# Patient Record
Sex: FEMALE | Race: WHITE | Employment: UNEMPLOYED | URBAN - METROPOLITAN AREA
[De-identification: names, ages, dates, MRNs, and addresses within clinical notes are randomized per-mention and may not be internally consistent; named-entity substitution may affect disease eponyms.]

---

## 2017-02-24 ENCOUNTER — HOSPITAL ENCOUNTER (OUTPATIENT)
Age: 52
Setting detail: SPECIMEN
Discharge: HOME | End: 2017-02-24
Payer: MEDICAID

## 2017-02-24 ENCOUNTER — HOSPITAL ENCOUNTER (OUTPATIENT)
Age: 52
Discharge: HOME | End: 2017-02-24
Payer: COMMERCIAL

## 2017-02-24 ENCOUNTER — OFFICE VISIT (OUTPATIENT)
Dept: FAMILY MEDICINE CLINIC | Facility: CLINIC | Age: 52
End: 2017-02-24

## 2017-02-24 VITALS
WEIGHT: 158.2 LBS | HEIGHT: 68 IN | BODY MASS INDEX: 23.98 KG/M2 | DIASTOLIC BLOOD PRESSURE: 81 MMHG | SYSTOLIC BLOOD PRESSURE: 138 MMHG | HEART RATE: 67 BPM | TEMPERATURE: 98 F

## 2017-02-24 DIAGNOSIS — M77.52 BONE SPUR OF LEFT FOOT: ICD-10-CM

## 2017-02-24 DIAGNOSIS — R56.9 SEIZURES (HCC): ICD-10-CM

## 2017-02-24 DIAGNOSIS — F32.A DEPRESSION, UNSPECIFIED DEPRESSION TYPE: ICD-10-CM

## 2017-02-24 DIAGNOSIS — G89.29 CHRONIC PAIN OF BOTH KNEES: ICD-10-CM

## 2017-02-24 DIAGNOSIS — M34.9 SCLERODERMA (HCC): ICD-10-CM

## 2017-02-24 DIAGNOSIS — M25.561 CHRONIC PAIN OF BOTH KNEES: ICD-10-CM

## 2017-02-24 DIAGNOSIS — R56.9 SEIZURE (HCC): ICD-10-CM

## 2017-02-24 DIAGNOSIS — Z13.9 SCREENING: ICD-10-CM

## 2017-02-24 DIAGNOSIS — Z76.89 ENCOUNTER TO ESTABLISH CARE: ICD-10-CM

## 2017-02-24 DIAGNOSIS — M25.562 CHRONIC PAIN OF BOTH KNEES: ICD-10-CM

## 2017-02-24 DIAGNOSIS — I10 ESSENTIAL HYPERTENSION: Primary | ICD-10-CM

## 2017-02-24 DIAGNOSIS — K21.9 GASTROESOPHAGEAL REFLUX DISEASE WITHOUT ESOPHAGITIS: ICD-10-CM

## 2017-02-24 LAB
ABSOLUTE EOS #: 0.2 K/UL (ref 0–0.4)
ABSOLUTE LYMPH #: 1.4 K/UL (ref 1–4.8)
ABSOLUTE MONO #: 0.4 K/UL (ref 0.1–1.2)
ALBUMIN SERPL-MCNC: 4.4 G/DL (ref 3.5–5.2)
ALBUMIN/GLOBULIN RATIO: 1.6 (ref 1–2.5)
ALP BLD-CCNC: 134 U/L (ref 35–104)
ALT SERPL-CCNC: 13 U/L (ref 5–33)
ANION GAP SERPL CALCULATED.3IONS-SCNC: 16 MMOL/L (ref 9–17)
AST SERPL-CCNC: 15 U/L
BASOPHILS # BLD: 1 % (ref 0–2)
BASOPHILS ABSOLUTE: 0.1 K/UL (ref 0–0.2)
BILIRUB SERPL-MCNC: 0.33 MG/DL (ref 0.3–1.2)
BUN BLDV-MCNC: 14 MG/DL (ref 6–20)
BUN/CREAT BLD: ABNORMAL (ref 9–20)
CALCIUM SERPL-MCNC: 9.4 MG/DL (ref 8.6–10.4)
CHLORIDE BLD-SCNC: 106 MMOL/L (ref 98–107)
CO2: 25 MMOL/L (ref 20–31)
CREAT SERPL-MCNC: 1.05 MG/DL (ref 0.5–0.9)
DIFFERENTIAL TYPE: ABNORMAL
EOSINOPHILS RELATIVE PERCENT: 3 % (ref 1–4)
GFR AFRICAN AMERICAN: >60 ML/MIN
GFR NON-AFRICAN AMERICAN: 55 ML/MIN
GFR SERPL CREATININE-BSD FRML MDRD: ABNORMAL ML/MIN/{1.73_M2}
GFR SERPL CREATININE-BSD FRML MDRD: ABNORMAL ML/MIN/{1.73_M2}
GLUCOSE BLD-MCNC: 59 MG/DL (ref 70–99)
HCT VFR BLD CALC: 33.9 % (ref 36–46)
HEMOGLOBIN: 11.9 G/DL (ref 12–16)
LYMPHOCYTES # BLD: 25 % (ref 24–44)
MCH RBC QN AUTO: 30.6 PG (ref 26–34)
MCHC RBC AUTO-ENTMCNC: 35 G/DL (ref 31–37)
MCV RBC AUTO: 87.3 FL (ref 80–100)
MONOCYTES # BLD: 8 % (ref 2–11)
PDW BLD-RTO: 13 % (ref 12.5–15.4)
PLATELET # BLD: 185 K/UL (ref 140–450)
PLATELET ESTIMATE: ABNORMAL
PMV BLD AUTO: 9 FL (ref 6–12)
POTASSIUM SERPL-SCNC: 4.6 MMOL/L (ref 3.7–5.3)
RBC # BLD: 3.88 M/UL (ref 4–5.2)
RBC # BLD: ABNORMAL 10*6/UL
SEG NEUTROPHILS: 63 % (ref 36–66)
SEGMENTED NEUTROPHILS ABSOLUTE COUNT: 3.3 K/UL (ref 1.8–7.7)
SODIUM BLD-SCNC: 147 MMOL/L (ref 135–144)
TOTAL PROTEIN: 7.2 G/DL (ref 6.4–8.3)
WBC # BLD: 5.4 K/UL (ref 3.5–11)
WBC # BLD: ABNORMAL 10*3/UL

## 2017-02-24 PROCEDURE — 99213 OFFICE O/P EST LOW 20 MIN: CPT | Performed by: FAMILY MEDICINE

## 2017-02-24 PROCEDURE — 99202 OFFICE O/P NEW SF 15 MIN: CPT | Performed by: HOSPITALIST

## 2017-02-24 RX ORDER — OMEPRAZOLE 20 MG/1
CAPSULE, DELAYED RELEASE ORAL
Refills: 4 | COMMUNITY
Start: 2017-02-09 | End: 2017-07-14 | Stop reason: SDUPTHER

## 2017-02-24 RX ORDER — CETIRIZINE HYDROCHLORIDE 10 MG/1
TABLET ORAL
Refills: 4 | COMMUNITY
Start: 2017-02-10 | End: 2018-03-09 | Stop reason: SDUPTHER

## 2017-02-24 RX ORDER — ERGOCALCIFEROL 1.25 MG/1
50000 CAPSULE ORAL WEEKLY
Status: ON HOLD | COMMUNITY
Start: 2017-02-22 | End: 2018-01-31 | Stop reason: HOSPADM

## 2017-02-24 RX ORDER — CYCLOBENZAPRINE HCL 5 MG
TABLET ORAL
Refills: 0 | COMMUNITY
Start: 2017-01-12 | End: 2017-03-17 | Stop reason: SDUPTHER

## 2017-02-24 RX ORDER — NALTREXONE HYDROCHLORIDE 50 MG/1
TABLET, FILM COATED ORAL
Refills: 2 | COMMUNITY
Start: 2017-01-10 | End: 2017-03-17 | Stop reason: SDUPTHER

## 2017-02-24 RX ORDER — POTASSIUM CHLORIDE 1.5 G/1.77G
20 POWDER, FOR SOLUTION ORAL 2 TIMES DAILY
COMMUNITY
End: 2017-03-08 | Stop reason: SDUPTHER

## 2017-02-24 RX ORDER — PRAVASTATIN SODIUM 40 MG
TABLET ORAL
Refills: 4 | COMMUNITY
Start: 2017-02-10 | End: 2017-12-01 | Stop reason: SDUPTHER

## 2017-02-24 RX ORDER — LOSARTAN POTASSIUM 100 MG/1
TABLET ORAL
Refills: 3 | COMMUNITY
Start: 2017-02-09 | End: 2017-04-17 | Stop reason: SDUPTHER

## 2017-02-24 RX ORDER — FAMOTIDINE 40 MG/1
TABLET, FILM COATED ORAL
Refills: 4 | COMMUNITY
Start: 2017-02-09 | End: 2018-04-11

## 2017-02-24 RX ORDER — GABAPENTIN 300 MG/1
CAPSULE ORAL
Refills: 2 | Status: ON HOLD | COMMUNITY
Start: 2017-01-10 | End: 2018-01-31 | Stop reason: HOSPADM

## 2017-02-24 RX ORDER — QUETIAPINE FUMARATE 200 MG/1
TABLET, FILM COATED ORAL
Refills: 1 | COMMUNITY
Start: 2017-02-21 | End: 2017-04-17 | Stop reason: SDUPTHER

## 2017-02-24 RX ORDER — LANOLIN ALCOHOL/MO/W.PET/CERES
1000 CREAM (GRAM) TOPICAL DAILY
Status: ON HOLD | COMMUNITY
End: 2019-06-11 | Stop reason: HOSPADM

## 2017-02-24 RX ORDER — PREDNISONE 50 MG/1
50 TABLET ORAL DAILY PRN
COMMUNITY
End: 2017-12-01 | Stop reason: SDUPTHER

## 2017-02-24 RX ORDER — LIDOCAINE 5 G/100G
CREAM RECTAL; TOPICAL PRN
COMMUNITY
End: 2017-06-21 | Stop reason: CLARIF

## 2017-02-24 RX ORDER — FLUCONAZOLE 150 MG/1
TABLET ORAL
Refills: 2 | COMMUNITY
Start: 2016-12-24 | End: 2017-09-29 | Stop reason: ALTCHOICE

## 2017-02-24 RX ORDER — LAMOTRIGINE 25 MG/1
TABLET ORAL
COMMUNITY
Start: 2017-02-14 | End: 2017-03-17 | Stop reason: SDUPTHER

## 2017-02-24 RX ORDER — M-VIT,TX,IRON,MINS/CALC/FOLIC 27MG-0.4MG
1 TABLET ORAL DAILY
COMMUNITY

## 2017-02-24 RX ORDER — ASPIRIN 81 MG/1
81 TABLET ORAL DAILY
Status: ON HOLD | COMMUNITY
End: 2017-05-03 | Stop reason: HOSPADM

## 2017-02-24 RX ORDER — FELODIPINE 10 MG/1
TABLET, EXTENDED RELEASE ORAL
COMMUNITY
Start: 2017-02-12 | End: 2017-03-17 | Stop reason: SDUPTHER

## 2017-02-24 RX ORDER — IBUPROFEN 800 MG/1
TABLET ORAL
Refills: 0 | Status: ON HOLD | COMMUNITY
Start: 2017-01-29 | End: 2017-04-08 | Stop reason: HOSPADM

## 2017-02-24 RX ORDER — CHLORHEXIDINE GLUCONATE 0.12 MG/ML
RINSE ORAL
Refills: 0 | COMMUNITY
Start: 2016-12-20 | End: 2017-09-29 | Stop reason: SDUPTHER

## 2017-02-24 RX ORDER — POTASSIUM BICARBONATE 25 MEQ/1
25 TABLET, EFFERVESCENT ORAL DAILY
Status: ON HOLD | COMMUNITY
End: 2017-09-15 | Stop reason: CLARIF

## 2017-02-24 RX ORDER — ATENOLOL 100 MG/1
TABLET ORAL
Refills: 3 | COMMUNITY
Start: 2017-02-17 | End: 2017-09-11 | Stop reason: SDUPTHER

## 2017-02-24 RX ORDER — TRAMADOL HYDROCHLORIDE 50 MG/1
TABLET ORAL
Refills: 1 | COMMUNITY
Start: 2016-12-21 | End: 2017-06-12 | Stop reason: ALTCHOICE

## 2017-02-24 RX ORDER — VENLAFAXINE 75 MG/1
TABLET ORAL
Refills: 0 | COMMUNITY
Start: 2016-12-07 | End: 2017-03-17 | Stop reason: SDUPTHER

## 2017-02-24 RX ORDER — PROPRANOLOL HYDROCHLORIDE 10 MG/1
TABLET ORAL
Refills: 1 | COMMUNITY
Start: 2017-02-21 | End: 2017-12-01 | Stop reason: SDUPTHER

## 2017-02-24 RX ORDER — EPINEPHRINE 0.3 MG/.3ML
INJECTION INTRAMUSCULAR
Refills: 2 | COMMUNITY
Start: 2016-12-07 | End: 2017-09-29 | Stop reason: SDUPTHER

## 2017-02-24 ASSESSMENT — ENCOUNTER SYMPTOMS
ABDOMINAL DISTENTION: 0
APNEA: 0
COUGH: 0
ANAL BLEEDING: 0
CHOKING: 0
SHORTNESS OF BREATH: 0

## 2017-03-08 ENCOUNTER — OFFICE VISIT (OUTPATIENT)
Dept: FAMILY MEDICINE CLINIC | Facility: CLINIC | Age: 52
End: 2017-03-08

## 2017-03-08 VITALS
HEART RATE: 58 BPM | HEIGHT: 67 IN | DIASTOLIC BLOOD PRESSURE: 72 MMHG | BODY MASS INDEX: 25.9 KG/M2 | SYSTOLIC BLOOD PRESSURE: 136 MMHG | WEIGHT: 165 LBS

## 2017-03-08 DIAGNOSIS — M25.561 CHRONIC PAIN OF BOTH KNEES: ICD-10-CM

## 2017-03-08 DIAGNOSIS — M34.9 SCLERODERMA (HCC): ICD-10-CM

## 2017-03-08 DIAGNOSIS — R76.0 ANTINUCLEAR ANTIBODY (ANA) TITER GREATER THAN 1:80: ICD-10-CM

## 2017-03-08 DIAGNOSIS — F32.9 SINGLE CURRENT EPISODE OF MAJOR DEPRESSIVE DISORDER, UNSPECIFIED DEPRESSION EPISODE SEVERITY: ICD-10-CM

## 2017-03-08 DIAGNOSIS — F41.9 ANXIETY: ICD-10-CM

## 2017-03-08 DIAGNOSIS — N17.9 ACUTE KIDNEY INJURY (HCC): ICD-10-CM

## 2017-03-08 DIAGNOSIS — I10 ESSENTIAL HYPERTENSION: ICD-10-CM

## 2017-03-08 DIAGNOSIS — Z96.652 H/O TOTAL KNEE REPLACEMENT, LEFT: ICD-10-CM

## 2017-03-08 DIAGNOSIS — M25.562 CHRONIC PAIN OF BOTH KNEES: ICD-10-CM

## 2017-03-08 DIAGNOSIS — Z00.00 PHYSICAL EXAM: Primary | ICD-10-CM

## 2017-03-08 DIAGNOSIS — G89.29 CHRONIC PAIN OF BOTH KNEES: ICD-10-CM

## 2017-03-08 DIAGNOSIS — F31.9 BIPOLAR AFFECTIVE DISORDER, REMISSION STATUS UNSPECIFIED (HCC): ICD-10-CM

## 2017-03-08 LAB
BILIRUBIN, POC: NORMAL
BLOOD URINE, POC: NORMAL
CLARITY, POC: CLEAR
COLOR, POC: YELLOW
GLUCOSE URINE, POC: NORMAL
KETONES, POC: NORMAL
LEUKOCYTE EST, POC: NORMAL
NITRITE, POC: NORMAL
PH, POC: NORMAL
PROTEIN, POC: NORMAL
SPECIFIC GRAVITY, POC: 1.01
UROBILINOGEN, POC: NORMAL

## 2017-03-08 PROCEDURE — 81003 URINALYSIS AUTO W/O SCOPE: CPT | Performed by: NURSE PRACTITIONER

## 2017-03-08 PROCEDURE — 99214 OFFICE O/P EST MOD 30 MIN: CPT | Performed by: NURSE PRACTITIONER

## 2017-03-08 RX ORDER — HYDROXYCHLOROQUINE SULFATE 200 MG/1
200 TABLET, FILM COATED ORAL DAILY
Refills: 1 | COMMUNITY
Start: 2017-02-15 | End: 2017-12-07 | Stop reason: SDUPTHER

## 2017-03-08 ASSESSMENT — PATIENT HEALTH QUESTIONNAIRE - PHQ9
SUM OF ALL RESPONSES TO PHQ QUESTIONS 1-9: 0
1. LITTLE INTEREST OR PLEASURE IN DOING THINGS: 0
2. FEELING DOWN, DEPRESSED OR HOPELESS: 0
SUM OF ALL RESPONSES TO PHQ9 QUESTIONS 1 & 2: 0

## 2017-03-08 ASSESSMENT — ENCOUNTER SYMPTOMS
RHINORRHEA: 0
SINUS PRESSURE: 0
EYE DISCHARGE: 0
COUGH: 0
ABDOMINAL PAIN: 0
CONSTIPATION: 0
CHEST TIGHTNESS: 0
BLOOD IN STOOL: 0
SHORTNESS OF BREATH: 0
DIARRHEA: 0

## 2017-03-08 ASSESSMENT — VISUAL ACUITY
OS_CC: 20/50
OD_CC: 20/20

## 2017-03-10 ENCOUNTER — TELEPHONE (OUTPATIENT)
Dept: ORTHOPEDIC SURGERY | Facility: CLINIC | Age: 52
End: 2017-03-10

## 2017-03-17 RX ORDER — FELODIPINE 10 MG/1
10 TABLET, EXTENDED RELEASE ORAL DAILY
Qty: 30 TABLET | Refills: 3 | Status: SHIPPED | OUTPATIENT
Start: 2017-03-17 | End: 2017-07-28 | Stop reason: SDUPTHER

## 2017-03-17 RX ORDER — LAMOTRIGINE 25 MG/1
25 TABLET ORAL 2 TIMES DAILY
Qty: 60 TABLET | Refills: 3 | Status: ON HOLD | OUTPATIENT
Start: 2017-03-17 | End: 2017-09-15 | Stop reason: HOSPADM

## 2017-03-17 RX ORDER — TRAMADOL HYDROCHLORIDE 50 MG/1
TABLET ORAL
Qty: 60 TABLET | Refills: 1 | OUTPATIENT
Start: 2017-03-17

## 2017-03-17 RX ORDER — CYCLOBENZAPRINE HCL 5 MG
TABLET ORAL
Qty: 45 TABLET | Refills: 1 | Status: SHIPPED | OUTPATIENT
Start: 2017-03-17 | End: 2017-05-24 | Stop reason: SDUPTHER

## 2017-03-17 RX ORDER — NALTREXONE HYDROCHLORIDE 50 MG/1
TABLET, FILM COATED ORAL
Qty: 30 TABLET | Refills: 3 | Status: SHIPPED | OUTPATIENT
Start: 2017-03-17 | End: 2017-12-08 | Stop reason: SDUPTHER

## 2017-03-17 RX ORDER — VENLAFAXINE 75 MG/1
TABLET ORAL
Qty: 90 TABLET | Refills: 3 | Status: SHIPPED | OUTPATIENT
Start: 2017-03-17 | End: 2020-08-05 | Stop reason: ALTCHOICE

## 2017-03-28 DIAGNOSIS — M25.561 RIGHT KNEE PAIN, UNSPECIFIED CHRONICITY: Primary | ICD-10-CM

## 2017-03-30 ENCOUNTER — TELEPHONE (OUTPATIENT)
Dept: FAMILY MEDICINE CLINIC | Age: 52
End: 2017-03-30

## 2017-03-30 ENCOUNTER — OFFICE VISIT (OUTPATIENT)
Dept: ORTHOPEDIC SURGERY | Age: 52
End: 2017-03-30
Payer: MEDICAID

## 2017-03-30 VITALS — BODY MASS INDEX: 25.61 KG/M2 | HEIGHT: 67 IN | WEIGHT: 163.14 LBS

## 2017-03-30 DIAGNOSIS — S83.206D TEAR OF MENISCUS OF RIGHT KNEE, UNSPECIFIED MENISCUS, UNSPECIFIED TEAR TYPE, UNSPECIFIED WHETHER OLD OR CURRENT TEAR, SUBSEQUENT ENCOUNTER: Primary | ICD-10-CM

## 2017-03-30 DIAGNOSIS — S83.206A TEAR OF MENISCUS OF RIGHT KNEE, UNSPECIFIED MENISCUS, UNSPECIFIED TEAR TYPE, UNSPECIFIED WHETHER OLD OR CURRENT TEAR, INITIAL ENCOUNTER: Primary | ICD-10-CM

## 2017-03-30 DIAGNOSIS — M34.9 SCLERODERMA (HCC): ICD-10-CM

## 2017-03-30 PROCEDURE — 99243 OFF/OP CNSLTJ NEW/EST LOW 30: CPT | Performed by: ORTHOPAEDIC SURGERY

## 2017-03-30 ASSESSMENT — ENCOUNTER SYMPTOMS
NAUSEA: 0
COUGH: 0
CONSTIPATION: 0
DIARRHEA: 0

## 2017-03-31 ENCOUNTER — TELEPHONE (OUTPATIENT)
Dept: FAMILY MEDICINE CLINIC | Age: 52
End: 2017-03-31

## 2017-03-31 DIAGNOSIS — J45.909 UNCOMPLICATED ASTHMA, UNSPECIFIED ASTHMA SEVERITY: Primary | ICD-10-CM

## 2017-03-31 RX ORDER — ALBUTEROL SULFATE 90 UG/1
2 AEROSOL, METERED RESPIRATORY (INHALATION) EVERY 6 HOURS PRN
Qty: 1 INHALER | Refills: 3 | Status: SHIPPED | OUTPATIENT
Start: 2017-03-31 | End: 2017-12-01 | Stop reason: SDUPTHER

## 2017-04-06 ENCOUNTER — APPOINTMENT (OUTPATIENT)
Dept: CT IMAGING | Age: 52
DRG: 463 | End: 2017-04-06
Payer: MEDICAID

## 2017-04-06 ENCOUNTER — APPOINTMENT (OUTPATIENT)
Dept: MRI IMAGING | Age: 52
DRG: 463 | End: 2017-04-06
Payer: MEDICAID

## 2017-04-06 ENCOUNTER — HOSPITAL ENCOUNTER (INPATIENT)
Age: 52
LOS: 2 days | Discharge: HOME | DRG: 463 | End: 2017-04-08
Attending: EMERGENCY MEDICINE | Admitting: INTERNAL MEDICINE
Payer: MEDICAID

## 2017-04-06 DIAGNOSIS — I63.9 CEREBROVASCULAR ACCIDENT (CVA), UNSPECIFIED MECHANISM (HCC): Primary | ICD-10-CM

## 2017-04-06 LAB
-: ABNORMAL
ABO/RH: NORMAL
ABSOLUTE EOS #: 0.2 K/UL (ref 0–0.4)
ABSOLUTE LYMPH #: 1.7 K/UL (ref 1–4.8)
ABSOLUTE MONO #: 0.5 K/UL (ref 0.1–1.3)
ALBUMIN SERPL-MCNC: 4.6 G/DL (ref 3.5–5.2)
ALBUMIN/GLOBULIN RATIO: ABNORMAL (ref 1–2.5)
ALP BLD-CCNC: 160 U/L (ref 35–104)
ALT SERPL-CCNC: 16 U/L (ref 5–33)
AMORPHOUS: ABNORMAL
ANION GAP SERPL CALCULATED.3IONS-SCNC: 15 MMOL/L (ref 9–17)
ANTIBODY SCREEN: NEGATIVE
ARM BAND NUMBER: NORMAL
AST SERPL-CCNC: 22 U/L
BACTERIA: ABNORMAL
BASOPHILS # BLD: 2 % (ref 0–2)
BASOPHILS ABSOLUTE: 0.1 K/UL (ref 0–0.2)
BILIRUB SERPL-MCNC: 0.33 MG/DL (ref 0.3–1.2)
BILIRUBIN URINE: NEGATIVE
BLOOD BANK COMMENT: NORMAL
BNP INTERPRETATION: NORMAL
BUN BLDV-MCNC: 17 MG/DL (ref 6–20)
BUN/CREAT BLD: ABNORMAL (ref 9–20)
CALCIUM SERPL-MCNC: 9.3 MG/DL (ref 8.6–10.4)
CASTS UA: ABNORMAL /LPF (ref 0–2)
CHLORIDE BLD-SCNC: 99 MMOL/L (ref 98–107)
CO2: 23 MMOL/L (ref 20–31)
COLOR: YELLOW
COMMENT UA: ABNORMAL
CREAT SERPL-MCNC: 0.98 MG/DL (ref 0.5–0.9)
CRYSTALS, UA: ABNORMAL /HPF
DIFFERENTIAL TYPE: ABNORMAL
EOSINOPHILS RELATIVE PERCENT: 4 % (ref 0–4)
EPITHELIAL CELLS UA: ABNORMAL /HPF (ref 0–5)
EXPIRATION DATE: NORMAL
GFR AFRICAN AMERICAN: >60 ML/MIN
GFR NON-AFRICAN AMERICAN: 60 ML/MIN
GFR SERPL CREATININE-BSD FRML MDRD: ABNORMAL ML/MIN/{1.73_M2}
GFR SERPL CREATININE-BSD FRML MDRD: ABNORMAL ML/MIN/{1.73_M2}
GLUCOSE BLD-MCNC: 75 MG/DL (ref 70–99)
GLUCOSE URINE: NEGATIVE
HCT VFR BLD CALC: 37 % (ref 36–46)
HEMOGLOBIN: 12.7 G/DL (ref 12–16)
HOMOCYSTEINE: 10.8 UMOL/L
INR BLD: 1
KETONES, URINE: NEGATIVE
LEUKOCYTE ESTERASE, URINE: ABNORMAL
LYMPHOCYTES # BLD: 27 % (ref 24–44)
MAGNESIUM: 2.3 MG/DL (ref 1.6–2.6)
MCH RBC QN AUTO: 31.1 PG (ref 26–34)
MCHC RBC AUTO-ENTMCNC: 34.3 G/DL (ref 31–37)
MCV RBC AUTO: 90.6 FL (ref 80–100)
MONOCYTES # BLD: 8 % (ref 1–7)
MUCUS: ABNORMAL
NITRITE, URINE: NEGATIVE
OTHER OBSERVATIONS UA: ABNORMAL
PDW BLD-RTO: 13 % (ref 11.5–14.9)
PH UA: 6 (ref 5–8)
PLATELET # BLD: 185 K/UL (ref 150–450)
PLATELET ESTIMATE: ABNORMAL
PMV BLD AUTO: 8.7 FL (ref 6–12)
POTASSIUM SERPL-SCNC: 4.9 MMOL/L (ref 3.7–5.3)
PRO-BNP: 69 PG/ML
PROTEIN UA: NEGATIVE
PROTHROMBIN TIME: 10.2 SEC (ref 9.7–12)
RBC # BLD: 4.08 M/UL (ref 4–5.2)
RBC # BLD: ABNORMAL 10*6/UL
RBC UA: ABNORMAL /HPF
RENAL EPITHELIAL, UA: ABNORMAL /HPF
SEG NEUTROPHILS: 59 % (ref 36–66)
SEGMENTED NEUTROPHILS ABSOLUTE COUNT: 3.8 K/UL (ref 1.3–9.1)
SODIUM BLD-SCNC: 137 MMOL/L (ref 135–144)
SPECIFIC GRAVITY UA: 1 (ref 1–1.03)
TOTAL PROTEIN: 7.9 G/DL (ref 6.4–8.3)
TRICHOMONAS: ABNORMAL
TROPONIN INTERP: NORMAL
TROPONIN T: <0.03 NG/ML
TURBIDITY: ABNORMAL
URINE HGB: NEGATIVE
UROBILINOGEN, URINE: NORMAL
WBC # BLD: 6.3 K/UL (ref 3.5–11)
WBC # BLD: ABNORMAL 10*3/UL
WBC UA: ABNORMAL /HPF
YEAST: ABNORMAL

## 2017-04-06 PROCEDURE — 70450 CT HEAD/BRAIN W/O DYE: CPT

## 2017-04-06 PROCEDURE — 87186 SC STD MICRODIL/AGAR DIL: CPT

## 2017-04-06 PROCEDURE — 99285 EMERGENCY DEPT VISIT HI MDM: CPT

## 2017-04-06 PROCEDURE — 87088 URINE BACTERIA CULTURE: CPT

## 2017-04-06 PROCEDURE — 71275 CT ANGIOGRAPHY CHEST: CPT

## 2017-04-06 PROCEDURE — 6370000000 HC RX 637 (ALT 250 FOR IP): Performed by: HOSPITALIST

## 2017-04-06 PROCEDURE — 83090 ASSAY OF HOMOCYSTEINE: CPT

## 2017-04-06 PROCEDURE — 70551 MRI BRAIN STEM W/O DYE: CPT

## 2017-04-06 PROCEDURE — 2580000003 HC RX 258: Performed by: HOSPITALIST

## 2017-04-06 PROCEDURE — 6360000002 HC RX W HCPCS: Performed by: STUDENT IN AN ORGANIZED HEALTH CARE EDUCATION/TRAINING PROGRAM

## 2017-04-06 PROCEDURE — 2580000003 HC RX 258: Performed by: EMERGENCY MEDICINE

## 2017-04-06 PROCEDURE — 83880 ASSAY OF NATRIURETIC PEPTIDE: CPT

## 2017-04-06 PROCEDURE — 85025 COMPLETE CBC W/AUTO DIFF WBC: CPT

## 2017-04-06 PROCEDURE — 84484 ASSAY OF TROPONIN QUANT: CPT

## 2017-04-06 PROCEDURE — 74174 CTA ABD&PLVS W/CONTRAST: CPT

## 2017-04-06 PROCEDURE — 6360000002 HC RX W HCPCS: Performed by: HOSPITALIST

## 2017-04-06 PROCEDURE — 87086 URINE CULTURE/COLONY COUNT: CPT

## 2017-04-06 PROCEDURE — 86901 BLOOD TYPING SEROLOGIC RH(D): CPT

## 2017-04-06 PROCEDURE — 70498 CT ANGIOGRAPHY NECK: CPT

## 2017-04-06 PROCEDURE — 86850 RBC ANTIBODY SCREEN: CPT

## 2017-04-06 PROCEDURE — 80053 COMPREHEN METABOLIC PANEL: CPT

## 2017-04-06 PROCEDURE — 85306 CLOT INHIBIT PROT S FREE: CPT

## 2017-04-06 PROCEDURE — 85220 BLOOC CLOT FACTOR V TEST: CPT

## 2017-04-06 PROCEDURE — 36415 COLL VENOUS BLD VENIPUNCTURE: CPT

## 2017-04-06 PROCEDURE — 85610 PROTHROMBIN TIME: CPT

## 2017-04-06 PROCEDURE — 6360000004 HC RX CONTRAST MEDICATION: Performed by: EMERGENCY MEDICINE

## 2017-04-06 PROCEDURE — 86900 BLOOD TYPING SEROLOGIC ABO: CPT

## 2017-04-06 PROCEDURE — 85303 CLOT INHIBIT PROT C ACTIVITY: CPT

## 2017-04-06 PROCEDURE — 70496 CT ANGIOGRAPHY HEAD: CPT

## 2017-04-06 PROCEDURE — 2060000000 HC ICU INTERMEDIATE R&B

## 2017-04-06 PROCEDURE — 93005 ELECTROCARDIOGRAM TRACING: CPT

## 2017-04-06 PROCEDURE — 94664 DEMO&/EVAL PT USE INHALER: CPT

## 2017-04-06 PROCEDURE — 83735 ASSAY OF MAGNESIUM: CPT

## 2017-04-06 PROCEDURE — 86038 ANTINUCLEAR ANTIBODIES: CPT

## 2017-04-06 PROCEDURE — 6370000000 HC RX 637 (ALT 250 FOR IP): Performed by: EMERGENCY MEDICINE

## 2017-04-06 PROCEDURE — 81001 URINALYSIS AUTO W/SCOPE: CPT

## 2017-04-06 PROCEDURE — 99999 PR OFFICE/OUTPT VISIT,PROCEDURE ONLY: CPT | Performed by: PSYCHIATRY & NEUROLOGY

## 2017-04-06 RX ORDER — LAMOTRIGINE 25 MG/1
25 TABLET ORAL 2 TIMES DAILY
Status: DISCONTINUED | OUTPATIENT
Start: 2017-04-06 | End: 2017-04-08 | Stop reason: HOSPADM

## 2017-04-06 RX ORDER — LOSARTAN POTASSIUM 100 MG/1
100 TABLET ORAL DAILY
Status: DISCONTINUED | OUTPATIENT
Start: 2017-04-06 | End: 2017-04-08 | Stop reason: HOSPADM

## 2017-04-06 RX ORDER — 0.9 % SODIUM CHLORIDE 0.9 %
100 INTRAVENOUS SOLUTION INTRAVENOUS ONCE
Status: COMPLETED | OUTPATIENT
Start: 2017-04-06 | End: 2017-04-06

## 2017-04-06 RX ORDER — SODIUM CHLORIDE 0.9 % (FLUSH) 0.9 %
10 SYRINGE (ML) INJECTION EVERY 12 HOURS SCHEDULED
Status: DISCONTINUED | OUTPATIENT
Start: 2017-04-06 | End: 2017-04-08 | Stop reason: HOSPADM

## 2017-04-06 RX ORDER — GABAPENTIN 300 MG/1
300 CAPSULE ORAL NIGHTLY
Status: DISCONTINUED | OUTPATIENT
Start: 2017-04-06 | End: 2017-04-08 | Stop reason: HOSPADM

## 2017-04-06 RX ORDER — SODIUM CHLORIDE 0.9 % (FLUSH) 0.9 %
10 SYRINGE (ML) INJECTION PRN
Status: DISCONTINUED | OUTPATIENT
Start: 2017-04-06 | End: 2017-04-08 | Stop reason: HOSPADM

## 2017-04-06 RX ORDER — BISACODYL 10 MG
10 SUPPOSITORY, RECTAL RECTAL DAILY PRN
Status: DISCONTINUED | OUTPATIENT
Start: 2017-04-06 | End: 2017-04-08 | Stop reason: HOSPADM

## 2017-04-06 RX ORDER — ONDANSETRON 2 MG/ML
4 INJECTION INTRAMUSCULAR; INTRAVENOUS EVERY 6 HOURS PRN
Status: DISCONTINUED | OUTPATIENT
Start: 2017-04-06 | End: 2017-04-08 | Stop reason: HOSPADM

## 2017-04-06 RX ORDER — VENLAFAXINE 75 MG/1
75 TABLET ORAL 2 TIMES DAILY WITH MEALS
Status: DISCONTINUED | OUTPATIENT
Start: 2017-04-06 | End: 2017-04-08 | Stop reason: HOSPADM

## 2017-04-06 RX ORDER — FAMOTIDINE 20 MG/1
40 TABLET, FILM COATED ORAL DAILY
Status: DISCONTINUED | OUTPATIENT
Start: 2017-04-06 | End: 2017-04-08 | Stop reason: HOSPADM

## 2017-04-06 RX ORDER — ASPIRIN 81 MG/1
324 TABLET, CHEWABLE ORAL ONCE
Status: COMPLETED | OUTPATIENT
Start: 2017-04-06 | End: 2017-04-06

## 2017-04-06 RX ORDER — PRAVASTATIN SODIUM 40 MG
40 TABLET ORAL NIGHTLY
Status: DISCONTINUED | OUTPATIENT
Start: 2017-04-06 | End: 2017-04-08 | Stop reason: HOSPADM

## 2017-04-06 RX ORDER — ALBUTEROL SULFATE 90 UG/1
2 AEROSOL, METERED RESPIRATORY (INHALATION) EVERY 6 HOURS PRN
Status: DISCONTINUED | OUTPATIENT
Start: 2017-04-06 | End: 2017-04-08 | Stop reason: HOSPADM

## 2017-04-06 RX ORDER — SODIUM CHLORIDE 9 MG/ML
INJECTION, SOLUTION INTRAVENOUS CONTINUOUS
Status: DISCONTINUED | OUTPATIENT
Start: 2017-04-06 | End: 2017-04-08 | Stop reason: HOSPADM

## 2017-04-06 RX ORDER — DIAZEPAM 5 MG/ML
2.5 INJECTION, SOLUTION INTRAMUSCULAR; INTRAVENOUS ONCE
Status: COMPLETED | OUTPATIENT
Start: 2017-04-06 | End: 2017-04-06

## 2017-04-06 RX ORDER — ASPIRIN 325 MG
325 TABLET ORAL DAILY
Status: DISCONTINUED | OUTPATIENT
Start: 2017-04-07 | End: 2017-04-08 | Stop reason: HOSPADM

## 2017-04-06 RX ORDER — QUETIAPINE FUMARATE 100 MG/1
100 TABLET, FILM COATED ORAL 2 TIMES DAILY
Status: DISCONTINUED | OUTPATIENT
Start: 2017-04-06 | End: 2017-04-08 | Stop reason: HOSPADM

## 2017-04-06 RX ORDER — ATENOLOL 50 MG/1
100 TABLET ORAL DAILY
Status: DISCONTINUED | OUTPATIENT
Start: 2017-04-06 | End: 2017-04-08 | Stop reason: HOSPADM

## 2017-04-06 RX ORDER — ACETAMINOPHEN 325 MG/1
650 TABLET ORAL EVERY 4 HOURS PRN
Status: DISCONTINUED | OUTPATIENT
Start: 2017-04-06 | End: 2017-04-08 | Stop reason: HOSPADM

## 2017-04-06 RX ORDER — HYDROXYCHLOROQUINE SULFATE 200 MG/1
200 TABLET, FILM COATED ORAL DAILY
Status: DISCONTINUED | OUTPATIENT
Start: 2017-04-06 | End: 2017-04-08 | Stop reason: HOSPADM

## 2017-04-06 RX ORDER — PROPRANOLOL HYDROCHLORIDE 10 MG/1
10 TABLET ORAL 2 TIMES DAILY
Status: DISCONTINUED | OUTPATIENT
Start: 2017-04-06 | End: 2017-04-08 | Stop reason: HOSPADM

## 2017-04-06 RX ADMIN — ENOXAPARIN SODIUM 40 MG: 40 INJECTION SUBCUTANEOUS at 21:41

## 2017-04-06 RX ADMIN — GABAPENTIN 300 MG: 300 CAPSULE ORAL at 21:36

## 2017-04-06 RX ADMIN — IOVERSOL 100 ML: 741 INJECTION INTRA-ARTERIAL; INTRAVENOUS at 15:13

## 2017-04-06 RX ADMIN — PROPRANOLOL HYDROCHLORIDE 10 MG: 10 TABLET ORAL at 21:31

## 2017-04-06 RX ADMIN — Medication 10 ML: at 15:13

## 2017-04-06 RX ADMIN — VENLAFAXINE 75 MG: 75 TABLET ORAL at 21:38

## 2017-04-06 RX ADMIN — SODIUM CHLORIDE 100 ML: 9 INJECTION, SOLUTION INTRAVENOUS at 15:13

## 2017-04-06 RX ADMIN — QUETIAPINE FUMARATE 100 MG: 100 TABLET, FILM COATED ORAL at 21:31

## 2017-04-06 RX ADMIN — Medication 10 ML: at 15:50

## 2017-04-06 RX ADMIN — IOVERSOL 100 ML: 741 INJECTION INTRA-ARTERIAL; INTRAVENOUS at 15:49

## 2017-04-06 RX ADMIN — ASPIRIN 324 MG: 81 TABLET, CHEWABLE ORAL at 16:36

## 2017-04-06 RX ADMIN — LAMOTRIGINE 25 MG: 25 TABLET ORAL at 21:31

## 2017-04-06 RX ADMIN — SODIUM CHLORIDE 100 ML: 9 INJECTION, SOLUTION INTRAVENOUS at 15:49

## 2017-04-06 RX ADMIN — DIAZEPAM 2.5 MG: 5 INJECTION, SOLUTION INTRAMUSCULAR; INTRAVENOUS at 19:18

## 2017-04-06 RX ADMIN — PRAVASTATIN SODIUM 40 MG: 40 TABLET ORAL at 21:31

## 2017-04-06 RX ADMIN — SODIUM CHLORIDE: 9 INJECTION, SOLUTION INTRAVENOUS at 18:46

## 2017-04-06 ASSESSMENT — PAIN DESCRIPTION - PAIN TYPE: TYPE: ACUTE PAIN

## 2017-04-06 ASSESSMENT — PAIN DESCRIPTION - ORIENTATION: ORIENTATION: RIGHT

## 2017-04-06 ASSESSMENT — PAIN DESCRIPTION - LOCATION: LOCATION: ANKLE

## 2017-04-06 ASSESSMENT — PAIN SCALES - GENERAL: PAINLEVEL_OUTOF10: 4

## 2017-04-07 LAB
ANTI-NUCLEAR ANTIBODY (ANA): POSITIVE
CHOLESTEROL/HDL RATIO: 1.7
CHOLESTEROL: 167 MG/DL
ESTIMATED AVERAGE GLUCOSE: 91 MG/DL
FACTOR V ACTIVITY: 106 % (ref 50–150)
HBA1C MFR BLD: 4.8 % (ref 4–6)
HCT VFR BLD CALC: 33.3 % (ref 36–46)
HDLC SERPL-MCNC: 100 MG/DL
HEMOGLOBIN: 11.4 G/DL (ref 12–16)
LDL CHOLESTEROL: 55 MG/DL (ref 0–130)
LV EF: 55 %
LVEF MODALITY: NORMAL
MCH RBC QN AUTO: 30.9 PG (ref 26–34)
MCHC RBC AUTO-ENTMCNC: 34.2 G/DL (ref 31–37)
MCV RBC AUTO: 90.2 FL (ref 80–100)
PDW BLD-RTO: 13.3 % (ref 11.5–14.9)
PLATELET # BLD: 157 K/UL (ref 150–450)
PMV BLD AUTO: 8.4 FL (ref 6–12)
PROTEIN C ACTIVITY: 102 %
PROTEIN S ACTIVITY: 69 % (ref 59–130)
RBC # BLD: 3.69 M/UL (ref 4–5.2)
TRIGL SERPL-MCNC: 59 MG/DL
VLDLC SERPL CALC-MCNC: NORMAL MG/DL (ref 1–30)
WBC # BLD: 4.8 K/UL (ref 3.5–11)

## 2017-04-07 PROCEDURE — 6370000000 HC RX 637 (ALT 250 FOR IP): Performed by: HOSPITALIST

## 2017-04-07 PROCEDURE — 83036 HEMOGLOBIN GLYCOSYLATED A1C: CPT

## 2017-04-07 PROCEDURE — 80061 LIPID PANEL: CPT

## 2017-04-07 PROCEDURE — 99232 SBSQ HOSP IP/OBS MODERATE 35: CPT | Performed by: INTERNAL MEDICINE

## 2017-04-07 PROCEDURE — 93306 TTE W/DOPPLER COMPLETE: CPT

## 2017-04-07 PROCEDURE — 6360000002 HC RX W HCPCS: Performed by: HOSPITALIST

## 2017-04-07 PROCEDURE — 2060000000 HC ICU INTERMEDIATE R&B

## 2017-04-07 PROCEDURE — 36415 COLL VENOUS BLD VENIPUNCTURE: CPT

## 2017-04-07 PROCEDURE — 85027 COMPLETE CBC AUTOMATED: CPT

## 2017-04-07 RX ADMIN — LAMOTRIGINE 25 MG: 25 TABLET ORAL at 19:53

## 2017-04-07 RX ADMIN — PRAVASTATIN SODIUM 40 MG: 40 TABLET ORAL at 19:53

## 2017-04-07 RX ADMIN — QUETIAPINE FUMARATE 100 MG: 100 TABLET, FILM COATED ORAL at 19:53

## 2017-04-07 RX ADMIN — LAMOTRIGINE 25 MG: 25 TABLET ORAL at 09:07

## 2017-04-07 RX ADMIN — FAMOTIDINE 40 MG: 20 TABLET, FILM COATED ORAL at 09:04

## 2017-04-07 RX ADMIN — GABAPENTIN 300 MG: 300 CAPSULE ORAL at 19:53

## 2017-04-07 RX ADMIN — VENLAFAXINE 75 MG: 75 TABLET ORAL at 09:07

## 2017-04-07 RX ADMIN — VENLAFAXINE 75 MG: 75 TABLET ORAL at 17:15

## 2017-04-07 RX ADMIN — QUETIAPINE FUMARATE 100 MG: 100 TABLET, FILM COATED ORAL at 09:07

## 2017-04-07 RX ADMIN — HYDROXYCHLOROQUINE SULFATE 200 MG: 200 TABLET, FILM COATED ORAL at 09:07

## 2017-04-07 RX ADMIN — ASPIRIN 325 MG ORAL TABLET 325 MG: 325 PILL ORAL at 09:04

## 2017-04-07 RX ADMIN — ENOXAPARIN SODIUM 40 MG: 40 INJECTION SUBCUTANEOUS at 09:08

## 2017-04-08 VITALS
TEMPERATURE: 97.5 F | RESPIRATION RATE: 15 BRPM | HEIGHT: 68 IN | SYSTOLIC BLOOD PRESSURE: 125 MMHG | DIASTOLIC BLOOD PRESSURE: 74 MMHG | BODY MASS INDEX: 24.93 KG/M2 | OXYGEN SATURATION: 100 % | HEART RATE: 58 BPM | WEIGHT: 164.46 LBS

## 2017-04-08 LAB
CULTURE: ABNORMAL
CULTURE: ABNORMAL
Lab: ABNORMAL
ORGANISM: ABNORMAL
SPECIMEN DESCRIPTION: ABNORMAL
SPECIMEN DESCRIPTION: ABNORMAL
STATUS: ABNORMAL

## 2017-04-08 PROCEDURE — 99239 HOSP IP/OBS DSCHRG MGMT >30: CPT | Performed by: INTERNAL MEDICINE

## 2017-04-08 PROCEDURE — 6370000000 HC RX 637 (ALT 250 FOR IP): Performed by: HOSPITALIST

## 2017-04-08 PROCEDURE — 2580000003 HC RX 258: Performed by: HOSPITALIST

## 2017-04-08 PROCEDURE — 6360000002 HC RX W HCPCS: Performed by: INTERNAL MEDICINE

## 2017-04-08 PROCEDURE — 6360000002 HC RX W HCPCS: Performed by: HOSPITALIST

## 2017-04-08 RX ORDER — CIPROFLOXACIN 2 MG/ML
400 INJECTION, SOLUTION INTRAVENOUS EVERY 12 HOURS
Status: DISCONTINUED | OUTPATIENT
Start: 2017-04-08 | End: 2017-04-08 | Stop reason: HOSPADM

## 2017-04-08 RX ORDER — CIPROFLOXACIN 500 MG/1
500 TABLET, FILM COATED ORAL 2 TIMES DAILY
Qty: 10 TABLET | Refills: 0 | Status: SHIPPED | OUTPATIENT
Start: 2017-04-08 | End: 2017-04-13

## 2017-04-08 RX ADMIN — PROPRANOLOL HYDROCHLORIDE 10 MG: 10 TABLET ORAL at 11:31

## 2017-04-08 RX ADMIN — FAMOTIDINE 40 MG: 20 TABLET, FILM COATED ORAL at 09:57

## 2017-04-08 RX ADMIN — Medication 10 ML: at 09:59

## 2017-04-08 RX ADMIN — LOSARTAN POTASSIUM 100 MG: 100 TABLET, FILM COATED ORAL at 09:57

## 2017-04-08 RX ADMIN — ASPIRIN 325 MG ORAL TABLET 325 MG: 325 PILL ORAL at 09:57

## 2017-04-08 RX ADMIN — LAMOTRIGINE 25 MG: 25 TABLET ORAL at 09:58

## 2017-04-08 RX ADMIN — ATENOLOL 100 MG: 50 TABLET ORAL at 09:57

## 2017-04-08 RX ADMIN — HYDROXYCHLOROQUINE SULFATE 200 MG: 200 TABLET, FILM COATED ORAL at 09:58

## 2017-04-08 RX ADMIN — ENOXAPARIN SODIUM 40 MG: 40 INJECTION SUBCUTANEOUS at 09:57

## 2017-04-08 RX ADMIN — CIPROFLOXACIN 400 MG: 2 INJECTION, SOLUTION INTRAVENOUS at 12:00

## 2017-04-08 RX ADMIN — VENLAFAXINE 75 MG: 75 TABLET ORAL at 09:58

## 2017-04-08 ASSESSMENT — PAIN SCALES - GENERAL: PAINLEVEL_OUTOF10: 0

## 2017-04-10 LAB
EKG ATRIAL RATE: 51 BPM
EKG P AXIS: 57 DEGREES
EKG P-R INTERVAL: 230 MS
EKG Q-T INTERVAL: 452 MS
EKG QRS DURATION: 92 MS
EKG QTC CALCULATION (BAZETT): 416 MS
EKG R AXIS: 1 DEGREES
EKG T AXIS: 0 DEGREES
EKG VENTRICULAR RATE: 51 BPM

## 2017-04-11 ENCOUNTER — CARE COORDINATION (OUTPATIENT)
Dept: FAMILY MEDICINE CLINIC | Age: 52
End: 2017-04-11

## 2017-04-11 ENCOUNTER — HOSPITAL ENCOUNTER (OUTPATIENT)
Dept: MRI IMAGING | Age: 52
Discharge: HOME | End: 2017-04-11
Payer: MEDICAID

## 2017-04-11 DIAGNOSIS — S83.206A TEAR OF MENISCUS OF RIGHT KNEE, UNSPECIFIED MENISCUS, UNSPECIFIED TEAR TYPE, UNSPECIFIED WHETHER OLD OR CURRENT TEAR, INITIAL ENCOUNTER: ICD-10-CM

## 2017-04-11 PROCEDURE — 73721 MRI JNT OF LWR EXTRE W/O DYE: CPT

## 2017-04-13 ENCOUNTER — OFFICE VISIT (OUTPATIENT)
Dept: ORTHOPEDIC SURGERY | Age: 52
End: 2017-04-13
Payer: MEDICAID

## 2017-04-13 VITALS — BODY MASS INDEX: 24.93 KG/M2 | HEIGHT: 68 IN | WEIGHT: 164.46 LBS

## 2017-04-13 DIAGNOSIS — M25.561 RIGHT KNEE PAIN, UNSPECIFIED CHRONICITY: Primary | ICD-10-CM

## 2017-04-13 DIAGNOSIS — M22.41 CHONDROMALACIA PATELLA, RIGHT: ICD-10-CM

## 2017-04-13 PROCEDURE — 99213 OFFICE O/P EST LOW 20 MIN: CPT | Performed by: ORTHOPAEDIC SURGERY

## 2017-04-13 ASSESSMENT — ENCOUNTER SYMPTOMS
CONSTIPATION: 0
DIARRHEA: 0
NAUSEA: 0
COUGH: 0

## 2017-04-17 RX ORDER — LOSARTAN POTASSIUM 100 MG/1
TABLET ORAL
Qty: 30 TABLET | Refills: 3 | Status: SHIPPED | OUTPATIENT
Start: 2017-04-17 | End: 2017-07-28

## 2017-04-17 RX ORDER — QUETIAPINE FUMARATE 200 MG/1
TABLET, FILM COATED ORAL
Qty: 60 TABLET | Refills: 1 | Status: SHIPPED | OUTPATIENT
Start: 2017-04-17 | End: 2017-12-01 | Stop reason: SDUPTHER

## 2017-04-21 ENCOUNTER — OFFICE VISIT (OUTPATIENT)
Dept: ORTHOPEDIC SURGERY | Age: 52
End: 2017-04-21
Payer: MEDICAID

## 2017-04-21 VITALS — HEIGHT: 68 IN | BODY MASS INDEX: 24.93 KG/M2 | WEIGHT: 164.46 LBS

## 2017-04-21 DIAGNOSIS — Z01.818 PRE-OP TESTING: ICD-10-CM

## 2017-04-21 DIAGNOSIS — M17.11 ARTHRITIS OF RIGHT KNEE: ICD-10-CM

## 2017-04-21 DIAGNOSIS — M25.561 RIGHT KNEE PAIN, UNSPECIFIED CHRONICITY: Primary | ICD-10-CM

## 2017-04-21 DIAGNOSIS — S83.206D TEAR OF MENISCUS OF RIGHT KNEE, UNSPECIFIED MENISCUS, UNSPECIFIED TEAR TYPE, UNSPECIFIED WHETHER OLD OR CURRENT TEAR, SUBSEQUENT ENCOUNTER: ICD-10-CM

## 2017-04-21 PROCEDURE — 99213 OFFICE O/P EST LOW 20 MIN: CPT | Performed by: ORTHOPAEDIC SURGERY

## 2017-04-21 ASSESSMENT — ENCOUNTER SYMPTOMS
DIARRHEA: 0
COUGH: 0
NAUSEA: 0
CONSTIPATION: 0

## 2017-04-26 ENCOUNTER — HOSPITAL ENCOUNTER (OUTPATIENT)
Dept: GENERAL RADIOLOGY | Age: 52
Discharge: HOME | End: 2017-04-26
Payer: MEDICAID

## 2017-04-26 ENCOUNTER — OFFICE VISIT (OUTPATIENT)
Dept: FAMILY MEDICINE CLINIC | Age: 52
End: 2017-04-26
Payer: MEDICAID

## 2017-04-26 ENCOUNTER — HOSPITAL ENCOUNTER (OUTPATIENT)
Dept: PREADMISSION TESTING | Age: 52
Discharge: HOME | End: 2017-04-26
Payer: MEDICAID

## 2017-04-26 ENCOUNTER — TELEPHONE (OUTPATIENT)
Dept: ORTHOPEDIC SURGERY | Age: 52
End: 2017-04-26

## 2017-04-26 VITALS
DIASTOLIC BLOOD PRESSURE: 78 MMHG | HEIGHT: 68 IN | RESPIRATION RATE: 18 BRPM | BODY MASS INDEX: 25.34 KG/M2 | OXYGEN SATURATION: 98 % | WEIGHT: 167.2 LBS | TEMPERATURE: 97.3 F | HEART RATE: 98 BPM | SYSTOLIC BLOOD PRESSURE: 120 MMHG

## 2017-04-26 VITALS
TEMPERATURE: 98.6 F | RESPIRATION RATE: 18 BRPM | HEART RATE: 56 BPM | HEIGHT: 68 IN | DIASTOLIC BLOOD PRESSURE: 64 MMHG | OXYGEN SATURATION: 100 % | SYSTOLIC BLOOD PRESSURE: 114 MMHG | WEIGHT: 164 LBS | BODY MASS INDEX: 24.86 KG/M2

## 2017-04-26 DIAGNOSIS — E11.42 TYPE 2 DIABETES MELLITUS WITH DIABETIC POLYNEUROPATHY, WITHOUT LONG-TERM CURRENT USE OF INSULIN (HCC): Primary | ICD-10-CM

## 2017-04-26 DIAGNOSIS — Z13.9 SCREENING: Primary | ICD-10-CM

## 2017-04-26 DIAGNOSIS — Z01.818 PRE-OP TESTING: ICD-10-CM

## 2017-04-26 DIAGNOSIS — Z13.9 SCREENING: ICD-10-CM

## 2017-04-26 DIAGNOSIS — Z09 HOSPITAL DISCHARGE FOLLOW-UP: ICD-10-CM

## 2017-04-26 LAB
-: ABNORMAL
AMORPHOUS: ABNORMAL
BACTERIA: ABNORMAL
BILIRUBIN URINE: NEGATIVE
CASTS UA: ABNORMAL /LPF (ref 0–8)
COLOR: YELLOW
COMMENT UA: ABNORMAL
CRYSTALS, UA: ABNORMAL /HPF
EPITHELIAL CELLS UA: ABNORMAL /HPF (ref 0–5)
GLUCOSE URINE: NEGATIVE
KETONES, URINE: NEGATIVE
LEUKOCYTE ESTERASE, URINE: ABNORMAL
MUCUS: ABNORMAL
NITRITE, URINE: NEGATIVE
OTHER OBSERVATIONS UA: ABNORMAL
PH UA: 5.5 (ref 5–8)
PROTEIN UA: NEGATIVE
RBC UA: ABNORMAL /HPF (ref 0–4)
RENAL EPITHELIAL, UA: ABNORMAL /HPF
SPECIFIC GRAVITY UA: 1.01 (ref 1–1.03)
TRICHOMONAS: ABNORMAL
TURBIDITY: CLEAR
URINE HGB: NEGATIVE
UROBILINOGEN, URINE: NORMAL
WBC UA: ABNORMAL /HPF (ref 0–5)
YEAST: ABNORMAL

## 2017-04-26 PROCEDURE — 71020 XR CHEST STANDARD TWO VW: CPT

## 2017-04-26 PROCEDURE — 81001 URINALYSIS AUTO W/SCOPE: CPT

## 2017-04-26 PROCEDURE — 99214 OFFICE O/P EST MOD 30 MIN: CPT | Performed by: NURSE PRACTITIONER

## 2017-04-26 RX ORDER — SODIUM CHLORIDE, SODIUM LACTATE, POTASSIUM CHLORIDE, CALCIUM CHLORIDE 600; 310; 30; 20 MG/100ML; MG/100ML; MG/100ML; MG/100ML
1000 INJECTION, SOLUTION INTRAVENOUS CONTINUOUS
Status: CANCELLED | OUTPATIENT
Start: 2017-04-26

## 2017-04-26 RX ORDER — CIPROFLOXACIN 500 MG/1
TABLET, FILM COATED ORAL
Qty: 6 TABLET | Refills: 0 | Status: SHIPPED | OUTPATIENT
Start: 2017-04-26 | End: 2017-06-14 | Stop reason: ALTCHOICE

## 2017-04-26 ASSESSMENT — ENCOUNTER SYMPTOMS
EYE DISCHARGE: 0
CONSTIPATION: 0
DIARRHEA: 0
HEMOPTYSIS: 0
EYE PAIN: 0
NAUSEA: 0
VOMITING: 0
COUGH: 0
BACK PAIN: 1

## 2017-04-27 ENCOUNTER — HOSPITAL ENCOUNTER (OUTPATIENT)
Age: 52
Setting detail: SPECIMEN
Discharge: HOME | End: 2017-04-27
Payer: MEDICAID

## 2017-04-27 DIAGNOSIS — N18.30 CKD (CHRONIC KIDNEY DISEASE) STAGE 3, GFR 30-59 ML/MIN (HCC): ICD-10-CM

## 2017-04-27 LAB
-: ABNORMAL
ABSOLUTE EOS #: 0.3 K/UL (ref 0–0.4)
ABSOLUTE LYMPH #: 1.4 K/UL (ref 1–4.8)
ABSOLUTE MONO #: 0.4 K/UL (ref 0.1–1.2)
AMORPHOUS: ABNORMAL
ANION GAP SERPL CALCULATED.3IONS-SCNC: 13 MMOL/L (ref 9–17)
BACTERIA: ABNORMAL
BASOPHILS # BLD: 2 %
BASOPHILS ABSOLUTE: 0.1 K/UL (ref 0–0.2)
BILIRUBIN URINE: NEGATIVE
BUN BLDV-MCNC: 17 MG/DL (ref 6–20)
BUN/CREAT BLD: ABNORMAL (ref 9–20)
CALCIUM SERPL-MCNC: 9.5 MG/DL (ref 8.6–10.4)
CASTS UA: ABNORMAL /LPF (ref 0–8)
CHLORIDE BLD-SCNC: 99 MMOL/L (ref 98–107)
CO2: 24 MMOL/L (ref 20–31)
COLOR: YELLOW
COMMENT UA: ABNORMAL
CREAT SERPL-MCNC: 1 MG/DL (ref 0.5–0.9)
CREATININE URINE: 91.2 MG/DL (ref 28–217)
CRYSTALS, UA: ABNORMAL /HPF
DIFFERENTIAL TYPE: ABNORMAL
EOSINOPHILS RELATIVE PERCENT: 5 %
EPITHELIAL CELLS UA: ABNORMAL /HPF (ref 0–5)
GFR AFRICAN AMERICAN: >60 ML/MIN
GFR NON-AFRICAN AMERICAN: 58 ML/MIN
GFR SERPL CREATININE-BSD FRML MDRD: ABNORMAL ML/MIN/{1.73_M2}
GFR SERPL CREATININE-BSD FRML MDRD: ABNORMAL ML/MIN/{1.73_M2}
GLUCOSE BLD-MCNC: 86 MG/DL (ref 70–99)
GLUCOSE URINE: NEGATIVE
HCT VFR BLD CALC: 33.9 % (ref 36–46)
HEMOGLOBIN: 11.8 G/DL (ref 12–16)
KETONES, URINE: NEGATIVE
LEUKOCYTE ESTERASE, URINE: ABNORMAL
LYMPHOCYTES # BLD: 27 %
MCH RBC QN AUTO: 30.7 PG (ref 26–34)
MCHC RBC AUTO-ENTMCNC: 34.6 G/DL (ref 31–37)
MCV RBC AUTO: 88.7 FL (ref 80–100)
MONOCYTES # BLD: 8 %
MUCUS: ABNORMAL
NITRITE, URINE: POSITIVE
OTHER OBSERVATIONS UA: ABNORMAL
PDW BLD-RTO: 13.5 % (ref 12.5–15.4)
PH UA: 5.5 (ref 5–8)
PLATELET # BLD: 206 K/UL (ref 140–450)
PLATELET ESTIMATE: ABNORMAL
PMV BLD AUTO: 9.4 FL (ref 6–12)
POTASSIUM SERPL-SCNC: 5.2 MMOL/L (ref 3.7–5.3)
PROTEIN UA: NEGATIVE
RBC # BLD: 3.82 M/UL (ref 4–5.2)
RBC # BLD: ABNORMAL 10*6/UL
RBC UA: ABNORMAL /HPF (ref 0–4)
RENAL EPITHELIAL, UA: ABNORMAL /HPF
SEG NEUTROPHILS: 58 %
SEGMENTED NEUTROPHILS ABSOLUTE COUNT: 2.9 K/UL (ref 1.8–7.7)
SODIUM BLD-SCNC: 136 MMOL/L (ref 135–144)
SPECIFIC GRAVITY UA: 1.02 (ref 1–1.03)
TOTAL PROTEIN, URINE: 13 MG/DL
TRICHOMONAS: ABNORMAL
TURBIDITY: CLEAR
URINE HGB: NEGATIVE
UROBILINOGEN, URINE: NORMAL
VITAMIN D 25-HYDROXY: 37.6 NG/ML (ref 30–100)
WBC # BLD: 5 K/UL (ref 3.5–11)
WBC # BLD: ABNORMAL 10*3/UL
WBC UA: ABNORMAL /HPF (ref 0–5)
YEAST: ABNORMAL

## 2017-05-02 ENCOUNTER — ANESTHESIA (OUTPATIENT)
Dept: OPERATING ROOM | Age: 52
DRG: 302 | End: 2017-05-02
Payer: MEDICAID

## 2017-05-02 ENCOUNTER — APPOINTMENT (OUTPATIENT)
Dept: GENERAL RADIOLOGY | Age: 52
DRG: 302 | End: 2017-05-02
Attending: ORTHOPAEDIC SURGERY
Payer: MEDICAID

## 2017-05-02 ENCOUNTER — HOSPITAL ENCOUNTER (INPATIENT)
Age: 52
LOS: 1 days | Discharge: HOME HEALTH | DRG: 302 | End: 2017-05-03
Attending: ORTHOPAEDIC SURGERY | Admitting: ORTHOPAEDIC SURGERY
Payer: MEDICAID

## 2017-05-02 ENCOUNTER — ANESTHESIA EVENT (OUTPATIENT)
Dept: OPERATING ROOM | Age: 52
DRG: 302 | End: 2017-05-02
Payer: MEDICAID

## 2017-05-02 VITALS — DIASTOLIC BLOOD PRESSURE: 62 MMHG | OXYGEN SATURATION: 97 % | SYSTOLIC BLOOD PRESSURE: 112 MMHG | TEMPERATURE: 96.9 F

## 2017-05-02 DIAGNOSIS — Z98.890 S/P KNEE SURGERY: Primary | ICD-10-CM

## 2017-05-02 PROBLEM — E11.9 TYPE 2 DIABETES MELLITUS (HCC): Status: ACTIVE | Noted: 2017-05-02

## 2017-05-02 PROBLEM — M17.11 DEGENERATIVE ARTHRITIS OF RIGHT KNEE: Status: ACTIVE | Noted: 2017-05-02

## 2017-05-02 PROBLEM — E11.9 CONTROLLED TYPE 2 DIABETES MELLITUS WITHOUT COMPLICATION (HCC): Status: ACTIVE | Noted: 2017-05-02

## 2017-05-02 LAB
BILIRUBIN URINE: NEGATIVE
COLOR: YELLOW
COMMENT UA: ABNORMAL
GLUCOSE BLD-MCNC: 73 MG/DL (ref 65–105)
GLUCOSE BLD-MCNC: 76 MG/DL (ref 65–105)
GLUCOSE BLD-MCNC: 79 MG/DL (ref 74–106)
GLUCOSE BLD-MCNC: 92 MG/DL (ref 65–105)
GLUCOSE URINE: ABNORMAL
KETONES, URINE: NEGATIVE
LEUKOCYTE ESTERASE, URINE: NEGATIVE
NITRITE, URINE: NEGATIVE
PH UA: 6 (ref 5–8)
POC POTASSIUM: 4 MMOL/L (ref 3.5–5.1)
PROTEIN UA: NEGATIVE
SPECIFIC GRAVITY UA: 1 (ref 1–1.03)
TURBIDITY: CLEAR
URINE HGB: NEGATIVE
UROBILINOGEN, URINE: NORMAL

## 2017-05-02 PROCEDURE — 6360000002 HC RX W HCPCS

## 2017-05-02 PROCEDURE — 82947 ASSAY GLUCOSE BLOOD QUANT: CPT

## 2017-05-02 PROCEDURE — 6360000002 HC RX W HCPCS: Performed by: ORTHOPAEDIC SURGERY

## 2017-05-02 PROCEDURE — 3600000004 HC SURGERY LEVEL 4 BASE: Performed by: ORTHOPAEDIC SURGERY

## 2017-05-02 PROCEDURE — 6360000002 HC RX W HCPCS: Performed by: STUDENT IN AN ORGANIZED HEALTH CARE EDUCATION/TRAINING PROGRAM

## 2017-05-02 PROCEDURE — 1200000000 HC SEMI PRIVATE

## 2017-05-02 PROCEDURE — G8988 SELF CARE GOAL STATUS: HCPCS

## 2017-05-02 PROCEDURE — 6360000002 HC RX W HCPCS: Performed by: SPECIALIST

## 2017-05-02 PROCEDURE — 2720000010 HC SURG SUPPLY STERILE: Performed by: ORTHOPAEDIC SURGERY

## 2017-05-02 PROCEDURE — 6360000002 HC RX W HCPCS: Performed by: ANESTHESIOLOGY

## 2017-05-02 PROCEDURE — L8699 PROSTHETIC IMPLANT NOS: HCPCS | Performed by: ORTHOPAEDIC SURGERY

## 2017-05-02 PROCEDURE — G8987 SELF CARE CURRENT STATUS: HCPCS

## 2017-05-02 PROCEDURE — 97162 PT EVAL MOD COMPLEX 30 MIN: CPT

## 2017-05-02 PROCEDURE — 3700000001 HC ADD 15 MINUTES (ANESTHESIA): Performed by: ORTHOPAEDIC SURGERY

## 2017-05-02 PROCEDURE — 97165 OT EVAL LOW COMPLEX 30 MIN: CPT

## 2017-05-02 PROCEDURE — 3E0T3CZ INTRODUCE REGIONAL ANESTH IN PERIPH NRV, PLEXI, PERC: ICD-10-PCS | Performed by: ANESTHESIOLOGY

## 2017-05-02 PROCEDURE — 7100000000 HC PACU RECOVERY - FIRST 15 MIN: Performed by: ORTHOPAEDIC SURGERY

## 2017-05-02 PROCEDURE — 7100000001 HC PACU RECOVERY - ADDTL 15 MIN: Performed by: ORTHOPAEDIC SURGERY

## 2017-05-02 PROCEDURE — 2580000003 HC RX 258: Performed by: ORTHOPAEDIC SURGERY

## 2017-05-02 PROCEDURE — 81003 URINALYSIS AUTO W/O SCOPE: CPT

## 2017-05-02 PROCEDURE — 97535 SELF CARE MNGMENT TRAINING: CPT

## 2017-05-02 PROCEDURE — 2580000003 HC RX 258: Performed by: ANESTHESIOLOGY

## 2017-05-02 PROCEDURE — 99253 IP/OBS CNSLTJ NEW/EST LOW 45: CPT | Performed by: FAMILY MEDICINE

## 2017-05-02 PROCEDURE — 6370000000 HC RX 637 (ALT 250 FOR IP): Performed by: ORTHOPAEDIC SURGERY

## 2017-05-02 PROCEDURE — C1713 ANCHOR/SCREW BN/BN,TIS/BN: HCPCS | Performed by: ORTHOPAEDIC SURGERY

## 2017-05-02 PROCEDURE — 64448 NJX AA&/STRD FEM NRV NFS IMG: CPT | Performed by: ANESTHESIOLOGY

## 2017-05-02 PROCEDURE — 3700000000 HC ANESTHESIA ATTENDED CARE: Performed by: ORTHOPAEDIC SURGERY

## 2017-05-02 PROCEDURE — 0SRC0J9 REPLACEMENT OF RIGHT KNEE JOINT WITH SYNTHETIC SUBSTITUTE, CEMENTED, OPEN APPROACH: ICD-10-PCS | Performed by: ORTHOPAEDIC SURGERY

## 2017-05-02 PROCEDURE — 3600000014 HC SURGERY LEVEL 4 ADDTL 15MIN: Performed by: ORTHOPAEDIC SURGERY

## 2017-05-02 PROCEDURE — 73562 X-RAY EXAM OF KNEE 3: CPT

## 2017-05-02 PROCEDURE — 84132 ASSAY OF SERUM POTASSIUM: CPT

## 2017-05-02 PROCEDURE — G8979 MOBILITY GOAL STATUS: HCPCS

## 2017-05-02 PROCEDURE — G8978 MOBILITY CURRENT STATUS: HCPCS

## 2017-05-02 PROCEDURE — 27438 REVISE KNEECAP WITH IMPLANT: CPT | Performed by: ORTHOPAEDIC SURGERY

## 2017-05-02 PROCEDURE — 2500000003 HC RX 250 WO HCPCS: Performed by: SPECIALIST

## 2017-05-02 PROCEDURE — 97530 THERAPEUTIC ACTIVITIES: CPT

## 2017-05-02 DEVICE — IMPLANTABLE DEVICE: Type: IMPLANTABLE DEVICE | Status: FUNCTIONAL

## 2017-05-02 DEVICE — DISCONTINUED USE 416978 CEMENT PALACOS R SING DOSE 40GR: Type: IMPLANTABLE DEVICE | Status: FUNCTIONAL

## 2017-05-02 DEVICE — SCREW BNE L33MM CONSTRN CNDYL KNEE HEX HD STEM FOR LEG NXGN: Type: IMPLANTABLE DEVICE | Status: FUNCTIONAL

## 2017-05-02 DEVICE — NEXGEN ALL-POLY PATELLA 29MM: Type: IMPLANTABLE DEVICE | Status: FUNCTIONAL

## 2017-05-02 RX ORDER — M-VIT,TX,IRON,MINS/CALC/FOLIC 27MG-0.4MG
1 TABLET ORAL DAILY
Status: DISCONTINUED | OUTPATIENT
Start: 2017-05-02 | End: 2017-05-03 | Stop reason: HOSPADM

## 2017-05-02 RX ORDER — PRAVASTATIN SODIUM 20 MG
40 TABLET ORAL NIGHTLY
Status: DISCONTINUED | OUTPATIENT
Start: 2017-05-02 | End: 2017-05-03 | Stop reason: HOSPADM

## 2017-05-02 RX ORDER — MAGNESIUM HYDROXIDE 1200 MG/15ML
LIQUID ORAL CONTINUOUS PRN
Status: DISCONTINUED | OUTPATIENT
Start: 2017-05-02 | End: 2017-05-02 | Stop reason: HOSPADM

## 2017-05-02 RX ORDER — PREDNISONE 20 MG/1
50 TABLET ORAL DAILY PRN
Status: DISCONTINUED | OUTPATIENT
Start: 2017-05-02 | End: 2017-05-03 | Stop reason: HOSPADM

## 2017-05-02 RX ORDER — LAMOTRIGINE 25 MG/1
25 TABLET ORAL 2 TIMES DAILY
Status: DISCONTINUED | OUTPATIENT
Start: 2017-05-02 | End: 2017-05-03 | Stop reason: HOSPADM

## 2017-05-02 RX ORDER — TRAMADOL HYDROCHLORIDE 50 MG/1
50 TABLET ORAL EVERY 6 HOURS PRN
Status: DISCONTINUED | OUTPATIENT
Start: 2017-05-02 | End: 2017-05-03 | Stop reason: HOSPADM

## 2017-05-02 RX ORDER — VENLAFAXINE 75 MG/1
75 TABLET ORAL 2 TIMES DAILY
Status: DISCONTINUED | OUTPATIENT
Start: 2017-05-02 | End: 2017-05-03 | Stop reason: HOSPADM

## 2017-05-02 RX ORDER — MORPHINE SULFATE 2 MG/ML
2 INJECTION, SOLUTION INTRAMUSCULAR; INTRAVENOUS
Status: DISCONTINUED | OUTPATIENT
Start: 2017-05-02 | End: 2017-05-03 | Stop reason: HOSPADM

## 2017-05-02 RX ORDER — ONDANSETRON 2 MG/ML
4 INJECTION INTRAMUSCULAR; INTRAVENOUS EVERY 6 HOURS PRN
Status: DISCONTINUED | OUTPATIENT
Start: 2017-05-02 | End: 2017-05-03 | Stop reason: HOSPADM

## 2017-05-02 RX ORDER — MIDAZOLAM HYDROCHLORIDE 1 MG/ML
2 INJECTION INTRAMUSCULAR; INTRAVENOUS ONCE
Status: COMPLETED | OUTPATIENT
Start: 2017-05-02 | End: 2017-05-02

## 2017-05-02 RX ORDER — FENTANYL CITRATE 50 UG/ML
INJECTION, SOLUTION INTRAMUSCULAR; INTRAVENOUS PRN
Status: DISCONTINUED | OUTPATIENT
Start: 2017-05-02 | End: 2017-05-02 | Stop reason: SDUPTHER

## 2017-05-02 RX ORDER — SODIUM CHLORIDE, SODIUM LACTATE, POTASSIUM CHLORIDE, CALCIUM CHLORIDE 600; 310; 30; 20 MG/100ML; MG/100ML; MG/100ML; MG/100ML
1000 INJECTION, SOLUTION INTRAVENOUS CONTINUOUS
Status: DISCONTINUED | OUTPATIENT
Start: 2017-05-02 | End: 2017-05-02

## 2017-05-02 RX ORDER — GLYCOPYRROLATE 0.2 MG/ML
INJECTION INTRAMUSCULAR; INTRAVENOUS PRN
Status: DISCONTINUED | OUTPATIENT
Start: 2017-05-02 | End: 2017-05-02 | Stop reason: SDUPTHER

## 2017-05-02 RX ORDER — PROPOFOL 10 MG/ML
INJECTION, EMULSION INTRAVENOUS PRN
Status: DISCONTINUED | OUTPATIENT
Start: 2017-05-02 | End: 2017-05-02 | Stop reason: SDUPTHER

## 2017-05-02 RX ORDER — CYCLOBENZAPRINE HCL 10 MG
5 TABLET ORAL 3 TIMES DAILY PRN
Status: DISCONTINUED | OUTPATIENT
Start: 2017-05-02 | End: 2017-05-03 | Stop reason: HOSPADM

## 2017-05-02 RX ORDER — ATENOLOL 50 MG/1
100 TABLET ORAL DAILY
Status: DISCONTINUED | OUTPATIENT
Start: 2017-05-02 | End: 2017-05-03 | Stop reason: HOSPADM

## 2017-05-02 RX ORDER — ASPIRIN 81 MG/1
81 TABLET ORAL 2 TIMES DAILY
Status: DISCONTINUED | OUTPATIENT
Start: 2017-05-02 | End: 2017-05-03 | Stop reason: HOSPADM

## 2017-05-02 RX ORDER — LOSARTAN POTASSIUM 50 MG/1
100 TABLET ORAL DAILY
Status: DISCONTINUED | OUTPATIENT
Start: 2017-05-02 | End: 2017-05-03 | Stop reason: HOSPADM

## 2017-05-02 RX ORDER — TRANEXAMIC ACID 100 MG/ML
INJECTION, SOLUTION INTRAVENOUS PRN
Status: DISCONTINUED | OUTPATIENT
Start: 2017-05-02 | End: 2017-05-02 | Stop reason: SDUPTHER

## 2017-05-02 RX ORDER — MORPHINE SULFATE 4 MG/ML
4 INJECTION, SOLUTION INTRAMUSCULAR; INTRAVENOUS
Status: DISCONTINUED | OUTPATIENT
Start: 2017-05-02 | End: 2017-05-03 | Stop reason: HOSPADM

## 2017-05-02 RX ORDER — NICOTINE POLACRILEX 4 MG
15 LOZENGE BUCCAL PRN
Status: DISCONTINUED | OUTPATIENT
Start: 2017-05-02 | End: 2017-05-03 | Stop reason: HOSPADM

## 2017-05-02 RX ORDER — SODIUM CHLORIDE 450 MG/100ML
INJECTION, SOLUTION INTRAVENOUS CONTINUOUS
Status: DISCONTINUED | OUTPATIENT
Start: 2017-05-02 | End: 2017-05-03 | Stop reason: HOSPADM

## 2017-05-02 RX ORDER — ACETAMINOPHEN 325 MG/1
650 TABLET ORAL EVERY 4 HOURS PRN
Status: DISCONTINUED | OUTPATIENT
Start: 2017-05-02 | End: 2017-05-03 | Stop reason: HOSPADM

## 2017-05-02 RX ORDER — SODIUM CHLORIDE 0.9 % (FLUSH) 0.9 %
10 SYRINGE (ML) INJECTION EVERY 12 HOURS SCHEDULED
Status: DISCONTINUED | OUTPATIENT
Start: 2017-05-02 | End: 2017-05-03 | Stop reason: HOSPADM

## 2017-05-02 RX ORDER — FENTANYL CITRATE 50 UG/ML
50 INJECTION, SOLUTION INTRAMUSCULAR; INTRAVENOUS ONCE
Status: COMPLETED | OUTPATIENT
Start: 2017-05-02 | End: 2017-05-02

## 2017-05-02 RX ORDER — AMLODIPINE BESYLATE 10 MG/1
10 TABLET ORAL DAILY
Status: DISCONTINUED | OUTPATIENT
Start: 2017-05-02 | End: 2017-05-02

## 2017-05-02 RX ORDER — NALTREXONE HYDROCHLORIDE 50 MG/1
50 TABLET, FILM COATED ORAL DAILY
Status: DISCONTINUED | OUTPATIENT
Start: 2017-05-02 | End: 2017-05-02

## 2017-05-02 RX ORDER — CHOLECALCIFEROL (VITAMIN D3) 125 MCG
1000 CAPSULE ORAL DAILY
Status: DISCONTINUED | OUTPATIENT
Start: 2017-05-02 | End: 2017-05-03 | Stop reason: HOSPADM

## 2017-05-02 RX ORDER — QUETIAPINE FUMARATE 200 MG/1
200 TABLET, FILM COATED ORAL NIGHTLY
Status: DISCONTINUED | OUTPATIENT
Start: 2017-05-02 | End: 2017-05-03 | Stop reason: HOSPADM

## 2017-05-02 RX ORDER — FAMOTIDINE 20 MG/1
20 TABLET, FILM COATED ORAL 2 TIMES DAILY
Status: DISCONTINUED | OUTPATIENT
Start: 2017-05-02 | End: 2017-05-03 | Stop reason: HOSPADM

## 2017-05-02 RX ORDER — DEXTROSE MONOHYDRATE 25 G/50ML
12.5 INJECTION, SOLUTION INTRAVENOUS PRN
Status: DISCONTINUED | OUTPATIENT
Start: 2017-05-02 | End: 2017-05-03 | Stop reason: HOSPADM

## 2017-05-02 RX ORDER — ONDANSETRON 2 MG/ML
INJECTION INTRAMUSCULAR; INTRAVENOUS PRN
Status: DISCONTINUED | OUTPATIENT
Start: 2017-05-02 | End: 2017-05-02 | Stop reason: SDUPTHER

## 2017-05-02 RX ORDER — SODIUM CHLORIDE 0.9 % (FLUSH) 0.9 %
10 SYRINGE (ML) INJECTION PRN
Status: DISCONTINUED | OUTPATIENT
Start: 2017-05-02 | End: 2017-05-03 | Stop reason: HOSPADM

## 2017-05-02 RX ORDER — POT CHLORIDE/POT BICARB/CIT AC 25 MEQ
25 TABLET, EFFERVESCENT ORAL DAILY
Status: DISCONTINUED | OUTPATIENT
Start: 2017-05-02 | End: 2017-05-03 | Stop reason: HOSPADM

## 2017-05-02 RX ORDER — LIDOCAINE HYDROCHLORIDE 10 MG/ML
INJECTION, SOLUTION EPIDURAL; INFILTRATION; INTRACAUDAL; PERINEURAL PRN
Status: DISCONTINUED | OUTPATIENT
Start: 2017-05-02 | End: 2017-05-02 | Stop reason: SDUPTHER

## 2017-05-02 RX ORDER — OXYCODONE HYDROCHLORIDE AND ACETAMINOPHEN 5; 325 MG/1; MG/1
2 TABLET ORAL EVERY 4 HOURS PRN
Status: DISCONTINUED | OUTPATIENT
Start: 2017-05-02 | End: 2017-05-03 | Stop reason: HOSPADM

## 2017-05-02 RX ORDER — CETIRIZINE HYDROCHLORIDE 10 MG/1
10 TABLET ORAL DAILY
Status: DISCONTINUED | OUTPATIENT
Start: 2017-05-02 | End: 2017-05-03 | Stop reason: HOSPADM

## 2017-05-02 RX ORDER — ALBUTEROL SULFATE 90 UG/1
2 AEROSOL, METERED RESPIRATORY (INHALATION) EVERY 6 HOURS PRN
Status: DISCONTINUED | OUTPATIENT
Start: 2017-05-02 | End: 2017-05-03 | Stop reason: HOSPADM

## 2017-05-02 RX ORDER — DOCUSATE SODIUM 100 MG/1
100 CAPSULE, LIQUID FILLED ORAL 2 TIMES DAILY
Status: DISCONTINUED | OUTPATIENT
Start: 2017-05-02 | End: 2017-05-03 | Stop reason: HOSPADM

## 2017-05-02 RX ORDER — DEXTROSE MONOHYDRATE 50 MG/ML
100 INJECTION, SOLUTION INTRAVENOUS PRN
Status: DISCONTINUED | OUTPATIENT
Start: 2017-05-02 | End: 2017-05-03 | Stop reason: HOSPADM

## 2017-05-02 RX ORDER — CIPROFLOXACIN 500 MG/1
500 TABLET, FILM COATED ORAL 3 TIMES DAILY
Status: DISCONTINUED | OUTPATIENT
Start: 2017-05-02 | End: 2017-05-02

## 2017-05-02 RX ORDER — KETOROLAC TROMETHAMINE 30 MG/ML
30 INJECTION, SOLUTION INTRAMUSCULAR; INTRAVENOUS ONCE
Status: COMPLETED | OUTPATIENT
Start: 2017-05-02 | End: 2017-05-02

## 2017-05-02 RX ORDER — OXYCODONE HYDROCHLORIDE AND ACETAMINOPHEN 5; 325 MG/1; MG/1
1 TABLET ORAL EVERY 4 HOURS PRN
Status: DISCONTINUED | OUTPATIENT
Start: 2017-05-02 | End: 2017-05-03 | Stop reason: HOSPADM

## 2017-05-02 RX ORDER — PROPRANOLOL HYDROCHLORIDE 10 MG/1
10 TABLET ORAL 2 TIMES DAILY PRN
Status: DISCONTINUED | OUTPATIENT
Start: 2017-05-02 | End: 2017-05-03 | Stop reason: HOSPADM

## 2017-05-02 RX ADMIN — Medication 2 G: at 17:57

## 2017-05-02 RX ADMIN — SODIUM CHLORIDE, POTASSIUM CHLORIDE, SODIUM LACTATE AND CALCIUM CHLORIDE 1000 ML: 600; 310; 30; 20 INJECTION, SOLUTION INTRAVENOUS at 08:38

## 2017-05-02 RX ADMIN — HYDROMORPHONE HYDROCHLORIDE 0.5 MG: 1 INJECTION, SOLUTION INTRAMUSCULAR; INTRAVENOUS; SUBCUTANEOUS at 13:20

## 2017-05-02 RX ADMIN — QUETIAPINE FUMARATE 200 MG: 200 TABLET, FILM COATED ORAL at 21:48

## 2017-05-02 RX ADMIN — CETIRIZINE HYDROCHLORIDE 10 MG: 10 TABLET ORAL at 17:56

## 2017-05-02 RX ADMIN — Medication 0.5 MG: at 13:15

## 2017-05-02 RX ADMIN — MIDAZOLAM HYDROCHLORIDE 2 MG: 1 INJECTION, SOLUTION INTRAMUSCULAR; INTRAVENOUS at 09:07

## 2017-05-02 RX ADMIN — OXYCODONE HYDROCHLORIDE AND ACETAMINOPHEN 2 TABLET: 5; 325 TABLET ORAL at 15:00

## 2017-05-02 RX ADMIN — HYDROMORPHONE HYDROCHLORIDE 0.5 MG: 1 INJECTION, SOLUTION INTRAMUSCULAR; INTRAVENOUS; SUBCUTANEOUS at 11:45

## 2017-05-02 RX ADMIN — DOCUSATE SODIUM 100 MG: 100 CAPSULE, LIQUID FILLED ORAL at 21:48

## 2017-05-02 RX ADMIN — LAMOTRIGINE 25 MG: 25 TABLET ORAL at 21:48

## 2017-05-02 RX ADMIN — TRANEXAMIC ACID 1000 MG: 100 INJECTION, SOLUTION INTRAVENOUS at 11:20

## 2017-05-02 RX ADMIN — FENTANYL CITRATE 25 MCG: 50 INJECTION INTRAMUSCULAR; INTRAVENOUS at 10:15

## 2017-05-02 RX ADMIN — ONDANSETRON 4 MG: 2 INJECTION, SOLUTION INTRAMUSCULAR; INTRAVENOUS at 11:13

## 2017-05-02 RX ADMIN — PRAVASTATIN SODIUM 40 MG: 20 TABLET ORAL at 21:48

## 2017-05-02 RX ADMIN — TRANEXAMIC ACID 1000 MG: 100 INJECTION, SOLUTION INTRAVENOUS at 10:14

## 2017-05-02 RX ADMIN — Medication 1000 MCG: at 17:57

## 2017-05-02 RX ADMIN — OXYCODONE HYDROCHLORIDE AND ACETAMINOPHEN 2 TABLET: 5; 325 TABLET ORAL at 18:25

## 2017-05-02 RX ADMIN — FAMOTIDINE 20 MG: 20 TABLET, FILM COATED ORAL at 21:48

## 2017-05-02 RX ADMIN — HYDROMORPHONE HYDROCHLORIDE 0.5 MG: 1 INJECTION, SOLUTION INTRAMUSCULAR; INTRAVENOUS; SUBCUTANEOUS at 11:40

## 2017-05-02 RX ADMIN — HYDROMORPHONE HYDROCHLORIDE 0.25 MG: 1 INJECTION, SOLUTION INTRAMUSCULAR; INTRAVENOUS; SUBCUTANEOUS at 11:55

## 2017-05-02 RX ADMIN — MULTIPLE VITAMINS W/ MINERALS TAB 1 TABLET: TAB at 17:57

## 2017-05-02 RX ADMIN — CYCLOBENZAPRINE HYDROCHLORIDE 5 MG: 10 TABLET, FILM COATED ORAL at 18:25

## 2017-05-02 RX ADMIN — OXYCODONE HYDROCHLORIDE AND ACETAMINOPHEN 2 TABLET: 5; 325 TABLET ORAL at 22:31

## 2017-05-02 RX ADMIN — PROPOFOL 300 MG: 10 INJECTION, EMULSION INTRAVENOUS at 09:45

## 2017-05-02 RX ADMIN — METFORMIN HYDROCHLORIDE 500 MG: 500 TABLET ORAL at 17:57

## 2017-05-02 RX ADMIN — FENTANYL CITRATE 25 MCG: 50 INJECTION INTRAMUSCULAR; INTRAVENOUS at 10:00

## 2017-05-02 RX ADMIN — LIDOCAINE HYDROCHLORIDE 50 MG: 10 INJECTION, SOLUTION EPIDURAL; INFILTRATION; INTRACAUDAL; PERINEURAL at 09:45

## 2017-05-02 RX ADMIN — ASPIRIN 81 MG: 81 TABLET, COATED ORAL at 21:48

## 2017-05-02 RX ADMIN — FENTANYL CITRATE 50 MCG: 50 INJECTION INTRAMUSCULAR; INTRAVENOUS at 09:07

## 2017-05-02 RX ADMIN — GLYCOPYRROLATE 0.2 MG: 0.2 INJECTION INTRAMUSCULAR; INTRAVENOUS at 09:55

## 2017-05-02 RX ADMIN — HYDROMORPHONE HYDROCHLORIDE 0.25 MG: 1 INJECTION, SOLUTION INTRAMUSCULAR; INTRAVENOUS; SUBCUTANEOUS at 11:50

## 2017-05-02 RX ADMIN — HYDROMORPHONE HYDROCHLORIDE 0.5 MG: 1 INJECTION, SOLUTION INTRAMUSCULAR; INTRAVENOUS; SUBCUTANEOUS at 13:15

## 2017-05-02 RX ADMIN — MORPHINE SULFATE 4 MG: 4 INJECTION, SOLUTION INTRAMUSCULAR; INTRAVENOUS at 16:21

## 2017-05-02 RX ADMIN — Medication 550 ML: at 13:25

## 2017-05-02 RX ADMIN — SODIUM CHLORIDE, POTASSIUM CHLORIDE, SODIUM LACTATE AND CALCIUM CHLORIDE: 600; 310; 30; 20 INJECTION, SOLUTION INTRAVENOUS at 10:45

## 2017-05-02 RX ADMIN — KETOROLAC TROMETHAMINE 30 MG: 30 INJECTION, SOLUTION INTRAMUSCULAR at 18:49

## 2017-05-02 RX ADMIN — HYDROMORPHONE HYDROCHLORIDE 0.25 MG: 1 INJECTION, SOLUTION INTRAMUSCULAR; INTRAVENOUS; SUBCUTANEOUS at 12:05

## 2017-05-02 RX ADMIN — VENLAFAXINE 75 MG: 75 TABLET ORAL at 21:48

## 2017-05-02 RX ADMIN — FENTANYL CITRATE 25 MCG: 50 INJECTION INTRAMUSCULAR; INTRAVENOUS at 10:45

## 2017-05-02 RX ADMIN — FAMOTIDINE 20 MG: 20 TABLET, FILM COATED ORAL at 17:57

## 2017-05-02 RX ADMIN — FENTANYL CITRATE 25 MCG: 50 INJECTION INTRAMUSCULAR; INTRAVENOUS at 10:30

## 2017-05-02 RX ADMIN — Medication 2 G: at 09:58

## 2017-05-02 RX ADMIN — HYDROMORPHONE HYDROCHLORIDE 0.25 MG: 1 INJECTION, SOLUTION INTRAMUSCULAR; INTRAVENOUS; SUBCUTANEOUS at 12:00

## 2017-05-02 RX ADMIN — SODIUM CHLORIDE: 4.5 INJECTION, SOLUTION INTRAVENOUS at 12:27

## 2017-05-02 ASSESSMENT — PAIN DESCRIPTION - DESCRIPTORS
DESCRIPTORS: ACHING;DISCOMFORT;SORE
DESCRIPTORS: ACHING;SORE

## 2017-05-02 ASSESSMENT — PAIN SCALES - GENERAL
PAINLEVEL_OUTOF10: 10
PAINLEVEL_OUTOF10: 4
PAINLEVEL_OUTOF10: 6
PAINLEVEL_OUTOF10: 2
PAINLEVEL_OUTOF10: 10
PAINLEVEL_OUTOF10: 10
PAINLEVEL_OUTOF10: 0
PAINLEVEL_OUTOF10: 10
PAINLEVEL_OUTOF10: 6
PAINLEVEL_OUTOF10: 10
PAINLEVEL_OUTOF10: 4
PAINLEVEL_OUTOF10: 10
PAINLEVEL_OUTOF10: 9
PAINLEVEL_OUTOF10: 6
PAINLEVEL_OUTOF10: 10
PAINLEVEL_OUTOF10: 9
PAINLEVEL_OUTOF10: 10
PAINLEVEL_OUTOF10: 7
PAINLEVEL_OUTOF10: 10
PAINLEVEL_OUTOF10: 6
PAINLEVEL_OUTOF10: 10

## 2017-05-02 ASSESSMENT — PAIN DESCRIPTION - ONSET: ONSET: ON-GOING

## 2017-05-02 ASSESSMENT — PAIN DESCRIPTION - LOCATION
LOCATION: KNEE

## 2017-05-02 ASSESSMENT — ENCOUNTER SYMPTOMS
CONSTIPATION: 0
DIARRHEA: 0
VOICE CHANGE: 0
SHORTNESS OF BREATH: 0
SORE THROAT: 0
ABDOMINAL PAIN: 0
NAUSEA: 0
VOMITING: 0
SINUS PRESSURE: 0
WHEEZING: 0
COUGH: 0
BLOOD IN STOOL: 0

## 2017-05-02 ASSESSMENT — PAIN DESCRIPTION - ORIENTATION
ORIENTATION: RIGHT

## 2017-05-02 ASSESSMENT — PAIN DESCRIPTION - PAIN TYPE
TYPE: SURGICAL PAIN

## 2017-05-02 ASSESSMENT — PAIN - FUNCTIONAL ASSESSMENT: PAIN_FUNCTIONAL_ASSESSMENT: 0-10

## 2017-05-02 ASSESSMENT — PAIN DESCRIPTION - FREQUENCY: FREQUENCY: CONTINUOUS

## 2017-05-02 ASSESSMENT — PAIN DESCRIPTION - PROGRESSION: CLINICAL_PROGRESSION: NOT CHANGED

## 2017-05-03 VITALS
HEART RATE: 56 BPM | BODY MASS INDEX: 25.46 KG/M2 | TEMPERATURE: 98.2 F | DIASTOLIC BLOOD PRESSURE: 75 MMHG | RESPIRATION RATE: 16 BRPM | WEIGHT: 167.99 LBS | SYSTOLIC BLOOD PRESSURE: 125 MMHG | OXYGEN SATURATION: 100 % | HEIGHT: 68 IN

## 2017-05-03 LAB
ABSOLUTE EOS #: 0.1 K/UL (ref 0–0.4)
ABSOLUTE LYMPH #: 1.3 K/UL (ref 1–4.8)
ABSOLUTE MONO #: 0.8 K/UL (ref 0.1–1.2)
ANION GAP SERPL CALCULATED.3IONS-SCNC: 15 MMOL/L (ref 9–17)
BASOPHILS # BLD: 1 %
BASOPHILS ABSOLUTE: 0 K/UL (ref 0–0.2)
BUN BLDV-MCNC: 9 MG/DL (ref 6–20)
BUN/CREAT BLD: ABNORMAL (ref 9–20)
CALCIUM SERPL-MCNC: 8.5 MG/DL (ref 8.6–10.4)
CHLORIDE BLD-SCNC: 103 MMOL/L (ref 98–107)
CO2: 21 MMOL/L (ref 20–31)
CREAT SERPL-MCNC: 0.83 MG/DL (ref 0.5–0.9)
DIFFERENTIAL TYPE: ABNORMAL
EOSINOPHILS RELATIVE PERCENT: 2 %
GFR AFRICAN AMERICAN: >60 ML/MIN
GFR NON-AFRICAN AMERICAN: >60 ML/MIN
GFR SERPL CREATININE-BSD FRML MDRD: ABNORMAL ML/MIN/{1.73_M2}
GFR SERPL CREATININE-BSD FRML MDRD: ABNORMAL ML/MIN/{1.73_M2}
GLUCOSE BLD-MCNC: 109 MG/DL (ref 65–105)
GLUCOSE BLD-MCNC: 120 MG/DL (ref 70–99)
GLUCOSE BLD-MCNC: 164 MG/DL (ref 65–105)
HCT VFR BLD CALC: 30.5 % (ref 36–46)
HEMOGLOBIN: 10.6 G/DL (ref 12–16)
LYMPHOCYTES # BLD: 24 %
MCH RBC QN AUTO: 30.7 PG (ref 26–34)
MCHC RBC AUTO-ENTMCNC: 34.6 G/DL (ref 31–37)
MCV RBC AUTO: 88.8 FL (ref 80–100)
MONOCYTES # BLD: 14 %
PDW BLD-RTO: 13.5 % (ref 12.5–15.4)
PLATELET # BLD: 163 K/UL (ref 140–450)
PLATELET ESTIMATE: ABNORMAL
PMV BLD AUTO: 8.6 FL (ref 6–12)
POTASSIUM SERPL-SCNC: 3.8 MMOL/L (ref 3.7–5.3)
RBC # BLD: 3.43 M/UL (ref 4–5.2)
RBC # BLD: ABNORMAL 10*6/UL
SEG NEUTROPHILS: 59 %
SEGMENTED NEUTROPHILS ABSOLUTE COUNT: 3.2 K/UL (ref 1.8–7.7)
SODIUM BLD-SCNC: 139 MMOL/L (ref 135–144)
WBC # BLD: 5.5 K/UL (ref 3.5–11)
WBC # BLD: ABNORMAL 10*3/UL

## 2017-05-03 PROCEDURE — 6360000002 HC RX W HCPCS: Performed by: ORTHOPAEDIC SURGERY

## 2017-05-03 PROCEDURE — 97535 SELF CARE MNGMENT TRAINING: CPT

## 2017-05-03 PROCEDURE — 97110 THERAPEUTIC EXERCISES: CPT

## 2017-05-03 PROCEDURE — 97116 GAIT TRAINING THERAPY: CPT

## 2017-05-03 PROCEDURE — 99232 SBSQ HOSP IP/OBS MODERATE 35: CPT | Performed by: FAMILY MEDICINE

## 2017-05-03 PROCEDURE — 82947 ASSAY GLUCOSE BLOOD QUANT: CPT

## 2017-05-03 PROCEDURE — 80048 BASIC METABOLIC PNL TOTAL CA: CPT

## 2017-05-03 PROCEDURE — 2580000003 HC RX 258: Performed by: ORTHOPAEDIC SURGERY

## 2017-05-03 PROCEDURE — 36415 COLL VENOUS BLD VENIPUNCTURE: CPT

## 2017-05-03 PROCEDURE — 85025 COMPLETE CBC W/AUTO DIFF WBC: CPT

## 2017-05-03 PROCEDURE — 6370000000 HC RX 637 (ALT 250 FOR IP): Performed by: ORTHOPAEDIC SURGERY

## 2017-05-03 RX ORDER — ASPIRIN 81 MG/1
81 TABLET ORAL 2 TIMES DAILY
Qty: 84 TABLET | Refills: 0 | Status: SHIPPED | OUTPATIENT
Start: 2017-05-03 | End: 2018-05-14

## 2017-05-03 RX ORDER — OXYCODONE HYDROCHLORIDE AND ACETAMINOPHEN 5; 325 MG/1; MG/1
1 TABLET ORAL EVERY 6 HOURS PRN
Qty: 60 TABLET | Refills: 0 | Status: SHIPPED | OUTPATIENT
Start: 2017-05-03 | End: 2017-05-10

## 2017-05-03 RX ORDER — DOCUSATE SODIUM 100 MG/1
100 CAPSULE, LIQUID FILLED ORAL 2 TIMES DAILY PRN
Qty: 60 CAPSULE | Refills: 1 | Status: ON HOLD | OUTPATIENT
Start: 2017-05-03 | End: 2018-11-10 | Stop reason: HOSPADM

## 2017-05-03 RX ADMIN — MORPHINE SULFATE 4 MG: 4 INJECTION, SOLUTION INTRAMUSCULAR; INTRAVENOUS at 00:05

## 2017-05-03 RX ADMIN — OXYCODONE HYDROCHLORIDE AND ACETAMINOPHEN 2 TABLET: 5; 325 TABLET ORAL at 10:21

## 2017-05-03 RX ADMIN — ATENOLOL 100 MG: 50 TABLET ORAL at 10:21

## 2017-05-03 RX ADMIN — Medication 2 G: at 01:52

## 2017-05-03 RX ADMIN — CETIRIZINE HYDROCHLORIDE 10 MG: 10 TABLET ORAL at 10:21

## 2017-05-03 RX ADMIN — Medication 1000 MCG: at 10:20

## 2017-05-03 RX ADMIN — LAMOTRIGINE 25 MG: 25 TABLET ORAL at 10:21

## 2017-05-03 RX ADMIN — METFORMIN HYDROCHLORIDE 500 MG: 500 TABLET ORAL at 10:20

## 2017-05-03 RX ADMIN — FAMOTIDINE 20 MG: 20 TABLET, FILM COATED ORAL at 10:20

## 2017-05-03 RX ADMIN — MORPHINE SULFATE 4 MG: 4 INJECTION, SOLUTION INTRAMUSCULAR; INTRAVENOUS at 02:38

## 2017-05-03 RX ADMIN — OXYCODONE HYDROCHLORIDE AND ACETAMINOPHEN 2 TABLET: 5; 325 TABLET ORAL at 05:47

## 2017-05-03 RX ADMIN — OXYCODONE HYDROCHLORIDE AND ACETAMINOPHEN 2 TABLET: 5; 325 TABLET ORAL at 14:47

## 2017-05-03 RX ADMIN — MULTIPLE VITAMINS W/ MINERALS TAB 1 TABLET: TAB at 10:20

## 2017-05-03 RX ADMIN — DOCUSATE SODIUM 100 MG: 100 CAPSULE, LIQUID FILLED ORAL at 10:20

## 2017-05-03 RX ADMIN — Medication 10 ML: at 00:05

## 2017-05-03 RX ADMIN — ASPIRIN 81 MG: 81 TABLET, COATED ORAL at 10:21

## 2017-05-03 RX ADMIN — OXYCODONE HYDROCHLORIDE AND ACETAMINOPHEN 2 TABLET: 5; 325 TABLET ORAL at 01:52

## 2017-05-03 RX ADMIN — LOSARTAN POTASSIUM 100 MG: 50 TABLET, FILM COATED ORAL at 10:21

## 2017-05-03 RX ADMIN — VENLAFAXINE 75 MG: 75 TABLET ORAL at 10:20

## 2017-05-03 RX ADMIN — Medication 10 ML: at 01:53

## 2017-05-03 ASSESSMENT — PAIN SCALES - GENERAL
PAINLEVEL_OUTOF10: 7
PAINLEVEL_OUTOF10: 7
PAINLEVEL_OUTOF10: 8
PAINLEVEL_OUTOF10: 7
PAINLEVEL_OUTOF10: 7
PAINLEVEL_OUTOF10: 9
PAINLEVEL_OUTOF10: 7
PAINLEVEL_OUTOF10: 7
PAINLEVEL_OUTOF10: 9
PAINLEVEL_OUTOF10: 10
PAINLEVEL_OUTOF10: 8
PAINLEVEL_OUTOF10: 7
PAINLEVEL_OUTOF10: 7

## 2017-05-03 ASSESSMENT — ENCOUNTER SYMPTOMS
VOICE CHANGE: 0
COUGH: 0
CONSTIPATION: 0
VOMITING: 0
SINUS PRESSURE: 0
SHORTNESS OF BREATH: 0
ABDOMINAL PAIN: 0
WHEEZING: 0
BLOOD IN STOOL: 0
SORE THROAT: 0
DIARRHEA: 0
NAUSEA: 0

## 2017-05-03 ASSESSMENT — PAIN DESCRIPTION - ORIENTATION
ORIENTATION: RIGHT
ORIENTATION: RIGHT

## 2017-05-03 ASSESSMENT — PAIN DESCRIPTION - PROGRESSION: CLINICAL_PROGRESSION: NOT CHANGED

## 2017-05-03 ASSESSMENT — PAIN DESCRIPTION - ONSET: ONSET: ON-GOING

## 2017-05-03 ASSESSMENT — PAIN DESCRIPTION - PAIN TYPE
TYPE: SURGICAL PAIN

## 2017-05-03 ASSESSMENT — PAIN DESCRIPTION - LOCATION
LOCATION: KNEE
LOCATION: KNEE

## 2017-05-03 ASSESSMENT — PAIN DESCRIPTION - FREQUENCY: FREQUENCY: CONTINUOUS

## 2017-05-03 ASSESSMENT — PAIN DESCRIPTION - DESCRIPTORS: DESCRIPTORS: ACHING;DISCOMFORT;SORE

## 2017-05-04 ENCOUNTER — OFFICE VISIT (OUTPATIENT)
Dept: NEUROLOGY | Age: 52
End: 2017-05-04
Payer: MEDICAID

## 2017-05-04 ENCOUNTER — TELEPHONE (OUTPATIENT)
Dept: ORTHOPEDIC SURGERY | Age: 52
End: 2017-05-04

## 2017-05-04 VITALS
WEIGHT: 168.8 LBS | HEIGHT: 68 IN | BODY MASS INDEX: 25.58 KG/M2 | SYSTOLIC BLOOD PRESSURE: 115 MMHG | HEART RATE: 74 BPM | DIASTOLIC BLOOD PRESSURE: 70 MMHG

## 2017-05-04 DIAGNOSIS — Z98.890 S/P KNEE SURGERY: Primary | ICD-10-CM

## 2017-05-04 DIAGNOSIS — G40.909 SEIZURE DISORDER (HCC): Primary | ICD-10-CM

## 2017-05-04 PROCEDURE — 99204 OFFICE O/P NEW MOD 45 MIN: CPT | Performed by: PSYCHIATRY & NEUROLOGY

## 2017-05-04 RX ORDER — OXYCODONE HYDROCHLORIDE AND ACETAMINOPHEN 5; 325 MG/1; MG/1
1 TABLET ORAL EVERY 6 HOURS PRN
Qty: 70 TABLET | Refills: 0 | Status: SHIPPED | OUTPATIENT
Start: 2017-05-04 | End: 2017-05-11

## 2017-05-05 ENCOUNTER — HOSPITAL ENCOUNTER (OUTPATIENT)
Dept: PHYSICAL THERAPY | Age: 52
Setting detail: THERAPIES SERIES
Discharge: HOME | End: 2017-05-05
Payer: MEDICAID

## 2017-05-05 ENCOUNTER — TELEPHONE (OUTPATIENT)
Dept: CASE MANAGEMENT | Age: 52
End: 2017-05-05

## 2017-05-05 PROCEDURE — 97161 PT EVAL LOW COMPLEX 20 MIN: CPT

## 2017-05-05 PROCEDURE — 97016 VASOPNEUMATIC DEVICE THERAPY: CPT

## 2017-05-05 PROCEDURE — 97110 THERAPEUTIC EXERCISES: CPT

## 2017-05-08 ENCOUNTER — HOSPITAL ENCOUNTER (OUTPATIENT)
Dept: PHYSICAL THERAPY | Age: 52
Setting detail: THERAPIES SERIES
Discharge: HOME | End: 2017-05-08
Payer: MEDICAID

## 2017-05-08 PROCEDURE — 97016 VASOPNEUMATIC DEVICE THERAPY: CPT

## 2017-05-08 PROCEDURE — 97110 THERAPEUTIC EXERCISES: CPT

## 2017-05-09 ENCOUNTER — HOSPITAL ENCOUNTER (OUTPATIENT)
Dept: PHYSICAL THERAPY | Age: 52
Setting detail: THERAPIES SERIES
Discharge: HOME | End: 2017-05-09
Payer: MEDICAID

## 2017-05-09 PROCEDURE — 97110 THERAPEUTIC EXERCISES: CPT

## 2017-05-09 PROCEDURE — 97016 VASOPNEUMATIC DEVICE THERAPY: CPT

## 2017-05-11 DIAGNOSIS — Z98.890 S/P KNEE SURGERY: Primary | ICD-10-CM

## 2017-05-12 ENCOUNTER — HOSPITAL ENCOUNTER (OUTPATIENT)
Dept: PHYSICAL THERAPY | Age: 52
Setting detail: THERAPIES SERIES
Discharge: HOME | End: 2017-05-12
Payer: MEDICAID

## 2017-05-12 PROCEDURE — 97110 THERAPEUTIC EXERCISES: CPT

## 2017-05-12 PROCEDURE — 97016 VASOPNEUMATIC DEVICE THERAPY: CPT

## 2017-05-15 ENCOUNTER — HOSPITAL ENCOUNTER (OUTPATIENT)
Dept: PHYSICAL THERAPY | Age: 52
Setting detail: THERAPIES SERIES
Discharge: HOME | End: 2017-05-15
Payer: MEDICAID

## 2017-05-15 ENCOUNTER — OFFICE VISIT (OUTPATIENT)
Dept: ORTHOPEDIC SURGERY | Age: 52
End: 2017-05-15

## 2017-05-15 VITALS — BODY MASS INDEX: 25.39 KG/M2 | WEIGHT: 167.55 LBS | HEIGHT: 68 IN

## 2017-05-15 DIAGNOSIS — Z96.651 HISTORY OF ARTHROPLASTY OF RIGHT KNEE: ICD-10-CM

## 2017-05-15 DIAGNOSIS — M22.41 CHONDROMALACIA PATELLAE OF RIGHT KNEE: Primary | ICD-10-CM

## 2017-05-15 PROCEDURE — 97016 VASOPNEUMATIC DEVICE THERAPY: CPT

## 2017-05-15 PROCEDURE — 99024 POSTOP FOLLOW-UP VISIT: CPT | Performed by: ORTHOPAEDIC SURGERY

## 2017-05-15 PROCEDURE — 97110 THERAPEUTIC EXERCISES: CPT

## 2017-05-17 ENCOUNTER — HOSPITAL ENCOUNTER (OUTPATIENT)
Dept: PHYSICAL THERAPY | Age: 52
Setting detail: THERAPIES SERIES
Discharge: HOME | End: 2017-05-17
Payer: MEDICAID

## 2017-05-17 PROCEDURE — 97016 VASOPNEUMATIC DEVICE THERAPY: CPT

## 2017-05-17 PROCEDURE — 97110 THERAPEUTIC EXERCISES: CPT

## 2017-05-17 ASSESSMENT — ENCOUNTER SYMPTOMS
CONSTIPATION: 0
DIARRHEA: 0
COUGH: 0
NAUSEA: 0

## 2017-05-18 ENCOUNTER — HOSPITAL ENCOUNTER (OUTPATIENT)
Dept: PHYSICAL THERAPY | Age: 52
Setting detail: THERAPIES SERIES
Discharge: HOME | End: 2017-05-18
Payer: MEDICAID

## 2017-05-18 PROCEDURE — 97110 THERAPEUTIC EXERCISES: CPT

## 2017-05-18 PROCEDURE — 97016 VASOPNEUMATIC DEVICE THERAPY: CPT

## 2017-05-22 ENCOUNTER — HOSPITAL ENCOUNTER (OUTPATIENT)
Dept: PHYSICAL THERAPY | Age: 52
Setting detail: THERAPIES SERIES
Discharge: HOME | End: 2017-05-22
Payer: MEDICAID

## 2017-05-22 PROCEDURE — 97110 THERAPEUTIC EXERCISES: CPT

## 2017-05-23 ENCOUNTER — OFFICE VISIT (OUTPATIENT)
Dept: FAMILY MEDICINE CLINIC | Age: 52
End: 2017-05-23
Payer: MEDICAID

## 2017-05-23 ENCOUNTER — HOSPITAL ENCOUNTER (OUTPATIENT)
Age: 52
Setting detail: SPECIMEN
Discharge: HOME | End: 2017-05-23
Payer: MEDICAID

## 2017-05-23 VITALS
OXYGEN SATURATION: 100 % | TEMPERATURE: 97.9 F | HEIGHT: 68 IN | SYSTOLIC BLOOD PRESSURE: 125 MMHG | WEIGHT: 167.55 LBS | BODY MASS INDEX: 25.39 KG/M2 | DIASTOLIC BLOOD PRESSURE: 72 MMHG | HEART RATE: 62 BPM | RESPIRATION RATE: 18 BRPM

## 2017-05-23 DIAGNOSIS — Z86.69 H/O SLEEP APNEA: Primary | ICD-10-CM

## 2017-05-23 DIAGNOSIS — R82.90 MALODOROUS URINE: ICD-10-CM

## 2017-05-23 DIAGNOSIS — Z86.2 H/O IRON DEFICIENCY ANEMIA: ICD-10-CM

## 2017-05-23 LAB
BILIRUBIN, POC: ABNORMAL
BLOOD URINE, POC: ABNORMAL
CLARITY, POC: CLEAR
COLOR, POC: ABNORMAL
GLUCOSE URINE, POC: ABNORMAL
KETONES, POC: ABNORMAL
LEUKOCYTE EST, POC: ABNORMAL
NITRITE, POC: POSITIVE
PH, POC: 5.5
PROTEIN, POC: ABNORMAL
SPECIFIC GRAVITY, POC: 1.01
UROBILINOGEN, POC: 0.2

## 2017-05-23 PROCEDURE — 81003 URINALYSIS AUTO W/O SCOPE: CPT | Performed by: NURSE PRACTITIONER

## 2017-05-23 PROCEDURE — 99214 OFFICE O/P EST MOD 30 MIN: CPT | Performed by: NURSE PRACTITIONER

## 2017-05-23 RX ORDER — NITROFURANTOIN 25; 75 MG/1; MG/1
100 CAPSULE ORAL 2 TIMES DAILY
Qty: 14 CAPSULE | Refills: 0 | Status: SHIPPED | OUTPATIENT
Start: 2017-05-23 | End: 2017-06-02

## 2017-05-23 ASSESSMENT — ENCOUNTER SYMPTOMS
ABDOMINAL PAIN: 0
SINUS PRESSURE: 0
DIARRHEA: 0
CONSTIPATION: 0
EYE DISCHARGE: 0
BLOOD IN STOOL: 0
SHORTNESS OF BREATH: 0
CHEST TIGHTNESS: 0
COUGH: 0
RHINORRHEA: 0

## 2017-05-24 ENCOUNTER — HOSPITAL ENCOUNTER (OUTPATIENT)
Dept: MRI IMAGING | Facility: CLINIC | Age: 52
Discharge: HOME | End: 2017-05-24
Payer: MEDICAID

## 2017-05-24 ENCOUNTER — HOSPITAL ENCOUNTER (OUTPATIENT)
Age: 52
Discharge: HOME | End: 2017-05-24
Payer: MEDICAID

## 2017-05-24 ENCOUNTER — HOSPITAL ENCOUNTER (OUTPATIENT)
Dept: PHYSICAL THERAPY | Age: 52
Setting detail: THERAPIES SERIES
Discharge: HOME | End: 2017-05-24
Payer: MEDICAID

## 2017-05-24 DIAGNOSIS — G40.909 SEIZURE DISORDER (HCC): ICD-10-CM

## 2017-05-24 DIAGNOSIS — Z86.2 H/O IRON DEFICIENCY ANEMIA: ICD-10-CM

## 2017-05-24 LAB
ADRENOCORTICOTROPIC HORMONE: 8 PG/ML (ref 6–58)
ESTIMATED AVERAGE GLUCOSE: 103 MG/DL
FERRITIN: 65 UG/L (ref 13–150)
HBA1C MFR BLD: 5.2 % (ref 4–6)
HCT VFR BLD CALC: 32.6 % (ref 36–46)
HEMOGLOBIN: 11.1 G/DL (ref 12–16)
IRON SATURATION: 23 % (ref 20–55)
IRON: 74 UG/DL (ref 37–145)
MCH RBC QN AUTO: 30.7 PG (ref 26–34)
MCHC RBC AUTO-ENTMCNC: 34.1 G/DL (ref 31–37)
MCV RBC AUTO: 90.1 FL (ref 80–100)
PDW BLD-RTO: 14.5 % (ref 12.5–15.4)
PLATELET # BLD: 378 K/UL (ref 140–450)
PMV BLD AUTO: 7.7 FL (ref 6–12)
RBC # BLD: 3.62 M/UL (ref 4–5.2)
TOTAL IRON BINDING CAPACITY: 325 UG/DL (ref 250–450)
UNSATURATED IRON BINDING CAPACITY: 251 UG/DL (ref 112–347)
VITAMIN D 25-HYDROXY: 34.9 NG/ML (ref 30–100)
WBC # BLD: 5.3 K/UL (ref 3.5–11)

## 2017-05-24 PROCEDURE — 72141 MRI NECK SPINE W/O DYE: CPT

## 2017-05-24 PROCEDURE — 84244 ASSAY OF RENIN: CPT

## 2017-05-24 PROCEDURE — 82088 ASSAY OF ALDOSTERONE: CPT

## 2017-05-24 PROCEDURE — 83036 HEMOGLOBIN GLYCOSYLATED A1C: CPT

## 2017-05-24 PROCEDURE — 82024 ASSAY OF ACTH: CPT

## 2017-05-24 PROCEDURE — 82627 DEHYDROEPIANDROSTERONE: CPT

## 2017-05-24 PROCEDURE — 83550 IRON BINDING TEST: CPT

## 2017-05-24 PROCEDURE — 82728 ASSAY OF FERRITIN: CPT

## 2017-05-24 PROCEDURE — 82306 VITAMIN D 25 HYDROXY: CPT

## 2017-05-24 PROCEDURE — 83540 ASSAY OF IRON: CPT

## 2017-05-24 PROCEDURE — 85027 COMPLETE CBC AUTOMATED: CPT

## 2017-05-24 PROCEDURE — 36415 COLL VENOUS BLD VENIPUNCTURE: CPT

## 2017-05-24 RX ORDER — CYCLOBENZAPRINE HCL 5 MG
TABLET ORAL
Qty: 45 TABLET | Refills: 0 | Status: SHIPPED | OUTPATIENT
Start: 2017-05-24 | End: 2017-12-07 | Stop reason: SDUPTHER

## 2017-05-25 LAB
ALDOSTERONE COMMENT: NORMAL
ALDOSTERONE: 6 NG/DL
CULTURE: ABNORMAL
CULTURE: ABNORMAL
DHEAS (DHEA SULFATE): 33.4 UG/DL (ref 26–200)
Lab: ABNORMAL
ORGANISM: ABNORMAL
RENIN ACTIVITY: 0.8 NG/ML/HR
RENIN COMMENT: NORMAL
SPECIMEN DESCRIPTION: ABNORMAL
STATUS: ABNORMAL

## 2017-05-26 ENCOUNTER — HOSPITAL ENCOUNTER (OUTPATIENT)
Age: 52
Discharge: HOME | End: 2017-05-26
Payer: MEDICAID

## 2017-05-26 ENCOUNTER — HOSPITAL ENCOUNTER (OUTPATIENT)
Dept: PHYSICAL THERAPY | Age: 52
Setting detail: THERAPIES SERIES
Discharge: HOME | End: 2017-05-26
Payer: MEDICAID

## 2017-05-26 PROCEDURE — 97110 THERAPEUTIC EXERCISES: CPT

## 2017-05-26 PROCEDURE — 82533 TOTAL CORTISOL: CPT

## 2017-05-30 ENCOUNTER — HOSPITAL ENCOUNTER (OUTPATIENT)
Dept: PHYSICAL THERAPY | Age: 52
Setting detail: THERAPIES SERIES
Discharge: HOME | End: 2017-05-30
Payer: MEDICAID

## 2017-05-30 LAB — CORTISOL SALIVARY: 0.07 UG/DL

## 2017-05-30 PROCEDURE — 97110 THERAPEUTIC EXERCISES: CPT

## 2017-05-31 ENCOUNTER — TELEPHONE (OUTPATIENT)
Dept: NEUROLOGY | Age: 52
End: 2017-05-31

## 2017-05-31 DIAGNOSIS — M54.12 CERVICAL RADICULOPATHY: Primary | ICD-10-CM

## 2017-06-01 ENCOUNTER — HOSPITAL ENCOUNTER (OUTPATIENT)
Dept: PHYSICAL THERAPY | Age: 52
Setting detail: THERAPIES SERIES
Discharge: HOME | End: 2017-06-01
Payer: MEDICAID

## 2017-06-01 DIAGNOSIS — Z86.69 H/O SLEEP APNEA: Primary | ICD-10-CM

## 2017-06-01 PROCEDURE — 97110 THERAPEUTIC EXERCISES: CPT

## 2017-06-02 ENCOUNTER — HOSPITAL ENCOUNTER (OUTPATIENT)
Dept: PHYSICAL THERAPY | Age: 52
Setting detail: THERAPIES SERIES
Discharge: HOME | End: 2017-06-02
Payer: MEDICAID

## 2017-06-02 PROCEDURE — 97110 THERAPEUTIC EXERCISES: CPT

## 2017-06-05 DIAGNOSIS — Z86.69 H/O SLEEP APNEA: Primary | ICD-10-CM

## 2017-06-06 ENCOUNTER — HOSPITAL ENCOUNTER (OUTPATIENT)
Dept: PHYSICAL THERAPY | Age: 52
Setting detail: THERAPIES SERIES
Discharge: HOME | End: 2017-06-06
Payer: MEDICAID

## 2017-06-06 PROCEDURE — 97110 THERAPEUTIC EXERCISES: CPT

## 2017-06-08 ENCOUNTER — APPOINTMENT (OUTPATIENT)
Dept: PHYSICAL THERAPY | Age: 52
End: 2017-06-08
Payer: MEDICAID

## 2017-06-12 ENCOUNTER — HOSPITAL ENCOUNTER (OUTPATIENT)
Dept: PHYSICAL THERAPY | Age: 52
Setting detail: THERAPIES SERIES
Discharge: HOME | End: 2017-06-12
Payer: MEDICAID

## 2017-06-12 ENCOUNTER — OFFICE VISIT (OUTPATIENT)
Dept: ORTHOPEDIC SURGERY | Age: 52
End: 2017-06-12

## 2017-06-12 VITALS — BODY MASS INDEX: 25.39 KG/M2 | WEIGHT: 167.55 LBS | HEIGHT: 68 IN

## 2017-06-12 DIAGNOSIS — Z96.651 HISTORY OF ARTHROPLASTY OF RIGHT KNEE: ICD-10-CM

## 2017-06-12 DIAGNOSIS — M22.41 CHONDROMALACIA PATELLAE OF RIGHT KNEE: Primary | ICD-10-CM

## 2017-06-12 PROCEDURE — 99024 POSTOP FOLLOW-UP VISIT: CPT | Performed by: ORTHOPAEDIC SURGERY

## 2017-06-12 PROCEDURE — 97110 THERAPEUTIC EXERCISES: CPT

## 2017-06-12 RX ORDER — OXYCODONE HYDROCHLORIDE AND ACETAMINOPHEN 5; 325 MG/1; MG/1
1 TABLET ORAL 2 TIMES DAILY PRN
Qty: 40 TABLET | Refills: 0 | Status: SHIPPED | OUTPATIENT
Start: 2017-06-12 | End: 2017-12-08

## 2017-06-12 ASSESSMENT — ENCOUNTER SYMPTOMS
ABDOMINAL PAIN: 0
RECTAL PAIN: 0
ANAL BLEEDING: 0
BLOOD IN STOOL: 0
DIARRHEA: 0
WHEEZING: 0
COLOR CHANGE: 0
CONSTIPATION: 0
NAUSEA: 0
COUGH: 0
VOMITING: 0
BACK PAIN: 0

## 2017-06-14 ENCOUNTER — OFFICE VISIT (OUTPATIENT)
Dept: NEUROLOGY | Age: 52
End: 2017-06-14
Payer: MEDICAID

## 2017-06-14 ENCOUNTER — HOSPITAL ENCOUNTER (OUTPATIENT)
Dept: SLEEP CENTER | Age: 52
Discharge: HOME | End: 2017-06-14
Payer: MEDICAID

## 2017-06-14 VITALS
HEART RATE: 62 BPM | WEIGHT: 167 LBS | BODY MASS INDEX: 25.31 KG/M2 | HEIGHT: 68 IN | RESPIRATION RATE: 18 BRPM | SYSTOLIC BLOOD PRESSURE: 122 MMHG | DIASTOLIC BLOOD PRESSURE: 72 MMHG

## 2017-06-14 VITALS
HEART RATE: 57 BPM | SYSTOLIC BLOOD PRESSURE: 96 MMHG | BODY MASS INDEX: 24.86 KG/M2 | DIASTOLIC BLOOD PRESSURE: 71 MMHG | WEIGHT: 164 LBS

## 2017-06-14 DIAGNOSIS — M54.12 CERVICAL RADICULAR PAIN: Primary | ICD-10-CM

## 2017-06-14 DIAGNOSIS — G40.909 SEIZURE DISORDER (HCC): ICD-10-CM

## 2017-06-14 DIAGNOSIS — G47.33 OSA (OBSTRUCTIVE SLEEP APNEA): Primary | ICD-10-CM

## 2017-06-14 PROCEDURE — 99214 OFFICE O/P EST MOD 30 MIN: CPT | Performed by: PSYCHIATRY & NEUROLOGY

## 2017-06-14 PROCEDURE — 95810 POLYSOM 6/> YRS 4/> PARAM: CPT

## 2017-06-15 DIAGNOSIS — G93.32 CHRONIC FATIGUE SYNDROME: ICD-10-CM

## 2017-06-15 ASSESSMENT — SLEEP AND FATIGUE QUESTIONNAIRES
HOW LIKELY ARE YOU TO NOD OFF OR FALL ASLEEP WHILE LYING DOWN TO REST IN THE AFTERNOON WHEN CIRCUMSTANCES PERMIT: 1
NECK CIRCUMFERENCE (INCHES): 14.17
HOW LIKELY ARE YOU TO NOD OFF OR FALL ASLEEP WHILE SITTING INACTIVE IN A PUBLIC PLACE: 0
HOW LIKELY ARE YOU TO NOD OFF OR FALL ASLEEP WHILE SITTING AND READING: 0
HOW LIKELY ARE YOU TO NOD OFF OR FALL ASLEEP WHEN YOU ARE A PASSENGER IN A CAR FOR AN HOUR WITHOUT A BREAK: 1
ESS TOTAL SCORE: 3
HOW LIKELY ARE YOU TO NOD OFF OR FALL ASLEEP IN A CAR, WHILE STOPPED FOR A FEW MINUTES IN TRAFFIC: 0
HOW LIKELY ARE YOU TO NOD OFF OR FALL ASLEEP WHILE WATCHING TV: 1
HOW LIKELY ARE YOU TO NOD OFF OR FALL ASLEEP WHILE SITTING QUIETLY AFTER LUNCH WITHOUT ALCOHOL: 0
HOW LIKELY ARE YOU TO NOD OFF OR FALL ASLEEP WHILE SITTING AND TALKING TO SOMEONE: 0

## 2017-06-16 ENCOUNTER — HOSPITAL ENCOUNTER (OUTPATIENT)
Dept: INFUSION THERAPY | Age: 52
Discharge: HOME | End: 2017-06-16
Payer: MEDICAID

## 2017-06-16 ENCOUNTER — HOSPITAL ENCOUNTER (OUTPATIENT)
Dept: PHYSICAL THERAPY | Age: 52
Setting detail: THERAPIES SERIES
Discharge: HOME | End: 2017-06-16
Payer: MEDICAID

## 2017-06-16 VITALS
SYSTOLIC BLOOD PRESSURE: 126 MMHG | HEART RATE: 52 BPM | TEMPERATURE: 97.7 F | DIASTOLIC BLOOD PRESSURE: 75 MMHG | RESPIRATION RATE: 18 BRPM

## 2017-06-16 DIAGNOSIS — G93.32 CHRONIC FATIGUE SYNDROME: ICD-10-CM

## 2017-06-16 LAB
CORTISOL COLLECTION INFO: 30
CORTISOL COLLECTION INFO: 60
CORTISOL: 20.9 UG/DL
CORTISOL: 23.5 UG/DL

## 2017-06-16 PROCEDURE — 97110 THERAPEUTIC EXERCISES: CPT

## 2017-06-16 PROCEDURE — 36415 COLL VENOUS BLD VENIPUNCTURE: CPT

## 2017-06-16 PROCEDURE — 6360000002 HC RX W HCPCS: Performed by: INTERNAL MEDICINE

## 2017-06-16 PROCEDURE — 2580000003 HC RX 258: Performed by: INTERNAL MEDICINE

## 2017-06-16 PROCEDURE — 96374 THER/PROPH/DIAG INJ IV PUSH: CPT

## 2017-06-16 PROCEDURE — 82533 TOTAL CORTISOL: CPT

## 2017-06-16 RX ADMIN — COSYNTROPIN 1 MCG: 0.25 INJECTION, POWDER, LYOPHILIZED, FOR SOLUTION INTRAMUSCULAR; INTRAVENOUS at 11:28

## 2017-06-20 ENCOUNTER — HOSPITAL ENCOUNTER (OUTPATIENT)
Dept: NEUROLOGY | Age: 52
Discharge: HOME | End: 2017-06-20
Payer: MEDICAID

## 2017-06-20 ENCOUNTER — HOSPITAL ENCOUNTER (OUTPATIENT)
Dept: PHYSICAL THERAPY | Age: 52
Setting detail: THERAPIES SERIES
Discharge: HOME | End: 2017-06-20
Payer: MEDICAID

## 2017-06-20 PROCEDURE — 97110 THERAPEUTIC EXERCISES: CPT

## 2017-06-20 PROCEDURE — 95816 EEG AWAKE AND DROWSY: CPT

## 2017-06-21 ENCOUNTER — HOSPITAL ENCOUNTER (OUTPATIENT)
Dept: PAIN MANAGEMENT | Age: 52
Discharge: HOME | End: 2017-06-21
Payer: MEDICAID

## 2017-06-21 VITALS
HEIGHT: 68 IN | HEART RATE: 58 BPM | WEIGHT: 162 LBS | TEMPERATURE: 98 F | BODY MASS INDEX: 24.55 KG/M2 | RESPIRATION RATE: 15 BRPM

## 2017-06-21 DIAGNOSIS — M79.18 MYOFASCIAL PAIN SYNDROME, CERVICAL: ICD-10-CM

## 2017-06-21 DIAGNOSIS — G62.9 NEUROPATHY: ICD-10-CM

## 2017-06-21 DIAGNOSIS — M75.50 SUBSCAPULAR BURSITIS: ICD-10-CM

## 2017-06-21 DIAGNOSIS — E11.9 CONTROLLED TYPE 2 DIABETES MELLITUS WITHOUT COMPLICATION, WITHOUT LONG-TERM CURRENT USE OF INSULIN (HCC): ICD-10-CM

## 2017-06-21 DIAGNOSIS — M47.812 SPONDYLOSIS OF CERVICAL REGION WITHOUT MYELOPATHY OR RADICULOPATHY: Primary | ICD-10-CM

## 2017-06-21 PROCEDURE — 99204 OFFICE O/P NEW MOD 45 MIN: CPT

## 2017-06-21 PROCEDURE — 99244 OFF/OP CNSLTJ NEW/EST MOD 40: CPT | Performed by: PAIN MEDICINE

## 2017-06-21 PROCEDURE — 80307 DRUG TEST PRSMV CHEM ANLYZR: CPT

## 2017-06-21 RX ORDER — LIDOCAINE 50 MG/G
OINTMENT TOPICAL PRN
COMMUNITY
End: 2017-12-01 | Stop reason: SDUPTHER

## 2017-06-21 ASSESSMENT — ENCOUNTER SYMPTOMS
VOMITING: 0
RESPIRATORY NEGATIVE: 1
DOUBLE VISION: 0
BLURRED VISION: 0
NAUSEA: 0
BACK PAIN: 1
CONSTIPATION: 0
GASTROINTESTINAL NEGATIVE: 1
HEARTBURN: 0
PHOTOPHOBIA: 1
SHORTNESS OF BREATH: 0
COUGH: 0

## 2017-06-21 ASSESSMENT — PAIN SCALES - GENERAL: PAINLEVEL_OUTOF10: 4

## 2017-06-21 ASSESSMENT — PAIN DESCRIPTION - DIRECTION: RADIATING_TOWARDS: RIGHT SHOULDER

## 2017-06-21 ASSESSMENT — PAIN DESCRIPTION - FREQUENCY: FREQUENCY: CONTINUOUS

## 2017-06-21 ASSESSMENT — PAIN DESCRIPTION - ORIENTATION: ORIENTATION: RIGHT

## 2017-06-21 ASSESSMENT — PAIN DESCRIPTION - LOCATION: LOCATION: NECK;SHOULDER

## 2017-06-21 ASSESSMENT — PAIN DESCRIPTION - PAIN TYPE: TYPE: CHRONIC PAIN

## 2017-06-21 ASSESSMENT — PAIN DESCRIPTION - ONSET: ONSET: ON-GOING

## 2017-06-21 ASSESSMENT — PAIN DESCRIPTION - PROGRESSION: CLINICAL_PROGRESSION: GRADUALLY WORSENING

## 2017-06-23 ENCOUNTER — HOSPITAL ENCOUNTER (OUTPATIENT)
Dept: PHYSICAL THERAPY | Age: 52
Setting detail: THERAPIES SERIES
Discharge: HOME | End: 2017-06-23
Payer: MEDICAID

## 2017-06-23 PROCEDURE — 97110 THERAPEUTIC EXERCISES: CPT

## 2017-06-24 LAB
6-ACETYLMORPHINE, UR: NOT DETECTED
7-AMINOCLONAZEPAM, URINE: NOT DETECTED
ALPHA-OH-ALPRAZ, URINE: NOT DETECTED
ALPRAZOLAM, URINE: NOT DETECTED
AMPHETAMINES, URINE: NOT DETECTED
BARBITURATES, URINE: NOT DETECTED
BENZOYLECGONINE, UR: NOT DETECTED
BUPRENORPHINE URINE: NOT DETECTED
CARISOPRODOL, UR: NOT DETECTED
CLONAZEPAM, URINE: NOT DETECTED
CODEINE, URINE: NOT DETECTED
CREATININE URINE: 145.1 MG/DL (ref 20–400)
DIAZEPAM, URINE: NOT DETECTED
DRUGS EXPECTED, UR: NORMAL
EER HI RES INTERP UR: NORMAL
ETHYL GLUCURONIDE UR: PRESENT
FENTANYL URINE: NOT DETECTED
HYDROCODONE, URINE: NOT DETECTED
HYDROMORPHONE, URINE: NOT DETECTED
LORAZEPAM, URINE: NOT DETECTED
MARIJUANA METAB, UR: NOT DETECTED
MDA, UR: NOT DETECTED
MDEA, EVE, UR: NOT DETECTED
MDMA URINE: NOT DETECTED
MEPERIDINE METAB, UR: NOT DETECTED
METHADONE, URINE: NOT DETECTED
METHAMPHETAMINE, URINE: NOT DETECTED
METHYLPHENIDATE: NOT DETECTED
MIDAZOLAM, URINE: NOT DETECTED
MORPHINE URINE: NOT DETECTED
NORBUPRENORPHINE, URINE: NOT DETECTED
NORDIAZEPAM, URINE: NOT DETECTED
NORFENTANYL, URINE: NOT DETECTED
NORHYDROCODONE, URINE: NOT DETECTED
NOROXYCODONE, URINE: NOT DETECTED
NOROXYMORPHONE, URINE: NOT DETECTED
OXAZEPAM, URINE: NOT DETECTED
OXYCODONE URINE: NOT DETECTED
OXYMORPHONE, URINE: NOT DETECTED
PAIN MANAGEMENT DRUG PANEL INTERP, URINE: NORMAL
PAIN MGT DRUG PANEL, HI RES, UR: NORMAL
PCP,URINE: NOT DETECTED
PHENTERMINE, UR: NOT DETECTED
PROPOXYPHENE, URINE: NOT DETECTED
TAPENTADOL, URINE: NOT DETECTED
TAPENTADOL-O-SULFATE, URINE: NOT DETECTED
TEMAZEPAM, URINE: NOT DETECTED
TRAMADOL, URINE: NOT DETECTED
ZOLPIDEM, URINE: NOT DETECTED

## 2017-06-25 ENCOUNTER — APPOINTMENT (OUTPATIENT)
Dept: GENERAL RADIOLOGY | Age: 52
End: 2017-06-25
Payer: MEDICAID

## 2017-06-25 ENCOUNTER — HOSPITAL ENCOUNTER (EMERGENCY)
Age: 52
Discharge: HOME | End: 2017-06-25
Attending: EMERGENCY MEDICINE
Payer: MEDICAID

## 2017-06-25 VITALS
HEART RATE: 56 BPM | HEIGHT: 68 IN | TEMPERATURE: 97.3 F | BODY MASS INDEX: 24.25 KG/M2 | OXYGEN SATURATION: 100 % | SYSTOLIC BLOOD PRESSURE: 117 MMHG | DIASTOLIC BLOOD PRESSURE: 79 MMHG | RESPIRATION RATE: 16 BRPM | WEIGHT: 160 LBS

## 2017-06-25 DIAGNOSIS — S30.810A ABRASION OF RIGHT BUTTOCK, INITIAL ENCOUNTER: ICD-10-CM

## 2017-06-25 DIAGNOSIS — S80.01XA CONTUSION OF RIGHT KNEE, INITIAL ENCOUNTER: Primary | ICD-10-CM
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
## 2017-06-25 PROCEDURE — 99284 EMERGENCY DEPT VISIT MOD MDM: CPT

## 2017-06-25 PROCEDURE — 73562 X-RAY EXAM OF KNEE 3: CPT

## 2017-06-25 RX ORDER — IBUPROFEN 800 MG/1
800 TABLET ORAL EVERY 8 HOURS PRN
Qty: 30 TABLET | Refills: 0 | Status: ON HOLD | OUTPATIENT
Start: 2017-06-25 | End: 2018-01-31 | Stop reason: HOSPADM

## 2017-06-25 RX ORDER — BACITRACIN ZINC AND POLYMYXIN B SULFATE 500; 1000 [USP'U]/G; [USP'U]/G
OINTMENT TOPICAL
Qty: 28.35 G | Refills: 0 | Status: SHIPPED | OUTPATIENT
Start: 2017-06-25 | End: 2017-07-02

## 2017-06-25 ASSESSMENT — ENCOUNTER SYMPTOMS
GASTROINTESTINAL NEGATIVE: 1
RESPIRATORY NEGATIVE: 1
EYES NEGATIVE: 1
ALLERGIC/IMMUNOLOGIC NEGATIVE: 1

## 2017-06-25 ASSESSMENT — PAIN DESCRIPTION - PAIN TYPE: TYPE: ACUTE PAIN

## 2017-06-26 ENCOUNTER — HOSPITAL ENCOUNTER (OUTPATIENT)
Dept: PHYSICAL THERAPY | Age: 52
Setting detail: THERAPIES SERIES
End: 2017-06-26
Payer: MEDICAID

## 2017-06-26 ENCOUNTER — HOSPITAL ENCOUNTER (OUTPATIENT)
Dept: PHYSICAL THERAPY | Age: 52
Setting detail: THERAPIES SERIES
Discharge: HOME | End: 2017-06-26
Payer: MEDICAID

## 2017-06-28 ENCOUNTER — OFFICE VISIT (OUTPATIENT)
Dept: FAMILY MEDICINE CLINIC | Age: 52
End: 2017-06-28
Payer: MEDICAID

## 2017-06-28 VITALS
SYSTOLIC BLOOD PRESSURE: 130 MMHG | WEIGHT: 157 LBS | DIASTOLIC BLOOD PRESSURE: 93 MMHG | HEART RATE: 89 BPM | OXYGEN SATURATION: 100 % | TEMPERATURE: 97.7 F | RESPIRATION RATE: 18 BRPM | BODY MASS INDEX: 23.79 KG/M2 | HEIGHT: 68 IN

## 2017-06-28 DIAGNOSIS — T07.XXXA MULTIPLE ABRASIONS: ICD-10-CM

## 2017-06-28 DIAGNOSIS — M25.551 ACUTE HIP PAIN, RIGHT: Primary | ICD-10-CM

## 2017-06-28 PROCEDURE — 99214 OFFICE O/P EST MOD 30 MIN: CPT | Performed by: NURSE PRACTITIONER

## 2017-06-28 ASSESSMENT — ENCOUNTER SYMPTOMS
CHEST TIGHTNESS: 0
CONSTIPATION: 0
DIARRHEA: 0
ABDOMINAL PAIN: 0
SHORTNESS OF BREATH: 0
RHINORRHEA: 0
EYE DISCHARGE: 0
BLOOD IN STOOL: 0
SINUS PRESSURE: 0
COUGH: 0

## 2017-06-30 ENCOUNTER — HOSPITAL ENCOUNTER (OUTPATIENT)
Dept: GENERAL RADIOLOGY | Facility: CLINIC | Age: 52
Discharge: HOME | End: 2017-06-30
Payer: MEDICAID

## 2017-06-30 ENCOUNTER — HOSPITAL ENCOUNTER (OUTPATIENT)
Dept: PHYSICAL THERAPY | Age: 52
Setting detail: THERAPIES SERIES
Discharge: HOME | End: 2017-06-30
Payer: MEDICAID

## 2017-06-30 ENCOUNTER — HOSPITAL ENCOUNTER (OUTPATIENT)
Facility: CLINIC | Age: 52
Discharge: HOME | End: 2017-06-30
Payer: MEDICAID

## 2017-06-30 DIAGNOSIS — M25.551 ACUTE HIP PAIN, RIGHT: ICD-10-CM

## 2017-06-30 PROCEDURE — 73502 X-RAY EXAM HIP UNI 2-3 VIEWS: CPT

## 2017-06-30 PROCEDURE — 97161 PT EVAL LOW COMPLEX 20 MIN: CPT

## 2017-06-30 PROCEDURE — 97140 MANUAL THERAPY 1/> REGIONS: CPT

## 2017-06-30 PROCEDURE — 97110 THERAPEUTIC EXERCISES: CPT

## 2017-07-05 ENCOUNTER — OFFICE VISIT (OUTPATIENT)
Dept: ORTHOPEDIC SURGERY | Age: 52
End: 2017-07-05

## 2017-07-05 ENCOUNTER — HOSPITAL ENCOUNTER (OUTPATIENT)
Dept: PHYSICAL THERAPY | Age: 52
Setting detail: THERAPIES SERIES
Discharge: HOME | End: 2017-07-05
Payer: MEDICAID

## 2017-07-05 VITALS — HEIGHT: 68 IN | BODY MASS INDEX: 23.79 KG/M2 | WEIGHT: 156.97 LBS

## 2017-07-05 DIAGNOSIS — M22.41 CHONDROMALACIA PATELLA, RIGHT: ICD-10-CM

## 2017-07-05 DIAGNOSIS — Z96.651 HISTORY OF ARTHROPLASTY OF RIGHT KNEE: Primary | ICD-10-CM

## 2017-07-05 DIAGNOSIS — M17.11 ARTHRITIS OF RIGHT KNEE: ICD-10-CM

## 2017-07-05 PROCEDURE — 99024 POSTOP FOLLOW-UP VISIT: CPT | Performed by: ORTHOPAEDIC SURGERY

## 2017-07-05 PROCEDURE — 97110 THERAPEUTIC EXERCISES: CPT

## 2017-07-05 PROCEDURE — 97140 MANUAL THERAPY 1/> REGIONS: CPT

## 2017-07-05 ASSESSMENT — ENCOUNTER SYMPTOMS
CHOKING: 0
VOMITING: 0
WHEEZING: 0
DIARRHEA: 0
ABDOMINAL PAIN: 0
VOICE CHANGE: 0
COUGH: 0
CONSTIPATION: 0
TROUBLE SWALLOWING: 0
SHORTNESS OF BREATH: 0
NAUSEA: 0

## 2017-07-07 ENCOUNTER — HOSPITAL ENCOUNTER (OUTPATIENT)
Dept: PHYSICAL THERAPY | Age: 52
Setting detail: THERAPIES SERIES
Discharge: HOME | End: 2017-07-07
Payer: MEDICAID

## 2017-07-07 PROCEDURE — 97140 MANUAL THERAPY 1/> REGIONS: CPT

## 2017-07-07 PROCEDURE — 97110 THERAPEUTIC EXERCISES: CPT

## 2017-07-11 ENCOUNTER — HOSPITAL ENCOUNTER (OUTPATIENT)
Dept: PHYSICAL THERAPY | Age: 52
Setting detail: THERAPIES SERIES
Discharge: HOME | End: 2017-07-11
Payer: MEDICAID

## 2017-07-11 PROCEDURE — 97140 MANUAL THERAPY 1/> REGIONS: CPT

## 2017-07-11 PROCEDURE — 97110 THERAPEUTIC EXERCISES: CPT

## 2017-07-14 ENCOUNTER — HOSPITAL ENCOUNTER (OUTPATIENT)
Dept: PHYSICAL THERAPY | Age: 52
Setting detail: THERAPIES SERIES
Discharge: HOME | End: 2017-07-14
Payer: MEDICAID

## 2017-07-14 PROCEDURE — 97110 THERAPEUTIC EXERCISES: CPT

## 2017-07-14 PROCEDURE — 97140 MANUAL THERAPY 1/> REGIONS: CPT

## 2017-07-14 RX ORDER — OMEPRAZOLE 20 MG/1
CAPSULE, DELAYED RELEASE ORAL
Qty: 30 CAPSULE | Refills: 4 | Status: ON HOLD | OUTPATIENT
Start: 2017-07-14 | End: 2018-01-17 | Stop reason: HOSPADM

## 2017-07-17 ENCOUNTER — HOSPITAL ENCOUNTER (OUTPATIENT)
Dept: PHYSICAL THERAPY | Age: 52
Setting detail: THERAPIES SERIES
Discharge: HOME | End: 2017-07-17
Payer: MEDICAID

## 2017-07-17 PROCEDURE — 97140 MANUAL THERAPY 1/> REGIONS: CPT

## 2017-07-17 PROCEDURE — 97110 THERAPEUTIC EXERCISES: CPT

## 2017-07-26 ENCOUNTER — HOSPITAL ENCOUNTER (OUTPATIENT)
Dept: PHYSICAL THERAPY | Age: 52
Setting detail: THERAPIES SERIES
Discharge: HOME | End: 2017-07-26
Payer: MEDICAID

## 2017-07-26 PROCEDURE — 97110 THERAPEUTIC EXERCISES: CPT

## 2017-07-26 PROCEDURE — 97140 MANUAL THERAPY 1/> REGIONS: CPT

## 2017-07-28 ENCOUNTER — OFFICE VISIT (OUTPATIENT)
Dept: FAMILY MEDICINE CLINIC | Age: 52
End: 2017-07-28
Payer: MEDICAID

## 2017-07-28 ENCOUNTER — HOSPITAL ENCOUNTER (OUTPATIENT)
Dept: PHYSICAL THERAPY | Age: 52
Setting detail: THERAPIES SERIES
Discharge: HOME | End: 2017-07-28
Payer: MEDICAID

## 2017-07-28 VITALS
DIASTOLIC BLOOD PRESSURE: 56 MMHG | OXYGEN SATURATION: 100 % | HEIGHT: 68 IN | SYSTOLIC BLOOD PRESSURE: 85 MMHG | HEART RATE: 58 BPM | BODY MASS INDEX: 25.1 KG/M2 | TEMPERATURE: 96.8 F | WEIGHT: 165.6 LBS | RESPIRATION RATE: 18 BRPM

## 2017-07-28 DIAGNOSIS — I95.2 HYPOTENSION DUE TO DRUGS: Primary | ICD-10-CM

## 2017-07-28 DIAGNOSIS — Z76.0 MEDICATION REFILL: ICD-10-CM

## 2017-07-28 PROCEDURE — 99213 OFFICE O/P EST LOW 20 MIN: CPT | Performed by: NURSE PRACTITIONER

## 2017-07-28 PROCEDURE — 97110 THERAPEUTIC EXERCISES: CPT

## 2017-07-28 PROCEDURE — 97140 MANUAL THERAPY 1/> REGIONS: CPT

## 2017-07-28 RX ORDER — POTASSIUM CHLORIDE 20 MEQ/1
20 TABLET, EXTENDED RELEASE ORAL DAILY
Qty: 60 TABLET | Refills: 5 | Status: ON HOLD | OUTPATIENT
Start: 2017-07-28 | End: 2017-09-15 | Stop reason: CLARIF

## 2017-07-28 RX ORDER — FELODIPINE 10 MG/1
10 TABLET, EXTENDED RELEASE ORAL DAILY
Qty: 30 TABLET | Refills: 3 | Status: ON HOLD | OUTPATIENT
Start: 2017-07-28 | End: 2018-11-11 | Stop reason: HOSPADM

## 2017-07-28 RX ORDER — LOSARTAN POTASSIUM 100 MG/1
TABLET ORAL
Qty: 30 TABLET | Refills: 3 | Status: CANCELLED | OUTPATIENT
Start: 2017-07-28

## 2017-07-28 RX ORDER — POTASSIUM CHLORIDE 20 MEQ/1
20 TABLET, EXTENDED RELEASE ORAL
COMMUNITY
Start: 2016-06-16 | End: 2017-07-28 | Stop reason: SDUPTHER

## 2017-07-28 ASSESSMENT — ENCOUNTER SYMPTOMS
BLOOD IN STOOL: 0
RHINORRHEA: 0
SHORTNESS OF BREATH: 0
ABDOMINAL PAIN: 0
COUGH: 0
SINUS PRESSURE: 0
CHEST TIGHTNESS: 0
DIARRHEA: 0
CONSTIPATION: 0
EYE DISCHARGE: 0

## 2017-07-31 ENCOUNTER — OFFICE VISIT (OUTPATIENT)
Dept: ORTHOPEDIC SURGERY | Age: 52
End: 2017-07-31

## 2017-07-31 ENCOUNTER — HOSPITAL ENCOUNTER (OUTPATIENT)
Age: 52
Discharge: HOME | End: 2017-07-31
Payer: MEDICAID

## 2017-07-31 VITALS — BODY MASS INDEX: 24.76 KG/M2 | WEIGHT: 163.36 LBS | HEIGHT: 68 IN

## 2017-07-31 DIAGNOSIS — Z96.651 HISTORY OF ARTHROPLASTY OF RIGHT KNEE: Primary | ICD-10-CM

## 2017-07-31 DIAGNOSIS — M22.41 CHONDROMALACIA PATELLA, RIGHT: ICD-10-CM

## 2017-07-31 LAB
ALBUMIN SERPL-MCNC: 3.9 G/DL (ref 3.5–5.2)
ALBUMIN/GLOBULIN RATIO: 1.4 (ref 1–2.5)
ALP BLD-CCNC: 117 U/L (ref 35–104)
ALT SERPL-CCNC: 14 U/L (ref 5–33)
ANION GAP SERPL CALCULATED.3IONS-SCNC: 11 MMOL/L (ref 9–17)
AST SERPL-CCNC: 28 U/L
BILIRUB SERPL-MCNC: 0.2 MG/DL (ref 0.3–1.2)
BUN BLDV-MCNC: 10 MG/DL (ref 6–20)
BUN/CREAT BLD: ABNORMAL (ref 9–20)
CALCIUM SERPL-MCNC: 8.8 MG/DL (ref 8.6–10.4)
CHLORIDE BLD-SCNC: 105 MMOL/L (ref 98–107)
CO2: 25 MMOL/L (ref 20–31)
CREAT SERPL-MCNC: 0.81 MG/DL (ref 0.5–0.9)
GFR AFRICAN AMERICAN: >60 ML/MIN
GFR NON-AFRICAN AMERICAN: >60 ML/MIN
GFR SERPL CREATININE-BSD FRML MDRD: ABNORMAL ML/MIN/{1.73_M2}
GFR SERPL CREATININE-BSD FRML MDRD: ABNORMAL ML/MIN/{1.73_M2}
GLUCOSE BLD-MCNC: 73 MG/DL (ref 70–99)
HCT VFR BLD CALC: 33.6 % (ref 36–46)
HEMOGLOBIN: 11.4 G/DL (ref 12–16)
MCH RBC QN AUTO: 29.7 PG (ref 26–34)
MCHC RBC AUTO-ENTMCNC: 33.8 G/DL (ref 31–37)
MCV RBC AUTO: 87.9 FL (ref 80–100)
PDW BLD-RTO: 14.6 % (ref 12.5–15.4)
PLATELET # BLD: 190 K/UL (ref 140–450)
PMV BLD AUTO: 9.2 FL (ref 6–12)
POTASSIUM SERPL-SCNC: 4.9 MMOL/L (ref 3.7–5.3)
RBC # BLD: 3.82 M/UL (ref 4–5.2)
SODIUM BLD-SCNC: 141 MMOL/L (ref 135–144)
TOTAL PROTEIN: 6.7 G/DL (ref 6.4–8.3)
WBC # BLD: 6.6 K/UL (ref 3.5–11)

## 2017-07-31 PROCEDURE — 36415 COLL VENOUS BLD VENIPUNCTURE: CPT

## 2017-07-31 PROCEDURE — 80053 COMPREHEN METABOLIC PANEL: CPT

## 2017-07-31 PROCEDURE — 85027 COMPLETE CBC AUTOMATED: CPT

## 2017-07-31 PROCEDURE — 99024 POSTOP FOLLOW-UP VISIT: CPT | Performed by: ORTHOPAEDIC SURGERY

## 2017-07-31 ASSESSMENT — ENCOUNTER SYMPTOMS
SHORTNESS OF BREATH: 0
CONSTIPATION: 0
ABDOMINAL PAIN: 0
NAUSEA: 0
COUGH: 0
WHEEZING: 0
TROUBLE SWALLOWING: 0
DIARRHEA: 0
VOICE CHANGE: 0
CHOKING: 0
VOMITING: 0

## 2017-08-01 ENCOUNTER — HOSPITAL ENCOUNTER (OUTPATIENT)
Dept: PHYSICAL THERAPY | Age: 52
Setting detail: THERAPIES SERIES
Discharge: HOME | End: 2017-08-01
Payer: MEDICAID

## 2017-08-01 PROCEDURE — 97110 THERAPEUTIC EXERCISES: CPT

## 2017-08-01 PROCEDURE — 97140 MANUAL THERAPY 1/> REGIONS: CPT

## 2017-08-03 ENCOUNTER — HOSPITAL ENCOUNTER (OUTPATIENT)
Dept: PHYSICAL THERAPY | Age: 52
Setting detail: THERAPIES SERIES
Discharge: HOME | End: 2017-08-03
Payer: MEDICAID

## 2017-08-03 PROCEDURE — 97140 MANUAL THERAPY 1/> REGIONS: CPT

## 2017-08-03 PROCEDURE — 97110 THERAPEUTIC EXERCISES: CPT

## 2017-08-04 ENCOUNTER — NURSE ONLY (OUTPATIENT)
Dept: FAMILY MEDICINE CLINIC | Age: 52
End: 2017-08-04
Payer: MEDICAID

## 2017-08-04 VITALS — DIASTOLIC BLOOD PRESSURE: 68 MMHG | SYSTOLIC BLOOD PRESSURE: 118 MMHG

## 2017-08-04 DIAGNOSIS — I10 ESSENTIAL HYPERTENSION: Primary | ICD-10-CM

## 2017-08-04 PROCEDURE — 99212 OFFICE O/P EST SF 10 MIN: CPT | Performed by: NURSE PRACTITIONER

## 2017-08-09 ENCOUNTER — HOSPITAL ENCOUNTER (OUTPATIENT)
Dept: PHYSICAL THERAPY | Age: 52
Setting detail: THERAPIES SERIES
Discharge: HOME | End: 2017-08-09
Payer: MEDICAID

## 2017-08-09 PROCEDURE — 97110 THERAPEUTIC EXERCISES: CPT

## 2017-08-11 ENCOUNTER — HOSPITAL ENCOUNTER (OUTPATIENT)
Dept: PHYSICAL THERAPY | Age: 52
Setting detail: THERAPIES SERIES
Discharge: HOME | End: 2017-08-11
Payer: MEDICAID

## 2017-08-11 PROCEDURE — 97110 THERAPEUTIC EXERCISES: CPT

## 2017-08-16 ENCOUNTER — HOSPITAL ENCOUNTER (OUTPATIENT)
Dept: PHYSICAL THERAPY | Age: 52
Setting detail: THERAPIES SERIES
Discharge: HOME | End: 2017-08-16
Payer: MEDICAID

## 2017-08-16 PROCEDURE — 97110 THERAPEUTIC EXERCISES: CPT

## 2017-08-21 ENCOUNTER — OFFICE VISIT (OUTPATIENT)
Dept: FAMILY MEDICINE CLINIC | Age: 52
End: 2017-08-21
Payer: MEDICAID

## 2017-08-21 VITALS
WEIGHT: 167 LBS | HEIGHT: 68 IN | HEART RATE: 64 BPM | BODY MASS INDEX: 25.31 KG/M2 | OXYGEN SATURATION: 98 % | RESPIRATION RATE: 18 BRPM | SYSTOLIC BLOOD PRESSURE: 129 MMHG | TEMPERATURE: 97.1 F | DIASTOLIC BLOOD PRESSURE: 83 MMHG

## 2017-08-21 DIAGNOSIS — L08.9 TOE INFECTION: Primary | ICD-10-CM

## 2017-08-21 PROCEDURE — 99213 OFFICE O/P EST LOW 20 MIN: CPT | Performed by: NURSE PRACTITIONER

## 2017-08-21 RX ORDER — CEPHALEXIN 500 MG/1
500 CAPSULE ORAL 3 TIMES DAILY
Qty: 30 CAPSULE | Refills: 0 | Status: SHIPPED | OUTPATIENT
Start: 2017-08-21 | End: 2017-09-18 | Stop reason: ALTCHOICE

## 2017-08-28 DIAGNOSIS — M47.812 SPONDYLOSIS OF CERVICAL REGION WITHOUT MYELOPATHY OR RADICULOPATHY: ICD-10-CM

## 2017-08-28 DIAGNOSIS — R56.9 SEIZURES (HCC): ICD-10-CM

## 2017-08-31 ENCOUNTER — TELEPHONE (OUTPATIENT)
Dept: UROLOGY | Age: 52
End: 2017-08-31

## 2017-09-11 ENCOUNTER — OFFICE VISIT (OUTPATIENT)
Dept: FAMILY MEDICINE CLINIC | Age: 52
End: 2017-09-11
Payer: MEDICAID

## 2017-09-11 VITALS
RESPIRATION RATE: 18 BRPM | TEMPERATURE: 96.8 F | SYSTOLIC BLOOD PRESSURE: 147 MMHG | HEIGHT: 68 IN | HEART RATE: 72 BPM | BODY MASS INDEX: 24.4 KG/M2 | OXYGEN SATURATION: 98 % | WEIGHT: 161 LBS | DIASTOLIC BLOOD PRESSURE: 97 MMHG

## 2017-09-11 DIAGNOSIS — I10 ESSENTIAL HYPERTENSION: Primary | ICD-10-CM

## 2017-09-11 DIAGNOSIS — M79.645 FINGER PAIN, LEFT: ICD-10-CM

## 2017-09-11 DIAGNOSIS — Z76.0 MEDICATION REFILL: ICD-10-CM

## 2017-09-11 DIAGNOSIS — Z23 ENCOUNTER FOR IMMUNIZATION: ICD-10-CM

## 2017-09-11 PROCEDURE — 90471 IMMUNIZATION ADMIN: CPT | Performed by: NURSE PRACTITIONER

## 2017-09-11 PROCEDURE — 99214 OFFICE O/P EST MOD 30 MIN: CPT | Performed by: NURSE PRACTITIONER

## 2017-09-11 PROCEDURE — 90688 IIV4 VACCINE SPLT 0.5 ML IM: CPT | Performed by: NURSE PRACTITIONER

## 2017-09-11 RX ORDER — LOSARTAN POTASSIUM 50 MG/1
50 TABLET ORAL DAILY
Qty: 30 TABLET | Refills: 3 | Status: SHIPPED | OUTPATIENT
Start: 2017-09-11 | End: 2018-01-03 | Stop reason: SDUPTHER

## 2017-09-11 RX ORDER — ATENOLOL 100 MG/1
TABLET ORAL
Qty: 30 TABLET | Refills: 3 | Status: SHIPPED | OUTPATIENT
Start: 2017-09-11 | End: 2018-01-03 | Stop reason: SDUPTHER

## 2017-09-11 ASSESSMENT — ENCOUNTER SYMPTOMS
RHINORRHEA: 1
COUGH: 0

## 2017-09-12 ENCOUNTER — HOSPITAL ENCOUNTER (OUTPATIENT)
Dept: GENERAL RADIOLOGY | Facility: CLINIC | Age: 52
Discharge: HOME | End: 2017-09-12
Payer: MEDICAID

## 2017-09-12 ENCOUNTER — HOSPITAL ENCOUNTER (OUTPATIENT)
Facility: CLINIC | Age: 52
Discharge: HOME | End: 2017-09-12
Payer: MEDICAID

## 2017-09-12 DIAGNOSIS — M79.645 FINGER PAIN, LEFT: ICD-10-CM

## 2017-09-12 PROCEDURE — 73140 X-RAY EXAM OF FINGER(S): CPT

## 2017-09-14 ENCOUNTER — HOSPITAL ENCOUNTER (OUTPATIENT)
Age: 52
Setting detail: OBSERVATION
Discharge: HOME | End: 2017-09-15
Attending: EMERGENCY MEDICINE | Admitting: EMERGENCY MEDICINE
Payer: MEDICAID

## 2017-09-14 DIAGNOSIS — R27.0 ATAXIA: ICD-10-CM

## 2017-09-14 DIAGNOSIS — T30.0 BURN: Primary | ICD-10-CM

## 2017-09-14 LAB
GLUCOSE BLD-MCNC: 167 MG/DL (ref 65–105)
GLUCOSE BLD-MCNC: 85 MG/DL (ref 65–105)
LAMOTRIGINE LEVEL: 6 UG/ML (ref 3–15)

## 2017-09-14 PROCEDURE — G0384 LEV 5 HOSP TYPE B ED VISIT: HCPCS

## 2017-09-14 PROCEDURE — 99223 1ST HOSP IP/OBS HIGH 75: CPT | Performed by: PSYCHIATRY & NEUROLOGY

## 2017-09-14 PROCEDURE — 80175 DRUG SCREEN QUAN LAMOTRIGINE: CPT

## 2017-09-14 PROCEDURE — G0378 HOSPITAL OBSERVATION PER HR: HCPCS

## 2017-09-14 PROCEDURE — 16020 DRESS/DEBRID P-THICK BURN S: CPT

## 2017-09-14 PROCEDURE — 6370000000 HC RX 637 (ALT 250 FOR IP): Performed by: PHYSICIAN ASSISTANT

## 2017-09-14 PROCEDURE — 90471 IMMUNIZATION ADMIN: CPT | Performed by: EMERGENCY MEDICINE

## 2017-09-14 PROCEDURE — 82947 ASSAY GLUCOSE BLOOD QUANT: CPT

## 2017-09-14 PROCEDURE — 90715 TDAP VACCINE 7 YRS/> IM: CPT | Performed by: EMERGENCY MEDICINE

## 2017-09-14 PROCEDURE — 6360000002 HC RX W HCPCS: Performed by: EMERGENCY MEDICINE

## 2017-09-14 RX ORDER — ONDANSETRON 4 MG/1
4 TABLET, ORALLY DISINTEGRATING ORAL EVERY 8 HOURS PRN
Status: DISCONTINUED | OUTPATIENT
Start: 2017-09-14 | End: 2017-09-14 | Stop reason: CLARIF

## 2017-09-14 RX ORDER — HYDROCODONE BITARTRATE AND ACETAMINOPHEN 5; 325 MG/1; MG/1
1 TABLET ORAL EVERY 4 HOURS PRN
Status: DISCONTINUED | OUTPATIENT
Start: 2017-09-14 | End: 2017-09-15 | Stop reason: HOSPADM

## 2017-09-14 RX ORDER — PRAVASTATIN SODIUM 20 MG
40 TABLET ORAL NIGHTLY
Status: DISCONTINUED | OUTPATIENT
Start: 2017-09-14 | End: 2017-09-15 | Stop reason: HOSPADM

## 2017-09-14 RX ORDER — VENLAFAXINE 75 MG/1
75 TABLET ORAL 2 TIMES DAILY WITH MEALS
Status: DISCONTINUED | OUTPATIENT
Start: 2017-09-14 | End: 2017-09-15 | Stop reason: HOSPADM

## 2017-09-14 RX ORDER — ONDANSETRON 4 MG/1
4 TABLET, FILM COATED ORAL EVERY 8 HOURS PRN
Status: DISCONTINUED | OUTPATIENT
Start: 2017-09-14 | End: 2017-09-15 | Stop reason: HOSPADM

## 2017-09-14 RX ORDER — CHOLECALCIFEROL (VITAMIN D3) 125 MCG
1000 CAPSULE ORAL DAILY
Status: DISCONTINUED | OUTPATIENT
Start: 2017-09-14 | End: 2017-09-15 | Stop reason: HOSPADM

## 2017-09-14 RX ORDER — LOSARTAN POTASSIUM 50 MG/1
50 TABLET ORAL DAILY
Status: DISCONTINUED | OUTPATIENT
Start: 2017-09-14 | End: 2017-09-15 | Stop reason: HOSPADM

## 2017-09-14 RX ORDER — AMLODIPINE BESYLATE 10 MG/1
10 TABLET ORAL DAILY
Status: DISCONTINUED | OUTPATIENT
Start: 2017-09-14 | End: 2017-09-15

## 2017-09-14 RX ORDER — FELODIPINE 5 MG/1
10 TABLET, EXTENDED RELEASE ORAL DAILY
Status: DISCONTINUED | OUTPATIENT
Start: 2017-09-14 | End: 2017-09-14 | Stop reason: CLARIF

## 2017-09-14 RX ORDER — LAMOTRIGINE 25 MG/1
25 TABLET ORAL 2 TIMES DAILY
Status: DISCONTINUED | OUTPATIENT
Start: 2017-09-14 | End: 2017-09-15 | Stop reason: HOSPADM

## 2017-09-14 RX ORDER — ATENOLOL 50 MG/1
100 TABLET ORAL DAILY
Status: DISCONTINUED | OUTPATIENT
Start: 2017-09-14 | End: 2017-09-15 | Stop reason: HOSPADM

## 2017-09-14 RX ORDER — POTASSIUM BICARBONATE 25 MEQ/1
25 TABLET, EFFERVESCENT ORAL DAILY
Status: DISCONTINUED | OUTPATIENT
Start: 2017-09-14 | End: 2017-09-15 | Stop reason: HOSPADM

## 2017-09-14 RX ORDER — HYDROCODONE BITARTRATE AND ACETAMINOPHEN 5; 325 MG/1; MG/1
1 TABLET ORAL ONCE
Status: COMPLETED | OUTPATIENT
Start: 2017-09-14 | End: 2017-09-14

## 2017-09-14 RX ORDER — ACETAMINOPHEN 325 MG/1
650 TABLET ORAL EVERY 4 HOURS PRN
Status: DISCONTINUED | OUTPATIENT
Start: 2017-09-14 | End: 2017-09-15 | Stop reason: HOSPADM

## 2017-09-14 RX ORDER — DOCUSATE SODIUM 100 MG/1
100 CAPSULE, LIQUID FILLED ORAL 2 TIMES DAILY PRN
Status: DISCONTINUED | OUTPATIENT
Start: 2017-09-14 | End: 2017-09-15 | Stop reason: HOSPADM

## 2017-09-14 RX ORDER — FAMOTIDINE 20 MG/1
40 TABLET, FILM COATED ORAL DAILY
Status: DISCONTINUED | OUTPATIENT
Start: 2017-09-14 | End: 2017-09-15 | Stop reason: HOSPADM

## 2017-09-14 RX ORDER — CETIRIZINE HYDROCHLORIDE 10 MG/1
10 TABLET ORAL DAILY
Status: DISCONTINUED | OUTPATIENT
Start: 2017-09-14 | End: 2017-09-15 | Stop reason: HOSPADM

## 2017-09-14 RX ORDER — HYDROXYCHLOROQUINE SULFATE 200 MG/1
200 TABLET, FILM COATED ORAL DAILY
Status: DISCONTINUED | OUTPATIENT
Start: 2017-09-14 | End: 2017-09-15 | Stop reason: HOSPADM

## 2017-09-14 RX ORDER — ALBUTEROL SULFATE 90 UG/1
2 AEROSOL, METERED RESPIRATORY (INHALATION) EVERY 6 HOURS PRN
Status: DISCONTINUED | OUTPATIENT
Start: 2017-09-14 | End: 2017-09-15 | Stop reason: HOSPADM

## 2017-09-14 RX ORDER — SODIUM CHLORIDE 0.9 % (FLUSH) 0.9 %
10 SYRINGE (ML) INJECTION EVERY 12 HOURS SCHEDULED
Status: DISCONTINUED | OUTPATIENT
Start: 2017-09-14 | End: 2017-09-15 | Stop reason: HOSPADM

## 2017-09-14 RX ORDER — OMEPRAZOLE 20 MG/1
20 CAPSULE, DELAYED RELEASE ORAL EVERY MORNING
Status: DISCONTINUED | OUTPATIENT
Start: 2017-09-15 | End: 2017-09-15 | Stop reason: HOSPADM

## 2017-09-14 RX ORDER — SODIUM CHLORIDE 0.9 % (FLUSH) 0.9 %
10 SYRINGE (ML) INJECTION PRN
Status: DISCONTINUED | OUTPATIENT
Start: 2017-09-14 | End: 2017-09-15 | Stop reason: HOSPADM

## 2017-09-14 RX ORDER — GABAPENTIN 300 MG/1
300 CAPSULE ORAL 2 TIMES DAILY
Status: DISCONTINUED | OUTPATIENT
Start: 2017-09-14 | End: 2017-09-15 | Stop reason: HOSPADM

## 2017-09-14 RX ORDER — NALTREXONE HYDROCHLORIDE 50 MG/1
50 TABLET, FILM COATED ORAL DAILY
Status: DISCONTINUED | OUTPATIENT
Start: 2017-09-14 | End: 2017-09-14

## 2017-09-14 RX ORDER — QUETIAPINE FUMARATE 200 MG/1
200 TABLET, FILM COATED ORAL NIGHTLY
Status: DISCONTINUED | OUTPATIENT
Start: 2017-09-14 | End: 2017-09-15 | Stop reason: HOSPADM

## 2017-09-14 RX ADMIN — HYDROCODONE BITARTRATE AND ACETAMINOPHEN 1 TABLET: 5; 325 TABLET ORAL at 19:50

## 2017-09-14 RX ADMIN — GABAPENTIN 300 MG: 300 CAPSULE ORAL at 21:24

## 2017-09-14 RX ADMIN — QUETIAPINE FUMARATE 200 MG: 200 TABLET ORAL at 21:24

## 2017-09-14 RX ADMIN — TETANUS TOXOID, REDUCED DIPHTHERIA TOXOID AND ACELLULAR PERTUSSIS VACCINE, ADSORBED 0.5 ML: 5; 2.5; 8; 8; 2.5 SUSPENSION INTRAMUSCULAR at 14:18

## 2017-09-14 RX ADMIN — PRAVASTATIN SODIUM 40 MG: 20 TABLET ORAL at 21:24

## 2017-09-14 RX ADMIN — HYDROCODONE BITARTRATE AND ACETAMINOPHEN 1 TABLET: 5; 325 TABLET ORAL at 14:48

## 2017-09-14 RX ADMIN — VENLAFAXINE 75 MG: 75 TABLET ORAL at 21:24

## 2017-09-14 ASSESSMENT — PAIN DESCRIPTION - DESCRIPTORS
DESCRIPTORS: BURNING
DESCRIPTORS: BURNING

## 2017-09-14 ASSESSMENT — PAIN SCALES - GENERAL
PAINLEVEL_OUTOF10: 5
PAINLEVEL_OUTOF10: 7
PAINLEVEL_OUTOF10: 0
PAINLEVEL_OUTOF10: 10
PAINLEVEL_OUTOF10: 9
PAINLEVEL_OUTOF10: 9
PAINLEVEL_OUTOF10: 8
PAINLEVEL_OUTOF10: 10
PAINLEVEL_OUTOF10: 6

## 2017-09-14 ASSESSMENT — ENCOUNTER SYMPTOMS
COUGH: 0
ABDOMINAL PAIN: 0
VOMITING: 0
COLOR CHANGE: 0
NAUSEA: 0
BACK PAIN: 0
SHORTNESS OF BREATH: 0
DIARRHEA: 0

## 2017-09-14 ASSESSMENT — PAIN DESCRIPTION - PROGRESSION
CLINICAL_PROGRESSION: NOT CHANGED
CLINICAL_PROGRESSION: NOT CHANGED
CLINICAL_PROGRESSION: GRADUALLY WORSENING
CLINICAL_PROGRESSION: NOT CHANGED
CLINICAL_PROGRESSION: GRADUALLY IMPROVING
CLINICAL_PROGRESSION: GRADUALLY WORSENING
CLINICAL_PROGRESSION: NOT CHANGED
CLINICAL_PROGRESSION: NOT CHANGED

## 2017-09-14 ASSESSMENT — PAIN DESCRIPTION - PAIN TYPE
TYPE: ACUTE PAIN
TYPE: ACUTE PAIN

## 2017-09-14 ASSESSMENT — PAIN DESCRIPTION - ORIENTATION: ORIENTATION: RIGHT

## 2017-09-14 ASSESSMENT — PAIN DESCRIPTION - FREQUENCY: FREQUENCY: CONTINUOUS

## 2017-09-14 ASSESSMENT — PAIN DESCRIPTION - LOCATION
LOCATION: LEG
LOCATION: LEG

## 2017-09-14 ASSESSMENT — PAIN DESCRIPTION - ONSET: ONSET: SUDDEN

## 2017-09-15 VITALS
OXYGEN SATURATION: 100 % | WEIGHT: 166.4 LBS | HEIGHT: 68 IN | BODY MASS INDEX: 25.22 KG/M2 | RESPIRATION RATE: 16 BRPM | DIASTOLIC BLOOD PRESSURE: 72 MMHG | SYSTOLIC BLOOD PRESSURE: 111 MMHG | HEART RATE: 55 BPM | TEMPERATURE: 97.7 F

## 2017-09-15 LAB
GLUCOSE BLD-MCNC: 74 MG/DL (ref 65–105)
GLUCOSE BLD-MCNC: 93 MG/DL (ref 65–105)
LAMOTRIGINE LEVEL: 3.7 UG/ML (ref 3–15)

## 2017-09-15 PROCEDURE — 2580000003 HC RX 258: Performed by: PHYSICIAN ASSISTANT

## 2017-09-15 PROCEDURE — G0378 HOSPITAL OBSERVATION PER HR: HCPCS

## 2017-09-15 PROCEDURE — 97162 PT EVAL MOD COMPLEX 30 MIN: CPT

## 2017-09-15 PROCEDURE — 6370000000 HC RX 637 (ALT 250 FOR IP): Performed by: PHYSICIAN ASSISTANT

## 2017-09-15 PROCEDURE — 36415 COLL VENOUS BLD VENIPUNCTURE: CPT

## 2017-09-15 PROCEDURE — G8979 MOBILITY GOAL STATUS: HCPCS

## 2017-09-15 PROCEDURE — 97530 THERAPEUTIC ACTIVITIES: CPT

## 2017-09-15 PROCEDURE — 99233 SBSQ HOSP IP/OBS HIGH 50: CPT | Performed by: PSYCHIATRY & NEUROLOGY

## 2017-09-15 PROCEDURE — 80175 DRUG SCREEN QUAN LAMOTRIGINE: CPT

## 2017-09-15 PROCEDURE — G8978 MOBILITY CURRENT STATUS: HCPCS

## 2017-09-15 PROCEDURE — 82947 ASSAY GLUCOSE BLOOD QUANT: CPT

## 2017-09-15 RX ORDER — POTASSIUM CHLORIDE 750 MG/1
10 TABLET, EXTENDED RELEASE ORAL 2 TIMES DAILY
Status: ON HOLD | COMMUNITY
End: 2019-06-11 | Stop reason: HOSPADM

## 2017-09-15 RX ORDER — GINSENG 100 MG
CAPSULE ORAL 3 TIMES DAILY
Status: DISCONTINUED | OUTPATIENT
Start: 2017-09-15 | End: 2017-09-15 | Stop reason: HOSPADM

## 2017-09-15 RX ORDER — LAMOTRIGINE 25 MG/1
25 TABLET ORAL 2 TIMES DAILY
Qty: 30 TABLET | Refills: 3 | Status: SHIPPED | OUTPATIENT
Start: 2017-09-15 | End: 2018-06-14 | Stop reason: DRUGHIGH

## 2017-09-15 RX ADMIN — Medication 1000 MCG: at 10:17

## 2017-09-15 RX ADMIN — GABAPENTIN 300 MG: 300 CAPSULE ORAL at 10:18

## 2017-09-15 RX ADMIN — LOSARTAN POTASSIUM 50 MG: 50 TABLET, FILM COATED ORAL at 10:17

## 2017-09-15 RX ADMIN — LAMOTRIGINE 25 MG: 25 TABLET ORAL at 10:17

## 2017-09-15 RX ADMIN — HYDROXYCHLOROQUINE SULFATE 200 MG: 200 TABLET, FILM COATED ORAL at 10:20

## 2017-09-15 RX ADMIN — METFORMIN HYDROCHLORIDE 500 MG: 500 TABLET ORAL at 10:18

## 2017-09-15 RX ADMIN — VITAMIN D, TAB 1000IU (100/BT) 1000 UNITS: 25 TAB at 10:17

## 2017-09-15 RX ADMIN — Medication 10 ML: at 10:32

## 2017-09-15 RX ADMIN — FAMOTIDINE 40 MG: 20 TABLET, FILM COATED ORAL at 10:18

## 2017-09-15 RX ADMIN — HYDROCODONE BITARTRATE AND ACETAMINOPHEN 1 TABLET: 5; 325 TABLET ORAL at 05:31

## 2017-09-15 RX ADMIN — CETIRIZINE HYDROCHLORIDE 10 MG: 10 TABLET ORAL at 10:19

## 2017-09-15 RX ADMIN — OMEPRAZOLE 20 MG: 20 CAPSULE, DELAYED RELEASE ORAL at 10:18

## 2017-09-15 RX ADMIN — VENLAFAXINE 75 MG: 75 TABLET ORAL at 10:18

## 2017-09-15 RX ADMIN — ATENOLOL 100 MG: 50 TABLET ORAL at 10:19

## 2017-09-15 RX ADMIN — HYDROCODONE BITARTRATE AND ACETAMINOPHEN 1 TABLET: 5; 325 TABLET ORAL at 10:31

## 2017-09-15 ASSESSMENT — PAIN SCALES - GENERAL
PAINLEVEL_OUTOF10: 0
PAINLEVEL_OUTOF10: 7
PAINLEVEL_OUTOF10: 0
PAINLEVEL_OUTOF10: 8
PAINLEVEL_OUTOF10: 6
PAINLEVEL_OUTOF10: 0
PAINLEVEL_OUTOF10: 0

## 2017-09-15 ASSESSMENT — PAIN DESCRIPTION - ORIENTATION: ORIENTATION: LEFT

## 2017-09-15 ASSESSMENT — PAIN DESCRIPTION - PROGRESSION
CLINICAL_PROGRESSION: NOT CHANGED
CLINICAL_PROGRESSION: GRADUALLY WORSENING
CLINICAL_PROGRESSION: NOT CHANGED
CLINICAL_PROGRESSION: GRADUALLY IMPROVING

## 2017-09-15 ASSESSMENT — PAIN DESCRIPTION - LOCATION: LOCATION: LEG

## 2017-09-18 ENCOUNTER — OFFICE VISIT (OUTPATIENT)
Dept: NEUROLOGY | Age: 52
End: 2017-09-18
Payer: MEDICAID

## 2017-09-18 ENCOUNTER — OFFICE VISIT (OUTPATIENT)
Dept: UROLOGY | Age: 52
End: 2017-09-18
Payer: MEDICAID

## 2017-09-18 VITALS
RESPIRATION RATE: 16 BRPM | BODY MASS INDEX: 25.23 KG/M2 | WEIGHT: 166.45 LBS | DIASTOLIC BLOOD PRESSURE: 96 MMHG | HEIGHT: 68 IN | HEART RATE: 67 BPM | SYSTOLIC BLOOD PRESSURE: 133 MMHG

## 2017-09-18 VITALS
DIASTOLIC BLOOD PRESSURE: 99 MMHG | WEIGHT: 164.6 LBS | SYSTOLIC BLOOD PRESSURE: 140 MMHG | HEART RATE: 72 BPM | BODY MASS INDEX: 24.95 KG/M2 | HEIGHT: 68 IN

## 2017-09-18 DIAGNOSIS — R10.2 PELVIC PRESSURE IN FEMALE: ICD-10-CM

## 2017-09-18 DIAGNOSIS — R33.9 INCOMPLETE BLADDER EMPTYING: Primary | ICD-10-CM

## 2017-09-18 DIAGNOSIS — M47.812 SPONDYLOSIS OF CERVICAL REGION WITHOUT MYELOPATHY OR RADICULOPATHY: Primary | ICD-10-CM

## 2017-09-18 DIAGNOSIS — G25.3 MYOCLONUS: ICD-10-CM

## 2017-09-18 DIAGNOSIS — N94.10 DYSPAREUNIA, FEMALE: ICD-10-CM

## 2017-09-18 PROCEDURE — 99215 OFFICE O/P EST HI 40 MIN: CPT | Performed by: PSYCHIATRY & NEUROLOGY

## 2017-09-18 PROCEDURE — 99204 OFFICE O/P NEW MOD 45 MIN: CPT | Performed by: UROLOGY

## 2017-09-18 PROCEDURE — 51798 US URINE CAPACITY MEASURE: CPT | Performed by: UROLOGY

## 2017-09-18 ASSESSMENT — ENCOUNTER SYMPTOMS
SHORTNESS OF BREATH: 0
NAUSEA: 0
EYE PAIN: 0
COUGH: 0
VOMITING: 0
EYE REDNESS: 0
WHEEZING: 0
ABDOMINAL PAIN: 0
COLOR CHANGE: 0
BACK PAIN: 1

## 2017-09-28 ENCOUNTER — HOSPITAL ENCOUNTER (OUTPATIENT)
Age: 52
Setting detail: SPECIMEN
Discharge: HOME | End: 2017-09-28
Payer: MEDICAID

## 2017-09-28 DIAGNOSIS — N18.30 CKD (CHRONIC KIDNEY DISEASE), STAGE III (HCC): ICD-10-CM

## 2017-09-28 LAB
ABSOLUTE EOS #: 0.2 K/UL (ref 0–0.4)
ABSOLUTE LYMPH #: 1.5 K/UL (ref 1–4.8)
ABSOLUTE MONO #: 0.2 K/UL (ref 0.1–1.2)
ANION GAP SERPL CALCULATED.3IONS-SCNC: 17 MMOL/L (ref 9–17)
BASOPHILS # BLD: 1 %
BASOPHILS ABSOLUTE: 0 K/UL (ref 0–0.2)
BUN BLDV-MCNC: 13 MG/DL (ref 6–20)
BUN/CREAT BLD: ABNORMAL (ref 9–20)
CALCIUM IONIZED: 1.28 MMOL/L (ref 1.13–1.33)
CALCIUM SERPL-MCNC: 9.6 MG/DL (ref 8.6–10.4)
CHLORIDE BLD-SCNC: 102 MMOL/L (ref 98–107)
CO2: 23 MMOL/L (ref 20–31)
CREAT SERPL-MCNC: 1.02 MG/DL (ref 0.5–0.9)
CREATININE URINE: 371.9 MG/DL (ref 28–217)
DIFFERENTIAL TYPE: ABNORMAL
EOSINOPHILS RELATIVE PERCENT: 4 %
GFR AFRICAN AMERICAN: >60 ML/MIN
GFR NON-AFRICAN AMERICAN: 57 ML/MIN
GFR SERPL CREATININE-BSD FRML MDRD: ABNORMAL ML/MIN/{1.73_M2}
GFR SERPL CREATININE-BSD FRML MDRD: ABNORMAL ML/MIN/{1.73_M2}
GLUCOSE BLD-MCNC: 162 MG/DL (ref 70–99)
HCT VFR BLD CALC: 35.2 % (ref 36–46)
HEMOGLOBIN: 12 G/DL (ref 12–16)
LYMPHOCYTES # BLD: 35 %
MCH RBC QN AUTO: 29.4 PG (ref 26–34)
MCHC RBC AUTO-ENTMCNC: 34.2 G/DL (ref 31–37)
MCV RBC AUTO: 86 FL (ref 80–100)
MONOCYTES # BLD: 6 %
PDW BLD-RTO: 14.5 % (ref 12.5–15.4)
PHOSPHORUS: 4 MG/DL (ref 2.6–4.5)
PLATELET # BLD: 245 K/UL (ref 140–450)
PLATELET ESTIMATE: ABNORMAL
PMV BLD AUTO: 8.6 FL (ref 6–12)
POTASSIUM SERPL-SCNC: 4.1 MMOL/L (ref 3.7–5.3)
PTH INTACT: 108.6 PG/ML (ref 15–65)
RBC # BLD: 4.09 M/UL (ref 4–5.2)
RBC # BLD: ABNORMAL 10*6/UL
SEG NEUTROPHILS: 54 %
SEGMENTED NEUTROPHILS ABSOLUTE COUNT: 2.3 K/UL (ref 1.8–7.7)
SODIUM BLD-SCNC: 142 MMOL/L (ref 135–144)
TOTAL PROTEIN, URINE: 43 MG/DL
VITAMIN D 25-HYDROXY: 41.9 NG/ML (ref 30–100)
WBC # BLD: 4.3 K/UL (ref 3.5–11)
WBC # BLD: ABNORMAL 10*3/UL

## 2017-09-29 ENCOUNTER — OFFICE VISIT (OUTPATIENT)
Dept: FAMILY MEDICINE CLINIC | Age: 52
End: 2017-09-29
Payer: MEDICAID

## 2017-09-29 VITALS
DIASTOLIC BLOOD PRESSURE: 78 MMHG | BODY MASS INDEX: 24.57 KG/M2 | TEMPERATURE: 98.2 F | SYSTOLIC BLOOD PRESSURE: 102 MMHG | WEIGHT: 161.6 LBS | HEART RATE: 70 BPM | OXYGEN SATURATION: 98 %

## 2017-09-29 DIAGNOSIS — R27.0 ATAXIA: Primary | ICD-10-CM

## 2017-09-29 DIAGNOSIS — I10 ESSENTIAL HYPERTENSION: ICD-10-CM

## 2017-09-29 DIAGNOSIS — K12.1 ORAL ULCER: ICD-10-CM

## 2017-09-29 DIAGNOSIS — Z87.892 H/O ANAPHYLACTIC SHOCK: ICD-10-CM

## 2017-09-29 PROCEDURE — 99213 OFFICE O/P EST LOW 20 MIN: CPT | Performed by: NURSE PRACTITIONER

## 2017-09-29 RX ORDER — CHLORHEXIDINE GLUCONATE 0.12 MG/ML
RINSE ORAL
Qty: 473 ML | Refills: 0 | Status: ON HOLD | OUTPATIENT
Start: 2017-09-29 | End: 2018-01-31 | Stop reason: HOSPADM

## 2017-09-29 RX ORDER — EPINEPHRINE 0.3 MG/.3ML
INJECTION INTRAMUSCULAR
Qty: 1 EACH | Refills: 2 | Status: ON HOLD | OUTPATIENT
Start: 2017-09-29 | End: 2018-12-15 | Stop reason: HOSPADM

## 2017-09-29 NOTE — MR AVS SNAPSHOT
After Visit Summary             Jordy Loge   2017 3:30 PM   Office Visit    Description:  Female : 1965   Provider:  Jane Alvarado CNP   Department:  [facility]              Your Follow-Up and Future Appointments         Below is a list of your follow-up and future appointments. This may not be a complete list as you may have made appointments directly with providers that we are not aware of or your providers may have made some for you. Please call your providers to confirm appointments. It is important to keep your appointments. Please bring your current insurance card, photo ID, co-pay, and all medication bottles to your appointment. If self-pay, payment is expected at the time of service. Your To-Do List     Future Appointments Provider Department Dept Phone    10/11/2017 11:30 AM Jacob Salguero, [address] [facility] [facility] [place] [phone]    10/30/2017 8:30 AM Jane Alvarado, [address] [place] [address] [phone]    Please arrive 15 minutes prior to appointment, bring photo ID and insurance card. 10/30/2017 10:30 AM Bekah Houser MD [facility] [place] [phone]    2018 8:30 AM Trevon Santoyo MD Nephrology [facility] [phone]    Please arrive 15 minutes prior to appointment time, bring insurance card and photo ID.     3/22/2018 1:00 PM Ashley Lewis, [address] [facility] [facility] [phone]    Please arrive 15 minutes prior to appointment time, bring insurance card and photo ID.     2018 8:00 AM DO [place] Thomas/ [address] [phone]    Please arrive 15 minutes prior to appointment, bring photo ID and insurance card.          Information from Your Visit        Department     Name Address Phone Fax    [address] [address] [phone]      You Were Seen for:         Comments Ataxia   [[id number]]         Vital Signs     Blood Pressure Pulse Temperature Weight Oxygen Saturation Body Mass Index    102/78 (Site: Left Arm, Position: Sitting, Cuff Size: Large Adult) 70 98.2 °F (36.8 °C) (Oral) 161 lb 9.6 oz (73.3 kg) 98% 24.57 kg/m2    Smoking Status                   Never Smoker              Today's Medication Changes          These changes are accurate as of: 9/29/17  4:48 PM.  If you have any questions, ask your nurse or doctor. CHANGE how you take these medications           chlorhexidine 0.12 % solution   Commonly known as:  PERIDEX   Instructions:  RINSE WITH 1/2 OZ. TWICE A DAY FOR 30 SECONDS, THEN SPIT   Quantity:  473 mL   Refills:  0   What changed:  See the new instructions. Changed by:  Abhishek Antunez CNP       EPIPEN 2-JIGAR 0.3 MG/0.3ML Soaj injection   Instructions:  INJECT 0.3 ML (0.3 MG) INTO THE MUSCLE ONCE AS NEEDED FOR UP TO 1 DOSE. for angioedema   Quantity:  1 each   Refills:  2   Generic drug:  EPINEPHrine   What changed:  See the new instructions. Changed by:  Abhishek Antunez CNP         STOP taking these medications           fluconazole 150 MG tablet   Commonly known as:  DIFLUCAN   Stopped by:  Abhishek Antunez CNP            Where to Get Your Medications      These medications were sent to [address] [place] [address] [phone] -  [phone]  [address] [place] [id number]     Phone:  [phone]     chlorhexidine 0.12 % solution    EPIPEN 2-JIGAR 0.3 MG/0.3ML Soaj injection               Your Current Medications Are              EPIPEN 2-JIGAR 0.3 MG/0.3ML SOAJ injection INJECT 0.3 ML (0.3 MG) INTO THE MUSCLE ONCE AS NEEDED FOR UP TO 1 DOSE. for angioedema    chlorhexidine (PERIDEX) 0.12 % solution RINSE WITH 1/2 OZ.  TWICE A DAY FOR 30 SECONDS, THEN SPIT    lamoTRIgine (LAMICTAL) 25 MG tablet Take 1 tablet by mouth 2 times daily potassium chloride (KLOR-CON M) 20 MEQ extended release tablet Take 20 mEq by mouth daily    atenolol (TENORMIN) 100 MG tablet TAKE 1 TABLET BY MOUTH ONE TIME A DAY    losartan (COZAAR) 50 MG tablet Take 1 tablet by mouth daily    felodipine (PLENDIL) 10 MG extended release tablet Take 1 tablet by mouth daily    omeprazole (PRILOSEC) 20 MG delayed release capsule TAKE 1 CAPSULE BY MOUTH TWO TIMES DAILY. ibuprofen (ADVIL;MOTRIN) 800 MG tablet Take 1 tablet by mouth every 8 hours as needed for Pain    lidocaine (XYLOCAINE) 5 % ointment Apply topically as needed for Pain Apply topically as needed. oxyCODONE-acetaminophen (PERCOCET) 5-325 MG per tablet Take 1 tablet by mouth 2 times daily as needed for Pain . cyclobenzaprine (FLEXERIL) 5 MG tablet TAKE 1 OR 2 TABLETS BY MOUTH AT BEDTIME AS NEEDED    docusate sodium (COLACE) 100 MG capsule Take 1 capsule by mouth 2 times daily as needed for Constipation    QUEtiapine (SEROQUEL) 200 MG tablet TAKE 1 TABLET BY MOUTH AT BEDTIME    metFORMIN (GLUCOPHAGE) 500 MG tablet Take 500 mg by mouth 2 times daily (with meals)    albuterol sulfate  (90 BASE) MCG/ACT inhaler Inhale 2 puffs into the lungs every 6 hours as needed for Wheezing    venlafaxine (EFFEXOR) 75 MG tablet Take 2 tablets (150 mg) in the morning and 1 tablet (75 mg) in the evening. naltrexone (DEPADE) 50 MG tablet TAKE 1 TABLET (50 MG) BY MOUTH ONCE DAILY.     hydroxychloroquine (PLAQUENIL) 200 MG tablet Take 200 mg by mouth daily    pravastatin (PRAVACHOL) 40 MG tablet TAKE 1 TABLET BY MOUTH AT BEDTIME    famotidine (PEPCID) 40 MG tablet TAKE 1 TABLET BY MOUTH ONE TIME A DAY    gabapentin (NEURONTIN) 300 MG capsule TAKE 1 CAPSULE (300 MG) BY 2 in AM, 1 @ NOON, 2 @ DINNER    propranolol (INDERAL) 10 MG tablet TAKE 1 OR 2 TABLETS BY MOUTH TWO TIMES A DAY AS NEEDED FOR RESTLESSNESS    cetirizine (ZYRTEC) 10 MG tablet TAKE 1 TABLET BY MOUTH ONE TIME A DAY vitamin D (ERGOCALCIFEROL) 13464 UNITS CAPS capsule 50,000 Units once a week Indications: on Sunday     predniSONE (DELTASONE) 50 MG tablet Take 50 mg by mouth daily as needed Indications: as needed For angioedema    Multiple Vitamins-Minerals (THERAPEUTIC MULTIVITAMIN-MINERALS) tablet Take 1 tablet by mouth daily    Probiotic Product (PROBIOTIC & ACIDOPHILUS EX ST PO) Take by mouth daily     vitamin B-12 (CYANOCOBALAMIN) 1000 MCG tablet Take 1,000 mcg by mouth daily    aspirin 81 MG EC tablet Take 1 tablet by mouth 2 times daily      Allergies              Morphine Nausea And Vomiting    Pt states it changes her mental status    Amlodipine Other (See Comments)    diarrhea and stress    Dilaudid [Hydromorphone Hcl] Hives, Itching    Sulfa Antibiotics Hives      We Ordered/Performed the following           DME Order for Pb Needle as OP     Comments: You must complete the order parameters below and add the medical necessity documentation for this DME in a separate note.     Folding Walker with Wheels, brakes and seat    Current patient weight: Weight: 161 lb 9.6 oz (73.3 kg)  Current patient height:    Diagnosis: ataxia  Duration: Purchase    Handicap placard          Additional Information        Basic Information     Date Of Birth Sex Race Ethnicity Preferred Language Preferred Written Language    1965 Female White Non-/Non  English English      Problem List as of 9/29/2017  Date Reviewed: 9/18/2017                Spondylosis of cervical region without myelopathy or radiculopathy    Chronic fatigue syndrome    S/P knee surgery    Controlled type 2 diabetes mellitus without complication (HCC)    Degenerative arthritis of right knee    Stroke ([facility] [address]    Transient insomnia    Sleep walking disorder    Sleep apnea    Raynaud's disease    Pulmonary nodules    Neuropathy (HCC)    Hypertension    Headache    GERD (gastroesophageal reflux disease)    Fibromyalgia    Depression Degenerative disc disease, thoracic    CAD (coronary artery disease)    Bipolar disorder (HCC)    Asthma    Anxiety    Antinuclear antibody (IQRA) titer greater than 1:80    Anemia    Acute kidney injury ([facility] [address]    Seizures ([facility] [address]      Immunizations as of 9/29/2017     Name Date    Influenza Vaccine, unspecified formulation 10/6/2016    Influenza, Suellyn Morning, 3 Years and older, IM 9/11/2017    Pneumococcal Polysaccharide ([id number]) 11/8/2007    Tdap (Boostrix, Adacel) 9/14/2017      Preventive Care        Date Due    Hepatitis C screening is recommended for all adults regardless of risk factors born between [facility] at least once (lifetime) who have never been tested. 1965    Diabetic Foot Exam 7/29/1975    Eye Exam By An Eye Doctor 7/29/1975    Urine Check For Kidney Problems 7/29/1983    Pap Smear 7/29/1986    Colonoscopy 7/29/2015    Cholesterol Screening 4/7/2018    Hemoglobin A1C (Test For Long-Term Glucose Control) 5/24/2018    Mammograms are recommended every 2 years for low/average risk patients aged 48 - 69, and every year for high risk patients per updated national guidelines. However these guidelines can be individualized by your provider. 12/13/2018    Tetanus Combination Vaccine (2 - Td) 9/14/2027            [email or web address] Signup           Our records indicate that you have an active [employer] account. You can view your After Visit Summary by going to [email or web address] and logging in with your [employer] username and password. If you don't have a [employer] username and password but a parent or guardian has access to your record, the parent or guardian should login with their own [employer] username and password and access your record to view the After Visit Summary. Additional Information  If you have questions, please contact the physician practice where you receive care. Remember, [employer] is NOT to be used for urgent needs. For medical emergencies, dial 911. For questions regarding your [employer] account call [phone]. If you have a clinical question, please call your doctor's office.

## 2017-09-29 NOTE — PROGRESS NOTES
HYSTERECTOMY      JOINT REPLACEMENT Bilateral     KNEE SURGERY  [id number]    both statse 24 knee surgeries    KNEE SURGERY Right 05/02/2017    (femoral) patella replacement    TONSILLECTOMY  1986    TOTAL KNEE ARTHROPLASTY  2011    left knee    TOTAL KNEE ARTHROPLASTY Right 5/2/2017    PATELLOFEMORAL REPLACEMENT KNEE - JAXSON, 3080 TABLE, FEMORAL POPLITEAL BLOCK, NSA= SPINAL VS GENERAL performed by Esau Landaverde, DO at [address] [place] [address]     Family History   Problem Relation Age of Onset    Adopted: Yes    Depression Mother     Asthma Mother     Depression Father     High Blood Pressure Father     Diabetes Father     Asthma Father     Depression Sister     Asthma Sister     Depression Brother     Asthma Brother     Asthma Maternal Aunt     Asthma Maternal Uncle     Asthma Paternal Aunt     Asthma Paternal Uncle     Asthma Maternal Grandmother     Cancer Maternal Grandmother     Asthma Maternal Grandfather     Asthma Paternal Grandmother     Asthma Paternal Grandfather     Asthma Maternal Cousin     Asthma Paternal Cousin      Social History   Substance Use Topics    Smoking status: Never Smoker    Smokeless tobacco: Never Used    Alcohol use Yes      Comment: occasional      Allergies   Allergen Reactions    Morphine Nausea And Vomiting     Pt states it changes her mental status    Amlodipine Other (See Comments)     diarrhea and stress    Dilaudid [Hydromorphone Hcl] Hives and Itching    Sulfa Antibiotics Hives       Subjective:      Review of Systems   Cardiovascular: Negative for chest pain, palpitations and leg swelling. Musculoskeletal: Positive for arthralgias, back pain, joint swelling, myalgias and neck pain. Neurological: Positive for weakness.      Objective:     /78 (Site: Left Arm, Position: Sitting, Cuff Size: Large Adult)   Pulse 70   Temp 98.2 °F (36.8 °C) (Oral)   Wt 161 lb 9.6 oz (73.3 kg)   SpO2 98%   BMI 24.57 kg/m²    Physical Exam   Constitutional: She is oriented to person, place, and time. She appears well-developed and well-nourished. No distress. HENT:   Head: Normocephalic and atraumatic. Right Ear: External ear normal.   Left Ear: External ear normal.   Nose: Nose normal.   Mouth/Throat: Oropharynx is clear and moist.   Eyes: Conjunctivae and EOM are normal. Pupils are equal, round, and reactive to light. Neck: Trachea normal, normal range of motion and full passive range of motion without pain. No thyroid mass present. Cardiovascular: Normal rate, regular rhythm, S1 normal, S2 normal and normal heart sounds. Exam reveals no distant heart sounds and no friction rub. Pulmonary/Chest: Effort normal and breath sounds normal. No accessory muscle usage. No respiratory distress. Abdominal: Soft. Bowel sounds are normal. She exhibits no distension, no ascites and no mass. Musculoskeletal: Normal range of motion. Pain free ROM     Lymphadenopathy:     She has no cervical adenopathy. Neurological: She is oriented to person, place, and time. Gait is normal.   Skin: Skin is warm and dry. No rash noted. She is not diaphoretic. Psychiatric: She has a normal mood and affect. Her behavior is normal. Judgment and thought content normal.       Assessment:       1. Ataxia  DME Order for Walker as OP    Handicap placard   2. Essential hypertension     3. Oral ulcer  chlorhexidine (PERIDEX) 0.12 % solution   4. H/O anaphylactic shock  EPIPEN 2-JIGAR 0.3 MG/0.3ML SOAJ injection       Plan:      1. Ataxia  Suggested she get an order for walker from er  - DME Order for Estrlela Mckeon as OP  - Handicap placard    2. Essential hypertension  Continue med    3. Oral ulcer    - chlorhexidine (PERIDEX) 0.12 % solution; RINSE WITH 1/2 OZ. TWICE A DAY FOR 30 SECONDS, THEN SPIT  Dispense: 473 mL; Refill: 0    4.  H/O anaphylactic shock    - EPIPEN 2-JIGAR 0.3 MG/0.3ML SOAJ injection; INJECT 0.3 ML (0.3 MG) INTO THE MUSCLE ONCE AS NEEDED FOR UP TO 1 DOSE. for angioedema  Dispense: 1 each; Refill: 2    RTO if symptoms worsen or fail to improve  Pt agreeable with plan      Patient given educational materials - see patient instructions. Discussed use, benefit, and side effects of prescribed medications. All patient questions answered. Pt voiced understanding. Reviewed health maintenance. Instructed to continue current medications, diet and exercise. 1.  Mia received counseling on the following healthy behaviors: nutrition, exercise and medication adherence  2. Patient given educational materials when available - see patient instructions when applicable  3. Discussed use, benefit, and side effects of prescribed medications. Barriers to medication compliance addressed. All patient questions answered. Pt voiced understanding. 4.  Reviewed prior labs and health maintenance  5. Continue current medications, diet and exercise. Completed Refills   Requested Prescriptions     Signed Prescriptions Disp Refills    EPIPEN 2-JIGAR 0.3 MG/0.3ML SOAJ injection 1 each 2     Sig: INJECT 0.3 ML (0.3 MG) INTO THE MUSCLE ONCE AS NEEDED FOR UP TO 1 DOSE. for angioedema    chlorhexidine (PERIDEX) 0.12 % solution 473 mL 0     Sig: RINSE WITH 1/2 OZ.  TWICE A DAY FOR 30 SECONDS, THEN SPIT         Electronically signed by Rylie Chicas CNP on 10/15/2017 at 5:23 PM

## 2017-10-02 ENCOUNTER — TELEPHONE (OUTPATIENT)
Dept: FAMILY MEDICINE CLINIC | Age: 52
End: 2017-10-02

## 2017-10-02 NOTE — TELEPHONE ENCOUNTER
She is not on antabuse, she is on naltrexone, this blocks her receptors, she has been using both of these for a long time.

## 2017-10-11 ENCOUNTER — PROCEDURE VISIT (OUTPATIENT)
Dept: UROLOGY | Age: 52
End: 2017-10-11
Payer: MEDICAID

## 2017-10-11 VITALS — TEMPERATURE: 98.3 F | SYSTOLIC BLOOD PRESSURE: 134 MMHG | DIASTOLIC BLOOD PRESSURE: 95 MMHG | HEART RATE: 80 BPM

## 2017-10-11 DIAGNOSIS — R10.2 PELVIC PRESSURE IN FEMALE: ICD-10-CM

## 2017-10-11 DIAGNOSIS — R39.16 STRAINING TO VOID: Primary | ICD-10-CM

## 2017-10-11 DIAGNOSIS — R33.9 INCOMPLETE BLADDER EMPTYING: ICD-10-CM

## 2017-10-11 PROCEDURE — 99999 PR OFFICE/OUTPT VISIT,PROCEDURE ONLY: CPT | Performed by: NURSE PRACTITIONER

## 2017-10-11 PROCEDURE — 51797 INTRAABDOMINAL PRESSURE TEST: CPT | Performed by: NURSE PRACTITIONER

## 2017-10-11 PROCEDURE — 51784 ANAL/URINARY MUSCLE STUDY: CPT | Performed by: NURSE PRACTITIONER

## 2017-10-11 PROCEDURE — 51728 CYSTOMETROGRAM W/VP: CPT | Performed by: NURSE PRACTITIONER

## 2017-10-11 PROCEDURE — 51741 ELECTRO-UROFLOWMETRY FIRST: CPT | Performed by: NURSE PRACTITIONER

## 2017-10-15 ASSESSMENT — ENCOUNTER SYMPTOMS: BACK PAIN: 1

## 2017-10-16 ENCOUNTER — HOSPITAL ENCOUNTER (OUTPATIENT)
Age: 52
Discharge: HOME | End: 2017-10-16
Payer: MEDICAID

## 2017-10-16 LAB
EKG ATRIAL RATE: 60 BPM
EKG P AXIS: 56 DEGREES
EKG P-R INTERVAL: 200 MS
EKG Q-T INTERVAL: 414 MS
EKG QRS DURATION: 88 MS
EKG QTC CALCULATION (BAZETT): 414 MS
EKG R AXIS: 15 DEGREES
EKG T AXIS: 20 DEGREES
EKG VENTRICULAR RATE: 60 BPM

## 2017-10-16 PROCEDURE — 93005 ELECTROCARDIOGRAM TRACING: CPT

## 2017-10-23 ENCOUNTER — TELEPHONE (OUTPATIENT)
Dept: FAMILY MEDICINE CLINIC | Age: 52
End: 2017-10-23

## 2017-10-25 ENCOUNTER — TELEPHONE (OUTPATIENT)
Dept: FAMILY MEDICINE CLINIC | Age: 52
End: 2017-10-25

## 2017-10-25 NOTE — TELEPHONE ENCOUNTER
Pt states that without heat she cannot feel her hands and feet ect. because of the condition that she has.

## 2017-10-25 NOTE — TELEPHONE ENCOUNTER
Its not that I dont want to but she needs to have a reason in her chart.   A medical need for hot water etc

## 2017-10-25 NOTE — TELEPHONE ENCOUNTER
Pt would like to know if you could write a letter stating she is handicap so her gas can be turned back on.

## 2017-10-30 ENCOUNTER — PROCEDURE VISIT (OUTPATIENT)
Dept: UROLOGY | Age: 52
End: 2017-10-30
Payer: MEDICAID

## 2017-10-30 ENCOUNTER — OFFICE VISIT (OUTPATIENT)
Dept: FAMILY MEDICINE CLINIC | Age: 52
End: 2017-10-30
Payer: MEDICAID

## 2017-10-30 ENCOUNTER — HOSPITAL ENCOUNTER (OUTPATIENT)
Age: 52
Setting detail: SPECIMEN
Discharge: HOME | End: 2017-10-30
Payer: MEDICAID

## 2017-10-30 VITALS
TEMPERATURE: 97 F | HEART RATE: 98 BPM | DIASTOLIC BLOOD PRESSURE: 78 MMHG | SYSTOLIC BLOOD PRESSURE: 130 MMHG | HEIGHT: 68 IN | RESPIRATION RATE: 20 BRPM | OXYGEN SATURATION: 99 % | BODY MASS INDEX: 25.16 KG/M2 | WEIGHT: 166 LBS

## 2017-10-30 VITALS — HEART RATE: 82 BPM | DIASTOLIC BLOOD PRESSURE: 82 MMHG | SYSTOLIC BLOOD PRESSURE: 142 MMHG | TEMPERATURE: 97.6 F

## 2017-10-30 DIAGNOSIS — N81.10 PROLAPSE OF VAGINAL WALLS: Primary | ICD-10-CM

## 2017-10-30 DIAGNOSIS — E11.9 CONTROLLED TYPE 2 DIABETES MELLITUS WITHOUT COMPLICATION, WITHOUT LONG-TERM CURRENT USE OF INSULIN (HCC): Primary | ICD-10-CM

## 2017-10-30 DIAGNOSIS — E11.9 CONTROLLED TYPE 2 DIABETES MELLITUS WITHOUT COMPLICATION, WITHOUT LONG-TERM CURRENT USE OF INSULIN (HCC): ICD-10-CM

## 2017-10-30 DIAGNOSIS — Z12.11 SCREENING FOR MALIGNANT NEOPLASM OF COLON: ICD-10-CM

## 2017-10-30 DIAGNOSIS — Z76.0 MEDICATION REFILL: ICD-10-CM

## 2017-10-30 DIAGNOSIS — N81.10 FEMALE CYSTOCELE: Primary | ICD-10-CM

## 2017-10-30 DIAGNOSIS — R27.0 ATAXIA: ICD-10-CM

## 2017-10-30 LAB
CREATININE URINE: 124.7 MG/DL (ref 28–217)
MICROALBUMIN/CREAT 24H UR: <12 MG/L
MICROALBUMIN/CREAT UR-RTO: NORMAL MCG/MG CREAT

## 2017-10-30 PROCEDURE — G8484 FLU IMMUNIZE NO ADMIN: HCPCS | Performed by: NURSE PRACTITIONER

## 2017-10-30 PROCEDURE — 3014F SCREEN MAMMO DOC REV: CPT | Performed by: NURSE PRACTITIONER

## 2017-10-30 PROCEDURE — 3044F HG A1C LEVEL LT 7.0%: CPT | Performed by: NURSE PRACTITIONER

## 2017-10-30 PROCEDURE — 1036F TOBACCO NON-USER: CPT | Performed by: NURSE PRACTITIONER

## 2017-10-30 PROCEDURE — 52000 CYSTOURETHROSCOPY: CPT | Performed by: UROLOGY

## 2017-10-30 PROCEDURE — G8598 ASA/ANTIPLAT THER USED: HCPCS | Performed by: NURSE PRACTITIONER

## 2017-10-30 PROCEDURE — 99214 OFFICE O/P EST MOD 30 MIN: CPT | Performed by: NURSE PRACTITIONER

## 2017-10-30 PROCEDURE — 3017F COLORECTAL CA SCREEN DOC REV: CPT | Performed by: NURSE PRACTITIONER

## 2017-10-30 PROCEDURE — G8417 CALC BMI ABV UP PARAM F/U: HCPCS | Performed by: NURSE PRACTITIONER

## 2017-10-30 PROCEDURE — G8427 DOCREV CUR MEDS BY ELIG CLIN: HCPCS | Performed by: NURSE PRACTITIONER

## 2017-10-30 ASSESSMENT — ENCOUNTER SYMPTOMS: BACK PAIN: 1

## 2017-10-30 NOTE — PROGRESS NOTES
[address] [place]  [phone]    Cortney Nguyễn is a 46 y.o. female who presents today for her  medical conditions/complaints as noted below. Mia Bishop is c/o of Gait Problem    HPI:     HPI   Mia still feels she has a gait problem. She is seeing neuro for this problem. She has still not gotten her walker, she does not know what the hold up is. Her neurologist wanted her to discontinue some of her polypharmacy, she is not willing to do this. She is going to go see a new neurologist at [facility]. She feels her gait is in a zig zag pattern, she does exhibit some of this while walking to the bathroom. One of her specialists is trying to get a functional capacity test ordered for her. She is not receiving disability. I do feel that mia would benefit from occassional use of walker when her gait is off balance until her neurologist can diagnose what her problem is. She sees specialists for all of her chronic medical problems other than her htn. Nursing note reviewed and discussed with patient. Patient's medications, allergies, past medical, surgical, social and family histories were reviewed and updated as appropriate. Current Outpatient Prescriptions on File Prior to Visit   Medication Sig Dispense Refill    EPIPEN 2-JIGAR 0.3 MG/0.3ML SOAJ injection INJECT 0.3 ML (0.3 MG) INTO THE MUSCLE ONCE AS NEEDED FOR UP TO 1 DOSE. for angioedema 1 each 2    chlorhexidine (PERIDEX) 0.12 % solution RINSE WITH 1/2 OZ.  TWICE A DAY FOR 30 SECONDS, THEN SPIT 473 mL 0    lamoTRIgine (LAMICTAL) 25 MG tablet Take 1 tablet by mouth 2 times daily 30 tablet 3    potassium chloride (KLOR-CON M) 20 MEQ extended release tablet Take 20 mEq by mouth daily      atenolol (TENORMIN) 100 MG tablet TAKE 1 TABLET BY MOUTH ONE TIME A DAY 30 tablet 3    losartan (COZAAR) 50 MG tablet Take 1 tablet by mouth daily 30 tablet 3    felodipine (PLENDIL) 10 MG extended release tablet Take 1 tablet by mouth daily 30 tablet 3    omeprazole (PRILOSEC) 20 MG delayed release capsule TAKE 1 CAPSULE BY MOUTH TWO TIMES DAILY. 30 capsule 4    ibuprofen (ADVIL;MOTRIN) 800 MG tablet Take 1 tablet by mouth every 8 hours as needed for Pain 30 tablet 0    lidocaine (XYLOCAINE) 5 % ointment Apply topically as needed for Pain Apply topically as needed.  oxyCODONE-acetaminophen (PERCOCET) 5-325 MG per tablet Take 1 tablet by mouth 2 times daily as needed for Pain . 40 tablet 0    cyclobenzaprine (FLEXERIL) 5 MG tablet TAKE 1 OR 2 TABLETS BY MOUTH AT BEDTIME AS NEEDED 45 tablet 0    docusate sodium (COLACE) 100 MG capsule Take 1 capsule by mouth 2 times daily as needed for Constipation 60 capsule 1    QUEtiapine (SEROQUEL) 200 MG tablet TAKE 1 TABLET BY MOUTH AT BEDTIME 60 tablet 1    metFORMIN (GLUCOPHAGE) 500 MG tablet Take 500 mg by mouth 2 times daily (with meals)      albuterol sulfate  (90 BASE) MCG/ACT inhaler Inhale 2 puffs into the lungs every 6 hours as needed for Wheezing 1 Inhaler 3    venlafaxine (EFFEXOR) 75 MG tablet Take 2 tablets (150 mg) in the morning and 1 tablet (75 mg) in the evening. 90 tablet 3    naltrexone (DEPADE) 50 MG tablet TAKE 1 TABLET (50 MG) BY MOUTH ONCE DAILY.  30 tablet 3    hydroxychloroquine (PLAQUENIL) 200 MG tablet Take 200 mg by mouth daily  1    pravastatin (PRAVACHOL) 40 MG tablet TAKE 1 TABLET BY MOUTH AT BEDTIME  4    famotidine (PEPCID) 40 MG tablet TAKE 1 TABLET BY MOUTH ONE TIME A DAY  4    gabapentin (NEURONTIN) 300 MG capsule TAKE 1 CAPSULE (300 MG) BY 2 in AM, 1 @ NOON, 2 @ DINNER  2    propranolol (INDERAL) 10 MG tablet TAKE 1 OR 2 TABLETS BY MOUTH TWO TIMES A DAY AS NEEDED FOR RESTLESSNESS  1    cetirizine (ZYRTEC) 10 MG tablet TAKE 1 TABLET BY MOUTH ONE TIME A DAY  4    vitamin D (ERGOCALCIFEROL) 97616 UNITS CAPS capsule 50,000 Units once a week Indications: on Sunday       predniSONE (DELTASONE) 50 MG tablet Take 50 mg by mouth daily as needed Indications: as needed For angioedema      Multiple Vitamins-Minerals (THERAPEUTIC MULTIVITAMIN-MINERALS) tablet Take 1 tablet by mouth daily      Probiotic Product (PROBIOTIC & ACIDOPHILUS EX ST PO) Take by mouth daily       vitamin B-12 (CYANOCOBALAMIN) 1000 MCG tablet Take 1,000 mcg by mouth daily      aspirin 81 MG EC tablet Take 1 tablet by mouth 2 times daily (Patient taking differently: Take 81 mg by mouth daily ) 84 tablet 0     No current facility-administered medications on file prior to visit.       Past Medical History:   Diagnosis Date    Acute kidney [facility] [address]     Anemia     Angioedema     Antinuclear antibody (IQRA) titer greater than 1:80     Anxiety     Asthma     Ataxia     Degenerative disc disease, thoracic     Depression     Diabetes mellitus (HCC)     DJD (degenerative joint disease)     Fibromyalgia     GERD (gastroesophageal reflux disease)     Headache     Hernia of abdominal wall     Hyperlipidemia     Hypertension     Mood disorder (HCC)     Neuropathy (HCC)     PTSD (post-traumatic stress disorder)     Pulmonary nodules     Raynaud's disease     Scleroderma (HCC)     Seizures (HCC)     Sleep apnea     Sleep walking disorder     Transient insomnia       Past Surgical History:   Procedure Laterality Date    ABDOMINOPLASTY  2013    CARPAL TUNNEL RELEASE  2016    two times   [address] [facility] Right     GASTRIC BYPASS SURGERY  2012    HYSTERECTOMY      JOINT REPLACEMENT Bilateral     KNEE SURGERY  [id number]    both statse 24 knee surgeries    KNEE SURGERY Right 05/02/2017    (femoral) patella replacement    TONSILLECTOMY  1986    TOTAL KNEE ARTHROPLASTY  2011    left knee    TOTAL KNEE ARTHROPLASTY Right 5/2/2017    PATELLOFEMORAL REPLACEMENT KNEE - JAXSON, 3080 TABLE, FEMORAL POPLITEAL BLOCK, NSA= SPINAL VS GENERAL performed by Nahed Chavarria DO at [address] [place] [address] 2004     Family History   Problem Relation Age of Onset    Adopted: Yes    Depression Mother     Asthma Mother     Depression Father     High Blood Pressure Father     Diabetes Father     Asthma Father     Depression Sister     Asthma Sister     Depression Brother     Asthma Brother     Asthma Maternal Aunt     Asthma Maternal Uncle     Asthma Paternal Aunt     Asthma Paternal Uncle     Asthma Maternal Grandmother     Cancer Maternal Grandmother     Asthma Maternal Grandfather     Asthma Paternal Grandmother     Asthma Paternal Grandfather     Asthma Maternal Cousin     Asthma Paternal Cousin      Social History   Substance Use Topics    Smoking status: Never Smoker    Smokeless tobacco: Never Used    Alcohol use Yes      Comment: occasional      Allergies   Allergen Reactions    Morphine Nausea And Vomiting     Pt states it changes her mental status    Amlodipine Other (See Comments)     diarrhea and stress    Dilaudid [Hydromorphone Hcl] Hives and Itching    Sulfa Antibiotics Hives       Subjective:      Review of Systems   Cardiovascular: Negative for chest pain, palpitations and leg swelling. Musculoskeletal: Positive for arthralgias, back pain, joint swelling, myalgias and neck pain. Neurological: Positive for weakness. Other pertinent ROS in HPI  Objective:     /78 (Site: Left Arm, Position: Sitting, Cuff Size: Medium Adult)   Pulse 98   Temp 97 °F (36.1 °C) (Oral)   Resp 20   Ht 5' 7.99\" (1.727 m)   Wt 166 lb (75.3 kg)   SpO2 99%   BMI 25.25 kg/m²    Physical Exam   Constitutional: She is oriented to person, place, and time. She appears well-developed and well-nourished. No distress. HENT:   Head: Normocephalic and atraumatic. Right Ear: External ear normal.   Left Ear: External ear normal.   Nose: Nose normal.   Mouth/Throat: Oropharynx is clear and moist.   Eyes: Conjunctivae and EOM are normal. Pupils are equal, round, and reactive to light.    Neck: Discussed use, benefit, and side effects of prescribed medications. All patient questions answered. Pt voiced understanding. Reviewed health maintenance. Instructed to continue current medications, diet and exercise. 1.  Mia received counseling on the following healthy behaviors: nutrition, exercise and medication adherence  2. Patient given educational materials when available - see patient instructions when applicable  3. Discussed use, benefit, and side effects of prescribed medications. Barriers to medication compliance addressed. All patient questions answered. Pt voiced understanding. 4.  Reviewed prior labs and health maintenance  5. Continue current medications, diet and exercise. Completed Refills   Requested Prescriptions     Signed Prescriptions Disp Refills    glucose blood VI test strips (ASCENSIA AUTODISC VI;ONE TOUCH ULTRA TEST VI) strip 100 strip 11     Sig: Use with associated glucose meter.          Electronically signed by Doris Douglass CNP on 10/30/2017 at 10:06 AM

## 2017-10-30 NOTE — PROGRESS NOTES
Cystoscopy Operative Note (10/30/17): Surgeon: Kelley Servin MD  Anesthesia: Urethral 2% Xylocaine  Indications: difficulty urinating, cystocele, stress incont  Position: Dorsal Lithotomy    Findings:   The patient was prepped and draped in the usual sterile fashion. The flexible cystoscope was advanced through the urethra and into the bladder. The bladder was thoroughly inspected and the following was noted:    Vagina: grade 3 cystocele   Residual Urine: mild  Urethra: hypermobile  Bladder: No tumors or CIS noted. No bladder diverticulum. There was none trabeculation noted. Ureters: Clear efflux from both ureters. Orifices with normal configuration and location. The cystoscope was removed. The patient tolerated the procedure well. Objective demonstration of urinary incontinence with straining and valsalva. Plan: refer to Dr. Tu Adams for cystocele  Would benefit from sling at same time -- will have pt f/u n a few months (after she sees Dr. Tu Adams) to discuss possible sling.

## 2017-10-30 NOTE — PROGRESS NOTES
Have you seen any other physician or provider since your last visit yes - Rheumatologist, urologist    Have you had any other diagnostic tests since your last visit? no    Have you changed or stopped any medications since your last visit including any over-the-counter medicines, vitamins, or herbal medicines? no     Are you taking all your prescribed medications? Yes  If NO, why? -  N/A           Patient Self-Management Goal for this visit. What is your goal for your visit today?  - 1 month follow up   Barriers to success: none   Plan for overcoming my barriers: N/A      Confidence: 10/10   Date goal set: 10/30/17   Date expected to reach goal: 3days    Medical history Review  Past Medical, Family, and Social History reviewed and does not contribute to the patient presenting condition    Health Maintenance Due   Topic Date Due    Hepatitis C screen  1965    Diabetic foot exam  07/29/1975    Diabetic retinal exam  07/29/1975    Diabetic microalbuminuria test  07/29/1983    Cervical cancer screen  07/29/1986    Colon cancer screen colonoscopy  07/29/2015

## 2017-11-01 ENCOUNTER — HOSPITAL ENCOUNTER (OUTPATIENT)
Age: 52
Discharge: HOME | End: 2017-11-01
Payer: MEDICAID

## 2017-11-01 ENCOUNTER — HOSPITAL ENCOUNTER (OUTPATIENT)
Dept: GENERAL RADIOLOGY | Age: 52
Discharge: HOME | End: 2017-11-01
Payer: MEDICAID

## 2017-11-01 DIAGNOSIS — I73.00 RAYNAUD'S DISEASE WITHOUT GANGRENE: ICD-10-CM

## 2017-11-01 DIAGNOSIS — Z12.11 SCREENING FOR MALIGNANT NEOPLASM OF COLON: ICD-10-CM

## 2017-11-01 LAB
ALBUMIN SERPL-MCNC: 4.1 G/DL (ref 3.5–5.2)
ALBUMIN/GLOBULIN RATIO: ABNORMAL (ref 1–2.5)
ALP BLD-CCNC: 116 U/L (ref 35–104)
ALT SERPL-CCNC: 10 U/L (ref 5–33)
ANION GAP SERPL CALCULATED.3IONS-SCNC: 12 MMOL/L (ref 9–17)
AST SERPL-CCNC: 22 U/L
BILIRUB SERPL-MCNC: 0.45 MG/DL (ref 0.3–1.2)
BUN BLDV-MCNC: 18 MG/DL (ref 6–20)
BUN/CREAT BLD: ABNORMAL (ref 9–20)
CALCIUM SERPL-MCNC: 9.2 MG/DL (ref 8.6–10.4)
CHLORIDE BLD-SCNC: 103 MMOL/L (ref 98–107)
CO2: 24 MMOL/L (ref 20–31)
CONTROL: PRESENT
CREAT SERPL-MCNC: 0.89 MG/DL (ref 0.5–0.9)
GFR AFRICAN AMERICAN: >60 ML/MIN
GFR NON-AFRICAN AMERICAN: >60 ML/MIN
GFR SERPL CREATININE-BSD FRML MDRD: ABNORMAL ML/MIN/{1.73_M2}
GFR SERPL CREATININE-BSD FRML MDRD: ABNORMAL ML/MIN/{1.73_M2}
GLUCOSE BLD-MCNC: 99 MG/DL (ref 70–99)
HEMOCCULT STL QL: NORMAL
POTASSIUM SERPL-SCNC: 3.6 MMOL/L (ref 3.7–5.3)
SODIUM BLD-SCNC: 139 MMOL/L (ref 135–144)
TOTAL PROTEIN: 7.1 G/DL (ref 6.4–8.3)

## 2017-11-01 PROCEDURE — 80053 COMPREHEN METABOLIC PANEL: CPT

## 2017-11-01 PROCEDURE — 82274 ASSAY TEST FOR BLOOD FECAL: CPT | Performed by: NURSE PRACTITIONER

## 2017-11-01 PROCEDURE — 72072 X-RAY EXAM THORAC SPINE 3VWS: CPT

## 2017-11-01 PROCEDURE — 72100 X-RAY EXAM L-S SPINE 2/3 VWS: CPT

## 2017-11-01 PROCEDURE — 36415 COLL VENOUS BLD VENIPUNCTURE: CPT

## 2017-11-06 ENCOUNTER — HOSPITAL ENCOUNTER (OUTPATIENT)
Age: 52
Setting detail: SPECIMEN
Discharge: HOME | End: 2017-11-06
Payer: MEDICAID

## 2017-11-06 PROCEDURE — 82570 ASSAY OF URINE CREATININE: CPT

## 2017-11-06 PROCEDURE — 82340 ASSAY OF CALCIUM IN URINE: CPT

## 2017-11-06 PROCEDURE — 81050 URINALYSIS VOLUME MEASURE: CPT

## 2017-11-07 ENCOUNTER — HOSPITAL ENCOUNTER (OUTPATIENT)
Dept: NUCLEAR MEDICINE | Age: 52
Discharge: HOME | End: 2017-11-07
Payer: MEDICAID

## 2017-11-07 DIAGNOSIS — I63.9 CEREBROVASCULAR ACCIDENT (CVA), UNSPECIFIED MECHANISM (HCC): ICD-10-CM

## 2017-11-07 LAB
CALCIUM TIMED UR: NORMAL MG/X H
CALCIUM URINE: 6.3 MG/DL
CALCIUM, URINE: 106 MG/24 H (ref 20–275)
CREATININE TIMED UR: NORMAL MG/ X H
CREATININE URINE: 78.3 MG/DL
CREATININE, 24H UR: 1317 MG/24 H (ref 740–1570)
HOURS COLLECTED: 24 H
HOURS COLLECTED: 24 H
VOLUME: 1682 ML
VOLUME: 1682 ML

## 2017-11-07 PROCEDURE — A9500 TC99M SESTAMIBI: HCPCS | Performed by: INTERNAL MEDICINE

## 2017-11-07 PROCEDURE — 78070 PARATHYROID PLANAR IMAGING: CPT

## 2017-11-07 PROCEDURE — 3430000000 HC RX DIAGNOSTIC RADIOPHARMACEUTICAL: Performed by: INTERNAL MEDICINE

## 2017-11-07 RX ADMIN — TETRAKIS(2-METHOXYISOBUTYLISOCYANIDE)COPPER(I) TETRAFLUOROBORATE 21.5 MILLICURIE: 1 INJECTION, POWDER, LYOPHILIZED, FOR SOLUTION INTRAVENOUS at 09:32

## 2017-11-17 ENCOUNTER — HOSPITAL ENCOUNTER (OUTPATIENT)
Dept: WOMENS IMAGING | Age: 52
Discharge: HOME | End: 2017-11-17
Payer: MEDICAID

## 2017-11-17 DIAGNOSIS — Z78.0 POST-MENOPAUSAL: ICD-10-CM

## 2017-11-17 PROCEDURE — 77080 DXA BONE DENSITY AXIAL: CPT

## 2017-11-29 ENCOUNTER — HOSPITAL ENCOUNTER (OUTPATIENT)
Age: 52
Setting detail: SPECIMEN
Discharge: HOME | End: 2017-11-29
Payer: MEDICAID

## 2017-11-29 ENCOUNTER — OFFICE VISIT (OUTPATIENT)
Dept: OBGYN CLINIC | Age: 52
End: 2017-11-29
Payer: MEDICAID

## 2017-11-29 VITALS
DIASTOLIC BLOOD PRESSURE: 84 MMHG | BODY MASS INDEX: 25.46 KG/M2 | RESPIRATION RATE: 16 BRPM | HEART RATE: 88 BPM | SYSTOLIC BLOOD PRESSURE: 148 MMHG | WEIGHT: 168 LBS | HEIGHT: 68 IN

## 2017-11-29 DIAGNOSIS — Z96.653 HISTORY OF TOTAL BILATERAL KNEE REPLACEMENT: ICD-10-CM

## 2017-11-29 DIAGNOSIS — Z01.419 WELL WOMAN EXAM: Primary | ICD-10-CM

## 2017-11-29 DIAGNOSIS — Z90.710 H/O HYSTERECTOMY FOR BENIGN DISEASE: ICD-10-CM

## 2017-11-29 DIAGNOSIS — Z12.39 SCREENING FOR BREAST CANCER: ICD-10-CM

## 2017-11-29 DIAGNOSIS — N89.8 VAGINAL DISCHARGE: ICD-10-CM

## 2017-11-29 DIAGNOSIS — Z13.820 SCREENING FOR OSTEOPOROSIS: ICD-10-CM

## 2017-11-29 LAB
DIRECT EXAM: ABNORMAL
Lab: ABNORMAL
SPECIMEN DESCRIPTION: ABNORMAL
STATUS: ABNORMAL

## 2017-11-29 PROCEDURE — 99214 OFFICE O/P EST MOD 30 MIN: CPT | Performed by: NURSE PRACTITIONER

## 2017-11-29 PROCEDURE — 87480 CANDIDA DNA DIR PROBE: CPT

## 2017-11-29 PROCEDURE — 87510 GARDNER VAG DNA DIR PROBE: CPT

## 2017-11-29 PROCEDURE — 87660 TRICHOMONAS VAGIN DIR PROBE: CPT

## 2017-11-29 RX ORDER — FLUCONAZOLE 150 MG/1
150 TABLET ORAL ONCE
Qty: 2 TABLET | Refills: 0 | Status: SHIPPED | OUTPATIENT
Start: 2017-11-29 | End: 2018-03-12 | Stop reason: SDUPTHER

## 2017-11-29 ASSESSMENT — PATIENT HEALTH QUESTIONNAIRE - PHQ9
2. FEELING DOWN, DEPRESSED OR HOPELESS: 0
SUM OF ALL RESPONSES TO PHQ QUESTIONS 1-9: 0
1. LITTLE INTEREST OR PLEASURE IN DOING THINGS: 0
SUM OF ALL RESPONSES TO PHQ9 QUESTIONS 1 & 2: 0

## 2017-11-29 NOTE — PROGRESS NOTES
walking disorder     Transient insomnia                                                                    Past Surgical History:   Procedure Laterality Date    ABDOMINOPLASTY  2013    CARPAL TUNNEL RELEASE  2016    two times   [address]     GASTRIC BYPASS SURGERY  2012    HYSTERECTOMY      JOINT REPLACEMENT Bilateral     KNEE SURGERY  [id number]    both statse 24 knee surgeries    KNEE SURGERY Right 05/02/2017    (femoral) patella replacement    TONSILLECTOMY  1986    TOTAL KNEE ARTHROPLASTY  2011    left knee    TOTAL KNEE ARTHROPLASTY Right 5/2/2017    PATELLOFEMORAL REPLACEMENT KNEE - JAXSON, 3080 TABLE, FEMORAL POPLITEAL BLOCK, NSA= SPINAL VS GENERAL performed by Ivette Nevarez DO at [address] [place] [address]     Family History   Problem Relation Age of Onset    Adopted: Yes    Depression Mother     Asthma Mother     Depression Father     High Blood Pressure Father     Diabetes Father     Asthma Father     Depression Sister     Asthma Sister     Depression Brother     Asthma Brother     Asthma Maternal Aunt     Asthma Maternal Uncle     Asthma Paternal [de-identified]     Asthma Paternal Uncle     Asthma Maternal Grandmother     Cancer Maternal Grandmother     Asthma Maternal Grandfather     Asthma Paternal Grandmother     Asthma Paternal Grandfather     Asthma Maternal Cousin     Asthma Paternal Cousin      Social History     Social History    Marital status:      Spouse name: N/A    Number of children: N/A    Years of education: N/A     Occupational History    Not on file.      Social History Main Topics    Smoking status: Never Smoker    Smokeless tobacco: Never Used    Alcohol use Yes      Comment: occasional    Drug use: No    Sexual activity: Not on file     Other Topics Concern    Not on file     Social History Narrative    No narrative on file       MEDICATIONS:  Current Outpatient Prescriptions   Medication Sig Dispense Refill    fluconazole (DIFLUCAN) 150 MG tablet Take 1 tablet by mouth once for 1 dose May repeat in 7 days if needed 2 tablet 0    glucose blood VI test strips (ASCENSIA AUTODISC VI;ONE TOUCH ULTRA TEST VI) strip Use with associated glucose meter. 100 strip 11    EPIPEN 2-JIGAR 0.3 MG/0.3ML SOAJ injection INJECT 0.3 ML (0.3 MG) INTO THE MUSCLE ONCE AS NEEDED FOR UP TO 1 DOSE. for angioedema 1 each 2    chlorhexidine (PERIDEX) 0.12 % solution RINSE WITH 1/2 OZ. TWICE A DAY FOR 30 SECONDS, THEN SPIT 473 mL 0    lamoTRIgine (LAMICTAL) 25 MG tablet Take 1 tablet by mouth 2 times daily 30 tablet 3    potassium chloride (KLOR-CON M) 20 MEQ extended release tablet Take 20 mEq by mouth daily      atenolol (TENORMIN) 100 MG tablet TAKE 1 TABLET BY MOUTH ONE TIME A DAY 30 tablet 3    losartan (COZAAR) 50 MG tablet Take 1 tablet by mouth daily 30 tablet 3    felodipine (PLENDIL) 10 MG extended release tablet Take 1 tablet by mouth daily 30 tablet 3    omeprazole (PRILOSEC) 20 MG delayed release capsule TAKE 1 CAPSULE BY MOUTH TWO TIMES DAILY. 30 capsule 4    ibuprofen (ADVIL;MOTRIN) 800 MG tablet Take 1 tablet by mouth every 8 hours as needed for Pain 30 tablet 0    lidocaine (XYLOCAINE) 5 % ointment Apply topically as needed for Pain Apply topically as needed.  oxyCODONE-acetaminophen (PERCOCET) 5-325 MG per tablet Take 1 tablet by mouth 2 times daily as needed for Pain .  40 tablet 0    cyclobenzaprine (FLEXERIL) 5 MG tablet TAKE 1 OR 2 TABLETS BY MOUTH AT BEDTIME AS NEEDED 45 tablet 0    docusate sodium (COLACE) 100 MG capsule Take 1 capsule by mouth 2 times daily as needed for Constipation 60 capsule 1    QUEtiapine (SEROQUEL) 200 MG tablet TAKE 1 TABLET BY MOUTH AT BEDTIME 60 tablet 1    metFORMIN (GLUCOPHAGE) 500 MG tablet Take 500 mg by mouth 2 times daily (with meals)      albuterol sulfate  (90 BASE) MCG/ACT inhaler Inhale 2 puffs into the lungs every 6 hours as needed for Wheezing 1 are not applicable. Extremities: The patients extremities were without calf tenderness, edema, or varicosities. There was full range of motion in all four extremities. Pulses in all four extremities were appreciated and are 2/4. Abdomen: The abdomen was soft and non-tender. There were good bowel sounds in all quadrants and there was no guarding, rebound or rigidity. On evaluation there was no evidence of hepatosplenomegaly and there was no costal vertebral abdiaziz tenderness bilaterally. No hernias were appreciated. Abdominal Scars: hysterectomy scar, gastric bypass scar, cholecystectomy scar    Psych: The patient had a normal Orientation to: Time, Place, Person, and Situation  There is no Mood / Affect changes    Breast:  (Chest)  normal appearance, no masses or tenderness  Self breast exams were reviewed in detail. Literature was given. Pelvic Exam:  Vulva and vagina appear normal. Bimanual exam reveals normal cuff intact. Cystocele noted- grade 2. Rectal Exam:  exam declined by patient          Musculosk:  Normal Gait and station was noted. Digits were evaluated without abnormal findings. Range of motion, stability and strength were evaluated and found to be appropriate for the patients age. ASSESSMENT:      46 y.o. Annual  1. Well woman exam     2. Screening for breast cancer  TERESA DIGITAL SCREEN W CAD BILATERAL   3. Screening for osteoporosis  CANCELED: DEXA BONE DENSITY 2 SITES   4. H/O hysterectomy for benign disease 2014     5. Vaginal discharge  VAGINITIS DNA PROBE   6.  History of total bilateral knee replacement            Chief Complaint   Patient presents with    Established New Doctor    Annual Exam          Past Medical History:   Diagnosis Date    Acute kidney injury ([facility] [address]     Anemia     Angioedema     Antinuclear antibody (IQRA) titer greater than 1:80     Anxiety     Asthma     Ataxia     Degenerative disc disease, thoracic     Depression     Diabetes mellitus ([facility] [address]     DJD (degenerative joint disease)     Fibromyalgia     GERD (gastroesophageal reflux disease)     Headache     Hernia of abdominal wall     Hyperlipidemia     Hypertension     Mood disorder (HCC)     Neuropathy (HCC)     PTSD (post-traumatic stress disorder)     Pulmonary nodules     Raynaud's disease     Scleroderma ([address]     Seizures ([facility] [address]     Sleep apnea     Sleep walking disorder     Transient insomnia          Patient Active Problem List    Diagnosis Date Noted    History of total bilateral knee replacement 11/29/2017     Priority: Medium    H/O hysterectomy for benign disease 2014 11/29/2017     [facility] in 2014 [facility] m[facility] Spondylosis of cervical region without myelopathy or radiculopathy      Suffers with neck pain. A MRI of the cervical spine revealed multilevel degenerative joint disease. Her neck pain is constant and radiates down both upper extremities. An EMG nerve conduction study in February 2016 found a bilateral carpal tunnel syndrome and possible ulnar neuropathy. A MRI of the cervical spine in August 2016 found multilevel degenerative joint disease and at C5-C6 a disc protrusion mildly indenting the thecal sac and closely abutting the ventral cord. Additionally she reports having low back pain and previously had a lumbar spine MRI in September 2014 done at the [address] [place] [address] in [place] which revealed multilevel lumbar degenerative joint disease worse at L5-S1 and L4-L5. Pain management consultation pending.         Chronic fatigue syndrome 06/15/2017    S/P knee surgery     Controlled type 2 diabetes mellitus without complication ([facility] [address] 24/18/8363    Degenerative arthritis of right knee 05/02/2017    Stroke ([facility] [address] 04/06/2017    Transient insomnia     Sleep walking disorder     Sleep apnea     Raynaud's disease     Pulmonary nodules     Neuropathy (HCC)     Hypertension     Headache     GERD

## 2017-11-30 ENCOUNTER — TELEPHONE (OUTPATIENT)
Dept: OBGYN CLINIC | Age: 52
End: 2017-11-30

## 2017-11-30 RX ORDER — METRONIDAZOLE 500 MG/1
500 TABLET ORAL 2 TIMES DAILY
Qty: 14 TABLET | Refills: 0 | Status: SHIPPED | OUTPATIENT
Start: 2017-11-30 | End: 2017-12-07

## 2017-11-30 NOTE — TELEPHONE ENCOUNTER
----- Message from Caryl Penaloza CNM sent at 11/30/2017  8:13 AM EST -----  Flagyl 500 mg # 14 BID po x 7 days

## 2017-11-30 NOTE — TELEPHONE ENCOUNTER
----- Message from Judy Hilario CNM sent at 11/30/2017  8:13 AM EST -----  Flagyl 500 mg # 14 BID po x 7 days

## 2017-11-30 NOTE — TELEPHONE ENCOUNTER
[address]. called for poss drug interraction on medication sent over E-Script.  Please call Marry Paredes

## 2017-12-01 NOTE — TELEPHONE ENCOUNTER
Last visit 10/30/017  next visit  1/30/18    Next Visit Date:  Future Appointments  Date Time Provider Sylvester Collins   12/7/2017 5:30 PM ST[facility] ULTRASOUND RM 1 [facility] US [facility] Radiolog   1/11/2018 12:00 PM DO Oliva Godfrey OB/Gyn [facility]   1/15/2018 9:20 AM Evens Gray MD [place] [facility]   1/30/2018 8:15 AM Juanjo Garcia CNP [facility] FP [facility]   2/22/2018 8:30 AM Pilar Finch MD Neph Malia None   3/22/2018 1:00 PM Anabella Laguna CNP Neuro Spec [facility]   7/30/2018 8:00 AM Sukumar Segovia DO [address] [place] [address] Maintenance   Topic Date Due    Hepatitis C screen  1965    Diabetic foot exam  07/29/1975    Diabetic retinal exam  07/29/1975    Lipid screen  04/07/2018    Diabetic hemoglobin A1C test  05/24/2018    Diabetic microalbuminuria test  10/30/2018    Colon Cancer Screen FIT/FOBT  11/01/2018    Breast cancer screen  12/13/2018    DTaP/Tdap/Td vaccine (2 - Td) 09/14/2027    Flu vaccine  Completed    Pneumococcal med risk  Completed    HIV screen  Completed       Hemoglobin A1C (%)   Date Value   05/24/2017 5.2   04/07/2017 4.8             ( goal A1C is < 7)   Microalb/Crt.  Ratio (mcg/mg creat)   Date Value   10/30/2017 CANNOT BE CALCULATED     LDL Cholesterol (mg/dL)   Date Value   04/07/2017 55       (goal LDL is <100)   AST (U/L)   Date Value   11/01/2017 22     ALT (U/L)   Date Value   11/01/2017 10     BUN (mg/dL)   Date Value   11/01/2017 18     BP Readings from Last 3 Encounters:   11/29/17 (!) 148/84   10/30/17 (!) 142/82   10/30/17 130/78          (goal 120/80)    All Future Testing planned in [facility]  Lab Frequency Next Occurrence   XR Knee Bilateral Standard Once 02/24/2017   CBC Once 96/57/6462   Basic Metabolic Panel Once 62/59/5066   Creatinine, Random Urine Once 08/26/2017   Protein, urine, random Once 08/26/2017   Baseline Diagnostic Sleep Study Once 06/01/2017   Sleep Study with PAP Titration Once 06/05/2017   Baseline Diagnostic Sleep Study Once 06/05/2017   PT eval and treat Once 06/21/2017   TERESA DIGITAL SCREEN W CAD BILATERAL Once 15/92/3402   Basic Metabolic Panel     CBC Auto Differential     Creatinine, Random Urine     Phosphorus     Protein, urine, random     PTH, INTACT WITH IONIZED CALCIUM     Vitamin D 25 Hydroxy                 Patient Active Problem List:     Seizures (HCC)     Transient insomnia     Sleep walking disorder     Sleep apnea     Raynaud's disease     Pulmonary nodules     Neuropathy (HCC)     Hypertension     Headache     GERD (gastroesophageal reflux disease)     Fibromyalgia     Depression     Degenerative disc disease, thoracic     CAD (coronary artery disease)     Bipolar disorder (HCC)     Asthma     Anxiety     Antinuclear antibody (IQRA) titer greater than 1:80     Anemia     Acute kidney injury ([facility] [address]     Stroke ([facility] [address]     Controlled type 2 diabetes mellitus without complication ([facility])     Degenerative arthritis of right knee     S/P knee surgery     Chronic fatigue syndrome     Spondylosis of cervical region without myelopathy or radiculopathy     H/O hysterectomy for benign disease 2014     History of total bilateral knee replacement

## 2017-12-04 RX ORDER — LIDOCAINE 50 MG/G
OINTMENT TOPICAL PRN
Qty: 1 TUBE | Refills: 5 | Status: SHIPPED | OUTPATIENT
Start: 2017-12-04 | End: 2018-01-26

## 2017-12-04 RX ORDER — PRAVASTATIN SODIUM 40 MG
TABLET ORAL
Qty: 30 TABLET | Refills: 5 | Status: ON HOLD | OUTPATIENT
Start: 2017-12-04 | End: 2018-01-31 | Stop reason: HOSPADM

## 2017-12-04 RX ORDER — PREDNISONE 50 MG/1
50 TABLET ORAL DAILY PRN
Qty: 30 TABLET | Refills: 0 | Status: ON HOLD | OUTPATIENT
Start: 2017-12-04 | End: 2018-01-31 | Stop reason: HOSPADM

## 2017-12-04 RX ORDER — PROPRANOLOL HYDROCHLORIDE 10 MG/1
TABLET ORAL
Qty: 60 TABLET | Refills: 5 | Status: SHIPPED | OUTPATIENT
Start: 2017-12-04 | End: 2018-01-23 | Stop reason: ALTCHOICE

## 2017-12-04 RX ORDER — ALBUTEROL SULFATE 90 UG/1
2 AEROSOL, METERED RESPIRATORY (INHALATION) EVERY 6 HOURS PRN
Qty: 1 INHALER | Refills: 3 | Status: SHIPPED | OUTPATIENT
Start: 2017-12-04 | End: 2019-08-19 | Stop reason: SDUPTHER

## 2017-12-04 RX ORDER — QUETIAPINE FUMARATE 200 MG/1
TABLET, FILM COATED ORAL
Qty: 60 TABLET | Refills: 1 | Status: SHIPPED | OUTPATIENT
Start: 2017-12-04 | End: 2018-12-14 | Stop reason: DRUGHIGH

## 2017-12-07 ENCOUNTER — HOSPITAL ENCOUNTER (OUTPATIENT)
Dept: ULTRASOUND IMAGING | Age: 52
Discharge: HOME | End: 2017-12-07
Payer: MEDICAID

## 2017-12-07 ENCOUNTER — HOSPITAL ENCOUNTER (OUTPATIENT)
Age: 52
Discharge: HOME | End: 2017-12-07
Payer: MEDICAID

## 2017-12-07 DIAGNOSIS — E21.3 HYPERPARATHYROIDISM (HCC): ICD-10-CM

## 2017-12-07 DIAGNOSIS — N18.30 CKD (CHRONIC KIDNEY DISEASE), STAGE III (HCC): ICD-10-CM

## 2017-12-07 LAB
ABSOLUTE EOS #: 0.2 K/UL (ref 0–0.44)
ABSOLUTE IMMATURE GRANULOCYTE: <0.03 K/UL (ref 0–0.3)
ABSOLUTE LYMPH #: 1.53 K/UL (ref 1.1–3.7)
ABSOLUTE MONO #: 0.51 K/UL (ref 0.1–1.2)
ANION GAP SERPL CALCULATED.3IONS-SCNC: 14 MMOL/L (ref 9–17)
BASOPHILS # BLD: 1 % (ref 0–2)
BASOPHILS ABSOLUTE: 0.08 K/UL (ref 0–0.2)
BUN BLDV-MCNC: 29 MG/DL (ref 6–20)
BUN/CREAT BLD: ABNORMAL (ref 9–20)
CALCIUM IONIZED: 1.25 MMOL/L (ref 1.13–1.33)
CALCIUM IONIZED: ABNORMAL MMOL/L (ref 1.13–1.33)
CALCIUM SERPL-MCNC: 8.9 MG/DL (ref 8.6–10.4)
CALCIUM SERPL-MCNC: 8.9 MG/DL (ref 8.6–10.4)
CHLORIDE BLD-SCNC: 99 MMOL/L (ref 98–107)
CO2: 22 MMOL/L (ref 20–31)
CREAT SERPL-MCNC: 1.26 MG/DL (ref 0.5–0.9)
CREATININE URINE: 137 MG/DL (ref 28–217)
DIFFERENTIAL TYPE: ABNORMAL
EOSINOPHILS RELATIVE PERCENT: 3 % (ref 1–4)
GFR AFRICAN AMERICAN: 54 ML/MIN
GFR NON-AFRICAN AMERICAN: 45 ML/MIN
GFR SERPL CREATININE-BSD FRML MDRD: ABNORMAL ML/MIN/{1.73_M2}
GFR SERPL CREATININE-BSD FRML MDRD: ABNORMAL ML/MIN/{1.73_M2}
GLUCOSE BLD-MCNC: 78 MG/DL (ref 70–99)
HCT VFR BLD CALC: 34.3 % (ref 36.3–47.1)
HEMOGLOBIN: 11.3 G/DL (ref 11.9–15.1)
IMMATURE GRANULOCYTES: 0 %
LYMPHOCYTES # BLD: 24 % (ref 24–43)
MCH RBC QN AUTO: 29.8 PG (ref 25.2–33.5)
MCHC RBC AUTO-ENTMCNC: 32.9 G/DL (ref 28.4–34.8)
MCV RBC AUTO: 90.5 FL (ref 82.6–102.9)
MONOCYTES # BLD: 8 % (ref 3–12)
PDW BLD-RTO: 12.2 % (ref 11.8–14.4)
PHOSPHORUS: 4.3 MG/DL (ref 2.6–4.5)
PLATELET # BLD: 272 K/UL (ref 138–453)
PLATELET ESTIMATE: ABNORMAL
PMV BLD AUTO: 10.7 FL (ref 8.1–13.5)
POTASSIUM SERPL-SCNC: 5.2 MMOL/L (ref 3.7–5.3)
POTASSIUM SERPL-SCNC: 5.5 MMOL/L (ref 3.7–5.3)
PTH INTACT: 160.1 PG/ML (ref 15–65)
PTH INTACT: 169.7 PG/ML (ref 15–65)
RBC # BLD: 3.79 M/UL (ref 3.95–5.11)
RBC # BLD: ABNORMAL 10*6/UL
SEG NEUTROPHILS: 64 % (ref 36–65)
SEGMENTED NEUTROPHILS ABSOLUTE COUNT: 3.99 K/UL (ref 1.5–8.1)
SODIUM BLD-SCNC: 135 MMOL/L (ref 135–144)
T3 FREE: 1.98 PG/ML (ref 2.02–4.43)
THYROXINE, FREE: 0.75 NG/DL (ref 0.93–1.7)
TOTAL PROTEIN, URINE: 19 MG/DL
TSH SERPL DL<=0.05 MIU/L-ACNC: 1.33 MIU/L (ref 0.3–5)
VITAMIN D 25-HYDROXY: 29.1 NG/ML (ref 30–100)
VITAMIN D 25-HYDROXY: 30 NG/ML (ref 30–100)
WBC # BLD: 6.3 K/UL (ref 3.5–11.3)
WBC # BLD: ABNORMAL 10*3/UL

## 2017-12-07 PROCEDURE — 83036 HEMOGLOBIN GLYCOSYLATED A1C: CPT

## 2017-12-07 PROCEDURE — 85025 COMPLETE CBC W/AUTO DIFF WBC: CPT

## 2017-12-07 PROCEDURE — 84132 ASSAY OF SERUM POTASSIUM: CPT

## 2017-12-07 PROCEDURE — 80048 BASIC METABOLIC PNL TOTAL CA: CPT

## 2017-12-07 PROCEDURE — 84481 FREE ASSAY (FT-3): CPT

## 2017-12-07 PROCEDURE — 82306 VITAMIN D 25 HYDROXY: CPT

## 2017-12-07 PROCEDURE — 84100 ASSAY OF PHOSPHORUS: CPT

## 2017-12-07 PROCEDURE — 36415 COLL VENOUS BLD VENIPUNCTURE: CPT

## 2017-12-07 PROCEDURE — 82310 ASSAY OF CALCIUM: CPT

## 2017-12-07 PROCEDURE — 86376 MICROSOMAL ANTIBODY EACH: CPT

## 2017-12-07 PROCEDURE — 83970 ASSAY OF PARATHORMONE: CPT

## 2017-12-07 PROCEDURE — 84439 ASSAY OF FREE THYROXINE: CPT

## 2017-12-07 PROCEDURE — 82570 ASSAY OF URINE CREATININE: CPT

## 2017-12-07 PROCEDURE — 84443 ASSAY THYROID STIM HORMONE: CPT

## 2017-12-07 PROCEDURE — 76536 US EXAM OF HEAD AND NECK: CPT

## 2017-12-07 PROCEDURE — 82330 ASSAY OF CALCIUM: CPT

## 2017-12-07 PROCEDURE — 84156 ASSAY OF PROTEIN URINE: CPT

## 2017-12-08 LAB
ESTIMATED AVERAGE GLUCOSE: 100 MG/DL
HBA1C MFR BLD: 5.1 % (ref 4–6)
THYROID PEROXIDASE (TPO) AB: <10 IU/ML (ref 0–35)

## 2017-12-08 RX ORDER — NALTREXONE HYDROCHLORIDE 50 MG/1
TABLET, FILM COATED ORAL
Qty: 30 TABLET | Refills: 2 | Status: ON HOLD | OUTPATIENT
Start: 2017-12-08 | End: 2018-01-31 | Stop reason: HOSPADM

## 2017-12-08 NOTE — TELEPHONE ENCOUNTER
Pt called again informing us that she is out plaquenil and would like this medication filled ASAP
Baseline Diagnostic Sleep Study Once 06/05/2017   PT eval and treat Once 06/21/2017   TERESA DIGITAL SCREEN W CAD BILATERAL Once 36/34/0410   Basic Metabolic Panel     CBC Auto Differential     Creatinine, Random Urine     Phosphorus     Protein, urine, random     PTH, INTACT WITH IONIZED CALCIUM     Vitamin D 25 Hydroxy                 Patient Active Problem List:     Seizures ([facility]     Transient insomnia     Sleep walking disorder     Sleep apnea     Raynaud's disease     Pulmonary nodules     Neuropathy (HCC)     Hypertension     Headache     GERD (gastroesophageal reflux disease)     Fibromyalgia     Depression     Degenerative disc disease, thoracic     CAD (coronary artery disease)     Bipolar disorder (HCC)     Asthma     Anxiety     Antinuclear antibody (IQRA) titer greater than 1:80     Anemia     Acute kidney injury ([facility]     Stroke ([facility]     Controlled type 2 diabetes mellitus without complication ([facility])     Degenerative arthritis of right knee     S/P knee surgery     Chronic fatigue syndrome     Spondylosis of cervical region without myelopathy or radiculopathy     H/O hysterectomy for benign disease 2014     History of total bilateral knee replacement

## 2017-12-08 NOTE — TELEPHONE ENCOUNTER
Health Maintenance   Topic Date Due    Hepatitis C screen  1965    Diabetic foot exam  07/29/1975    Diabetic retinal exam  07/29/1975    Lipid screen  04/07/2018    Diabetic hemoglobin A1C test  05/24/2018    Diabetic microalbuminuria test  10/30/2018    Colon Cancer Screen FIT/FOBT  11/01/2018    Breast cancer screen  12/13/2018    DTaP/Tdap/Td vaccine (2 - Td) 09/14/2027    Flu vaccine  Completed    Pneumococcal med risk  Completed    HIV screen  Completed             (applicable per patient's age: Cancer Screenings, Depression Screening, Fall Risk Screening, Immunizations)    Hemoglobin A1C (%)   Date Value   05/24/2017 5.2   04/07/2017 4.8     Microalb/Crt.  Ratio (mcg/mg creat)   Date Value   10/30/2017 CANNOT BE CALCULATED     LDL Cholesterol (mg/dL)   Date Value   04/07/2017 55     AST (U/L)   Date Value   11/01/2017 22     ALT (U/L)   Date Value   11/01/2017 10     BUN (mg/dL)   Date Value   12/07/2017 29 (H)      (goal A1C is < 7)   (goal LDL is <100) need 30-50% reduction from baseline     BP Readings from Last 3 Encounters:   11/29/17 (!) 148/84   10/30/17 (!) 142/82   10/30/17 130/78    (goal /80)      All Future Testing planned in [facility]:  Lab Frequency Next Occurrence   XR Knee Bilateral Standard Once 02/24/2017   CBC Once 34/95/3128   Basic Metabolic Panel Once 54/77/3988   Creatinine, Random Urine Once 08/26/2017   Protein, urine, random Once 08/26/2017   Baseline Diagnostic Sleep Study Once 06/01/2017   Sleep Study with PAP Titration Once 06/05/2017   Baseline Diagnostic Sleep Study Once 06/05/2017   PT eval and treat Once 06/21/2017   TERESA DIGITAL SCREEN W CAD BILATERAL Once 88/73/7063   Basic Metabolic Panel     CBC Auto Differential     Creatinine, Random Urine     Phosphorus     Protein, urine, random     PTH, INTACT WITH IONIZED CALCIUM     Vitamin D 25 Hydroxy         Next Visit Date:  Future Appointments  Date Time Provider Sylvester Collins   1/11/2018 12:00 PM

## 2017-12-11 RX ORDER — HYDROXYCHLOROQUINE SULFATE 200 MG/1
200 TABLET, FILM COATED ORAL DAILY
Qty: 30 TABLET | Refills: 5 | Status: SHIPPED | OUTPATIENT
Start: 2017-12-11 | End: 2018-06-02 | Stop reason: SDUPTHER

## 2017-12-11 RX ORDER — CYCLOBENZAPRINE HCL 5 MG
TABLET ORAL
Qty: 45 TABLET | Refills: 0 | Status: SHIPPED | OUTPATIENT
Start: 2017-12-11 | End: 2018-01-03 | Stop reason: SDUPTHER

## 2018-01-03 DIAGNOSIS — Z76.0 MEDICATION REFILL: ICD-10-CM

## 2018-01-03 DIAGNOSIS — I10 ESSENTIAL HYPERTENSION: ICD-10-CM

## 2018-01-03 NOTE — TELEPHONE ENCOUNTER
Last seen: 10/30/17  Next appt: 1/30/18    Next Visit Date:  Future Appointments  Date Time Provider Sylvester Delongi   1/12/2018 9:15 AM DO Oliva Haddad OB/[facility]   1/15/2018 9:20 AM Keith Winslow MD [place] [facility]   1/30/2018 8:15 AM Rash[facility]irston [facility]   2/22/2018 8:30 AM Casimer Schilder, MD Neph [facility] None   3/22/2018 1:00 PM Peg Scot, [facility] Neuro Spec [facility]   7/30/2018 8:00 AM Sukumar Aguilar  [facility] Maintenance   Topic Date Due    Hepatitis C screen  1965    Diabetic foot exam  07/29/1975    Diabetic retinal exam  07/29/1975    Lipid screen  04/07/2018    Diabetic microalbuminuria test  10/30/2018    Colon Cancer Screen FIT/FOBT  11/01/2018    Diabetic hemoglobin A1C test  12/07/2018    Breast cancer screen  12/13/2018    DTaP/Tdap/Td vaccine (2 - Td) 09/14/2027    Flu vaccine  Completed    Pneumococcal med risk  Completed    HIV screen  Completed       Hemoglobin A1C (%)   Date Value   12/07/2017 5.1   05/24/2017 5.2   04/07/2017 4.8             ( goal A1C is < 7)   Microalb/Crt.  Ratio (mcg/mg creat)   Date Value   10/30/2017 CANNOT BE CALCULATED     LDL Cholesterol (mg/dL)   Date Value   04/07/2017 55       (goal LDL is <100)   AST (U/L)   Date Value   11/01/2017 22     ALT (U/L)   Date Value   11/01/2017 10     BUN (mg/dL)   Date Value   12/07/2017 29 (H)     BP Readings from Last 3 Encounters:   11/29/17 (!) 148/84   10/30/17 (!) 142/82   10/30/17 130/78          (goal 120/80)    All Future Testing planned in [facility]  Lab Frequency Next Occurrence   XR Knee Bilateral Standard Once 02/24/2017   CBC Once 47/38/1436   Basic Metabolic Panel Once 42/21/9520   Creatinine, Random Urine Once 08/26/2017   Protein, urine, random Once 08/26/2017   Baseline Diagnostic Sleep Study Once 06/01/2017   Sleep Study with PAP Titration Once 06/05/2017   Baseline Diagnostic Sleep Study Once 06/05/2017   PT aaliyah and treat Once 06/21/2017   TERESA DIGITAL SCREEN W CAD BILATERAL Once 43/76/6928   Basic Metabolic Panel     CBC Auto Differential     Creatinine, Random Urine     Phosphorus     Protein, urine, random     PTH, INTACT WITH IONIZED CALCIUM     Vitamin D 25 Hydroxy                 Patient Active Problem List:     Seizures (HCC)     Transient insomnia     Sleep walking disorder     Sleep apnea     Raynaud's disease     Pulmonary nodules     Neuropathy (HCC)     Hypertension     Headache     GERD (gastroesophageal reflux disease)     Fibromyalgia     Depression     Degenerative disc disease, thoracic     CAD (coronary artery disease)     Bipolar disorder (HCC)     Asthma     Anxiety     Antinuclear antibody (IQRA) titer greater than 1:80     Anemia     Acute kidney injury ([facility] [address]     Stroke ([facility] [address]     Controlled type 2 diabetes mellitus without complication (HCC)     Degenerative arthritis of right knee     S/P knee surgery     Chronic fatigue syndrome     Spondylosis of cervical region without myelopathy or radiculopathy     H/O hysterectomy for benign disease 2014     History of total bilateral knee replacement

## 2018-01-05 DIAGNOSIS — I10 ESSENTIAL HYPERTENSION: ICD-10-CM

## 2018-01-05 RX ORDER — LOSARTAN POTASSIUM 50 MG/1
TABLET ORAL
Qty: 90 TABLET | Refills: 1 | Status: SHIPPED | OUTPATIENT
Start: 2018-01-05 | End: 2018-01-23 | Stop reason: ALTCHOICE

## 2018-01-05 RX ORDER — CYCLOBENZAPRINE HCL 5 MG
TABLET ORAL
Qty: 45 TABLET | Refills: 0 | Status: ON HOLD | OUTPATIENT
Start: 2018-01-05 | End: 2018-01-31 | Stop reason: HOSPADM

## 2018-01-05 RX ORDER — ATENOLOL 50 MG/1
TABLET ORAL
Qty: 180 TABLET | Refills: 1 | Status: SHIPPED | OUTPATIENT
Start: 2018-01-05 | End: 2018-01-23 | Stop reason: ALTCHOICE

## 2018-01-05 RX ORDER — LOSARTAN POTASSIUM 50 MG/1
TABLET ORAL
Qty: 90 TABLET | Refills: 1 | OUTPATIENT
Start: 2018-01-05

## 2018-01-12 ENCOUNTER — OFFICE VISIT (OUTPATIENT)
Dept: OBGYN CLINIC | Age: 53
End: 2018-01-12
Payer: MEDICAID

## 2018-01-12 ENCOUNTER — HOSPITAL ENCOUNTER (OUTPATIENT)
Dept: WOMENS IMAGING | Age: 53
Discharge: HOME | DRG: 469 | End: 2018-01-12
Payer: MEDICAID

## 2018-01-12 VITALS
BODY MASS INDEX: 26.52 KG/M2 | SYSTOLIC BLOOD PRESSURE: 114 MMHG | WEIGHT: 175 LBS | RESPIRATION RATE: 20 BRPM | HEIGHT: 68 IN | DIASTOLIC BLOOD PRESSURE: 72 MMHG | HEART RATE: 78 BPM

## 2018-01-12 DIAGNOSIS — N39.3 SUI (STRESS URINARY INCONTINENCE, FEMALE): ICD-10-CM

## 2018-01-12 DIAGNOSIS — N95.2 ATROPHIC VAGINITIS: ICD-10-CM

## 2018-01-12 DIAGNOSIS — Z12.39 SCREENING FOR BREAST CANCER: ICD-10-CM

## 2018-01-12 DIAGNOSIS — N81.11 CYSTOCELE, MIDLINE: Primary | ICD-10-CM

## 2018-01-12 PROCEDURE — G8427 DOCREV CUR MEDS BY ELIG CLIN: HCPCS | Performed by: OBSTETRICS & GYNECOLOGY

## 2018-01-12 PROCEDURE — 3014F SCREEN MAMMO DOC REV: CPT | Performed by: OBSTETRICS & GYNECOLOGY

## 2018-01-12 PROCEDURE — 3017F COLORECTAL CA SCREEN DOC REV: CPT | Performed by: OBSTETRICS & GYNECOLOGY

## 2018-01-12 PROCEDURE — 77067 SCR MAMMO BI INCL CAD: CPT

## 2018-01-12 PROCEDURE — 99203 OFFICE O/P NEW LOW 30 MIN: CPT | Performed by: OBSTETRICS & GYNECOLOGY

## 2018-01-12 PROCEDURE — G8484 FLU IMMUNIZE NO ADMIN: HCPCS | Performed by: OBSTETRICS & GYNECOLOGY

## 2018-01-12 PROCEDURE — 1036F TOBACCO NON-USER: CPT | Performed by: OBSTETRICS & GYNECOLOGY

## 2018-01-12 PROCEDURE — G8599 NO ASA/ANTIPLAT THER USE RNG: HCPCS | Performed by: OBSTETRICS & GYNECOLOGY

## 2018-01-12 PROCEDURE — G8417 CALC BMI ABV UP PARAM F/U: HCPCS | Performed by: OBSTETRICS & GYNECOLOGY

## 2018-01-12 NOTE — PROGRESS NOTES
Mia COULTER Huggins  1/12/2018      Mia Alicea is a 46 y.o. female [id number]      The patient was seen today. She was here to follow-up regarding her Known Cystocele and associated JOYCE. She has seen Urology Dr. Archana Gray. She is scheduled for a sling for her JOYCE and wishes her cystocel repaired at the same time. She has a lengthy medical hx with multiple co-morbidities. She will need surgical clearance and will need premarin cream pre-op BID 2-3 weeks vaginally then for 5-7 weeks post operatively at . ICD-10-CM ICD-9-CM    1. Cystocele, midline N81.11 618.01    2. JOYCE (stress urinary incontinence, female) N39.3 625.6    3. Atrophic Vaginal Mucosa N95.2 627.3        She does not have any specific chief complaint today. Her bowels are regular and she is voiding without difficulty.        Past Medical History:   Diagnosis Date    Acute kidney injury ([address]     Anemia     Angioedema     Antinuclear antibody (IQRA) titer greater than 1:80     Anxiety     Asthma     Ataxia     Degenerative disc disease, thoracic     Depression     Diabetes mellitus (HCC)     DJD (degenerative joint disease)     Fibromyalgia     GERD (gastroesophageal reflux disease)     H/O: hysterectomy     Headache     Hernia of abdominal wall     Hyperlipidemia     Hypertension     Mood disorder (HCC)     Neuropathy (HCC)     PTSD (post-traumatic stress disorder)     Pulmonary nodules     Raynaud's disease     Scleroderma ([address]     Seizures ([address]     Sleep apnea     Sleep walking disorder     Transient insomnia          Past Surgical History:   Procedure Laterality Date    ABDOMINOPLASTY  2013    CARPAL TUNNEL RELEASE  2016    two times   [address] [facility] Right     GASTRIC BYPASS SURGERY  2012    HYSTERECTOMY      JOINT REPLACEMENT Bilateral     KNEE SURGERY  [id number]    both statse [address] knee surgeries    KNEE SURGERY Right 05/02/2017    (femoral) patella replacement    TONSILLECTOMY  1986    TOTAL KNEE ARTHROPLASTY  2011    left knee    TOTAL KNEE ARTHROPLASTY Right 5/2/2017    PATELLOFEMORAL REPLACEMENT KNEE - Casi Lam, [address] TABLE, FEMORAL POPLITEAL BLOCK, NSA= SPINAL VS GENERAL performed by [facility], DO at [address] [place] [address]         Family History   Problem Relation Age of Onset    Adopted:  Yes    Depression Mother     Asthma Mother     Depression Father     High Blood Pressure Father     Diabetes Father     Asthma Father     Depression Sister     Asthma Sister     Depression Brother     Asthma Brother     Asthma Maternal Aunt     Asthma Maternal Uncle     Asthma Paternal Aunt     Asthma Paternal Uncle     Asthma Maternal Grandmother     Cancer Maternal Grandmother     Asthma Maternal Grandfather     Asthma Paternal Grandmother     Asthma Paternal Grandfather     Asthma Maternal Cousin     Asthma Paternal Cousin          Social History   Substance Use Topics    Smoking status: Never Smoker    Smokeless tobacco: Never Used    Alcohol use Yes      Comment: occasional         MEDICATIONS:  Current Outpatient Prescriptions   Medication Sig Dispense Refill    cyclobenzaprine (FLEXERIL) 5 MG tablet TAKE 1 OR 2 TABLETS BY MOUTH AT BEDTIME AS NEEDED 45 tablet 0    losartan (COZAAR) 50 MG tablet TAKE 1 TABLET BY MOUTH ONE TIME A DAY  90 tablet 1    atenolol (TENORMIN) 50 MG tablet TAKE 2 TABLETS BY MOUTH ONE TIME A DAY  180 tablet 1    hydroxychloroquine (PLAQUENIL) 200 MG tablet Take 1 tablet by mouth daily 30 tablet 5    naltrexone (DEPADE) 50 MG tablet TAKE 1 TABLET BY MOUTH ONE TIME A DAY  30 tablet 2    QUEtiapine (SEROQUEL) 200 MG tablet TAKE 1 TABLET BY MOUTH AT BEDTIME 60 tablet 1    propranolol (INDERAL) 10 MG tablet TAKE 1 OR 2 TABLETS BY MOUTH TWO TIMES A DAY AS NEEDED FOR RESTLESSNESS 60 tablet 5    pravastatin (PRAVACHOL) 40 MG tablet TAKE 1 TABLET BY MOUTH AT BEDTIME 30 tablet 5    predniSONE (DELTASONE) 50 MG tablet Take 1 tablet by MULTIVITAMIN-MINERALS) tablet Take 1 tablet by mouth daily      Probiotic Product (PROBIOTIC & ACIDOPHILUS EX ST PO) Take by mouth daily       vitamin B-12 (CYANOCOBALAMIN) 1000 MCG tablet Take 1,000 mcg by mouth daily      aspirin 81 MG EC tablet Take 1 tablet by mouth 2 times daily (Patient taking differently: Take 81 mg by mouth daily ) 84 tablet 0     No current facility-administered medications for this visit. ALLERGIES:  Allergies as of 01/12/2018 - Review Complete 01/12/2018   Allergen Reaction Noted    Morphine Nausea And Vomiting 02/24/2017    Amlodipine Other (See Comments) 02/24/2017    Dilaudid [hydromorphone hcl] Hives and Itching 02/24/2017    Sulfa antibiotics Hives 02/24/2017         Blood pressure 114/72, pulse 78, resp. rate 20, height 5' 8\" (1.727 m), weight 175 lb (79.4 kg), not currently breastfeeding. (Limited for Cystocele Evaluation)    Abdomen: Soft non-tender; good bowel sounds. No guarding, rebound or rigidity. No CVA tenderness bilaterally. Extremities: No calf tenderness, DTR 2/4, and No edema bilaterally    Pelvic: Requested   External genitalia: hair loss and fat pad loss  Urinary system: urethral meatus normal and Low with hyper-mobility  Vaginal: atrophic mucosa and cystocele present, 2-3. No other defects. Good Length no dome masses.  Appropriate Caliber  Cervix: absent  Adnexa: normal bimanual exam and non palpable  Uterus: absent    Diagnostics:  NA      Lab Results:  Results for orders placed or performed during the hospital encounter of 63/16/56   Basic Metabolic Panel   Result Value Ref Range    Glucose 78 70 - 99 mg/dL    BUN 29 (H) 6 - 20 mg/dL    CREATININE 1.26 (H) 0.50 - 0.90 mg/dL    Bun/Cre Ratio NOT REPORTED 9 - 20    Calcium 8.9 8.6 - 10.4 mg/dL    Sodium 135 135 - 144 mmol/L    Potassium 5.2 3.7 - 5.3 mmol/L    Chloride 99 98 - 107 mmol/L    CO2 22 20 - 31 mmol/L    Anion Gap 14 9 - 17 mmol/L    GFR Non-African American 45 (L) >60 mL/min    GFR  54 (L) >60 mL/min    GFR Comment          GFR Staging NOT REPORTED    CBC Auto Differential   Result Value Ref Range    WBC 6.3 3.5 - 11.3 k/uL    RBC 3.79 (L) 3.95 - 5.11 m/uL    Hemoglobin 11.3 (L) 11.9 - 15.1 g/dL    Hematocrit 34.3 (L) 36.3 - 47.1 %    MCV 90.5 82.6 - 102.9 fL    MCH 29.8 25.2 - 33.5 pg    MCHC 32.9 28.4 - 34.8 g/dL    RDW 12.2 11.8 - 14.4 %    Platelets 291 098 - 546 k/uL    MPV 10.7 8.1 - 13.5 fL    Differential Type NOT REPORTED     Seg Neutrophils 64 36 - 65 %    Lymphocytes 24 24 - 43 %    Monocytes 8 3 - 12 %    Eosinophils % 3 1 - 4 %    Basophils 1 0 - 2 %    Immature Granulocytes 0 0 %    Segs Absolute 3.99 1.50 - 8.10 k/uL    Absolute Lymph # 1.53 1.10 - 3.70 k/uL    Absolute Mono # 0.51 0.10 - 1.20 k/uL    Absolute Eos # 0.20 0.00 - 0.44 k/uL    Basophils # 0.08 0.00 - 0.20 k/uL    Absolute Immature Granulocyte <0.03 0.00 - 0.30 k/uL    WBC Morphology NOT REPORTED     RBC Morphology NOT REPORTED     Platelet Estimate NOT REPORTED    Phosphorus   Result Value Ref Range    Phosphorus 4.3 2.6 - 4.5 mg/dL   PTH, INTACT WITH IONIZED CALCIUM   Result Value Ref Range    Pth Intact 160.1 (H) 15.0 - 65.0 pg/mL    Calcium, Ion TOTAL CA PERFORMED 1.13 - 1.33 mmol/L   Vitamin D 25 Hydroxy   Result Value Ref Range    Vit D, 25-Hydroxy 30.0 30.0 - 100.0 ng/mL   Thyroid Peroxidase Antibody   Result Value Ref Range    Thyroid Peroxidase (Tpo) Ab <10.0 0.0 - 35.0 IU/mL   Calcium   Result Value Ref Range    Calcium 8.9 8.6 - 10.4 mg/dL   T3, Free   Result Value Ref Range    T3, Free 1.98 (L) 2.02 - 4.43 pg/mL   T4, Free   Result Value Ref Range    Thyroxine, Free 0.75 (L) 0.93 - 1.70 ng/dL   Hemoglobin A1C   Result Value Ref Range    Hemoglobin A1C 5.1 4.0 - 6.0 %    Estimated Avg Glucose 100 mg/dL   PTH, INTACT WITH IONIZED CALCIUM   Result Value Ref Range    Pth Intact 169.7 (H) 15.0 - 65.0 pg/mL    Calcium, Ion 1.25 1.13 - 1.33 mmol/L   Potassium   Result Value Ref Range

## 2018-01-15 ENCOUNTER — TELEPHONE (OUTPATIENT)
Dept: FAMILY MEDICINE CLINIC | Age: 53
End: 2018-01-15

## 2018-01-15 ENCOUNTER — HOSPITAL ENCOUNTER (INPATIENT)
Age: 53
LOS: 2 days | Discharge: HOME HEALTH | DRG: 469 | End: 2018-01-17
Attending: EMERGENCY MEDICINE | Admitting: INTERNAL MEDICINE
Payer: MEDICAID

## 2018-01-15 ENCOUNTER — APPOINTMENT (OUTPATIENT)
Dept: GENERAL RADIOLOGY | Age: 53
DRG: 469 | End: 2018-01-15
Payer: MEDICAID

## 2018-01-15 ENCOUNTER — OFFICE VISIT (OUTPATIENT)
Dept: UROLOGY | Age: 53
End: 2018-01-15
Payer: MEDICAID

## 2018-01-15 VITALS
BODY MASS INDEX: 27.13 KG/M2 | TEMPERATURE: 97.7 F | SYSTOLIC BLOOD PRESSURE: 74 MMHG | WEIGHT: 179 LBS | DIASTOLIC BLOOD PRESSURE: 42 MMHG | HEART RATE: 44 BPM | HEIGHT: 68 IN

## 2018-01-15 DIAGNOSIS — N17.9 ACUTE RENAL FAILURE, UNSPECIFIED ACUTE RENAL FAILURE TYPE (HCC): ICD-10-CM

## 2018-01-15 DIAGNOSIS — R00.1 BRADYCARDIA: ICD-10-CM

## 2018-01-15 DIAGNOSIS — R35.1 NOCTURIA: ICD-10-CM

## 2018-01-15 DIAGNOSIS — E86.0 DEHYDRATION: ICD-10-CM

## 2018-01-15 DIAGNOSIS — N39.3 STRESS INCONTINENCE: Primary | ICD-10-CM

## 2018-01-15 DIAGNOSIS — R39.15 URGENCY OF URINATION: ICD-10-CM

## 2018-01-15 DIAGNOSIS — N81.10 FEMALE CYSTOCELE: ICD-10-CM

## 2018-01-15 DIAGNOSIS — R35.0 FREQUENCY OF URINATION: ICD-10-CM

## 2018-01-15 DIAGNOSIS — I48.91 ATRIAL FIBRILLATION WITH SLOW VENTRICULAR RESPONSE (HCC): Primary | ICD-10-CM

## 2018-01-15 DIAGNOSIS — N94.10 DYSPAREUNIA, FEMALE: ICD-10-CM

## 2018-01-15 PROBLEM — Z86.73 H/O: STROKE: Status: ACTIVE | Noted: 2018-01-15

## 2018-01-15 PROBLEM — R57.1 HYPOVOLEMIC SHOCK (HCC): Status: ACTIVE | Noted: 2018-01-15

## 2018-01-15 LAB
-: ABNORMAL
ABSOLUTE EOS #: 0.3 K/UL (ref 0–0.4)
ABSOLUTE IMMATURE GRANULOCYTE: ABNORMAL K/UL (ref 0–0.3)
ABSOLUTE LYMPH #: 2.2 K/UL (ref 1–4.8)
ABSOLUTE MONO #: 0.7 K/UL (ref 0.1–1.3)
ALBUMIN SERPL-MCNC: 4.3 G/DL (ref 3.5–5.2)
ALBUMIN/GLOBULIN RATIO: ABNORMAL (ref 1–2.5)
ALP BLD-CCNC: 131 U/L (ref 35–104)
ALT SERPL-CCNC: 22 U/L (ref 5–33)
AMORPHOUS: ABNORMAL
ANION GAP SERPL CALCULATED.3IONS-SCNC: 17 MMOL/L (ref 9–17)
AST SERPL-CCNC: 18 U/L
BACTERIA: ABNORMAL
BASOPHILS # BLD: 1 % (ref 0–2)
BASOPHILS ABSOLUTE: 0.1 K/UL (ref 0–0.2)
BILIRUB SERPL-MCNC: 0.2 MG/DL (ref 0.3–1.2)
BILIRUBIN URINE: NEGATIVE
BUN BLDV-MCNC: 20 MG/DL (ref 6–20)
BUN/CREAT BLD: ABNORMAL (ref 9–20)
CALCIUM IONIZED: 1.31 MMOL/L (ref 1.13–1.33)
CALCIUM SERPL-MCNC: 9.4 MG/DL (ref 8.6–10.4)
CASTS UA: ABNORMAL /LPF
CHLORIDE BLD-SCNC: 101 MMOL/L (ref 98–107)
CO2: 22 MMOL/L (ref 20–31)
COLOR: YELLOW
COMMENT UA: ABNORMAL
CREAT SERPL-MCNC: 1.97 MG/DL (ref 0.5–0.9)
CRYSTALS, UA: ABNORMAL /HPF
DIFFERENTIAL TYPE: ABNORMAL
EOSINOPHILS RELATIVE PERCENT: 3 % (ref 0–4)
EPITHELIAL CELLS UA: ABNORMAL /HPF
GFR AFRICAN AMERICAN: 32 ML/MIN
GFR NON-AFRICAN AMERICAN: 27 ML/MIN
GFR SERPL CREATININE-BSD FRML MDRD: ABNORMAL ML/MIN/{1.73_M2}
GFR SERPL CREATININE-BSD FRML MDRD: ABNORMAL ML/MIN/{1.73_M2}
GLUCOSE BLD-MCNC: 116 MG/DL (ref 65–105)
GLUCOSE BLD-MCNC: 125 MG/DL (ref 70–99)
GLUCOSE BLD-MCNC: 79 MG/DL (ref 65–105)
GLUCOSE URINE: NEGATIVE
HCT VFR BLD CALC: 37.1 % (ref 36–46)
HEMOGLOBIN: 12.1 G/DL (ref 12–16)
IMMATURE GRANULOCYTES: ABNORMAL %
INR BLD: 0.9
KETONES, URINE: NEGATIVE
LACTIC ACID: 1.9 MMOL/L (ref 0.5–2.2)
LACTIC ACID: 3.9 MMOL/L (ref 0.5–2.2)
LEUKOCYTE ESTERASE, URINE: ABNORMAL
LYMPHOCYTES # BLD: 25 % (ref 24–44)
MAGNESIUM: 2.2 MG/DL (ref 1.6–2.6)
MCH RBC QN AUTO: 29.7 PG (ref 26–34)
MCHC RBC AUTO-ENTMCNC: 32.6 G/DL (ref 31–37)
MCV RBC AUTO: 91.1 FL (ref 80–100)
MONOCYTES # BLD: 9 % (ref 1–7)
MUCUS: ABNORMAL
NITRITE, URINE: POSITIVE
NRBC AUTOMATED: ABNORMAL PER 100 WBC
OTHER OBSERVATIONS UA: ABNORMAL
PDW BLD-RTO: 13.5 % (ref 11.5–14.9)
PH UA: 6.5 (ref 5–8)
PHOSPHORUS: 3.9 MG/DL (ref 2.6–4.5)
PLATELET # BLD: 245 K/UL (ref 150–450)
PLATELET ESTIMATE: ABNORMAL
PMV BLD AUTO: 9.3 FL (ref 6–12)
POTASSIUM SERPL-SCNC: 4.9 MMOL/L (ref 3.7–5.3)
PROTEIN UA: NEGATIVE
PROTHROMBIN TIME: 10 SEC (ref 9.7–12)
RBC # BLD: 4.07 M/UL (ref 4–5.2)
RBC # BLD: ABNORMAL 10*6/UL
RBC UA: ABNORMAL /HPF
RENAL EPITHELIAL, UA: ABNORMAL /HPF
SEG NEUTROPHILS: 62 % (ref 36–66)
SEGMENTED NEUTROPHILS ABSOLUTE COUNT: 5.6 K/UL (ref 1.3–9.1)
SODIUM BLD-SCNC: 140 MMOL/L (ref 135–144)
SPECIFIC GRAVITY UA: 1.01 (ref 1–1.03)
TOTAL PROTEIN: 7.4 G/DL (ref 6.4–8.3)
TRICHOMONAS: ABNORMAL
TROPONIN INTERP: NORMAL
TROPONIN INTERP: NORMAL
TROPONIN T: <0.03 NG/ML
TROPONIN T: <0.03 NG/ML
TSH SERPL DL<=0.05 MIU/L-ACNC: 3.06 MIU/L (ref 0.3–5)
TURBIDITY: ABNORMAL
URINE HGB: NEGATIVE
UROBILINOGEN, URINE: NORMAL
WBC # BLD: 8.8 K/UL (ref 3.5–11)
WBC # BLD: ABNORMAL 10*3/UL
WBC UA: ABNORMAL /HPF
YEAST: ABNORMAL

## 2018-01-15 PROCEDURE — 94762 N-INVAS EAR/PLS OXIMTRY CONT: CPT

## 2018-01-15 PROCEDURE — 87040 BLOOD CULTURE FOR BACTERIA: CPT

## 2018-01-15 PROCEDURE — 81001 URINALYSIS AUTO W/SCOPE: CPT

## 2018-01-15 PROCEDURE — 84484 ASSAY OF TROPONIN QUANT: CPT

## 2018-01-15 PROCEDURE — 99285 EMERGENCY DEPT VISIT HI MDM: CPT

## 2018-01-15 PROCEDURE — 2580000003 HC RX 258: Performed by: INTERNAL MEDICINE

## 2018-01-15 PROCEDURE — 71045 X-RAY EXAM CHEST 1 VIEW: CPT

## 2018-01-15 PROCEDURE — 87186 SC STD MICRODIL/AGAR DIL: CPT

## 2018-01-15 PROCEDURE — 6360000002 HC RX W HCPCS: Performed by: EMERGENCY MEDICINE

## 2018-01-15 PROCEDURE — 82947 ASSAY GLUCOSE BLOOD QUANT: CPT

## 2018-01-15 PROCEDURE — 83605 ASSAY OF LACTIC ACID: CPT

## 2018-01-15 PROCEDURE — 83735 ASSAY OF MAGNESIUM: CPT

## 2018-01-15 PROCEDURE — G8484 FLU IMMUNIZE NO ADMIN: HCPCS | Performed by: UROLOGY

## 2018-01-15 PROCEDURE — G8427 DOCREV CUR MEDS BY ELIG CLIN: HCPCS | Performed by: UROLOGY

## 2018-01-15 PROCEDURE — 84100 ASSAY OF PHOSPHORUS: CPT

## 2018-01-15 PROCEDURE — 2500000003 HC RX 250 WO HCPCS: Performed by: EMERGENCY MEDICINE

## 2018-01-15 PROCEDURE — 93005 ELECTROCARDIOGRAM TRACING: CPT

## 2018-01-15 PROCEDURE — 87505 NFCT AGENT DETECTION GI: CPT

## 2018-01-15 PROCEDURE — 87086 URINE CULTURE/COLONY COUNT: CPT

## 2018-01-15 PROCEDURE — G8599 NO ASA/ANTIPLAT THER USE RNG: HCPCS | Performed by: UROLOGY

## 2018-01-15 PROCEDURE — 6370000000 HC RX 637 (ALT 250 FOR IP): Performed by: FAMILY MEDICINE

## 2018-01-15 PROCEDURE — 99214 OFFICE O/P EST MOD 30 MIN: CPT | Performed by: UROLOGY

## 2018-01-15 PROCEDURE — 3014F SCREEN MAMMO DOC REV: CPT | Performed by: UROLOGY

## 2018-01-15 PROCEDURE — 96375 TX/PRO/DX INJ NEW DRUG ADDON: CPT

## 2018-01-15 PROCEDURE — 2580000003 HC RX 258: Performed by: EMERGENCY MEDICINE

## 2018-01-15 PROCEDURE — 36415 COLL VENOUS BLD VENIPUNCTURE: CPT

## 2018-01-15 PROCEDURE — 80053 COMPREHEN METABOLIC PANEL: CPT

## 2018-01-15 PROCEDURE — 99291 CRITICAL CARE FIRST HOUR: CPT | Performed by: INTERNAL MEDICINE

## 2018-01-15 PROCEDURE — 87493 C DIFF AMPLIFIED PROBE: CPT

## 2018-01-15 PROCEDURE — 84443 ASSAY THYROID STIM HORMONE: CPT

## 2018-01-15 PROCEDURE — 2000000000 HC ICU R&B

## 2018-01-15 PROCEDURE — G8417 CALC BMI ABV UP PARAM F/U: HCPCS | Performed by: UROLOGY

## 2018-01-15 PROCEDURE — 2580000003 HC RX 258: Performed by: FAMILY MEDICINE

## 2018-01-15 PROCEDURE — 3017F COLORECTAL CA SCREEN DOC REV: CPT | Performed by: UROLOGY

## 2018-01-15 PROCEDURE — 94664 DEMO&/EVAL PT USE INHALER: CPT

## 2018-01-15 PROCEDURE — 96374 THER/PROPH/DIAG INJ IV PUSH: CPT

## 2018-01-15 PROCEDURE — 87088 URINE BACTERIA CULTURE: CPT

## 2018-01-15 PROCEDURE — 1036F TOBACCO NON-USER: CPT | Performed by: UROLOGY

## 2018-01-15 PROCEDURE — 85025 COMPLETE CBC W/AUTO DIFF WBC: CPT

## 2018-01-15 PROCEDURE — 6360000002 HC RX W HCPCS: Performed by: RADIOLOGY

## 2018-01-15 PROCEDURE — 85610 PROTHROMBIN TIME: CPT

## 2018-01-15 PROCEDURE — 6370000000 HC RX 637 (ALT 250 FOR IP): Performed by: RADIOLOGY

## 2018-01-15 PROCEDURE — 2580000003 HC RX 258: Performed by: RADIOLOGY

## 2018-01-15 PROCEDURE — 82330 ASSAY OF CALCIUM: CPT

## 2018-01-15 RX ORDER — SODIUM CHLORIDE 0.9 % (FLUSH) 0.9 %
10 SYRINGE (ML) INJECTION EVERY 12 HOURS SCHEDULED
Status: DISCONTINUED | OUTPATIENT
Start: 2018-01-15 | End: 2018-01-17 | Stop reason: HOSPADM

## 2018-01-15 RX ORDER — DEXTROSE MONOHYDRATE 50 MG/ML
100 INJECTION, SOLUTION INTRAVENOUS PRN
Status: DISCONTINUED | OUTPATIENT
Start: 2018-01-15 | End: 2018-01-17 | Stop reason: HOSPADM

## 2018-01-15 RX ORDER — NICOTINE POLACRILEX 4 MG
15 LOZENGE BUCCAL PRN
Status: DISCONTINUED | OUTPATIENT
Start: 2018-01-15 | End: 2018-01-17 | Stop reason: HOSPADM

## 2018-01-15 RX ORDER — DEXTROSE MONOHYDRATE 25 G/50ML
12.5 INJECTION, SOLUTION INTRAVENOUS PRN
Status: DISCONTINUED | OUTPATIENT
Start: 2018-01-15 | End: 2018-01-17 | Stop reason: HOSPADM

## 2018-01-15 RX ORDER — POTASSIUM CHLORIDE 7.45 MG/ML
10 INJECTION INTRAVENOUS PRN
Status: DISCONTINUED | OUTPATIENT
Start: 2018-01-15 | End: 2018-01-17 | Stop reason: HOSPADM

## 2018-01-15 RX ORDER — ACETAMINOPHEN 325 MG/1
650 TABLET ORAL EVERY 4 HOURS PRN
Status: DISCONTINUED | OUTPATIENT
Start: 2018-01-15 | End: 2018-01-15 | Stop reason: SDUPTHER

## 2018-01-15 RX ORDER — ACETAMINOPHEN 325 MG/1
650 TABLET ORAL EVERY 4 HOURS PRN
Status: DISCONTINUED | OUTPATIENT
Start: 2018-01-15 | End: 2018-01-17 | Stop reason: HOSPADM

## 2018-01-15 RX ORDER — LANOLIN ALCOHOL/MO/W.PET/CERES
1000 CREAM (GRAM) TOPICAL DAILY
Status: DISCONTINUED | OUTPATIENT
Start: 2018-01-15 | End: 2018-01-17 | Stop reason: HOSPADM

## 2018-01-15 RX ORDER — SODIUM CHLORIDE 0.9 % (FLUSH) 0.9 %
10 SYRINGE (ML) INJECTION PRN
Status: DISCONTINUED | OUTPATIENT
Start: 2018-01-15 | End: 2018-01-17 | Stop reason: HOSPADM

## 2018-01-15 RX ORDER — 0.9 % SODIUM CHLORIDE 0.9 %
1000 INTRAVENOUS SOLUTION INTRAVENOUS ONCE
Status: COMPLETED | OUTPATIENT
Start: 2018-01-15 | End: 2018-01-15

## 2018-01-15 RX ORDER — HEPARIN SODIUM 5000 [USP'U]/ML
5000 INJECTION, SOLUTION INTRAVENOUS; SUBCUTANEOUS EVERY 8 HOURS SCHEDULED
Status: DISCONTINUED | OUTPATIENT
Start: 2018-01-15 | End: 2018-01-16

## 2018-01-15 RX ORDER — SODIUM CHLORIDE 9 MG/ML
INJECTION, SOLUTION INTRAVENOUS CONTINUOUS
Status: DISCONTINUED | OUTPATIENT
Start: 2018-01-15 | End: 2018-01-15

## 2018-01-15 RX ORDER — FAMOTIDINE 20 MG/1
40 TABLET, FILM COATED ORAL DAILY
Status: DISCONTINUED | OUTPATIENT
Start: 2018-01-15 | End: 2018-01-17 | Stop reason: HOSPADM

## 2018-01-15 RX ORDER — ALBUTEROL SULFATE 90 UG/1
2 AEROSOL, METERED RESPIRATORY (INHALATION) EVERY 6 HOURS PRN
Status: DISCONTINUED | OUTPATIENT
Start: 2018-01-15 | End: 2018-01-17 | Stop reason: HOSPADM

## 2018-01-15 RX ORDER — CALCIUM CHLORIDE 100 MG/ML
1 INJECTION INTRAVENOUS; INTRAVENTRICULAR ONCE
Status: COMPLETED | OUTPATIENT
Start: 2018-01-15 | End: 2018-01-15

## 2018-01-15 RX ORDER — ONDANSETRON 2 MG/ML
4 INJECTION INTRAMUSCULAR; INTRAVENOUS EVERY 6 HOURS PRN
Status: DISCONTINUED | OUTPATIENT
Start: 2018-01-15 | End: 2018-01-17 | Stop reason: HOSPADM

## 2018-01-15 RX ORDER — POTASSIUM CHLORIDE 20MEQ/15ML
40 LIQUID (ML) ORAL PRN
Status: DISCONTINUED | OUTPATIENT
Start: 2018-01-15 | End: 2018-01-17 | Stop reason: HOSPADM

## 2018-01-15 RX ORDER — SODIUM CHLORIDE 9 MG/ML
INJECTION, SOLUTION INTRAVENOUS CONTINUOUS
Status: DISCONTINUED | OUTPATIENT
Start: 2018-01-15 | End: 2018-01-17

## 2018-01-15 RX ORDER — POTASSIUM CHLORIDE 20 MEQ/1
40 TABLET, EXTENDED RELEASE ORAL PRN
Status: DISCONTINUED | OUTPATIENT
Start: 2018-01-15 | End: 2018-01-17 | Stop reason: HOSPADM

## 2018-01-15 RX ORDER — PRAVASTATIN SODIUM 40 MG
40 TABLET ORAL NIGHTLY
Status: DISCONTINUED | OUTPATIENT
Start: 2018-01-15 | End: 2018-01-17 | Stop reason: HOSPADM

## 2018-01-15 RX ORDER — CALCIUM GLUCONATE 94 MG/ML
1 INJECTION, SOLUTION INTRAVENOUS ONCE
Status: COMPLETED | OUTPATIENT
Start: 2018-01-15 | End: 2018-01-15

## 2018-01-15 RX ORDER — LAMOTRIGINE 25 MG/1
25 TABLET ORAL 2 TIMES DAILY
Status: DISCONTINUED | OUTPATIENT
Start: 2018-01-15 | End: 2018-01-17 | Stop reason: HOSPADM

## 2018-01-15 RX ORDER — VENLAFAXINE 75 MG/1
75 TABLET ORAL 2 TIMES DAILY WITH MEALS
Status: DISCONTINUED | OUTPATIENT
Start: 2018-01-15 | End: 2018-01-17 | Stop reason: HOSPADM

## 2018-01-15 RX ORDER — ASPIRIN 81 MG/1
81 TABLET ORAL DAILY
Status: DISCONTINUED | OUTPATIENT
Start: 2018-01-15 | End: 2018-01-17 | Stop reason: HOSPADM

## 2018-01-15 RX ORDER — QUETIAPINE FUMARATE 200 MG/1
200 TABLET, FILM COATED ORAL NIGHTLY
Status: DISCONTINUED | OUTPATIENT
Start: 2018-01-15 | End: 2018-01-17 | Stop reason: HOSPADM

## 2018-01-15 RX ORDER — ATROPINE SULFATE 0.1 MG/ML
0.5 INJECTION INTRAVENOUS ONCE
Status: COMPLETED | OUTPATIENT
Start: 2018-01-15 | End: 2018-01-15

## 2018-01-15 RX ORDER — HYDROXYCHLOROQUINE SULFATE 200 MG/1
200 TABLET, FILM COATED ORAL DAILY
Status: DISCONTINUED | OUTPATIENT
Start: 2018-01-15 | End: 2018-01-15

## 2018-01-15 RX ORDER — CALCIUM GLUCONATE 94 MG/ML
2 INJECTION, SOLUTION INTRAVENOUS ONCE
Status: DISCONTINUED | OUTPATIENT
Start: 2018-01-15 | End: 2018-01-15 | Stop reason: CLARIF

## 2018-01-15 RX ADMIN — QUETIAPINE FUMARATE 200 MG: 200 TABLET, FILM COATED ORAL at 23:09

## 2018-01-15 RX ADMIN — CYANOCOBALAMIN TAB 1000 MCG 1000 MCG: 1000 TAB at 15:40

## 2018-01-15 RX ADMIN — CALCIUM GLUCONATE 1 G: 94 INJECTION, SOLUTION INTRAVENOUS at 13:37

## 2018-01-15 RX ADMIN — HEPARIN SODIUM 5000 UNITS: 5000 INJECTION, SOLUTION INTRAVENOUS; SUBCUTANEOUS at 15:41

## 2018-01-15 RX ADMIN — ASPIRIN 81 MG: 81 TABLET, COATED ORAL at 15:39

## 2018-01-15 RX ADMIN — SODIUM CHLORIDE 1000 ML: 9 INJECTION, SOLUTION INTRAVENOUS at 11:46

## 2018-01-15 RX ADMIN — SODIUM BICARBONATE 50 MEQ: 84 INJECTION, SOLUTION INTRAVENOUS at 11:49

## 2018-01-15 RX ADMIN — SODIUM CHLORIDE: 9 INJECTION, SOLUTION INTRAVENOUS at 15:41

## 2018-01-15 RX ADMIN — VENLAFAXINE 75 MG: 75 TABLET ORAL at 17:42

## 2018-01-15 RX ADMIN — SODIUM CHLORIDE 1000 ML: 9 INJECTION, SOLUTION INTRAVENOUS at 16:33

## 2018-01-15 RX ADMIN — Medication 10 ML: at 23:16

## 2018-01-15 RX ADMIN — HEPARIN SODIUM 5000 UNITS: 5000 INJECTION, SOLUTION INTRAVENOUS; SUBCUTANEOUS at 23:11

## 2018-01-15 RX ADMIN — SODIUM CHLORIDE 1000 ML: 9 INJECTION, SOLUTION INTRAVENOUS at 11:05

## 2018-01-15 RX ADMIN — WATER 1 G: 1 INJECTION INTRAMUSCULAR; INTRAVENOUS; SUBCUTANEOUS at 15:40

## 2018-01-15 RX ADMIN — FAMOTIDINE 40 MG: 20 TABLET, FILM COATED ORAL at 15:40

## 2018-01-15 RX ADMIN — ATROPINE SULFATE 0.5 MG: 0.1 INJECTION, SOLUTION INTRAVENOUS at 12:14

## 2018-01-15 RX ADMIN — GLUCAGON HYDROCHLORIDE 5 MG: KIT at 13:09

## 2018-01-15 RX ADMIN — SODIUM CHLORIDE 1000 ML: 9 INJECTION, SOLUTION INTRAVENOUS at 10:45

## 2018-01-15 RX ADMIN — PRAVASTATIN SODIUM 40 MG: 40 TABLET ORAL at 23:08

## 2018-01-15 RX ADMIN — CALCIUM CHLORIDE 1 G: 100 INJECTION, SOLUTION INTRAVENOUS at 11:40

## 2018-01-15 RX ADMIN — LAMOTRIGINE 25 MG: 25 TABLET ORAL at 23:07

## 2018-01-15 ASSESSMENT — ENCOUNTER SYMPTOMS
COUGH: 0
SHORTNESS OF BREATH: 1
VOMITING: 0
SHORTNESS OF BREATH: 0
ABDOMINAL PAIN: 0
VOMITING: 0
VISUAL CHANGE: 0
COLOR CHANGE: 0
SHORTNESS OF BREATH: 0
BACK PAIN: 1
EYE PAIN: 0
COUGH: 0
ABDOMINAL PAIN: 1
NAUSEA: 0
EYE REDNESS: 0
HEMATOCHEZIA: 0
SPUTUM PRODUCTION: 0
WHEEZING: 0
DIARRHEA: 1
NAUSEA: 0
DIARRHEA: 0
ABDOMINAL PAIN: 0

## 2018-01-15 NOTE — PROGRESS NOTES
Breath Sounds: clear/dim  RR[de-identified] 15  Pulse Sat: 96% RA  Home Meds: Alb MDI Q6 prn    · Bronchodilator assessment at level:  1  · [x]    Home Level  BRONCHODILATOR ASSESSMENT SCORE  Score 0 1 2 3 4 5   Breath Sounds   []  Patient Baseline [x]  No Wheeze good aeration []  Faint, scattered wheezing, good aeration []  Expiratory Wheezing and or moderately diminished []  Insp/Exp wheeze and/or very diminished []  Insp/Exp and/ or marked distress   Respiratory Rate   []  Patient Baseline [x]  Less than 20 []  Less than 20 []  20-25 []  Greater than 25 []  Greater than 25   Peak flow % of Pred or PB [x]  NA   []  Greater than 90%  []  81-90% []  71-80% []  Less than or equal to 70%  or unable to perform []  Unable due to Respiratory Distress   Dyspnea re []  Patient Baseline [x]  No SOB []  No SOB []  SOB on exertion []  SOB min activity []  At rest/acute   e FEV% Predicted       [x]  NA []  Above 69%  []  Unable []  Above 60-69%  []  Unable []  Above 50-59%  []  Unable []  Above 35-49%  []  Unable []  Less than 35%  []  Unable

## 2018-01-15 NOTE — TELEPHONE ENCOUNTER
Lmr, Needs medical clearance for procedure: Cystocele/and in order touse premarin cream.  Please, schedule an appt. Mohamud Bosch, DO/no date for surgery at this time.

## 2018-01-15 NOTE — H&P
[address] [facility]    HISTORY AND PHYSICAL EXAMINATION            Date:   1/15/2018  Patient name:  Héctor Mccormick  Date of admission:  1/15/2018 10:12 AM  MRN:   [id number]  Account:  [de-identified]  YOB: 1965  PCP:    Virgilio Solano CNP  Room:   2001/2001-01  Code Status:    Prior    Chief Complaint:     Chief Complaint   Patient presents with    Hypotension    Neck Pain     Bilat neck \"tightness\"       History Obtained From:     patient    History of Present Illness: The patient is a 46 y.o. female with PMH mild SLE/limited systemic sclerosis, HTN, HLD, DM, bipolar d/o who presents with Hypotension and Neck Pain (Bilat neck \"tightness\")  and she is admitted to the hospital for the management of hypovolemic shock. Pt woke up this morning with lightheadedness. The past couple of days she was having bilateral calf cramping and jaw tightness. The past 3 days, she c/o bloating and watery diarrhea 8x a day which was very foul smelling. She endorses her PO intake is normal and that she is compliant with her meds. She denies any recent medication changes, abx use, contact with medical facility or sick contacts. She states she has CAD but has not had any rhythm issues in the past.  Denies fevers/chills, NV, dysuria. Has h/o raynauds and vasospasms of the arteries below the knee. In the ED she was bradycardic 39, hypotensive, LA 3.9, and in CELINE Cr 1.97 (b/l 0.89 11/2017). She was given 3L bolus, atropine, and glucagon and blood pressure meera to 122/66 HR to 50s. Per pt her HR usually runs mid 46s.     Past Medical History:     Past Medical History:   Diagnosis Date    Acute kidney injury ([facility] [address]     Anemia     Angioedema     Antinuclear antibody (IQRA) titer greater than 1:80     Anxiety     Asthma     Ataxia     Degenerative disc disease, thoracic     Depression     Diabetes mellitus (HCC)     DJD (degenerative joint disease) Collection Time: 01/15/18 10:30 AM   Result Value Ref Range    Protime 10.0 9.7 - 12.0 sec    INR 0.9    Magnesium    Collection Time: 01/15/18 10:30 AM   Result Value Ref Range    Magnesium 2.2 1.6 - 2.6 mg/dL   TSH with Reflex    Collection Time: 01/15/18 10:30 AM   Result Value Ref Range    TSH 3.06 0.30 - 5.00 mIU/L   Troponin    Collection Time: 01/15/18 10:30 AM   Result Value Ref Range    Troponin T <0.03 <0.03 ng/mL    Troponin Interp         Lactic Acid    Collection Time: 01/15/18 10:30 AM   Result Value Ref Range    Lactic Acid 3.9 (H) 0.5 - 2.2 mmol/L   Troponin    Collection Time: 01/15/18  1:29 PM   Result Value Ref Range    Troponin T <0.03 <0.03 ng/mL    Troponin Interp         Lactic Acid    Collection Time: 01/15/18  1:29 PM   Result Value Ref Range    Lactic Acid 1.9 0.5 - 2.2 mmol/L       Imaging/Diagnostics:  Xr Chest Portable    Result Date: 1/15/2018  EXAMINATION: SINGLE VIEW OF THE CHEST 1/15/2018 11:10 am COMPARISON: April 26, 2017 HISTORY: ORDERING SYSTEM PROVIDED HISTORY: weakness Ordering Physician Provided Reason for Exam: low BP FINDINGS: The lungs are without acute focal process. There is no effusion or pneumothorax. Heart size appears upper limits normal.  The osseous structures are intact without acute process. No acute process.          Assessment :      Primary Problem  Atrial fibrillation with slow ventricular response [facility])    Active Hospital Problems    Diagnosis Date Noted    Atrial fibrillation with slow ventricular response ([facility] [I48.91] 01/15/2018    Hypovolemic shock ([facility] [R57.1] 01/15/2018    H/O: stroke [Z86.73] 01/15/2018    Controlled type 2 diabetes mellitus without complication ([address] [M61.8] 05/02/2017    Hypertension [I10]     GERD (gastroesophageal reflux disease) [K21.9]     Depression [F32.9]     Fibromyalgia [M79.7]     CAD (coronary artery disease) [I25.10]     Bipolar disorder ([address] [F31.9]     Anxiety [F41.9]     Acute kidney injury ([address]

## 2018-01-15 NOTE — ED NOTES
Pt states onset of s/s's was this morning when she woke up. Pt c/o generalized weakness, lightheadedness, and bilat lower leg \"spasms\". Pt also c/o bilat neck \"tightness\" with exertion that improves with rest.  Pt states she did not take any of her home medications today. Pt was seen at her urologists office for a general checkup then the staff noticed the pt was pale, hypotensive, and bradycardic. Pt arrives A+O x 4, GCS = 15, PMS x 4 intact, and eupneic. Pt's skin is pale, cool, et dry. Pulse is bradycardic and irregular. Lung sounds clear t/o bilat. Pt's cap refill > 5 seconds. Pt is having appropriate conversation with this nurse.            Lazaro Shane RN  01/15/18 [id number]

## 2018-01-15 NOTE — ED PROVIDER NOTES
[address]  eMERGENCY dEPARTMENT eNCOUnter    Pt Name: Kenney Adams  MRN: [id number]  [place] 1965  Date of evaluation: 1/15/18  CHIEF COMPLAINT       Chief Complaint   Patient presents with    Hypotension    Neck Pain     Bilat neck \"tightness\"     HISTORY OF PRESENT ILLNESS     Fatigue   Severity:  Severe  Onset quality:  Gradual  Duration:  2 days  Timing:  Constant  Progression:  Worsening  Chronicity:  New  Relieved by:  Rest  Worsened by:  Standing  Ineffective treatments:  None tried  Associated symptoms: chest pain, dizziness and near-syncope    Associated symptoms: no abdominal pain, no aphasia, no cough, no diarrhea, no falls, no fever, no foul-smelling urine, no frequency, no hematochezia, no loss of consciousness, no melena, no myalgias, no nausea, no sensory-motor deficit, no shortness of breath, no stroke symptoms, no syncope, no urgency, no vision change and no vomiting    Associated symptoms comment:  Tightness in left upper chest and neck, decreased urine output      REVIEW OF SYSTEMS     Review of Systems   Constitutional: Positive for fatigue. Negative for fever. Respiratory: Negative for cough and shortness of breath. Cardiovascular: Positive for chest pain and near-syncope. Negative for syncope. Gastrointestinal: Negative for abdominal pain, diarrhea, hematochezia, melena, nausea and vomiting. Genitourinary: Negative for frequency and urgency. Musculoskeletal: Negative for falls and myalgias. Neurological: Positive for dizziness. Negative for loss of consciousness. All other systems reviewed and are negative.     PAST MEDICAL HISTORY     Past Medical History:   Diagnosis Date    Acute kidney injury ([facility] [address]     Anemia     Angioedema     Antinuclear antibody (IQRA) titer greater than 1:80     Anxiety     Asthma     Ataxia     Degenerative disc disease, thoracic     Depression     Diabetes mellitus (HCC)     DJD (degenerative joint disease)     Fibromyalgia D (ERGOCALCIFEROL) 29180 UNITS CAPS CAPSULE    50,000 Units once a week Indications: on Sunday      ALLERGIES     is allergic to morphine; amlodipine; dilaudid [hydromorphone hcl]; and sulfa antibiotics. FAMILY HISTORY     is adopted. SOCIAL HISTORY      reports that she has never smoked. She has never used smokeless tobacco. She reports that she drinks alcohol. She reports that she does not use drugs. PHYSICAL EXAM     INITIAL VITALS: /71   Pulse (!) 44   Temp 97.3 °F (36.3 °C) (Oral)   Resp 16   Ht 5' 8\" (1.727 m)   Wt 180 lb (81.6 kg)   SpO2 95%   BMI 27.37 kg/m²    Physical Exam   Constitutional: She is oriented to person, place, and time. She appears well-developed and well-nourished. No distress. HENT:   Head: Normocephalic and atraumatic. Nose: Nose normal.   Eyes: Conjunctivae and EOM are normal. Pupils are equal, round, and reactive to light. Right eye exhibits no discharge. Left eye exhibits no discharge. Neck: Normal range of motion. Neck supple. No tracheal deviation present. Cardiovascular: Normal heart sounds and intact distal pulses. Irregular bradycardia, radial pulses and femoral pulses 2+ BL   Pulmonary/Chest: Effort normal and breath sounds normal. No stridor. No respiratory distress. She has no wheezes. She has no rales. She exhibits no tenderness. Abdominal: Soft. Bowel sounds are normal. There is no tenderness. There is no rebound and no guarding. Musculoskeletal: Normal range of motion. She exhibits no edema or tenderness. Neurological: She is alert and oriented to person, place, and time. No cranial nerve deficit. Coordination normal.   Skin: Skin is warm and dry. No rash noted. She is not diaphoretic. No erythema. Psychiatric: She has a normal mood and affect. Her behavior is normal. Judgment normal.       MEDICAL DECISION MAKING:   [facility]  12:16 PM  Given IVF, cacl, sodium bicarb, responding to second liter. Ordered third liter.   Will give a dose of 0.5 mg atropine for her bradycardia. She is in ARF, suspect dehydration. We'll hold off on anticoagulation at this point for the atrial fibrillation because of her hypotension. 12:35 PM  Patient getting third liter of fluid. Responding well to IV fluids, blood pressure 104/71. Heart rate in the upper 40s low 50s. She had received the atropine 0.5 mg.  Mental status is normal, Marydel Coma Score 15. Holding off on a central line and art line at this time since she is responding to initial medical treatment. We'll give her a dose of glucagon as well for potential beta blocker toxicity, she is on atenolol daily. We'll also give 2 g of calcium. Continue IV fluids. 12:40 PM  She is admitted to the intensive care unit, she has 2 large-bore IVs.  He has a normal mental status, Lauro Coma Score 15. Discussed with Dr. Coral Power, accepts admission. Discussed with family practice residents. CRITICAL CARE:   Due to the immediate potential for life-threatening deterioration due to bradycardia and hypotension, I spent 45 minutes providing critical care. This time is excluding time spent performing procedures. PROCEDURES:    Procedures    DIAGNOSTIC RESULTS   EKG: All EKG's are interpreted by the Emergency Department Physician who either signs or Co-signs this chart in the absence of a cardiologist.  H referral list with slow ventricular rate, 43 bpm, QRS 86, QTC 4:15. Abnormal EKG. Compared to old EKG there is significant change. New-onset atrial fibrillation. RADIOLOGY:All plain film, CT, MRI, and formal ultrasound images (except ED bedside ultrasound) are read by the radiologist and interpretations are viewed by the emergency physician. XR CHEST PORTABLE   Final Result   No acute process. LABS: All lab results were reviewed by myself, and all abnormals are listed below.   Labs Reviewed   CBC WITH AUTO DIFFERENTIAL - Abnormal; Notable for the following:        Result Value    Monocytes 9 (*)

## 2018-01-16 PROBLEM — R57.1 HYPOVOLEMIC SHOCK (HCC): Status: RESOLVED | Noted: 2018-01-15 | Resolved: 2018-01-16

## 2018-01-16 PROBLEM — N30.00 ACUTE CYSTITIS WITHOUT HEMATURIA: Status: ACTIVE | Noted: 2018-01-16

## 2018-01-16 PROBLEM — E86.1 HYPOVOLEMIA: Status: ACTIVE | Noted: 2018-01-16

## 2018-01-16 LAB
ABSOLUTE EOS #: 0.07 K/UL (ref 0–0.4)
ABSOLUTE IMMATURE GRANULOCYTE: ABNORMAL K/UL (ref 0–0.3)
ABSOLUTE LYMPH #: 1.54 K/UL (ref 1–4.8)
ABSOLUTE MONO #: 0.25 K/UL (ref 0.1–1.3)
ANION GAP SERPL CALCULATED.3IONS-SCNC: 12 MMOL/L (ref 9–17)
ATYPICAL LYMPHOCYTE ABSOLUTE COUNT: 0.04 K/UL
ATYPICAL LYMPHOCYTES: 1 %
BASOPHILS # BLD: 0 % (ref 0–2)
BASOPHILS ABSOLUTE: 0 K/UL (ref 0–0.2)
BUN BLDV-MCNC: 12 MG/DL (ref 6–20)
BUN/CREAT BLD: ABNORMAL (ref 9–20)
C DIFFICILE TOXINS, PCR: NORMAL
CALCIUM SERPL-MCNC: 8.7 MG/DL (ref 8.6–10.4)
CAMPYLOBACTER PCR: NORMAL
CHLORIDE BLD-SCNC: 108 MMOL/L (ref 98–107)
CO2: 24 MMOL/L (ref 20–31)
CREAT SERPL-MCNC: 0.95 MG/DL (ref 0.5–0.9)
CULTURE: ABNORMAL
CULTURE: ABNORMAL
DIFFERENTIAL TYPE: ABNORMAL
EKG ATRIAL RATE: 57 BPM
EKG P AXIS: 27 DEGREES
EKG P-R INTERVAL: 250 MS
EKG Q-T INTERVAL: 436 MS
EKG QRS DURATION: 80 MS
EKG QTC CALCULATION (BAZETT): 424 MS
EKG R AXIS: 2 DEGREES
EKG T AXIS: 8 DEGREES
EKG VENTRICULAR RATE: 57 BPM
EOSINOPHILS RELATIVE PERCENT: 2 % (ref 0–4)
GFR AFRICAN AMERICAN: >60 ML/MIN
GFR NON-AFRICAN AMERICAN: >60 ML/MIN
GFR SERPL CREATININE-BSD FRML MDRD: ABNORMAL ML/MIN/{1.73_M2}
GFR SERPL CREATININE-BSD FRML MDRD: ABNORMAL ML/MIN/{1.73_M2}
GLUCOSE BLD-MCNC: 104 MG/DL (ref 65–105)
GLUCOSE BLD-MCNC: 109 MG/DL (ref 65–105)
GLUCOSE BLD-MCNC: 66 MG/DL (ref 65–105)
GLUCOSE BLD-MCNC: 69 MG/DL (ref 65–105)
GLUCOSE BLD-MCNC: 73 MG/DL (ref 70–99)
GLUCOSE BLD-MCNC: 76 MG/DL (ref 65–105)
GLUCOSE BLD-MCNC: 78 MG/DL (ref 65–105)
GLUCOSE BLD-MCNC: 79 MG/DL (ref 65–105)
HCT VFR BLD CALC: 29.1 % (ref 36–46)
HEMOGLOBIN: 9.8 G/DL (ref 12–16)
IMMATURE GRANULOCYTES: ABNORMAL %
LV EF: 55 %
LVEF MODALITY: NORMAL
LYMPHOCYTES # BLD: 44 % (ref 24–44)
Lab: ABNORMAL
MCH RBC QN AUTO: 29.9 PG (ref 26–34)
MCHC RBC AUTO-ENTMCNC: 33.6 G/DL (ref 31–37)
MCV RBC AUTO: 88.9 FL (ref 80–100)
MONOCYTES # BLD: 7 % (ref 1–7)
MORPHOLOGY: NORMAL
NRBC AUTOMATED: ABNORMAL PER 100 WBC
ORGANISM: ABNORMAL
PDW BLD-RTO: 13.2 % (ref 11.5–14.9)
PLATELET # BLD: 145 K/UL (ref 150–450)
PLATELET ESTIMATE: ABNORMAL
PMV BLD AUTO: 8.8 FL (ref 6–12)
POTASSIUM SERPL-SCNC: 4.2 MMOL/L (ref 3.7–5.3)
RBC # BLD: 3.27 M/UL (ref 4–5.2)
RBC # BLD: ABNORMAL 10*6/UL
SALMONELLA PCR: NORMAL
SEG NEUTROPHILS: 46 % (ref 36–66)
SEGMENTED NEUTROPHILS ABSOLUTE COUNT: 1.6 K/UL (ref 1.3–9.1)
SHIGATOXIN GENE PCR: NORMAL
SHIGELLA SP PCR: NORMAL
SODIUM BLD-SCNC: 144 MMOL/L (ref 135–144)
SPECIMEN DESCRIPTION: ABNORMAL
SPECIMEN DESCRIPTION: ABNORMAL
SPECIMEN DESCRIPTION: NORMAL
SPECIMEN: NORMAL
STATUS: ABNORMAL
WBC # BLD: 3.5 K/UL (ref 3.5–11)
WBC # BLD: ABNORMAL 10*3/UL

## 2018-01-16 PROCEDURE — 36415 COLL VENOUS BLD VENIPUNCTURE: CPT

## 2018-01-16 PROCEDURE — 2580000003 HC RX 258: Performed by: RADIOLOGY

## 2018-01-16 PROCEDURE — 6360000002 HC RX W HCPCS: Performed by: FAMILY MEDICINE

## 2018-01-16 PROCEDURE — 6370000000 HC RX 637 (ALT 250 FOR IP): Performed by: FAMILY MEDICINE

## 2018-01-16 PROCEDURE — 82947 ASSAY GLUCOSE BLOOD QUANT: CPT

## 2018-01-16 PROCEDURE — 2580000003 HC RX 258: Performed by: FAMILY MEDICINE

## 2018-01-16 PROCEDURE — 6360000002 HC RX W HCPCS: Performed by: RADIOLOGY

## 2018-01-16 PROCEDURE — 2060000000 HC ICU INTERMEDIATE R&B

## 2018-01-16 PROCEDURE — 93005 ELECTROCARDIOGRAM TRACING: CPT

## 2018-01-16 PROCEDURE — 99233 SBSQ HOSP IP/OBS HIGH 50: CPT | Performed by: INTERNAL MEDICINE

## 2018-01-16 PROCEDURE — 6370000000 HC RX 637 (ALT 250 FOR IP): Performed by: INTERNAL MEDICINE

## 2018-01-16 PROCEDURE — 86022 PLATELET ANTIBODIES: CPT

## 2018-01-16 PROCEDURE — 6370000000 HC RX 637 (ALT 250 FOR IP): Performed by: RADIOLOGY

## 2018-01-16 PROCEDURE — 80048 BASIC METABOLIC PNL TOTAL CA: CPT

## 2018-01-16 PROCEDURE — 85025 COMPLETE CBC W/AUTO DIFF WBC: CPT

## 2018-01-16 PROCEDURE — 93306 TTE W/DOPPLER COMPLETE: CPT

## 2018-01-16 RX ORDER — METRONIDAZOLE 500 MG/1
500 TABLET ORAL EVERY 8 HOURS SCHEDULED
Status: DISCONTINUED | OUTPATIENT
Start: 2018-01-16 | End: 2018-01-17 | Stop reason: HOSPADM

## 2018-01-16 RX ORDER — FONDAPARINUX SODIUM 2.5 MG/.5ML
2.5 INJECTION SUBCUTANEOUS DAILY
Status: DISCONTINUED | OUTPATIENT
Start: 2018-01-16 | End: 2018-01-17 | Stop reason: HOSPADM

## 2018-01-16 RX ADMIN — LAMOTRIGINE 25 MG: 25 TABLET ORAL at 08:42

## 2018-01-16 RX ADMIN — LAMOTRIGINE 25 MG: 25 TABLET ORAL at 22:05

## 2018-01-16 RX ADMIN — METRONIDAZOLE 500 MG: 500 TABLET ORAL at 22:04

## 2018-01-16 RX ADMIN — CYANOCOBALAMIN TAB 1000 MCG 1000 MCG: 1000 TAB at 08:42

## 2018-01-16 RX ADMIN — METRONIDAZOLE 500 MG: 500 TABLET ORAL at 15:51

## 2018-01-16 RX ADMIN — HEPARIN SODIUM 5000 UNITS: 5000 INJECTION, SOLUTION INTRAVENOUS; SUBCUTANEOUS at 06:46

## 2018-01-16 RX ADMIN — QUETIAPINE FUMARATE 200 MG: 200 TABLET, FILM COATED ORAL at 22:05

## 2018-01-16 RX ADMIN — ASPIRIN 81 MG: 81 TABLET, COATED ORAL at 08:42

## 2018-01-16 RX ADMIN — SODIUM CHLORIDE: 9 INJECTION, SOLUTION INTRAVENOUS at 17:43

## 2018-01-16 RX ADMIN — SODIUM CHLORIDE: 9 INJECTION, SOLUTION INTRAVENOUS at 10:42

## 2018-01-16 RX ADMIN — DEXTROSE 15 G: 15 GEL ORAL at 02:56

## 2018-01-16 RX ADMIN — Medication 10 ML: at 08:42

## 2018-01-16 RX ADMIN — FAMOTIDINE 40 MG: 20 TABLET, FILM COATED ORAL at 08:42

## 2018-01-16 RX ADMIN — PRAVASTATIN SODIUM 40 MG: 40 TABLET ORAL at 22:04

## 2018-01-16 RX ADMIN — VENLAFAXINE 75 MG: 75 TABLET ORAL at 16:56

## 2018-01-16 RX ADMIN — WATER 1 G: 1 INJECTION INTRAMUSCULAR; INTRAVENOUS; SUBCUTANEOUS at 15:51

## 2018-01-16 RX ADMIN — VENLAFAXINE 75 MG: 75 TABLET ORAL at 08:42

## 2018-01-16 RX ADMIN — ACETAMINOPHEN 650 MG: 325 TABLET, FILM COATED ORAL at 10:41

## 2018-01-16 RX ADMIN — FONDAPARINUX SODIUM 2.5 MG: 2.5 INJECTION, SOLUTION SUBCUTANEOUS at 15:52

## 2018-01-16 ASSESSMENT — PAIN SCALES - GENERAL
PAINLEVEL_OUTOF10: 7
PAINLEVEL_OUTOF10: 0
PAINLEVEL_OUTOF10: 4

## 2018-01-16 ASSESSMENT — ENCOUNTER SYMPTOMS
DIARRHEA: 1
DOUBLE VISION: 0
ABDOMINAL PAIN: 0
SORE THROAT: 0
COUGH: 0
SHORTNESS OF BREATH: 0
NAUSEA: 0
WHEEZING: 0
VOMITING: 0

## 2018-01-16 NOTE — PROGRESS NOTES
thoracic; Depression; Diabetes mellitus ([facility] [address] DJD (degenerative joint disease); Fibromyalgia; GERD (gastroesophageal reflux disease); H/O: hysterectomy; Headache; Hernia of abdominal wall; Hyperlipidemia; Hypertension; Mood disorder ([facility] [address] Neuropathy ([facility] [address] PTSD (post-traumatic stress disorder); Pulmonary nodules; Raynaud's disease; Scleroderma ([facility] [address] Seizures ([facility] [address] Sleep apnea; Sleep walking disorder; and Transient insomnia. Social History:   reports that she has never smoked. She has never used smokeless tobacco. She reports that she drinks alcohol. She reports that she does not use drugs. Family History:   Family History   Problem Relation Age of Onset    Adopted: Yes    Depression Mother     Asthma Mother     Depression Father     High Blood Pressure Father     Diabetes Father     Asthma Father     Depression Sister     Asthma Sister     Depression Brother     Asthma Brother     Asthma Maternal Aunt     Asthma Maternal Uncle     Asthma Paternal [de-identified]     Asthma Paternal Uncle     Asthma Maternal Grandmother     Cancer Maternal Grandmother     Asthma Maternal Grandfather     Asthma Paternal Grandmother     Asthma Paternal Grandfather     Asthma Maternal Cousin     Asthma Paternal Cousin        Vitals:  /70   Pulse 61   Temp 98.6 °F (37 °C) (Oral)   Resp 10   Ht 5' 8\" (1.727 m)   Wt 180 lb 5.4 oz (81.8 kg)   SpO2 99%   BMI 27.42 kg/m²   Temp (24hrs), Av.5 °F (36.4 °C), Min:95.4 °F (35.2 °C), Max:98.6 °F (37 °C)    Recent Labs      01/15/18   2236  18   0252  18   0316  18   0812   POCGLU  79  66  104  71       I/O (24Hr):     Intake/Output Summary (Last 24 hours) at 18 0841  Last data filed at 18 0615   Gross per 24 hour   Intake             3990 ml   Output             3800 ml   Net              190 ml       Labs:    Lab Results   Component Value Date/Time    SPECIAL  01/15/2018 02:34 PM     6CC TOTAL, 1CC RED, 5CC PURPLE, RT AC Performed

## 2018-01-16 NOTE — PLAN OF CARE
Problem: Falls - Risk of  Goal: Absence of falls  Outcome: Ongoing  Bed locked and in lowest position. Call light in reach. Side rails up x2. Non skid slips on. No falls this shift    Problem: Cardiac Output - Decreased:  Goal: Hemodynamic stability will improve  Hemodynamic stability will improve   Outcome: Ongoing  Patient monitored for and fluid volume imbalance. Daily weights taken, Vitals monitored Q4, strict I's and O's, and labs as needed.      Problem: Urinary Elimination:  Goal: Signs and symptoms of infection will decrease  Signs and symptoms of infection will decrease   Outcome: Ongoing  atx for UTI    Problem: Serum Glucose Level - Abnormal:  Goal: Ability to maintain appropriate glucose levels will improve to within specified parameters  Ability to maintain appropriate glucose levels will improve to within specified parameters   Outcome: Ongoing  BS checked AC and HS

## 2018-01-16 NOTE — PLAN OF CARE
Problem: Falls - Risk of  Goal: Absence of falls  Outcome: Met This Shift  Patient remained free from falls. Call light within reach. Problem: Cardiac Output - Decreased:  Goal: Hemodynamic stability will improve  Hemodynamic stability will improve   Outcome: Met This Shift  Heart rate and Blood pressure WNL. Problem: Urinary Elimination:  Goal: Signs and symptoms of infection will decrease  Signs and symptoms of infection will decrease   Outcome: Ongoing  Patient has UTI and is on Rocephin.

## 2018-01-17 VITALS
HEART RATE: 68 BPM | HEIGHT: 68 IN | OXYGEN SATURATION: 98 % | SYSTOLIC BLOOD PRESSURE: 156 MMHG | TEMPERATURE: 97.9 F | DIASTOLIC BLOOD PRESSURE: 85 MMHG | WEIGHT: 182.98 LBS | RESPIRATION RATE: 16 BRPM | BODY MASS INDEX: 27.73 KG/M2

## 2018-01-17 LAB
ABSOLUTE EOS #: 0.2 K/UL (ref 0–0.4)
ABSOLUTE IMMATURE GRANULOCYTE: ABNORMAL K/UL (ref 0–0.3)
ABSOLUTE LYMPH #: 1.1 K/UL (ref 1–4.8)
ABSOLUTE MONO #: 0.3 K/UL (ref 0.1–1.3)
ANION GAP SERPL CALCULATED.3IONS-SCNC: 13 MMOL/L (ref 9–17)
BASOPHILS # BLD: 2 % (ref 0–2)
BASOPHILS ABSOLUTE: 0.1 K/UL (ref 0–0.2)
BUN BLDV-MCNC: 8 MG/DL (ref 6–20)
BUN/CREAT BLD: NORMAL (ref 9–20)
CALCIUM SERPL-MCNC: 9 MG/DL (ref 8.6–10.4)
CHLORIDE BLD-SCNC: 103 MMOL/L (ref 98–107)
CO2: 25 MMOL/L (ref 20–31)
CREAT SERPL-MCNC: 0.85 MG/DL (ref 0.5–0.9)
CULTURE: ABNORMAL
CULTURE: ABNORMAL
DIFFERENTIAL TYPE: ABNORMAL
EKG ATRIAL RATE: 32 BPM
EKG Q-T INTERVAL: 492 MS
EKG QRS DURATION: 86 MS
EKG QTC CALCULATION (BAZETT): 415 MS
EKG R AXIS: 9 DEGREES
EKG T AXIS: 7 DEGREES
EKG VENTRICULAR RATE: 43 BPM
EOSINOPHILS RELATIVE PERCENT: 5 % (ref 0–4)
GFR AFRICAN AMERICAN: >60 ML/MIN
GFR NON-AFRICAN AMERICAN: >60 ML/MIN
GFR SERPL CREATININE-BSD FRML MDRD: NORMAL ML/MIN/{1.73_M2}
GFR SERPL CREATININE-BSD FRML MDRD: NORMAL ML/MIN/{1.73_M2}
GLUCOSE BLD-MCNC: 108 MG/DL (ref 65–105)
GLUCOSE BLD-MCNC: 66 MG/DL (ref 65–105)
GLUCOSE BLD-MCNC: 91 MG/DL (ref 70–99)
HCT VFR BLD CALC: 32 % (ref 36–46)
HEMOGLOBIN: 10.8 G/DL (ref 12–16)
HEPARIN INDUCED PLATELET ANTIBODY: 0.3 O.D. (ref 0–0.4)
IMMATURE GRANULOCYTES: ABNORMAL %
LYMPHOCYTES # BLD: 31 % (ref 24–44)
Lab: ABNORMAL
MCH RBC QN AUTO: 29.8 PG (ref 26–34)
MCHC RBC AUTO-ENTMCNC: 33.8 G/DL (ref 31–37)
MCV RBC AUTO: 88.2 FL (ref 80–100)
MONOCYTES # BLD: 9 % (ref 1–7)
NRBC AUTOMATED: ABNORMAL PER 100 WBC
ORGANISM: ABNORMAL
PDW BLD-RTO: 13.3 % (ref 11.5–14.9)
PLATELET # BLD: 166 K/UL (ref 150–450)
PLATELET ESTIMATE: ABNORMAL
PMV BLD AUTO: 8.9 FL (ref 6–12)
POTASSIUM SERPL-SCNC: 4.4 MMOL/L (ref 3.7–5.3)
RBC # BLD: 3.63 M/UL (ref 4–5.2)
RBC # BLD: ABNORMAL 10*6/UL
SEG NEUTROPHILS: 53 % (ref 36–66)
SEGMENTED NEUTROPHILS ABSOLUTE COUNT: 2 K/UL (ref 1.3–9.1)
SODIUM BLD-SCNC: 141 MMOL/L (ref 135–144)
SPECIMEN DESCRIPTION: ABNORMAL
SPECIMEN DESCRIPTION: ABNORMAL
STATUS: ABNORMAL
WBC # BLD: 3.7 K/UL (ref 3.5–11)
WBC # BLD: ABNORMAL 10*3/UL

## 2018-01-17 PROCEDURE — 80048 BASIC METABOLIC PNL TOTAL CA: CPT

## 2018-01-17 PROCEDURE — 2580000003 HC RX 258: Performed by: RADIOLOGY

## 2018-01-17 PROCEDURE — 6360000002 HC RX W HCPCS: Performed by: FAMILY MEDICINE

## 2018-01-17 PROCEDURE — 99239 HOSP IP/OBS DSCHRG MGMT >30: CPT | Performed by: INTERNAL MEDICINE

## 2018-01-17 PROCEDURE — 85025 COMPLETE CBC W/AUTO DIFF WBC: CPT

## 2018-01-17 PROCEDURE — 82947 ASSAY GLUCOSE BLOOD QUANT: CPT

## 2018-01-17 PROCEDURE — 6370000000 HC RX 637 (ALT 250 FOR IP): Performed by: RADIOLOGY

## 2018-01-17 PROCEDURE — 36415 COLL VENOUS BLD VENIPUNCTURE: CPT

## 2018-01-17 PROCEDURE — 6360000002 HC RX W HCPCS: Performed by: RADIOLOGY

## 2018-01-17 PROCEDURE — 6370000000 HC RX 637 (ALT 250 FOR IP): Performed by: FAMILY MEDICINE

## 2018-01-17 RX ORDER — CEFUROXIME AXETIL 250 MG/1
250 TABLET ORAL 2 TIMES DAILY
Qty: 10 TABLET | Refills: 0 | Status: SHIPPED | OUTPATIENT
Start: 2018-01-17 | End: 2018-01-22

## 2018-01-17 RX ADMIN — VENLAFAXINE 75 MG: 75 TABLET ORAL at 09:34

## 2018-01-17 RX ADMIN — LAMOTRIGINE 25 MG: 25 TABLET ORAL at 09:34

## 2018-01-17 RX ADMIN — WATER 1 G: 1 INJECTION INTRAMUSCULAR; INTRAVENOUS; SUBCUTANEOUS at 16:10

## 2018-01-17 RX ADMIN — FAMOTIDINE 40 MG: 20 TABLET, FILM COATED ORAL at 09:34

## 2018-01-17 RX ADMIN — METRONIDAZOLE 500 MG: 500 TABLET ORAL at 06:00

## 2018-01-17 RX ADMIN — ASPIRIN 81 MG: 81 TABLET, COATED ORAL at 09:34

## 2018-01-17 RX ADMIN — CYANOCOBALAMIN TAB 1000 MCG 1000 MCG: 1000 TAB at 09:34

## 2018-01-17 RX ADMIN — FONDAPARINUX SODIUM 2.5 MG: 2.5 INJECTION, SOLUTION SUBCUTANEOUS at 09:34

## 2018-01-17 RX ADMIN — METRONIDAZOLE 500 MG: 500 TABLET ORAL at 13:42

## 2018-01-17 ASSESSMENT — ENCOUNTER SYMPTOMS
SORE THROAT: 0
VOMITING: 0
WHEEZING: 0
ABDOMINAL PAIN: 0
NAUSEA: 0
DIARRHEA: 0
SHORTNESS OF BREATH: 0
COUGH: 0
DOUBLE VISION: 0

## 2018-01-17 NOTE — CARE COORDINATION
ONGOING DISCHARGE PLAN:    Spoke with patient regarding discharge plan and patient confirms that plan is still to discharge to home with vns  Per PCP Resident       Hypovolemic shock 2/2 viral gastroenteritis - resolved  - 2nd EKG did not show afib, 1st EKG equivocal afib  - no diarrhea today, more formed stool yesterday, able to tolerate PO  - BCx2 NGTD, cdiff and stool cx negative  - discharge today     UTI  - Small leuk est, positive nitrite  - UCx: ecoli [id number] senstive to rocephin  - Continue IV Rocephin: PO ceftin on DC     CELINE  - likely from hypovolemia  - Cr 0.85 at b/l (b/l 0.89 11/2017)     Bipolar disorder  - hold seroquel (can cause sleepiness), lamictal until pt stabilzes  - cont venlafaxine     Asthma  - albuterol PRN  - Resp eval and treat     DM2  - hold metformin  - low dose ISS  - poct glucose  - diabetic diet    Will continue to follow for additional discharge needs.     Electronically signed by Sary Casanova RN on 1/17/2018 at 12:18 PM

## 2018-01-17 NOTE — CARE COORDINATION
medications from [address] [place], [place] [address] [phone] - F [phone]   1 cefUROXime   Go     FCC57 Office Visit 9:15 AM   Carmela Valdes CNP   MINISTRY [facility]   [address] [address] [phone]    You have more future appointments. Please review your full appointment list.   Breanna Luna your After Visit Summary and more online at [email or web address]. After Visit Summary   Instructions        Need Help? After Visit Summary  (Discharge Instructions)     This summary was created for you.     Thank you for entrusting your care to us. The following information includes details about your hospital/visit stay along with steps you should take to help with your recovery once you leave the hospital. Follow-up with your Primary Care Provider when condition worsens. Seek emergency care when necessary. At your follow-up appointment, ask your physician about any tests or studies that were not available at discharge. In this packet, you will find information about the topics listed below:      Instructions about your medications including a list of your home medications   A summary of your hospital visit   Follow-up appointments once you have left the hospital   Your care plan at home        You may receive a survey regarding the care you received during your stay. Your input is valuable to us. We encourage you to complete and return your survey in the envelope provided. We hope you will choose us in the future for your healthcare needs.        Your medications have changed     START taking:    cefUROXime 250 MG tablet (CEFTIN)   STOP taking:    omeprazole 20 MG delayed release capsule (PRILOSEC)    vitamin D 1000 UNIT Tabs tablet (CHOLECALCIFEROL)   Review your updated medication list below.    Care Plan Once You Return Home   Do   Go         Activity instructions     Up as tolerated Nausea And Vomiting   Pt states it changes her mental status       Amlodipine Other (See Comments)   diarrhea and stress       Dilaudid (Hydromorphone Hcl) Hives  Itching       Sulfa Antibiotics Hives   Your Latest Vitals          Blood Pressure 156/85              BMI 27.82              Weight 182 lb 15.7 oz              Height 5' 8\"              Temperature (Oral) 97.9 °F              Pulse 68              Respiration 16              Oxygen Saturation 98%              BSA 2 m²               Daily Medication List (This medication list can be shared with any healthcare provider who is helping you manage your medications)     There are NEW medications for you. START taking them after you leave the hospital     There are NEW medications for you.  START taking them after you leave the hospital    Next Dose Due AM NOON PM NIGHT   cefUROXime 250 MG tablet   Commonly known as: CEFTIN   Take 1 tablet by mouth 2 times daily for 5 days                   You told us you were taking these medications at home,but the amount or how often you take this medication has CHANGED     You told us you were taking these medications at home,but the amount or how often you take this medication has CHANGED    Next Dose Due AM NOON PM NIGHT   aspirin 81 MG EC tablet   Take 1 tablet by mouth 2 times daily   What changed: when to take this                   These are medications you told us you were taking at home, CONTINUE taking them after you leave the hospital     These are medications you told us you were taking at home, CONTINUE taking them after you leave the hospital    Next Dose Due AM NOON PM NIGHT   albuterol sulfate  (90 Base) MCG/ACT inhaler   Inhale 2 puffs into the lungs every 6 hours as needed for Wheezing                   atenolol 50 MG tablet   Commonly known as: TENORMIN   TAKE 2 TABLETS BY MOUTH ONE TIME A DAY                   cetirizine 10 MG tablet   Commonly known as: ZYRTEC   TAKE 1 TABLET BY MOUTH ONE TIME A DAY                   chlorhexidine 0.12 % solution   Commonly known as: PERIDEX   RINSE WITH 1/2 OZ.  TWICE A DAY FOR 30 SECONDS, THEN SPIT                   cyclobenzaprine 5 MG tablet   Commonly known as: FLEXERIL   TAKE 1 OR 2 TABLETS BY MOUTH AT BEDTIME AS NEEDED                   docusate sodium 100 MG capsule   Commonly known as: COLACE   Take 1 capsule by mouth 2 times daily as needed for Constipation                   EPIPEN 2-JIGAR 0.3 MG/0.3ML Soaj injection   Generic drug: EPINEPHrine   INJECT 0.3 ML (0.3 MG) INTO THE MUSCLE ONCE AS NEEDED FOR UP TO 1 DOSE. for angioedema                   famotidine 40 MG tablet   Commonly known as: PEPCID   TAKE 1 TABLET BY MOUTH ONE TIME A DAY                   felodipine 10 MG extended release tablet   Commonly known as: PLENDIL   Take 1 tablet by mouth daily                   gabapentin 300 MG capsule   Commonly known as: NEURONTIN   TAKE 1 CAPSULE (300 MG) BY 2 in AM, 1 @ NOON, 2 @ DINNER                   glucose blood VI test strips strip   Commonly known as: ASCENSIA AUTODISC VI;ONE TOUCH ULTRA TEST VI   Use with associated glucose meter.                   hydroxychloroquine 200 MG tablet   Commonly known as: PLAQUENIL   Take 1 tablet by mouth daily                   ibuprofen 800 MG tablet   Commonly known as: ADVIL;MOTRIN   Take 1 tablet by mouth every 8 hours as needed for Pain                   lamoTRIgine 25 MG tablet   Commonly known as: LAMICTAL   Take 1 tablet by mouth 2 times daily                   lidocaine 5 % ointment   Commonly known as: XYLOCAINE   Apply topically as needed for Pain Apply topically as needed.                   losartan 50 MG tablet   Commonly known as: COZAAR   TAKE 1 TABLET BY MOUTH ONE TIME A DAY                   metFORMIN 500 MG tablet   Commonly known as: GLUCOPHAGE   Take 500 mg by mouth 2 times daily (with meals)                   naltrexone 50 MG tablet   Commonly known as: DEPADE   TAKE 1 TABLET BY MOUTH ONE TIME A DAY       ([phone]) Inpatient     The information on all pages of the After Visit Summary has been reviewed with me, the patient and/or responsible adult, by my health care provider(s). I had the opportunity to ask questions regarding this information. I understand I should dispose of my armband safely at home to protect my health information. A complete copy of the After Visit Summary has been given to me, the patient and/or responsible adult.     Patient Signature/Responsible Adult:____________________     Clinician Signature:_____________________     Date:_____________________     Time:_____________________        MyChart      Instructions     Continuity of Care Form     Patient Name: Lea Lozano   :  1965                       MRN:  [id number]     Admit date:  1/15/2018                        Discharge date:  18     Code Status Order: Full Code   Advance Directives:  No     Admitting Physician:  Haylie Lynch MD  PCP: Asim Davis CNP     Discharging Nurse: ELLIS DE LEON Unit/Room#:   Discharging Unit Phone Number: [phone]     Emergency Contact:         Past Surgical History:        Past Surgical History:   Procedure Laterality Date    ABDOMINOPLASTY   2013    CARPAL TUNNEL RELEASE   2016     two times   [address] Right      GASTRIC BYPASS SURGERY       HYSTERECTOMY        JOINT REPLACEMENT Bilateral      KNEE SURGERY   [id number]     both statse [address] knee surgeries    KNEE SURGERY Right 2017     (femoral) patella replacement    TONSILLECTOMY   1986    TOTAL KNEE ARTHROPLASTY        left knee    TOTAL KNEE ARTHROPLASTY Right 2017     PATELLOFEMORAL REPLACEMENT KNEE - Romilda Jerome, [address] TABLE, FEMORAL POPLITEAL BLOCK, NSA= SPINAL VS GENERAL [place] Roberts [place] [facility] [place]            Immunization History:        Immunization History   Administered Date(s)

## 2018-01-17 NOTE — DISCHARGE SUMMARY
cardiomyopathy    Diet: regular diet    Activity: As tolerated    Instructions to Patient:  - Take all medications as prescribed  - Follow up with PCP   - In case of any worsening condition please visit Emergency Room. Discharge Medications:      Medication List      START taking these medications    cefUROXime 250 MG tablet  Commonly known as:  CEFTIN  Take 1 tablet by mouth 2 times daily for 5 days        CHANGE how you take these medications    aspirin 81 MG EC tablet  Take 1 tablet by mouth 2 times daily  What changed:  when to take this        CONTINUE taking these medications    albuterol sulfate  (90 Base) MCG/ACT inhaler  Inhale 2 puffs into the lungs every 6 hours as needed for Wheezing     atenolol 50 MG tablet  Commonly known as:  TENORMIN  TAKE 2 TABLETS BY MOUTH ONE TIME A DAY     cetirizine 10 MG tablet  Commonly known as:  ZYRTEC     chlorhexidine 0.12 % solution  Commonly known as:  PERIDEX  RINSE WITH 1/2 OZ. TWICE A DAY FOR 30 SECONDS, THEN SPIT     cyclobenzaprine 5 MG tablet  Commonly known as:  FLEXERIL  TAKE 1 OR 2 TABLETS BY MOUTH AT BEDTIME AS NEEDED     docusate sodium 100 MG capsule  Commonly known as:  COLACE  Take 1 capsule by mouth 2 times daily as needed for Constipation     EPIPEN 2-JIGAR 0.3 MG/0.3ML Soaj injection  Generic drug:  EPINEPHrine  INJECT 0.3 ML (0.3 MG) INTO THE MUSCLE ONCE AS NEEDED FOR UP TO 1 DOSE. for angioedema     famotidine 40 MG tablet  Commonly known as:  PEPCID     felodipine 10 MG extended release tablet  Commonly known as:  PLENDIL  Take 1 tablet by mouth daily     gabapentin 300 MG capsule  Commonly known as:  NEURONTIN     glucose blood VI test strips strip  Commonly known as:  ASCENSIA AUTODISC VI;ONE TOUCH ULTRA TEST VI  Use with associated glucose meter.      hydroxychloroquine 200 MG tablet  Commonly known as:  PLAQUENIL  Take 1 tablet by mouth daily     ibuprofen 800 MG tablet  Commonly known as:  ADVIL;MOTRIN  Take 1 tablet by mouth every 8 prescriptions for required medications, discharge plan and follow up. Electronically signed by   Ember Bryant MD  1/17/2018  4:19 PM      Thank you Dr. Ivet Villa CNP for the opportunity to be involved in this patient's care. Attending Physician Statement  Patient seen and examined  I have discussed the care of the patient, including pertinent history and exam findings,  with the resident. I have reviewed the key elements of all parts of the encounter with the resident. I agree with the assessment, plan and orders as documented by the resident.     Buck Latif

## 2018-01-17 NOTE — PROGRESS NOTES
Gilberto Forester    SPECIAL  01/15/2018 02:34 [address] 63[address] [place], [address] [facility] [phone]     Lab Results   Component Value Date/Time    CULTURE NO GROWTH 2 DAYS 01/15/2018 02:34 PM    CULTURE  01/15/2018 02:34 PM     Performed at [facility], [address] [facility] [phone]       Radiology:    Xr Chest Portable    Result Date: 1/15/2018  EXAMINATION: SINGLE VIEW OF THE CHEST 1/15/2018 11:10 am COMPARISON: April 26, 2017 HISTORY: ORDERING SYSTEM PROVIDED HISTORY: weakness Ordering Physician Provided Reason for Exam: low BP FINDINGS: The lungs are without acute focal process. There is no effusion or pneumothorax. Heart size appears upper limits normal.  The osseous structures are intact without acute process. No acute process. Physical Examination:        Physical Exam   Constitutional: She is oriented to person, place, and time and well-developed, well-nourished, and in no distress. No distress. HENT:   Head: Normocephalic and atraumatic. Eyes: EOM are normal. Pupils are equal, round, and reactive to light. Neck: Normal range of motion. Neck supple. Cardiovascular: Normal rate, regular rhythm and normal heart sounds. Exam reveals no gallop and no friction rub. No murmur heard. Pulmonary/Chest: Effort normal and breath sounds normal. No respiratory distress. She has no wheezes. She exhibits no tenderness. Abdominal: Soft. Bowel sounds are normal. She exhibits no distension and no mass. There is no tenderness. There is no rebound and no guarding. Musculoskeletal: Normal range of motion. She exhibits no edema or deformity. Neurological: She is alert and oriented to person, place, and time. Skin: Skin is warm and dry.      Assessment:        Primary Problem  Hypovolemia    Active Hospital Problems    Diagnosis Date Noted    Acute cystitis without hematuria [N30.00] 01/16/2018    Hypovolemia [E86.1] 01/16/2018    Atrial fibrillation with slow ventricular response ([facility] [I48.91] 01/15/2018    H/O: stroke [Z86.73] 01/15/2018    Controlled type 2 diabetes mellitus without complication ([facility] [S68.9] 05/02/2017    Hypertension [I10]     GERD (gastroesophageal reflux disease) [K21.9]     Depression [F32.9]     Fibromyalgia [M79.7]     CAD (coronary artery disease) [I25.10]     Bipolar disorder ([facility] [F31.9]     Anxiety [F41.9]     Acute kidney injury ([facility] [N17.9]     Headache [R51]     Raynaud's disease [I73.00]     Asthma [J45.909]        Plan:        Hypovolemic shock 2/2 viral gastroenteritis - resolved  - 2nd EKG did not show afib, 1st EKG equivocal afib  - no diarrhea today, more formed stool yesterday, able to tolerate PO  - BCx2 NGTD, cdiff and stool cx negative  - discharge today     UTI  - Small leuk est, positive nitrite  - UCx: ecoli [id number] senstive to rocephin  - Continue IV Rocephin: PO ceftin on DC    CELINE  - likely from hypovolemia  - Cr 0.85 at b/l (b/l 0.89 11/2017)     Bipolar disorder  - hold seroquel (can cause sleepiness), lamictal until pt stabilzes  - cont venlafaxine     Asthma  - albuterol PRN  - Resp eval and treat     DM2  - hold metformin  - low dose ISS  - poct glucose  - diabetic diet    Jonah Portillo MD  1/17/2018  9:51 AM   Attending Physician Statement  Patient seen and examined  I have discussed the care of the patient, including pertinent history and exam findings,  with the resident. I have reviewed the key elements of all parts of the encounter with the resident. I agree with the assessment, plan and orders as documented by the resident.     Blaire Latif

## 2018-01-21 LAB
CULTURE: NORMAL
Lab: NORMAL
SPECIMEN DESCRIPTION: NORMAL
STATUS: NORMAL
STATUS: NORMAL

## 2018-01-23 ENCOUNTER — OFFICE VISIT (OUTPATIENT)
Dept: FAMILY MEDICINE CLINIC | Age: 53
End: 2018-01-23
Payer: MEDICAID

## 2018-01-23 VITALS
BODY MASS INDEX: 26.83 KG/M2 | HEIGHT: 68 IN | WEIGHT: 177 LBS | SYSTOLIC BLOOD PRESSURE: 73 MMHG | OXYGEN SATURATION: 100 % | HEART RATE: 47 BPM | RESPIRATION RATE: 20 BRPM | DIASTOLIC BLOOD PRESSURE: 47 MMHG | TEMPERATURE: 96.9 F

## 2018-01-23 DIAGNOSIS — R00.1 BRADYCARDIA: ICD-10-CM

## 2018-01-23 DIAGNOSIS — I95.9 HYPOTENSION, UNSPECIFIED HYPOTENSION TYPE: Primary | ICD-10-CM

## 2018-01-23 PROCEDURE — G8598 ASA/ANTIPLAT THER USED: HCPCS | Performed by: NURSE PRACTITIONER

## 2018-01-23 PROCEDURE — G8427 DOCREV CUR MEDS BY ELIG CLIN: HCPCS | Performed by: NURSE PRACTITIONER

## 2018-01-23 PROCEDURE — G8417 CALC BMI ABV UP PARAM F/U: HCPCS | Performed by: NURSE PRACTITIONER

## 2018-01-23 PROCEDURE — G8484 FLU IMMUNIZE NO ADMIN: HCPCS | Performed by: NURSE PRACTITIONER

## 2018-01-23 PROCEDURE — 99213 OFFICE O/P EST LOW 20 MIN: CPT | Performed by: NURSE PRACTITIONER

## 2018-01-23 PROCEDURE — 1111F DSCHRG MED/CURRENT MED MERGE: CPT | Performed by: NURSE PRACTITIONER

## 2018-01-23 PROCEDURE — 3017F COLORECTAL CA SCREEN DOC REV: CPT | Performed by: NURSE PRACTITIONER

## 2018-01-23 PROCEDURE — 3014F SCREEN MAMMO DOC REV: CPT | Performed by: NURSE PRACTITIONER

## 2018-01-23 PROCEDURE — 93000 ELECTROCARDIOGRAM COMPLETE: CPT | Performed by: NURSE PRACTITIONER

## 2018-01-23 PROCEDURE — 1036F TOBACCO NON-USER: CPT | Performed by: NURSE PRACTITIONER

## 2018-01-23 NOTE — PROGRESS NOTES
hysterectomy     Headache     Hernia of abdominal wall     Hyperlipidemia     Hypertension     Mood disorder (HCC)     Neuropathy (HCC)     PTSD (post-traumatic stress disorder)     Pulmonary nodules     Raynaud's disease     Scleroderma ([address]     Seizures (HCC)     Sleep apnea     Sleep walking disorder     Transient insomnia       Past Surgical History:   Procedure Laterality Date    ABDOMINOPLASTY  2013    CARPAL TUNNEL RELEASE  2016    two times   [address] Right     GASTRIC BYPASS SURGERY  2012    HYSTERECTOMY      JOINT REPLACEMENT Bilateral     KNEE SURGERY  [id number]    both statse 24 knee surgeries    KNEE SURGERY Right 05/02/2017    (femoral) patella replacement    TONSILLECTOMY  1986    TOTAL KNEE ARTHROPLASTY  2011    left knee    TOTAL KNEE ARTHROPLASTY Right 5/2/2017    PATELLOFEMORAL REPLACEMENT KNEE - [address], [address] TABLE, FEMORAL POPLITEAL BLOCK, NSA= SPINAL VS GENERAL performed by Sergey Larose DO at [address] [place] [address]  2004     Family History   Problem Relation Age of Onset    Adopted:  Yes    Depression Mother     Asthma Mother     Depression Father     High Blood Pressure Father     Diabetes Father     Asthma Father     Depression Sister     Asthma Sister     Depression Brother     Asthma Brother     Asthma Maternal Aunt     Asthma Maternal Uncle     Asthma Paternal Aunt     Asthma Paternal Uncle     Asthma Maternal Grandmother     Cancer Maternal Grandmother     Asthma Maternal Grandfather     Asthma Paternal Grandmother     Asthma Paternal Grandfather     Asthma Maternal Cousin     Asthma Paternal Cousin      Social History   Substance Use Topics    Smoking status: Never Smoker    Smokeless tobacco: Never Used    Alcohol use Yes      Comment: occasional      Allergies   Allergen Reactions    Morphine Nausea And Vomiting     Pt states it changes her mental status    Amlodipine Other (See Comments)

## 2018-01-24 ENCOUNTER — NURSE ONLY (OUTPATIENT)
Dept: FAMILY MEDICINE CLINIC | Age: 53
End: 2018-01-24

## 2018-01-24 VITALS — DIASTOLIC BLOOD PRESSURE: 42 MMHG | HEART RATE: 48 BPM | SYSTOLIC BLOOD PRESSURE: 78 MMHG | OXYGEN SATURATION: 99 %

## 2018-01-24 DIAGNOSIS — I95.9 HYPOTENSION, UNSPECIFIED HYPOTENSION TYPE: ICD-10-CM

## 2018-01-26 ENCOUNTER — APPOINTMENT (OUTPATIENT)
Dept: GENERAL RADIOLOGY | Age: 53
DRG: 201 | End: 2018-01-26
Payer: MEDICAID

## 2018-01-26 ENCOUNTER — HOSPITAL ENCOUNTER (INPATIENT)
Age: 53
LOS: 5 days | Discharge: HOME | DRG: 201 | End: 2018-01-31
Attending: EMERGENCY MEDICINE | Admitting: INTERNAL MEDICINE
Payer: MEDICAID

## 2018-01-26 DIAGNOSIS — R07.9 CHEST PAIN, UNSPECIFIED TYPE: Primary | ICD-10-CM

## 2018-01-26 PROBLEM — R00.1 SYMPTOMATIC BRADYCARDIA: Status: ACTIVE | Noted: 2018-01-26

## 2018-01-26 LAB
ABSOLUTE EOS #: 0.2 K/UL (ref 0–0.4)
ABSOLUTE EOS #: 0.2 K/UL (ref 0–0.4)
ABSOLUTE IMMATURE GRANULOCYTE: ABNORMAL K/UL (ref 0–0.3)
ABSOLUTE IMMATURE GRANULOCYTE: ABNORMAL K/UL (ref 0–0.3)
ABSOLUTE LYMPH #: 2.7 K/UL (ref 1–4.8)
ABSOLUTE LYMPH #: 2.9 K/UL (ref 1–4.8)
ABSOLUTE MONO #: 0.5 K/UL (ref 0.1–1.3)
ABSOLUTE MONO #: 0.6 K/UL (ref 0.1–1.3)
ANION GAP SERPL CALCULATED.3IONS-SCNC: 13 MMOL/L (ref 9–17)
ANION GAP SERPL CALCULATED.3IONS-SCNC: 20 MMOL/L (ref 9–17)
BASOPHILS # BLD: 1 % (ref 0–2)
BASOPHILS # BLD: 2 % (ref 0–2)
BASOPHILS ABSOLUTE: 0.1 K/UL (ref 0–0.2)
BASOPHILS ABSOLUTE: 0.1 K/UL (ref 0–0.2)
BNP INTERPRETATION: ABNORMAL
BUN BLDV-MCNC: 23 MG/DL (ref 6–20)
BUN BLDV-MCNC: 23 MG/DL (ref 6–20)
BUN/CREAT BLD: ABNORMAL (ref 9–20)
BUN/CREAT BLD: ABNORMAL (ref 9–20)
CALCIUM SERPL-MCNC: 7.8 MG/DL (ref 8.6–10.4)
CALCIUM SERPL-MCNC: 8.9 MG/DL (ref 8.6–10.4)
CHLORIDE BLD-SCNC: 101 MMOL/L (ref 98–107)
CHLORIDE BLD-SCNC: 105 MMOL/L (ref 98–107)
CO2: 18 MMOL/L (ref 20–31)
CO2: 18 MMOL/L (ref 20–31)
CREAT SERPL-MCNC: 1.61 MG/DL (ref 0.5–0.9)
CREAT SERPL-MCNC: 1.76 MG/DL (ref 0.5–0.9)
DIFFERENTIAL TYPE: ABNORMAL
DIFFERENTIAL TYPE: ABNORMAL
EKG ATRIAL RATE: 300 BPM
EKG Q-T INTERVAL: 452 MS
EKG QRS DURATION: 94 MS
EKG QTC CALCULATION (BAZETT): 416 MS
EKG R AXIS: 12 DEGREES
EKG T AXIS: 14 DEGREES
EKG VENTRICULAR RATE: 51 BPM
EOSINOPHILS RELATIVE PERCENT: 3 % (ref 0–4)
EOSINOPHILS RELATIVE PERCENT: 3 % (ref 0–4)
GFR AFRICAN AMERICAN: 37 ML/MIN
GFR AFRICAN AMERICAN: 41 ML/MIN
GFR NON-AFRICAN AMERICAN: 30 ML/MIN
GFR NON-AFRICAN AMERICAN: 34 ML/MIN
GFR SERPL CREATININE-BSD FRML MDRD: ABNORMAL ML/MIN/{1.73_M2}
GLUCOSE BLD-MCNC: 194 MG/DL (ref 70–99)
GLUCOSE BLD-MCNC: 229 MG/DL (ref 70–99)
HCT VFR BLD CALC: 26.4 % (ref 36–46)
HCT VFR BLD CALC: 33.4 % (ref 36–46)
HEMOGLOBIN: 11.6 G/DL (ref 12–16)
HEMOGLOBIN: 8.9 G/DL (ref 12–16)
IMMATURE GRANULOCYTES: ABNORMAL %
IMMATURE GRANULOCYTES: ABNORMAL %
INR BLD: 1
LYMPHOCYTES # BLD: 36 % (ref 24–44)
LYMPHOCYTES # BLD: 41 % (ref 24–44)
MCH RBC QN AUTO: 30.6 PG (ref 26–34)
MCH RBC QN AUTO: 31.4 PG (ref 26–34)
MCHC RBC AUTO-ENTMCNC: 33.5 G/DL (ref 31–37)
MCHC RBC AUTO-ENTMCNC: 34.9 G/DL (ref 31–37)
MCV RBC AUTO: 90.1 FL (ref 80–100)
MCV RBC AUTO: 91.2 FL (ref 80–100)
MONOCYTES # BLD: 7 % (ref 1–7)
MONOCYTES # BLD: 8 % (ref 1–7)
NRBC AUTOMATED: ABNORMAL PER 100 WBC
NRBC AUTOMATED: ABNORMAL PER 100 WBC
PARTIAL THROMBOPLASTIN TIME: 25.8 SEC (ref 23–31)
PDW BLD-RTO: 13.6 % (ref 11.5–14.9)
PDW BLD-RTO: 13.7 % (ref 11.5–14.9)
PLATELET # BLD: 186 K/UL (ref 150–450)
PLATELET # BLD: 240 K/UL (ref 150–450)
PLATELET ESTIMATE: ABNORMAL
PLATELET ESTIMATE: ABNORMAL
PMV BLD AUTO: 9.2 FL (ref 6–12)
PMV BLD AUTO: 9.4 FL (ref 6–12)
POTASSIUM SERPL-SCNC: 4.1 MMOL/L (ref 3.7–5.3)
POTASSIUM SERPL-SCNC: 4.9 MMOL/L (ref 3.7–5.3)
PRO-BNP: 813 PG/ML
PROTHROMBIN TIME: 10.2 SEC (ref 9.7–12)
RBC # BLD: 2.9 M/UL (ref 4–5.2)
RBC # BLD: 3.71 M/UL (ref 4–5.2)
RBC # BLD: ABNORMAL 10*6/UL
RBC # BLD: ABNORMAL 10*6/UL
SEG NEUTROPHILS: 46 % (ref 36–66)
SEG NEUTROPHILS: 53 % (ref 36–66)
SEGMENTED NEUTROPHILS ABSOLUTE COUNT: 3.2 K/UL (ref 1.3–9.1)
SEGMENTED NEUTROPHILS ABSOLUTE COUNT: 3.8 K/UL (ref 1.3–9.1)
SODIUM BLD-SCNC: 136 MMOL/L (ref 135–144)
SODIUM BLD-SCNC: 139 MMOL/L (ref 135–144)
TROPONIN INTERP: NORMAL
TROPONIN INTERP: NORMAL
TROPONIN T: <0.03 NG/ML
TROPONIN T: <0.03 NG/ML
WBC # BLD: 7.1 K/UL (ref 3.5–11)
WBC # BLD: 7.4 K/UL (ref 3.5–11)
WBC # BLD: ABNORMAL 10*3/UL
WBC # BLD: ABNORMAL 10*3/UL

## 2018-01-26 PROCEDURE — 36415 COLL VENOUS BLD VENIPUNCTURE: CPT

## 2018-01-26 PROCEDURE — 93005 ELECTROCARDIOGRAM TRACING: CPT

## 2018-01-26 PROCEDURE — 85025 COMPLETE CBC W/AUTO DIFF WBC: CPT

## 2018-01-26 PROCEDURE — 6370000000 HC RX 637 (ALT 250 FOR IP): Performed by: INTERNAL MEDICINE

## 2018-01-26 PROCEDURE — 83880 ASSAY OF NATRIURETIC PEPTIDE: CPT

## 2018-01-26 PROCEDURE — 80048 BASIC METABOLIC PNL TOTAL CA: CPT

## 2018-01-26 PROCEDURE — 99285 EMERGENCY DEPT VISIT HI MDM: CPT

## 2018-01-26 PROCEDURE — 2580000003 HC RX 258: Performed by: INTERNAL MEDICINE

## 2018-01-26 PROCEDURE — 85610 PROTHROMBIN TIME: CPT

## 2018-01-26 PROCEDURE — 85730 THROMBOPLASTIN TIME PARTIAL: CPT

## 2018-01-26 PROCEDURE — 71045 X-RAY EXAM CHEST 1 VIEW: CPT

## 2018-01-26 PROCEDURE — 6370000000 HC RX 637 (ALT 250 FOR IP): Performed by: EMERGENCY MEDICINE

## 2018-01-26 PROCEDURE — 2060000000 HC ICU INTERMEDIATE R&B

## 2018-01-26 PROCEDURE — 2580000003 HC RX 258: Performed by: EMERGENCY MEDICINE

## 2018-01-26 PROCEDURE — 84484 ASSAY OF TROPONIN QUANT: CPT

## 2018-01-26 RX ORDER — SODIUM CHLORIDE 0.9 % (FLUSH) 0.9 %
10 SYRINGE (ML) INJECTION EVERY 12 HOURS SCHEDULED
Status: DISCONTINUED | OUTPATIENT
Start: 2018-01-26 | End: 2018-01-31 | Stop reason: HOSPADM

## 2018-01-26 RX ORDER — HEPARIN SODIUM 5000 [USP'U]/ML
5000 INJECTION, SOLUTION INTRAVENOUS; SUBCUTANEOUS EVERY 8 HOURS SCHEDULED
Status: DISCONTINUED | OUTPATIENT
Start: 2018-01-27 | End: 2018-01-31 | Stop reason: HOSPADM

## 2018-01-26 RX ORDER — MIDODRINE HYDROCHLORIDE 5 MG/1
5 TABLET ORAL ONCE
Status: COMPLETED | OUTPATIENT
Start: 2018-01-26 | End: 2018-01-26

## 2018-01-26 RX ORDER — FENTANYL CITRATE 50 UG/ML
25 INJECTION, SOLUTION INTRAMUSCULAR; INTRAVENOUS ONCE
Status: DISCONTINUED | OUTPATIENT
Start: 2018-01-26 | End: 2018-01-31 | Stop reason: HOSPADM

## 2018-01-26 RX ORDER — 0.9 % SODIUM CHLORIDE 0.9 %
1000 INTRAVENOUS SOLUTION INTRAVENOUS ONCE
Status: COMPLETED | OUTPATIENT
Start: 2018-01-26 | End: 2018-01-26

## 2018-01-26 RX ORDER — PRAVASTATIN SODIUM 40 MG
40 TABLET ORAL ONCE
Status: COMPLETED | OUTPATIENT
Start: 2018-01-26 | End: 2018-01-26

## 2018-01-26 RX ORDER — ASPIRIN 81 MG/1
81 TABLET, CHEWABLE ORAL ONCE
Status: DISCONTINUED | OUTPATIENT
Start: 2018-01-26 | End: 2018-01-31 | Stop reason: HOSPADM

## 2018-01-26 RX ORDER — ASPIRIN 81 MG/1
324 TABLET, CHEWABLE ORAL ONCE
Status: COMPLETED | OUTPATIENT
Start: 2018-01-26 | End: 2018-01-26

## 2018-01-26 RX ORDER — ACETAMINOPHEN 325 MG/1
650 TABLET ORAL EVERY 4 HOURS PRN
Status: DISCONTINUED | OUTPATIENT
Start: 2018-01-26 | End: 2018-01-31 | Stop reason: HOSPADM

## 2018-01-26 RX ORDER — GABAPENTIN 300 MG/1
300 CAPSULE ORAL ONCE
Status: COMPLETED | OUTPATIENT
Start: 2018-01-26 | End: 2018-01-26

## 2018-01-26 RX ORDER — SODIUM CHLORIDE 0.9 % (FLUSH) 0.9 %
10 SYRINGE (ML) INJECTION PRN
Status: DISCONTINUED | OUTPATIENT
Start: 2018-01-26 | End: 2018-01-31 | Stop reason: HOSPADM

## 2018-01-26 RX ORDER — LAMOTRIGINE 25 MG/1
25 TABLET ORAL ONCE
Status: COMPLETED | OUTPATIENT
Start: 2018-01-26 | End: 2018-01-26

## 2018-01-26 RX ORDER — VENLAFAXINE 75 MG/1
75 TABLET ORAL ONCE
Status: COMPLETED | OUTPATIENT
Start: 2018-01-26 | End: 2018-01-26

## 2018-01-26 RX ORDER — SODIUM CHLORIDE 9 MG/ML
INJECTION, SOLUTION INTRAVENOUS CONTINUOUS
Status: DISCONTINUED | OUTPATIENT
Start: 2018-01-26 | End: 2018-01-30

## 2018-01-26 RX ADMIN — ASPIRIN 81 MG 324 MG: 81 TABLET ORAL at 17:12

## 2018-01-26 RX ADMIN — VENLAFAXINE 75 MG: 75 TABLET ORAL at 22:45

## 2018-01-26 RX ADMIN — PRAVASTATIN SODIUM 40 MG: 40 TABLET ORAL at 22:18

## 2018-01-26 RX ADMIN — SODIUM CHLORIDE: 9 INJECTION, SOLUTION INTRAVENOUS at 21:23

## 2018-01-26 RX ADMIN — LAMOTRIGINE 25 MG: 25 TABLET ORAL at 22:44

## 2018-01-26 RX ADMIN — SODIUM CHLORIDE 1000 ML: 9 INJECTION, SOLUTION INTRAVENOUS at 18:13

## 2018-01-26 RX ADMIN — SODIUM CHLORIDE 1000 ML: 9 INJECTION, SOLUTION INTRAVENOUS at 17:13

## 2018-01-26 RX ADMIN — GABAPENTIN 300 MG: 300 CAPSULE ORAL at 22:18

## 2018-01-26 RX ADMIN — MIDODRINE HYDROCHLORIDE 5 MG: 5 TABLET ORAL at 20:03

## 2018-01-26 ASSESSMENT — PAIN DESCRIPTION - LOCATION
LOCATION: CHEST
LOCATION: CHEST

## 2018-01-26 ASSESSMENT — PAIN DESCRIPTION - PAIN TYPE
TYPE: ACUTE PAIN
TYPE: ACUTE PAIN

## 2018-01-26 ASSESSMENT — PAIN DESCRIPTION - DESCRIPTORS
DESCRIPTORS: NUMBNESS
DESCRIPTORS: SHARP

## 2018-01-26 ASSESSMENT — PAIN SCALES - GENERAL
PAINLEVEL_OUTOF10: 6

## 2018-01-26 ASSESSMENT — PAIN DESCRIPTION - FREQUENCY: FREQUENCY: CONTINUOUS

## 2018-01-26 ASSESSMENT — PAIN DESCRIPTION - ONSET: ONSET: ON-GOING

## 2018-01-27 LAB — GLUCOSE BLD-MCNC: 128 MG/DL (ref 65–105)

## 2018-01-27 PROCEDURE — 82947 ASSAY GLUCOSE BLOOD QUANT: CPT

## 2018-01-27 PROCEDURE — 6370000000 HC RX 637 (ALT 250 FOR IP): Performed by: INTERNAL MEDICINE

## 2018-01-27 PROCEDURE — 2580000003 HC RX 258: Performed by: INTERNAL MEDICINE

## 2018-01-27 PROCEDURE — 2060000000 HC ICU INTERMEDIATE R&B

## 2018-01-27 PROCEDURE — 99223 1ST HOSP IP/OBS HIGH 75: CPT | Performed by: INTERNAL MEDICINE

## 2018-01-27 PROCEDURE — 6360000002 HC RX W HCPCS: Performed by: INTERNAL MEDICINE

## 2018-01-27 RX ORDER — HYDROXYCHLOROQUINE SULFATE 200 MG/1
200 TABLET, FILM COATED ORAL DAILY
Status: DISCONTINUED | OUTPATIENT
Start: 2018-01-27 | End: 2018-01-31 | Stop reason: HOSPADM

## 2018-01-27 RX ORDER — QUETIAPINE FUMARATE 200 MG/1
200 TABLET, FILM COATED ORAL NIGHTLY
Status: DISCONTINUED | OUTPATIENT
Start: 2018-01-27 | End: 2018-01-31 | Stop reason: HOSPADM

## 2018-01-27 RX ORDER — LAMOTRIGINE 25 MG/1
25 TABLET ORAL 2 TIMES DAILY
Status: DISCONTINUED | OUTPATIENT
Start: 2018-01-27 | End: 2018-01-31 | Stop reason: HOSPADM

## 2018-01-27 RX ORDER — VENLAFAXINE 75 MG/1
75 TABLET ORAL 2 TIMES DAILY WITH MEALS
Status: DISCONTINUED | OUTPATIENT
Start: 2018-01-27 | End: 2018-01-31 | Stop reason: HOSPADM

## 2018-01-27 RX ADMIN — HEPARIN SODIUM 5000 UNITS: 5000 INJECTION, SOLUTION INTRAVENOUS; SUBCUTANEOUS at 17:50

## 2018-01-27 RX ADMIN — HEPARIN SODIUM 5000 UNITS: 5000 INJECTION, SOLUTION INTRAVENOUS; SUBCUTANEOUS at 22:37

## 2018-01-27 RX ADMIN — SODIUM CHLORIDE: 9 INJECTION, SOLUTION INTRAVENOUS at 10:06

## 2018-01-27 RX ADMIN — HEPARIN SODIUM 5000 UNITS: 5000 INJECTION, SOLUTION INTRAVENOUS; SUBCUTANEOUS at 06:25

## 2018-01-27 RX ADMIN — HYDROXYCHLOROQUINE SULFATE 200 MG: 200 TABLET, FILM COATED ORAL at 17:50

## 2018-01-27 RX ADMIN — LAMOTRIGINE 25 MG: 25 TABLET ORAL at 22:37

## 2018-01-27 RX ADMIN — VENLAFAXINE 75 MG: 75 TABLET ORAL at 17:50

## 2018-01-27 RX ADMIN — QUETIAPINE FUMARATE 200 MG: 200 TABLET ORAL at 22:37

## 2018-01-27 RX ADMIN — ACETAMINOPHEN 650 MG: 325 TABLET, FILM COATED ORAL at 07:51

## 2018-01-27 ASSESSMENT — PAIN DESCRIPTION - ONSET: ONSET: ON-GOING

## 2018-01-27 ASSESSMENT — PAIN DESCRIPTION - LOCATION: LOCATION: CHEST

## 2018-01-27 ASSESSMENT — PAIN DESCRIPTION - DESCRIPTORS: DESCRIPTORS: PRESSURE

## 2018-01-27 ASSESSMENT — PAIN SCALES - GENERAL
PAINLEVEL_OUTOF10: 6
PAINLEVEL_OUTOF10: 0

## 2018-01-27 ASSESSMENT — PAIN DESCRIPTION - PAIN TYPE: TYPE: ACUTE PAIN

## 2018-01-27 ASSESSMENT — PAIN DESCRIPTION - FREQUENCY: FREQUENCY: CONTINUOUS

## 2018-01-27 NOTE — PROGRESS NOTES
Writer notified Jeane Ledbetter, house supervisor, that patient's mews is 5. Fluids begun at direction of Dr. Elizabet Redmond. Cardiology (Dr. Florencia Winkler also consulted)    Writer called Dr. Florencia Winkler and left message on his voicemail.

## 2018-01-27 NOTE — PROGRESS NOTES
Patient arrived from ER via gurney. Patient able to ambulate from rSoldier to bed. Vitals taken. Patient assessed. Telemetry attached. Patient oriented to room. Resting comfortably in bed.

## 2018-01-27 NOTE — CARE COORDINATION
ADMISSION NOTE       Patient admitted to room  2123. Time of admit:  2030    Admit from:  ER    Reason for admission:  Bradycardia    Where patient has been residing for the last 24 hrs:  Private residence    Has the patient been admitted to any facility in the last 4 weeks, which one:  [address] at bedside:  No    Patient is currently in pain Yes    Patient has been oriented to room, educated on how to use call light, and to call for assistance prior to getting up. Bed in lowest and locked position. 2 siderails up for safety. Call light within reach. [facility]  DVT Prophylaxis and Vaccine Status  Work List  Mandatory for all patients      Patient must be on both Chemical prophylaxis and Mechanical prophylaxis.  If chemical/mechanical prophylaxis is not ordered, the physician must document a reason for not using prophylaxis     Chemical Prophylaxis  Is patient on chemical prophylaxis: Yes  If no chemical prophylaxis Is a order in for No Chemical VTE prophylaxisNo  If no was the physician notified not applicable      Mechanical Prophylaxis  Is patient on mechanical prophylaxis, intermittent pneumatic compression device: No  If no was the physician notified Order for No Mechanical        Pneumonia Vaccine  Vaccine indicated:  Not indicated  If indicated was the vaccine given: not applicable    Influenza Vaccine (applicable from October through March):  Vaccine indicated: Up-to-date  If indicated was the vaccine given: not applicable    Patient Education  Education completed on DVT prophylaxis: yes

## 2018-01-27 NOTE — PROGRESS NOTES
Writer spoke with Dr. Mayito Swan via telephone. Updated physician on patient's status. No new orders at this time. Physician indicated he would be in to see patient in the a.m. Saturday.

## 2018-01-27 NOTE — CONSULTS
asthma, scleroderma, probable seizure disorder  Gastric bypass surgery, cholecystectomy, hysterectomy, carpal tunnel surgery, bilateral knee surgery, left total knee arthroplasty    Vitals: BP 90/70 Comment: manual  Pulse (!) 41   Temp 97.7 °F (36.5 °C) (Oral)   Resp 18   Ht 5' 8\" (1.727 m)   Wt 189 lb 9.5 oz (86 kg)   SpO2 100%   BMI 28.83 kg/m²     Patient seen and examined with the patient's nurse  At present she is hemodynamically stable and having her lunch, still has a bradycardia heart rate 50  No obvious sign of cardiopulmonary decompensation  Normal dorsalis pedis and posterior tibial pulse and in no carotid bruit  Medications:   Scheduled Meds:   fentanNYL  25 mcg Intravenous Once    sodium chloride flush  10 mL Intravenous 2 times per day    aspirin  81 mg Oral Once    heparin (porcine)  5,000 Units Subcutaneous 3 times per day     Continuous Infusions:   sodium chloride 75 mL/hr at 01/27/18 1006     CBC:   Recent Labs      01/26/18   1645  01/26/18   2117   WBC  7.1  7.4   HGB  11.6*  8.9*   PLT  240  186     BMP:    Recent Labs      01/26/18   1645  01/26/18 2117   NA  139  136   K  4.1  4.9   CL  101  105   CO2  18*  18*   BUN  23*  23*   CREATININE  1.76*  1.61*   GLUCOSE  194*  229*     Hepatic: No results for input(s): AST, ALT, ALB, BILITOT, ALKPHOS in the last 72 hours. Troponin: No results for input(s): TROPONINI in the last 72 hours. BNP: No results for input(s): BNP in the last 72 hours. Lipids: No results for input(s): CHOL, HDL in the last 72 hours. Invalid input(s): LDLCALCU  INR:   Recent Labs      01/26/18   1645   INR  1.0       Objective:   Vitals: BP 90/70 Comment: manual  Pulse (!) 41   Temp 97.7 °F (36.5 °C) (Oral)   Resp 18   Ht 5' 8\" (1.727 m)   Wt 189 lb 9.5 oz (86 kg)   SpO2 100%   BMI 28.83 kg/m²   General appearance: alert and cooperative with exam  HEENT: Head: Normocephalic, no lesions, without obvious abnormality.   Neck: no adenopathy, no carotid bruit, no JVD, supple, symmetrical, trachea midline and thyroid not enlarged, symmetric, no tenderness/mass/nodules  Lungs: diminished breath sounds bibasilar  Heart: regular rate and rhythm  Abdomen: soft, non-tender; bowel sounds normal; no masses,  no organomegaly  Extremities: extremities normal, atraumatic, no cyanosis or edema  Neurologic: Mental status: Alert, oriented, thought content appropriate    EKG: Probable junctional bradycardia. ECHO: reviewed. Ejection fraction: 55%  Stress Test: not obtained. Cardiac Angiography: not obtained. Assessment / Acute Cardiac Problems:   Patient admitted with generalized weakness, hypotension improved after 3 L normal saline  Patient admitted with the bradycardia most likely junctional bradycardia heart rate below 50  Recurrent dizziness but no syncope  Exertional fatigue during activities of daily living, recurrent chest pain radiating to the left arm associated with the fatigue for the last 10 days.   Negative cardiac markers, negative ECG for ischemic changes  History of scleroderma, connective tissue disorder, fibromyalgia  History of thyroid disorder  Anemia with a normal MCV, no history of GI bleeding    Patient Active Problem List:     Seizures (HCC)     Transient insomnia     Sleep walking disorder     Raynaud's disease     Pulmonary nodules     Neuropathy (HCC)     Hypertension     Headache     GERD (gastroesophageal reflux disease)     Fibromyalgia     Depression     Degenerative disc disease, thoracic     CAD (coronary artery disease)     Bipolar disorder (HCC)     Asthma     Anxiety     Antinuclear antibody (IQRA) titer greater than 1:80     Anemia     Acute kidney injury ([address]     Stroke ([address]     Controlled type 2 diabetes mellitus without complication (HCC)     Degenerative arthritis of right knee     S/P knee surgery     Chronic fatigue syndrome     Spondylosis of cervical region without myelopathy or radiculopathy     H/O hysterectomy with BSO at [facility] for benign disease 2014     History of total bilateral knee replacement     H/O: hysterectomy     Atrial fibrillation with slow ventricular response ([address]     H/O: stroke     Acute cystitis without hematuria     Hypovolemia     Hypotension     Symptomatic bradycardia      Plan of Treatment:   Continue IV fluids  Continuous ECG monitoring  Check thyroid function test  Most likely patient is going to need permanent pacemaker and cardiac catheterization    Electronically signed by Muna Nash MD on 1/27/2018 at 12:24 PM

## 2018-01-27 NOTE — PROGRESS NOTES
Patient had soft stool which did not meet requirements for c.diff. Per protocol c.diff isolation was cancelled.

## 2018-01-28 LAB
ABSOLUTE EOS #: 0.2 K/UL (ref 0–0.4)
ABSOLUTE IMMATURE GRANULOCYTE: ABNORMAL K/UL (ref 0–0.3)
ABSOLUTE LYMPH #: 1.5 K/UL (ref 1–4.8)
ABSOLUTE MONO #: 0.5 K/UL (ref 0.1–1.3)
BASOPHILS # BLD: 1 % (ref 0–2)
BASOPHILS ABSOLUTE: 0 K/UL (ref 0–0.2)
DIFFERENTIAL TYPE: ABNORMAL
EOSINOPHILS RELATIVE PERCENT: 4 % (ref 0–4)
HCT VFR BLD CALC: 28.2 % (ref 36–46)
HEMOGLOBIN: 9.3 G/DL (ref 12–16)
IMMATURE GRANULOCYTES: ABNORMAL %
LYMPHOCYTES # BLD: 36 % (ref 24–44)
MCH RBC QN AUTO: 29.8 PG (ref 26–34)
MCHC RBC AUTO-ENTMCNC: 33.1 G/DL (ref 31–37)
MCV RBC AUTO: 90.1 FL (ref 80–100)
MONOCYTES # BLD: 11 % (ref 1–7)
NRBC AUTOMATED: ABNORMAL PER 100 WBC
PDW BLD-RTO: 13.7 % (ref 11.5–14.9)
PLATELET # BLD: 144 K/UL (ref 150–450)
PLATELET ESTIMATE: ABNORMAL
PMV BLD AUTO: 9.2 FL (ref 6–12)
RBC # BLD: 3.13 M/UL (ref 4–5.2)
RBC # BLD: ABNORMAL 10*6/UL
SEG NEUTROPHILS: 48 % (ref 36–66)
SEGMENTED NEUTROPHILS ABSOLUTE COUNT: 2 K/UL (ref 1.3–9.1)
WBC # BLD: 4.1 K/UL (ref 3.5–11)
WBC # BLD: ABNORMAL 10*3/UL

## 2018-01-28 PROCEDURE — 94762 N-INVAS EAR/PLS OXIMTRY CONT: CPT

## 2018-01-28 PROCEDURE — 6360000002 HC RX W HCPCS: Performed by: INTERNAL MEDICINE

## 2018-01-28 PROCEDURE — 6370000000 HC RX 637 (ALT 250 FOR IP): Performed by: INTERNAL MEDICINE

## 2018-01-28 PROCEDURE — 2580000003 HC RX 258: Performed by: INTERNAL MEDICINE

## 2018-01-28 PROCEDURE — 36415 COLL VENOUS BLD VENIPUNCTURE: CPT

## 2018-01-28 PROCEDURE — 2000000000 HC ICU R&B

## 2018-01-28 PROCEDURE — 93005 ELECTROCARDIOGRAM TRACING: CPT

## 2018-01-28 PROCEDURE — 99232 SBSQ HOSP IP/OBS MODERATE 35: CPT | Performed by: INTERNAL MEDICINE

## 2018-01-28 PROCEDURE — 99222 1ST HOSP IP/OBS MODERATE 55: CPT | Performed by: INTERNAL MEDICINE

## 2018-01-28 PROCEDURE — 85025 COMPLETE CBC W/AUTO DIFF WBC: CPT

## 2018-01-28 RX ORDER — DOPAMINE HYDROCHLORIDE 160 MG/100ML
2.5 INJECTION, SOLUTION INTRAVENOUS CONTINUOUS
Status: DISCONTINUED | OUTPATIENT
Start: 2018-01-28 | End: 2018-01-29 | Stop reason: RX

## 2018-01-28 RX ORDER — 0.9 % SODIUM CHLORIDE 0.9 %
250 INTRAVENOUS SOLUTION INTRAVENOUS ONCE
Status: COMPLETED | OUTPATIENT
Start: 2018-01-28 | End: 2018-01-28

## 2018-01-28 RX ADMIN — LAMOTRIGINE 25 MG: 25 TABLET ORAL at 09:52

## 2018-01-28 RX ADMIN — SODIUM CHLORIDE 250 ML: 0.9 INJECTION, SOLUTION INTRAVENOUS at 18:15

## 2018-01-28 RX ADMIN — HEPARIN SODIUM 5000 UNITS: 5000 INJECTION, SOLUTION INTRAVENOUS; SUBCUTANEOUS at 14:57

## 2018-01-28 RX ADMIN — SODIUM CHLORIDE: 9 INJECTION, SOLUTION INTRAVENOUS at 17:33

## 2018-01-28 RX ADMIN — SODIUM CHLORIDE: 9 INJECTION, SOLUTION INTRAVENOUS at 01:57

## 2018-01-28 RX ADMIN — HYDROXYCHLOROQUINE SULFATE 200 MG: 200 TABLET, FILM COATED ORAL at 09:52

## 2018-01-28 RX ADMIN — QUETIAPINE FUMARATE 200 MG: 200 TABLET ORAL at 21:28

## 2018-01-28 RX ADMIN — RIFAXIMIN 550 MG: 550 TABLET ORAL at 14:57

## 2018-01-28 RX ADMIN — LAMOTRIGINE 25 MG: 25 TABLET ORAL at 21:28

## 2018-01-28 RX ADMIN — HEPARIN SODIUM 5000 UNITS: 5000 INJECTION, SOLUTION INTRAVENOUS; SUBCUTANEOUS at 06:51

## 2018-01-28 RX ADMIN — HEPARIN SODIUM 5000 UNITS: 5000 INJECTION, SOLUTION INTRAVENOUS; SUBCUTANEOUS at 21:29

## 2018-01-28 RX ADMIN — VENLAFAXINE 75 MG: 75 TABLET ORAL at 17:59

## 2018-01-28 RX ADMIN — VENLAFAXINE 75 MG: 75 TABLET ORAL at 09:52

## 2018-01-28 RX ADMIN — RIFAXIMIN 550 MG: 550 TABLET ORAL at 21:28

## 2018-01-28 RX ADMIN — DOPAMINE HYDROCHLORIDE 2.5 MCG/KG/MIN: 160 INJECTION, SOLUTION INTRAVENOUS at 17:55

## 2018-01-28 ASSESSMENT — PAIN SCALES - GENERAL
PAINLEVEL_OUTOF10: 0
PAINLEVEL_OUTOF10: 0

## 2018-01-28 NOTE — PROGRESS NOTES
Date:   1/28/2018  Patient name: Charles Garcia  Date of admission:  1/26/2018  4:33 PM  MRN:   [id number]  YOB: 1965  PCP: Ammy Howard CNP    Reason for Admission: Symptomatic bradycardia [R00.1]    Cardiology follow-up : Bradycardia    History of presenting illness:Mrs Mia Multani 46years old female admitted with severe generalized weakness, severe hypotension and dizziness. Over the last 10 days she has been experiencing chest discomfort like a heaviness and some time pain radiates to the left shoulder and arm. Sometimes chest pain is associated with the nausea and shortness of breath. She has experienced number of episodes of palpitation with chest pain. Chest pain usually last about 5 minutes. She gets tired during her activities of daily living and tiredness has progressed over the last few weeks. When she lost her the grossly shopping she gets exhausted. On admission her electrocardiogram showed junctional bradycardia and her systolic blood pressure was below 80. She has received 3 L IV fluids since admission and blood pressure has improved. She still has a bradycardia heart rate 45. Still has junctional bradycardia.   She has had couple of syncopal episodes over the last 2 years.     ECG January 15, 2018 probable A. fib with a bradycardia heart rate 43  ECG January 15, 2018 second ECG sinus bradycardia with a first-degree heart block heart rate 57  ECG 1/26/2018 4:36 PM junctional bradycardia heart rate 51  2-D echo January 16, 2018 ejection fraction more than 55%, no valvular abnormality, right ventricular systolic pressure 20  CTA chest and abdomen and pelvis April 2017 unremarkable, normal aorta     Labs on admission PRO-, troponin 0.03, hemoglobin 8.9, MCV 91.2, platelets 020, glucose 229, creatinine 1.61, BUN 23     Past medical history  Hypertension, hyperlipidemia, diabetes mellitus, neuropathy  Chronic anemia moderate phenomena, positive IQRA, history of 28.83 kg/m²   General appearance: alert and cooperative with exam  HEENT: Head: Normocephalic, no lesions, without obvious abnormality. Neck: no adenopathy, no carotid bruit, no JVD, supple, symmetrical, trachea midline and thyroid not enlarged, symmetric, no tenderness/mass/nodules  Lungs: diminished breath sounds bibasilar  Heart: Heart rate 47 bpm, ECG monitor looks irregular rhythm  Abdomen: Abdomen is obese, distended, soft, bowel sounds present, no localized tenderness  Extremities: Homans sign is negative, no sign of DVT  Neurologic: Mental status: Alert, oriented, thought content appropriate    EKG: Probable junctional rhythm heart rate 47, unchanged from previous tracings. ECHO: reviewed. Ejection fraction: 55%  Stress Test: not obtained. Cardiac Angiography: not obtained. Assessment / Acute Cardiac Problems:   Patient admitted with generalized weakness, hypotension improved after 3 L normal saline  Patient admitted with the bradycardia most likely junctional bradycardia heart rate below 50  Recurrent dizziness but no syncope  Exertional fatigue during activities of daily living, recurrent chest pain radiating to the left arm associated with the fatigue for the last 10 days.   Negative cardiac markers, negative ECG for ischemic changes  Negative stress test 2013  History of scleroderma, connective tissue disorder, fibromyalgia, then phenomena  History of thyroid disorder  History of gastric bypass surgery, diarrhea, 6-7 bowel movements every day  Anemia with a normal MCV, no history of GI bleeding    Patient Active Problem List:     Seizures (HCC)     Transient insomnia     Sleep walking disorder     Raynaud's disease     Pulmonary nodules     Neuropathy (HCC)     Hypertension     Headache     GERD (gastroesophageal reflux disease)     Fibromyalgia     Depression     Degenerative disc disease, thoracic     CAD (coronary artery disease)     Bipolar disorder (HCC)     Asthma     Anxiety

## 2018-01-28 NOTE — PROGRESS NOTES
Spoke to patient's son Manohar Fitting and updated on patient status. Son is requesting writer talk to Dr Calle Doing this evening since his father passed away from an internal bleed and patient is feeling bloated. Perfect serve sent.

## 2018-01-28 NOTE — H&P
[address]  [address]     HISTORY AND PHYSICAL EXAMINATION              Patient name:  Kenney Adams  Date of admission:  1/26/2018  4:33 PM  MRN:   [id number]  Account:  [de-identified]  YOB: 1965  PCP:    Ligia Sagastume CNP  Room:   2107/2107-01  Code Status:    Full Code    Chief Complaint:     Chief Complaint   Patient presents with    Chest Pain    Shortness of Breath       History Obtained From:     patient    History of Present Illness: The patient is a 46 y.o.   Non-/non  female who presents with Chest Pain and Shortness of Breath   and she is admitted to the hospital for the management of  Bradycardia        HPI   1) Location/Symptom dizziness  2) Timing/Onset: chest discomfort  3) Context/Setting: hypotension   4) Quality:pressure like   5) Duration: more than 1 week   6) Modifying Factors: palpitations  7) Severity: acute severe     ECG January 15, 2018 probable A. fib with a bradycardia heart rate 43  ECG January 15, 2018 second ECG sinus bradycardia with a first-degree heart block heart rate 57  ECG 1/26/2018 4:36 PM junctional bradycardia heart rate 51  2-D echo January 16, 2018 ejection fraction more than 55%, no valvular abnormality, right ventricular systolic pressure 20  CTA chest and abdomen and pelvis April 2017 unremarkable, normal aorta      Past Medical History:     Past Medical History:   Diagnosis Date    Acute kidney injury ([address]     Anemia     Angioedema     Antinuclear antibody (IQRA) titer greater than 1:80     Anxiety     Asthma     Ataxia     Degenerative disc disease, thoracic     Depression     Diabetes mellitus ([address]     DJD (degenerative joint disease)     Fibromyalgia     GERD (gastroesophageal reflux disease)     H/O: hysterectomy     Headache     Hernia of abdominal wall     Hyperlipidemia     Hypertension     Mood disorder (HCC)     Neuropathy (HCC)     PTSD (post-traumatic stress disorder)     Pulmonary nodules     Raynaud's disease     [facility]     Seizures (HCC)     Sleep apnea     Sleep walking disorder     Transient insomnia         Past Surgical History:     Past Surgical History:   Procedure Laterality Date    ABDOMINOPLASTY  2013    CARPAL TUNNEL RELEASE  2016    two times   [address] Right     GASTRIC BYPASS SURGERY  2012    HYSTERECTOMY      JOINT REPLACEMENT Bilateral     KNEE SURGERY  [id number]    both statse 24 knee surgeries    KNEE SURGERY Right 05/02/2017    (femoral) patella replacement    TONSILLECTOMY  1986    TOTAL KNEE ARTHROPLASTY  2011    left knee    TOTAL KNEE ARTHROPLASTY Right 5/2/2017    PATELLOFEMORAL REPLACEMENT KNEE - Romilda Queenie, [address] TABLE, FEMORAL POPLITEAL BLOCK, NSA= SPINAL VS GENERAL performed by Yanira Roberts DO at [address] [place] [address]  2004        Medications Prior to Admission:     Prior to Admission medications    Medication Sig Start Date End Date Taking?  Authorizing Provider   cyclobenzaprine (FLEXERIL) 5 MG tablet TAKE 1 OR 2 TABLETS BY MOUTH AT BEDTIME AS NEEDED 1/5/18  Yes Chanell Reese CNP   hydroxychloroquine (PLAQUENIL) 200 MG tablet Take 1 tablet by mouth daily 12/11/17  Yes Chanell Reese CNP   naltrexone (DEPADE) 50 MG tablet TAKE 1 TABLET BY MOUTH ONE TIME A DAY  12/8/17  Yes Chanell Reese CNP   QUEtiapine (SEROQUEL) 200 MG tablet TAKE 1 TABLET BY MOUTH AT BEDTIME 12/4/17  Yes Chanell Reese CNP   pravastatin (PRAVACHOL) 40 MG tablet TAKE 1 TABLET BY MOUTH AT BEDTIME 12/4/17  Yes Chanell Reese CNP   predniSONE (DELTASONE) 50 MG tablet Take 1 tablet by mouth daily as needed (prn) For angioedema 12/4/17  Yes Chanell Reese CNP   albuterol sulfate  (90 Base) MCG/ACT inhaler Inhale 2 puffs into the lungs every 6

## 2018-01-28 NOTE — CONSULTS
Value Date    RBC 3.13 (L) 01/28/2018    HGB 9.3 (L) 01/28/2018    MCV 90.1 01/28/2018    MCH 29.8 01/28/2018    MCHC 33.1 01/28/2018    RDW 13.7 01/28/2018    MPV 9.2 01/28/2018    BASOPCT 1 01/28/2018    LYMPHSABS 1.50 01/28/2018    MONOSABS 0.50 01/28/2018    NEUTROABS 2.00 01/28/2018    EOSABS 0.20 01/28/2018    BASOSABS 0.00 01/28/2018          DIAGNOSTIC TESTING:   Xr Chest (single View Frontal)    Result Date: 1/26/2018  EXAMINATION: SINGLE VIEW OF THE CHEST 1/26/2018 6:28 pm COMPARISON: January 15, 2018 and April 26, 2017 HISTORY: ORDERING SYSTEM PROVIDED HISTORY: chest pain TECHNOLOGIST PROVIDED HISTORY: Reason for exam:->chest pain Ordering Physician Provided Reason for Exam: chest pain Acuity: Acute Type of Exam: Initial FINDINGS: Cardiac silhouette enlargement is again noted. Shallow lung inflation is present. Linear opacities in the mid and lower lung fields appear more prominent, likely due to shallow inflation. There is no consolidation, pneumothorax or evidence for edema. No pleural effusion. No acute osseous abnormality is identified. Shallow inflation with findings consistent with subsegmental atelectasis in the lung bases. Xr Chest Portable    Result Date: 1/15/2018  EXAMINATION: SINGLE VIEW OF THE CHEST 1/15/2018 11:10 am COMPARISON: April 26, 2017 HISTORY: ORDERING SYSTEM PROVIDED HISTORY: weakness Ordering Physician Provided Reason for Exam: low BP FINDINGS: The lungs are without acute focal process. There is no effusion or pneumothorax. Heart size appears upper limits normal.  The osseous structures are intact without acute process. No acute process. Tiffanie Digital Screen W Cad Bilateral    Result Date: 1/25/2018  EXAMINATION: BILATERAL DIGITAL SCREENING MAMMOGRAM, 1/12/2018 TECHNIQUE: CC and MLO views of the left and right breasts were obtained. Computer aided detection was utilized in the interpretation of this exam. 3D tomosynthesis was utilized in evaluating the study. COMPARISON: 13 December 2016 ; 1 October 2015 from [address] [place] [address] Drive: Screening. Negative family history of breast cancer. Negative history of hormonal replacement therapy. No prior breast interventions. FINDINGS: The breasts are composed of scattered fibroglandular density. No skin thickening, nipple contour changes, malignant type microcalcifications, areas of architectural distortion, or significant interval changes are noted. Benign appearing axillary lymph nodes are present. No mammographic evidence of malignancy. Advise annual screening mammography. BIRADS: BIRADS - CATEGORY 1 Negative, no evidence of malignancy in either breast. OVERALL ASSESSMENT - NEGATIVE A letter of notification will be sent to the patient regarding the results. RECOMMENDATION: Routine bilateral annual screening mammography is recommended. Follow-up screening mammogram in 1 year is advised. IMPRESSION: Ms. Shabnam Sullivan is a 46 y.o. female with bloating. Abdominal discomfort. Diarrhea. Chest pain. She is undergoing further cardiology workup. We discussed the potential diagnosis for her GI issues. Trial of rifaximin for possible bacterial overgrowth due to blind loop. If this does not work we'll try cholestyramine powder. She would benefit from elective colonoscopy. Thank you for allowing me to participate in the care of Ms. Shabnam Sullivan. For any further questions please do not hesitate to contact me.        Dc Porter MD

## 2018-01-28 NOTE — PROGRESS NOTES
[address] [facility]    Date:   1/28/2018  Patient name:  Mecca Portillo  Date of admission:  1/26/2018  4:33 PM  MRN:   [id number]  YOB: 1965    CC- bradycardia     HPI-  Pt with bradycardia sopped plendil  Now heat rate around 60   TSH normal     REVIEW OF SYSTEMS:    · General----negative for fatigue, weight loss  · GI negative for nausea and vomiting, no dysphagia       EXAM-  /64   Pulse 67   Temp 97.6 °F (36.4 °C) (Oral)   Resp 20   Ht 5' 8\" (1.727 m)   Wt 189 lb 9.5 oz (86 kg)   SpO2 98%   BMI 28.83 kg/m²      · General appearance: NAD conversant  · Lungs: normal effort, clear to auscultation bilaterally,no wheeze.   · Heart: regular rate and rhythm, S1, S2 normal, no murmur  · Abdomen: soft, non-tender; no masses, no organomegaly  · Extremities: no cyanosis, no edema no clubbing no synovitis      Laboratory Testing:  CBC:   Recent Labs      01/28/18   0853   WBC  4.1   HGB  9.3*   PLT  144*     BMP:    Recent Labs      01/26/18   1645  01/26/18   2117   NA  139  136   K  4.1  4.9   CL  101  105   CO2  18*  18*   BUN  23*  23*   CREATININE  1.76*  1.61*   GLUCOSE  194*  229*         ASSESSMENT:    Patient Active Problem List   Diagnosis    Seizures (HCC)    Transient insomnia    Sleep walking disorder    Raynaud's disease    Pulmonary nodules    Neuropathy (HCC)    Hypertension    Headache    GERD (gastroesophageal reflux disease)    Fibromyalgia    Depression    Degenerative disc disease, thoracic    CAD (coronary artery disease)    Bipolar disorder (HCC)    Asthma    Anxiety    Antinuclear antibody (IQRA) titer greater than 1:80    Anemia    Acute kidney injury (HCC)    Stroke (HCC)    Controlled type 2 diabetes mellitus without complication (HCC)    Degenerative arthritis of right knee    S/P knee surgery    Chronic fatigue syndrome    Spondylosis of cervical region without myelopathy or

## 2018-01-29 LAB
EKG ATRIAL RATE: 84 BPM
EKG Q-T INTERVAL: 478 MS
EKG QRS DURATION: 96 MS
EKG QTC CALCULATION (BAZETT): 423 MS
EKG R AXIS: 15 DEGREES
EKG T AXIS: 16 DEGREES
EKG VENTRICULAR RATE: 47 BPM
GLUCOSE BLD-MCNC: 64 MG/DL (ref 65–105)
GLUCOSE BLD-MCNC: 67 MG/DL (ref 65–105)
GLUCOSE BLD-MCNC: 93 MG/DL (ref 65–105)

## 2018-01-29 PROCEDURE — 6370000000 HC RX 637 (ALT 250 FOR IP): Performed by: INTERNAL MEDICINE

## 2018-01-29 PROCEDURE — 2580000003 HC RX 258: Performed by: INTERNAL MEDICINE

## 2018-01-29 PROCEDURE — 2580000003 HC RX 258: Performed by: NURSE PRACTITIONER

## 2018-01-29 PROCEDURE — 2000000000 HC ICU R&B

## 2018-01-29 PROCEDURE — 94762 N-INVAS EAR/PLS OXIMTRY CONT: CPT

## 2018-01-29 PROCEDURE — 93005 ELECTROCARDIOGRAM TRACING: CPT

## 2018-01-29 PROCEDURE — 82947 ASSAY GLUCOSE BLOOD QUANT: CPT

## 2018-01-29 PROCEDURE — 99232 SBSQ HOSP IP/OBS MODERATE 35: CPT | Performed by: INTERNAL MEDICINE

## 2018-01-29 PROCEDURE — 6360000002 HC RX W HCPCS: Performed by: INTERNAL MEDICINE

## 2018-01-29 RX ORDER — LOSARTAN POTASSIUM 50 MG/1
50 TABLET ORAL DAILY
Status: DISCONTINUED | OUTPATIENT
Start: 2018-01-29 | End: 2018-01-30

## 2018-01-29 RX ORDER — FAMOTIDINE 20 MG/1
20 TABLET, FILM COATED ORAL DAILY
Status: DISCONTINUED | OUTPATIENT
Start: 2018-01-30 | End: 2018-01-31 | Stop reason: HOSPADM

## 2018-01-29 RX ORDER — DEXTROSE MONOHYDRATE 50 MG/ML
100 INJECTION, SOLUTION INTRAVENOUS PRN
Status: DISCONTINUED | OUTPATIENT
Start: 2018-01-29 | End: 2018-01-31 | Stop reason: HOSPADM

## 2018-01-29 RX ORDER — DEXTROSE MONOHYDRATE 25 G/50ML
12.5 INJECTION, SOLUTION INTRAVENOUS PRN
Status: DISCONTINUED | OUTPATIENT
Start: 2018-01-29 | End: 2018-01-31 | Stop reason: HOSPADM

## 2018-01-29 RX ORDER — ONDANSETRON 2 MG/ML
4 INJECTION INTRAMUSCULAR; INTRAVENOUS EVERY 6 HOURS PRN
Status: DISCONTINUED | OUTPATIENT
Start: 2018-01-29 | End: 2018-01-31 | Stop reason: HOSPADM

## 2018-01-29 RX ORDER — NICOTINE POLACRILEX 4 MG
15 LOZENGE BUCCAL PRN
Status: DISCONTINUED | OUTPATIENT
Start: 2018-01-29 | End: 2018-01-31 | Stop reason: HOSPADM

## 2018-01-29 RX ADMIN — SODIUM CHLORIDE: 9 INJECTION, SOLUTION INTRAVENOUS at 06:18

## 2018-01-29 RX ADMIN — LOSARTAN POTASSIUM 50 MG: 50 TABLET, FILM COATED ORAL at 20:39

## 2018-01-29 RX ADMIN — HEPARIN SODIUM 5000 UNITS: 5000 INJECTION, SOLUTION INTRAVENOUS; SUBCUTANEOUS at 21:53

## 2018-01-29 RX ADMIN — DOPAMINE HYDROCHLORIDE: 40 INJECTION, SOLUTION, CONCENTRATE INTRAVENOUS at 06:27

## 2018-01-29 RX ADMIN — RIFAXIMIN 550 MG: 550 TABLET ORAL at 14:09

## 2018-01-29 RX ADMIN — DEXTROSE MONOHYDRATE 12.5 G: 25 INJECTION, SOLUTION INTRAVENOUS at 22:59

## 2018-01-29 RX ADMIN — VENLAFAXINE 75 MG: 75 TABLET ORAL at 08:43

## 2018-01-29 RX ADMIN — RIFAXIMIN 550 MG: 550 TABLET ORAL at 20:39

## 2018-01-29 RX ADMIN — VENLAFAXINE 75 MG: 75 TABLET ORAL at 17:19

## 2018-01-29 RX ADMIN — QUETIAPINE FUMARATE 200 MG: 200 TABLET ORAL at 20:39

## 2018-01-29 RX ADMIN — SODIUM CHLORIDE: 9 INJECTION, SOLUTION INTRAVENOUS at 19:54

## 2018-01-29 RX ADMIN — RIFAXIMIN 550 MG: 550 TABLET ORAL at 08:43

## 2018-01-29 RX ADMIN — HYDROXYCHLOROQUINE SULFATE 200 MG: 200 TABLET, FILM COATED ORAL at 08:43

## 2018-01-29 RX ADMIN — LAMOTRIGINE 25 MG: 25 TABLET ORAL at 08:43

## 2018-01-29 RX ADMIN — HEPARIN SODIUM 5000 UNITS: 5000 INJECTION, SOLUTION INTRAVENOUS; SUBCUTANEOUS at 06:20

## 2018-01-29 RX ADMIN — HEPARIN SODIUM 5000 UNITS: 5000 INJECTION, SOLUTION INTRAVENOUS; SUBCUTANEOUS at 14:09

## 2018-01-29 RX ADMIN — LAMOTRIGINE 25 MG: 25 TABLET ORAL at 20:39

## 2018-01-29 RX ADMIN — ACETAMINOPHEN 650 MG: 325 TABLET, FILM COATED ORAL at 14:22

## 2018-01-29 ASSESSMENT — PAIN SCALES - GENERAL
PAINLEVEL_OUTOF10: 0
PAINLEVEL_OUTOF10: 4
PAINLEVEL_OUTOF10: 0
PAINLEVEL_OUTOF10: 3

## 2018-01-29 NOTE — PLAN OF CARE
Problem: Falls - Risk of  Goal: Absence of falls  Outcome: Met This Shift  Patient remained free from falls. Call light within reach. Problem: Pain:  Goal: Pain level will decrease  Pain level will decrease   Outcome: Met This Shift  No complaints of pain. Problem: Tissue Perfusion - Cardiopulmonary, Altered:  Goal: Absence of angina  Absence of angina   Outcome: Met This Shift  Patient denied any chest pain. Goal: Hemodynamic stability will improve  Hemodynamic stability will improve   Outcome: Met This Shift  Patient's heart rate maintained in 70's - 80's. Blood pressure WNL.

## 2018-01-29 NOTE — FLOWSHEET NOTE
01/29/18 5704   Provider Notification   Reason for Communication New orders   Provider Name Dr Kennedi Perez   Provider Notification Physician   Method of Communication Call   Response See orders   Notification Time [phone] [id number]   notified pt request for something for nausea

## 2018-01-30 ENCOUNTER — APPOINTMENT (OUTPATIENT)
Dept: NUCLEAR MEDICINE | Age: 53
DRG: 201 | End: 2018-01-30
Payer: MEDICAID

## 2018-01-30 LAB
ABSOLUTE EOS #: 0.2 K/UL (ref 0–0.4)
ABSOLUTE IMMATURE GRANULOCYTE: ABNORMAL K/UL (ref 0–0.3)
ABSOLUTE LYMPH #: 1.6 K/UL (ref 1–4.8)
ABSOLUTE MONO #: 0.5 K/UL (ref 0.1–1.3)
ANION GAP SERPL CALCULATED.3IONS-SCNC: 12 MMOL/L (ref 9–17)
BASOPHILS # BLD: 1 % (ref 0–2)
BASOPHILS ABSOLUTE: 0.1 K/UL (ref 0–0.2)
BUN BLDV-MCNC: 10 MG/DL (ref 6–20)
BUN/CREAT BLD: NORMAL (ref 9–20)
CALCIUM SERPL-MCNC: 8.6 MG/DL (ref 8.6–10.4)
CHLORIDE BLD-SCNC: 107 MMOL/L (ref 98–107)
CO2: 25 MMOL/L (ref 20–31)
CREAT SERPL-MCNC: 0.85 MG/DL (ref 0.5–0.9)
DIFFERENTIAL TYPE: ABNORMAL
EKG ATRIAL RATE: 79 BPM
EKG P AXIS: 29 DEGREES
EKG P-R INTERVAL: 198 MS
EKG Q-T INTERVAL: 502 MS
EKG QRS DURATION: 92 MS
EKG QTC CALCULATION (BAZETT): 575 MS
EKG R AXIS: 43 DEGREES
EKG T AXIS: 65 DEGREES
EKG VENTRICULAR RATE: 79 BPM
EOSINOPHILS RELATIVE PERCENT: 5 % (ref 0–4)
GFR AFRICAN AMERICAN: >60 ML/MIN
GFR NON-AFRICAN AMERICAN: >60 ML/MIN
GFR SERPL CREATININE-BSD FRML MDRD: NORMAL ML/MIN/{1.73_M2}
GFR SERPL CREATININE-BSD FRML MDRD: NORMAL ML/MIN/{1.73_M2}
GLUCOSE BLD-MCNC: 73 MG/DL (ref 70–99)
GLUCOSE BLD-MCNC: 76 MG/DL (ref 65–105)
GLUCOSE BLD-MCNC: 91 MG/DL (ref 65–105)
HCT VFR BLD CALC: 29.3 % (ref 36–46)
HEMOGLOBIN: 10.3 G/DL (ref 12–16)
IMMATURE GRANULOCYTES: ABNORMAL %
LYMPHOCYTES # BLD: 37 % (ref 24–44)
MCH RBC QN AUTO: 30.8 PG (ref 26–34)
MCHC RBC AUTO-ENTMCNC: 35.2 G/DL (ref 31–37)
MCV RBC AUTO: 87.4 FL (ref 80–100)
MONOCYTES # BLD: 10 % (ref 1–7)
NRBC AUTOMATED: ABNORMAL PER 100 WBC
PDW BLD-RTO: 13.6 % (ref 11.5–14.9)
PLATELET # BLD: 162 K/UL (ref 150–450)
PLATELET ESTIMATE: ABNORMAL
PMV BLD AUTO: 9.1 FL (ref 6–12)
POTASSIUM SERPL-SCNC: 3.9 MMOL/L (ref 3.7–5.3)
RBC # BLD: 3.35 M/UL (ref 4–5.2)
RBC # BLD: ABNORMAL 10*6/UL
SEG NEUTROPHILS: 47 % (ref 36–66)
SEGMENTED NEUTROPHILS ABSOLUTE COUNT: 2.1 K/UL (ref 1.3–9.1)
SODIUM BLD-SCNC: 144 MMOL/L (ref 135–144)
WBC # BLD: 4.5 K/UL (ref 3.5–11)
WBC # BLD: ABNORMAL 10*3/UL

## 2018-01-30 PROCEDURE — 82947 ASSAY GLUCOSE BLOOD QUANT: CPT

## 2018-01-30 PROCEDURE — 6370000000 HC RX 637 (ALT 250 FOR IP): Performed by: INTERNAL MEDICINE

## 2018-01-30 PROCEDURE — 2580000003 HC RX 258: Performed by: INTERNAL MEDICINE

## 2018-01-30 PROCEDURE — A9500 TC99M SESTAMIBI: HCPCS | Performed by: INTERNAL MEDICINE

## 2018-01-30 PROCEDURE — 3430000000 HC RX DIAGNOSTIC RADIOPHARMACEUTICAL: Performed by: INTERNAL MEDICINE

## 2018-01-30 PROCEDURE — 36415 COLL VENOUS BLD VENIPUNCTURE: CPT

## 2018-01-30 PROCEDURE — 78452 HT MUSCLE IMAGE SPECT MULT: CPT

## 2018-01-30 PROCEDURE — 2060000000 HC ICU INTERMEDIATE R&B

## 2018-01-30 PROCEDURE — 6360000002 HC RX W HCPCS: Performed by: INTERNAL MEDICINE

## 2018-01-30 PROCEDURE — 80048 BASIC METABOLIC PNL TOTAL CA: CPT

## 2018-01-30 PROCEDURE — 85025 COMPLETE CBC W/AUTO DIFF WBC: CPT

## 2018-01-30 PROCEDURE — 94762 N-INVAS EAR/PLS OXIMTRY CONT: CPT

## 2018-01-30 PROCEDURE — 99232 SBSQ HOSP IP/OBS MODERATE 35: CPT | Performed by: INTERNAL MEDICINE

## 2018-01-30 RX ORDER — SODIUM CHLORIDE 0.9 % (FLUSH) 0.9 %
10 SYRINGE (ML) INJECTION PRN
Status: DISCONTINUED | OUTPATIENT
Start: 2018-01-30 | End: 2018-01-31 | Stop reason: HOSPADM

## 2018-01-30 RX ORDER — NIFEDIPINE 10 MG/1
30 CAPSULE ORAL DAILY
Status: DISCONTINUED | OUTPATIENT
Start: 2018-01-30 | End: 2018-01-30

## 2018-01-30 RX ORDER — ATENOLOL 100 MG/1
100 TABLET ORAL DAILY
Status: ON HOLD | COMMUNITY
End: 2018-01-31 | Stop reason: HOSPADM

## 2018-01-30 RX ORDER — LOSARTAN POTASSIUM 100 MG/1
100 TABLET ORAL DAILY
Status: DISCONTINUED | OUTPATIENT
Start: 2018-01-30 | End: 2018-01-31 | Stop reason: HOSPADM

## 2018-01-30 RX ORDER — NIFEDIPINE 30 MG/1
30 TABLET, FILM COATED, EXTENDED RELEASE ORAL DAILY
Status: DISCONTINUED | OUTPATIENT
Start: 2018-01-30 | End: 2018-01-31 | Stop reason: HOSPADM

## 2018-01-30 RX ADMIN — QUETIAPINE FUMARATE 200 MG: 200 TABLET ORAL at 21:25

## 2018-01-30 RX ADMIN — HEPARIN SODIUM 5000 UNITS: 5000 INJECTION, SOLUTION INTRAVENOUS; SUBCUTANEOUS at 07:31

## 2018-01-30 RX ADMIN — LAMOTRIGINE 25 MG: 25 TABLET ORAL at 21:25

## 2018-01-30 RX ADMIN — ONDANSETRON 4 MG: 2 INJECTION INTRAMUSCULAR; INTRAVENOUS at 14:44

## 2018-01-30 RX ADMIN — HEPARIN SODIUM 5000 UNITS: 5000 INJECTION, SOLUTION INTRAVENOUS; SUBCUTANEOUS at 21:25

## 2018-01-30 RX ADMIN — RIFAXIMIN 550 MG: 550 TABLET ORAL at 09:22

## 2018-01-30 RX ADMIN — RIFAXIMIN 550 MG: 550 TABLET ORAL at 16:54

## 2018-01-30 RX ADMIN — SODIUM CHLORIDE: 9 INJECTION, SOLUTION INTRAVENOUS at 09:22

## 2018-01-30 RX ADMIN — ACETAMINOPHEN 650 MG: 325 TABLET, FILM COATED ORAL at 19:15

## 2018-01-30 RX ADMIN — VENLAFAXINE 75 MG: 75 TABLET ORAL at 09:22

## 2018-01-30 RX ADMIN — HYDROXYCHLOROQUINE SULFATE 200 MG: 200 TABLET, FILM COATED ORAL at 09:22

## 2018-01-30 RX ADMIN — NIFEDIPINE 30 MG: 10 CAPSULE, LIQUID FILLED ORAL at 12:04

## 2018-01-30 RX ADMIN — RIFAXIMIN 550 MG: 550 TABLET ORAL at 21:25

## 2018-01-30 RX ADMIN — TETRAKIS(2-METHOXYISOBUTYLISOCYANIDE)COPPER(I) TETRAFLUOROBORATE 31.3 MILLICURIE: 1 INJECTION, POWDER, LYOPHILIZED, FOR SOLUTION INTRAVENOUS at 12:05

## 2018-01-30 RX ADMIN — FAMOTIDINE 20 MG: 20 TABLET, FILM COATED ORAL at 09:22

## 2018-01-30 RX ADMIN — Medication 10 ML: at 12:05

## 2018-01-30 RX ADMIN — VENLAFAXINE 75 MG: 75 TABLET ORAL at 19:04

## 2018-01-30 RX ADMIN — Medication 10 ML: at 21:26

## 2018-01-30 RX ADMIN — LOSARTAN POTASSIUM 100 MG: 100 TABLET ORAL at 07:31

## 2018-01-30 RX ADMIN — HEPARIN SODIUM 5000 UNITS: 5000 INJECTION, SOLUTION INTRAVENOUS; SUBCUTANEOUS at 16:53

## 2018-01-30 RX ADMIN — LAMOTRIGINE 25 MG: 25 TABLET ORAL at 09:22

## 2018-01-30 ASSESSMENT — PAIN SCALES - GENERAL: PAINLEVEL_OUTOF10: 7

## 2018-01-30 NOTE — PROGRESS NOTES
(!) 174/106   Pulse 60   Temp 98 °F (36.7 °C)   Resp 17   Ht 5' 8\" (1.727 m)   Wt 189 lb 9.5 oz (86 kg)   SpO2 98%   BMI 28.83 kg/m²   General appearance: alert and cooperative with exam  HEENT: Head: Normocephalic, no lesions, without obvious abnormality. Neck: no adenopathy, no JVD, supple, symmetrical, trachea midline and thyroid not enlarged, symmetric, no tenderness/mass/nodules  Lungs: diminished breath sounds bibasilar  Heart: regular rate and rhythm  Abdomen: soft, non-tender; bowel sounds normal; no masses,  no organomegaly  Extremities: extremities normal, atraumatic, no cyanosis or edema  Neurologic: Mental status: Alert, oriented, thought content appropriate    EKG: normal sinus rhythm. Low voltage, nonspecific T-wave changes  ECHO: reviewed. Ejection fraction: 55%  Stress Test: ordered, but not yet obtained. Cardiac Angiography: not obtained. Assessment / Acute Cardiac Problems:   Patient admitted with generalized weakness, hypotension improved after 3 L normal saline  Patient admitted with the bradycardia most likely junctional bradycardia heart rate below 50  Bradycardia resolved after starting dopamine drip, junctional rhythm converted to sinus rhythm on 1/29/2018  Patient was taking atenolol, she told me she hasn't taken acting normal since the beginning of January 2018  Recurrent dizziness but no syncope  Exertional fatigue during activities of daily living, recurrent chest pain radiating to the left arm associated with the fatigue for the last 10 days.   Negative cardiac markers, negative ECG for ischemic changes  Negative stress test 2013  History of scleroderma, connective tissue disorder, fibromyalgia, then phenomena  History of thyroid disorder  History of gastric bypass surgery, diarrhea, 6-7 bowel movements every day  Anemia with a normal MCV, no history of GI bleeding    Patient Active Problem List:     Seizures ([facility] [address]     Transient insomnia     Sleep walking disorder

## 2018-01-30 NOTE — PROGRESS NOTES
1/10/17  Yes Historical Provider, MD   cetirizine (ZYRTEC) 10 MG tablet TAKE 1 TABLET BY MOUTH ONE TIME A DAY 2/10/17  Yes Historical Provider, MD   vitamin D (ERGOCALCIFEROL) 96262 UNITS CAPS capsule 50,000 Units once a week Indications: on Sunday 2/22/17  Yes Historical Provider, MD   Multiple Vitamins-Minerals (THERAPEUTIC MULTIVITAMIN-MINERALS) tablet Take 1 tablet by mouth daily   Yes Historical Provider, MD   Probiotic Product (PROBIOTIC & ACIDOPHILUS EX ST PO) Take by mouth daily    Yes Historical Provider, MD   vitamin B-12 (CYANOCOBALAMIN) 1000 MCG tablet Take 1,000 mcg by mouth daily   Yes Historical Provider, MD   glucose blood VI test strips (ASCENSIA AUTODISC VI;ONE TOUCH ULTRA TEST VI) strip Use with associated glucose meter. 10/30/17   Haim Wetzel CNP   EPIPEN 2-JIGAR 0.3 MG/0.3ML SOAJ injection INJECT 0.3 ML (0.3 MG) INTO THE MUSCLE ONCE AS NEEDED FOR UP TO 1 DOSE. for angioedema 9/29/17   Haim Wetzel CNP   chlorhexidine (PERIDEX) 0.12 % solution RINSE WITH 1/2 OZ. TWICE A DAY FOR 30 SECONDS, THEN SPIT 9/29/17   Haim Wetzel CNP   ibuprofen (ADVIL;MOTRIN) 800 MG tablet Take 1 tablet by mouth every 8 hours as needed for Pain 6/25/17   Migel Kendrick PA-C       ALLERGIES      Morphine; Amlodipine; Dilaudid [hydromorphone hcl]; and Sulfa antibiotics    SOCIAL HISTORY       reports that she has never smoked. She has never used smokeless tobacco. She reports that she drinks alcohol. She reports that she does not use drugs. FAMILY HISTORY      family history includes Asthma in her brother, father, maternal aunt, maternal cousin, maternal grandfather, maternal grandmother, maternal uncle, mother, paternal aunt, paternal cousin, paternal grandfather, paternal grandmother, paternal uncle, and sister; Cancer in her maternal grandmother; Depression in her brother, father, mother, and sister; Diabetes in her father; High Blood Pressure in her father.  She was adopted. REVIEW OF SYSTEMS      · Constitutional: Negative for weight loss  · Eyes: Negative for visual changes, diplopia, scleral icterus. · ENT: Negative for Headaches, hearing loss, vertigo, mouth sores, sore throat. · Cardiovascular: posfor lightheadedness/orthostatic symptoms ,chest pain, dyspnea on exertion, palpitations or loss of consciousness. · Respiratory: Negative for cough or wheezing, sputum production, hemoptysis, pleuritic pain. · Gastrointestinal: Negative for nausea/vomiting, change in bowel habits, abdominal pain, dysphagia/appetite loss, hematemesis, blood in stools. · Genitourinary:Negative for change in bladder habits, dysuria, trouble voiding, hematuria. · Musculoskeletal: Negative for gait disturbance, weakness, joint complaints. · Integumentary: Negative for rash, pruritis. · Neurological: Negative for headache, dizziness, change in muscle strength, numbness/tingling, change in gait, balance, coordination,   · Psychiatric: negative for change in mood, affect, memory, mentation, behavior. · Endocrine: negative for temperature intolerance, excessive thirst, fluid intake, or urination, tremor. · Hematologic/Lymphatic: negative for abnormal bruising or bleeding, blood clots, swollen lymph nodes. · Allergic/Immunologic: negative for nasal congestion, pruritis, hives. PHYSICAL EXAM      /63   Pulse 74   Temp 98.2 °F (36.8 °C) (Oral)   Resp 16   Ht 5' 8\" (1.727 m)   Wt 189 lb 9.5 oz (86 kg)   SpO2 100%   BMI 28.83 kg/m²      · General appearance: well nourished  · HEENT: Head: Normocephalic, no lesions, without obvious abnormality. · Lungs: clear to auscultation bilaterally  · Heart: regular rate and rhythm, S1, S2 normal, no murmur, click, rub or gallop  · Abdomen: soft, non-tender; bowel sounds normal; no masses,  no organomegaly  · Extremities: extremities normal, atraumatic, no cyanosis or edema  · Neurological: Gait normal. Reflexes normal and symmetric. Sensation grossly normal  · Skin - no rash, no lump   · Eye no icterus no redness  · Psych-normal affect   · NEURO-no limb weakness  No facial droop  · Lymphatic system-no lymphadenopathy no splenomegaly     DIAGNOSTICS      Laboratory Testing:  CBC:   Recent Labs      01/30/18   0520   WBC  4.5   HGB  10.3*   PLT  162     BMP:    Recent Labs      01/30/18   0520   NA  144   K  3.9   CL  107   CO2  25   BUN  10   CREATININE  0.85   GLUCOSE  73     S. Calcium:  Recent Labs      01/30/18   0520   CALCIUM  8.6     S. Ionized Calcium:No results for input(s): IONCA in the last 72 hours. S. Magnesium:No results for input(s): MG in the last 72 hours. S. Phosphorus:No results for input(s): PHOS in the last 72 hours. S. Glucose:  Recent Labs      01/29/18   2152  01/29/18   2320  01/30/18   0808   POCGLU  67  93  76     Glycosylated hemoglobin A1C: No results for input(s): LABA1C in the last 72 hours. INR: No results for input(s): INR in the last 72 hours. Hepatic functions: No results for input(s): ALKPHOS, ALT, AST, PROT, BILITOT, BILIDIR, LABALBU in the last 72 hours. Pancreatic functions:No results for input(s): LACTA, AMYLASE in the last 72 hours. S. Lactic Acid: No results for input(s): LACTA in the last 72 hours. Cardiac enzymes:No results for input(s): CKTOTAL, CKMB, CKMBINDEX, TROPONINI in the last 72 hours. BNP:No results for input(s): BNP in the last 72 hours. Lipid profile: No results for input(s): CHOL, TRIG, HDL, LDLCALC in the last 72 hours. Invalid input(s): LDL  Blood Gases: No results found for: PH, PCO2, PO2, HCO3, O2SAT  Thyroid functions:   Lab Results   Component Value Date    TSH 3.06 01/15/2018        Imaging/Diagonstics:      CXR: No acute cardiopulmonary findings.     ASSESSMENT     Patient Active Problem List   Diagnosis    Seizures ([facility] [address]    Transient insomnia    Sleep walking disorder    Raynaud's disease    Pulmonary nodules    Neuropathy ([facility] [address]    Hypertension    Headache    GERD (gastroesophageal reflux disease)    Fibromyalgia    Depression    Degenerative disc disease, thoracic    CAD (coronary artery disease)    Bipolar disorder (HCC)    Asthma    Anxiety    Antinuclear antibody (MARCELO) titer greater than 1:80    Anemia    [facility] ([facility] [address]    Stroke ([facility] [address]    Controlled type 2 diabetes mellitus without complication (HCC)    Degenerative arthritis of right knee    S/P knee surgery    Chronic fatigue syndrome    Spondylosis of cervical region without myelopathy or radiculopathy    H/O hysterectomy with BSO at  of  for benign disease 2014    History of total bilateral knee replacement    H/O: hysterectomy    Atrial fibrillation with slow ventricular response ([facility] [address]    H/O: stroke    Acute cystitis without hematuria    Hypovolemia    Hypotension    Bradycardia     Bradycardia   last admission was taken off atenolol and was in nsr  Hr > 60 at d/c   tsh was nl   pos marcelo    Now has reccurrence bradycardia   h/o afib with slow response   pr nl    has not been talikng bb   off plkendil      ? Ischemia related bradycardia vs lupus associted conduction disorder      PLAN         cardiac stress test     may need cath      bp elevated    will follow and adjust meds      off dopamine      no chf    MD JENN Rojo [address], [address] [place] [address].    Phone [phone]   Fax: [phone]  Answering Service: [phone]

## 2018-01-30 NOTE — PROGRESS NOTES
Spoke with Neelam faustin, pt will be brought down for resting portion of stress test today around 1330. Please keep pt Npo after midnight and no caffeine after 4pm today. The stress portion will be done in am. Any questions please call [phone].  Electronically signed by Jeffrey Moctezuma on 1/30/2018 at 11:22 AM

## 2018-01-31 VITALS
HEART RATE: 87 BPM | RESPIRATION RATE: 16 BRPM | SYSTOLIC BLOOD PRESSURE: 135 MMHG | TEMPERATURE: 98.2 F | WEIGHT: 189.6 LBS | OXYGEN SATURATION: 97 % | BODY MASS INDEX: 28.73 KG/M2 | DIASTOLIC BLOOD PRESSURE: 83 MMHG | HEIGHT: 68 IN

## 2018-01-31 LAB
GLUCOSE BLD-MCNC: 119 MG/DL (ref 65–105)
GLUCOSE BLD-MCNC: 165 MG/DL (ref 65–105)
GLUCOSE BLD-MCNC: 66 MG/DL (ref 65–105)
LV EF: 55 %
LVEF MODALITY: NORMAL

## 2018-01-31 PROCEDURE — 3430000000 HC RX DIAGNOSTIC RADIOPHARMACEUTICAL: Performed by: INTERNAL MEDICINE

## 2018-01-31 PROCEDURE — 6360000002 HC RX W HCPCS: Performed by: INTERNAL MEDICINE

## 2018-01-31 PROCEDURE — 6370000000 HC RX 637 (ALT 250 FOR IP): Performed by: INTERNAL MEDICINE

## 2018-01-31 PROCEDURE — A9500 TC99M SESTAMIBI: HCPCS | Performed by: INTERNAL MEDICINE

## 2018-01-31 PROCEDURE — 82947 ASSAY GLUCOSE BLOOD QUANT: CPT

## 2018-01-31 PROCEDURE — 99232 SBSQ HOSP IP/OBS MODERATE 35: CPT | Performed by: INTERNAL MEDICINE

## 2018-01-31 PROCEDURE — 93017 CV STRESS TEST TRACING ONLY: CPT

## 2018-01-31 PROCEDURE — 2580000003 HC RX 258: Performed by: INTERNAL MEDICINE

## 2018-01-31 PROCEDURE — 2580000003 HC RX 258: Performed by: NURSE PRACTITIONER

## 2018-01-31 RX ORDER — NITROGLYCERIN 0.4 MG/1
0.4 TABLET SUBLINGUAL EVERY 5 MIN PRN
Status: ACTIVE | OUTPATIENT
Start: 2018-01-31 | End: 2018-01-31

## 2018-01-31 RX ORDER — SODIUM CHLORIDE 0.9 % (FLUSH) 0.9 %
10 SYRINGE (ML) INJECTION PRN
Status: ACTIVE | OUTPATIENT
Start: 2018-01-31 | End: 2018-01-31

## 2018-01-31 RX ORDER — METOPROLOL TARTRATE 5 MG/5ML
2.5 INJECTION INTRAVENOUS PRN
Status: ACTIVE | OUTPATIENT
Start: 2018-01-31 | End: 2018-01-31

## 2018-01-31 RX ORDER — SODIUM CHLORIDE 9 MG/ML
50 INJECTION, SOLUTION INTRAVENOUS ONCE
Status: DISCONTINUED | OUTPATIENT
Start: 2018-01-31 | End: 2018-01-31 | Stop reason: HOSPADM

## 2018-01-31 RX ORDER — AMINOPHYLLINE DIHYDRATE 25 MG/ML
100 INJECTION, SOLUTION INTRAVENOUS
Status: DISCONTINUED | OUTPATIENT
Start: 2018-01-31 | End: 2018-01-31 | Stop reason: HOSPADM

## 2018-01-31 RX ORDER — SODIUM CHLORIDE 0.9 % (FLUSH) 0.9 %
10 SYRINGE (ML) INJECTION PRN
Status: DISCONTINUED | OUTPATIENT
Start: 2018-01-31 | End: 2018-01-31 | Stop reason: HOSPADM

## 2018-01-31 RX ADMIN — HEPARIN SODIUM 5000 UNITS: 5000 INJECTION, SOLUTION INTRAVENOUS; SUBCUTANEOUS at 05:45

## 2018-01-31 RX ADMIN — LAMOTRIGINE 25 MG: 25 TABLET ORAL at 07:56

## 2018-01-31 RX ADMIN — HYDROXYCHLOROQUINE SULFATE 200 MG: 200 TABLET, FILM COATED ORAL at 10:37

## 2018-01-31 RX ADMIN — ACETAMINOPHEN 650 MG: 325 TABLET, FILM COATED ORAL at 05:47

## 2018-01-31 RX ADMIN — VENLAFAXINE 75 MG: 75 TABLET ORAL at 17:47

## 2018-01-31 RX ADMIN — TETRAKIS(2-METHOXYISOBUTYLISOCYANIDE)COPPER(I) TETRAFLUOROBORATE 33.1 MILLICURIE: 1 INJECTION, POWDER, LYOPHILIZED, FOR SOLUTION INTRAVENOUS at 09:48

## 2018-01-31 RX ADMIN — LOSARTAN POTASSIUM 100 MG: 100 TABLET ORAL at 10:37

## 2018-01-31 RX ADMIN — RIFAXIMIN 550 MG: 550 TABLET ORAL at 10:37

## 2018-01-31 RX ADMIN — HEPARIN SODIUM 5000 UNITS: 5000 INJECTION, SOLUTION INTRAVENOUS; SUBCUTANEOUS at 15:09

## 2018-01-31 RX ADMIN — RIFAXIMIN 550 MG: 550 TABLET ORAL at 15:09

## 2018-01-31 RX ADMIN — VENLAFAXINE 75 MG: 75 TABLET ORAL at 10:37

## 2018-01-31 RX ADMIN — DEXTROSE MONOHYDRATE 12.5 G: 25 INJECTION, SOLUTION INTRAVENOUS at 07:03

## 2018-01-31 RX ADMIN — NIFEDIPINE 30 MG: 30 TABLET, FILM COATED, EXTENDED RELEASE ORAL at 10:37

## 2018-01-31 RX ADMIN — FAMOTIDINE 20 MG: 20 TABLET, FILM COATED ORAL at 10:37

## 2018-01-31 RX ADMIN — Medication 10 ML: at 10:37

## 2018-01-31 ASSESSMENT — PAIN SCALES - GENERAL: PAINLEVEL_OUTOF10: 6

## 2018-01-31 NOTE — PROGRESS NOTES
 Hypertension    Headache    GERD (gastroesophageal reflux disease)    Fibromyalgia    Depression    Degenerative disc disease, thoracic    CAD (coronary artery disease)    Bipolar disorder (HCC)    Asthma    Anxiety    Antinuclear antibody (MARCELO) titer greater than 1:80    Anemia    Acute kidney injury ([facility] [address]    Stroke ([facility] [address]    Controlled type 2 diabetes mellitus without complication (HCC)    Degenerative arthritis of right knee    S/P knee surgery    Chronic fatigue syndrome    Spondylosis of cervical region without myelopathy or radiculopathy    H/O hysterectomy with BSO at  of  for benign disease 2014    History of total bilateral knee replacement    H/O: hysterectomy    Atrial fibrillation with slow ventricular response ([facility] [address]    H/O: stroke    Acute cystitis without hematuria    Hypovolemia    Hypotension    Bradycardia    Chest pain     Bradycardia   last admission was taken off atenolol and was in nsr  Hr > 60 at d/c   tsh was nl   pos marcelo    Now has reccurrence bradycardia   h/o afib with slow response   pr nl    has not been talikng bb   off plkendil      ? Ischemia related bradycardia vs lupus associted conduction disorder      PLAN         cardiac stress test     may need cath      bp elevated    will follow and adjust meds      off dopamine      no chf      1/31   stress test today     ahr 79    bp stable    no cp         MD JENN Elliott[address], [address] [facility].    Phone [phone]   Fax: [phone]  Answering Service: [phone]

## 2018-02-01 NOTE — DISCHARGE SUMMARY
[facility]    Patient name:  Radha Noguera  YOB: 1965  Primary Care Physician: Rhea Blakely CNP    Date of admission:  1/26/2018  4:33 PM  Date of discharge: 1/31/2018    DISCHARGE DIAGNOSES       Principal Problem:    Bradycardia  Active Problems:    Hypertension    CAD (coronary artery disease)    Controlled type 2 diabetes mellitus without complication ([address]    Chest pain      HOSPITAL COURSE      Bradycardia  Off atenolol   stress neg tsh nl   pr nl    Resolved hr 70 at d/c nst   f/u holter/event monitor with cardiology  Consultants:  -    Procedures:  None    DISCHARGE MEDICATIONS        Kalie COULTER   Home Medication Instructions CNB:[id number]    Printed on:02/01/18 5542   Medication Information                      albuterol sulfate  (90 Base) MCG/ACT inhaler  Inhale 2 puffs into the lungs every 6 hours as needed for Wheezing             aspirin 81 MG EC tablet  Take 1 tablet by mouth 2 times daily             cetirizine (ZYRTEC) 10 MG tablet  TAKE 1 TABLET BY MOUTH ONE TIME A DAY             docusate sodium (COLACE) 100 MG capsule  Take 1 capsule by mouth 2 times daily as needed for Constipation             EPIPEN 2-JIGAR 0.3 MG/0.3ML SOAJ injection  INJECT 0.3 ML (0.3 MG) INTO THE MUSCLE ONCE AS NEEDED FOR UP TO 1 DOSE. for angioedema             famotidine (PEPCID) 40 MG tablet  TAKE 1 TABLET BY MOUTH ONE TIME A DAY             felodipine (PLENDIL) 10 MG extended release tablet  Take 1 tablet by mouth daily             glucose blood VI test strips (ASCENSIA AUTODISC VI;ONE TOUCH ULTRA TEST VI) strip  Use with associated glucose meter.              hydroxychloroquine (PLAQUENIL) 200 MG tablet  Take 1 tablet by mouth daily             lamoTRIgine (LAMICTAL) 25 MG tablet  Take 1 tablet by mouth 2 times daily             metFORMIN (GLUCOPHAGE) 500 MG tablet  Take 500 mg by mouth 2 times daily (with meals)             Multiple Vitamins-Minerals (THERAPEUTIC MULTIVITAMIN-MINERALS) tablet  Take 1 tablet by mouth daily             potassium chloride (KLOR-CON M) 20 MEQ extended release tablet  Take 20 mEq by mouth daily             Probiotic Product (PROBIOTIC & ACIDOPHILUS EX ST PO)  Take by mouth daily              QUEtiapine (SEROQUEL) 200 MG tablet  TAKE 1 TABLET BY MOUTH AT BEDTIME             VALSARTAN PO  Take 50 mg by mouth daily             venlafaxine (EFFEXOR) 75 MG tablet  Take 2 tablets (150 mg) in the morning and 1 tablet (75 mg) in the evening. vitamin B-12 (CYANOCOBALAMIN) 1000 MCG tablet  Take 1,000 mcg by mouth daily                 DISPOSITION AND FOLLOW-UP     Disposition: home    Condition: Stable     Diet:  Regular diet     Activity: As tolerated     Follow-up:   with Rosmery Venegas CNP,    Discharge time spent on pt and paperworki more than [address] MD JENN Lopez [address], [address] [place] [address].    Phone [phone]   Fax: [phone]  Answering Service: [phone]

## 2018-02-05 RX ORDER — CYCLOBENZAPRINE HCL 5 MG
TABLET ORAL
Qty: 100 TABLET | Refills: 1 | Status: SHIPPED | OUTPATIENT
Start: 2018-02-05 | End: 2018-06-06

## 2018-02-05 NOTE — TELEPHONE ENCOUNTER
Last filled 1/5/18 #45 with 0 RF  Last seen 1/25/18    Next Visit Date:  Future Appointments  Date Time Provider Sylvester Collins   2/22/2018 8:30 AM Lacy John MD Neph [facility]ia None   3/22/2018 1:00 PM Moises Speaker, CNP Neuro Spec [id number]   7/27/2018 7:30 AM Sukumar Langley,  [facility] Maintenance   Topic Date Due    Hepatitis C screen  1965    Diabetic foot exam  07/29/1975    Diabetic retinal exam  07/29/1975    Lipid screen  04/07/2018    Diabetic microalbuminuria test  10/30/2018    Colon Cancer Screen FIT/FOBT  11/01/2018    A1C test (Diabetic or Prediabetic)  12/07/2018    Potassium monitoring  01/30/2019    Creatinine monitoring  01/30/2019    Breast cancer screen  01/12/2020    DTaP/Tdap/Td vaccine (2 - Td) 09/14/2027    Flu vaccine  Completed    Pneumococcal med risk  Completed    HIV screen  Completed       Hemoglobin A1C (%)   Date Value   12/07/2017 5.1   05/24/2017 5.2   04/07/2017 4.8             ( goal A1C is < 7)   Microalb/Crt.  Ratio (mcg/mg creat)   Date Value   10/30/2017 CANNOT BE CALCULATED     LDL Cholesterol (mg/dL)   Date Value   04/07/2017 55       (goal LDL is <100)   AST (U/L)   Date Value   01/15/2018 18     ALT (U/L)   Date Value   01/15/2018 22     BUN (mg/dL)   Date Value   01/30/2018 10     BP Readings from Last 3 Encounters:   01/31/18 135/83   01/24/18 (!) 78/42   01/23/18 (!) 73/47          (goal 120/80)    All Future Testing planned in [facility]  Lab Frequency Next Occurrence   XR Knee Bilateral Standard Once 02/24/2017   CBC Once 14/40/7160   Basic Metabolic Panel Once 41/23/9810   Creatinine, Random Urine Once 08/26/2017   Protein, urine, random Once 08/26/2017   Baseline Diagnostic Sleep Study Once 06/01/2017   Sleep Study with PAP Titration Once 06/05/2017   Baseline Diagnostic Sleep Study Once 06/05/2017   PT eval and treat Once 46/60/7905   Basic Metabolic Panel     CBC Auto Differential     Creatinine, Random Urine

## 2018-02-08 ASSESSMENT — ENCOUNTER SYMPTOMS
SHORTNESS OF BREATH: 0
COUGH: 0

## 2018-02-13 ENCOUNTER — OFFICE VISIT (OUTPATIENT)
Dept: FAMILY MEDICINE CLINIC | Age: 53
End: 2018-02-13
Payer: MEDICAID

## 2018-02-13 VITALS
OXYGEN SATURATION: 100 % | DIASTOLIC BLOOD PRESSURE: 67 MMHG | SYSTOLIC BLOOD PRESSURE: 102 MMHG | HEIGHT: 68 IN | WEIGHT: 175 LBS | HEART RATE: 71 BPM | BODY MASS INDEX: 26.52 KG/M2

## 2018-02-13 DIAGNOSIS — Z01.818 PRE-OP EVALUATION: Primary | ICD-10-CM

## 2018-02-13 DIAGNOSIS — D64.9 ANEMIA, UNSPECIFIED TYPE: ICD-10-CM

## 2018-02-13 PROCEDURE — 99396 PREV VISIT EST AGE 40-64: CPT | Performed by: NURSE PRACTITIONER

## 2018-02-13 RX ORDER — PROPRANOLOL HYDROCHLORIDE 10 MG/1
20 TABLET ORAL 2 TIMES DAILY
COMMUNITY
End: 2018-08-15

## 2018-02-13 RX ORDER — GABAPENTIN 600 MG/1
600 TABLET ORAL 3 TIMES DAILY
COMMUNITY
End: 2018-05-10 | Stop reason: DRUGHIGH

## 2018-02-13 RX ORDER — LEVOTHYROXINE SODIUM 0.1 MG/1
100 TABLET ORAL DAILY
COMMUNITY

## 2018-02-13 RX ORDER — ERGOCALCIFEROL 1.25 MG/1
50000 CAPSULE ORAL WEEKLY
Status: ON HOLD | COMMUNITY
End: 2018-12-15 | Stop reason: HOSPADM

## 2018-02-13 RX ORDER — NALTREXONE HYDROCHLORIDE 50 MG/1
50 TABLET, FILM COATED ORAL DAILY
COMMUNITY
End: 2018-03-22 | Stop reason: ALTCHOICE

## 2018-02-13 RX ORDER — DISULFIRAM 250 MG/1
250 TABLET ORAL DAILY
COMMUNITY
End: 2018-03-22 | Stop reason: ALTCHOICE

## 2018-02-13 NOTE — PROGRESS NOTES
Visit Information    Have you changed or started any medications since your last visit including any over-the-counter medicines, vitamins, or herbal medicines? no   Have you stopped taking any of your medications? Is so, why? -  no  Are you having any side effects from any of your medications? - no    Have you seen any other physician or provider since your last visit?  no   Have you had any other diagnostic tests since your last visit? yes - 1/26/2018   Have you been seen in the emergency room and/or had an admission in a hospital since we last saw you?  yes - 1/26/2018-1/  Have you had your routine dental cleaning in the past 6 months?  no     Do you have an active MyChart account? If no, what is the barrier?   Yes    Patient Care Team:  Yasmeen Mayorga CNP as PCP - General (Certified Nurse Practitioner)  Yasmeen Mayorga CNP as Referring Physician (Certified Nurse Practitioner)    Medical History Review  Past Medical, Family, and Social History reviewed and does contribute to the patient presenting condition    Health Maintenance   Topic Date Due    Hepatitis C screen  1965    Diabetic foot exam  07/29/1975    Lipid screen  04/07/2018    Diabetic microalbuminuria test  10/30/2018    Colon Cancer Screen FIT/FOBT  11/01/2018    A1C test (Diabetic or Prediabetic)  12/07/2018    Diabetic retinal exam  01/24/2019    Potassium monitoring  01/30/2019    Creatinine monitoring  01/30/2019    Breast cancer screen  01/12/2020    DTaP/Tdap/Td vaccine (2 - Td) 09/14/2027    Flu vaccine  Completed    Pneumococcal med risk  Completed    HIV screen  Completed

## 2018-02-13 NOTE — PROGRESS NOTES
extended release tablet Take 20 mEq by mouth daily      felodipine (PLENDIL) 10 MG extended release tablet Take 1 tablet by mouth daily 30 tablet 3    aspirin 81 MG EC tablet Take 1 tablet by mouth 2 times daily (Patient taking differently: Take 81 mg by mouth daily ) 84 tablet 0    docusate sodium (COLACE) 100 MG capsule Take 1 capsule by mouth 2 times daily as needed for Constipation 60 capsule 1    metFORMIN (GLUCOPHAGE) 500 MG tablet Take 500 mg by mouth 2 times daily (with meals)      venlafaxine (EFFEXOR) 75 MG tablet Take 2 tablets (150 mg) in the morning and 1 tablet (75 mg) in the evening. 90 tablet 3    famotidine (PEPCID) 40 MG tablet TAKE 1 TABLET BY MOUTH ONE TIME A DAY  4    cetirizine (ZYRTEC) 10 MG tablet TAKE 1 TABLET BY MOUTH ONE TIME A DAY  4    Multiple Vitamins-Minerals (THERAPEUTIC MULTIVITAMIN-MINERALS) tablet Take 1 tablet by mouth daily      Probiotic Product (PROBIOTIC & ACIDOPHILUS EX ST PO) Take by mouth daily       vitamin B-12 (CYANOCOBALAMIN) 1000 MCG tablet Take 1,000 mcg by mouth daily         This patient presents to the office today for a preoperative consultation at the request of surgeon, Dr. Phoenix Asencio and Marge Barksdale, who plans on performing bladder sling on unknown  at [facility]. The current problem began unknown ago, and symptoms have been worsening with time. Conservative therapy: Yes: meds, which has been not very effective. .    Planned anesthesia: General   Known anesthesia problems: None   Bleeding risk: No recent or remote history of abnormal bleeding  Personal or FH of DVT/PE: No    Patient objection to receiving blood products: No  She is also going to get clearance from cardio- recent stress test which was negative.        Patient Active Problem List   Diagnosis    Seizures ([address]    Transient insomnia    Sleep walking disorder    Raynaud's disease    Pulmonary nodules    [facility]uropathy ([facility]    Hypertension    Headache    GERD (gastroesophageal reflux disease)  Fibromyalgia    Depression    Degenerative disc disease, thoracic    CAD (coronary artery disease)    Bipolar disorder (HCC)    Asthma    Anxiety    Antinuclear antibody (IQRA) titer greater than 1:80    Anemia    Acute kidney injury ([address]    Stroke ([address]    Controlled type 2 diabetes mellitus without complication (HCC)    Degenerative arthritis of right knee    S/P knee surgery    Chronic fatigue syndrome    Spondylosis of cervical region without myelopathy or radiculopathy    H/O hysterectomy with BSO at [facility] for benign disease 2014    History of total bilateral knee replacement    H/O: hysterectomy    Atrial fibrillation with slow ventricular response ([address]    H/O: stroke    Acute cystitis without hematuria    Hypovolemia    Hypotension    Bradycardia    Chest pain       Past Medical History:   Diagnosis Date    Acute kidney injury ([address]     Anemia     Angioedema     Antinuclear antibody (IQRA) titer greater than 1:80     Anxiety     Asthma     Ataxia     Degenerative disc disease, thoracic     Depression     Diabetes mellitus (HCC)     DJD (degenerative joint disease)     Fibromyalgia     GERD (gastroesophageal reflux disease)     H/O: hysterectomy     Headache     Hernia of abdominal wall     Hyperlipidemia     Hypertension     Mood disorder (HCC)     Neuropathy ([address]     PTSD (post-traumatic stress disorder)     Pulmonary nodules     Raynaud's disease     Scleroderma ([address]     Seizures ([address]     Sleep apnea     Sleep walking disorder     Transient insomnia      Past Surgical History:   Procedure Laterality Date    ABDOMINOPLASTY  2013    CARPAL TUNNEL RELEASE  2016    two times    CHOLECYSTECTOMY  1989    ELBOW SURGERY Right     GASTRIC BYPASS SURGERY  2012    HYSTERECTOMY      JOINT REPLACEMENT Bilateral     KNEE SURGERY  [id number]    both statse 24 knee surgeries    KNEE SURGERY Right 05/02/2017    (femoral) patella replacement    normal and normal range of motion. Neck supple. No JVD present. Carotid bruit is not present. No mass and no thyromegaly present. Cardiovascular: Normal rate, regular rhythm, normal heart sounds and intact distal pulses. Exam reveals no gallop and no friction rub. No murmur heard. Pulmonary/Chest: Effort normal and breath sounds normal. No respiratory distress. She has no wheezes. She has no rales. Abdominal: Soft. Normal aorta and bowel sounds are normal. She exhibits no distension and no mass. There is no hepatosplenomegaly. No tenderness. Musculoskeletal: She exhibits no edema and no tenderness. Neurological: She is alert and oriented to person, place, and time. She has normal strength. No cranial nerve deficit or sensory deficit. Coordination and gait normal.   Skin: Skin is warm and dry. No rash noted. No erythema. Psychiatric: She has a normal mood and affect. Her behavior is normal.     EKG Interpretation:  normal EKG, normal sinus rhythm, unchanged from previous tracings. Lab Review   Lab Results   Component Value Date     01/30/2018    K 3.9 01/30/2018     01/30/2018    CO2 25 01/30/2018    BUN 10 01/30/2018    CREATININE 0.85 01/30/2018    GLUCOSE 73 01/30/2018    CALCIUM 8.6 01/30/2018     Lab Results   Component Value Date    WBC 4.5 01/30/2018    HGB 10.3 01/30/2018    HCT 29.3 01/30/2018    MCV 87.4 01/30/2018     01/30/2018           Assessment:       46 y.o. patient with planned surgery as above. Known risk factors for perioperative complications: Congestive heart failure, Hypertension  Current medications which may produce withdrawal symptoms if withheld perioperatively: none      Plan:     1. Preoperative workup as follows: none- done in recent hospital stay  2. Change in medication regimen before surgery: None- per surgeons  3.  Prophylaxis for cardiac events with perioperative beta-blockers: Currently taking  propranolol  ACC/AHA indications for pre-operative

## 2018-02-16 ENCOUNTER — OFFICE VISIT (OUTPATIENT)
Dept: GASTROENTEROLOGY | Age: 53
End: 2018-02-16
Payer: MEDICAID

## 2018-02-16 VITALS
HEART RATE: 67 BPM | DIASTOLIC BLOOD PRESSURE: 73 MMHG | BODY MASS INDEX: 26.91 KG/M2 | SYSTOLIC BLOOD PRESSURE: 116 MMHG | WEIGHT: 177 LBS

## 2018-02-16 DIAGNOSIS — R19.7 DIARRHEA, UNSPECIFIED TYPE: ICD-10-CM

## 2018-02-16 DIAGNOSIS — R10.9 ABDOMINAL PAIN, UNSPECIFIED ABDOMINAL LOCATION: ICD-10-CM

## 2018-02-16 PROCEDURE — G8417 CALC BMI ABV UP PARAM F/U: HCPCS | Performed by: INTERNAL MEDICINE

## 2018-02-16 PROCEDURE — G8598 ASA/ANTIPLAT THER USED: HCPCS | Performed by: INTERNAL MEDICINE

## 2018-02-16 PROCEDURE — 99213 OFFICE O/P EST LOW 20 MIN: CPT | Performed by: INTERNAL MEDICINE

## 2018-02-16 PROCEDURE — 3014F SCREEN MAMMO DOC REV: CPT | Performed by: INTERNAL MEDICINE

## 2018-02-16 PROCEDURE — 1036F TOBACCO NON-USER: CPT | Performed by: INTERNAL MEDICINE

## 2018-02-16 PROCEDURE — G8484 FLU IMMUNIZE NO ADMIN: HCPCS | Performed by: INTERNAL MEDICINE

## 2018-02-16 PROCEDURE — G8427 DOCREV CUR MEDS BY ELIG CLIN: HCPCS | Performed by: INTERNAL MEDICINE

## 2018-02-16 PROCEDURE — 3017F COLORECTAL CA SCREEN DOC REV: CPT | Performed by: INTERNAL MEDICINE

## 2018-02-16 PROCEDURE — 1111F DSCHRG MED/CURRENT MED MERGE: CPT | Performed by: INTERNAL MEDICINE

## 2018-02-16 RX ORDER — OMEPRAZOLE 20 MG/1
20 CAPSULE, DELAYED RELEASE ORAL 2 TIMES DAILY
COMMUNITY
End: 2018-05-07

## 2018-02-16 RX ORDER — LOSARTAN POTASSIUM 100 MG/1
100 TABLET ORAL DAILY
COMMUNITY
End: 2018-04-10 | Stop reason: SDUPTHER

## 2018-02-16 NOTE — PROGRESS NOTES
DIGESTIVE HEALTH PROGRESS NOTE    HISTORY OF PRESENT ILLNESS: Ms. Neema Ca is a 46 y.o. female who presents for follow up on diarrhea and bloating. For the hospital recently when she was having bradycardia. We started her on rifaximin for possible bacterial overgrowth. She feels much better. She reports no further diarrhea. She reports minimal bloating. She reports no prior screening colonoscopy. She reports negative FIT test recently. Past Medical, Family, and Social History reviewed and does not contribute to the patient presenting condition. Patient's PMH/PSH,SH,PSYCH Hx, MEDs, ALLERGIES, and ROS were all reviewed and updated in the appropriate sections.     PAST MEDICAL HISTORY:  Past Medical History:   Diagnosis Date    Acute kidney injury ([address]     Anemia     Angioedema     Antinuclear antibody (IQRA) titer greater than 1:80     Anxiety     Asthma     Ataxia     Degenerative disc disease, thoracic     Depression     Diabetes mellitus (HCC)     DJD (degenerative joint disease)     Fibromyalgia     GERD (gastroesophageal reflux disease)     H/O: hysterectomy     Headache     Hernia of abdominal wall     Hyperlipidemia     Hypertension     Mood disorder (HCC)     Neuropathy (HCC)     PTSD (post-traumatic stress disorder)     Pulmonary nodules     Raynaud's disease     Scleroderma ([address]     Seizures ([address]     Sleep apnea     Sleep walking disorder     Transient insomnia        Past Surgical History:   Procedure Laterality Date    ABDOMINOPLASTY  2013    CARPAL TUNNEL RELEASE  2016    two times   [address] [facility] Right     GASTRIC BYPASS SURGERY  2012    HYSTERECTOMY      JOINT REPLACEMENT Bilateral     KNEE SURGERY  [id number]    both statse 24 knee surgeries    KNEE SURGERY Right 05/02/2017    (femoral) patella replacement    TONSILLECTOMY  1986    TOTAL KNEE ARTHROPLASTY  2011    left knee    TOTAL KNEE ARTHROPLASTY Right 5/2/2017 PATELLOFEMORAL REPLACEMENT KNEE - JAXSON, 3080 TABLE, FEMORAL POPLITEAL BLOCK, NSA= SPINAL VS GENERAL performed by Clovis Soto DO at [facility]  2004       CURRENT MEDICATIONS:    Current Outpatient Prescriptions:     losartan (COZAAR) 100 MG tablet, Take 100 mg by mouth daily, Disp: , Rfl:     omeprazole (PRILOSEC) 20 MG delayed release capsule, Take 20 mg by mouth 2 times daily, Disp: , Rfl:     vitamin D (ERGOCALCIFEROL) 52325 units CAPS capsule, Take 50,000 Units by mouth once a week, Disp: , Rfl:     gabapentin (NEURONTIN) 600 MG tablet, Take 600 mg by mouth 3 times daily. , Disp: , Rfl:     levothyroxine (SYNTHROID) 25 MCG tablet, Take 25 mcg by mouth Daily, Disp: , Rfl:     naltrexone (DEPADE) 50 MG tablet, Take 50 mg by mouth daily, Disp: , Rfl:     disulfiram (ANTABUSE) 250 MG tablet, Take 250 mg by mouth daily, Disp: , Rfl:     propranolol (INDERAL) 10 MG tablet, Take 20 mg by mouth 2 times daily, Disp: , Rfl:     cyclobenzaprine (FLEXERIL) 5 MG tablet, TAKE 1 OR 2 TABLETS BY MOUTH AT BEDTIME AS NEEDED, Disp: 100 tablet, Rfl: 1    hydroxychloroquine (PLAQUENIL) 200 MG tablet, Take 1 tablet by mouth daily, Disp: 30 tablet, Rfl: 5    QUEtiapine (SEROQUEL) 200 MG tablet, TAKE 1 TABLET BY MOUTH AT BEDTIME, Disp: 60 tablet, Rfl: 1    albuterol sulfate  (90 Base) MCG/ACT inhaler, Inhale 2 puffs into the lungs every 6 hours as needed for Wheezing, Disp: 1 Inhaler, Rfl: 3    glucose blood VI test strips (ASCENSIA AUTODISC VI;ONE TOUCH ULTRA TEST VI) strip, Use with associated glucose meter. , Disp: 100 strip, Rfl: 11    EPIPEN 2-JIGAR 0.3 MG/0.3ML SOAJ injection, INJECT 0.3 ML (0.3 MG) INTO THE MUSCLE ONCE AS NEEDED FOR UP TO 1 DOSE. for angioedema, Disp: 1 each, Rfl: 2    lamoTRIgine (LAMICTAL) 25 MG tablet, Take 1 tablet by mouth 2 times daily (Patient taking differently: Take 50 mg by mouth 2 times daily ), Disp: 30 tablet, Rfl: 3    potassium chloride (KLOR-CON M) Topics Concern    Not on file     Social History Narrative    No narrative on file       REVIEW OF SYSTEMS: A 12-point review of systems was obtained and pertinent positives and negatives were enumerated above in the history of present illness. All other reviewed systems / symptoms were negative. Review of Systems     PHYSICAL EXAMINATION: Vital signs reviewed per the nursing documentation. /73 (Site: Right Arm, Position: Sitting, Cuff Size: Medium Adult)   Pulse 67   Wt 177 lb (80.3 kg)   BMI 26.91 kg/m²   Body mass index is 26.91 kg/m². I personally reviewed the nurse's notes and documentation and I agree with her notes. General: alert, appears stated age and cooperative Psych: Normal. and Alert and oriented, appropriate affect. . Normal affect. Mentation normal  HEENT: PERRLA. Clear conjunctivae and sclerae. Moist oral mucosae, no lesions or ulcers. The neck is supple, without lymphadenopathy or jugular venous distension. No masses. Normal thyroid. Cardiovascular: S1 S2 RRR no rubs or murmurs. Pulmonary: clear BL. No accessory muscle usage. Abdominal Exam: Soft, NT ND, no hepato or spleno megaly, +BS, no ascites.         LABORATORY DATA: Reviewed  Lab Results   Component Value Date    WBC 4.5 01/30/2018    HGB 10.3 (L) 01/30/2018    HCT 29.3 (L) 01/30/2018    MCV 87.4 01/30/2018     01/30/2018     01/30/2018    K 3.9 01/30/2018     01/30/2018    CO2 25 01/30/2018    BUN 10 01/30/2018    CREATININE 0.85 01/30/2018    LABALBU 4.3 01/15/2018    BILITOT 0.20 (L) 01/15/2018    ALKPHOS 131 (H) 01/15/2018    AST 18 01/15/2018    ALT 22 01/15/2018    INR 1.0 01/26/2018         Lab Results   Component Value Date    RBC 3.35 (L) 01/30/2018    HGB 10.3 (L) 01/30/2018    MCV 87.4 01/30/2018    MCH 30.8 01/30/2018    MCHC 35.2 01/30/2018    RDW 13.6 01/30/2018    MPV 9.1 01/30/2018    BASOPCT 1 01/30/2018    LYMPHSABS 1.60 01/30/2018    MONOSABS 0.50 01/30/2018    NEUTROABS 2.10 vascular territory with abnormalities but without LV dilation 4. Small wall motion abnormality involving 1-2 segments and only 1 coronary bed. Low Risk (Less than 1% annual death or MI) 1. Normal or small myocardial perfusion defect at rest or with stress encumbering less than 5% of the myocardium. IMPRESSION: Ms. Enrique Pappas is a 46 y.o. female with chronic diarrhea. Bloating. Very likely bacterial overgrowth. Much better after a course of rifaximin. We discussed natural history. We discussed screening for colon cancer. She opted to get fecal occult blood testing. Arnie Zarate MD [facility]      Please note that this chart was generated using voice recognition Dragon dictation software. Although every effort was made to ensure the accuracy of this automated transcription, some errors in transcription may have occurred.

## 2018-02-21 ENCOUNTER — TELEPHONE (OUTPATIENT)
Dept: FAMILY MEDICINE CLINIC | Age: 53
End: 2018-02-21

## 2018-02-21 RX ORDER — MEDICAL SUPPLY, MISCELLANEOUS
1 EACH MISCELLANEOUS DAILY
Qty: 1 EACH | Refills: 0 | Status: ON HOLD | OUTPATIENT
Start: 2018-02-21 | End: 2018-11-10 | Stop reason: HOSPADM

## 2018-02-22 ENCOUNTER — HOSPITAL ENCOUNTER (OUTPATIENT)
Age: 53
Setting detail: SPECIMEN
Discharge: HOME | End: 2018-02-22
Payer: MEDICAID

## 2018-02-22 DIAGNOSIS — N18.2 CKD (CHRONIC KIDNEY DISEASE), STAGE II: ICD-10-CM

## 2018-02-22 LAB
ABSOLUTE EOS #: 0.23 K/UL (ref 0–0.44)
ABSOLUTE IMMATURE GRANULOCYTE: <0.03 K/UL (ref 0–0.3)
ABSOLUTE LYMPH #: 1.7 K/UL (ref 1.1–3.7)
ABSOLUTE MONO #: 0.45 K/UL (ref 0.1–1.2)
ANION GAP SERPL CALCULATED.3IONS-SCNC: 11 MMOL/L (ref 9–17)
BASOPHILS # BLD: 2 % (ref 0–2)
BASOPHILS ABSOLUTE: 0.09 K/UL (ref 0–0.2)
BILIRUBIN URINE: NEGATIVE
BUN BLDV-MCNC: 19 MG/DL (ref 6–20)
BUN/CREAT BLD: ABNORMAL (ref 9–20)
CALCIUM SERPL-MCNC: 8.8 MG/DL (ref 8.6–10.4)
CHLORIDE BLD-SCNC: 104 MMOL/L (ref 98–107)
CO2: 25 MMOL/L (ref 20–31)
COLOR: YELLOW
COMMENT UA: NORMAL
CREAT SERPL-MCNC: 0.99 MG/DL (ref 0.5–0.9)
CREATININE URINE: 110.4 MG/DL (ref 28–217)
DIFFERENTIAL TYPE: ABNORMAL
EOSINOPHILS RELATIVE PERCENT: 5 % (ref 1–4)
GFR AFRICAN AMERICAN: >60 ML/MIN
GFR NON-AFRICAN AMERICAN: 59 ML/MIN
GFR SERPL CREATININE-BSD FRML MDRD: ABNORMAL ML/MIN/{1.73_M2}
GFR SERPL CREATININE-BSD FRML MDRD: ABNORMAL ML/MIN/{1.73_M2}
GLUCOSE BLD-MCNC: 78 MG/DL (ref 70–99)
GLUCOSE URINE: NEGATIVE
HCT VFR BLD CALC: 34.2 % (ref 36.3–47.1)
HEMOGLOBIN: 10.9 G/DL (ref 11.9–15.1)
IMMATURE GRANULOCYTES: 1 %
KETONES, URINE: NEGATIVE
LEUKOCYTE ESTERASE, URINE: NEGATIVE
LYMPHOCYTES # BLD: 40 % (ref 24–43)
MCH RBC QN AUTO: 28.6 PG (ref 25.2–33.5)
MCHC RBC AUTO-ENTMCNC: 31.9 G/DL (ref 28.4–34.8)
MCV RBC AUTO: 89.8 FL (ref 82.6–102.9)
MONOCYTES # BLD: 11 % (ref 3–12)
NITRITE, URINE: NEGATIVE
NRBC AUTOMATED: 0 PER 100 WBC
PDW BLD-RTO: 13.1 % (ref 11.8–14.4)
PH UA: 7 (ref 5–8)
PHOSPHORUS: 4.1 MG/DL (ref 2.6–4.5)
PLATELET # BLD: 223 K/UL (ref 138–453)
PLATELET ESTIMATE: ABNORMAL
PMV BLD AUTO: 10.8 FL (ref 8.1–13.5)
POTASSIUM SERPL-SCNC: 5.1 MMOL/L (ref 3.7–5.3)
PROTEIN UA: NEGATIVE
RBC # BLD: 3.81 M/UL (ref 3.95–5.11)
RBC # BLD: ABNORMAL 10*6/UL
SEG NEUTROPHILS: 41 % (ref 36–65)
SEGMENTED NEUTROPHILS ABSOLUTE COUNT: 1.78 K/UL (ref 1.5–8.1)
SODIUM BLD-SCNC: 140 MMOL/L (ref 135–144)
SPECIFIC GRAVITY UA: 1.02 (ref 1–1.03)
TOTAL PROTEIN, URINE: 15 MG/DL
TURBIDITY: CLEAR
URINE HGB: NEGATIVE
UROBILINOGEN, URINE: NORMAL
WBC # BLD: 4.3 K/UL (ref 3.5–11.3)
WBC # BLD: ABNORMAL 10*3/UL

## 2018-03-05 ENCOUNTER — HOSPITAL ENCOUNTER (OUTPATIENT)
Age: 53
Setting detail: SPECIMEN
Discharge: HOME | End: 2018-03-05
Payer: MEDICAID

## 2018-03-05 LAB
THYROXINE, FREE: 0.9 NG/DL (ref 0.93–1.7)
TSH SERPL DL<=0.05 MIU/L-ACNC: 1.33 MIU/L (ref 0.3–5)
VITAMIN D 25-HYDROXY: 31.7 NG/ML (ref 30–100)

## 2018-03-06 LAB
ESTIMATED AVERAGE GLUCOSE: 100 MG/DL
HBA1C MFR BLD: 5.1 % (ref 4–6)

## 2018-03-09 NOTE — TELEPHONE ENCOUNTER
Last refill previous physician  Last visit 01/23/2018    Next Visit Date:  Future Appointments  Date Time Provider Sylvester Delongi   3/22/2018 1:00 PM Layla Jarrett CNP Neuro Spec [facility]   5/14/2018 8:15 AM Rashawn Hairston CNP [facility]   7/27/2018 7:30 AM Sukumar Aguilar, DO ORTHO [facility]   8/23/2018 8:30 AM Casimer Schilder, MD Neph [facility] None       Health Maintenance   Topic Date Due    Hepatitis C screen  1965    Diabetic foot exam  07/29/1975    Shingles Vaccine (1 of 2 - 2 Dose Series) 07/29/2015    Lipid screen  04/07/2018    Diabetic microalbuminuria test  10/30/2018    Colon Cancer Screen FIT/FOBT  11/01/2018    Diabetic retinal exam  01/24/2019    Potassium monitoring  02/22/2019    Creatinine monitoring  02/22/2019    A1C test (Diabetic or Prediabetic)  03/05/2019    Breast cancer screen  01/12/2020    DTaP/Tdap/Td vaccine (2 - Td) 09/14/2027    Flu vaccine  Completed    Pneumococcal med risk  Completed    HIV screen  Completed       Hemoglobin A1C (%)   Date Value   03/05/2018 5.1   12/07/2017 5.1   05/24/2017 5.2             ( goal A1C is < 7)   Microalb/Crt.  Ratio (mcg/mg creat)   Date Value   10/30/2017 CANNOT BE CALCULATED     LDL Cholesterol (mg/dL)   Date Value   04/07/2017 55       (goal LDL is <100)   AST (U/L)   Date Value   01/15/2018 18     ALT (U/L)   Date Value   01/15/2018 22     BUN (mg/dL)   Date Value   02/22/2018 19     BP Readings from Last 3 Encounters:   02/22/18 116/66   02/16/18 116/73   02/13/18 102/67          (goal 120/80)    All Future Testing planned in [facility]  Lab Frequency Next Occurrence   CBC Once 60/89/2929   Basic Metabolic Panel Once 19/47/7992   Creatinine, Random Urine Once 08/26/2017   Protein, urine, random Once 08/26/2017   Baseline Diagnostic Sleep Study Once 06/01/2017   Sleep Study with PAP Titration Once 06/05/2017   Baseline Diagnostic Sleep Study Once 06/05/2017   PT eval and treat Once 06/21/2017   Basic

## 2018-03-13 RX ORDER — CETIRIZINE HYDROCHLORIDE 10 MG/1
10 TABLET ORAL DAILY
Qty: 30 TABLET | Refills: 5 | Status: SHIPPED | OUTPATIENT
Start: 2018-03-13 | End: 2018-08-28 | Stop reason: SDUPTHER

## 2018-03-13 RX ORDER — FLUCONAZOLE 150 MG/1
TABLET ORAL
Qty: 2 TABLET | Refills: 0 | Status: SHIPPED | OUTPATIENT
Start: 2018-03-13 | End: 2018-10-11 | Stop reason: ALTCHOICE

## 2018-03-22 ENCOUNTER — OFFICE VISIT (OUTPATIENT)
Dept: NEUROLOGY | Age: 53
End: 2018-03-22
Payer: MEDICAID

## 2018-03-22 VITALS
DIASTOLIC BLOOD PRESSURE: 89 MMHG | HEIGHT: 68 IN | HEART RATE: 74 BPM | SYSTOLIC BLOOD PRESSURE: 138 MMHG | BODY MASS INDEX: 26.98 KG/M2 | WEIGHT: 178 LBS

## 2018-03-22 DIAGNOSIS — R56.9 SEIZURES (HCC): ICD-10-CM

## 2018-03-22 DIAGNOSIS — M47.812 SPONDYLOSIS OF CERVICAL REGION WITHOUT MYELOPATHY OR RADICULOPATHY: Primary | ICD-10-CM

## 2018-03-22 PROCEDURE — G8598 ASA/ANTIPLAT THER USED: HCPCS | Performed by: NURSE PRACTITIONER

## 2018-03-22 PROCEDURE — 3017F COLORECTAL CA SCREEN DOC REV: CPT | Performed by: NURSE PRACTITIONER

## 2018-03-22 PROCEDURE — 99214 OFFICE O/P EST MOD 30 MIN: CPT | Performed by: NURSE PRACTITIONER

## 2018-03-22 PROCEDURE — 3014F SCREEN MAMMO DOC REV: CPT | Performed by: NURSE PRACTITIONER

## 2018-03-22 PROCEDURE — G8417 CALC BMI ABV UP PARAM F/U: HCPCS | Performed by: NURSE PRACTITIONER

## 2018-03-22 PROCEDURE — 1036F TOBACCO NON-USER: CPT | Performed by: NURSE PRACTITIONER

## 2018-03-22 PROCEDURE — G8427 DOCREV CUR MEDS BY ELIG CLIN: HCPCS | Performed by: NURSE PRACTITIONER

## 2018-03-22 PROCEDURE — G8482 FLU IMMUNIZE ORDER/ADMIN: HCPCS | Performed by: NURSE PRACTITIONER

## 2018-03-22 NOTE — PROGRESS NOTES
[facility]            [address]          [place], [address] [facility]          Dept: [phone]          Dept Fax: [phone]        MD Jane Dickens MD Ahmed B. Leonore Carrie, MD Irene Lowers, MD Thurmond Shake, MD Marti Shine, CNP            3/22/2018    HPI:      Your patient, Aravind Francois returns for continuing neurologic care. Patient is a 59-year-old woman who was seen last by Dr. Zarina Austin, as well as Dr. Marquis Victor and all of their records were carefully reviewed. Patient's primarily suffers from neck pain and has had previous MRI of the cervical spine revealing multilevel degenerative disc disease as well as C5-C6 disc protrusion mildly indenting the thecal sac and closely abutting the ventral cord. Patient believes that her neck pain has become significantly worse on a scale of 7/10. She primarily takes gabapentin 600 mg 3 times daily, which she does not think significantly helps her pain. She had been referred to a pain management physician, however has not yet followed up with that. Patient also has low back pain and had an MRI done in 2014 which revealed multilevel lumbar degenerative joint disease, worse at L5-S1 and L4-L5. Patient also had an episode of generalized tonic-clonic activity in January 2017 while in [place], which may have been provoked by tramadol. An EEG in 2017 was normal.  An MRI of the brain in April 2017 was normal as well. Registration to that testing CT angiography of the head and neck were also completed, which were normal.  She was hospitalized at [facility] in June 2017 and was told she did not have a seizure or a seizure disorder that her episodes were most likely syncopal related to low blood pressure. She is treated with Lamictal 25 mg twice daily, but this has been taken over by 6 psychiatry for treatment of her depression.   She (PLENDIL) 10 MG extended release tablet Take 1 tablet by mouth daily 30 tablet 3    aspirin 81 MG EC tablet Take 1 tablet by mouth 2 times daily 84 tablet 0    docusate sodium (COLACE) 100 MG capsule Take 1 capsule by mouth 2 times daily as needed for Constipation (Patient taking differently: Take 100 mg by mouth daily as needed for Constipation ) 60 capsule 1    metFORMIN (GLUCOPHAGE) 500 MG tablet Take 500 mg by mouth daily (with breakfast)       venlafaxine (EFFEXOR) 75 MG tablet Take 2 tablets (150 mg) in the morning and 1 tablet (75 mg) in the evening. 90 tablet 3    famotidine (PEPCID) 40 MG tablet TAKE 1 TABLET BY MOUTH ONE TIME A DAY  4    Multiple Vitamins-Minerals (THERAPEUTIC MULTIVITAMIN-MINERALS) tablet Take 1 tablet by mouth daily      Probiotic Product (PROBIOTIC & ACIDOPHILUS EX ST PO) Take by mouth daily       vitamin B-12 (CYANOCOBALAMIN) 1000 MCG tablet Take 1,000 mcg by mouth daily       No current facility-administered medications for this visit. PHYSICAL EXAMINATION       /89 (Site: Right Arm, Position: Sitting)   Pulse 74   Ht 5' 8\" (1.727 m)   Wt 178 lb (80.7 kg)   BMI 27.06 kg/m²                                             .                                                                                                       General Appearance:  Alert, cooperative, no signs of distress, appears stated age   Head:  Normocephalic, no signs of trauma   Eyes:  Conjunctiva/corneas clear;  eyelids intact   Ears:  Normal external ear and canals   Nose: Nares normal, mucosa normal, no drainage    Throat: Lips and tongue normal; teeth normal;  gums normal   Neck: Supple, intact flexion, extension and rotation;   trachea midline;  no adenopathy;   thyroid: not enlarged;   no carotid pulse abnormality   Back:   Symmetric, no curvature, ROM adequate   Lungs:   Respirations unlabored   Heart:  Regular rate and rhythm           Extremities: Extremities normal, no cyanosis, no edema   Pulses: Symmetric over head and neck   Skin: Skin color, texture normal, no rashes, no lesions                                               NEUROLOGIC EXAMINATION    Neurologic Exam     Mental Status   Oriented to person, place, and time. Attention: normal.   Speech: speech is normal   Level of consciousness: alert  Normal comprehension. Cranial Nerves     CN II   Visual fields full to confrontation. CN III, IV, VI   Pupils are equal, round, and reactive to light. Extraocular motions are normal.     CN V   Facial sensation intact. CN VII   Facial expression full, symmetric. CN VIII   CN VIII normal.     CN IX, X   CN IX normal.     CN XI   CN XI normal.     CN XII   CN XII normal.     Motor Exam   Muscle bulk: normal  Overall muscle tone: normal  Right arm tone: normal  Left arm tone: normal  Right arm pronator drift: absent  Left arm pronator drift: absent  Right leg tone: normal  Left leg tone: normal    Strength   Strength 5/5 throughout. Sensory Exam   Light touch normal.   Right leg vibration: decreased from ankle  Left leg vibration: decreased from knee  Pinprick normal.     Gait, Coordination, and Reflexes     Gait  Gait: non-neurologic (Possible ataxic versus histrionic gait)    Coordination   Finger to nose coordination: normal    Tremor   Resting tremor: absent    Reflexes   Right brachioradialis: 2+  Left brachioradialis: 2+  Right biceps: 2+  Left biceps: 2+  Right triceps: 2+  Left triceps: 2+  Right patellar: 2+  Left patellar: 2+  Right achilles: 2+  Left achilles: 2+  Right : 0  Left : 0            ASSESSMENT/PLAN:       In summary, your patient, Neema Ca exhibits the following, with associated plan:    1. Cervical spondylosis with an increase in symptoms  1. Continue gabapentin 600 mg 3 times daily  2.  Obtain MRI of the cervical spine with and without contrast to determine if there is any progression of her cervical spondylosis, with associated stenosis causing her to have myelopathic Symptoms. 3. If MRI shows no progression of disease, patient will be referred to pain management for YOLANDA injections  4. Patient will return for reevaluation in 3 months  2. Lumbar disc disease  3. Syncope versus seizure, currently treated with Lamictal for psychiatric disease  1.  Continue Lamictal 25 mg twice a day per psychiatry            Signed: Rogelio Scales CNP

## 2018-04-02 ENCOUNTER — HOSPITAL ENCOUNTER (OUTPATIENT)
Dept: MRI IMAGING | Age: 53
Discharge: HOME | End: 2018-04-04
Payer: MEDICAID

## 2018-04-02 ENCOUNTER — APPOINTMENT (OUTPATIENT)
Dept: MRI IMAGING | Facility: CLINIC | Age: 53
End: 2018-04-02
Payer: MEDICAID

## 2018-04-02 DIAGNOSIS — M47.812 SPONDYLOSIS OF CERVICAL REGION WITHOUT MYELOPATHY OR RADICULOPATHY: ICD-10-CM

## 2018-04-02 PROCEDURE — A9579 GAD-BASE MR CONTRAST NOS,1ML: HCPCS | Performed by: NURSE PRACTITIONER

## 2018-04-02 PROCEDURE — 72156 MRI NECK SPINE W/O & W/DYE: CPT

## 2018-04-02 PROCEDURE — 6360000004 HC RX CONTRAST MEDICATION: Performed by: NURSE PRACTITIONER

## 2018-04-02 RX ADMIN — GADOTERIDOL 16 ML: 279.3 INJECTION, SOLUTION INTRAVENOUS at 09:03

## 2018-04-04 ENCOUNTER — TELEPHONE (OUTPATIENT)
Dept: NEUROLOGY | Age: 53
End: 2018-04-04

## 2018-04-04 DIAGNOSIS — M47.12 OSTEOARTHRITIS OF CERVICAL SPINE WITH MYELOPATHY: Primary | ICD-10-CM

## 2018-04-10 RX ORDER — LOSARTAN POTASSIUM 100 MG/1
100 TABLET ORAL DAILY
Qty: 30 TABLET | Refills: 5 | Status: ON HOLD | OUTPATIENT
Start: 2018-04-10 | End: 2018-11-11 | Stop reason: HOSPADM

## 2018-04-11 RX ORDER — FAMOTIDINE 40 MG/1
40 TABLET, FILM COATED ORAL EVERY EVENING
Qty: 90 TABLET | Refills: 1 | Status: SHIPPED | OUTPATIENT
Start: 2018-04-11 | End: 2018-09-27 | Stop reason: SDUPTHER

## 2018-04-12 ENCOUNTER — HOSPITAL ENCOUNTER (OUTPATIENT)
Dept: PAIN MANAGEMENT | Age: 53
Discharge: HOME | End: 2018-04-12
Payer: MEDICAID

## 2018-04-12 VITALS
HEART RATE: 75 BPM | WEIGHT: 175 LBS | SYSTOLIC BLOOD PRESSURE: 124 MMHG | DIASTOLIC BLOOD PRESSURE: 72 MMHG | HEIGHT: 68 IN | RESPIRATION RATE: 14 BRPM | BODY MASS INDEX: 26.52 KG/M2 | TEMPERATURE: 97.8 F

## 2018-04-12 PROCEDURE — 99214 OFFICE O/P EST MOD 30 MIN: CPT | Performed by: PAIN MEDICINE

## 2018-04-12 PROCEDURE — 99203 OFFICE O/P NEW LOW 30 MIN: CPT

## 2018-04-12 ASSESSMENT — ENCOUNTER SYMPTOMS
COUGH: 0
CHANGE IN BOWEL HABIT: 0
HEARTBURN: 1
BOWEL INCONTINENCE: 0
BLURRED VISION: 0

## 2018-05-04 ENCOUNTER — HOSPITAL ENCOUNTER (OUTPATIENT)
Dept: PAIN MANAGEMENT | Age: 53
Discharge: HOME | End: 2018-05-04
Payer: MEDICAID

## 2018-05-04 VITALS
HEART RATE: 68 BPM | TEMPERATURE: 97.9 F | RESPIRATION RATE: 14 BRPM | SYSTOLIC BLOOD PRESSURE: 140 MMHG | DIASTOLIC BLOOD PRESSURE: 60 MMHG | OXYGEN SATURATION: 99 %

## 2018-05-04 LAB — GLUCOSE BLD-MCNC: 83 MG/DL (ref 65–105)

## 2018-05-04 PROCEDURE — 6360000002 HC RX W HCPCS: Performed by: PAIN MEDICINE

## 2018-05-04 PROCEDURE — 64491 INJ PARAVERT F JNT C/T 2 LEV: CPT

## 2018-05-04 PROCEDURE — 64490 INJ PARAVERT F JNT C/T 1 LEV: CPT

## 2018-05-04 PROCEDURE — 64491 INJ PARAVERT F JNT C/T 2 LEV: CPT | Performed by: PAIN MEDICINE

## 2018-05-04 PROCEDURE — 2580000003 HC RX 258: Performed by: PAIN MEDICINE

## 2018-05-04 PROCEDURE — 82947 ASSAY GLUCOSE BLOOD QUANT: CPT

## 2018-05-04 PROCEDURE — 64492 INJ PARAVERT F JNT C/T 3 LEV: CPT

## 2018-05-04 PROCEDURE — 64492 INJ PARAVERT F JNT C/T 3 LEV: CPT | Performed by: PAIN MEDICINE

## 2018-05-04 PROCEDURE — 64490 INJ PARAVERT F JNT C/T 1 LEV: CPT | Performed by: PAIN MEDICINE

## 2018-05-04 RX ORDER — MIDAZOLAM HYDROCHLORIDE 1 MG/ML
INJECTION INTRAMUSCULAR; INTRAVENOUS
Status: COMPLETED | OUTPATIENT
Start: 2018-05-04 | End: 2018-05-04

## 2018-05-04 RX ORDER — MIDAZOLAM HYDROCHLORIDE 1 MG/ML
2 INJECTION INTRAMUSCULAR; INTRAVENOUS
Status: ACTIVE | OUTPATIENT
Start: 2018-05-04 | End: 2018-05-04

## 2018-05-04 RX ORDER — DEXAMETHASONE SODIUM PHOSPHATE 10 MG/ML
10 INJECTION, SOLUTION INTRAMUSCULAR; INTRAVENOUS
Status: ACTIVE | OUTPATIENT
Start: 2018-05-04 | End: 2018-05-04

## 2018-05-04 RX ORDER — SODIUM CHLORIDE, SODIUM LACTATE, POTASSIUM CHLORIDE, CALCIUM CHLORIDE 600; 310; 30; 20 MG/100ML; MG/100ML; MG/100ML; MG/100ML
INJECTION, SOLUTION INTRAVENOUS CONTINUOUS
Status: DISCONTINUED | OUTPATIENT
Start: 2018-05-04 | End: 2018-05-05 | Stop reason: HOSPADM

## 2018-05-04 RX ORDER — BUPIVACAINE HYDROCHLORIDE 2.5 MG/ML
30 INJECTION, SOLUTION EPIDURAL; INFILTRATION; INTRACAUDAL
Status: ACTIVE | OUTPATIENT
Start: 2018-05-04 | End: 2018-05-04

## 2018-05-04 RX ORDER — FENTANYL CITRATE 50 UG/ML
100 INJECTION, SOLUTION INTRAMUSCULAR; INTRAVENOUS
Status: ACTIVE | OUTPATIENT
Start: 2018-05-04 | End: 2018-05-04

## 2018-05-04 RX ADMIN — SODIUM CHLORIDE, POTASSIUM CHLORIDE, SODIUM LACTATE AND CALCIUM CHLORIDE: 600; 310; 30; 20 INJECTION, SOLUTION INTRAVENOUS at 10:12

## 2018-05-04 RX ADMIN — MIDAZOLAM HYDROCHLORIDE 4 MG: 1 INJECTION, SOLUTION INTRAMUSCULAR; INTRAVENOUS at 10:19

## 2018-05-04 ASSESSMENT — PAIN - FUNCTIONAL ASSESSMENT
PAIN_FUNCTIONAL_ASSESSMENT: 0-10

## 2018-05-04 ASSESSMENT — PAIN DESCRIPTION - DESCRIPTORS: DESCRIPTORS: CONSTANT;ACHING;PINS AND NEEDLES

## 2018-05-07 DIAGNOSIS — I10 ESSENTIAL HYPERTENSION: ICD-10-CM

## 2018-05-07 DIAGNOSIS — Z76.0 MEDICATION REFILL: ICD-10-CM

## 2018-05-07 RX ORDER — ATENOLOL 50 MG/1
TABLET ORAL
Qty: 180 TABLET | Refills: 1 | OUTPATIENT
Start: 2018-05-07

## 2018-05-07 RX ORDER — NALTREXONE HYDROCHLORIDE 50 MG/1
TABLET, FILM COATED ORAL
Qty: 90 TABLET | Refills: 1 | OUTPATIENT
Start: 2018-05-07

## 2018-05-07 RX ORDER — LOSARTAN POTASSIUM 50 MG/1
TABLET ORAL
Qty: 90 TABLET | Refills: 1 | OUTPATIENT
Start: 2018-05-07

## 2018-05-07 RX ORDER — OMEPRAZOLE 20 MG/1
CAPSULE, DELAYED RELEASE ORAL
Qty: 180 CAPSULE | Refills: 1 | Status: SHIPPED | OUTPATIENT
Start: 2018-05-07 | End: 2018-10-27 | Stop reason: SDUPTHER

## 2018-05-07 RX ORDER — CYCLOBENZAPRINE HCL 5 MG
TABLET ORAL
Qty: 45 TABLET | Refills: 1 | OUTPATIENT
Start: 2018-05-07

## 2018-05-09 ENCOUNTER — TELEPHONE (OUTPATIENT)
Dept: GASTROENTEROLOGY | Age: 53
End: 2018-05-09

## 2018-05-09 DIAGNOSIS — R19.7 DIARRHEA, UNSPECIFIED TYPE: Primary | ICD-10-CM

## 2018-05-10 ENCOUNTER — HOSPITAL ENCOUNTER (OUTPATIENT)
Dept: PAIN MANAGEMENT | Age: 53
Discharge: HOME | End: 2018-05-10
Payer: MEDICAID

## 2018-05-10 VITALS
BODY MASS INDEX: 26.52 KG/M2 | WEIGHT: 175 LBS | SYSTOLIC BLOOD PRESSURE: 136 MMHG | TEMPERATURE: 97.2 F | RESPIRATION RATE: 16 BRPM | DIASTOLIC BLOOD PRESSURE: 88 MMHG | HEART RATE: 68 BPM | HEIGHT: 68 IN

## 2018-05-10 DIAGNOSIS — M47.812 SPONDYLOSIS OF CERVICAL REGION WITHOUT MYELOPATHY OR RADICULOPATHY: Primary | ICD-10-CM

## 2018-05-10 PROCEDURE — 99213 OFFICE O/P EST LOW 20 MIN: CPT

## 2018-05-10 PROCEDURE — 99213 OFFICE O/P EST LOW 20 MIN: CPT | Performed by: PAIN MEDICINE

## 2018-05-10 RX ORDER — GABAPENTIN 800 MG/1
800 TABLET ORAL 3 TIMES DAILY
COMMUNITY
End: 2018-11-06 | Stop reason: SDUPTHER

## 2018-05-10 ASSESSMENT — ENCOUNTER SYMPTOMS: BOWEL INCONTINENCE: 0

## 2018-05-14 ENCOUNTER — OFFICE VISIT (OUTPATIENT)
Dept: FAMILY MEDICINE CLINIC | Age: 53
End: 2018-05-14
Payer: MEDICAID

## 2018-05-14 VITALS
DIASTOLIC BLOOD PRESSURE: 79 MMHG | WEIGHT: 183.6 LBS | HEIGHT: 68 IN | TEMPERATURE: 97.9 F | BODY MASS INDEX: 27.83 KG/M2 | SYSTOLIC BLOOD PRESSURE: 116 MMHG | HEART RATE: 84 BPM

## 2018-05-14 DIAGNOSIS — I48.91 ATRIAL FIBRILLATION WITH SLOW VENTRICULAR RESPONSE (HCC): ICD-10-CM

## 2018-05-14 DIAGNOSIS — Z13.220 SCREENING, LIPID: ICD-10-CM

## 2018-05-14 DIAGNOSIS — J45.20 MILD INTERMITTENT ASTHMA WITHOUT COMPLICATION: ICD-10-CM

## 2018-05-14 DIAGNOSIS — Z11.59 ENCOUNTER FOR HEPATITIS C SCREENING TEST FOR LOW RISK PATIENT: ICD-10-CM

## 2018-05-14 DIAGNOSIS — L98.9 SKIN LESION OF FACE: ICD-10-CM

## 2018-05-14 DIAGNOSIS — F31.9 BIPOLAR AFFECTIVE DISORDER, REMISSION STATUS UNSPECIFIED (HCC): Primary | ICD-10-CM

## 2018-05-14 PROCEDURE — 3017F COLORECTAL CA SCREEN DOC REV: CPT | Performed by: NURSE PRACTITIONER

## 2018-05-14 PROCEDURE — 1036F TOBACCO NON-USER: CPT | Performed by: NURSE PRACTITIONER

## 2018-05-14 PROCEDURE — G8427 DOCREV CUR MEDS BY ELIG CLIN: HCPCS | Performed by: NURSE PRACTITIONER

## 2018-05-14 PROCEDURE — G8598 ASA/ANTIPLAT THER USED: HCPCS | Performed by: NURSE PRACTITIONER

## 2018-05-14 PROCEDURE — G8417 CALC BMI ABV UP PARAM F/U: HCPCS | Performed by: NURSE PRACTITIONER

## 2018-05-14 PROCEDURE — 99214 OFFICE O/P EST MOD 30 MIN: CPT | Performed by: NURSE PRACTITIONER

## 2018-05-14 RX ORDER — PRAVASTATIN SODIUM 40 MG
40 TABLET ORAL DAILY
COMMUNITY
Start: 2018-05-07 | End: 2018-07-02

## 2018-05-14 RX ORDER — DISULFIRAM 500 MG/1
500 TABLET ORAL DAILY
Status: ON HOLD | COMMUNITY
Start: 2018-04-18 | End: 2018-12-15 | Stop reason: HOSPADM

## 2018-05-14 ASSESSMENT — ENCOUNTER SYMPTOMS
SHORTNESS OF BREATH: 0
COUGH: 0

## 2018-05-22 ENCOUNTER — HOSPITAL ENCOUNTER (OUTPATIENT)
Age: 53
Setting detail: SPECIMEN
Discharge: HOME | End: 2018-05-22
Payer: MEDICAID

## 2018-05-22 ENCOUNTER — TELEPHONE (OUTPATIENT)
Dept: GASTROENTEROLOGY | Age: 53
End: 2018-05-22

## 2018-05-22 DIAGNOSIS — Z11.59 ENCOUNTER FOR HEPATITIS C SCREENING TEST FOR LOW RISK PATIENT: ICD-10-CM

## 2018-05-22 DIAGNOSIS — Z13.220 SCREENING, LIPID: ICD-10-CM

## 2018-05-22 DIAGNOSIS — R19.7 DIARRHEA, UNSPECIFIED TYPE: Primary | ICD-10-CM

## 2018-05-22 LAB
CHOLESTEROL/HDL RATIO: 1.7
CHOLESTEROL: 184 MG/DL
HDLC SERPL-MCNC: 106 MG/DL
HEPATITIS C ANTIBODY: NONREACTIVE
LDL CHOLESTEROL: 65 MG/DL (ref 0–130)
TRIGL SERPL-MCNC: 63 MG/DL
VLDLC SERPL CALC-MCNC: NORMAL MG/DL (ref 1–30)

## 2018-05-22 RX ORDER — METRONIDAZOLE 500 MG/1
500 TABLET ORAL 3 TIMES DAILY
Qty: 30 TABLET | Refills: 0 | Status: SHIPPED | OUTPATIENT
Start: 2018-05-22 | End: 2018-06-01

## 2018-05-24 ENCOUNTER — HOSPITAL ENCOUNTER (OUTPATIENT)
Dept: PHYSICAL THERAPY | Age: 53
Setting detail: THERAPIES SERIES
Discharge: HOME | End: 2018-05-24
Payer: MEDICAID

## 2018-05-24 PROCEDURE — 97162 PT EVAL MOD COMPLEX 30 MIN: CPT

## 2018-05-24 PROCEDURE — 97140 MANUAL THERAPY 1/> REGIONS: CPT

## 2018-05-24 PROCEDURE — 97110 THERAPEUTIC EXERCISES: CPT

## 2018-05-30 ENCOUNTER — HOSPITAL ENCOUNTER (OUTPATIENT)
Dept: PHYSICAL THERAPY | Age: 53
Setting detail: THERAPIES SERIES
Discharge: HOME | End: 2018-05-30
Payer: MEDICAID

## 2018-05-30 PROCEDURE — 97140 MANUAL THERAPY 1/> REGIONS: CPT

## 2018-05-30 PROCEDURE — 97110 THERAPEUTIC EXERCISES: CPT

## 2018-05-31 ENCOUNTER — TELEPHONE (OUTPATIENT)
Dept: FAMILY MEDICINE CLINIC | Age: 53
End: 2018-05-31

## 2018-05-31 ENCOUNTER — HOSPITAL ENCOUNTER (OUTPATIENT)
Dept: PHYSICAL THERAPY | Age: 53
Setting detail: THERAPIES SERIES
Discharge: HOME | End: 2018-05-31
Payer: MEDICAID

## 2018-05-31 PROCEDURE — 97140 MANUAL THERAPY 1/> REGIONS: CPT

## 2018-05-31 PROCEDURE — 97110 THERAPEUTIC EXERCISES: CPT

## 2018-06-04 ENCOUNTER — HOSPITAL ENCOUNTER (OUTPATIENT)
Dept: PHYSICAL THERAPY | Age: 53
Setting detail: THERAPIES SERIES
Discharge: HOME | End: 2018-06-04
Payer: MEDICAID

## 2018-06-04 ENCOUNTER — CARE COORDINATION (OUTPATIENT)
Dept: CARE COORDINATION | Age: 53
End: 2018-06-04

## 2018-06-04 PROCEDURE — 97110 THERAPEUTIC EXERCISES: CPT

## 2018-06-04 PROCEDURE — 97140 MANUAL THERAPY 1/> REGIONS: CPT

## 2018-06-06 ENCOUNTER — HOSPITAL ENCOUNTER (OUTPATIENT)
Dept: PHYSICAL THERAPY | Age: 53
Setting detail: THERAPIES SERIES
Discharge: HOME | End: 2018-06-06
Payer: MEDICAID

## 2018-06-06 PROCEDURE — 97140 MANUAL THERAPY 1/> REGIONS: CPT

## 2018-06-06 PROCEDURE — 97110 THERAPEUTIC EXERCISES: CPT

## 2018-06-06 RX ORDER — HYDROXYCHLOROQUINE SULFATE 200 MG/1
TABLET, FILM COATED ORAL
Qty: 90 TABLET | Refills: 1 | Status: SHIPPED | OUTPATIENT
Start: 2018-06-06 | End: 2018-11-30 | Stop reason: SDUPTHER

## 2018-06-11 ENCOUNTER — APPOINTMENT (OUTPATIENT)
Dept: PHYSICAL THERAPY | Age: 53
End: 2018-06-11
Payer: MEDICAID

## 2018-06-11 ENCOUNTER — HOSPITAL ENCOUNTER (OUTPATIENT)
Dept: PHYSICAL THERAPY | Age: 53
Setting detail: THERAPIES SERIES
Discharge: HOME | End: 2018-06-11
Payer: MEDICAID

## 2018-06-11 PROCEDURE — 97140 MANUAL THERAPY 1/> REGIONS: CPT

## 2018-06-11 PROCEDURE — 97110 THERAPEUTIC EXERCISES: CPT

## 2018-06-13 ENCOUNTER — CARE COORDINATION (OUTPATIENT)
Dept: CARE COORDINATION | Age: 53
End: 2018-06-13

## 2018-06-13 ENCOUNTER — APPOINTMENT (OUTPATIENT)
Dept: PHYSICAL THERAPY | Age: 53
End: 2018-06-13
Payer: MEDICAID

## 2018-06-13 RX ORDER — CEPHALEXIN 500 MG/1
500 CAPSULE ORAL PRN
Status: ON HOLD | COMMUNITY
End: 2018-07-31 | Stop reason: HOSPADM

## 2018-06-13 RX ORDER — NALTREXONE HYDROCHLORIDE 50 MG/1
50 TABLET, FILM COATED ORAL DAILY
Status: ON HOLD | COMMUNITY
Start: 2018-05-14 | End: 2018-12-15 | Stop reason: HOSPADM

## 2018-06-13 RX ORDER — PREDNISONE 50 MG/1
50 TABLET ORAL PRN
COMMUNITY
End: 2018-06-15

## 2018-06-14 ENCOUNTER — HOSPITAL ENCOUNTER (OUTPATIENT)
Dept: PAIN MANAGEMENT | Age: 53
Discharge: HOME | End: 2018-06-14
Payer: MEDICAID

## 2018-06-14 ENCOUNTER — TELEPHONE (OUTPATIENT)
Dept: UROLOGY | Age: 53
End: 2018-06-14

## 2018-06-14 VITALS
BODY MASS INDEX: 26.52 KG/M2 | DIASTOLIC BLOOD PRESSURE: 96 MMHG | HEIGHT: 68 IN | WEIGHT: 175 LBS | RESPIRATION RATE: 16 BRPM | SYSTOLIC BLOOD PRESSURE: 125 MMHG | HEART RATE: 65 BPM | TEMPERATURE: 97.6 F

## 2018-06-14 PROCEDURE — 99213 OFFICE O/P EST LOW 20 MIN: CPT | Performed by: PAIN MEDICINE

## 2018-06-14 PROCEDURE — 99214 OFFICE O/P EST MOD 30 MIN: CPT

## 2018-06-14 RX ORDER — LAMOTRIGINE 200 MG/1
200 TABLET ORAL EVERY EVENING
COMMUNITY
Start: 2018-06-06

## 2018-06-14 ASSESSMENT — ENCOUNTER SYMPTOMS
RESPIRATORY NEGATIVE: 1
GASTROINTESTINAL NEGATIVE: 1

## 2018-06-15 ENCOUNTER — TELEPHONE (OUTPATIENT)
Dept: UROLOGY | Age: 53
End: 2018-06-15

## 2018-06-15 RX ORDER — PREDNISONE 50 MG/1
50 TABLET ORAL DAILY PRN
Qty: 30 TABLET | Refills: 0 | Status: SHIPPED | OUTPATIENT
Start: 2018-06-15 | End: 2018-08-02 | Stop reason: SDUPTHER

## 2018-06-18 ENCOUNTER — HOSPITAL ENCOUNTER (OUTPATIENT)
Dept: PHYSICAL THERAPY | Age: 53
Setting detail: THERAPIES SERIES
Discharge: HOME | End: 2018-06-18
Payer: MEDICAID

## 2018-06-18 PROCEDURE — 97110 THERAPEUTIC EXERCISES: CPT

## 2018-06-18 PROCEDURE — 97140 MANUAL THERAPY 1/> REGIONS: CPT

## 2018-06-20 ENCOUNTER — HOSPITAL ENCOUNTER (OUTPATIENT)
Dept: PHYSICAL THERAPY | Age: 53
Setting detail: THERAPIES SERIES
Discharge: HOME | End: 2018-06-20
Payer: MEDICAID

## 2018-06-20 PROCEDURE — 97140 MANUAL THERAPY 1/> REGIONS: CPT

## 2018-06-25 ENCOUNTER — OFFICE VISIT (OUTPATIENT)
Dept: NEUROLOGY | Age: 53
End: 2018-06-25
Payer: MEDICAID

## 2018-06-25 ENCOUNTER — HOSPITAL ENCOUNTER (OUTPATIENT)
Dept: PHYSICAL THERAPY | Age: 53
Setting detail: THERAPIES SERIES
Discharge: HOME | End: 2018-06-25
Payer: MEDICAID

## 2018-06-25 VITALS
WEIGHT: 184.4 LBS | HEIGHT: 68 IN | DIASTOLIC BLOOD PRESSURE: 72 MMHG | BODY MASS INDEX: 27.95 KG/M2 | SYSTOLIC BLOOD PRESSURE: 112 MMHG | HEART RATE: 70 BPM

## 2018-06-25 DIAGNOSIS — M47.812 SPONDYLOSIS OF CERVICAL REGION WITHOUT MYELOPATHY OR RADICULOPATHY: ICD-10-CM

## 2018-06-25 DIAGNOSIS — R56.9 SEIZURES (HCC): ICD-10-CM

## 2018-06-25 DIAGNOSIS — G62.9 NEUROPATHY: Primary | ICD-10-CM

## 2018-06-25 PROCEDURE — 3017F COLORECTAL CA SCREEN DOC REV: CPT | Performed by: NURSE PRACTITIONER

## 2018-06-25 PROCEDURE — 99214 OFFICE O/P EST MOD 30 MIN: CPT | Performed by: NURSE PRACTITIONER

## 2018-06-25 PROCEDURE — G8417 CALC BMI ABV UP PARAM F/U: HCPCS | Performed by: NURSE PRACTITIONER

## 2018-06-25 PROCEDURE — G8598 ASA/ANTIPLAT THER USED: HCPCS | Performed by: NURSE PRACTITIONER

## 2018-06-25 PROCEDURE — 97110 THERAPEUTIC EXERCISES: CPT

## 2018-06-25 PROCEDURE — G8427 DOCREV CUR MEDS BY ELIG CLIN: HCPCS | Performed by: NURSE PRACTITIONER

## 2018-06-25 PROCEDURE — 97140 MANUAL THERAPY 1/> REGIONS: CPT

## 2018-06-25 PROCEDURE — 1036F TOBACCO NON-USER: CPT | Performed by: NURSE PRACTITIONER

## 2018-06-26 ENCOUNTER — TELEPHONE (OUTPATIENT)
Dept: FAMILY MEDICINE CLINIC | Age: 53
End: 2018-06-26

## 2018-06-26 ENCOUNTER — HOSPITAL ENCOUNTER (OUTPATIENT)
Age: 53
Setting detail: SPECIMEN
Discharge: HOME | End: 2018-06-26
Payer: MEDICAID

## 2018-06-26 DIAGNOSIS — N18.2 CKD (CHRONIC KIDNEY DISEASE), STAGE II: ICD-10-CM

## 2018-06-26 LAB
ABSOLUTE EOS #: 0.2 K/UL (ref 0–0.44)
ABSOLUTE IMMATURE GRANULOCYTE: <0.03 K/UL (ref 0–0.3)
ABSOLUTE LYMPH #: 1.63 K/UL (ref 1.1–3.7)
ABSOLUTE MONO #: 0.4 K/UL (ref 0.1–1.2)
ALBUMIN SERPL-MCNC: 4.3 G/DL (ref 3.5–5.2)
ALBUMIN/GLOBULIN RATIO: 1.7 (ref 1–2.5)
ALP BLD-CCNC: 154 U/L (ref 35–104)
ALT SERPL-CCNC: 11 U/L (ref 5–33)
ANION GAP SERPL CALCULATED.3IONS-SCNC: 10 MMOL/L (ref 9–17)
ANION GAP SERPL CALCULATED.3IONS-SCNC: 10 MMOL/L (ref 9–17)
AST SERPL-CCNC: 14 U/L
BASOPHILS # BLD: 2 % (ref 0–2)
BASOPHILS ABSOLUTE: 0.07 K/UL (ref 0–0.2)
BILIRUB SERPL-MCNC: 0.27 MG/DL (ref 0.3–1.2)
BILIRUBIN URINE: NEGATIVE
BUN BLDV-MCNC: 12 MG/DL (ref 6–20)
BUN BLDV-MCNC: 12 MG/DL (ref 6–20)
BUN/CREAT BLD: ABNORMAL (ref 9–20)
BUN/CREAT BLD: ABNORMAL (ref 9–20)
CALCIUM SERPL-MCNC: 9.2 MG/DL (ref 8.6–10.4)
CALCIUM SERPL-MCNC: 9.2 MG/DL (ref 8.6–10.4)
CHLORIDE BLD-SCNC: 105 MMOL/L (ref 98–107)
CHLORIDE BLD-SCNC: 105 MMOL/L (ref 98–107)
CHOLESTEROL/HDL RATIO: 1.9
CHOLESTEROL: 206 MG/DL
CO2: 26 MMOL/L (ref 20–31)
CO2: 26 MMOL/L (ref 20–31)
COLOR: YELLOW
COMMENT UA: NORMAL
CREAT SERPL-MCNC: 0.92 MG/DL (ref 0.5–0.9)
CREAT SERPL-MCNC: 0.92 MG/DL (ref 0.5–0.9)
CREATININE URINE: 104.7 MG/DL (ref 28–217)
DIFFERENTIAL TYPE: ABNORMAL
EOSINOPHILS RELATIVE PERCENT: 6 % (ref 1–4)
ESTIMATED AVERAGE GLUCOSE: 105 MG/DL
GFR AFRICAN AMERICAN: >60 ML/MIN
GFR AFRICAN AMERICAN: >60 ML/MIN
GFR NON-AFRICAN AMERICAN: >60 ML/MIN
GFR NON-AFRICAN AMERICAN: >60 ML/MIN
GFR SERPL CREATININE-BSD FRML MDRD: ABNORMAL ML/MIN/{1.73_M2}
GLUCOSE BLD-MCNC: 78 MG/DL (ref 70–99)
GLUCOSE BLD-MCNC: 78 MG/DL (ref 70–99)
GLUCOSE URINE: NEGATIVE
HBA1C MFR BLD: 5.3 % (ref 4–6)
HCT VFR BLD CALC: 32.1 % (ref 36.3–47.1)
HDLC SERPL-MCNC: 110 MG/DL
HEMOGLOBIN: 10.2 G/DL (ref 11.9–15.1)
IMMATURE GRANULOCYTES: 0 %
KETONES, URINE: NEGATIVE
LDL CHOLESTEROL: 84 MG/DL (ref 0–130)
LEUKOCYTE ESTERASE, URINE: NEGATIVE
LYMPHOCYTES # BLD: 47 % (ref 24–43)
MCH RBC QN AUTO: 27.9 PG (ref 25.2–33.5)
MCHC RBC AUTO-ENTMCNC: 31.8 G/DL (ref 28.4–34.8)
MCV RBC AUTO: 87.7 FL (ref 82.6–102.9)
MONOCYTES # BLD: 12 % (ref 3–12)
NITRITE, URINE: NEGATIVE
NRBC AUTOMATED: 0 PER 100 WBC
PDW BLD-RTO: 14.2 % (ref 11.8–14.4)
PH UA: 6 (ref 5–8)
PHOSPHORUS: 4.6 MG/DL (ref 2.6–4.5)
PLATELET # BLD: 189 K/UL (ref 138–453)
PLATELET ESTIMATE: ABNORMAL
PMV BLD AUTO: 11.1 FL (ref 8.1–13.5)
POTASSIUM SERPL-SCNC: 4.8 MMOL/L (ref 3.7–5.3)
POTASSIUM SERPL-SCNC: 4.8 MMOL/L (ref 3.7–5.3)
PROTEIN UA: NEGATIVE
RBC # BLD: 3.66 M/UL (ref 3.95–5.11)
RBC # BLD: ABNORMAL 10*6/UL
SEG NEUTROPHILS: 33 % (ref 36–65)
SEGMENTED NEUTROPHILS ABSOLUTE COUNT: 1.15 K/UL (ref 1.5–8.1)
SODIUM BLD-SCNC: 141 MMOL/L (ref 135–144)
SODIUM BLD-SCNC: 141 MMOL/L (ref 135–144)
SPECIFIC GRAVITY UA: 1.01 (ref 1–1.03)
TOTAL PROTEIN, URINE: 11 MG/DL
TOTAL PROTEIN: 6.9 G/DL (ref 6.4–8.3)
TRIGL SERPL-MCNC: 61 MG/DL
TURBIDITY: CLEAR
URINE HGB: NEGATIVE
UROBILINOGEN, URINE: NORMAL
VITAMIN D 25-HYDROXY: 37.3 NG/ML (ref 30–100)
VLDLC SERPL CALC-MCNC: ABNORMAL MG/DL (ref 1–30)
WBC # BLD: 3.5 K/UL (ref 3.5–11.3)
WBC # BLD: ABNORMAL 10*3/UL

## 2018-06-27 ENCOUNTER — HOSPITAL ENCOUNTER (OUTPATIENT)
Dept: PHYSICAL THERAPY | Age: 53
Setting detail: THERAPIES SERIES
Discharge: HOME | End: 2018-06-27
Payer: MEDICAID

## 2018-06-27 LAB
CREATININE URINE: 97.7 MG/DL (ref 28–217)
MICROALBUMIN/CREAT 24H UR: <12 MG/L
MICROALBUMIN/CREAT UR-RTO: NORMAL MCG/MG CREAT

## 2018-06-27 PROCEDURE — 97140 MANUAL THERAPY 1/> REGIONS: CPT

## 2018-06-27 PROCEDURE — 97110 THERAPEUTIC EXERCISES: CPT

## 2018-06-29 ENCOUNTER — HOSPITAL ENCOUNTER (OUTPATIENT)
Dept: PAIN MANAGEMENT | Age: 53
Discharge: HOME | End: 2018-06-29
Payer: MEDICAID

## 2018-06-29 VITALS
HEIGHT: 68 IN | RESPIRATION RATE: 16 BRPM | TEMPERATURE: 97.8 F | OXYGEN SATURATION: 97 % | HEART RATE: 80 BPM | DIASTOLIC BLOOD PRESSURE: 80 MMHG | WEIGHT: 184 LBS | BODY MASS INDEX: 27.89 KG/M2 | SYSTOLIC BLOOD PRESSURE: 129 MMHG

## 2018-06-29 DIAGNOSIS — M54.2 NECK PAIN ON RIGHT SIDE: ICD-10-CM

## 2018-06-29 LAB — GLUCOSE BLD-MCNC: 80 MG/DL (ref 65–105)

## 2018-06-29 PROCEDURE — 64492 INJ PARAVERT F JNT C/T 3 LEV: CPT | Performed by: PAIN MEDICINE

## 2018-06-29 PROCEDURE — 64490 INJ PARAVERT F JNT C/T 1 LEV: CPT | Performed by: PAIN MEDICINE

## 2018-06-29 PROCEDURE — 82947 ASSAY GLUCOSE BLOOD QUANT: CPT

## 2018-06-29 PROCEDURE — 6360000002 HC RX W HCPCS: Performed by: PAIN MEDICINE

## 2018-06-29 PROCEDURE — 6360000004 HC RX CONTRAST MEDICATION

## 2018-06-29 PROCEDURE — 64491 INJ PARAVERT F JNT C/T 2 LEV: CPT

## 2018-06-29 PROCEDURE — 2580000003 HC RX 258: Performed by: PAIN MEDICINE

## 2018-06-29 PROCEDURE — 64491 INJ PARAVERT F JNT C/T 2 LEV: CPT | Performed by: PAIN MEDICINE

## 2018-06-29 PROCEDURE — 6360000002 HC RX W HCPCS

## 2018-06-29 PROCEDURE — 64492 INJ PARAVERT F JNT C/T 3 LEV: CPT

## 2018-06-29 PROCEDURE — 64490 INJ PARAVERT F JNT C/T 1 LEV: CPT

## 2018-06-29 RX ORDER — FENTANYL CITRATE 50 UG/ML
100 INJECTION, SOLUTION INTRAMUSCULAR; INTRAVENOUS
Status: ACTIVE | OUTPATIENT
Start: 2018-06-29 | End: 2018-06-29

## 2018-06-29 RX ORDER — DEXAMETHASONE SODIUM PHOSPHATE 10 MG/ML
10 INJECTION, SOLUTION INTRAMUSCULAR; INTRAVENOUS
Status: ACTIVE | OUTPATIENT
Start: 2018-06-29 | End: 2018-06-29

## 2018-06-29 RX ORDER — BUPIVACAINE HYDROCHLORIDE 2.5 MG/ML
30 INJECTION, SOLUTION EPIDURAL; INFILTRATION; INTRACAUDAL
Status: ACTIVE | OUTPATIENT
Start: 2018-06-29 | End: 2018-06-29

## 2018-06-29 RX ORDER — MIDAZOLAM HYDROCHLORIDE 1 MG/ML
2 INJECTION INTRAMUSCULAR; INTRAVENOUS
Status: COMPLETED | OUTPATIENT
Start: 2018-06-29 | End: 2018-06-29

## 2018-06-29 RX ORDER — SODIUM CHLORIDE, SODIUM LACTATE, POTASSIUM CHLORIDE, CALCIUM CHLORIDE 600; 310; 30; 20 MG/100ML; MG/100ML; MG/100ML; MG/100ML
INJECTION, SOLUTION INTRAVENOUS CONTINUOUS
Status: DISCONTINUED | OUTPATIENT
Start: 2018-06-29 | End: 2018-06-30 | Stop reason: HOSPADM

## 2018-06-29 RX ADMIN — SODIUM CHLORIDE, POTASSIUM CHLORIDE, SODIUM LACTATE AND CALCIUM CHLORIDE: 600; 310; 30; 20 INJECTION, SOLUTION INTRAVENOUS at 11:26

## 2018-06-29 RX ADMIN — MIDAZOLAM HYDROCHLORIDE 2 MG: 1 INJECTION, SOLUTION INTRAMUSCULAR; INTRAVENOUS at 11:39

## 2018-06-29 ASSESSMENT — PAIN DESCRIPTION - DESCRIPTORS: DESCRIPTORS: SHARP;STABBING

## 2018-06-29 ASSESSMENT — PAIN DESCRIPTION - FREQUENCY: FREQUENCY: INTERMITTENT

## 2018-06-29 ASSESSMENT — PAIN DESCRIPTION - ORIENTATION: ORIENTATION: RIGHT;LEFT;POSTERIOR

## 2018-06-29 ASSESSMENT — PAIN DESCRIPTION - PAIN TYPE: TYPE: CHRONIC PAIN

## 2018-06-29 ASSESSMENT — PAIN DESCRIPTION - LOCATION: LOCATION: SHOULDER;NECK

## 2018-06-29 ASSESSMENT — PAIN - FUNCTIONAL ASSESSMENT
PAIN_FUNCTIONAL_ASSESSMENT: 0-10
PAIN_FUNCTIONAL_ASSESSMENT: 0-10

## 2018-06-29 ASSESSMENT — PAIN SCALES - GENERAL: PAINLEVEL_OUTOF10: 4

## 2018-06-29 ASSESSMENT — PAIN DESCRIPTION - ONSET: ONSET: ON-GOING

## 2018-06-29 ASSESSMENT — PAIN DESCRIPTION - PROGRESSION: CLINICAL_PROGRESSION: NOT CHANGED

## 2018-07-02 ENCOUNTER — HOSPITAL ENCOUNTER (OUTPATIENT)
Dept: MRI IMAGING | Age: 53
Discharge: HOME | End: 2018-07-04
Payer: MEDICAID

## 2018-07-02 ENCOUNTER — HOSPITAL ENCOUNTER (OUTPATIENT)
Dept: PHYSICAL THERAPY | Age: 53
Setting detail: THERAPIES SERIES
Discharge: HOME | End: 2018-07-02
Payer: MEDICAID

## 2018-07-02 DIAGNOSIS — R56.9 SEIZURES (HCC): ICD-10-CM

## 2018-07-02 PROCEDURE — 70553 MRI BRAIN STEM W/O & W/DYE: CPT

## 2018-07-02 PROCEDURE — A9576 INJ PROHANCE MULTIPACK: HCPCS | Performed by: NURSE PRACTITIONER

## 2018-07-02 PROCEDURE — 6360000004 HC RX CONTRAST MEDICATION: Performed by: NURSE PRACTITIONER

## 2018-07-02 PROCEDURE — 97110 THERAPEUTIC EXERCISES: CPT

## 2018-07-02 RX ORDER — PRAVASTATIN SODIUM 40 MG
TABLET ORAL
Qty: 90 TABLET | Refills: 1 | Status: SHIPPED | OUTPATIENT
Start: 2018-07-02 | End: 2018-12-27 | Stop reason: SDUPTHER

## 2018-07-02 RX ORDER — PRAVASTATIN SODIUM 40 MG
TABLET ORAL
Qty: 30 TABLET | Refills: 4 | OUTPATIENT
Start: 2018-07-02

## 2018-07-02 RX ORDER — PREDNISONE 50 MG/1
TABLET ORAL
Qty: 30 TABLET | Refills: 0 | OUTPATIENT
Start: 2018-07-02

## 2018-07-02 RX ADMIN — GADOTERIDOL 16 ML: 279.3 INJECTION, SOLUTION INTRAVENOUS at 11:04

## 2018-07-02 NOTE — TELEPHONE ENCOUNTER
from Last 3 Encounters:   06/29/18 129/80   06/25/18 112/72   06/14/18 (!) 125/96          (goal 120/80)    All Future Testing planned in [facility]  Lab Frequency Next Occurrence   Basic Metabolic Panel     CBC Auto Differential     Creatinine, Random Urine     Phosphorus     Protein, urine, random     PTH, INTACT WITH IONIZED CALCIUM     Vitamin D 25 Hydroxy     Basic Metabolic Panel     CBC Auto Differential     Creatinine, Random Urine     Phosphorus     Protein, urine, random     Urinalysis Every 12 Weeks 10/26/2018, 1/18/2019, 4/12/2019, 7/5/2019, 9/27/2019, 12/20/2019, 3/13/2020, 6/5/2020, 8/28/2020, 11/20/2020, 2/12/2021, 5/7/2021, 7/30/2021               Patient Active Problem List:     Seizures ([facility]     Transient insomnia     Sleep walking disorder     Raynaud's disease     Pulmonary nodules     Neuropathy (HCC)     Hypertension     Headache     GERD (gastroesophageal reflux disease)     Fibromyalgia     Depression     Degenerative disc disease, thoracic     CAD (coronary artery disease)     Bipolar disorder (HCC)     Asthma     Anxiety     Antinuclear antibody (IQRA) titer greater than 1:80     Anemia     Acute kidney injury ([facility] [address]     Stroke ([facility]     Controlled type 2 diabetes mellitus without complication (HCC)     Degenerative arthritis of right knee     S/P knee surgery     Chronic fatigue syndrome     Spondylosis of cervical region without myelopathy or radiculopathy     H/O hysterectomy with BSO at [facility] for benign disease 2014     History of total bilateral knee replacement     H/O: hysterectomy     Atrial fibrillation with slow ventricular response (HCC)     H/O: stroke     Acute cystitis without hematuria     Hypovolemia     Hypotension     Bradycardia     Chest pain     Abdominal pain     Diarrhea

## 2018-07-02 NOTE — FLOWSHEET NOTE
Increased reps 7/2/18 x   Cervical Flexion 20x  Increased reps 7/2/18 X          Sidelying       Cervical Lateral Flexion 20x ea  Increased reps 7/2/18 x          Other:    Manual: TPR to R UT/Levator, rhomboids - not 7/2/18    Specific Instructions for next treatment: progress UE strengthening, continue with manual     Treatment Charges: Mins Units   [x]  HP 0 0   [x]  Ther Exercise 40 3   [x]  Manual Therapy 0 0   []  Ther Activities     []  Aquatics     []  Vasocompression     []  Other     Total Treatment time 40 3       Assessment: [x] Progressing toward goals: Patient with some tension in R UT, but was able to tolerate progressions as charted. Plane to complete next Tx, then d/c to HEP. [] No change. [x] Other:      STG: (to be met in 10 treatments)  1. ? Pain: Keep pain at 4 or less most times and headaches more intermittent  2. ? ROM: Cervical spine WFL and min to no pain  3. ? Strength: Rt arm test at 4+or better  4. ? Function: Neck Disability improves to 31% or better, able to read without aggravation, Improved sleep with less pain in rt trap. 5. Spasms: min to none and able to manage with a cane  6. Demonstrate Knowledge of fall prevention     LTG: (to be met in 12 treatments)         1. Independent with Home Exercise Programs      Patient goals: To stop daily pain    Pt. Education:  [x] Yes  [] No  [x] Reviewed Prior HEP/Ed  Method of Education: [x] Verbal  [x] Demo  [] Written  Comprehension of Education:  [x] Verbalizes understanding. [] Demonstrates understanding. [] Needs review. [] Demonstrates/verbalizes HEP/Ed previously given. Plan: [x] Continue per plan of care.    [] Other:   Frequency:  2 x/week for 12 visits      Time In: 5043            Time Out: 1239    Electronically signed by: Abran Gupta PTA

## 2018-07-03 DIAGNOSIS — Z76.0 MEDICATION REFILL: ICD-10-CM

## 2018-07-03 RX ORDER — PREDNISONE 50 MG/1
TABLET ORAL
Qty: 30 TABLET | Refills: 0 | OUTPATIENT
Start: 2018-07-03

## 2018-07-03 RX ORDER — ATENOLOL 50 MG/1
TABLET ORAL
Qty: 60 TABLET | Refills: 2 | OUTPATIENT
Start: 2018-07-03

## 2018-07-09 ENCOUNTER — TELEPHONE (OUTPATIENT)
Dept: NEUROLOGY | Age: 53
End: 2018-07-09

## 2018-07-09 ENCOUNTER — HOSPITAL ENCOUNTER (OUTPATIENT)
Dept: PHYSICAL THERAPY | Age: 53
Setting detail: THERAPIES SERIES
Discharge: HOME | End: 2018-07-09
Payer: MEDICAID

## 2018-07-09 ENCOUNTER — APPOINTMENT (OUTPATIENT)
Dept: PHYSICAL THERAPY | Age: 53
End: 2018-07-09
Payer: MEDICAID

## 2018-07-09 DIAGNOSIS — R27.0 ATAXIA: Primary | ICD-10-CM

## 2018-07-09 PROCEDURE — 97110 THERAPEUTIC EXERCISES: CPT

## 2018-07-09 NOTE — TELEPHONE ENCOUNTER
Mia called in . She got our message that her MRI was normal. She wanted to know now, is there anything that shows on the MRI that can explain the ataxia? If not then what?

## 2018-07-10 ENCOUNTER — CARE COORDINATION (OUTPATIENT)
Dept: CARE COORDINATION | Age: 53
End: 2018-07-10

## 2018-07-10 RX ORDER — CHLORHEXIDINE GLUCONATE 0.12 MG/ML
RINSE ORAL
Status: ON HOLD | COMMUNITY
End: 2018-11-10 | Stop reason: HOSPADM

## 2018-07-10 NOTE — CARE COORDINATION
Ambulatory Care Coordination Note  7/10/2018  CM Risk Score: 11  Jelena Mortality Risk Score:      ACC: Jeremi Ritchie RN    Summary Note: She is going to physical therapy per pain management orders. She got cervical nerve blocks to see if would be a candidate for RFA. She has a follow up appt next week. No other issues or concerns right now. Had an MRI and was negative, no answer why she has ataxia. Has cystocele repair surgery in a few weeks. Has many other physician appts coming up along with her physical therapy. CC Plan:   -Follow after surgery, review consult notes for any medication or treatment changes. Diabetes Assessment    Medic Alert ID:  No  Meal Planning:  None   How often do you test your blood sugar?:  Other (Comment: only PRN if having symptoms)   Do you have barriers with adherence to non-pharmacologic self-management interventions? (Nutrition/Exercise/Self-Monitoring):  Yes   Have you ever had to go to the ED for symptoms of low blood sugar?:  No       No patient-reported symptoms       and   General Assessment    Do you have any symptoms that are causing concern?:  No               Care Coordination Interventions    Program Enrollment:  Complex Care  Referral from Primary Care Provider:  No  Suggested Interventions and Community Resources  [facility]  Completed (Comment: Sees Dr. Divya Velasco every 3 weeks)  Physical Therapy:  Completed         Goals Addressed             Most Recent     Reduce Falls    On track (7/10/2018)             I will reduce my risk of falls by the following: Remove rugs or use non slip rugs  Install grab bars in bathroom  Use walking aids like cane or walker  Follow through on orders for PT    Barriers: none  Plan for overcoming my barriers: N/A  Confidence: 7/10  Anticipated Goal Completion Date: 3 months (9/15/18)            Prior to Admission medications    Medication Sig Start Date End Date Taking?  Authorizing Provider   pravastatin (PRAVACHOL) 40 MG tablet once a week Takes 3 times a week    Historical Provider, MD   levothyroxine (SYNTHROID) 25 MCG tablet Take 25 mcg by mouth Daily    Historical Provider, MD   propranolol (INDERAL) 10 MG tablet Take 20 mg by mouth 2 times daily Takes 30 mg at HS only prescribed by Dr. Bonnie Red Provider, MD   QUEtiapine (SEROQUEL) 200 MG tablet TAKE 1 TABLET BY MOUTH AT BEDTIME 12/4/17   COLTON Zheng CNP   albuterol sulfate  (90 Base) MCG/ACT inhaler Inhale 2 puffs into the lungs every 6 hours as needed for Wheezing 12/4/17   COLTON Zheng CNP   glucose blood VI test strips (ASCENSIA AUTODISC VI;ONE TOUCH ULTRA TEST VI) strip Use with associated glucose meter. 10/30/17   COLTON Zheng CNP   EPIPEN 2-JIGAR 0.3 MG/0.3ML SOAJ injection INJECT 0.3 ML (0.3 MG) INTO THE MUSCLE ONCE AS NEEDED FOR UP TO 1 DOSE. for angioedema 9/29/17   COLTON Zheng CNP   potassium chloride (KLOR-CON M) 20 MEQ extended release tablet Take 20 mEq by mouth 2 times daily Prescribed by endocrinologist    Historical Provider, MD   felodipine (PLENDIL) 10 MG extended release tablet Take 1 tablet by mouth daily 7/28/17   COLTON Zheng CNP   docusate sodium (COLACE) 100 MG capsule Take 1 capsule by mouth 2 times daily as needed for Constipation  Patient taking differently: Take 100 mg by mouth daily as needed for Constipation  5/3/17   Katie Hicks DO   metFORMIN (GLUCOPHAGE) 500 MG tablet Take 500 mg by mouth daily (with breakfast)     Historical Provider, MD   venlafaxine (EFFEXOR) 75 MG tablet Take 2 tablets (150 mg) in the morning and 1 tablet (75 mg) in the evening.  3/17/17   COLTON Zheng CNP   Multiple Vitamins-Minerals (THERAPEUTIC MULTIVITAMIN-MINERALS) tablet Take 1 tablet by mouth daily    Historical Provider, MD   Probiotic Product (PROBIOTIC & ACIDOPHILUS EX ST PO) Take by mouth daily     Historical Provider, MD   vitamin B-12 (CYANOCOBALAMIN) 1000 MCG tablet Take 1,000 mcg by mouth daily    Historical Provider, MD       Future Appointments  Date Time Provider Sylvester Delongi   7/12/2018 8:45 AM Shiela Wolff   7/13/2018 9:20 AM COLTON Orosco CNP [facility] PAIN [facility]   7/20/2018 10:30 AM [facility] PAT RM 1 [facility] PAT [facility]   7/27/2018 7:30 AM Sukumar Armstrong DO ORTHO SPECIA [facility]   8/14/2018 8:15 AM COLTON Vang - CNP [facility] FP [facility]   8/14/2018 10:15 AM DO Oliva Wolff OB/Gyn [facility]   8/23/2018 8:30 AM Citlali Cole MD AFL Neph [facility] None   9/26/2018 7:40 AM COLTON Kaiser CNP Neuro Spec [facility]   9/28/2018 8:15 AM Everette Saavedra MD  derm [facility]

## 2018-07-12 ENCOUNTER — OFFICE VISIT (OUTPATIENT)
Dept: OBGYN CLINIC | Age: 53
End: 2018-07-12
Payer: MEDICAID

## 2018-07-12 ENCOUNTER — PREP FOR PROCEDURE (OUTPATIENT)
Dept: OBGYN CLINIC | Age: 53
End: 2018-07-12

## 2018-07-12 VITALS
BODY MASS INDEX: 26.52 KG/M2 | SYSTOLIC BLOOD PRESSURE: 120 MMHG | WEIGHT: 175 LBS | DIASTOLIC BLOOD PRESSURE: 82 MMHG | HEIGHT: 68 IN | HEART RATE: 78 BPM

## 2018-07-12 DIAGNOSIS — I10 ESSENTIAL HYPERTENSION: ICD-10-CM

## 2018-07-12 DIAGNOSIS — E11.9 CONTROLLED TYPE 2 DIABETES MELLITUS WITHOUT COMPLICATION, WITHOUT LONG-TERM CURRENT USE OF INSULIN (HCC): ICD-10-CM

## 2018-07-12 DIAGNOSIS — Z01.818 PREOP TESTING: Primary | ICD-10-CM

## 2018-07-12 DIAGNOSIS — N95.2 ATROPHIC VAGINITIS: ICD-10-CM

## 2018-07-12 DIAGNOSIS — Z90.710 H/O HYSTERECTOMY FOR BENIGN DISEASE: ICD-10-CM

## 2018-07-12 DIAGNOSIS — Z96.653 HISTORY OF TOTAL BILATERAL KNEE REPLACEMENT: ICD-10-CM

## 2018-07-12 DIAGNOSIS — N39.3 SUI (STRESS URINARY INCONTINENCE, FEMALE): ICD-10-CM

## 2018-07-12 DIAGNOSIS — I25.119 CORONARY ARTERY DISEASE WITH ANGINA PECTORIS, UNSPECIFIED VESSEL OR LESION TYPE, UNSPECIFIED WHETHER NATIVE OR TRANSPLANTED HEART (HCC): ICD-10-CM

## 2018-07-12 DIAGNOSIS — N81.11 CYSTOCELE, MIDLINE: Primary | ICD-10-CM

## 2018-07-12 PROCEDURE — G8598 ASA/ANTIPLAT THER USED: HCPCS | Performed by: OBSTETRICS & GYNECOLOGY

## 2018-07-12 PROCEDURE — G8417 CALC BMI ABV UP PARAM F/U: HCPCS | Performed by: OBSTETRICS & GYNECOLOGY

## 2018-07-12 PROCEDURE — 2022F DILAT RTA XM EVC RTNOPTHY: CPT | Performed by: OBSTETRICS & GYNECOLOGY

## 2018-07-12 PROCEDURE — 3044F HG A1C LEVEL LT 7.0%: CPT | Performed by: OBSTETRICS & GYNECOLOGY

## 2018-07-12 PROCEDURE — G8427 DOCREV CUR MEDS BY ELIG CLIN: HCPCS | Performed by: OBSTETRICS & GYNECOLOGY

## 2018-07-12 PROCEDURE — 3017F COLORECTAL CA SCREEN DOC REV: CPT | Performed by: OBSTETRICS & GYNECOLOGY

## 2018-07-12 PROCEDURE — 1036F TOBACCO NON-USER: CPT | Performed by: OBSTETRICS & GYNECOLOGY

## 2018-07-12 PROCEDURE — 99213 OFFICE O/P EST LOW 20 MIN: CPT | Performed by: OBSTETRICS & GYNECOLOGY

## 2018-07-12 RX ORDER — SODIUM CHLORIDE 0.9 % (FLUSH) 0.9 %
10 SYRINGE (ML) INJECTION PRN
Status: CANCELLED | OUTPATIENT
Start: 2018-07-12 | End: 2019-07-12

## 2018-07-12 RX ORDER — SODIUM CHLORIDE 0.9 % (FLUSH) 0.9 %
10 SYRINGE (ML) INJECTION EVERY 12 HOURS SCHEDULED
Status: CANCELLED | OUTPATIENT
Start: 2018-07-12 | End: 2019-07-12

## 2018-07-12 RX ORDER — SODIUM CHLORIDE, SODIUM LACTATE, POTASSIUM CHLORIDE, CALCIUM CHLORIDE 600; 310; 30; 20 MG/100ML; MG/100ML; MG/100ML; MG/100ML
INJECTION, SOLUTION INTRAVENOUS CONTINUOUS
Status: CANCELLED | OUTPATIENT
Start: 2018-07-12 | End: 2019-07-12

## 2018-07-12 NOTE — PROGRESS NOTES
previously had a lumbar spine MRI in 2014 done at the [address] [place] [address],[address] in [place] which revealed multilevel lumbar degenerative joint disease worse at L5-S1 and L4-L5. Pain management consultation pending.  Sleep walking disorder      Priority: Medium    Neuropathy (HCC)      Priority: Medium    GERD (gastroesophageal reflux disease)      Priority: Medium    Antinuclear antibody (IQRA) titer greater than 1:80      Priority: Medium    Anemia      Priority: Medium    Seizures ([address] 2017     Priority: Medium     An isolated episode of witnessed generalized tonic-clonic seizure activity in 2017 while in [place] likely provoked by Tramadol. An EEG in 2017 was normal.  A MRI of the brain in 2017 was normal.    She was taking Tramadol which was discontinued and also uses Lamotrigine primarily for her bipolar disorder.       Abdominal pain 2018    Diarrhea 2018    Chest pain     Bradycardia 2018    Hypotension 2018    Acute cystitis without hematuria 2018    Hypovolemia 2018    Atrial fibrillation with slow ventricular response ([address] 01/15/2018    H/O: stroke 01/15/2018    H/O: hysterectomy     Chronic fatigue syndrome 06/15/2017    S/P knee surgery     Degenerative arthritis of right knee 2017    Transient insomnia     Pulmonary nodules     Headache     Fibromyalgia     Depression     Degenerative disc disease, thoracic     Bipolar disorder (HCC)     Asthma     Anxiety     Acute kidney injury ([address]          Obstetric History       T4      L4     SAB0   TAB0   Ectopic0   Molar0   Multiple0   Live Births4       # Outcome Date GA Lbr Flaquito/2nd Weight Sex Delivery Anes PTL Lv   4 Term      Vag-Spont  N JF   3 Term      Vag-Spont  N JF   2 Term      Vag-Spont  N JF   1 Term      Vag-Spont  N JF          Past Medical History:   Diagnosis Date    Acute kidney injury ([address]     Anemia     Angioedema     Antinuclear antibody (IQRA) titer greater than 1:80     Anxiety     Asthma     Ataxia     Atrial fibrillation (HCC)     Bradycardia     Chronic kidney disease     Degenerative disc disease, thoracic     Depression     Diabetes mellitus (HCC)     DJD (degenerative joint disease)     Fibromyalgia     GERD (gastroesophageal reflux disease)     H/O: hysterectomy     Headache     Hernia of abdominal wall     History of blood transfusion     Hyperlipidemia     Hypertension     Lupus     Mood disorder (HCC)     Neuropathy (HCC)     PTSD (post-traumatic stress disorder)     Pulmonary nodules     Raynaud's disease     Scleroderma ([address]     Seizures ([address]     Sleep apnea     Sleep walking disorder     Thyroid disease     Transient insomnia        Past Surgical History:   Procedure Laterality Date    ABDOMINOPLASTY  2013    CARPAL TUNNEL RELEASE  2016    two times   [address] Right     GASTRECTOMY      GASTRIC BYPASS SURGERY  2012    HYSTERECTOMY      JOINT REPLACEMENT Bilateral     KNEE SURGERY  [id number]    both statse 24 knee surgeries    KNEE SURGERY Right 05/02/2017    (femoral) patella replacement    NERVE BLOCK Right 05/04/2018     cervical facet #1 no steroid    NERVE BLOCK Right 06/29/2018    rt cervical diagnostic #2 decadron 10mg    TONSILLECTOMY  1986    TOTAL KNEE ARTHROPLASTY  2011    left knee    TOTAL KNEE ARTHROPLASTY Right 5/2/2017    PATELLOFEMORAL REPLACEMENT KNEE - JAXSON, 3080 TABLE, FEMORAL POPLITEAL BLOCK, NSA= SPINAL VS GENERAL performed by Sandra Box DO at [address] [place] [address]  2004       Social History     Social History    Marital status:      Spouse name: N/A    Number of children: N/A    Years of education: N/A     Occupational History    Not on file.      Social History Main Topics    Smoking status: Never Smoker    Smokeless tobacco: Never Used    Alcohol use Yes      Comment: daily drinks 1 liter of whiskey in three days    Drug use: No    Sexual activity: Yes     Partners: Male     Birth control/ protection: Surgical      Comment: hyst     Other Topics Concern    Not on file     Social History Narrative    No narrative on file       Psychosocial History: Stable    MEDICATIONS:  Current Outpatient Prescriptions   Medication Sig Dispense Refill    conjugated estrogens (PREMARIN) 0.625 MG/GM vaginal cream Place 0.5 g vaginally daily Place vaginally daily. 1 Tube 1    chlorhexidine (PERIDEX) 0.12 % solution chlorhexidine gluconate 0.12 % mouthwash   Place 15 mL twice a day by mucous membrane route.  pravastatin (PRAVACHOL) 40 MG tablet TAKE 1 TABLET BY MOUTH AT BEDTIME  90 tablet 1    predniSONE (DELTASONE) 50 MG tablet Take 1 tablet by mouth daily as needed (prn) For angioedema 30 tablet 0    lamoTRIgine (LAMICTAL) 100 MG tablet       naltrexone (DEPADE) 50 MG tablet Prescribed by Dr. Martin Soriano cephALEXin (KEFLEX) 500 MG capsule Take 500 mg by mouth as needed (before and after dental work)     Bill Flor hydroxychloroquine (PLAQUENIL) 200 MG tablet TAKE 1 TABLET BY MOUTH ONE TIME A DAY  90 tablet 1    cyclobenzaprine (FLEXERIL) 5 MG tablet TAKE 1 OR 2 TABLETS BY MOUTH AT BEDTIME AS NEEDED 100 tablet 1    disulfiram (ANTABUSE) 250 MG tablet Take 250 mg by mouth daily      aspirin 81 MG tablet Take 1 tablet by mouth 2 times daily 60 tablet 11    gabapentin (NEURONTIN) 800 MG tablet Take 800 mg by mouth 3 times daily. Myra Sue omeprazole (PRILOSEC) 20 MG delayed release capsule TAKE 1 CAPSULE BY MOUTH TWO TIMES A DAY  180 capsule 1    famotidine (PEPCID) 40 MG tablet Take 1 tablet by mouth every evening 90 tablet 1    losartan (COZAAR) 100 MG tablet Take 1 tablet by mouth daily 30 tablet 5    cetirizine (ZYRTEC) 10 MG tablet Take 1 tablet by mouth daily 30 tablet 5    fluconazole (DIFLUCAN) 150 MG tablet TAKE 1 TABLET BY MOUTH NOW AS A ONE TIME DOSE, REPEAT WITH A SECOND TABLET IN 7 DAYS if needed 2 tablet 0    Blood Pressure Monitoring (B-D ASSURE BPM/AUTO WRIST CUFF) MISC 1 Device by Does not apply route daily 1 each 0    vitamin D (ERGOCALCIFEROL) 31459 units CAPS capsule Take 50,000 Units by mouth once a week Takes 3 times a week      levothyroxine (SYNTHROID) 25 MCG tablet Take 25 mcg by mouth Daily      propranolol (INDERAL) 10 MG tablet Take 20 mg by mouth 2 times daily Takes 30 mg at HS only prescribed by Dr. Leandro Mireles QUEtiapine (SEROQUEL) 200 MG tablet TAKE 1 TABLET BY MOUTH AT BEDTIME 60 tablet 1    albuterol sulfate  (90 Base) MCG/ACT inhaler Inhale 2 puffs into the lungs every 6 hours as needed for Wheezing 1 Inhaler 3    glucose blood VI test strips (ASCENSIA AUTODISC VI;ONE TOUCH ULTRA TEST VI) strip Use with associated glucose meter. 100 strip 11    EPIPEN 2-JIGAR 0.3 MG/0.3ML SOAJ injection INJECT 0.3 ML (0.3 MG) INTO THE MUSCLE ONCE AS NEEDED FOR UP TO 1 DOSE. for angioedema 1 each 2    potassium chloride (KLOR-CON M) 20 MEQ extended release tablet Take 20 mEq by mouth 2 times daily Prescribed by endocrinologist      felodipine (PLENDIL) 10 MG extended release tablet Take 1 tablet by mouth daily 30 tablet 3    docusate sodium (COLACE) 100 MG capsule Take 1 capsule by mouth 2 times daily as needed for Constipation (Patient taking differently: Take 100 mg by mouth daily as needed for Constipation ) 60 capsule 1    metFORMIN (GLUCOPHAGE) 500 MG tablet Take 500 mg by mouth daily (with breakfast)       venlafaxine (EFFEXOR) 75 MG tablet Take 2 tablets (150 mg) in the morning and 1 tablet (75 mg) in the evening. 90 tablet 3    Multiple Vitamins-Minerals (THERAPEUTIC MULTIVITAMIN-MINERALS) tablet Take 1 tablet by mouth daily      Probiotic Product (PROBIOTIC & ACIDOPHILUS EX ST PO) Take by mouth daily       vitamin B-12 (CYANOCOBALAMIN) 1000 MCG tablet Take 1,000 mcg by mouth daily       No current facility-administered medications for this visit. intact. Female Urogen:  Declined  Female Rectal: Declined    OMM Structural Component:  The patient did not complain of a Chief complaint requiring OMM. Chief Complaint:none    Structural Exam: No Interest              Diagnostics:  NA       Lab Results:  Results for orders placed or performed during the hospital encounter of 06/29/18   POC Glucose Fingerstick   Result Value Ref Range    POC Glucose 80 65 - 105 mg/dL           Assessment:     Diagnosis Orders   1. Cystocele, midline     2. JOYCE (stress urinary incontinence, female)     3. Atrophic Vaginal Mucosa  conjugated estrogens (PREMARIN) 0.625 MG/GM vaginal cream   4. H/O hysterectomy for benign disease 2014     5. History of total bilateral knee replacement     6. Controlled type 2 diabetes mellitus without complication, without long-term current use of insulin ([facility]     7. Essential hypertension     8. Coronary artery disease with angina pectoris, unspecified vessel or lesion type, unspecified whether native or transplanted heart [facility])         Patient Active Problem List    Diagnosis Date Noted    [facility] 11/29/2017     Priority: High     Overview Note:     Hysterectomy in 2014 for [facility] menses at [facility] History of total bilateral knee replacement 11/29/2017     Priority: High    Controlled type 2 diabetes mellitus without complication ([facility] 39/21/0478     Priority: High    Stroke [facility]) 04/06/2017     Priority: High    Raynaud's disease      Priority: High    Hypertension      Priority: High    CAD (coronary artery disease)      Priority: High    Spondylosis of cervical region without myelopathy or radiculopathy      Priority: Medium     Overview Note:     Suffers with neck pain. A MRI of the cervical spine revealed multilevel degenerative joint disease. Her neck pain is constant and radiates down both upper extremities.  An EMG nerve conduction study in February 2016 found a bilateral carpal tunnel syndrome and possible ulnar neuropathy. A MRI of the cervical spine in August 2016 found multilevel degenerative joint disease and at C5-C6 a disc protrusion mildly indenting the thecal sac and closely abutting the ventral cord. Additionally she reports having low back pain and previously had a lumbar spine MRI in September 2014 done at the [address] [place] [address],[address] in [place] which revealed multilevel lumbar degenerative joint disease worse at L5-S1 and L4-L5. Pain management consultation pending.  Sleep walking disorder      Priority: Medium    Neuropathy (HCC)      Priority: Medium    GERD (gastroesophageal reflux disease)      Priority: Medium    Antinuclear antibody (IQRA) titer greater than 1:80      Priority: Medium    Anemia      Priority: Medium    Seizures ([address] 02/24/2017     Priority: Medium     Overview Note:     An isolated episode of witnessed generalized tonic-clonic seizure activity in January 2017 while in [place] likely provoked by Tramadol. An EEG in June 2017 was normal.  A MRI of the brain in April 2017 was normal.    She was taking Tramadol which was discontinued and also uses Lamotrigine primarily for her bipolar disorder.  Abdominal pain 02/16/2018    Diarrhea 02/16/2018    Chest pain     Bradycardia 01/26/2018    Hypotension 01/24/2018    Acute cystitis without hematuria 01/16/2018    Hypovolemia 01/16/2018    Atrial fibrillation with slow ventricular response ([address] 01/15/2018    H/O: stroke 01/15/2018    H/O: hysterectomy     Chronic fatigue syndrome 06/15/2017    S/P knee surgery     Degenerative arthritis of right knee 05/02/2017    Transient insomnia     Pulmonary nodules     Headache     Fibromyalgia     Depression     Degenerative disc disease, thoracic     Bipolar disorder (HCC)     Asthma     Anxiety     Acute kidney injury ([address]          Plan:  1. Cystocele Repair  2.  Co-Procedure with Urology Dr. Shelia Lagunas for

## 2018-07-13 ENCOUNTER — HOSPITAL ENCOUNTER (OUTPATIENT)
Dept: PAIN MANAGEMENT | Age: 53
Discharge: HOME | End: 2018-07-13
Payer: MEDICAID

## 2018-07-13 VITALS
HEIGHT: 68 IN | DIASTOLIC BLOOD PRESSURE: 102 MMHG | TEMPERATURE: 98 F | BODY MASS INDEX: 26.52 KG/M2 | WEIGHT: 175 LBS | HEART RATE: 75 BPM | RESPIRATION RATE: 16 BRPM | SYSTOLIC BLOOD PRESSURE: 143 MMHG

## 2018-07-13 DIAGNOSIS — M47.812 SPONDYLOSIS OF CERVICAL REGION WITHOUT MYELOPATHY OR RADICULOPATHY: Primary | ICD-10-CM

## 2018-07-13 PROCEDURE — 99213 OFFICE O/P EST LOW 20 MIN: CPT | Performed by: NURSE PRACTITIONER

## 2018-07-13 PROCEDURE — 99214 OFFICE O/P EST MOD 30 MIN: CPT

## 2018-07-13 ASSESSMENT — ENCOUNTER SYMPTOMS
CONSTIPATION: 0
SHORTNESS OF BREATH: 0
COUGH: 0

## 2018-07-13 NOTE — PROGRESS NOTES
MG tablet, TAKE 1 TABLET BY MOUTH AT BEDTIME , Disp: 90 tablet, Rfl: 1    predniSONE (DELTASONE) 50 MG tablet, Take 1 tablet by mouth daily as needed (prn) For angioedema, Disp: 30 tablet, Rfl: 0    lamoTRIgine (LAMICTAL) 100 MG tablet, , Disp: , Rfl:     naltrexone (DEPADE) 50 MG tablet, Prescribed by Dr. Panchito Naranjo, Disp: , Rfl:     hydroxychloroquine (PLAQUENIL) 200 MG tablet, TAKE 1 TABLET BY MOUTH ONE TIME A DAY , Disp: 90 tablet, Rfl: 1    cyclobenzaprine (FLEXERIL) 5 MG tablet, TAKE 1 OR 2 TABLETS BY MOUTH AT BEDTIME AS NEEDED, Disp: 100 tablet, Rfl: 1    disulfiram (ANTABUSE) 250 MG tablet, Take 250 mg by mouth daily, Disp: , Rfl:     aspirin 81 MG tablet, Take 1 tablet by mouth 2 times daily, Disp: 60 tablet, Rfl: 11    gabapentin (NEURONTIN) 800 MG tablet, Take 800 mg by mouth 3 times daily. ., Disp: , Rfl:     omeprazole (PRILOSEC) 20 MG delayed release capsule, TAKE 1 CAPSULE BY MOUTH TWO TIMES A DAY , Disp: 180 capsule, Rfl: 1    famotidine (PEPCID) 40 MG tablet, Take 1 tablet by mouth every evening, Disp: 90 tablet, Rfl: 1    losartan (COZAAR) 100 MG tablet, Take 1 tablet by mouth daily, Disp: 30 tablet, Rfl: 5    cetirizine (ZYRTEC) 10 MG tablet, Take 1 tablet by mouth daily, Disp: 30 tablet, Rfl: 5    Blood Pressure Monitoring (B-D ASSURE BPM/AUTO WRIST CUFF) MISC, 1 Device by Does not apply route daily, Disp: 1 each, Rfl: 0    vitamin D (ERGOCALCIFEROL) 78757 units CAPS capsule, Take 50,000 Units by mouth once a week Takes 3 times a week, Disp: , Rfl:     levothyroxine (SYNTHROID) 25 MCG tablet, Take 25 mcg by mouth Daily, Disp: , Rfl:     propranolol (INDERAL) 10 MG tablet, Take 20 mg by mouth 2 times daily Takes 30 mg at HS only prescribed by Dr. Panchito Naranjo, Disp: , Rfl:     QUEtiapine (SEROQUEL) 200 MG tablet, TAKE 1 TABLET BY MOUTH AT BEDTIME, Disp: 60 tablet, Rfl: 1    albuterol sulfate  (90 Base) MCG/ACT inhaler, Inhale 2 puffs into the lungs every 6 hours as needed for Wheezing, Disp: 1 Inhaler, Rfl: 3    glucose blood VI test strips (ASCENSIA AUTODISC VI;ONE TOUCH ULTRA TEST VI) strip, Use with associated glucose meter. , Disp: 100 strip, Rfl: 11    EPIPEN 2-JIGAR 0.3 MG/0.3ML SOAJ injection, INJECT 0.3 ML (0.3 MG) INTO THE MUSCLE ONCE AS NEEDED FOR UP TO 1 DOSE. for angioedema, Disp: 1 each, Rfl: 2    potassium chloride (KLOR-CON M) 20 MEQ extended release tablet, Take 20 mEq by mouth 2 times daily Prescribed by endocrinologist, Disp: , Rfl:     felodipine (PLENDIL) 10 MG extended release tablet, Take 1 tablet by mouth daily, Disp: 30 tablet, Rfl: 3    docusate sodium (COLACE) 100 MG capsule, Take 1 capsule by mouth 2 times daily as needed for Constipation (Patient taking differently: Take 100 mg by mouth daily as needed for Constipation ), Disp: 60 capsule, Rfl: 1    metFORMIN (GLUCOPHAGE) 500 MG tablet, Take 500 mg by mouth daily (with breakfast) , Disp: , Rfl:     venlafaxine (EFFEXOR) 75 MG tablet, Take 2 tablets (150 mg) in the morning and 1 tablet (75 mg) in the evening., Disp: 90 tablet, Rfl: 3    Multiple Vitamins-Minerals (THERAPEUTIC MULTIVITAMIN-MINERALS) tablet, Take 1 tablet by mouth daily, Disp: , Rfl:     Probiotic Product (PROBIOTIC & ACIDOPHILUS EX ST PO), Take by mouth daily , Disp: , Rfl:     vitamin B-12 (CYANOCOBALAMIN) 1000 MCG tablet, Take 1,000 mcg by mouth daily, Disp: , Rfl:     cephALEXin (KEFLEX) 500 MG capsule, Take 500 mg by mouth as needed (before and after dental work), Disp: , Rfl:     fluconazole (DIFLUCAN) 150 MG tablet, TAKE 1 TABLET BY MOUTH NOW AS A ONE TIME DOSE, REPEAT WITH A SECOND TABLET IN 7 DAYS if needed, Disp: 2 tablet, Rfl: 0    Family History   Problem Relation Age of Onset    Adopted:  Yes    Depression Mother     Asthma Mother     Depression Father     High Blood Pressure Father     Diabetes Father     Asthma Father     Depression Sister     Asthma Sister     Depression Brother     Asthma Brother     Asthma degenerative disc disease at C5-C6 with mild spinal canal stenosis. Severe right neural foraminal stenosis at this level. 2. Minimal spinal canal stenosis at C3-C4 and C4-C5. Assessment:  Problem List Items Addressed This Visit     Spondylosis of cervical region without myelopathy or radiculopathy - Primary            Treatment Plan:  DISCUSSION: Treatment options discussed with patient and all questions answered to patient's satisfaction. TREATMENT OPTIONS:   Cervical RFA - right side C3/4, C4/,5 C5/6   Pre-procedural instructions are reviewed and signed. Procedure is scheduled.

## 2018-07-20 ENCOUNTER — HOSPITAL ENCOUNTER (OUTPATIENT)
Dept: GENERAL RADIOLOGY | Age: 53
Discharge: HOME | End: 2018-07-22
Payer: MEDICAID

## 2018-07-20 ENCOUNTER — HOSPITAL ENCOUNTER (OUTPATIENT)
Dept: PREADMISSION TESTING | Age: 53
Discharge: HOME | End: 2018-07-24
Payer: MEDICAID

## 2018-07-20 VITALS
HEIGHT: 68 IN | WEIGHT: 175 LBS | RESPIRATION RATE: 16 BRPM | HEART RATE: 74 BPM | TEMPERATURE: 97.9 F | BODY MASS INDEX: 26.52 KG/M2 | DIASTOLIC BLOOD PRESSURE: 95 MMHG | OXYGEN SATURATION: 100 % | SYSTOLIC BLOOD PRESSURE: 157 MMHG

## 2018-07-20 DIAGNOSIS — Z01.818 PREOP TESTING: ICD-10-CM

## 2018-07-20 LAB
ABO/RH: NORMAL
ABSOLUTE BANDS #: 0.07 K/UL (ref 0–1)
ABSOLUTE EOS #: 0.1 K/UL (ref 0–0.4)
ABSOLUTE IMMATURE GRANULOCYTE: ABNORMAL K/UL (ref 0–0.3)
ABSOLUTE LYMPH #: 1.35 K/UL (ref 1–4.8)
ABSOLUTE MONO #: 0.33 K/UL (ref 0.1–1.3)
ANION GAP SERPL CALCULATED.3IONS-SCNC: 13 MMOL/L (ref 9–17)
ANTIBODY SCREEN: NEGATIVE
ARM BAND NUMBER: NORMAL
BANDS: 2 % (ref 0–10)
BASOPHILS # BLD: 1 % (ref 0–2)
BASOPHILS ABSOLUTE: 0.03 K/UL (ref 0–0.2)
BILIRUBIN URINE: NEGATIVE
BUN BLDV-MCNC: 10 MG/DL (ref 6–20)
BUN/CREAT BLD: ABNORMAL (ref 9–20)
CALCIUM SERPL-MCNC: 9.8 MG/DL (ref 8.6–10.4)
CHLORIDE BLD-SCNC: 101 MMOL/L (ref 98–107)
CO2: 28 MMOL/L (ref 20–31)
COLOR: YELLOW
COMMENT UA: NORMAL
CREAT SERPL-MCNC: 0.91 MG/DL (ref 0.5–0.9)
DIFFERENTIAL TYPE: ABNORMAL
EKG ATRIAL RATE: 68 BPM
EKG P AXIS: 47 DEGREES
EKG P-R INTERVAL: 220 MS
EKG Q-T INTERVAL: 396 MS
EKG QRS DURATION: 88 MS
EKG QTC CALCULATION (BAZETT): 421 MS
EKG R AXIS: 5 DEGREES
EKG T AXIS: 11 DEGREES
EKG VENTRICULAR RATE: 68 BPM
EOSINOPHILS RELATIVE PERCENT: 3 % (ref 0–4)
EXPIRATION DATE: NORMAL
GFR AFRICAN AMERICAN: >60 ML/MIN
GFR NON-AFRICAN AMERICAN: >60 ML/MIN
GFR SERPL CREATININE-BSD FRML MDRD: ABNORMAL ML/MIN/{1.73_M2}
GFR SERPL CREATININE-BSD FRML MDRD: ABNORMAL ML/MIN/{1.73_M2}
GLUCOSE BLD-MCNC: 94 MG/DL (ref 70–99)
GLUCOSE URINE: NEGATIVE
HCG QUANTITATIVE: 2 IU/L
HCT VFR BLD CALC: 35.7 % (ref 36–46)
HEMOGLOBIN: 12 G/DL (ref 12–16)
IMMATURE GRANULOCYTES: ABNORMAL %
INR BLD: 1
KETONES, URINE: NEGATIVE
LEUKOCYTE ESTERASE, URINE: NEGATIVE
LYMPHOCYTES # BLD: 41 % (ref 24–44)
MCH RBC QN AUTO: 28 PG (ref 26–34)
MCHC RBC AUTO-ENTMCNC: 33.5 G/DL (ref 31–37)
MCV RBC AUTO: 83.6 FL (ref 80–100)
MONOCYTES # BLD: 10 % (ref 1–7)
MORPHOLOGY: ABNORMAL
NITRITE, URINE: NEGATIVE
NRBC AUTOMATED: ABNORMAL PER 100 WBC
PARTIAL THROMBOPLASTIN TIME: 25.4 SEC (ref 23–31)
PDW BLD-RTO: 14.8 % (ref 11.5–14.9)
PH UA: 6 (ref 5–8)
PLATELET # BLD: 232 K/UL (ref 150–450)
PLATELET ESTIMATE: ABNORMAL
PMV BLD AUTO: 8.6 FL (ref 6–12)
POTASSIUM SERPL-SCNC: 3.7 MMOL/L (ref 3.7–5.3)
PROTEIN UA: NEGATIVE
PROTHROMBIN TIME: 10.1 SEC (ref 9.7–12)
RBC # BLD: 4.27 M/UL (ref 4–5.2)
RBC # BLD: ABNORMAL 10*6/UL
SEG NEUTROPHILS: 43 % (ref 36–66)
SEGMENTED NEUTROPHILS ABSOLUTE COUNT: 1.42 K/UL (ref 1.3–9.1)
SODIUM BLD-SCNC: 142 MMOL/L (ref 135–144)
SPECIFIC GRAVITY UA: 1.01 (ref 1–1.03)
TURBIDITY: CLEAR
URINE HGB: NEGATIVE
UROBILINOGEN, URINE: NORMAL
WBC # BLD: 3.3 K/UL (ref 3.5–11)
WBC # BLD: ABNORMAL 10*3/UL

## 2018-07-20 PROCEDURE — 36415 COLL VENOUS BLD VENIPUNCTURE: CPT

## 2018-07-20 PROCEDURE — 86850 RBC ANTIBODY SCREEN: CPT

## 2018-07-20 PROCEDURE — 93005 ELECTROCARDIOGRAM TRACING: CPT

## 2018-07-20 PROCEDURE — 84702 CHORIONIC GONADOTROPIN TEST: CPT

## 2018-07-20 PROCEDURE — 86900 BLOOD TYPING SEROLOGIC ABO: CPT

## 2018-07-20 PROCEDURE — 85610 PROTHROMBIN TIME: CPT

## 2018-07-20 PROCEDURE — 87086 URINE CULTURE/COLONY COUNT: CPT

## 2018-07-20 PROCEDURE — 80048 BASIC METABOLIC PNL TOTAL CA: CPT

## 2018-07-20 PROCEDURE — 85730 THROMBOPLASTIN TIME PARTIAL: CPT

## 2018-07-20 PROCEDURE — 85025 COMPLETE CBC W/AUTO DIFF WBC: CPT

## 2018-07-20 PROCEDURE — 86901 BLOOD TYPING SEROLOGIC RH(D): CPT

## 2018-07-20 PROCEDURE — 71046 X-RAY EXAM CHEST 2 VIEWS: CPT

## 2018-07-20 PROCEDURE — 81003 URINALYSIS AUTO W/O SCOPE: CPT

## 2018-07-21 ENCOUNTER — ANESTHESIA EVENT (OUTPATIENT)
Dept: OPERATING ROOM | Age: 53
End: 2018-07-21
Payer: MEDICAID

## 2018-07-21 LAB
CULTURE: NO GROWTH
Lab: NORMAL
SPECIMEN DESCRIPTION: NORMAL
STATUS: NORMAL

## 2018-07-26 ENCOUNTER — TELEPHONE (OUTPATIENT)
Dept: ORTHOPEDIC SURGERY | Age: 53
End: 2018-07-26

## 2018-07-26 DIAGNOSIS — Z96.651 HISTORY OF ARTHROPLASTY OF RIGHT KNEE: Primary | ICD-10-CM

## 2018-07-27 ENCOUNTER — OFFICE VISIT (OUTPATIENT)
Dept: ORTHOPEDIC SURGERY | Age: 53
End: 2018-07-27
Payer: MEDICAID

## 2018-07-27 VITALS
HEART RATE: 82 BPM | BODY MASS INDEX: 26.52 KG/M2 | DIASTOLIC BLOOD PRESSURE: 94 MMHG | WEIGHT: 175 LBS | SYSTOLIC BLOOD PRESSURE: 134 MMHG | HEIGHT: 68 IN

## 2018-07-27 DIAGNOSIS — Z98.890 S/P KNEE SURGERY: ICD-10-CM

## 2018-07-27 DIAGNOSIS — G89.29 CHRONIC PAIN OF LEFT KNEE: ICD-10-CM

## 2018-07-27 DIAGNOSIS — M79.641 RIGHT HAND PAIN: ICD-10-CM

## 2018-07-27 DIAGNOSIS — Z01.818 PRE-OP TESTING: ICD-10-CM

## 2018-07-27 DIAGNOSIS — M17.11 ARTHRITIS OF RIGHT KNEE: Primary | ICD-10-CM

## 2018-07-27 DIAGNOSIS — R22.31 MASS OF FINGER OF RIGHT HAND: ICD-10-CM

## 2018-07-27 DIAGNOSIS — M25.562 CHRONIC PAIN OF LEFT KNEE: ICD-10-CM

## 2018-07-27 PROCEDURE — 3017F COLORECTAL CA SCREEN DOC REV: CPT | Performed by: ORTHOPAEDIC SURGERY

## 2018-07-27 PROCEDURE — G8427 DOCREV CUR MEDS BY ELIG CLIN: HCPCS | Performed by: ORTHOPAEDIC SURGERY

## 2018-07-27 PROCEDURE — G8417 CALC BMI ABV UP PARAM F/U: HCPCS | Performed by: ORTHOPAEDIC SURGERY

## 2018-07-27 PROCEDURE — 1036F TOBACCO NON-USER: CPT | Performed by: ORTHOPAEDIC SURGERY

## 2018-07-27 PROCEDURE — G8598 ASA/ANTIPLAT THER USED: HCPCS | Performed by: ORTHOPAEDIC SURGERY

## 2018-07-27 PROCEDURE — 99214 OFFICE O/P EST MOD 30 MIN: CPT | Performed by: ORTHOPAEDIC SURGERY

## 2018-07-27 ASSESSMENT — ENCOUNTER SYMPTOMS
DIARRHEA: 0
CONSTIPATION: 0
NAUSEA: 0
COUGH: 0

## 2018-07-27 NOTE — PROGRESS NOTES
[address] [place] [address]  Dept: [phone]  Dept Fax: [phone]        Ambulatory Follow Up      Subjective:   Jluiane Prado is a 46y.o. year old female who presents to our office today for routine followup regarding her   1. Arthritis of right knee    2. Pre-op testing    3. S/P knee surgery    4. Right hand pain    5. Chronic pain of left knee    6. Mass of finger of right hand    . Chief Complaint   Patient presents with    Knee Pain     Right       HPI-Mia Tijerina  is a 46 y.o.  female who presents today for her 1 year follow up for  right patellofemoral arthroplasty that was performed on 05/02/2018. .  The patient was last seen on 7/31/2017 and underwent treatment in the form of home exercise program and activity as tolerated. The patient is very happy with her results. Patient does bring up that her left knee hurts and she has decreased range of motion. Patient did have a prior surgery to her left knee at [facility]. Patient also bring up that she has a nodule on her right hand by her right ring finger. Patient has a history of previous carpal tunnel release and cubital tunnel release on the right. Review of Systems   Constitutional: Negative for chills and fever. Respiratory: Negative for cough. Gastrointestinal: Negative for constipation, diarrhea and nausea. Musculoskeletal: Negative for arthralgias, gait problem, joint swelling and myalgias. Neurological: Negative for dizziness, weakness and numbness. I have reviewed the CC, HPI, ROS, PMH, FHX, Social History. I agree with the documentation provided by other staff, residents, and/or medical students and have reviewed their documentation prior to providing my signature indicating agreement. Objective :   General: Juliane Prado is a 46 y.o. female who is alert and oriented and sitting comfortably in our office.   Ortho Exam  MS:  Right knee full can still have some errors including those of syntax and sound a like substitutions which may escape proof reading.  It such instances, actual meaning can be extrapolated by contextual diversion      No orders of the defined types were placed in this encounter. Orders Placed This Encounter   Procedures    XR HAND RIGHT (MIN 3 VIEWS)     Standing Status:   Future     Number of Occurrences:   1     Standing Expiration Date:   7/27/2019     Order Specific Question:   Reason for exam:     Answer:   inc pain    XR KNEE LEFT (MIN 4 VIEWS)     Weight Bearing. AP/Lateral, Merchant, and [employer].      Standing Status:   Future     Number of Occurrences:   1     Standing Expiration Date:   7/28/2019     Order Specific Question:   Reason for exam:     Answer:   in pain       Electronically signed by Yanelis Davis DO, Shera Cedars on 7/27/2018 at 9:54 PM

## 2018-07-29 PROBLEM — N39.3 SUI (STRESS URINARY INCONTINENCE, FEMALE): Status: ACTIVE | Noted: 2018-07-29

## 2018-07-29 PROBLEM — N81.11 CYSTOCELE, MIDLINE: Status: ACTIVE | Noted: 2018-07-29

## 2018-07-30 ENCOUNTER — ANESTHESIA (OUTPATIENT)
Dept: OPERATING ROOM | Age: 53
End: 2018-07-30
Payer: MEDICAID

## 2018-07-30 ENCOUNTER — HOSPITAL ENCOUNTER (OUTPATIENT)
Age: 53
Setting detail: OBSERVATION
Discharge: HOME | End: 2018-07-31
Attending: OBSTETRICS & GYNECOLOGY | Admitting: OBSTETRICS & GYNECOLOGY
Payer: MEDICAID

## 2018-07-30 VITALS — TEMPERATURE: 97.5 F | SYSTOLIC BLOOD PRESSURE: 186 MMHG | OXYGEN SATURATION: 100 % | DIASTOLIC BLOOD PRESSURE: 91 MMHG

## 2018-07-30 DIAGNOSIS — Z98.890: ICD-10-CM

## 2018-07-30 DIAGNOSIS — D50.8 OTHER IRON DEFICIENCY ANEMIA: Primary | ICD-10-CM

## 2018-07-30 LAB
-: ABNORMAL
AMORPHOUS: ABNORMAL
BACTERIA: ABNORMAL
BILIRUBIN URINE: NEGATIVE
CASTS UA: ABNORMAL /LPF
COLOR: YELLOW
COMMENT UA: ABNORMAL
CRYSTALS, UA: ABNORMAL /HPF
EPITHELIAL CELLS UA: ABNORMAL /HPF
GLUCOSE BLD-MCNC: 106 MG/DL (ref 65–105)
GLUCOSE BLD-MCNC: 124 MG/DL (ref 65–105)
GLUCOSE BLD-MCNC: 210 MG/DL (ref 65–105)
GLUCOSE BLD-MCNC: 89 MG/DL (ref 65–105)
GLUCOSE BLD-MCNC: 91 MG/DL (ref 65–105)
GLUCOSE URINE: NEGATIVE
KETONES, URINE: NEGATIVE
LEUKOCYTE ESTERASE, URINE: NEGATIVE
MUCUS: ABNORMAL
NITRITE, URINE: NEGATIVE
OTHER OBSERVATIONS UA: ABNORMAL
PH UA: 7 (ref 5–8)
PROTEIN UA: NEGATIVE
RBC UA: ABNORMAL /HPF
RENAL EPITHELIAL, UA: ABNORMAL /HPF
SPECIFIC GRAVITY UA: 1.01 (ref 1–1.03)
TRICHOMONAS: ABNORMAL
TURBIDITY: ABNORMAL
URINE HGB: NEGATIVE
UROBILINOGEN, URINE: NORMAL
WBC UA: ABNORMAL /HPF
YEAST: ABNORMAL

## 2018-07-30 PROCEDURE — 2580000003 HC RX 258: Performed by: OBSTETRICS & GYNECOLOGY

## 2018-07-30 PROCEDURE — G0378 HOSPITAL OBSERVATION PER HR: HCPCS

## 2018-07-30 PROCEDURE — 6360000002 HC RX W HCPCS: Performed by: NURSE ANESTHETIST, CERTIFIED REGISTERED

## 2018-07-30 PROCEDURE — 2500000003 HC RX 250 WO HCPCS: Performed by: OBSTETRICS & GYNECOLOGY

## 2018-07-30 PROCEDURE — 7100000000 HC PACU RECOVERY - FIRST 15 MIN: Performed by: OBSTETRICS & GYNECOLOGY

## 2018-07-30 PROCEDURE — 1220000000 HC SEMI PRIVATE OB R&B

## 2018-07-30 PROCEDURE — 2500000003 HC RX 250 WO HCPCS: Performed by: NURSE ANESTHETIST, CERTIFIED REGISTERED

## 2018-07-30 PROCEDURE — 2580000003 HC RX 258: Performed by: STUDENT IN AN ORGANIZED HEALTH CARE EDUCATION/TRAINING PROGRAM

## 2018-07-30 PROCEDURE — 82947 ASSAY GLUCOSE BLOOD QUANT: CPT

## 2018-07-30 PROCEDURE — 6360000002 HC RX W HCPCS: Performed by: STUDENT IN AN ORGANIZED HEALTH CARE EDUCATION/TRAINING PROGRAM

## 2018-07-30 PROCEDURE — 96372 THER/PROPH/DIAG INJ SC/IM: CPT

## 2018-07-30 PROCEDURE — 88302 TISSUE EXAM BY PATHOLOGIST: CPT

## 2018-07-30 PROCEDURE — 2709999900 HC NON-CHARGEABLE SUPPLY: Performed by: OBSTETRICS & GYNECOLOGY

## 2018-07-30 PROCEDURE — 81001 URINALYSIS AUTO W/SCOPE: CPT

## 2018-07-30 PROCEDURE — 6360000002 HC RX W HCPCS: Performed by: OBSTETRICS & GYNECOLOGY

## 2018-07-30 PROCEDURE — 6370000000 HC RX 637 (ALT 250 FOR IP): Performed by: STUDENT IN AN ORGANIZED HEALTH CARE EDUCATION/TRAINING PROGRAM

## 2018-07-30 PROCEDURE — 3600000002 HC SURGERY LEVEL 2 BASE: Performed by: OBSTETRICS & GYNECOLOGY

## 2018-07-30 PROCEDURE — C1771 REP DEV, URINARY, W/SLING: HCPCS | Performed by: OBSTETRICS & GYNECOLOGY

## 2018-07-30 PROCEDURE — 3700000000 HC ANESTHESIA ATTENDED CARE: Performed by: OBSTETRICS & GYNECOLOGY

## 2018-07-30 PROCEDURE — 3600000012 HC SURGERY LEVEL 2 ADDTL 15MIN: Performed by: OBSTETRICS & GYNECOLOGY

## 2018-07-30 PROCEDURE — 57240 ANTERIOR COLPORRHAPHY: CPT | Performed by: OBSTETRICS & GYNECOLOGY

## 2018-07-30 PROCEDURE — 94664 DEMO&/EVAL PT USE INHALER: CPT

## 2018-07-30 PROCEDURE — 3700000001 HC ADD 15 MINUTES (ANESTHESIA): Performed by: OBSTETRICS & GYNECOLOGY

## 2018-07-30 PROCEDURE — 7100000001 HC PACU RECOVERY - ADDTL 15 MIN: Performed by: OBSTETRICS & GYNECOLOGY

## 2018-07-30 DEVICE — TRANSOBTURATOR MID-URETHRAL SYSTEM
Type: IMPLANTABLE DEVICE | Site: BLADDER | Status: FUNCTIONAL
Brand: OBTRYX™ SYSTEM - HALO

## 2018-07-30 RX ORDER — DOCUSATE SODIUM 100 MG/1
100 CAPSULE, LIQUID FILLED ORAL DAILY
Status: DISCONTINUED | OUTPATIENT
Start: 2018-07-31 | End: 2018-07-31 | Stop reason: HOSPADM

## 2018-07-30 RX ORDER — FELODIPINE 10 MG/1
10 TABLET, EXTENDED RELEASE ORAL DAILY
Status: DISCONTINUED | OUTPATIENT
Start: 2018-07-30 | End: 2018-07-31 | Stop reason: HOSPADM

## 2018-07-30 RX ORDER — MEPERIDINE HYDROCHLORIDE 50 MG/ML
12.5 INJECTION INTRAMUSCULAR; INTRAVENOUS; SUBCUTANEOUS EVERY 5 MIN PRN
Status: DISCONTINUED | OUTPATIENT
Start: 2018-07-30 | End: 2018-07-30 | Stop reason: HOSPADM

## 2018-07-30 RX ORDER — HEPARIN SODIUM 5000 [USP'U]/ML
5000 INJECTION, SOLUTION INTRAVENOUS; SUBCUTANEOUS ONCE
Status: COMPLETED | OUTPATIENT
Start: 2018-07-31 | End: 2018-07-31

## 2018-07-30 RX ORDER — DEXTROSE MONOHYDRATE 25 G/50ML
12.5 INJECTION, SOLUTION INTRAVENOUS PRN
Status: DISCONTINUED | OUTPATIENT
Start: 2018-07-30 | End: 2018-07-31 | Stop reason: HOSPADM

## 2018-07-30 RX ORDER — BUPIVACAINE HYDROCHLORIDE AND EPINEPHRINE 2.5; 5 MG/ML; UG/ML
INJECTION, SOLUTION EPIDURAL; INFILTRATION; INTRACAUDAL; PERINEURAL PRN
Status: DISCONTINUED | OUTPATIENT
Start: 2018-07-30 | End: 2018-07-30 | Stop reason: HOSPADM

## 2018-07-30 RX ORDER — GLYCOPYRROLATE 1 MG/5 ML
SYRINGE (ML) INTRAVENOUS PRN
Status: DISCONTINUED | OUTPATIENT
Start: 2018-07-30 | End: 2018-07-30 | Stop reason: SDUPTHER

## 2018-07-30 RX ORDER — VENLAFAXINE 75 MG/1
75 TABLET ORAL NIGHTLY
Status: DISCONTINUED | OUTPATIENT
Start: 2018-07-30 | End: 2018-07-31 | Stop reason: HOSPADM

## 2018-07-30 RX ORDER — NEOSTIGMINE METHYLSULFATE 1 MG/ML
INJECTION, SOLUTION INTRAVENOUS PRN
Status: DISCONTINUED | OUTPATIENT
Start: 2018-07-30 | End: 2018-07-30 | Stop reason: SDUPTHER

## 2018-07-30 RX ORDER — SODIUM CHLORIDE 0.9 % (FLUSH) 0.9 %
10 SYRINGE (ML) INJECTION PRN
Status: DISCONTINUED | OUTPATIENT
Start: 2018-07-30 | End: 2018-07-30 | Stop reason: HOSPADM

## 2018-07-30 RX ORDER — LOSARTAN POTASSIUM 100 MG/1
100 TABLET ORAL DAILY
Status: DISCONTINUED | OUTPATIENT
Start: 2018-07-30 | End: 2018-07-31 | Stop reason: HOSPADM

## 2018-07-30 RX ORDER — LEVOTHYROXINE SODIUM 0.03 MG/1
25 TABLET ORAL DAILY
Status: DISCONTINUED | OUTPATIENT
Start: 2018-07-30 | End: 2018-07-31 | Stop reason: HOSPADM

## 2018-07-30 RX ORDER — HYDROXYCHLOROQUINE SULFATE 200 MG/1
200 TABLET, FILM COATED ORAL DAILY
Status: DISCONTINUED | OUTPATIENT
Start: 2018-07-31 | End: 2018-07-31 | Stop reason: HOSPADM

## 2018-07-30 RX ORDER — HEPARIN SODIUM 5000 [USP'U]/ML
5000 INJECTION, SOLUTION INTRAVENOUS; SUBCUTANEOUS ONCE
Status: COMPLETED | OUTPATIENT
Start: 2018-07-30 | End: 2018-07-30

## 2018-07-30 RX ORDER — LABETALOL HYDROCHLORIDE 5 MG/ML
5 INJECTION, SOLUTION INTRAVENOUS EVERY 10 MIN PRN
Status: DISCONTINUED | OUTPATIENT
Start: 2018-07-30 | End: 2018-07-30 | Stop reason: HOSPADM

## 2018-07-30 RX ORDER — GABAPENTIN 400 MG/1
800 CAPSULE ORAL 3 TIMES DAILY
Status: DISCONTINUED | OUTPATIENT
Start: 2018-07-30 | End: 2018-07-31 | Stop reason: HOSPADM

## 2018-07-30 RX ORDER — POTASSIUM CHLORIDE 20 MEQ/1
20 TABLET, EXTENDED RELEASE ORAL 2 TIMES DAILY
Status: DISCONTINUED | OUTPATIENT
Start: 2018-07-31 | End: 2018-07-31 | Stop reason: HOSPADM

## 2018-07-30 RX ORDER — DIPHENHYDRAMINE HYDROCHLORIDE 50 MG/ML
12.5 INJECTION INTRAMUSCULAR; INTRAVENOUS
Status: DISCONTINUED | OUTPATIENT
Start: 2018-07-30 | End: 2018-07-30 | Stop reason: HOSPADM

## 2018-07-30 RX ORDER — FENTANYL CITRATE 50 UG/ML
50 INJECTION, SOLUTION INTRAMUSCULAR; INTRAVENOUS EVERY 5 MIN PRN
Status: DISCONTINUED | OUTPATIENT
Start: 2018-07-30 | End: 2018-07-30 | Stop reason: HOSPADM

## 2018-07-30 RX ORDER — ACETAMINOPHEN 325 MG/1
650 TABLET ORAL EVERY 4 HOURS PRN
Status: DISCONTINUED | OUTPATIENT
Start: 2018-07-30 | End: 2018-07-30 | Stop reason: SDUPTHER

## 2018-07-30 RX ORDER — ALBUTEROL SULFATE 90 UG/1
2 AEROSOL, METERED RESPIRATORY (INHALATION) EVERY 6 HOURS PRN
Status: DISCONTINUED | OUTPATIENT
Start: 2018-07-30 | End: 2018-07-31 | Stop reason: HOSPADM

## 2018-07-30 RX ORDER — NALTREXONE HYDROCHLORIDE 50 MG/1
50 TABLET, FILM COATED ORAL DAILY
Status: DISCONTINUED | OUTPATIENT
Start: 2018-07-30 | End: 2018-07-30

## 2018-07-30 RX ORDER — DIPHENHYDRAMINE HCL 25 MG
25 TABLET ORAL EVERY 6 HOURS PRN
Status: DISCONTINUED | OUTPATIENT
Start: 2018-07-30 | End: 2018-07-31 | Stop reason: HOSPADM

## 2018-07-30 RX ORDER — CIPROFLOXACIN 500 MG/1
500 TABLET, FILM COATED ORAL EVERY 12 HOURS SCHEDULED
Status: DISCONTINUED | OUTPATIENT
Start: 2018-07-31 | End: 2018-07-31 | Stop reason: HOSPADM

## 2018-07-30 RX ORDER — PROPOFOL 10 MG/ML
INJECTION, EMULSION INTRAVENOUS PRN
Status: DISCONTINUED | OUTPATIENT
Start: 2018-07-30 | End: 2018-07-30 | Stop reason: SDUPTHER

## 2018-07-30 RX ORDER — ACETAMINOPHEN 325 MG/1
650 TABLET ORAL EVERY 4 HOURS PRN
Status: DISCONTINUED | OUTPATIENT
Start: 2018-07-30 | End: 2018-07-31 | Stop reason: HOSPADM

## 2018-07-30 RX ORDER — SODIUM CHLORIDE 9 MG/ML
INJECTION, SOLUTION INTRAVENOUS CONTINUOUS
Status: DISCONTINUED | OUTPATIENT
Start: 2018-07-30 | End: 2018-07-31 | Stop reason: HOSPADM

## 2018-07-30 RX ORDER — SODIUM CHLORIDE, SODIUM LACTATE, POTASSIUM CHLORIDE, CALCIUM CHLORIDE 600; 310; 30; 20 MG/100ML; MG/100ML; MG/100ML; MG/100ML
INJECTION, SOLUTION INTRAVENOUS CONTINUOUS
Status: DISCONTINUED | OUTPATIENT
Start: 2018-07-30 | End: 2018-07-30

## 2018-07-30 RX ORDER — DISULFIRAM 250 MG/1
250 TABLET ORAL DAILY
Status: DISCONTINUED | OUTPATIENT
Start: 2018-07-30 | End: 2018-07-31 | Stop reason: HOSPADM

## 2018-07-30 RX ORDER — SODIUM CHLORIDE 0.9 % (FLUSH) 0.9 %
10 SYRINGE (ML) INJECTION PRN
Status: DISCONTINUED | OUTPATIENT
Start: 2018-07-30 | End: 2018-07-31 | Stop reason: HOSPADM

## 2018-07-30 RX ORDER — ROCURONIUM BROMIDE 10 MG/ML
INJECTION, SOLUTION INTRAVENOUS PRN
Status: DISCONTINUED | OUTPATIENT
Start: 2018-07-30 | End: 2018-07-30 | Stop reason: SDUPTHER

## 2018-07-30 RX ORDER — ASPIRIN 81 MG/1
81 TABLET ORAL 2 TIMES DAILY
Status: DISCONTINUED | OUTPATIENT
Start: 2018-07-30 | End: 2018-07-31 | Stop reason: HOSPADM

## 2018-07-30 RX ORDER — VENLAFAXINE HYDROCHLORIDE 150 MG/1
150 CAPSULE, EXTENDED RELEASE ORAL EVERY MORNING
Status: DISCONTINUED | OUTPATIENT
Start: 2018-07-31 | End: 2018-07-31 | Stop reason: HOSPADM

## 2018-07-30 RX ORDER — QUETIAPINE FUMARATE 200 MG/1
200 TABLET, FILM COATED ORAL NIGHTLY
Status: DISCONTINUED | OUTPATIENT
Start: 2018-07-30 | End: 2018-07-31 | Stop reason: HOSPADM

## 2018-07-30 RX ORDER — FENTANYL CITRATE 50 UG/ML
INJECTION, SOLUTION INTRAMUSCULAR; INTRAVENOUS PRN
Status: DISCONTINUED | OUTPATIENT
Start: 2018-07-30 | End: 2018-07-30 | Stop reason: SDUPTHER

## 2018-07-30 RX ORDER — ONDANSETRON 2 MG/ML
INJECTION INTRAMUSCULAR; INTRAVENOUS PRN
Status: DISCONTINUED | OUTPATIENT
Start: 2018-07-30 | End: 2018-07-30 | Stop reason: SDUPTHER

## 2018-07-30 RX ORDER — FAMOTIDINE 20 MG/1
40 TABLET, FILM COATED ORAL NIGHTLY
Status: DISCONTINUED | OUTPATIENT
Start: 2018-07-30 | End: 2018-07-31 | Stop reason: HOSPADM

## 2018-07-30 RX ORDER — ONDANSETRON 2 MG/ML
4 INJECTION INTRAMUSCULAR; INTRAVENOUS
Status: DISCONTINUED | OUTPATIENT
Start: 2018-07-30 | End: 2018-07-30 | Stop reason: HOSPADM

## 2018-07-30 RX ORDER — HEPARIN SODIUM 5000 [USP'U]/ML
5000 INJECTION, SOLUTION INTRAVENOUS; SUBCUTANEOUS ONCE
Status: DISCONTINUED | OUTPATIENT
Start: 2018-07-30 | End: 2018-07-30

## 2018-07-30 RX ORDER — CALCIUM CARBONATE 200(500)MG
500 TABLET,CHEWABLE ORAL 3 TIMES DAILY PRN
Status: DISCONTINUED | OUTPATIENT
Start: 2018-07-30 | End: 2018-07-31 | Stop reason: HOSPADM

## 2018-07-30 RX ORDER — DEXTROSE MONOHYDRATE 50 MG/ML
100 INJECTION, SOLUTION INTRAVENOUS PRN
Status: DISCONTINUED | OUTPATIENT
Start: 2018-07-30 | End: 2018-07-31 | Stop reason: HOSPADM

## 2018-07-30 RX ORDER — KETOROLAC TROMETHAMINE 30 MG/ML
INJECTION, SOLUTION INTRAMUSCULAR; INTRAVENOUS PRN
Status: DISCONTINUED | OUTPATIENT
Start: 2018-07-30 | End: 2018-07-30 | Stop reason: SDUPTHER

## 2018-07-30 RX ORDER — CEFAZOLIN SODIUM 1 G/3ML
INJECTION, POWDER, FOR SOLUTION INTRAMUSCULAR; INTRAVENOUS
Status: DISCONTINUED
Start: 2018-07-30 | End: 2018-07-30

## 2018-07-30 RX ORDER — HYDROCODONE BITARTRATE AND ACETAMINOPHEN 5; 325 MG/1; MG/1
1 TABLET ORAL EVERY 4 HOURS PRN
Status: DISCONTINUED | OUTPATIENT
Start: 2018-07-30 | End: 2018-07-31 | Stop reason: HOSPADM

## 2018-07-30 RX ORDER — IBUPROFEN 600 MG/1
600 TABLET ORAL EVERY 6 HOURS PRN
Status: DISCONTINUED | OUTPATIENT
Start: 2018-07-30 | End: 2018-07-30

## 2018-07-30 RX ORDER — CYCLOBENZAPRINE HCL 10 MG
5 TABLET ORAL NIGHTLY PRN
Status: DISCONTINUED | OUTPATIENT
Start: 2018-07-30 | End: 2018-07-31 | Stop reason: HOSPADM

## 2018-07-30 RX ORDER — PROPRANOLOL HYDROCHLORIDE 20 MG/1
20 TABLET ORAL DAILY
Status: DISCONTINUED | OUTPATIENT
Start: 2018-07-30 | End: 2018-07-31 | Stop reason: HOSPADM

## 2018-07-30 RX ORDER — OMEPRAZOLE 20 MG/1
20 CAPSULE, DELAYED RELEASE ORAL DAILY
Status: DISCONTINUED | OUTPATIENT
Start: 2018-07-31 | End: 2018-07-30

## 2018-07-30 RX ORDER — DEXAMETHASONE SODIUM PHOSPHATE 4 MG/ML
INJECTION, SOLUTION INTRA-ARTICULAR; INTRALESIONAL; INTRAMUSCULAR; INTRAVENOUS; SOFT TISSUE PRN
Status: DISCONTINUED | OUTPATIENT
Start: 2018-07-30 | End: 2018-07-30 | Stop reason: SDUPTHER

## 2018-07-30 RX ORDER — OMEPRAZOLE 20 MG/1
20 CAPSULE, DELAYED RELEASE ORAL 2 TIMES DAILY
Status: DISCONTINUED | OUTPATIENT
Start: 2018-07-30 | End: 2018-07-31 | Stop reason: HOSPADM

## 2018-07-30 RX ORDER — SODIUM CHLORIDE 0.9 % (FLUSH) 0.9 %
10 SYRINGE (ML) INJECTION EVERY 12 HOURS SCHEDULED
Status: DISCONTINUED | OUTPATIENT
Start: 2018-07-30 | End: 2018-07-30 | Stop reason: HOSPADM

## 2018-07-30 RX ORDER — LAMOTRIGINE 100 MG/1
100 TABLET ORAL DAILY
Status: DISCONTINUED | OUTPATIENT
Start: 2018-07-30 | End: 2018-07-31 | Stop reason: HOSPADM

## 2018-07-30 RX ORDER — NICOTINE POLACRILEX 4 MG
15 LOZENGE BUCCAL PRN
Status: DISCONTINUED | OUTPATIENT
Start: 2018-07-30 | End: 2018-07-31 | Stop reason: HOSPADM

## 2018-07-30 RX ORDER — MIDAZOLAM HYDROCHLORIDE 1 MG/ML
INJECTION INTRAMUSCULAR; INTRAVENOUS PRN
Status: DISCONTINUED | OUTPATIENT
Start: 2018-07-30 | End: 2018-07-30 | Stop reason: SDUPTHER

## 2018-07-30 RX ORDER — PREDNISONE 50 MG/1
50 TABLET ORAL DAILY PRN
Status: DISCONTINUED | OUTPATIENT
Start: 2018-07-30 | End: 2018-07-31 | Stop reason: HOSPADM

## 2018-07-30 RX ADMIN — DEXAMETHASONE SODIUM PHOSPHATE 4 MG: 4 INJECTION, SOLUTION INTRAMUSCULAR; INTRAVENOUS at 07:49

## 2018-07-30 RX ADMIN — OMEPRAZOLE 20 MG: 20 CAPSULE, DELAYED RELEASE ORAL at 20:44

## 2018-07-30 RX ADMIN — Medication 0.5 MG: at 08:54

## 2018-07-30 RX ADMIN — FENTANYL CITRATE 50 MCG: 50 INJECTION, SOLUTION INTRAMUSCULAR; INTRAVENOUS at 07:51

## 2018-07-30 RX ADMIN — HYDROCODONE BITARTRATE AND ACETAMINOPHEN 1 TABLET: 5; 325 TABLET ORAL at 11:55

## 2018-07-30 RX ADMIN — LEVOTHYROXINE SODIUM 25 MCG: 25 TABLET ORAL at 11:56

## 2018-07-30 RX ADMIN — Medication 2 G: at 07:35

## 2018-07-30 RX ADMIN — ONDANSETRON 4 MG: 2 INJECTION INTRAMUSCULAR; INTRAVENOUS at 08:54

## 2018-07-30 RX ADMIN — SODIUM CHLORIDE: 9 INJECTION, SOLUTION INTRAVENOUS at 11:55

## 2018-07-30 RX ADMIN — SODIUM CHLORIDE, POTASSIUM CHLORIDE, SODIUM LACTATE AND CALCIUM CHLORIDE: 600; 310; 30; 20 INJECTION, SOLUTION INTRAVENOUS at 08:54

## 2018-07-30 RX ADMIN — INSULIN LISPRO 2 UNITS: 100 INJECTION, SOLUTION INTRAVENOUS; SUBCUTANEOUS at 17:27

## 2018-07-30 RX ADMIN — PROPOFOL 200 MG: 10 INJECTION, EMULSION INTRAVENOUS at 07:44

## 2018-07-30 RX ADMIN — VENLAFAXINE HYDROCHLORIDE 150 MG: 150 CAPSULE, EXTENDED RELEASE ORAL at 11:57

## 2018-07-30 RX ADMIN — ASPIRIN 81 MG: 81 TABLET, COATED ORAL at 20:44

## 2018-07-30 RX ADMIN — MIDAZOLAM 2 MG: 1 INJECTION INTRAMUSCULAR; INTRAVENOUS at 07:30

## 2018-07-30 RX ADMIN — FAMOTIDINE 40 MG: 20 TABLET ORAL at 20:44

## 2018-07-30 RX ADMIN — DISULFIRAM 250 MG: 250 TABLET ORAL at 11:58

## 2018-07-30 RX ADMIN — CEFAZOLIN 1 G: 1 INJECTION, POWDER, FOR SOLUTION INTRAMUSCULAR; INTRAVENOUS at 15:58

## 2018-07-30 RX ADMIN — HEPARIN SODIUM 5000 UNITS: 5000 INJECTION, SOLUTION INTRAVENOUS; SUBCUTANEOUS at 17:29

## 2018-07-30 RX ADMIN — GABAPENTIN 800 MG: 400 CAPSULE ORAL at 14:32

## 2018-07-30 RX ADMIN — OMEPRAZOLE 20 MG: 20 CAPSULE, DELAYED RELEASE ORAL at 12:52

## 2018-07-30 RX ADMIN — NEOSTIGMINE METHYLSULFATE 3 MG: 1 INJECTION, SOLUTION INTRAVENOUS at 08:54

## 2018-07-30 RX ADMIN — VENLAFAXINE 75 MG: 75 TABLET ORAL at 20:46

## 2018-07-30 RX ADMIN — FENTANYL CITRATE 50 MCG: 50 INJECTION, SOLUTION INTRAMUSCULAR; INTRAVENOUS at 07:35

## 2018-07-30 RX ADMIN — HYDROCODONE BITARTRATE AND ACETAMINOPHEN 1 TABLET: 5; 325 TABLET ORAL at 20:44

## 2018-07-30 RX ADMIN — LAMOTRIGINE 100 MG: 100 TABLET ORAL at 11:57

## 2018-07-30 RX ADMIN — SODIUM CHLORIDE: 9 INJECTION, SOLUTION INTRAVENOUS at 20:52

## 2018-07-30 RX ADMIN — CEFAZOLIN 1 G: 1 INJECTION, POWDER, FOR SOLUTION INTRAMUSCULAR; INTRAVENOUS at 23:33

## 2018-07-30 RX ADMIN — ROCURONIUM BROMIDE 50 MG: 10 INJECTION INTRAVENOUS at 07:44

## 2018-07-30 RX ADMIN — SODIUM CHLORIDE, POTASSIUM CHLORIDE, SODIUM LACTATE AND CALCIUM CHLORIDE: 600; 310; 30; 20 INJECTION, SOLUTION INTRAVENOUS at 07:04

## 2018-07-30 RX ADMIN — QUETIAPINE FUMARATE 200 MG: 200 TABLET ORAL at 20:46

## 2018-07-30 RX ADMIN — HYDROCODONE BITARTRATE AND ACETAMINOPHEN 1 TABLET: 5; 325 TABLET ORAL at 16:02

## 2018-07-30 RX ADMIN — GABAPENTIN 800 MG: 400 CAPSULE ORAL at 20:46

## 2018-07-30 RX ADMIN — KETOROLAC TROMETHAMINE 30 MG: 30 INJECTION, SOLUTION INTRAMUSCULAR at 08:54

## 2018-07-30 RX ADMIN — PROPRANOLOL HYDROCHLORIDE 30 MG: 20 TABLET ORAL at 20:46

## 2018-07-30 RX ADMIN — FENTANYL CITRATE 50 MCG: 50 INJECTION, SOLUTION INTRAMUSCULAR; INTRAVENOUS at 09:01

## 2018-07-30 ASSESSMENT — PULMONARY FUNCTION TESTS
PIF_VALUE: 15
PIF_VALUE: 15
PIF_VALUE: 0
PIF_VALUE: 0
PIF_VALUE: 5
PIF_VALUE: 15
PIF_VALUE: 14
PIF_VALUE: 13
PIF_VALUE: 14
PIF_VALUE: 2
PIF_VALUE: 0
PIF_VALUE: 15
PIF_VALUE: 4
PIF_VALUE: 15
PIF_VALUE: 14
PIF_VALUE: 13
PIF_VALUE: 15
PIF_VALUE: 15
PIF_VALUE: 13
PIF_VALUE: 14
PIF_VALUE: 1
PIF_VALUE: 14
PIF_VALUE: 15
PIF_VALUE: 0
PIF_VALUE: 14
PIF_VALUE: 15
PIF_VALUE: 14
PIF_VALUE: 15
PIF_VALUE: 1
PIF_VALUE: 15
PIF_VALUE: 1
PIF_VALUE: 0
PIF_VALUE: 14
PIF_VALUE: 13
PIF_VALUE: 14
PIF_VALUE: 16
PIF_VALUE: 0
PIF_VALUE: 14
PIF_VALUE: 14
PIF_VALUE: 13
PIF_VALUE: 14
PIF_VALUE: 1
PIF_VALUE: 14
PIF_VALUE: 14
PIF_VALUE: 1
PIF_VALUE: 14
PIF_VALUE: 14
PIF_VALUE: 13
PIF_VALUE: 14
PIF_VALUE: 14
PIF_VALUE: 15
PIF_VALUE: 14
PIF_VALUE: 1
PIF_VALUE: 1
PIF_VALUE: 13
PIF_VALUE: 14
PIF_VALUE: 13
PIF_VALUE: 17
PIF_VALUE: 27
PIF_VALUE: 12
PIF_VALUE: 13
PIF_VALUE: 14
PIF_VALUE: 14
PIF_VALUE: 15
PIF_VALUE: 14
PIF_VALUE: 14
PIF_VALUE: 15
PIF_VALUE: 14
PIF_VALUE: 0
PIF_VALUE: 0
PIF_VALUE: 12
PIF_VALUE: 14
PIF_VALUE: 2
PIF_VALUE: 14
PIF_VALUE: 0
PIF_VALUE: 14
PIF_VALUE: 15
PIF_VALUE: 13
PIF_VALUE: 14
PIF_VALUE: 1
PIF_VALUE: 15
PIF_VALUE: 15
PIF_VALUE: 13
PIF_VALUE: 14
PIF_VALUE: 0
PIF_VALUE: 14

## 2018-07-30 ASSESSMENT — PAIN SCALES - GENERAL
PAINLEVEL_OUTOF10: 4
PAINLEVEL_OUTOF10: 3
PAINLEVEL_OUTOF10: 2
PAINLEVEL_OUTOF10: 0
PAINLEVEL_OUTOF10: 0
PAINLEVEL_OUTOF10: 6
PAINLEVEL_OUTOF10: 5
PAINLEVEL_OUTOF10: 0
PAINLEVEL_OUTOF10: 0
PAINLEVEL_OUTOF10: 6

## 2018-07-30 ASSESSMENT — PAIN - FUNCTIONAL ASSESSMENT: PAIN_FUNCTIONAL_ASSESSMENT: 0-10

## 2018-07-30 ASSESSMENT — PAIN DESCRIPTION - PAIN TYPE
TYPE: SURGICAL PAIN

## 2018-07-30 ASSESSMENT — PAIN DESCRIPTION - LOCATION
LOCATION: PERINEUM
LOCATION: PERINEUM

## 2018-07-30 ASSESSMENT — PAIN DESCRIPTION - ORIENTATION
ORIENTATION: LOWER
ORIENTATION: LOWER

## 2018-07-30 NOTE — OP NOTE
[address]                   [address] [facility], [address]\""i                                 OPERATIVE REPORT    PATIENT NAME: Spero Babinski                     :        1965  MED REC NO:   [id number]                              ROOM:       3705  ACCOUNT NO:   [de-identified]                           ADMIT DATE: 2018  PROVIDER:     Shaggy Mcneil MD    DATE OF PROCEDURE:  2018    PREOPERATIVE DIAGNOSIS:  Stress urinary incontinence. POSTOPERATIVE DIAGNOSIS:  Stress urinary incontinence. OPERATION PERFORMED:  Obtryx Halo midurethral sling. SURGEON:  Shaggy Mcneil MD.    ANESTHESIA:  General.    COMPLICATIONS:  None. INDICATIONS:  This is a very pleasant 59-year-old female who does have a  cystocele. She does also have stress urinary incontinence. She is  scheduled to get her cystocele repaired by Dr. Jagjit Platt and I am scheduled  to do a sling to address her stress urinary incontinence. The risks,  benefits, and alteratives were explained and informed consent was signed. OPERATIVE NARRATIVE:  The patient was brought to the operating room. She  was properly identified. The procedure was confirmed verbally. The  patient was administered with general anesthetic. She was placed in  modified dorsal lithotomy position and prepped and draped in sterile  fashion. A Andersen catheter was placed on the sterile field. I did  infiltrate the anterior vaginal mucosa with 0.25% Marcaine with  epinephrine. I then made a vertical incision overlying the mid urethra  with a scalpel. I then dissected pockets submucosally towards each ischial  pubic ramus. I then made stab incisions on other side of the labia majora  at the level of the clitoris near the insertion of the adductor longus  muscle. I then passed the Obtryx Halo trocars outside and in the standard  fashion. I then removed the Andersen catheter and the cystoscopy.   There was  no

## 2018-07-30 NOTE — PROGRESS NOTES
Patient Name: Molina Tavarez  Patient : 1965  Room/Bed: 9739/1276-52  Admission Date/Time: 2018  6:28 AM      Date: 2018  Time: 12:12 PM    Day of Surgery  Hospital Day: 1    Mia Martinez is a 48 y.o. female [id number]    This patient was seen today. She is POD #0 S/P Cystocele repair  Cystoscopy with OBTRYX Ureteral Sling (per Urology)    Today she she feels well. Her pain is well controlled with current pain medications. The patient is urinating well via davis catheter. She denies chest pain, shortness of breath, headache, lightheadedness, blurred vision, peripheral edema, palpitations and dry cough. She is not yet ambulating. She has not passed gas. She denies any vaginal bleeding. The patient is tolerating a normal diet (clears, ice chips and abeba crackers). She just ordered lunch. She has no nausea or vomiting.      Medications and Infusions:    Scheduled Medications:    aspirin  81 mg Oral BID    disulfiram  250 mg Oral Daily    [START ON 2018] docusate sodium  100 mg Oral Daily    famotidine  40 mg Oral Nightly    felodipine  10 mg Oral Daily    gabapentin  800 mg Oral TID    [START ON 2018] hydroxychloroquine  200 mg Oral Daily    lamoTRIgine  100 mg Oral Daily    levothyroxine  25 mcg Oral Daily    losartan  100 mg Oral Daily    propranolol  20 mg Oral Daily    QUEtiapine  200 mg Oral Nightly    venlafaxine  75 mg Oral Nightly    omeprazole  20 mg Oral BID    [START ON 2018] potassium chloride  20 mEq Oral BID    [START ON 2018] venlafaxine  150 mg Oral QAM    ceFAZolin  1 g Intravenous Q8H    [START ON 2018] ciprofloxacin  500 mg Oral 2 times per day    heparin (porcine)  5,000 Units Subcutaneous Once    insulin lispro  0-6 Units Subcutaneous TID     insulin lispro  0-3 Units Subcutaneous Nightly    propranolol  30 mg Oral Nightly    [START ON 2018] heparin (porcine)  5,000 Units Subcutaneous Once     Continuous Infusions:    Cystocele repair (Gyn)  Cystoscopy with OBTRYX Ureteral Sling (per Urology)       Hospital Day: 1    Patient Active Problem List   Diagnosis    Seizures ([address]    Transient insomnia    Sleep walking disorder    Raynaud's disease    Pulmonary nodules    Neuropathy ([address]    Hypertension    GERD (gastroesophageal reflux disease)    Fibromyalgia    Depression    Degenerative disc disease, thoracic    CAD (coronary artery disease)    Bipolar disorder (HCC)    Asthma    Anxiety    Antinuclear antibody (IQRA) titer greater than 1:80    Anemia    Hx of Stroke ([address]    Controlled type 2 diabetes mellitus without complication (HCC)    Degenerative arthritis of right knee    S/P knee surgery    Chronic fatigue syndrome    Spondylosis of cervical region without myelopathy or radiculopathy    [facility]    Hx of total bilateral knee replacement    H/O: hysterectomy    Atrial fibrillation with slow ventricular response ([facility]    H/O: stroke    Acute cystitis without hematuria    Hypovolemia    Hypotension    Bradycardia    Cystocele, midline    JOYCE (stress urinary incontinence, female)    Overflow incontinence    7/30/18 Cystocele repair, Cystoscopy with OBTRYX Ureteral Sling       Condition: good          Plan:  - Labs: H&H ordered 7/31  - ABx: continue Ancef x24, Cipro 500mg BID (to be started 7/31)   - Anticoagulation: Heparin 6P/6A, SCDs and ambulation, home ASA 81 mg BID continued  - Fluids: IVF NS @125  - Andersen catheter to remain in place x24hrs, due out @0900 7/31, UO adequate  - PVR to be done after spont void trial, orders placed and communicated to RN  - Pain meds: Norco 1 tablet Q4hrs  - No NSAIDs with Hx of gastric bypass  - Home metformin held, Low dose ISS ordered while in patient  - Blood sugars AC&HS  - All other home medications ordered  - Home Felodipine to be supplied from patient's home meds as pharmacy substitute (Amlodipine) patient does not tolerate    Orders Placed This Encounter   Procedures    Surgical Pathology     VAGINAL MUCOSA     Standing Status:   Standing     Number of Occurrences:   1    Urinalysis Reflex to Culture     URINE     Standing Status:   Standing     Number of Occurrences:   1    Hemoglobin and hematocrit, blood     Standing Status:   Standing     Number of Occurrences:   1    Surgical Pathology     Standing Status:   Standing     Number of Occurrences:   1    Microscopic Urinalysis     Standing Status:   Standing     Number of Occurrences:   1    DIET GESTATIONAL CARB CONTROL; Carb Control: 4 carb choices (60 gms)/meal     Standing Status:   Standing     Number of Occurrences:   1     Order Specific Question:   Carb Control     Answer:   4 carb choices (60 gms)/meal    Diagnosis for procedure     Pre-Procedure Diagnosis: cystocele and miller     Standing Status:   Standing     Number of Occurrences:   1    Ambulate patient     Progressive ambulation. Standing Status:   Standing     Number of Occurrences:   1    Straight catheterize     POSTVOID RESIDUAL  To be done after davis catheter removal  Davis to stay in for 24 hrs postop  Patient to void within 4hrs after davis catheter removal  After spontaneous void, do bladder scan to determine post-void residual  Insert indwelling urinary catheter and leave in if residual is greater than 150ml. Standing Status:   Standing     Number of Occurrences:   1    Abdominal wound care     Postop day 1. Remove dressing and leave incision open to air.      Standing Status:   Standing     Number of Occurrences:   1    Place intermittent pneumatic compression devices     Standing Status:   Standing     Number of Occurrences:   1    Vital signs per unit routine     Standing Status:   Standing     Number of Occurrences:   1    Adv Diet as Malia (nurse communication)     Standing Status:   Standing     Number of Occurrences:   1    Maintain sequential compression device     Standing Status:   Standing     Number of Occurrences:   1    HYPOGLYCEMIA TREATMENT: blood glucose less than 50 mg/dL and patient  ALERT and TOLERATING PO     Give 8 ounces juice or regular soda or 2 tubes glucose gel. Repeat blood glucose in 15 minutes. If blood glucose is less than 70 mg/dL, repeat treatment and recheck blood glucose in 15 minutes x2 and notify provider. Standing Status:   Standing     Number of Occurrences:   94178    HYPOGLYCEMIA TREATMENT: blood glucose less than 70 mg/dL and patient ALERT and TOLERATING PO     Give 4 ounces juice or regular soda or 1 tube glucose gel. Repeat blood glucose in 15 minutes. If blood glucose is less than 70 mg/dL, repeat treatment and recheck blood glucose in 15 minutes x2. If blood glucose remains less than 70 mg/dL, notify provider. Standing Status:   Standing     Number of Occurrences:   29343    HYPOGLYCEMIA TREATMENT: blood glucose less than 70 mg/dL and patient NOT ALERT or NPO     Give dextrose 50% intravenous. If patient does not respond within 5 minutes, repeat dose x1. Start D5W at 100 mL/hour until ordering provider can be reached. Repeat blood glucose in 15 minutes. If blood glucose is less than 70 mg/dL, repeat treatment and recheck blood glucose in 15 minutes x2. Notify provider. If no intravenous access, administer glucagon 1 mg. After administration, attempt intravenous access and start D5W at 100 mL/hr. Repeat blood glucose in 15 minutes x2 and notify provider.      Standing Status:   Standing     Number of Occurrences:   31106    Full code     Standing Status:   Standing     Number of Occurrences:   1    Initiate Oxygen Therapy Protocol     - If patient has any of the following conditions, initiate oxygen therapy: SpO2 less than 92%, Cyanosis, Chest Pain, Dyspnea, Home oxygen, or Altered level of consciousness    - If oxygen therapy initiated, enter the RT51 Nasal cannula oxygen order using Per Protocol order mode using

## 2018-07-30 NOTE — ANESTHESIA PRE PROCEDURE
Historical Provider, MD   felodipine (PLENDIL) 10 MG extended release tablet Take 1 tablet by mouth daily 7/28/17   COLTON Ward CNP   docusate sodium (COLACE) 100 MG capsule Take 1 capsule by mouth 2 times daily as needed for Constipation  Patient taking differently: Take 100 mg by mouth daily as needed for Constipation  5/3/17   Lauraimtiaz Damon Hicks, DO   metFORMIN (GLUCOPHAGE) 500 MG tablet Take 500 mg by mouth daily (with breakfast)     Historical Provider, MD   venlafaxine (EFFEXOR) 75 MG tablet Take 2 tablets (150 mg) in the morning and 1 tablet (75 mg) in the evening. 3/17/17   COLTON aWrd CNP   Multiple Vitamins-Minerals (THERAPEUTIC MULTIVITAMIN-MINERALS) tablet Take 1 tablet by mouth daily    Historical Provider, MD   Probiotic Product (PROBIOTIC & ACIDOPHILUS EX ST PO) Take 1 capsule by mouth daily     Historical Provider, MD   vitamin B-12 (CYANOCOBALAMIN) 1000 MCG tablet Take 1,000 mcg by mouth daily    Historical Provider, MD       Current medications:    No current facility-administered medications for this encounter. Allergies:     Allergies   Allergen Reactions    Morphine Nausea And Vomiting     Pt states it changes her mental status    Amlodipine Other (See Comments)     diarrhea and stress    Dilaudid [Hydromorphone Hcl] Hives and Itching    Sulfa Antibiotics Hives       Problem List:    Patient Active Problem List   Diagnosis Code    Seizures ([facility]) R56.9    Transient insomnia F51.02    Sleep walking disorder F51.3    Raynaud's disease I73.00    Pulmonary nodules R91.8    Neuropathy ([facility]) G62.9    Hypertension I10    Headache R51    GERD (gastroesophageal reflux disease) K21.9    Fibromyalgia M79.7    Depression F32.9    Degenerative disc disease, thoracic M51.34    CAD (coronary artery disease) I25.10    Bipolar disorder ([facility]) F31.9    Asthma J45.909    Anxiety F41.9    Antinuclear antibody (IQRA) titer greater than 1:80 R76.0    Results   Component Value Date     07/20/2018    K 3.7 07/20/2018     07/20/2018    CO2 28 07/20/2018    BUN 10 07/20/2018    CREATININE 0.91 07/20/2018    GFRAA >60 07/20/2018    LABGLOM >60 07/20/2018    GLUCOSE 94 07/20/2018    PROT 6.9 06/26/2018    CALCIUM 9.8 07/20/2018    BILITOT 0.27 06/26/2018    ALKPHOS 154 06/26/2018    AST 14 06/26/2018    ALT 11 06/26/2018       POC Tests: No results for input(s): POCGLU, POCNA, POCK, POCCL, POCBUN, POCHEMO, POCHCT in the last 72 hours. Coags:   Lab Results   Component Value Date    PROTIME 10.1 07/20/2018    INR 1.0 07/20/2018    APTT 25.4 07/20/2018       HCG (If Applicable):   Lab Results   Component Value Date    HCGQUANT 2 07/20/2018        ABGs: No results found for: PHART, PO2ART, SXI4COI, IOL3CTJ, BEART, O8HOSMOW     Type & Screen (If Applicable):  No results found for: [facility]    Anesthesia Evaluation  Patient summary reviewed  Airway: Mallampati: II  TM distance: >3 FB   Neck ROM: full  Mouth opening: > = 3 FB Dental:          Pulmonary:normal exam  breath sounds clear to auscultation  (+) asthma:     (-) rhonchi, wheezes and rales                           Cardiovascular:  Exercise tolerance: good (>4 METS),   (+) hypertension: no interval change, CAD: no interval change,     (-) murmur,  friction rub, carotid bruit and peripheral edema    ECG reviewed  Rhythm: regular  Rate: normal           Beta Blocker:  Dose within 24 Hrs         Neuro/Psych:   (+) CVA: no interval change, TIA, headaches:, psychiatric history:            GI/Hepatic/Renal:   (+) GERD:,           Endo/Other:    (+) DiabetesType II DM, well controlled, , .                 Abdominal:           Vascular: negative vascular ROS. Anesthesia Plan      general     ASA 3       Induction: intravenous. MIPS: Postoperative opioids intended and Prophylactic antiemetics administered. Anesthetic plan and risks discussed with patient.       Plan

## 2018-07-30 NOTE — INTERVAL H&P NOTE
Update History & Physical- (Gynecology)    [address]  Gynecologic Surgery  PHYSICIAN PRE-OPERATIVE NOTE:      Patient Name: Tobie Rinne  Patient : 1965  Room/Bed: [address] [place] [address] [address] Pool/NONE  Admission Date/Time: 2018  6:28 AM  Primary Care Physician: COLTON Diaz CNP  MRN #: [id number]  [facility] #: [id number]        Date: 2018  Time: 7:18 AM        The patient's History and Physical was reviewed with the patient and there were no significant changes. Tobie Rinne is a 46 y.o. female [id number]  Pt with JOYCE and has been seen by urology . She is planning urologic procedure to correct. She has a Cystocele. She declined a pessary and is requesting surgical correction. Previous surgical cardiac clearance was obtained. The procedure was previously reviewed in detail and risk of breakdown and painful intercourse following the procedure. She voiced understanding. She reports she has been using her Premarin cream for the last two weeks. I examined the patient and there were no significant changes from the previous History and Physical.    Plan: The risk, benefits, expected outcome, and alternative to the recommended procedure have been discussed with the patient. Patient understands and wants to proceed with the procedure. She is aware that there may be a need for a second procedure and there may be incomplete removal of any abnormal tissue. She was also counseled on the possibility of Bleeding, Infection, possible damage to bowel, bladder,  ureters, vasculature and surrounding organs-(described in layman's terminology to the patient). The labs and consent were reviewed prior to the patient going to the Operating Room.       Vitals:    18 0654   BP: (!) 162/88   Pulse: 63   Resp: 16   Temp: 97.7 °F (36.5 °C)   TempSrc: Oral   SpO2: 98%   Weight: 175 lb (79.4 kg)   Height: 5' 8\" (1.727 m)       PE-per resident note-Unchanged    Patient Active Problem List    Diagnosis Date Noted  H/O hysterectomy with BSO at [facility] for benign disease 2014 11/29/2017     Priority: Medium     Overview Note:     Hysterectomy in 2014 for heavy menses at [facility] History of total bilateral knee replacement 11/29/2017     Priority: Medium    Cystocele, midline 07/29/2018    JOYCE (stress urinary incontinence, female) 07/29/2018    Abdominal pain 02/16/2018    Diarrhea 02/16/2018    Chest pain     Bradycardia 01/26/2018    Hypotension 01/24/2018    Acute cystitis without hematuria 01/16/2018    Hypovolemia 01/16/2018    Atrial fibrillation with slow ventricular response ([facility] 01/15/2018    H/O: stroke 01/15/2018    H/O: hysterectomy     Spondylosis of cervical region without myelopathy or radiculopathy      Overview Note:     Suffers with neck pain. A MRI of the cervical spine revealed multilevel degenerative joint disease. Her neck pain is constant and radiates down both upper extremities. An EMG nerve conduction study in February 2016 found a bilateral carpal tunnel syndrome and possible ulnar neuropathy. A MRI of the cervical spine in August 2016 found multilevel degenerative joint disease and at C5-C6 a disc protrusion mildly indenting the thecal sac and closely abutting the ventral cord. Additionally she reports having low back pain and previously had a lumbar spine MRI in September 2014 done at the [address] [place] [address],[address] in [place] which revealed multilevel lumbar degenerative joint disease worse at L5-S1 and L4-L5. Pain management consultation pending.         Chronic fatigue syndrome 06/15/2017    S/P knee surgery     Controlled type 2 diabetes mellitus without complication ([address] 25/77/5886    Degenerative arthritis of right knee 05/02/2017    Stroke ([facility] 04/06/2017    Transient insomnia     Sleep walking disorder     Raynaud's disease     Pulmonary nodules     Neuropathy (HCC)     Hypertension     Headache     GERD (gastroesophageal reflux disease)  Fibromyalgia     Depression     Degenerative disc disease, thoracic     CAD (coronary artery disease)     Bipolar disorder (HCC)     Asthma     Anxiety     Antinuclear antibody (IQRA) titer greater than 1:80     Anemia     Acute kidney injury ([facility]     Seizures ([facility] 02/24/2017     Overview Note:     An isolated episode of witnessed generalized tonic-clonic seizure activity in January 2017 while in [place] likely provoked by Tramadol. An EEG in June 2017 was normal.  A MRI of the brain in April 2017 was normal.    She was taking Tramadol which was discontinued and also uses Lamotrigine primarily for her bipolar disorder.            Lab Results:  Admission on 07/30/2018   Component Date Value Ref Range Status    POC Glucose 07/30/2018 89  65 - 105 mg/dL Final   Hospital Outpatient Visit on 07/20/2018   Component Date Value Ref Range Status    PTT 07/20/2018 25.4  23.0 - 31.0 sec Final    Comment:       IV Heparin Therapy Range:     64.3-87.8            WBC 07/20/2018 3.3* 3.5 - 11.0 k/uL Final    RBC 07/20/2018 4.27  4.0 - 5.2 m/uL Final    Hemoglobin 07/20/2018 12.0  12.0 - 16.0 g/dL Final    Hematocrit 07/20/2018 35.7* 36 - 46 % Final    MCV 07/20/2018 83.6  80 - 100 fL Final    MCH 07/20/2018 28.0  26 - 34 pg Final    MCHC 07/20/2018 33.5  31 - 37 g/dL Final    RDW 07/20/2018 14.8  11.5 - 14.9 % Final    Platelets 07/44/6879 232  150 - 450 k/uL Final    MPV 07/20/2018 8.6  6.0 - 12.0 fL Final    NRBC Automated 07/20/2018 NOT REPORTED  per 100 WBC Final    Differential Type 07/20/2018 NOT REPORTED   Final    Immature Granulocytes 07/20/2018 NOT REPORTED  0 % Final    Absolute Immature Granulocyte 07/20/2018 NOT REPORTED  0.00 - 0.30 k/uL Final    WBC Morphology 07/20/2018 NOT REPORTED   Final    RBC Morphology 07/20/2018 NOT REPORTED   Final    Platelet Estimate 27/46/6677 NOT REPORTED   Final    Seg Neutrophils 07/20/2018 43  36 - 66 % Final    Lymphocytes 07/20/2018 41  24 - 44 completed. The history and physical as well as all supporting surgical documentation will be forwarded to the pre-operative holding area.     The patient is aware that this procedure may not alleviate her symptoms. That there may be a necessity for a second surgery and that there may be an incomplete removal of abnormal tissue. I discussed and reviewed the risk of breakdown and pain with intercourse.           Electronically signed by Nathaniel Rodriguez DO  on 7/30/2018 at 7:18 AM

## 2018-07-30 NOTE — H&P (VIEW-ONLY)
[address] [facility] Gynecology  [facility]; [address]  [address]  [phone] mn [phone] Fax        Jade Steve  1965  Primary Care Physician: COLTON Teixeira 224: [facility]      7/12/2018  MEDICAID CONSENT COMPLETED: No      HPI: Jade Steve is a 46 y.o. female [id number]  Pt with JOYCE and has been seen by urology . She is planning urologic procedure to correct. She has a Cystocele. She declined a pessary and is requesting surgical correction. I did obtain surgical cardiac clearance. I reviewed the procedure in detail and risk of breakdown and painful intercourse following the procedure. She voiced understanding. Relevant Past History:   Patient Active Problem List    Diagnosis Date Noted    H/O hysterectomy with BSO at [facility] [facility] for benign disease 2014 11/29/2017     Priority: High     Hysterectomy in 2014 for heavy menses at [facility] History of total bilateral knee replacement 11/29/2017     Priority: High    Controlled type 2 diabetes mellitus without complication ([facility] 40/27/5544     Priority: High    Stroke [facility]) 04/06/2017     Priority: High    Raynaud's disease      Priority: High    Hypertension      Priority: High    CAD (coronary artery disease)      Priority: High    Spondylosis of cervical region without myelopathy or radiculopathy      Priority: Medium     Suffers with neck pain. A MRI of the cervical spine revealed multilevel degenerative joint disease. Her neck pain is constant and radiates down both upper extremities. An EMG nerve conduction study in February 2016 found a bilateral carpal tunnel syndrome and possible ulnar neuropathy. A MRI of the cervical spine in August 2016 found multilevel degenerative joint disease and at C5-C6 a disc protrusion mildly indenting the thecal sac and closely abutting the ventral cord.     Additionally she reports having low back pain and previously had a lumbar spine MRI in 2014 done at the [address] [place] [address],[address] in [place] which revealed multilevel lumbar degenerative joint disease worse at L5-S1 and L4-L5. Pain management consultation pending.  Sleep walking disorder      Priority: Medium    Neuropathy (HCC)      Priority: Medium    GERD (gastroesophageal reflux disease)      Priority: Medium    Antinuclear antibody (IQRA) titer greater than 1:80      Priority: Medium    Anemia      Priority: Medium    Seizures ([address] 2017     Priority: Medium     An isolated episode of witnessed generalized tonic-clonic seizure activity in 2017 while in [facility] likely provoked by Tramadol. An EEG in 2017 was normal.  A MRI of the brain in 2017 was normal.    She was taking Tramadol which was discontinued and also uses Lamotrigine primarily for her bipolar disorder.       Abdominal pain 2018    Diarrhea 2018    Chest pain     Bradycardia 2018    Hypotension 2018    Acute cystitis without hematuria 2018    Hypovolemia 2018    Atrial fibrillation with slow ventricular response ([address] 01/15/2018    H/O: stroke 01/15/2018    H/O: hysterectomy     Chronic fatigue syndrome 06/15/2017    S/P knee surgery     Degenerative arthritis of right knee 2017    Transient insomnia     Pulmonary nodules     Headache     Fibromyalgia     Depression     Degenerative disc disease, thoracic     [facility] disorder ([facility])     Asthma     Anxiety     Acute kidney injury ([address]          Obstetric History       T4      L4     SAB0   TAB0   Ectopic0   Molar0   Multiple0   Live Births4       # Outcome Date GA Lbr Flaquito/2nd Weight Sex Delivery Anes PTL Lv   4 Term      Vag-Spont  N JF   3 Term      Vag-Spont  N JF   2 Term      Vag-Spont  N JF   1 Term      Vag-Spont  N JF          Past Medical History:   Diagnosis Date    Acute kidney injury ([address]     Anemia     And Vomiting 02/24/2017    Amlodipine Other (See Comments) 02/24/2017    Dilaudid [hydromorphone hcl] Hives and Itching 02/24/2017    Sulfa antibiotics Hives 02/24/2017           REVIEW OF SYSTEMS:      General appearance:Appears healthy. Alert; in no acute distress. Pleasant. HEENT: +Glasses   CARDIOVASCULAR: Denies: chest pain, dyspnea on exertion, palpitations  RESPIRATORY: Denies: cough, shortness of breath, wheezing  GI: Denies: abdominal pain, flank pain, nausea, vomiting, diarrhea  : Denies: dysuria, frequency/urgency, hematuria, pelvic pain; +Low Urethra that is hyper mobile; + cystocele  MUSCULOSKELETAL: Denies: back pain, joint swelling, muscle pain  SKIN: Denies: rash, hives. HEMATOLOGIC: Denies Bleeding Disorder, Coagulopathy or Transfusion  NEUROLOGIC: Denies Migraines, Headaches, CVA, TIA  ENDOCRINE: Denies any Endocrinologic disorders                PHYSICAL EXAM:  Blood pressure 120/82, pulse 78, height 5' 8\" (1.727 m), weight 175 lb (79.4 kg), not currently breastfeeding. General Appearance:  awake, alert, oriented, in no acute distress, well developed, well nourished, in no acute distress   Skin:  Skin color, texture, turgor normal. No rashes or lesions  Mouth/Throat:  Mucosa moist.  No lesions. Pharynx without erythema, edema or exudate. Lungs:  Normal expansion. Clear to auscultation. No rales, rhonchi, or wheezing. Heart:  Heart sounds are normal.  Regular rate and rhythm without murmur, gallop or rub. Breast:  Inspection negative. No nipple discharge or bleeding. No palpable mass  Abdomen:  Soft, non-tender, normal bowel sounds. No bruits, organomegaly or masses. Extremities: Extremities warm to touch, pink, with no edema. Musculoskeletal:  negative  Peripheral Pulses:  Normal  Neurologic:  Gait normal. Reflexes normal and symmetric. Sensation grossly intact.   Female Urogen:  Declined  Female Rectal: Declined    OMM Structural Component:  The patient did not complain of a Chief complaint requiring OMM. Chief Complaint:none    Structural Exam: No Interest              Diagnostics:  NA       Lab Results:  Results for orders placed or performed during the hospital encounter of 06/29/18   POC Glucose Fingerstick   Result Value Ref Range    POC Glucose 80 65 - 105 mg/dL           Assessment:     Diagnosis Orders   1. Cystocele, midline     2. JOYCE (stress urinary incontinence, female)     3. Atrophic Vaginal Mucosa     4. H/O hysterectomy for benign disease 2014     5. History of total bilateral knee replacement     6. Controlled type 2 diabetes mellitus without complication, without long-term current use of insulin ([facility] [address]     7. Essential hypertension     8. Coronary artery disease with angina pectoris, unspecified vessel or lesion type, unspecified whether native or transplanted heart [facility])         Patient Active Problem List    Diagnosis Date Noted    [facility] 11/29/2017     Priority: High     Overview Note:     Hysterectomy in 2014 for heavy menses at [facility] History of total bilateral knee replacement 11/29/2017     Priority: High    Controlled type 2 diabetes mellitus without complication ([facility] [address] 62/61/0973     Priority: High    Stroke [facility]) 04/06/2017     Priority: High    Raynaud's disease      Priority: High    Hypertension      Priority: High    CAD (coronary artery disease)      Priority: High    Spondylosis of cervical region without myelopathy or radiculopathy      Priority: Medium     Overview Note:     Suffers with neck pain. A MRI of the cervical spine revealed multilevel degenerative joint disease. Her neck pain is constant and radiates down both upper extremities. An EMG nerve conduction study in February 2016 found a bilateral carpal tunnel syndrome and possible ulnar neuropathy.   A MRI of the cervical spine in August 2016 found multilevel degenerative joint disease and at C5-C6 a disc types were placed in this encounter. Counseling: The patient was counseled on all options both medical and surgical, conservative as well as definitive. She has elected to proceed with the procedure as stated above. The patient was counseled on the procedure. Risks and complications were reviewed in detail. The patients orders, labs, consents have been completed. The history and physical as well as all supporting surgical documentation will be forwarded to the pre-operative holding area. The patient is aware that this procedure may not alleviate her symptoms. That there may be a necessity for a second surgery and that there may be an incomplete removal of abnormal tissue. I discussed and reviewed the risk of breakdown and pain with intercourse.                    ________________________________________D. O.  Date:_______________  Elsa Valdes DO

## 2018-07-30 NOTE — H&P
Gynecologic Surgery Pre-Operative Attestation Note:      Hospital: [facility]  Date: 2018  Time: 7:31 AM      Patient Name: Jose L Shin  Patient : 1965  Room/Bed: [address] [place] [address] OR Pool/NONE  Admission Date/Time: 2018  6:28 AM  MRN #: [id number]  [facility] #: [id number]          Attending Physician Statement  I have discussed the care of Mia Maddox, including pertinent history and exam findings,  with the resident. I have reviewed their note in the electronic medical record. I have seen and examined the patient in the pre-op holding area and the key elements of all parts of the encounter have been performed/reviewed by me . I agree with the assessment, plan and orders as documented by the resident. The procedure risks and complications were discussed as well as the possibility of the need for a second surgery and incomplete removal of abnormal tissue.       Vitals:    18 0654   BP: (!) 162/88   Pulse: 63   Resp: 16   Temp: 97.7 °F (36.5 °C)   SpO2: 98%       Admission on 2018   Component Date Value Ref Range Status    POC Glucose 2018 89  65 - 105 mg/dL Final   Hospital Outpatient Visit on 2018   Component Date Value Ref Range Status    PTT 2018 25.4  23.0 - 31.0 sec Final    Comment:       IV Heparin Therapy Range:     64.3-87.8            WBC 2018 3.3* 3.5 - 11.0 k/uL Final    RBC 2018 4.27  4.0 - 5.2 m/uL Final    Hemoglobin 2018 12.0  12.0 - 16.0 g/dL Final    Hematocrit 2018 35.7* 36 - 46 % Final    MCV 2018 83.6  80 - 100 fL Final    MCH 2018 28.0  26 - 34 pg Final    MCHC 2018 33.5  31 - 37 g/dL Final    RDW 2018 14.8  11.5 - 14.9 % Final    Platelets  232  150 - 450 k/uL Final    MPV 2018 8.6  6.0 - 12.0 fL Final    NRBC Automated 2018 NOT REPORTED  per 100 WBC Final    Differential Type 2018 NOT REPORTED   Final    Immature Granulocytes 2018 NOT REPORTED  0 % Final    Absolute Immature Granulocyte 07/20/2018 NOT REPORTED  0.00 - 0.30 k/uL Final    WBC Morphology 07/20/2018 NOT REPORTED   Final    RBC Morphology 07/20/2018 NOT REPORTED   Final    Platelet Estimate 29/94/5241 NOT REPORTED   Final    Seg Neutrophils 07/20/2018 43  36 - 66 % Final    Lymphocytes 07/20/2018 41  24 - 44 % Final    Monocytes 07/20/2018 10* 1 - 7 % Final    Eosinophils % 07/20/2018 3  0 - 4 % Final    Basophils 07/20/2018 1  0 - 2 % Final    Bands 07/20/2018 2  0 - 10 % Final    Segs Absolute 07/20/2018 1.42  1.3 - 9.1 k/uL Final    Absolute Lymph # 07/20/2018 1.35  1.0 - 4.8 k/uL Final    Absolute Mono # 07/20/2018 0.33  0.1 - 1.3 k/uL Final    Absolute Eos # 07/20/2018 0.10  0.0 - 0.4 k/uL Final    Basophils # 07/20/2018 0.03  0.0 - 0.2 k/uL Final    Absolute Bands # 07/20/2018 0.07  0.0 - 1.0 k/uL Final    Morphology 07/20/2018 1+   Final    Comment: TEARDROPS  1+  ELLIPTOCYTES      Protime 07/20/2018 10.1  9.7 - 12.0 sec Final    INR 07/20/2018 1.0   Final    Comment:       Non-therapeutic Range:     INR = 0.9-1.2  Therapeutic Range: Moderate Anticoagulant Intensity:     INR = 2.0-3.0   High Anticoagulant Intensity:     INR = 2.5-3.5            hCG Quant 07/20/2018 2  <5 IU/L Final    Comment:    Non-preg premeno   <=5  Postmeno           <=8  Male               <=3  If HCG results do not concur with clinical observations, additional testing to   confirm result is recommended. This test is not labeled for use as a tumor   marker.       Expiration Date 07/20/2018 08/02/2018   Final    Arm Band Number 07/20/2018 [id number]   Final    ABO/Rh 07/20/2018 AB NEGATIVE   Final    Antibody Screen 07/20/2018 NEGATIVE   Final    Color, UA 07/20/2018 YELLOW  YEL Final    Turbidity UA 07/20/2018 CLEAR  CLEAR Final    Glucose, Ur 07/20/2018 NEGATIVE  NEG Final    Bilirubin Urine 07/20/2018 NEGATIVE  NEG Final    Ketones, Urine 07/20/2018 NEGATIVE  NEG Final    Specific cord.     Additionally she reports having low back pain and previously had a lumbar spine MRI in September 2014 done at the [address] [place] [address],[address] in [place] which revealed multilevel lumbar degenerative joint disease worse at L5-S1 and L4-L5.     Pain management consultation pending.          Sleep walking disorder         Priority: Medium    Neuropathy (HCC)         Priority: Medium    GERD (gastroesophageal reflux disease)         Priority: Medium    Antinuclear antibody (IQRA) titer greater than 1:80         Priority: Medium    Anemia         Priority: Medium    Seizures ([address] 02/24/2017       Priority: Medium       Overview Note:       An isolated episode of witnessed generalized tonic-clonic seizure activity in January 2017 while in [place] likely provoked by Tramadol.  An EEG in June 2017 was normal.  A MRI of the brain in April 2017 was normal.     She was taking Tramadol which was discontinued and also uses Lamotrigine primarily for her bipolar disorder.       Abdominal pain 02/16/2018    Diarrhea 02/16/2018    Chest pain      Bradycardia 01/26/2018    Hypotension 01/24/2018    Acute cystitis without hematuria 01/16/2018    Hypovolemia 01/16/2018    Atrial fibrillation with slow ventricular response ([address] 01/15/2018    H/O: stroke 01/15/2018    H/O: hysterectomy      Chronic fatigue syndrome 06/15/2017    S/P knee surgery      Degenerative arthritis of right knee 05/02/2017    Transient insomnia      Pulmonary nodules      Headache      Fibromyalgia      Depression      Degenerative disc disease, thoracic      Bipolar disorder (HCC)      Asthma      Anxiety      Acute kidney injury ([facility]              Plan:  1. Cystocele Repair  2. Co-Procedure with Urology Dr. Valeri Riedel for JOYCE  3. Position while awake with YELLOW-FIN STIRRUPS  4. Premarin cream bid intravaginal 2 weeks prior to OR  5.  Stop asa 81 mg 7 days prior to OR        No orders of the defined types were placed in this

## 2018-07-30 NOTE — OP NOTE
Gynecologic Operative Note      NAME: Jamar Maldonado   MRN: [id number]  CSN: [id number]  : 1965    PROCEDURE DATE: 2018     Pre-op Diagnosis: CYSTOCELE OVERFLOW INCONTINENCE STRESS INCONTINENCE     Post-op Diagnosis: Same;     Procedure: Procedure(s):  Rosita Mário Ganesh Hornes [address] URETERAL SLING    Surgeon: Dagmar Roe DO   Co-Procedure: Kevon Kerr MD-Urology    Assistant:   Zuleyma Copeland DO      Circulator Documentation of Staff:  Surgeon(s) and Role:  Panel 1:     * Dagmar Roe DO - Primary    Panel 2:     * Cinthya Holt MD - Primary    OR Staff:  Scrub Person First: Bella Garcias      Anesthesia Type: General  Anesthesia Staff: Anesthesiologist: Bertrand Winters MD  CRNA: COLTON Saenz - CRNA      Complications: None      Estimated Blood Loss: 10 ml  Total IV Fluids:  1000 ml  Urine Output: 150 ml clear      Specimens:   ID Type Source Tests Collected by Time Destination   1 :  Urine Bladder URINE RT REFLEX TO CULTURE Dagmar Roe DO 2018 7330    A :  Tissue Vaginal SURGICAL PATHOLOGY Dagmar Roe DO 2018 1767      Implants:   Implant Name Type Inv. Item Serial No.  Lot No. LRB No. Used   SLING TRANSOBTURATOR [employer] - [id number] Urological/Gyn SLING TRANSOBTURATOR [employer] [id number] [employer]: INTERVENTIONAL CARDIO [id number] N/A 1       Drains:   Urethral Catheter Non-latex 16 fr (Active)   Catheter Indications Perioperative use in selected surgeries including but not limited to urologic, pelvic or need for intraoperative monitoring of urinary output due to prolonged surgery, large volume infusion or need for diuretic therapy in surgery 2018  7:50 AM   Urine Color Yellow 2018  7:50 AM   Urine Appearance Clear 2018  7:50 AM         Condition: Stable    Findings: Absent uterus without any adnexal fullness. Cystocele grade 2-3. No rectocele or enterocele appreciated.  Dome without scissors. The vaginal mucosa was then closed in an interrupted fashion with 2-0 vicryl suture in case urology needed access to the superior portion of the repair. Excellent hemostasis was noted. Sponge, needle and instrument counts were called for and found to be correct. Dr. Pippa Zaldivar entered the room to compplete his portion of the procedure. She will be admitted to the GYN service. EPC's and Heparin therapy for thromboembolic prophylaxis, SCIP ABX continue. PVR testing in the am with bladder scanning. Disposition:  The patient will return to my office in 2 weeks. We will review her pathology report and any further recommendations. .  She was counseled with her family that she is to report any temperature more than 100.4 F, pelvic pain, or heavy vaginal bleeding; She is to refrain from intercourse douching or tampons; No hot tubs baths lakes or pools. No driving. The patient voiced understanding of the above counseling.     Electronically signed by Syeda Gleason DO on 7/30/18 at 8:53 AM

## 2018-07-30 NOTE — DISCHARGE SUMMARY
cyclobenzaprine 5 MG tablet  Commonly known as:  FLEXERIL  TAKE 1 OR 2 TABLETS BY MOUTH AT BEDTIME AS NEEDED     disulfiram 250 MG tablet  Commonly known as:  ANTABUSE     EPIPEN 2-JIGAR 0.3 MG/0.3ML Soaj injection  Generic drug:  EPINEPHrine  INJECT 0.3 ML (0.3 MG) INTO THE MUSCLE ONCE AS NEEDED FOR UP TO 1 DOSE. for angioedema     famotidine 40 MG tablet  Commonly known as:  PEPCID  Take 1 tablet by mouth every evening     felodipine 10 MG extended release tablet  Commonly known as:  PLENDIL  Take 1 tablet by mouth daily     fluconazole 150 MG tablet  Commonly known as:  DIFLUCAN  TAKE 1 TABLET BY MOUTH NOW AS A ONE TIME DOSE, REPEAT WITH A SECOND TABLET IN 7 DAYS if needed     gabapentin 800 MG tablet  Commonly known as:  NEURONTIN     hydroxychloroquine 200 MG tablet  Commonly known as:  PLAQUENIL  TAKE 1 TABLET BY MOUTH ONE TIME A DAY     lamoTRIgine 100 MG tablet  Commonly known as:  LAMICTAL     levothyroxine 25 MCG tablet  Commonly known as:  SYNTHROID     losartan 100 MG tablet  Commonly known as:  COZAAR  Take 1 tablet by mouth daily     metFORMIN 500 MG tablet  Commonly known as:  GLUCOPHAGE     naltrexone 50 MG tablet  Commonly known as:  DEPADE     omeprazole 20 MG delayed release capsule  Commonly known as:  PRILOSEC  TAKE 1 CAPSULE BY MOUTH TWO TIMES A DAY     potassium chloride 20 MEQ extended release tablet  Commonly known as:  KLOR-CON M     pravastatin 40 MG tablet  Commonly known as:  PRAVACHOL  TAKE 1 TABLET BY MOUTH AT BEDTIME     predniSONE 50 MG tablet  Commonly known as:  DELTASONE  Take 1 tablet by mouth daily as needed (prn) For angioedema     PROBIOTIC & ACIDOPHILUS EX ST PO     propranolol 10 MG tablet  Commonly known as:  INDERAL     QUEtiapine 200 MG tablet  Commonly known as:  SEROQUEL  TAKE 1 TABLET BY MOUTH AT BEDTIME     therapeutic multivitamin-minerals tablet     venlafaxine 75 MG tablet  Commonly known as:  EFFEXOR  Take 2 tablets (150 mg) in the morning and 1 tablet (75 mg) in the evening. vitamin B-12 1000 MCG tablet  Commonly known as:  CYANOCOBALAMIN     vitamin D 93868 units Caps capsule  Commonly known as:  ERGOCALCIFEROL        STOP taking these medications    cephALEXin 500 MG capsule  Commonly known as:  Hussein Nicole           Where to Get Your Medications      These medications were sent to [address] [place] [address] [phone] - [phone]  [address] [place] [address]    Phone:  [phone]   · ferrous sulfate 325 (65 Fe) MG EC tablet     You can get these medications from any pharmacy    Bring a paper prescription for each of these medications  · ciprofloxacin 500 MG tablet  · HYDROcodone-acetaminophen 5-325 MG per tablet           Activity: pelvic rest x 6 weeks, no driving on narcotics, no lifting greater than 20 lbs  Diet: diabetic diet  Follow up: 2 weeks     Condition on discharge: good and stable   Discharge Date: 8/1/2018     Comments:  Home care, Follow-up care, restrictions reviewed.  Patient to start using Premarin cream once nightly in 1 week on posterior (bottom) vaginal wall (side with no sutures/stitches)    Nathaniel Rodriguez DO  Ob/Gyn Resident  [facility]  8/1/2018, 8:37 PM

## 2018-07-30 NOTE — H&P
History and Physical    Patient:  Jose L Shin  MRN: [id number]    CHIEF COMPLAINT:  miller    History Obtained From:  patient  PCP: COLTON Alonso CNP    HISTORY OF PRESENT ILLNESS:   The patient is a 48 y.o. female who presents with above    Past Medical History:        Diagnosis Date    Acute kidney injury ([address]     Anemia     Angioedema     Antinuclear antibody (IQRA) titer greater than 1:80     Anxiety     Asthma     Ataxia     Atrial fibrillation (HCC)     Borderline personality disorder     Bradycardia     CAD (coronary artery disease)     Chronic kidney disease     Degenerative disc disease, thoracic     Depression     Diabetes mellitus ([address]     DJD (degenerative joint disease)     Fibromyalgia     GERD (gastroesophageal reflux disease)     H/O: hysterectomy     Headache     Hernia of abdominal wall     History of blood transfusion     Hyperlipidemia     Hypertension     Lupus     Mood disorder (HCC)     Neuropathy (HCC)     Osteoarthritis     PTSD (post-traumatic stress disorder)     Pulmonary nodules     Raynaud's disease     Scleroderma ([address]     Seizures ([address]     Sleep walking disorder     Thyroid disease     TIA (transient ischemic attack)     Transient insomnia     Vasospasm ([address] 01/2016    bilat. legs. resulting in near complete absence of profusion to arteries of feet. ([facility]     Wears glasses        Past Surgical History:        Procedure Laterality Date    ABDOMINOPLASTY  2013    CARPAL TUNNEL RELEASE Right 2016    CHOLECYSTECTOMY  1989    ELBOW SURGERY Right     EYE SURGERY      khushboo. lasik     GASTRECTOMY      GASTRIC BYPASS SURGERY  2012    HYSTERECTOMY      JOINT REPLACEMENT Bilateral     KNEE SURGERY  [id number]    both ,[address] knee surgeries    KNEE SURGERY Right 05/02/2017    (femoral) patella replacement    NERVE BLOCK Right 05/04/2018     cervical facet #1 no steroid    NERVE BLOCK Right 06/29/2018    rt cervical diagnostic times daily 5/14/18   COLTON Zurita CNP   Blood Pressure Monitoring (B-D ASSURE BPM/AUTO WRIST CUFF) MISC 1 Device by Does not apply route daily 2/21/18   COLTON Zurita CNP   albuterol sulfate  (90 Base) MCG/ACT inhaler Inhale 2 puffs into the lungs every 6 hours as needed for Wheezing 12/4/17   COLTON Zurita CNP   glucose blood VI test strips (ASCENSIA AUTODISC VI;ONE TOUCH ULTRA TEST VI) strip Use with associated glucose meter. 10/30/17   COLTON Zurita CNP   EPIPEN 2-JIGAR 0.3 MG/0.3ML SOAJ injection INJECT 0.3 ML (0.3 MG) INTO THE MUSCLE ONCE AS NEEDED FOR UP TO 1 DOSE. for angioedema 9/29/17   COLTON Zurita CNP   docusate sodium (COLACE) 100 MG capsule Take 1 capsule by mouth 2 times daily as needed for Constipation  Patient taking differently: Take 100 mg by mouth daily as needed for Constipation  5/3/17   Betsey Hicks,    metFORMIN (GLUCOPHAGE) 500 MG tablet Take 500 mg by mouth daily (with breakfast)     Historical Provider, MD       Allergies:  Morphine; Amlodipine; Dilaudid [hydromorphone hcl]; and Sulfa antibiotics    Social History:   TOBACCO:   reports that she has never smoked. She has never used smokeless tobacco.  ETOH:   reports that she drinks alcohol. OCCUPATION:      Family History:   Family History   Problem Relation Age of Onset    Adopted:  Yes    Depression Mother     Asthma Mother     Depression Father     High Blood Pressure Father     Diabetes Father     Asthma Father     Depression Sister     Asthma Sister     Depression Brother     Asthma Brother     Asthma Maternal Aunt     Asthma Maternal Uncle     Asthma Paternal Aunt     Asthma Paternal Uncle     Asthma Maternal Grandmother     Cancer Maternal Grandmother     Asthma Maternal Grandfather     Asthma Paternal Grandmother     Asthma Paternal Grandfather     Asthma Maternal Cousin     Asthma Paternal Cousin        REVIEW OF

## 2018-07-31 ENCOUNTER — TELEPHONE (OUTPATIENT)
Dept: UROLOGY | Age: 53
End: 2018-07-31

## 2018-07-31 VITALS
TEMPERATURE: 98.2 F | SYSTOLIC BLOOD PRESSURE: 111 MMHG | OXYGEN SATURATION: 99 % | BODY MASS INDEX: 26.52 KG/M2 | HEART RATE: 68 BPM | DIASTOLIC BLOOD PRESSURE: 61 MMHG | RESPIRATION RATE: 16 BRPM | HEIGHT: 68 IN | WEIGHT: 175 LBS

## 2018-07-31 PROBLEM — N81.10 CYSTOCELE, UNSPECIFIED (CODE): Status: ACTIVE | Noted: 2018-07-31

## 2018-07-31 LAB
GLUCOSE BLD-MCNC: 69 MG/DL (ref 65–105)
GLUCOSE BLD-MCNC: 81 MG/DL (ref 65–105)
HCT VFR BLD CALC: 25.3 % (ref 36–46)
HCT VFR BLD CALC: 26 % (ref 36–46)
HEMOGLOBIN: 8.4 G/DL (ref 12–16)
HEMOGLOBIN: 8.5 G/DL (ref 12–16)
SURGICAL PATHOLOGY REPORT: NORMAL

## 2018-07-31 PROCEDURE — G0378 HOSPITAL OBSERVATION PER HR: HCPCS

## 2018-07-31 PROCEDURE — 85018 HEMOGLOBIN: CPT

## 2018-07-31 PROCEDURE — 96372 THER/PROPH/DIAG INJ SC/IM: CPT

## 2018-07-31 PROCEDURE — 6360000002 HC RX W HCPCS: Performed by: STUDENT IN AN ORGANIZED HEALTH CARE EDUCATION/TRAINING PROGRAM

## 2018-07-31 PROCEDURE — 96367 TX/PROPH/DG ADDL SEQ IV INF: CPT

## 2018-07-31 PROCEDURE — 6370000000 HC RX 637 (ALT 250 FOR IP): Performed by: STUDENT IN AN ORGANIZED HEALTH CARE EDUCATION/TRAINING PROGRAM

## 2018-07-31 PROCEDURE — 96360 HYDRATION IV INFUSION INIT: CPT

## 2018-07-31 PROCEDURE — 82947 ASSAY GLUCOSE BLOOD QUANT: CPT

## 2018-07-31 PROCEDURE — 36415 COLL VENOUS BLD VENIPUNCTURE: CPT

## 2018-07-31 PROCEDURE — 99024 POSTOP FOLLOW-UP VISIT: CPT | Performed by: OBSTETRICS & GYNECOLOGY

## 2018-07-31 PROCEDURE — 85014 HEMATOCRIT: CPT

## 2018-07-31 PROCEDURE — 96361 HYDRATE IV INFUSION ADD-ON: CPT

## 2018-07-31 PROCEDURE — 96365 THER/PROPH/DIAG IV INF INIT: CPT

## 2018-07-31 RX ORDER — HYDROCODONE BITARTRATE AND ACETAMINOPHEN 5; 325 MG/1; MG/1
1 TABLET ORAL EVERY 4 HOURS PRN
Qty: 5 TABLET | Refills: 0 | Status: SHIPPED | OUTPATIENT
Start: 2018-07-31 | End: 2018-08-07

## 2018-07-31 RX ORDER — CIPROFLOXACIN 500 MG/1
500 TABLET, FILM COATED ORAL EVERY 12 HOURS SCHEDULED
Qty: 6 TABLET | Refills: 0 | Status: SHIPPED | OUTPATIENT
Start: 2018-07-31 | End: 2018-08-03

## 2018-07-31 RX ORDER — LANOLIN ALCOHOL/MO/W.PET/CERES
325 CREAM (GRAM) TOPICAL 2 TIMES DAILY
Qty: 90 TABLET | Refills: 3 | Status: ON HOLD | OUTPATIENT
Start: 2018-07-31 | End: 2019-06-11 | Stop reason: HOSPADM

## 2018-07-31 RX ADMIN — DOCUSATE SODIUM 100 MG: 100 CAPSULE, LIQUID FILLED ORAL at 10:09

## 2018-07-31 RX ADMIN — VENLAFAXINE 75 MG: 75 TABLET ORAL at 21:01

## 2018-07-31 RX ADMIN — HYDROXYCHLOROQUINE SULFATE 200 MG: 200 TABLET, FILM COATED ORAL at 15:31

## 2018-07-31 RX ADMIN — LEVOTHYROXINE SODIUM 25 MCG: 25 TABLET ORAL at 06:22

## 2018-07-31 RX ADMIN — CIPROFLOXACIN 500 MG: 500 TABLET, FILM COATED ORAL at 21:00

## 2018-07-31 RX ADMIN — ASPIRIN 81 MG: 81 TABLET, COATED ORAL at 21:00

## 2018-07-31 RX ADMIN — POTASSIUM CHLORIDE 20 MEQ: 1500 TABLET, EXTENDED RELEASE ORAL at 09:10

## 2018-07-31 RX ADMIN — HYDROCODONE BITARTRATE AND ACETAMINOPHEN 1 TABLET: 5; 325 TABLET ORAL at 12:22

## 2018-07-31 RX ADMIN — HEPARIN SODIUM 5000 UNITS: 5000 INJECTION, SOLUTION INTRAVENOUS; SUBCUTANEOUS at 06:24

## 2018-07-31 RX ADMIN — GABAPENTIN 800 MG: 400 CAPSULE ORAL at 21:01

## 2018-07-31 RX ADMIN — HYDROCODONE BITARTRATE AND ACETAMINOPHEN 1 TABLET: 5; 325 TABLET ORAL at 18:47

## 2018-07-31 RX ADMIN — FAMOTIDINE 40 MG: 20 TABLET ORAL at 21:00

## 2018-07-31 RX ADMIN — ASPIRIN 81 MG: 81 TABLET, COATED ORAL at 10:51

## 2018-07-31 RX ADMIN — POTASSIUM CHLORIDE 20 MEQ: 1500 TABLET, EXTENDED RELEASE ORAL at 21:01

## 2018-07-31 RX ADMIN — OMEPRAZOLE 20 MG: 20 CAPSULE, DELAYED RELEASE ORAL at 21:01

## 2018-07-31 RX ADMIN — CIPROFLOXACIN 500 MG: 500 TABLET, FILM COATED ORAL at 09:11

## 2018-07-31 RX ADMIN — HYDROCODONE BITARTRATE AND ACETAMINOPHEN 1 TABLET: 5; 325 TABLET ORAL at 05:35

## 2018-07-31 RX ADMIN — DISULFIRAM 250 MG: 250 TABLET ORAL at 09:11

## 2018-07-31 RX ADMIN — OMEPRAZOLE 20 MG: 20 CAPSULE, DELAYED RELEASE ORAL at 09:11

## 2018-07-31 ASSESSMENT — PAIN SCALES - GENERAL
PAINLEVEL_OUTOF10: 3
PAINLEVEL_OUTOF10: 5
PAINLEVEL_OUTOF10: 5
PAINLEVEL_OUTOF10: 6

## 2018-07-31 ASSESSMENT — PAIN DESCRIPTION - PAIN TYPE
TYPE: SURGICAL PAIN
TYPE: SURGICAL PAIN

## 2018-07-31 ASSESSMENT — PAIN DESCRIPTION - LOCATION
LOCATION: PERINEUM
LOCATION: PERINEUM

## 2018-07-31 NOTE — PROGRESS NOTES
Patient Name: Tyrel Shine  Patient : 1965  Room/Bed: 5991/8311-24  Admission Date/Time: 2018  6:28 AM      Date: 2018  Time: 7:26 AM    Day of Surgery  Hospital Day: 2    Mia Lay is a 48 y.o. female [id number]    This patient was seen today. She is POD #1 S/P Cystocele repair  Cystoscopy with OBTRYX Ureteral Sling (per Urology)    Today she she feels well. Her pain is well controlled with current pain medications. The patient is urinating well via davis catheter. Will remove davis @0900 this am and RN to do PVR bladder scan. She denies chest pain, shortness of breath, headache, lightheadedness, blurred vision, peripheral edema, palpitations and dry cough. She is ambulating without difficulty. Reports she has not passed gas. She denies any vaginal bleeding. The patient is tolerating a oral intake.       Medications and Infusions:    Scheduled Medications:    aspirin  81 mg Oral BID    disulfiram  250 mg Oral Daily    docusate sodium  100 mg Oral Daily    famotidine  40 mg Oral Nightly    felodipine  10 mg Oral Daily    gabapentin  800 mg Oral TID    hydroxychloroquine  200 mg Oral Daily    lamoTRIgine  100 mg Oral Daily    levothyroxine  25 mcg Oral Daily    losartan  100 mg Oral Daily    propranolol  20 mg Oral Daily    QUEtiapine  200 mg Oral Nightly    venlafaxine  75 mg Oral Nightly    omeprazole  20 mg Oral BID    potassium chloride  20 mEq Oral BID    venlafaxine  150 mg Oral QAM    ciprofloxacin  500 mg Oral 2 times per day    insulin lispro  0-6 Units Subcutaneous TID     insulin lispro  0-3 Units Subcutaneous Nightly    propranolol  30 mg Oral Nightly     Continuous Infusions:    dextrose      sodium chloride 100 mL/hr at 18     PRN Medications: albuterol sulfate HFA, cyclobenzaprine, predniSONE, sodium chloride flush, HYDROcodone 5 mg - acetaminophen, benzocaine-menthol, diphenhydrAMINE, calcium carbonate, glucose, dextrose, glucagon (rDNA), dextrose, acetaminophen      PHYSICAL EXAM:  BP (!) 92/58   Pulse 54   Temp 96.3 °F (35.7 °C) (Infrared)   Resp 14   Ht 5' 8\" (1.727 m)   Wt 175 lb (79.4 kg)   SpO2 99%   BMI 26.61 kg/m²   Vitals:    07/30/18 1036 07/30/18 1921 07/30/18 2331 07/31/18 0534   BP:  135/77 (!) 106/59 (!) 92/58   Pulse:  71 57 54   Resp: 16 16 14 14   Temp: 97.7 °F (36.5 °C) 97.3 °F (36.3 °C) 97.9 °F (36.6 °C) 96.3 °F (35.7 °C)   TempSrc: Infrared Infrared Infrared Infrared   SpO2:  99%     Weight:       Height:         Orthostatics negative, see RN note    Intake/Output:   Last Shift: I/O last 3 completed shifts: In: 3610 [P.O.:950; I.V.:3301]  Out: 9088 [Urine:2875]  Current Shift: No intake/output data recorded. UO:  118ml/hr clear yellow for the last 8 hrs       General Appearance:   alert, appears older than stated age and cooperative, comfortable, sitting up, in no acute distress    Mental Status:  alert, oriented to person, place, and time, normal mood, behavior, speech, dress, motor activity, and thought processes    Lungs:  Normal expansion. Clear to auscultation. No rales, rhonchi, or wheezing., No chest wall tenderness.     Heart:  normal rate, normal S1 and S2, no gallops and intact distal pulses    Abdomen:  soft, nontender, nondistended, no masses or organomegaly, no rebound tenderness noted, Previous abdominal scars from prior surgeries, bowel sounds normal, no CVA tenderness    Incision: No incision    Extremities:  peripheral pulses normal, no pedal edema, no clubbing or cyanosis, feet normal, good pulses, normal color, temperature and sensation, Iona's sign negative bilaterally, SCDs in place and running    Vaginal Vault: Not inspected, however, perineum with no blood or active bleeding, some spots of old blood on peripad        Assessment:  Amy Leon is a 48 y.o. female [id number] POD # 1 S/P: Cystocele repair (Gyn)  Cystoscopy with OBTRYX Ureteral Sling (per Urology)       Hospital Day: 2    Patient not tolerate    Orders Placed This Encounter   Procedures    Surgical Pathology     VAGINAL MUCOSA     Standing Status:   Standing     Number of Occurrences:   1    Urinalysis Reflex to Culture     URINE     Standing Status:   Standing     Number of Occurrences:   1    Hemoglobin and hematocrit, blood     Standing Status:   Standing     Number of Occurrences:   1    Surgical Pathology     Standing Status:   Standing     Number of Occurrences:   1    Microscopic Urinalysis     Standing Status:   Standing     Number of Occurrences:   1    DIET GESTATIONAL CARB CONTROL; Carb Control: 4 carb choices (60 gms)/meal     Standing Status:   Standing     Number of Occurrences:   1     Order Specific Question:   Carb Control     Answer:   4 carb choices (60 gms)/meal    Diagnosis for procedure     Pre-Procedure Diagnosis: cystocele and miller     Standing Status:   Standing     Number of Occurrences:   1    Ambulate patient     Progressive ambulation. Standing Status:   Standing     Number of Occurrences:   1    Straight catheterize     POSTVOID RESIDUAL  To be done after davis catheter removal  Davis to stay in for 24 hrs postop  Patient to void within 4hrs after davis catheter removal  After spontaneous void, do bladder scan to determine post-void residual  Insert indwelling urinary catheter and leave in if residual is greater than 150ml. Standing Status:   Standing     Number of Occurrences:   1    Abdominal wound care     Postop day 1. Remove dressing and leave incision open to air.      Standing Status:   Standing     Number of Occurrences:   1    Place intermittent pneumatic compression devices     Standing Status:   Standing     Number of Occurrences:   1    Vital signs per unit routine     Standing Status:   Standing     Number of Occurrences:   1    Adv Diet as Malia (nurse communication)     Standing Status:   Standing     Number of Occurrences:   1    Maintain sequential compression device Occurrences:   1       Home care and Follow up Care were reviewed. Wound Care was reviewed. RTO 2 weeks  Post operative restrictions and follow up were reviewed: She was counseled with her family that she is to report any temperature more than 100.4 F, pelvic pain, or heavy vaginal bleeding; She is to refrain from intercourse douching or tampons; No hot tubs baths lakes or pools. No driving. The patient voiced understanding of the above counseling.           Provider's Name:  Dr. Lorraine Vicente DO  OB/GYN Resident, PGY3  [facility]  7/31/2018 7:26 AM

## 2018-07-31 NOTE — FLOWSHEET NOTE
07/31/18 1715   Encounter Summary   Services provided to: Patient   Referral/Consult From: [facility] Family members   Continue Visiting (7/31/18)   Complexity of Encounter Low   Length of Encounter 15 minutes   Spiritual Assessment Completed Yes   Routine   Type Initial   Assessment Calm; Approachable   Intervention Active listening;Explored feelings, thoughts, concerns;Nurtured hope;Sustaining presence/ Ministry of presence; Discussed illness/injury and it's impact   Outcome Acceptance;Expressed gratitude;Coping;Receptive   Spiritual/Quaker   Type Spiritual support   Visited by Kiko Elizabeth

## 2018-07-31 NOTE — FLOWSHEET NOTE
RN verbally updates Dr. Pau Ayala. RN informs Dr. Lilia Morris blood pressure readings as well. No new orders received at this time.  Will continue to update  On voiding trial.

## 2018-07-31 NOTE — PROGRESS NOTES
Obstetric/Gynecology Resident Interval Note    Labs reviewed. Repeat Hgb 8.4. Patient has been hypotensive this am. She has not yet ambulated this morning but is PO hydrating and ordered breakfast. Andersen catheter was removed this am at 0910.      Vitals:    07/31/18 0756 07/31/18 0757 07/31/18 0907 07/31/18 1013   BP: (!) 82/53 (!) 81/52 (!) 85/53 (!) 82/53   Pulse: 64 67 61 58   Resp: 16 16 16 16   Temp:       TempSrc:       SpO2:       Weight:       Height:            Labs:  Recent Results (from the past 5 hour(s))   Hemoglobin and hematocrit, blood    Collection Time: 07/31/18  7:04 AM   Result Value Ref Range    Hemoglobin 8.5 (L) 12.0 - 16.0 g/dL    Hematocrit 26.0 (L) 36 - 46 %   POC Glucose Fingerstick    Collection Time: 07/31/18  7:36 AM   Result Value Ref Range    POC Glucose 81 65 - 105 mg/dL   Hemoglobin and Hematocrit, Blood    Collection Time: 07/31/18 10:49 AM   Result Value Ref Range    Hemoglobin 8.4 (L) 12.0 - 16.0 g/dL    Hematocrit 25.3 (L) 36 - 46 %       Dr. Manish Valenzuela DO  OB/GYN Resident, PGY3  [facility]  7/31/2018, 11:08 AM

## 2018-07-31 NOTE — PROGRESS NOTES
Attending Physician Statement  I have discussed the care of Mia Dumont Pi, including pertinent history and exam findings,  with the resident. I have seen and examined the patient and the key elements of all parts of the encounter have been performed by me. I agree with the assessment, plan and orders as documented by the resident. [facility]            Gynecology Rounds    POD# 1  Procedure:   Lyndsey Shabazz is a 48 y.o. female [id number] POD # 1 S/P: Cystocele repair (Gyn)  Cystoscopy with OBTRYX Ureteral Sling (per Urology)     Date: 2018  Time: 12:55 PM      Patient Name: Lyndsey Shabazz  Patient : 1965  Room/Bed: [facility]  Admission Date/Time: 2018  6:28 AM  MRN #: [id number]  CSN #: [id number]        Attending Physician Statement  I have discussed the care of Lyndsey Shabazz, including pertinent history and exam findings,  with the resident. I have reviewed their note in the electronic medical record. I have seen and examined the patient and the key elements of all parts of the encounter have been performed/reviewed by me . I agree with the assessment, plan and orders as documented by the resident.      Vitals:    18 1145   BP: (!) 98/54   Pulse: 57   Resp: 16   Temp: 97.9 °F (36.6 °C)   SpO2:        Admission on 2018   Component Date Value Ref Range Status    POC Glucose 2018 89  65 - 105 mg/dL Final    Color, UA 2018 YELLOW  YEL Final    Turbidity UA 2018 CLOUDY* CLEAR Final    Glucose, Ur 2018 NEGATIVE  NEG Final    Bilirubin Urine 2018 NEGATIVE  NEG Final    Ketones, Urine 2018 NEGATIVE  NEG Final    Specific Gravity, UA 2018 1.011  1.000 - 1.030 Final    Urine Hgb 2018 NEGATIVE  NEG Final    pH, UA 2018 7.0  5.0 - 8.0 Final    Protein, UA 2018 NEGATIVE  NEG Final    Urobilinogen, Urine 2018 Normal  NORM Final    Nitrite, Urine 2018 NEGATIVE  NEG Final    Leukocyte Esterase, Urine

## 2018-07-31 NOTE — FLOWSHEET NOTE
Orthostatic BP's  Supine BP 98/58 HR 57  Sitting BP 82/53 HR 64  Standing BP 81/52 HR 67    Dr. Katerin Cobb updated.

## 2018-07-31 NOTE — FLOWSHEET NOTE
Message left with Dr. Francisca Escobar office regarding failed 1st voiding trial and discharge plans for this evening after second voiding trail. Awaiting return call.

## 2018-07-31 NOTE — FLOWSHEET NOTE
Patient unable to void 12:00, bladder scan shows 375ml  Patient attempted to void at 13:10, unable to void. Andersen cath inserted, 425 clear yellow urine returned. Dr. Yumiko Ye in department and updated. Andersen to be removed at 1700 and patient given another voiding trial and post void residual by 21:00.

## 2018-08-01 ENCOUNTER — TELEPHONE (OUTPATIENT)
Dept: OBGYN CLINIC | Age: 53
End: 2018-08-01

## 2018-08-01 NOTE — TELEPHONE ENCOUNTER
Per Dr. Priscila Gracia, pt had surgery and wanted a follow up call to make sure the pt is Voiding spontaneously every 2-4 hours with out hesitation. Pt stated that she is not having any issues voiding and not having any other concerns.

## 2018-08-02 DIAGNOSIS — Z76.0 MEDICATION REFILL: ICD-10-CM

## 2018-08-02 RX ORDER — ATENOLOL 50 MG/1
TABLET ORAL
Qty: 60 TABLET | Refills: 2 | OUTPATIENT
Start: 2018-08-02

## 2018-08-02 RX ORDER — PREDNISONE 50 MG/1
TABLET ORAL
Qty: 30 TABLET | Refills: 0 | Status: ON HOLD | OUTPATIENT
Start: 2018-08-02 | End: 2018-12-15 | Stop reason: HOSPADM

## 2018-08-02 NOTE — TELEPHONE ENCOUNTER
Last seen 5/14/18  Last filled 6/15/18 30 with 0 refills    Next appt 8/14/18    Next Visit Date:  Future Appointments  Date Time Provider Sylvester Collins   8/10/2018 7:30 AM STV PAIN FLUORO RM 1 STVZ PAIN MG [facility]   8/14/2018 8:15 AM [facility]N - [facility]NP [facility]   8/14/2018 10:15 AM Rhina Jeffrey DO [facility] OB/Gyn [facility]   8/15/2018 2:00 PM STVZ PAT RM 2 STVZ PAT [facility]   8/23/2018 8:30 AM Susy Ramirez MD AFL Neph Malia None   9/10/2018 7:30 AM Sukumar Grey DO [address] [place] [address]   9/26/2018 7:40 AM Reese Hand APRN - CNP Neuro Spec [facility]   9/28/2018 8:15 AM Robert Xiong MD  derm [address] Maintenance   Topic Date Due    Diabetic foot exam  07/29/1975    Shingles Vaccine (1 of 2 - 2 Dose Series) 07/29/2015    Flu vaccine (1) 09/01/2018    Colon Cancer Screen FIT/FOBT  11/01/2018    Diabetic retinal exam  01/24/2019    A1C test (Diabetic or Prediabetic)  06/26/2019    Diabetic microalbuminuria test  06/26/2019    Lipid screen  06/26/2019    Potassium monitoring  07/20/2019    Creatinine monitoring  07/20/2019    Breast cancer screen  01/12/2020    DTaP/Tdap/Td vaccine (2 - Td) 09/14/2027    Pneumococcal med risk  Completed    Hepatitis C screen  Completed    HIV screen  Completed       Hemoglobin A1C (%)   Date Value   06/26/2018 5.3   03/05/2018 5.1   12/07/2017 5.1             ( goal A1C is < 7)   Microalb/Crt.  Ratio (mcg/mg creat)   Date Value   06/26/2018 CANNOT BE CALCULATED     LDL Cholesterol (mg/dL)   Date Value   06/26/2018 84   05/22/2018 65       (goal LDL is <100)   AST (U/L)   Date Value   06/26/2018 14     ALT (U/L)   Date Value   06/26/2018 11     BUN (mg/dL)   Date Value   07/20/2018 10     BP Readings from Last 3 Encounters:   07/31/18 111/61   07/30/18 (!) 186/91   07/27/18 (!) 134/94          (goal 120/80)    All Future Testing planned in [facility]  Lab Frequency Next Occurrence   EKG 12 Lead Once

## 2018-08-10 ENCOUNTER — HOSPITAL ENCOUNTER (OUTPATIENT)
Dept: PAIN MANAGEMENT | Age: 53
Discharge: HOME | End: 2018-08-10
Payer: MEDICAID

## 2018-08-10 VITALS
DIASTOLIC BLOOD PRESSURE: 90 MMHG | SYSTOLIC BLOOD PRESSURE: 132 MMHG | OXYGEN SATURATION: 98 % | RESPIRATION RATE: 16 BRPM | TEMPERATURE: 97.6 F | HEART RATE: 79 BPM

## 2018-08-10 LAB — GLUCOSE BLD-MCNC: 63 MG/DL (ref 65–105)

## 2018-08-10 PROCEDURE — 64490 INJ PARAVERT F JNT C/T 1 LEV: CPT

## 2018-08-10 PROCEDURE — 6360000002 HC RX W HCPCS

## 2018-08-10 PROCEDURE — 6360000002 HC RX W HCPCS: Performed by: PAIN MEDICINE

## 2018-08-10 PROCEDURE — 64634 DESTROY C/TH FACET JNT ADDL: CPT | Performed by: PAIN MEDICINE

## 2018-08-10 PROCEDURE — 64634 DESTROY C/TH FACET JNT ADDL: CPT

## 2018-08-10 PROCEDURE — 64635 DESTROY LUMB/SAC FACET JNT: CPT

## 2018-08-10 PROCEDURE — 64633 DESTROY CERV/THOR FACET JNT: CPT

## 2018-08-10 PROCEDURE — 2500000003 HC RX 250 WO HCPCS

## 2018-08-10 PROCEDURE — 64491 INJ PARAVERT F JNT C/T 2 LEV: CPT

## 2018-08-10 PROCEDURE — 82947 ASSAY GLUCOSE BLOOD QUANT: CPT

## 2018-08-10 PROCEDURE — 2580000003 HC RX 258: Performed by: PAIN MEDICINE

## 2018-08-10 PROCEDURE — 64633 DESTROY CERV/THOR FACET JNT: CPT | Performed by: PAIN MEDICINE

## 2018-08-10 RX ORDER — BUPIVACAINE HYDROCHLORIDE 2.5 MG/ML
30 INJECTION, SOLUTION EPIDURAL; INFILTRATION; INTRACAUDAL
Status: ACTIVE | OUTPATIENT
Start: 2018-08-10 | End: 2018-08-10

## 2018-08-10 RX ORDER — FENTANYL CITRATE 50 UG/ML
INJECTION, SOLUTION INTRAMUSCULAR; INTRAVENOUS
Status: COMPLETED | OUTPATIENT
Start: 2018-08-10 | End: 2018-08-10

## 2018-08-10 RX ORDER — SODIUM CHLORIDE, SODIUM LACTATE, POTASSIUM CHLORIDE, CALCIUM CHLORIDE 600; 310; 30; 20 MG/100ML; MG/100ML; MG/100ML; MG/100ML
INJECTION, SOLUTION INTRAVENOUS CONTINUOUS
Status: DISCONTINUED | OUTPATIENT
Start: 2018-08-10 | End: 2018-08-11 | Stop reason: HOSPADM

## 2018-08-10 RX ORDER — MIDAZOLAM HYDROCHLORIDE 1 MG/ML
INJECTION INTRAMUSCULAR; INTRAVENOUS
Status: COMPLETED | OUTPATIENT
Start: 2018-08-10 | End: 2018-08-10

## 2018-08-10 RX ORDER — MIDAZOLAM HYDROCHLORIDE 1 MG/ML
2 INJECTION INTRAMUSCULAR; INTRAVENOUS
Status: ACTIVE | OUTPATIENT
Start: 2018-08-10 | End: 2018-08-10

## 2018-08-10 RX ORDER — FENTANYL CITRATE 50 UG/ML
100 INJECTION, SOLUTION INTRAMUSCULAR; INTRAVENOUS
Status: ACTIVE | OUTPATIENT
Start: 2018-08-10 | End: 2018-08-10

## 2018-08-10 RX ORDER — DEXAMETHASONE SODIUM PHOSPHATE 10 MG/ML
10 INJECTION, SOLUTION INTRAMUSCULAR; INTRAVENOUS
Status: ACTIVE | OUTPATIENT
Start: 2018-08-10 | End: 2018-08-10

## 2018-08-10 RX ADMIN — SODIUM CHLORIDE, POTASSIUM CHLORIDE, SODIUM LACTATE AND CALCIUM CHLORIDE 500 ML: 600; 310; 30; 20 INJECTION, SOLUTION INTRAVENOUS at 07:34

## 2018-08-10 RX ADMIN — MIDAZOLAM HYDROCHLORIDE 1 MG: 1 INJECTION, SOLUTION INTRAMUSCULAR; INTRAVENOUS at 07:43

## 2018-08-10 RX ADMIN — FENTANYL CITRATE 50 MCG: 50 INJECTION, SOLUTION INTRAMUSCULAR; INTRAVENOUS at 07:45

## 2018-08-10 RX ADMIN — MIDAZOLAM HYDROCHLORIDE 1 MG: 1 INJECTION, SOLUTION INTRAMUSCULAR; INTRAVENOUS at 07:45

## 2018-08-10 RX ADMIN — FENTANYL CITRATE 50 MCG: 50 INJECTION, SOLUTION INTRAMUSCULAR; INTRAVENOUS at 07:42

## 2018-08-10 ASSESSMENT — PAIN DESCRIPTION - DESCRIPTORS: DESCRIPTORS: THROBBING;PRESSURE

## 2018-08-10 ASSESSMENT — PAIN - FUNCTIONAL ASSESSMENT
PAIN_FUNCTIONAL_ASSESSMENT: 0-10

## 2018-08-10 NOTE — OP NOTE
placed sequentially. Position confirmed radiographically with the fluoroscope. Sensory stimulation was used to localize the nerve. 50hz current and sensory threshold recorded for each level. Motor stimulation checked at [address] and confirmed negative for radicular stimulation at 3 times the sensory threshold at Active tip corresponding at the waist of the articular pillars for the cervical RFA Right C3/4, C4/5, C5/6 facet joint(s). After confirmation of the needle placement the patient received . 5 cc of 0.25% marcaine mixed with 1 mg dexamethasone to provide anesthesia. Radiofrequency was delivered to the cervical region at 80 degrees limit 60 seconds in length with no ill effect. The patient tolerated the procedure well and was transported to the recovery room where the patient was was monitored with no complications and with stable vital signs. Patient was discharged with appropriate written discharge instructions. Follow-up as needed.       [address] [place] [address]

## 2018-08-10 NOTE — H&P
cervical facet #1 no steroid    NERVE BLOCK Right 06/29/2018    rt cervical diagnostic #2 decadron 10mg    IL ANTERIOR COLPORRAPHY RPR CYSTOCELE W/CYSTO N/A 7/30/2018    CYSTOCELE REPAIR performed by Carol Cordoba DO at [address] [place] [address] [address]  2011    left knee    TOTAL KNEE ARTHROPLASTY Right 5/2/2017    PATELLOFEMORAL REPLACEMENT KNEE - JAXSON, 3080 TABLE, FEMORAL POPLITEAL BLOCK, NSA= SPINAL VS GENERAL performed by Yanelis Davis DO at [address] [place] [address]  2004       Allergies   Allergen Reactions    Morphine Nausea And Vomiting     Pt states it changes her mental status    Amlodipine Other (See Comments)     diarrhea and stress    Dilaudid [Hydromorphone Hcl] Hives and Itching    Sulfa Antibiotics Hives         Current Outpatient Prescriptions:     predniSONE (DELTASONE) 50 MG tablet, TAKE 1 TABLET BY MOUTH ONE TIME A DAY AS NEEDED FOR ANGIOEDEMA, Disp: 30 tablet, Rfl: 0    ferrous sulfate (FE TABS) 325 (65 Fe) MG EC tablet, Take 1 tablet by mouth 2 times daily, Disp: 90 tablet, Rfl: 3    conjugated estrogens (PREMARIN) 0.625 MG/GM vaginal cream, Place 0.5 g vaginally daily Place vaginally daily. , Disp: 1 Tube, Rfl: 1    chlorhexidine (PERIDEX) 0.12 % solution, chlorhexidine gluconate 0.12 % mouthwash  Place 15 mL twice a day by mucous membrane route., Disp: , Rfl:     pravastatin (PRAVACHOL) 40 MG tablet, TAKE 1 TABLET BY MOUTH AT BEDTIME , Disp: 90 tablet, Rfl: 1    lamoTRIgine (LAMICTAL) 100 MG tablet, Take 100 mg by mouth daily , Disp: , Rfl:     naltrexone (DEPADE) 50 MG tablet, Take 50 mg by mouth daily Prescribed by Dr. Lilly Gamez, Disp: , Rfl:     hydroxychloroquine (PLAQUENIL) 200 MG tablet, TAKE 1 TABLET BY MOUTH ONE TIME A DAY , Disp: 90 tablet, Rfl: 1    cyclobenzaprine (FLEXERIL) 5 MG tablet, TAKE 1 OR 2 TABLETS BY MOUTH AT BEDTIME AS NEEDED, Disp: 100 tablet, Rfl: 1    disulfiram (ANTABUSE) 250 MG tablet, Take 250 mg by mouth daily, Disp: , Rfl:     aspirin 81 MG tablet, Take 1 tablet by mouth 2 times daily, Disp: 60 tablet, Rfl: 11    gabapentin (NEURONTIN) 800 MG tablet, Take 800 mg by mouth 3 times daily. ., Disp: , Rfl:     omeprazole (PRILOSEC) 20 MG delayed release capsule, TAKE 1 CAPSULE BY MOUTH TWO TIMES A DAY , Disp: 180 capsule, Rfl: 1    famotidine (PEPCID) 40 MG tablet, Take 1 tablet by mouth every evening, Disp: 90 tablet, Rfl: 1    losartan (COZAAR) 100 MG tablet, Take 1 tablet by mouth daily, Disp: 30 tablet, Rfl: 5    cetirizine (ZYRTEC) 10 MG tablet, Take 1 tablet by mouth daily, Disp: 30 tablet, Rfl: 5    fluconazole (DIFLUCAN) 150 MG tablet, TAKE 1 TABLET BY MOUTH NOW AS A ONE TIME DOSE, REPEAT WITH A SECOND TABLET IN 7 DAYS if needed, Disp: 2 tablet, Rfl: 0    Blood Pressure Monitoring (B-D ASSURE BPM/AUTO WRIST CUFF) MISC, 1 Device by Does not apply route daily, Disp: 1 each, Rfl: 0    vitamin D (ERGOCALCIFEROL) 91014 units CAPS capsule, Take 50,000 Units by mouth once a week Takes 3 times a week-(sunday,tuesday,thursday). , Disp: , Rfl:     levothyroxine (SYNTHROID) 25 MCG tablet, Take 25 mcg by mouth Daily, Disp: , Rfl:     propranolol (INDERAL) 10 MG tablet, Take 20 mg by mouth 2 times daily Takes 30 mg at HS only prescribed by Dr. Ruiz Oviedo, Disp: , Rfl:     QUEtiapine (SEROQUEL) 200 MG tablet, TAKE 1 TABLET BY MOUTH AT BEDTIME, Disp: 60 tablet, Rfl: 1    albuterol sulfate  (90 Base) MCG/ACT inhaler, Inhale 2 puffs into the lungs every 6 hours as needed for Wheezing, Disp: 1 Inhaler, Rfl: 3    glucose blood VI test strips (ASCENSIA AUTODISC VI;ONE TOUCH ULTRA TEST VI) strip, Use with associated glucose meter. , Disp: 100 strip, Rfl: 11    EPIPEN 2-JIGAR 0.3 MG/0.3ML SOAJ injection, INJECT 0.3 ML (0.3 MG) INTO THE MUSCLE ONCE AS NEEDED FOR UP TO 1 DOSE. for angioedema, Disp: 1 each, Rfl: 2    potassium chloride (KLOR-CON M) 20 MEQ extended release tablet, Take 20 mEq by mouth 2 times daily Prescribed by endocrinologist, Disp: , Rfl:     felodipine (PLENDIL) 10 MG extended release tablet, Take 1 tablet by mouth daily, Disp: 30 tablet, Rfl: 3    docusate sodium (COLACE) 100 MG capsule, Take 1 capsule by mouth 2 times daily as needed for Constipation (Patient taking differently: Take 100 mg by mouth daily as needed for Constipation ), Disp: 60 capsule, Rfl: 1    metFORMIN (GLUCOPHAGE) 500 MG tablet, Take 500 mg by mouth daily (with breakfast) , Disp: , Rfl:     venlafaxine (EFFEXOR) 75 MG tablet, Take 2 tablets (150 mg) in the morning and 1 tablet (75 mg) in the evening., Disp: 90 tablet, Rfl: 3    Multiple Vitamins-Minerals (THERAPEUTIC MULTIVITAMIN-MINERALS) tablet, Take 1 tablet by mouth daily, Disp: , Rfl:     Probiotic Product (PROBIOTIC & ACIDOPHILUS EX ST PO), Take 1 capsule by mouth daily , Disp: , Rfl:     vitamin B-12 (CYANOCOBALAMIN) 1000 MCG tablet, Take 1,000 mcg by mouth daily, Disp: , Rfl:     Social History   Substance Use Topics    Smoking status: Never Smoker    Smokeless tobacco: Never Used    Alcohol use Yes      Comment: daily drinks 1 liter of whiskey in three days \"hasn't drank in last month and  a half. \"       Review of Systems:   Focused review of systems was performed, and negative as pertinent to diagnosis, except as stated in HPI. Physical Exam:     /84   Pulse 70   Temp 97.6 °F (36.4 °C)   Resp 16     Physical Exam   Musculoskeletal:        Cervical back: She exhibits tenderness. Nursing note and vitals reviewed. Patient's current physical status, medications, medical history, and HPI have been reviewed and updated as appropriate on this date: 08/10/18    Risk/Benefit(s): The risks, benefits, alternatives, and potential complications have been discussed with the patient/family and informed consent has been obtained for the procedure/sedation.     Diagnosis:   Cervical spondylosis  Cervicalgia      Plan: Right C3-4, C4-5, C5-6 radiofrequency

## 2018-08-13 ENCOUNTER — CARE COORDINATION (OUTPATIENT)
Dept: CARE COORDINATION | Age: 53
End: 2018-08-13

## 2018-08-13 NOTE — CARE COORDINATION
Called and left a voicemail message asking patient to call me back at [phone] for care coordination call. She had a cystocele repair 7/31/18 and a right neck RFA procedure with pain management on 8/10/18.     Future Appointments  Date Time Provider Sylvester Collins   8[facility]14[facility]2018 8:15 [facility]   8/15/2018 2:00 PM [facility] PAT RM 2 [facility] PAT [facility]   8/16/2018 9:15 AM DO Edilia Wolffasten OB/[facility]   8/23/2018 8:30 AM Citlali Cole MD [facility] Neph Malia None   9/7/2018 8:40 AM COLTON Orosco CNP [facility] PAIN [facility]   9/10/2018 7:30 AM Sukumar Armstrong DO ORTHO SPECIA [facility]   9/26/2018 7:40 AM COLTON Kaiser CNP Neuro Spec [facility]   9/28/2018 8:15 AM Everette Saavedra MD  derm [facility]

## 2018-08-14 ENCOUNTER — OFFICE VISIT (OUTPATIENT)
Dept: FAMILY MEDICINE CLINIC | Age: 53
End: 2018-08-14
Payer: MEDICAID

## 2018-08-14 VITALS
WEIGHT: 174 LBS | DIASTOLIC BLOOD PRESSURE: 80 MMHG | OXYGEN SATURATION: 100 % | TEMPERATURE: 98 F | SYSTOLIC BLOOD PRESSURE: 132 MMHG | BODY MASS INDEX: 26.46 KG/M2 | HEART RATE: 71 BPM

## 2018-08-14 DIAGNOSIS — K29.00 OTHER ACUTE GASTRITIS WITHOUT HEMORRHAGE: ICD-10-CM

## 2018-08-14 DIAGNOSIS — R25.2 CRAMPS OF LEFT LOWER EXTREMITY: ICD-10-CM

## 2018-08-14 DIAGNOSIS — E11.9 CONTROLLED TYPE 2 DIABETES MELLITUS WITHOUT COMPLICATION, WITHOUT LONG-TERM CURRENT USE OF INSULIN (HCC): Primary | ICD-10-CM

## 2018-08-14 PROBLEM — H25.10 NUCLEAR SENILE CATARACT: Status: ACTIVE | Noted: 2018-01-24

## 2018-08-14 LAB — HBA1C MFR BLD: 5.4 %

## 2018-08-14 PROCEDURE — 2022F DILAT RTA XM EVC RTNOPTHY: CPT | Performed by: NURSE PRACTITIONER

## 2018-08-14 PROCEDURE — 1036F TOBACCO NON-USER: CPT | Performed by: NURSE PRACTITIONER

## 2018-08-14 PROCEDURE — G8598 ASA/ANTIPLAT THER USED: HCPCS | Performed by: NURSE PRACTITIONER

## 2018-08-14 PROCEDURE — G8417 CALC BMI ABV UP PARAM F/U: HCPCS | Performed by: NURSE PRACTITIONER

## 2018-08-14 PROCEDURE — G8427 DOCREV CUR MEDS BY ELIG CLIN: HCPCS | Performed by: NURSE PRACTITIONER

## 2018-08-14 PROCEDURE — 83036 HEMOGLOBIN GLYCOSYLATED A1C: CPT | Performed by: NURSE PRACTITIONER

## 2018-08-14 PROCEDURE — 3044F HG A1C LEVEL LT 7.0%: CPT | Performed by: NURSE PRACTITIONER

## 2018-08-14 PROCEDURE — 99214 OFFICE O/P EST MOD 30 MIN: CPT | Performed by: NURSE PRACTITIONER

## 2018-08-14 PROCEDURE — 3017F COLORECTAL CA SCREEN DOC REV: CPT | Performed by: NURSE PRACTITIONER

## 2018-08-14 NOTE — PROGRESS NOTES
Patient is present for 3 month f/u for DM  Patient states BS run in the 60s    Pharmacy and medication reviewed with patient    Visit Information    Have you changed or started any medications since your last visit including any over-the-counter medicines, vitamins, or herbal medicines? no   Have you stopped taking any of your medications? Is so, why? -  no  Are you having any side effects from any of your medications? - no    Have you seen any other physician or provider since your last visit? yes - ortho, cardio, endo   Have you had any other diagnostic tests since your last visit? yes - labs   Have you been seen in the emergency room and/or had an admission in a hospital since we last saw you?  no   Have you had your routine dental cleaning in the past 6 months?  yes -      Do you have an active MyChart account? If no, what is the barrier?   Yes    Patient Care Team:  COLTON Murillo CNP as PCP - General (Certified Nurse Practitioner)  COLTON Murillo CNP as PCP - [facility] Attributed Provider  COLTON Murillo CNP as Referring Physician (Certified Nurse Practitioner)  Alhaji Clayton (Psychiatry)  Erica Bell MD as Consulting Physician (Gastroenterology)  Julio Spann RN as Care Coordinator    Medical History Review  Past Medical, Family, and Social History reviewed and does contribute to the patient presenting condition    Health Maintenance   Topic Date Due    Diabetic foot exam  07/29/1975    Shingles Vaccine (1 of 2 - 2 Dose Series) 07/29/2015    Flu vaccine (1) 09/01/2018    Colon Cancer Screen FIT/FOBT  11/01/2018    Diabetic retinal exam  01/24/2019    A1C test (Diabetic or Prediabetic)  06/26/2019    Diabetic microalbuminuria test  06/26/2019    Lipid screen  06/26/2019    Potassium monitoring  07/20/2019    Creatinine monitoring  07/20/2019    Breast cancer screen  01/12/2020    DTaP/Tdap/Td vaccine (2 - Td) 09/14/2027    Pneumococcal med risk  Completed  Hepatitis C screen  Completed    HIV screen  Completed

## 2018-08-15 ENCOUNTER — HOSPITAL ENCOUNTER (OUTPATIENT)
Dept: PREADMISSION TESTING | Age: 53
Discharge: HOME | End: 2018-08-19
Payer: MEDICAID

## 2018-08-15 VITALS
OXYGEN SATURATION: 100 % | SYSTOLIC BLOOD PRESSURE: 157 MMHG | WEIGHT: 170.64 LBS | TEMPERATURE: 98 F | RESPIRATION RATE: 18 BRPM | BODY MASS INDEX: 25.86 KG/M2 | HEIGHT: 68 IN | HEART RATE: 67 BPM | DIASTOLIC BLOOD PRESSURE: 88 MMHG

## 2018-08-15 LAB
-: ABNORMAL
ALBUMIN SERPL-MCNC: 4.4 G/DL (ref 3.5–5.2)
ALBUMIN/GLOBULIN RATIO: 1.4 (ref 1–2.5)
ALP BLD-CCNC: 145 U/L (ref 35–104)
ALT SERPL-CCNC: 8 U/L (ref 5–33)
AMORPHOUS: ABNORMAL
ANION GAP SERPL CALCULATED.3IONS-SCNC: 12 MMOL/L (ref 9–17)
AST SERPL-CCNC: 13 U/L
BACTERIA: ABNORMAL
BILIRUB SERPL-MCNC: 0.22 MG/DL (ref 0.3–1.2)
BILIRUBIN URINE: NEGATIVE
BUN BLDV-MCNC: 11 MG/DL (ref 6–20)
BUN/CREAT BLD: ABNORMAL (ref 9–20)
CALCIUM SERPL-MCNC: 8.9 MG/DL (ref 8.6–10.4)
CASTS UA: ABNORMAL /LPF (ref 0–8)
CHLORIDE BLD-SCNC: 102 MMOL/L (ref 98–107)
CO2: 26 MMOL/L (ref 20–31)
COLOR: YELLOW
COMMENT UA: ABNORMAL
CREAT SERPL-MCNC: 0.8 MG/DL (ref 0.5–0.9)
CRYSTALS, UA: ABNORMAL /HPF
EPITHELIAL CELLS UA: ABNORMAL /HPF (ref 0–5)
GFR AFRICAN AMERICAN: >60 ML/MIN
GFR NON-AFRICAN AMERICAN: >60 ML/MIN
GFR SERPL CREATININE-BSD FRML MDRD: ABNORMAL ML/MIN/{1.73_M2}
GFR SERPL CREATININE-BSD FRML MDRD: ABNORMAL ML/MIN/{1.73_M2}
GLUCOSE BLD-MCNC: 90 MG/DL (ref 70–99)
GLUCOSE URINE: NEGATIVE
HCT VFR BLD CALC: 35 % (ref 36.3–47.1)
HEMOGLOBIN: 11.3 G/DL (ref 11.9–15.1)
KETONES, URINE: ABNORMAL
LEUKOCYTE ESTERASE, URINE: ABNORMAL
MCH RBC QN AUTO: 27.9 PG (ref 25.2–33.5)
MCHC RBC AUTO-ENTMCNC: 32.3 G/DL (ref 28.4–34.8)
MCV RBC AUTO: 86.4 FL (ref 82.6–102.9)
MUCUS: ABNORMAL
NITRITE, URINE: NEGATIVE
NRBC AUTOMATED: 0 PER 100 WBC
OTHER OBSERVATIONS UA: ABNORMAL
PDW BLD-RTO: 14.2 % (ref 11.8–14.4)
PH UA: 7 (ref 5–8)
PLATELET # BLD: 261 K/UL (ref 138–453)
PMV BLD AUTO: 10.7 FL (ref 8.1–13.5)
POTASSIUM SERPL-SCNC: 3.9 MMOL/L (ref 3.7–5.3)
PROTEIN UA: ABNORMAL
RBC # BLD: 4.05 M/UL (ref 3.95–5.11)
RBC UA: ABNORMAL /HPF (ref 0–4)
RENAL EPITHELIAL, UA: ABNORMAL /HPF
SODIUM BLD-SCNC: 140 MMOL/L (ref 135–144)
SPECIFIC GRAVITY UA: 1.02 (ref 1–1.03)
TOTAL PROTEIN: 7.6 G/DL (ref 6.4–8.3)
TRICHOMONAS: ABNORMAL
TURBIDITY: ABNORMAL
URINE HGB: ABNORMAL
UROBILINOGEN, URINE: NORMAL
WBC # BLD: 5.5 K/UL (ref 3.5–11.3)
WBC UA: ABNORMAL /HPF (ref 0–5)
YEAST: ABNORMAL

## 2018-08-15 PROCEDURE — 80053 COMPREHEN METABOLIC PANEL: CPT

## 2018-08-15 PROCEDURE — 85027 COMPLETE CBC AUTOMATED: CPT

## 2018-08-15 PROCEDURE — 36415 COLL VENOUS BLD VENIPUNCTURE: CPT

## 2018-08-15 PROCEDURE — 81001 URINALYSIS AUTO W/SCOPE: CPT

## 2018-08-15 RX ORDER — SODIUM CHLORIDE, SODIUM LACTATE, POTASSIUM CHLORIDE, CALCIUM CHLORIDE 600; 310; 30; 20 MG/100ML; MG/100ML; MG/100ML; MG/100ML
1000 INJECTION, SOLUTION INTRAVENOUS CONTINUOUS
Status: CANCELLED | OUTPATIENT
Start: 2018-08-15

## 2018-08-15 NOTE — H&P
History and Physical    Pt Name: Ty Guadarrama  MRN: [id number]  YOB: 1965  Date of evaluation: 8/15/2018  Primary Care Physician: COLTON Price CNP  Patient evaluated at the request of  Dr. Johana Hazel    Reason for evaluation:   Right hand  ring finger  will mass   and  trigger finger   SUBJECTIVE:   History of Chief Complaint:      Griselda Eldridge is a 48 y.o. female , who has done a lot of repetitive  motion with typing x 30 yrs , hx of DM x 18 yrs , at present  Right hand  ring finger  will mass   and  trigger finger                Past Medical History      has a past medical history of Acute kidney injury ([address] Anemia; Angioedema; Antinuclear antibody (IQRA) titer greater than 1:80; Anxiety; Asthma; Ataxia; Atrial fibrillation ([address] Borderline personality disorder; Bradycardia; CAD (coronary artery disease); Chronic kidney disease; Degenerative disc disease, thoracic; Depression; Diabetes mellitus ([address] Diastolic dysfunction; DJD (degenerative joint disease); Fibromyalgia; GERD (gastroesophageal reflux disease); H/O: hysterectomy; Headache; Hernia of abdominal wall; History of blood transfusion; Hyperlipidemia; Hypertension; Lupus; Mood disorder ([address] Neuropathy; Osteoarthritis; PTSD (post-traumatic stress disorder); Pulmonary nodules; Raynaud's disease; Scleroderma ([address] Seizures ([address] Sleep walking disorder; Thyroid disease; TIA (transient ischemic attack); Transient insomnia; Tricuspid regurgitation; Vasospasm ([address] and Wears glasses. Past Surgical History   has a past surgical history that includes knee surgery (3934-0150); Hysterectomy; Cholecystectomy (1989); Tonsillectomy (1986); Carpal tunnel release (Right, 2016); Wrist surgery (2004); Gastric bypass surgery (2012); Abdominoplasty (2013); Elbow surgery (Right); Total knee arthroplasty (2011); knee surgery (Right, 05/02/2017);  Total knee arthroplasty (Right, 5/2/2017); joint replacement (Bilateral); gastrectomy; Nerve Block (Right, 05/04/2018); Nerve Block (Right, 06/29/2018); eye surgery; pr anterior colporraphy rpr cystocele w/cysto (N/A, 7/30/2018); bladder suspension (N/A, 7/30/2018); and Nerve Block (Right, 08/10/2018). Medications   Scheduled Meds:  Current Outpatient Rx   Medication Sig Dispense Refill    Propranolol HCl (INDERAL PO) Take 10 mg by mouth nightly Takes 3 10 mg tablets at night      predniSONE (DELTASONE) 50 MG tablet TAKE 1 TABLET BY MOUTH ONE TIME A DAY AS NEEDED FOR ANGIOEDEMA 30 tablet 0    conjugated estrogens (PREMARIN) 0.625 MG/GM vaginal cream Place 0.5 g vaginally daily Place vaginally daily. 1 Tube 1    chlorhexidine (PERIDEX) 0.12 % solution chlorhexidine gluconate 0.12 % mouthwash   Place 15 mL twice a day by mucous membrane route.  pravastatin (PRAVACHOL) 40 MG tablet TAKE 1 TABLET BY MOUTH AT BEDTIME  90 tablet 1    lamoTRIgine (LAMICTAL) 100 MG tablet Take 100 mg by mouth daily       naltrexone (DEPADE) 50 MG tablet Take 50 mg by mouth daily Prescribed by Dr. Mike Crooks hydroxychloroquine (PLAQUENIL) 200 MG tablet TAKE 1 TABLET BY MOUTH ONE TIME A DAY  90 tablet 1    cyclobenzaprine (FLEXERIL) 5 MG tablet TAKE 1 OR 2 TABLETS BY MOUTH AT BEDTIME AS NEEDED 100 tablet 1    disulfiram (ANTABUSE) 250 MG tablet Take 250 mg by mouth daily      aspirin 81 MG tablet Take 1 tablet by mouth 2 times daily 60 tablet 11    gabapentin (NEURONTIN) 800 MG tablet Take 800 mg by mouth 3 times daily. Luis Thornton omeprazole (PRILOSEC) 20 MG delayed release capsule TAKE 1 CAPSULE BY MOUTH TWO TIMES A DAY  180 capsule 1    famotidine (PEPCID) 40 MG tablet Take 1 tablet by mouth every evening 90 tablet 1    losartan (COZAAR) 100 MG tablet Take 1 tablet by mouth daily 30 tablet 5    cetirizine (ZYRTEC) 10 MG tablet Take 1 tablet by mouth daily 30 tablet 5    fluconazole (DIFLUCAN) 150 MG tablet TAKE 1 TABLET BY MOUTH NOW AS A ONE TIME DOSE, REPEAT WITH A SECOND TABLET IN 7 DAYS if needed 2 amlodipine; dilaudid [hydromorphone hcl]; and sulfa antibiotics. Family History    family history includes Asthma in her brother, father, maternal aunt, maternal cousin, maternal grandfather, maternal grandmother, maternal uncle, mother, paternal aunt, paternal cousin, paternal grandfather, paternal grandmother, paternal uncle, and sister; Cancer in her maternal grandmother; Depression in her brother, father, mother, and sister; Diabetes in her father; High Blood Pressure in her father. She was adopted. Family Status   Relation Status    Mother     Father     Sister Alive    Brother Alive    MAunt (Not Specified)    MUnc (Not Specified)    PAunt (Not Specified)    PUnc (Not Specified)    MGM (Not Specified)    MGF (Not Specified)    PGM (Not Specified)    PGF (Not Specified)    MCousin (Not Specified)    PCousin (Not Specified)         Social History  Social History     Social History    Marital status:      Spouse name: N/A    Number of children: N/A    Years of education: N/A     Occupational History    Not on file. Social History Main Topics    Smoking status: Never Smoker    Smokeless tobacco: Never Used    Alcohol use Yes      Comment: daily drinks 1 liter of whiskey in three days \"hasn't drank in last month and  a half. \"    Drug use: No    Sexual activity: Yes     Partners: Male     Birth control/ protection: Surgical      Comment: hyst     Other Topics Concern    Not on file     Social History Narrative    No narrative on file                 Hobbies:  Fishing , [employer]     OBJECTIVE:   VITALS:  height is 5' 8\" (1.727 m) and weight is 170 lb 10.2 oz (77.4 kg). Her oral temperature is 98 °F (36.7 °C). Her blood pressure is 157/88 (abnormal) and her pulse is 67. Her respiration is 18 and oxygen saturation is 100%. CONSTITUTIONAL: Alert and oriented times 3, no acute distress and cooperative to examination.  friendly and pleasant     SKIN: rash

## 2018-08-16 ENCOUNTER — CARE COORDINATION (OUTPATIENT)
Dept: CARE COORDINATION | Age: 53
End: 2018-08-16

## 2018-08-16 ENCOUNTER — OFFICE VISIT (OUTPATIENT)
Dept: OBGYN CLINIC | Age: 53
End: 2018-08-16

## 2018-08-16 VITALS
BODY MASS INDEX: 24.4 KG/M2 | SYSTOLIC BLOOD PRESSURE: 120 MMHG | HEIGHT: 68 IN | WEIGHT: 161 LBS | DIASTOLIC BLOOD PRESSURE: 86 MMHG | HEART RATE: 78 BPM

## 2018-08-16 DIAGNOSIS — Z90.710 H/O HYSTERECTOMY FOR BENIGN DISEASE: ICD-10-CM

## 2018-08-16 DIAGNOSIS — N81.11 CYSTOCELE, MIDLINE: Primary | ICD-10-CM

## 2018-08-16 DIAGNOSIS — N39.3 SUI (STRESS URINARY INCONTINENCE, FEMALE): ICD-10-CM

## 2018-08-16 PROCEDURE — 99024 POSTOP FOLLOW-UP VISIT: CPT | Performed by: OBSTETRICS & GYNECOLOGY

## 2018-08-16 NOTE — CARE COORDINATION
She had her cystocele repair 7/30, still has vaginal drainage which she was told she will have for another month yet. No pain. She denied any problems with urination- no burning or frequency, bowel movements are regular. She had a follow up appt with Dr. Michael Hoskins today. Recent A1c 5.4, only checks her blood sugar PRN and she hasn't had any symptoms of low blood sugar lately. She is scheduled to have trigger finger surgery 8/28. Had pre admission labs, urine showed moderate bacteria and large amount leukocytes.     Future Appointments  Date Time Provider Sylvester Collins   8/23/2018 8:30 AM Talon Prado MD [facility] Neph Malia None   9/7/2018 8:40 AM OCLTON Alfaro CNP [facility]   9/10/2018 7:30 AM Sukumar Davis DO ORTHO SPECIA [facility]   9/18/2018 1:00 PM DO Oliva Johnson OB/Gyn [facility]   9/24/2018 1:50 PM Kobi Wilkins MD [place] [facility]   9/26/2018 7:40 AM COLTON Bourgeois CNP Neuro Spec [facility]   9/28/2018 8:15 AM Mariluz Quinones MD  derm [facility]   11/14/2018 8:15 AM COLTON Jay CNP [facility]

## 2018-08-17 NOTE — CARE COORDINATION
I called and left a voicemail message asking patient to come to office and give urine sample so culture can be checked- based on results of urine test done with preop testing. Left my contact number, [phone], to call if any questions.

## 2018-08-20 ENCOUNTER — NURSE ONLY (OUTPATIENT)
Dept: FAMILY MEDICINE CLINIC | Age: 53
End: 2018-08-20

## 2018-08-20 ENCOUNTER — HOSPITAL ENCOUNTER (OUTPATIENT)
Age: 53
Setting detail: SPECIMEN
Discharge: HOME | End: 2018-08-20
Payer: MEDICAID

## 2018-08-20 DIAGNOSIS — R82.90 ABNORMAL URINALYSIS: Primary | ICD-10-CM

## 2018-08-20 DIAGNOSIS — R82.90 ABNORMAL URINALYSIS: ICD-10-CM

## 2018-08-21 LAB
CULTURE: NO GROWTH
Lab: NORMAL
SPECIMEN DESCRIPTION: NORMAL
STATUS: NORMAL

## 2018-08-23 PROBLEM — M34.9 SCLERODERMA (HCC): Status: ACTIVE | Noted: 2018-08-23

## 2018-08-23 PROBLEM — E55.9 VITAMIN D DEFICIENCY: Status: ACTIVE | Noted: 2018-08-23

## 2018-08-23 PROBLEM — N18.2 CKD (CHRONIC KIDNEY DISEASE), STAGE II: Status: ACTIVE | Noted: 2018-08-23

## 2018-08-28 ENCOUNTER — ANESTHESIA EVENT (OUTPATIENT)
Dept: OPERATING ROOM | Age: 53
End: 2018-08-28
Payer: MEDICAID

## 2018-08-28 ENCOUNTER — HOSPITAL ENCOUNTER (OUTPATIENT)
Age: 53
Setting detail: OUTPATIENT SURGERY
Discharge: HOME | End: 2018-08-28
Attending: ORTHOPAEDIC SURGERY | Admitting: ORTHOPAEDIC SURGERY
Payer: MEDICAID

## 2018-08-28 ENCOUNTER — ANESTHESIA (OUTPATIENT)
Dept: OPERATING ROOM | Age: 53
End: 2018-08-28
Payer: MEDICAID

## 2018-08-28 VITALS
SYSTOLIC BLOOD PRESSURE: 141 MMHG | DIASTOLIC BLOOD PRESSURE: 74 MMHG | HEIGHT: 68 IN | RESPIRATION RATE: 16 BRPM | BODY MASS INDEX: 25.06 KG/M2 | TEMPERATURE: 97.5 F | HEART RATE: 74 BPM | OXYGEN SATURATION: 99 % | WEIGHT: 165.34 LBS

## 2018-08-28 VITALS
DIASTOLIC BLOOD PRESSURE: 69 MMHG | RESPIRATION RATE: 13 BRPM | SYSTOLIC BLOOD PRESSURE: 136 MMHG | OXYGEN SATURATION: 100 %

## 2018-08-28 DIAGNOSIS — G89.18 POST-OP PAIN: Primary | ICD-10-CM

## 2018-08-28 LAB
GLUCOSE BLD-MCNC: 80 MG/DL (ref 74–100)
POC POTASSIUM: 3.6 MMOL/L (ref 3.5–4.5)

## 2018-08-28 PROCEDURE — 2500000003 HC RX 250 WO HCPCS: Performed by: NURSE ANESTHETIST, CERTIFIED REGISTERED

## 2018-08-28 PROCEDURE — 3600000013 HC SURGERY LEVEL 3 ADDTL 15MIN: Performed by: ORTHOPAEDIC SURGERY

## 2018-08-28 PROCEDURE — 2580000003 HC RX 258: Performed by: ANESTHESIOLOGY

## 2018-08-28 PROCEDURE — 84132 ASSAY OF SERUM POTASSIUM: CPT

## 2018-08-28 PROCEDURE — 3700000000 HC ANESTHESIA ATTENDED CARE: Performed by: ORTHOPAEDIC SURGERY

## 2018-08-28 PROCEDURE — 2500000003 HC RX 250 WO HCPCS: Performed by: ORTHOPAEDIC SURGERY

## 2018-08-28 PROCEDURE — 7100000000 HC PACU RECOVERY - FIRST 15 MIN: Performed by: ORTHOPAEDIC SURGERY

## 2018-08-28 PROCEDURE — 3700000001 HC ADD 15 MINUTES (ANESTHESIA): Performed by: ORTHOPAEDIC SURGERY

## 2018-08-28 PROCEDURE — 7100000011 HC PHASE II RECOVERY - ADDTL 15 MIN: Performed by: ORTHOPAEDIC SURGERY

## 2018-08-28 PROCEDURE — 6360000002 HC RX W HCPCS: Performed by: STUDENT IN AN ORGANIZED HEALTH CARE EDUCATION/TRAINING PROGRAM

## 2018-08-28 PROCEDURE — 82947 ASSAY GLUCOSE BLOOD QUANT: CPT

## 2018-08-28 PROCEDURE — 26055 INCISE FINGER TENDON SHEATH: CPT | Performed by: ORTHOPAEDIC SURGERY

## 2018-08-28 PROCEDURE — 7100000001 HC PACU RECOVERY - ADDTL 15 MIN: Performed by: ORTHOPAEDIC SURGERY

## 2018-08-28 PROCEDURE — 6360000002 HC RX W HCPCS: Performed by: NURSE ANESTHETIST, CERTIFIED REGISTERED

## 2018-08-28 PROCEDURE — 3600000003 HC SURGERY LEVEL 3 BASE: Performed by: ORTHOPAEDIC SURGERY

## 2018-08-28 PROCEDURE — 2709999900 HC NON-CHARGEABLE SUPPLY: Performed by: ORTHOPAEDIC SURGERY

## 2018-08-28 PROCEDURE — 7100000010 HC PHASE II RECOVERY - FIRST 15 MIN: Performed by: ORTHOPAEDIC SURGERY

## 2018-08-28 RX ORDER — KETOROLAC TROMETHAMINE 30 MG/ML
INJECTION, SOLUTION INTRAMUSCULAR; INTRAVENOUS PRN
Status: DISCONTINUED | OUTPATIENT
Start: 2018-08-28 | End: 2018-08-28 | Stop reason: SDUPTHER

## 2018-08-28 RX ORDER — SODIUM CHLORIDE, SODIUM LACTATE, POTASSIUM CHLORIDE, CALCIUM CHLORIDE 600; 310; 30; 20 MG/100ML; MG/100ML; MG/100ML; MG/100ML
1000 INJECTION, SOLUTION INTRAVENOUS CONTINUOUS
Status: DISCONTINUED | OUTPATIENT
Start: 2018-08-28 | End: 2018-08-28 | Stop reason: HOSPADM

## 2018-08-28 RX ORDER — LIDOCAINE HYDROCHLORIDE 10 MG/ML
INJECTION, SOLUTION EPIDURAL; INFILTRATION; INTRACAUDAL; PERINEURAL PRN
Status: DISCONTINUED | OUTPATIENT
Start: 2018-08-28 | End: 2018-08-28 | Stop reason: SDUPTHER

## 2018-08-28 RX ORDER — FENTANYL CITRATE 50 UG/ML
INJECTION, SOLUTION INTRAMUSCULAR; INTRAVENOUS PRN
Status: DISCONTINUED | OUTPATIENT
Start: 2018-08-28 | End: 2018-08-28 | Stop reason: SDUPTHER

## 2018-08-28 RX ORDER — FENTANYL CITRATE 50 UG/ML
25 INJECTION, SOLUTION INTRAMUSCULAR; INTRAVENOUS EVERY 5 MIN PRN
Status: CANCELLED | OUTPATIENT
Start: 2018-08-28

## 2018-08-28 RX ORDER — MIDAZOLAM HYDROCHLORIDE 1 MG/ML
1 INJECTION INTRAMUSCULAR; INTRAVENOUS EVERY 10 MIN PRN
Status: CANCELLED | OUTPATIENT
Start: 2018-08-28

## 2018-08-28 RX ORDER — PROPOFOL 10 MG/ML
INJECTION, EMULSION INTRAVENOUS PRN
Status: DISCONTINUED | OUTPATIENT
Start: 2018-08-28 | End: 2018-08-28 | Stop reason: SDUPTHER

## 2018-08-28 RX ORDER — SODIUM CHLORIDE 0.9 % (FLUSH) 0.9 %
10 SYRINGE (ML) INJECTION PRN
Status: CANCELLED | OUTPATIENT
Start: 2018-08-28

## 2018-08-28 RX ORDER — BUPIVACAINE HYDROCHLORIDE 2.5 MG/ML
INJECTION, SOLUTION EPIDURAL; INFILTRATION; INTRACAUDAL PRN
Status: DISCONTINUED | OUTPATIENT
Start: 2018-08-28 | End: 2018-08-28 | Stop reason: HOSPADM

## 2018-08-28 RX ORDER — HYDROCODONE BITARTRATE AND ACETAMINOPHEN 5; 325 MG/1; MG/1
1 TABLET ORAL EVERY 6 HOURS PRN
Qty: 20 TABLET | Refills: 0 | Status: SHIPPED | OUTPATIENT
Start: 2018-08-28 | End: 2018-09-02

## 2018-08-28 RX ORDER — SODIUM CHLORIDE 0.9 % (FLUSH) 0.9 %
10 SYRINGE (ML) INJECTION EVERY 12 HOURS SCHEDULED
Status: CANCELLED | OUTPATIENT
Start: 2018-08-28

## 2018-08-28 RX ADMIN — PROPOFOL 200 MG: 10 INJECTION, EMULSION INTRAVENOUS at 16:38

## 2018-08-28 RX ADMIN — PROPOFOL 100 MG: 10 INJECTION, EMULSION INTRAVENOUS at 17:10

## 2018-08-28 RX ADMIN — PROPOFOL 100 MG: 10 INJECTION, EMULSION INTRAVENOUS at 17:05

## 2018-08-28 RX ADMIN — FENTANYL CITRATE 50 MCG: 50 INJECTION INTRAMUSCULAR; INTRAVENOUS at 17:10

## 2018-08-28 RX ADMIN — PROPOFOL 100 MG: 10 INJECTION, EMULSION INTRAVENOUS at 16:44

## 2018-08-28 RX ADMIN — Medication 2 G: at 16:46

## 2018-08-28 RX ADMIN — KETOROLAC TROMETHAMINE 30 MG: 30 INJECTION, SOLUTION INTRAMUSCULAR; INTRAVENOUS at 16:49

## 2018-08-28 RX ADMIN — LIDOCAINE HYDROCHLORIDE 50 MG: 10 INJECTION, SOLUTION EPIDURAL; INFILTRATION; INTRACAUDAL; PERINEURAL at 16:39

## 2018-08-28 RX ADMIN — FENTANYL CITRATE 50 MCG: 50 INJECTION INTRAMUSCULAR; INTRAVENOUS at 16:47

## 2018-08-28 RX ADMIN — SODIUM CHLORIDE, POTASSIUM CHLORIDE, SODIUM LACTATE AND CALCIUM CHLORIDE 1000 ML: 600; 310; 30; 20 INJECTION, SOLUTION INTRAVENOUS at 14:15

## 2018-08-28 ASSESSMENT — PULMONARY FUNCTION TESTS
PIF_VALUE: 2
PIF_VALUE: 3
PIF_VALUE: 2
PIF_VALUE: 2
PIF_VALUE: 3
PIF_VALUE: 2
PIF_VALUE: 3
PIF_VALUE: 2
PIF_VALUE: 4
PIF_VALUE: 1
PIF_VALUE: 4
PIF_VALUE: 6
PIF_VALUE: 4
PIF_VALUE: 1
PIF_VALUE: 3
PIF_VALUE: 2
PIF_VALUE: 3
PIF_VALUE: 3
PIF_VALUE: 1
PIF_VALUE: 2
PIF_VALUE: 1
PIF_VALUE: 2
PIF_VALUE: 1
PIF_VALUE: 1
PIF_VALUE: 3
PIF_VALUE: 3
PIF_VALUE: 2
PIF_VALUE: 3
PIF_VALUE: 1
PIF_VALUE: 3
PIF_VALUE: 2
PIF_VALUE: 3
PIF_VALUE: 3
PIF_VALUE: 26
PIF_VALUE: 2
PIF_VALUE: 5
PIF_VALUE: 1
PIF_VALUE: 2

## 2018-08-28 ASSESSMENT — PAIN SCALES - GENERAL
PAINLEVEL_OUTOF10: 0

## 2018-08-28 ASSESSMENT — PAIN - FUNCTIONAL ASSESSMENT: PAIN_FUNCTIONAL_ASSESSMENT: 0-10

## 2018-08-28 NOTE — INTERVAL H&P NOTE
Pt Name: Maurilio Holman  MRN: [id number]  YOB: 1965  Date of evaluation: 8/28/2018    I have reviewed the patient's history and physical examination completed in pre-admission testing on 8/15/18    No changes to history or on examination today, unless noted below.    none    EFRAIN ELLISON CNP  8/28/18  2:23 PM

## 2018-08-28 NOTE — H&P (VIEW-ONLY)
Nerve Block (Right, 05/04/2018); Nerve Block (Right, 06/29/2018); eye surgery; pr anterior colporraphy rpr cystocele w/cysto (N/A, 7/30/2018); bladder suspension (N/A, 7/30/2018); and Nerve Block (Right, 08/10/2018). Medications   Scheduled Meds:  Current Outpatient Rx   Medication Sig Dispense Refill    Propranolol HCl (INDERAL PO) Take 10 mg by mouth nightly Takes 3 10 mg tablets at night      predniSONE (DELTASONE) 50 MG tablet TAKE 1 TABLET BY MOUTH ONE TIME A DAY AS NEEDED FOR ANGIOEDEMA 30 tablet 0    conjugated estrogens (PREMARIN) 0.625 MG/GM vaginal cream Place 0.5 g vaginally daily Place vaginally daily. 1 Tube 1    chlorhexidine (PERIDEX) 0.12 % solution chlorhexidine gluconate 0.12 % mouthwash   Place 15 mL twice a day by mucous membrane route.  pravastatin (PRAVACHOL) 40 MG tablet TAKE 1 TABLET BY MOUTH AT BEDTIME  90 tablet 1    lamoTRIgine (LAMICTAL) 100 MG tablet Take 100 mg by mouth daily       naltrexone (DEPADE) 50 MG tablet Take 50 mg by mouth daily Prescribed by Dr. Shelby Odom hydroxychloroquine (PLAQUENIL) 200 MG tablet TAKE 1 TABLET BY MOUTH ONE TIME A DAY  90 tablet 1    cyclobenzaprine (FLEXERIL) 5 MG tablet TAKE 1 OR 2 TABLETS BY MOUTH AT BEDTIME AS NEEDED 100 tablet 1    disulfiram (ANTABUSE) 250 MG tablet Take 250 mg by mouth daily      aspirin 81 MG tablet Take 1 tablet by mouth 2 times daily 60 tablet 11    gabapentin (NEURONTIN) 800 MG tablet Take 800 mg by mouth 3 times daily. Conny Reges omeprazole (PRILOSEC) 20 MG delayed release capsule TAKE 1 CAPSULE BY MOUTH TWO TIMES A DAY  180 capsule 1    famotidine (PEPCID) 40 MG tablet Take 1 tablet by mouth every evening 90 tablet 1    losartan (COZAAR) 100 MG tablet Take 1 tablet by mouth daily 30 tablet 5    cetirizine (ZYRTEC) 10 MG tablet Take 1 tablet by mouth daily 30 tablet 5    fluconazole (DIFLUCAN) 150 MG tablet TAKE 1 TABLET BY MOUTH NOW AS A ONE TIME DOSE, REPEAT WITH A SECOND TABLET IN 7 DAYS if needed 2 tablet 0    vitamin D (ERGOCALCIFEROL) 19437 units CAPS capsule Take 50,000 Units by mouth once a week Takes 3 times a week-(sunday,tuesday,thursday).  levothyroxine (SYNTHROID) 25 MCG tablet Take 25 mcg by mouth Daily      QUEtiapine (SEROQUEL) 200 MG tablet TAKE 1 TABLET BY MOUTH AT BEDTIME 60 tablet 1    albuterol sulfate  (90 Base) MCG/ACT inhaler Inhale 2 puffs into the lungs every 6 hours as needed for Wheezing 1 Inhaler 3    EPIPEN 2-JIGAR 0.3 MG/0.3ML SOAJ injection INJECT 0.3 ML (0.3 MG) INTO THE MUSCLE ONCE AS NEEDED FOR UP TO 1 DOSE. for angioedema 1 each 2    potassium chloride (KLOR-CON M) 20 MEQ extended release tablet Take 20 mEq by mouth 2 times daily Prescribed by endocrinologist      felodipine (PLENDIL) 10 MG extended release tablet Take 1 tablet by mouth daily 30 tablet 3    docusate sodium (COLACE) 100 MG capsule Take 1 capsule by mouth 2 times daily as needed for Constipation (Patient taking differently: Take 100 mg by mouth daily as needed for Constipation ) 60 capsule 1    metFORMIN (GLUCOPHAGE) 500 MG tablet Take 500 mg by mouth daily (with breakfast)       venlafaxine (EFFEXOR) 75 MG tablet Take 2 tablets (150 mg) in the morning and 1 tablet (75 mg) in the evening. 90 tablet 3    Multiple Vitamins-Minerals (THERAPEUTIC MULTIVITAMIN-MINERALS) tablet Take 1 tablet by mouth daily      Probiotic Product (PROBIOTIC & ACIDOPHILUS EX ST PO) Take 1 capsule by mouth daily       ferrous sulfate (FE TABS) 325 (65 Fe) MG EC tablet Take 1 tablet by mouth 2 times daily 90 tablet 3    Blood Pressure Monitoring (B-D ASSURE BPM/AUTO WRIST CUFF) MISC 1 Device by Does not apply route daily 1 each 0    glucose blood VI test strips (ASCENSIA AUTODISC VI;ONE TOUCH ULTRA TEST VI) strip Use with associated glucose meter. 100 strip 11    vitamin B-12 (CYANOCOBALAMIN) 1000 MCG tablet Take 1,000 mcg by mouth daily       Continuous Infusions:  PRN Meds:.   Allergies  is allergic to morphine; IMPRESSIONS:   1. Right hand  ring finger  will mass   and  trigger finger   2.  does not have any pertinent problems on file.     [facility]  Electronically signed 8/15/2018 at 2:25 PM       Scheduled for:   Right hand  ring finger  will mass excision and  trigger release

## 2018-08-28 NOTE — ANESTHESIA PRE PROCEDURE
F41.9    Antinuclear antibody (IQRA) titer greater than 1:80 R76.0    Anemia D64.9    Hx of Stroke (HCC) I63.9    Controlled type 2 diabetes mellitus without complication (HCC) X15.6    Degenerative arthritis of right knee M17.11    S/P knee surgery Z98.890    Chronic fatigue syndrome R53.82    Spondylosis of cervical region without myelopathy or radiculopathy M47.812    H/O hysterectomy [facility] BSO at [facility] for benign disease 2014 Z90.710    Hx of total bilateral knee replacement Z80.18    H/O: hysterectomy Z90.710    Atrial fibrillation with slow ventricular response (HCC) I48.91    H/O: stroke Z86.73    Acute cystitis without hematuria N30.00    Hypovolemia E86.1    Hypotension I95.9    Bradycardia R00.1    Cystocele, midline N81.11    JOYCE (stress urinary incontinence, female) N39.3    Overflow incontinence N39.490    7/30/18 Cystocele repair, Cystoscopy with OBTRYX Ureteral Sling Z98.890    Cystocele, unspecified (CODE) N81.10    Nuclear senile cataract H25.10    CKD (chronic kidney disease), stage II N18.2    Scleroderma ([facility]) M34.9    Vitamin D deficiency E55.9       Past Medical History:        Diagnosis Date    [facility] [address]     Anemia     Angioedema     Antinuclear antibody (IQRA) titer greater than 1:80     Anxiety     Asthma     Ataxia     Atrial fibrillation (HCC)     Borderline personality disorder     Bradycardia     CAD (coronary artery disease)     Chronic kidney disease     CKD (chronic kidney disease), stage II 8/23/2018    Degenerative disc disease, thoracic     Depression     Diabetes mellitus (HCC)     Diastolic dysfunction     DJD (degenerative joint disease)     Fibromyalgia     GERD (gastroesophageal reflux disease)     H/O: hysterectomy     Headache     Hernia of abdominal wall     History of blood transfusion     no problem    Hyperlipidemia     Hypertension     Lupus     Mood disorder (HCC)     Neuropathy     Osteoarthritis Cardiovascular:    (+) hypertension: moderate, CAD: no interval change,       ECG reviewed  Rhythm: regular  Rate: normal                    Neuro/Psych:   (+) CVA: no interval change, TIA, headaches: migraine headaches, psychiatric history: stable with treatment            GI/Hepatic/Renal:             Endo/Other:    (+) DiabetesType II DM, no interval change, , : arthritis: OA., .                 Abdominal:           Vascular:                                        Anesthesia Plan      general     ASA 3       Induction: intravenous. Anesthetic plan and risks discussed with patient. Plan discussed with CRNA.                   Neris Sung MD   8/28/2018

## 2018-08-29 RX ORDER — CETIRIZINE HYDROCHLORIDE 10 MG/1
TABLET ORAL
Qty: 30 TABLET | Refills: 5 | Status: SHIPPED | OUTPATIENT
Start: 2018-08-29 | End: 2019-02-25 | Stop reason: SDUPTHER

## 2018-08-29 NOTE — OP NOTE
surgery the patient was met in the pre-operative unit where written consent was obtained and the operative site was marked with permanent marker. The patient was then transported to the operating room, was laid in the supine position with the right arm on the hand table. MAC anesthesia was induced. A well padded non sterile tourniquet was applied to the RUE. Appropriate antibiotics were being infused at this time. A time-out was held and after all members were in agreement we proceeded forward with surgery. After standard sterile preparation and draping of the RUE was complete we performed gravity exsanguination and inflated the tourniquet to 250mmHg. We examined the involved digits under anesthesia and noted prominence over the ring finger A1 pulley with clinical locking/triggering appreciated. We also noted a prominence over the middle finger A1 pulley and found this digit to also exhibit clinical triggering. This was likely the mass the patient was referring to. For that reason the decision was made to release the A1 pulley of both the middle and long fingers. A 4cm incision was made with a #15 blade directly over the A1 pulleys and blunt dissection was carried down through the subcutaneous tissue until the tendon sheath and overlying pulley were identified. The #15 blade was used to make a small incision in the pulley and then tenotomy scissors were used to sharply divide the A1 pulley entirely. At this time the FDS and FDP tendons were found to be completely freed without any appreciable locking on the pulley due to the full release. The wound was thoroughly irrigated at this time. The incisions were then closed with 3-0 nylon. Sterile adaptic, 4x4s, webril and an ACE bandage were applied. Anesthesia was reversed and the patient was extubated without complication. Patient was then moved back over to the hospital bed and wheeled to the recovery unit in stable condition.     Dr. Eze Valdez was present for and active in all critical aspects of the case.                   Horacio Alpers    D: 08/28/2018 20:20:06       T: 08/29/2018 4:17:59     [email or web address]  Job#: [id number]     Doc#: [id number]    CC:

## 2018-08-29 NOTE — TELEPHONE ENCOUNTER
08/15/2018   Urinalysis Once 09/24/2018   Hemoglobin and Hematocrit, Blood Once 95/10/5750   Basic Metabolic Panel     CBC Auto Differential     Creatinine, Random Urine     Phosphorus     Protein, urine, random     Urinalysis Every 12 Weeks 10/26/2018, 1/18/2019, 4/12/2019, 7/5/2019, 9/27/2019, 12/20/2019, 3/13/2020, 6/5/2020, 8/28/2020, 11/20/2020, 2/12/2021, 5/7/2021, 7/22/4132   Basic Metabolic Panel Every 12 Weeks    CBC Auto Differential Every 12 Weeks    Creatinine, Random Urine Every 12 Weeks    Phosphorus Every 12 Weeks    Protein / creatinine ratio, urine Every 12 Weeks    Protein, urine, random Every 12 Weeks    PTH, INTACT WITH IONIZED CALCIUM Every 12 Weeks    Vitamin D 25 Hydroxy Every 12 Weeks                Patient Active Problem List:     Seizures (HCC)     Transient insomnia     Sleep walking disorder     Raynaud's disease     Pulmonary nodules     Neuropathy     Hypertension     GERD (gastroesophageal reflux disease)     Fibromyalgia     Depression     Degenerative disc disease, thoracic     CAD (coronary artery disease)     Bipolar disorder (HCC)     Asthma     Anxiety     Antinuclear antibody (IQRA) titer greater than 1:80     Anemia     Hx of Stroke ([address]     Controlled type 2 diabetes mellitus without complication (HCC)     Degenerative arthritis of right knee     S/P knee surgery     Chronic fatigue syndrome     Spondylosis of cervical region without myelopathy or radiculopathy     H/O hysterectomy with BSO at [facility] for benign disease 2014     Hx of total bilateral knee replacement     H/O: hysterectomy     Atrial fibrillation with slow ventricular response ([address]     H/O: stroke     Acute cystitis without hematuria     Hypovolemia     Hypotension     Bradycardia     Cystocele, midline     JOYCE (stress urinary incontinence, female)     Overflow incontinence     7/30/18 Cystocele repair, Cystoscopy with OBTRYX Ureteral Sling     Cystocele, unspecified (CODE)     Nuclear senile cataract     CKD (chronic kidney disease), stage II     Scleroderma (HCC)     Vitamin D deficiency

## 2018-09-06 ENCOUNTER — CARE COORDINATION (OUTPATIENT)
Dept: CARE COORDINATION | Age: 53
End: 2018-09-06

## 2018-09-07 ENCOUNTER — HOSPITAL ENCOUNTER (OUTPATIENT)
Dept: PAIN MANAGEMENT | Age: 53
Discharge: HOME | End: 2018-09-07
Payer: MEDICAID

## 2018-09-07 VITALS
DIASTOLIC BLOOD PRESSURE: 61 MMHG | RESPIRATION RATE: 14 BRPM | TEMPERATURE: 98.8 F | HEART RATE: 74 BPM | SYSTOLIC BLOOD PRESSURE: 103 MMHG

## 2018-09-07 DIAGNOSIS — M50.30 DEGENERATIVE DISC DISEASE, CERVICAL: ICD-10-CM

## 2018-09-07 DIAGNOSIS — M47.22 OSTEOARTHRITIS OF SPINE WITH RADICULOPATHY, CERVICAL REGION: Primary | ICD-10-CM

## 2018-09-07 PROCEDURE — 99214 OFFICE O/P EST MOD 30 MIN: CPT | Performed by: NURSE PRACTITIONER

## 2018-09-07 PROCEDURE — 99214 OFFICE O/P EST MOD 30 MIN: CPT

## 2018-09-07 ASSESSMENT — ENCOUNTER SYMPTOMS
COUGH: 0
SHORTNESS OF BREATH: 0
CONSTIPATION: 0

## 2018-09-07 NOTE — PROGRESS NOTES
TIME A DAY , Disp: 90 tablet, Rfl: 1    cyclobenzaprine (FLEXERIL) 5 MG tablet, TAKE 1 OR 2 TABLETS BY MOUTH AT BEDTIME AS NEEDED, Disp: 100 tablet, Rfl: 1    disulfiram (ANTABUSE) 250 MG tablet, Take 250 mg by mouth daily, Disp: , Rfl:     aspirin 81 MG tablet, Take 1 tablet by mouth 2 times daily, Disp: 60 tablet, Rfl: 11    gabapentin (NEURONTIN) 800 MG tablet, Take 800 mg by mouth 3 times daily. ., Disp: , Rfl:     omeprazole (PRILOSEC) 20 MG delayed release capsule, TAKE 1 CAPSULE BY MOUTH TWO TIMES A DAY , Disp: 180 capsule, Rfl: 1    famotidine (PEPCID) 40 MG tablet, Take 1 tablet by mouth every evening, Disp: 90 tablet, Rfl: 1    losartan (COZAAR) 100 MG tablet, Take 1 tablet by mouth daily, Disp: 30 tablet, Rfl: 5    fluconazole (DIFLUCAN) 150 MG tablet, TAKE 1 TABLET BY MOUTH NOW AS A ONE TIME DOSE, REPEAT WITH A SECOND TABLET IN 7 DAYS if needed, Disp: 2 tablet, Rfl: 0    Blood Pressure Monitoring (B-D ASSURE BPM/AUTO WRIST CUFF) MISC, 1 Device by Does not apply route daily, Disp: 1 each, Rfl: 0    vitamin D (ERGOCALCIFEROL) 30420 units CAPS capsule, Take 50,000 Units by mouth once a week Takes 3 times a week-(sunday,tuesday,thursday). , Disp: , Rfl:     levothyroxine (SYNTHROID) 25 MCG tablet, Take 25 mcg by mouth Daily, Disp: , Rfl:     QUEtiapine (SEROQUEL) 200 MG tablet, TAKE 1 TABLET BY MOUTH AT BEDTIME, Disp: 60 tablet, Rfl: 1    albuterol sulfate  (90 Base) MCG/ACT inhaler, Inhale 2 puffs into the lungs every 6 hours as needed for Wheezing, Disp: 1 Inhaler, Rfl: 3    glucose blood VI test strips (ASCENSIA AUTODISC VI;ONE TOUCH ULTRA TEST VI) strip, Use with associated glucose meter. , Disp: 100 strip, Rfl: 11    EPIPEN 2-JIGAR 0.3 MG/0.3ML SOAJ injection, INJECT 0.3 ML (0.3 MG) INTO THE MUSCLE ONCE AS NEEDED FOR UP TO 1 DOSE. for angioedema, Disp: 1 each, Rfl: 2    potassium chloride (KLOR-CON M) 20 MEQ extended release tablet, Take 20 mEq by mouth 2 times daily Prescribed by endocrinologist, Disp: , Rfl:     felodipine (PLENDIL) 10 MG extended release tablet, Take 1 tablet by mouth daily, Disp: 30 tablet, Rfl: 3    docusate sodium (COLACE) 100 MG capsule, Take 1 capsule by mouth 2 times daily as needed for Constipation (Patient taking differently: Take 100 mg by mouth daily as needed for Constipation ), Disp: 60 capsule, Rfl: 1    metFORMIN (GLUCOPHAGE) 500 MG tablet, Take 500 mg by mouth daily (with breakfast) , Disp: , Rfl:     venlafaxine (EFFEXOR) 75 MG tablet, Take 2 tablets (150 mg) in the morning and 1 tablet (75 mg) in the evening., Disp: 90 tablet, Rfl: 3    Multiple Vitamins-Minerals (THERAPEUTIC MULTIVITAMIN-MINERALS) tablet, Take 1 tablet by mouth daily, Disp: , Rfl:     Probiotic Product (PROBIOTIC & ACIDOPHILUS EX ST PO), Take 1 capsule by mouth daily , Disp: , Rfl:     vitamin B-12 (CYANOCOBALAMIN) 1000 MCG tablet, Take 1,000 mcg by mouth daily, Disp: , Rfl:     Family History   Problem Relation Age of Onset    Adopted: Yes    Depression Mother     Asthma Mother     Depression Father     High Blood Pressure Father     Diabetes Father     Asthma Father     Depression Sister     Asthma Sister     Depression Brother     Asthma Brother     Asthma Maternal Aunt     Asthma Maternal Uncle     Asthma Paternal [de-identified]     Asthma Paternal Uncle     Asthma Maternal Grandmother     Cancer Maternal Grandmother     Asthma Maternal Grandfather     Asthma Paternal Grandmother     Asthma Paternal Grandfather     Asthma Maternal Cousin     Asthma Paternal Cousin        Social History     Social History    Marital status:      Spouse name: N/A    Number of children: N/A    Years of education: N/A     Occupational History    Not on file. Social History Main Topics    Smoking status: Never Smoker    Smokeless tobacco: Never Used    Alcohol use Yes      Comment: daily drinks 1 liter of whiskey in three days \"hasn't drank in last month and  a half. \"  Drug use: No    Sexual activity: Not on file      Comment: hyst     Other Topics Concern    Not on file     Social History Narrative    No narrative on file       Review of Systems:  Review of Systems   Constitution: Positive for weakness. Negative for chills and fever. Cardiovascular: Negative for chest pain and irregular heartbeat. Respiratory: Negative for cough and shortness of breath. Musculoskeletal: Positive for neck pain. Gastrointestinal: Negative for constipation. Neurological: Positive for headaches. Negative for disturbances in coordination and loss of balance. Physical Exam:  /61   Pulse 74   Temp 98.8 °F (37.1 °C)   Resp 14     Physical Exam   Constitutional: She is oriented to person, place, and time and well-developed, well-nourished, and in no distress. HENT:   Head: Normocephalic. Eyes: EOM are normal.   Pulmonary/Chest: Effort normal.   Musculoskeletal: Normal range of motion. Cervical back: She exhibits tenderness and pain. Neurological: She is alert and oriented to person, place, and time. Skin: Skin is warm and dry. Psychiatric: Affect normal.       Record/Diagnostics Review:    MRI Cervical 2018 -     FINDINGS:  BONES/ALIGNMENT: The craniocervical junction is intact.  Cervical spine  alignment is within normal limits.  Cervical vertebral bodies are normal in  height.  No acute cervical spine fracture.  Mild degenerative marrow changes  at C5-C6.  Remaining marrow signal pattern is within normal limits.     Congenital spinal canal stenosis.     SPINAL CORD: No abnormal cord signal is seen.     SOFT TISSUES: No abnormal enhancement of the cervical spine.  No paraspinal  mass identified.     C2-C3: There is no significant disc protrusion, spinal canal stenosis or  neural foraminal narrowing.     C3-C4: Minimal central disc osteophyte complex.  Minimal spinal canal  stenosis.  No significant neural foraminal stenosis.     C4-C5: Minimal disc

## 2018-09-10 ENCOUNTER — OFFICE VISIT (OUTPATIENT)
Dept: ORTHOPEDIC SURGERY | Age: 53
End: 2018-09-10

## 2018-09-10 VITALS — HEIGHT: 68 IN | WEIGHT: 165 LBS | BODY MASS INDEX: 25.01 KG/M2

## 2018-09-10 DIAGNOSIS — M65.341 TRIGGER FINGER, RIGHT RING FINGER: ICD-10-CM

## 2018-09-10 DIAGNOSIS — M65.331 TRIGGER FINGER, RIGHT MIDDLE FINGER: Primary | ICD-10-CM

## 2018-09-10 PROCEDURE — 99024 POSTOP FOLLOW-UP VISIT: CPT | Performed by: ORTHOPAEDIC SURGERY

## 2018-09-10 ASSESSMENT — ENCOUNTER SYMPTOMS
NAUSEA: 0
CONSTIPATION: 0
DIARRHEA: 0
COUGH: 0

## 2018-09-10 NOTE — PROGRESS NOTES
intact  Mouth: Oral mucosa moist. No perioral lesions  Pulm: Respirations unlabored and regular. Skin: warm, well perfused  Psych:   Patient has good fund of knowledge and displays understanging of exam, diagnosis, and plan. Assessment:      1. Trigger finger, right middle finger    2. Trigger finger, right ring finger         Plan:         Sutures removed from right hand. Patient can be activity as tolerated with right hand. Will see patient back in 4 weeks. If patient would like to talk about left carpal tunnel surgery we can discuss it at that time. Follow up:Return in about 4 weeks (around 10/8/2018) for re- evaluation. Juani Ring RN am scribing for and in the presence of Dr. Cleo Scott  9/10/2018 2:18 PM    I have reviewed and made changes accordingly to the work scribed by Criselda Ward RN. The documentation accurately reflects work and decisions made by me. I have also reviewed documentation completed by clinical staff. Cleo Scott DO, [address] [place] [address]  9/10/2018 2:19 PM      No orders of the defined types were placed in this encounter. No orders of the defined types were placed in this encounter.        Electronically signed by Yanelis Davis DO, Shera Cedars on 9/10/2018 at 2:18 PM

## 2018-09-21 ENCOUNTER — HOSPITAL ENCOUNTER (OUTPATIENT)
Age: 53
Setting detail: SPECIMEN
Discharge: HOME | End: 2018-09-21
Payer: MEDICAID

## 2018-09-21 DIAGNOSIS — N18.2 CKD (CHRONIC KIDNEY DISEASE), STAGE II: ICD-10-CM

## 2018-09-21 DIAGNOSIS — E55.9 VITAMIN D DEFICIENCY: ICD-10-CM

## 2018-09-21 LAB
ABSOLUTE EOS #: 0.18 K/UL (ref 0–0.44)
ABSOLUTE IMMATURE GRANULOCYTE: <0.03 K/UL (ref 0–0.3)
ABSOLUTE LYMPH #: 1.33 K/UL (ref 1.1–3.7)
ABSOLUTE MONO #: 0.34 K/UL (ref 0.1–1.2)
ALBUMIN SERPL-MCNC: 4.1 G/DL (ref 3.5–5.2)
ALBUMIN/GLOBULIN RATIO: 1.4 (ref 1–2.5)
ALP BLD-CCNC: 141 U/L (ref 35–104)
ALT SERPL-CCNC: 10 U/L (ref 5–33)
ANION GAP SERPL CALCULATED.3IONS-SCNC: 12 MMOL/L (ref 9–17)
ANION GAP SERPL CALCULATED.3IONS-SCNC: 12 MMOL/L (ref 9–17)
AST SERPL-CCNC: 16 U/L
BASOPHILS # BLD: 2 % (ref 0–2)
BASOPHILS ABSOLUTE: 0.06 K/UL (ref 0–0.2)
BILIRUB SERPL-MCNC: 0.28 MG/DL (ref 0.3–1.2)
BUN BLDV-MCNC: 12 MG/DL (ref 6–20)
BUN BLDV-MCNC: 12 MG/DL (ref 6–20)
BUN/CREAT BLD: ABNORMAL (ref 9–20)
BUN/CREAT BLD: ABNORMAL (ref 9–20)
CALCIUM IONIZED: 1.26 MMOL/L (ref 1.13–1.33)
CALCIUM SERPL-MCNC: 9.5 MG/DL (ref 8.6–10.4)
CALCIUM SERPL-MCNC: 9.5 MG/DL (ref 8.6–10.4)
CHLORIDE BLD-SCNC: 103 MMOL/L (ref 98–107)
CHLORIDE BLD-SCNC: 103 MMOL/L (ref 98–107)
CHOLESTEROL/HDL RATIO: 1.9
CHOLESTEROL: 206 MG/DL
CO2: 26 MMOL/L (ref 20–31)
CO2: 26 MMOL/L (ref 20–31)
CREAT SERPL-MCNC: 0.96 MG/DL (ref 0.5–0.9)
CREAT SERPL-MCNC: 0.96 MG/DL (ref 0.5–0.9)
CREATININE URINE: 374 MG/DL (ref 28–217)
CREATININE URINE: 380.9 MG/DL (ref 28–217)
DIFFERENTIAL TYPE: ABNORMAL
EOSINOPHILS RELATIVE PERCENT: 6 % (ref 1–4)
ESTIMATED AVERAGE GLUCOSE: 117 MG/DL
GFR AFRICAN AMERICAN: >60 ML/MIN
GFR AFRICAN AMERICAN: >60 ML/MIN
GFR NON-AFRICAN AMERICAN: >60 ML/MIN
GFR NON-AFRICAN AMERICAN: >60 ML/MIN
GFR SERPL CREATININE-BSD FRML MDRD: ABNORMAL ML/MIN/{1.73_M2}
GLUCOSE BLD-MCNC: 86 MG/DL (ref 70–99)
GLUCOSE BLD-MCNC: 86 MG/DL (ref 70–99)
HBA1C MFR BLD: 5.7 % (ref 4–6)
HCT VFR BLD CALC: 32.7 % (ref 36.3–47.1)
HDLC SERPL-MCNC: 106 MG/DL
HEMOGLOBIN: 10.4 G/DL (ref 11.9–15.1)
IMMATURE GRANULOCYTES: 0 %
LDL CHOLESTEROL: 86 MG/DL (ref 0–130)
LYMPHOCYTES # BLD: 40 % (ref 24–43)
MCH RBC QN AUTO: 27.4 PG (ref 25.2–33.5)
MCHC RBC AUTO-ENTMCNC: 31.8 G/DL (ref 28.4–34.8)
MCV RBC AUTO: 86.3 FL (ref 82.6–102.9)
MICROALBUMIN/CREAT 24H UR: 13 MG/L
MICROALBUMIN/CREAT UR-RTO: 3 MCG/MG CREAT
MONOCYTES # BLD: 11 % (ref 3–12)
NRBC AUTOMATED: 0 PER 100 WBC
PDW BLD-RTO: 13.5 % (ref 11.8–14.4)
PHOSPHORUS: 4.3 MG/DL (ref 2.6–4.5)
PLATELET # BLD: 193 K/UL (ref 138–453)
PLATELET ESTIMATE: ABNORMAL
PMV BLD AUTO: 12.1 FL (ref 8.1–13.5)
POTASSIUM SERPL-SCNC: 3.8 MMOL/L (ref 3.7–5.3)
POTASSIUM SERPL-SCNC: 3.8 MMOL/L (ref 3.7–5.3)
PTH INTACT: 84.71 PG/ML (ref 15–65)
RBC # BLD: 3.79 M/UL (ref 3.95–5.11)
RBC # BLD: ABNORMAL 10*6/UL
SEG NEUTROPHILS: 41 % (ref 36–65)
SEGMENTED NEUTROPHILS ABSOLUTE COUNT: 1.31 K/UL (ref 1.5–8.1)
SODIUM BLD-SCNC: 141 MMOL/L (ref 135–144)
SODIUM BLD-SCNC: 141 MMOL/L (ref 135–144)
TOTAL PROTEIN, URINE: 41 MG/DL
TOTAL PROTEIN: 7 G/DL (ref 6.4–8.3)
TRIGL SERPL-MCNC: 68 MG/DL
URINE TOTAL PROTEIN CREATININE RATIO: 0.11 (ref 0–0.2)
VITAMIN D 25-HYDROXY: 31.3 NG/ML (ref 30–100)
VITAMIN D 25-HYDROXY: 31.3 NG/ML (ref 30–100)
VLDLC SERPL CALC-MCNC: ABNORMAL MG/DL (ref 1–30)
WBC # BLD: 3.2 K/UL (ref 3.5–11.3)
WBC # BLD: ABNORMAL 10*3/UL

## 2018-09-26 ENCOUNTER — OFFICE VISIT (OUTPATIENT)
Dept: OBGYN CLINIC | Age: 53
End: 2018-09-26

## 2018-09-26 ENCOUNTER — OFFICE VISIT (OUTPATIENT)
Dept: NEUROLOGY | Age: 53
End: 2018-09-26
Payer: MEDICAID

## 2018-09-26 VITALS
BODY MASS INDEX: 26.07 KG/M2 | DIASTOLIC BLOOD PRESSURE: 87 MMHG | HEIGHT: 68 IN | SYSTOLIC BLOOD PRESSURE: 124 MMHG | HEART RATE: 77 BPM | WEIGHT: 172 LBS

## 2018-09-26 VITALS
HEIGHT: 68 IN | BODY MASS INDEX: 26.07 KG/M2 | DIASTOLIC BLOOD PRESSURE: 74 MMHG | SYSTOLIC BLOOD PRESSURE: 118 MMHG | WEIGHT: 172 LBS | HEART RATE: 80 BPM

## 2018-09-26 DIAGNOSIS — Z90.710 H/O HYSTERECTOMY FOR BENIGN DISEASE: ICD-10-CM

## 2018-09-26 DIAGNOSIS — N95.1 MENOPAUSAL SYMPTOM: ICD-10-CM

## 2018-09-26 DIAGNOSIS — Z98.890: Primary | ICD-10-CM

## 2018-09-26 DIAGNOSIS — G62.9 NEUROPATHY: ICD-10-CM

## 2018-09-26 DIAGNOSIS — R26.9 GAIT DISORDER: ICD-10-CM

## 2018-09-26 DIAGNOSIS — M47.812 SPONDYLOSIS OF CERVICAL REGION WITHOUT MYELOPATHY OR RADICULOPATHY: Primary | ICD-10-CM

## 2018-09-26 DIAGNOSIS — R56.9 SEIZURES (HCC): ICD-10-CM

## 2018-09-26 PROCEDURE — 1036F TOBACCO NON-USER: CPT | Performed by: NURSE PRACTITIONER

## 2018-09-26 PROCEDURE — G8417 CALC BMI ABV UP PARAM F/U: HCPCS | Performed by: NURSE PRACTITIONER

## 2018-09-26 PROCEDURE — G8598 ASA/ANTIPLAT THER USED: HCPCS | Performed by: NURSE PRACTITIONER

## 2018-09-26 PROCEDURE — 99214 OFFICE O/P EST MOD 30 MIN: CPT | Performed by: NURSE PRACTITIONER

## 2018-09-26 PROCEDURE — G8427 DOCREV CUR MEDS BY ELIG CLIN: HCPCS | Performed by: NURSE PRACTITIONER

## 2018-09-26 PROCEDURE — 3017F COLORECTAL CA SCREEN DOC REV: CPT | Performed by: NURSE PRACTITIONER

## 2018-09-26 PROCEDURE — 99024 POSTOP FOLLOW-UP VISIT: CPT | Performed by: OBSTETRICS & GYNECOLOGY
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
## 2018-09-27 ENCOUNTER — OFFICE VISIT (OUTPATIENT)
Dept: NEUROLOGY | Age: 53
End: 2018-09-27
Payer: MEDICAID

## 2018-09-27 ENCOUNTER — HOSPITAL ENCOUNTER (OUTPATIENT)
Age: 53
Setting detail: SPECIMEN
Discharge: HOME | End: 2018-09-27
Payer: MEDICAID

## 2018-09-27 DIAGNOSIS — R20.0 NUMBNESS: Primary | ICD-10-CM

## 2018-09-27 DIAGNOSIS — N95.1 MENOPAUSAL SYMPTOM: ICD-10-CM

## 2018-09-27 LAB
ABSOLUTE EOS #: 0.14 K/UL (ref 0–0.44)
ABSOLUTE IMMATURE GRANULOCYTE: <0.03 K/UL (ref 0–0.3)
ABSOLUTE LYMPH #: 1.39 K/UL (ref 1.1–3.7)
ABSOLUTE MONO #: 0.31 K/UL (ref 0.1–1.2)
BASOPHILS # BLD: 2 % (ref 0–2)
BASOPHILS ABSOLUTE: 0.05 K/UL (ref 0–0.2)
DIFFERENTIAL TYPE: ABNORMAL
EOSINOPHILS RELATIVE PERCENT: 5 % (ref 1–4)
ESTIMATED AVERAGE GLUCOSE: 105 MG/DL
ESTRADIOL LEVEL: <5 PG/ML (ref 27–314)
FOLLICLE STIMULATING HORMONE: 47.9 U/L (ref 1.7–21.5)
HBA1C MFR BLD: 5.3 % (ref 4–6)
HCT VFR BLD CALC: 34.4 % (ref 36.3–47.1)
HEMOGLOBIN: 11.1 G/DL (ref 11.9–15.1)
IMMATURE GRANULOCYTES: 0 %
LYMPHOCYTES # BLD: 47 % (ref 24–43)
MCH RBC QN AUTO: 27.7 PG (ref 25.2–33.5)
MCHC RBC AUTO-ENTMCNC: 32.3 G/DL (ref 28.4–34.8)
MCV RBC AUTO: 85.8 FL (ref 82.6–102.9)
MONOCYTES # BLD: 11 % (ref 3–12)
NRBC AUTOMATED: 0 PER 100 WBC
PDW BLD-RTO: 13.2 % (ref 11.8–14.4)
PLATELET # BLD: 198 K/UL (ref 138–453)
PLATELET ESTIMATE: ABNORMAL
PMV BLD AUTO: 11.4 FL (ref 8.1–13.5)
RBC # BLD: 4.01 M/UL (ref 3.95–5.11)
RBC # BLD: ABNORMAL 10*6/UL
SEG NEUTROPHILS: 35 % (ref 36–65)
SEGMENTED NEUTROPHILS ABSOLUTE COUNT: 1.02 K/UL (ref 1.5–8.1)
TSH SERPL DL<=0.05 MIU/L-ACNC: 1.18 MIU/L (ref 0.3–5)
WBC # BLD: 2.9 K/UL (ref 3.5–11.3)
WBC # BLD: ABNORMAL 10*3/UL

## 2018-09-27 PROCEDURE — 95909 NRV CNDJ TST 5-6 STUDIES: CPT | Performed by: PSYCHIATRY & NEUROLOGY

## 2018-09-27 PROCEDURE — 95886 MUSC TEST DONE W/N TEST COMP: CPT | Performed by: PSYCHIATRY & NEUROLOGY

## 2018-10-01 ENCOUNTER — OFFICE VISIT (OUTPATIENT)
Dept: UROLOGY | Age: 53
End: 2018-10-01

## 2018-10-01 ENCOUNTER — TELEPHONE (OUTPATIENT)
Dept: OBGYN CLINIC | Age: 53
End: 2018-10-01

## 2018-10-01 VITALS
TEMPERATURE: 98 F | DIASTOLIC BLOOD PRESSURE: 92 MMHG | HEART RATE: 72 BPM | HEIGHT: 68 IN | SYSTOLIC BLOOD PRESSURE: 150 MMHG | WEIGHT: 170 LBS | BODY MASS INDEX: 25.76 KG/M2

## 2018-10-01 DIAGNOSIS — N39.3 STRESS INCONTINENCE: Primary | ICD-10-CM

## 2018-10-01 PROCEDURE — 99024 POSTOP FOLLOW-UP VISIT: CPT | Performed by: UROLOGY

## 2018-10-01 ASSESSMENT — ENCOUNTER SYMPTOMS
ABDOMINAL PAIN: 0
BACK PAIN: 0
VOMITING: 0
WHEEZING: 0
NAUSEA: 0
SHORTNESS OF BREATH: 0
EYE REDNESS: 0
EYE PAIN: 0
COLOR CHANGE: 0
COUGH: 0
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
## 2018-10-03 ENCOUNTER — HOSPITAL ENCOUNTER (OUTPATIENT)
Dept: PHYSICAL THERAPY | Age: 53
Setting detail: THERAPIES SERIES
Discharge: HOME | End: 2018-10-03
Payer: MEDICAID

## 2018-10-03 ENCOUNTER — INITIAL CONSULT (OUTPATIENT)
Dept: NEUROSURGERY | Age: 53
End: 2018-10-03
Payer: MEDICAID

## 2018-10-03 VITALS
TEMPERATURE: 98.2 F | HEART RATE: 71 BPM | HEIGHT: 68 IN | DIASTOLIC BLOOD PRESSURE: 94 MMHG | SYSTOLIC BLOOD PRESSURE: 137 MMHG | WEIGHT: 166 LBS | BODY MASS INDEX: 25.16 KG/M2

## 2018-10-03 DIAGNOSIS — M50.30 DEGENERATIVE DISC DISEASE, CERVICAL: Primary | ICD-10-CM

## 2018-10-03 PROCEDURE — 3017F COLORECTAL CA SCREEN DOC REV: CPT | Performed by: NEUROLOGICAL SURGERY

## 2018-10-03 PROCEDURE — G8484 FLU IMMUNIZE NO ADMIN: HCPCS | Performed by: NEUROLOGICAL SURGERY

## 2018-10-03 PROCEDURE — G8598 ASA/ANTIPLAT THER USED: HCPCS | Performed by: NEUROLOGICAL SURGERY

## 2018-10-03 PROCEDURE — G8417 CALC BMI ABV UP PARAM F/U: HCPCS | Performed by: NEUROLOGICAL SURGERY

## 2018-10-03 PROCEDURE — 1036F TOBACCO NON-USER: CPT | Performed by: NEUROLOGICAL SURGERY

## 2018-10-03 PROCEDURE — 99203 OFFICE O/P NEW LOW 30 MIN: CPT | Performed by: NEUROLOGICAL SURGERY

## 2018-10-03 PROCEDURE — G8427 DOCREV CUR MEDS BY ELIG CLIN: HCPCS | Performed by: NEUROLOGICAL SURGERY

## 2018-10-03 PROCEDURE — 97162 PT EVAL MOD COMPLEX 30 MIN: CPT

## 2018-10-03 NOTE — FLOWSHEET NOTE
[facility] Fall Risk Assessment    Patient Name:  Viviane Ziegler  : 1965        Risk Factor Scale  Score   History of Falls [x] Yes  [] No 25  0 25   Secondary Diagnosis [] Yes  [x] No 15  0 0   Ambulatory Aid [] Furniture  [] Crutches/cane/walker  [x] None/bedrest/wheelchair/nurse 30  15  0 0   IV/Heparin Lock [] Yes  [x] No 20  0 0   Gait/Transferring [] Impaired  [x] Weak  [] Normal/bedrest/immobile 20  10  0 10   Mental Status [] Forgets limitations  [x] Oriented to own ability 15  0 0      Total: 35     Based on the Assessment score: check the appropriate box.     []  No intervention needed   Low =   Score of 0-24    [x]  Use standard prevention interventions Moderate =  Score of 24-44   [x] Give patient handout and discuss fall prevention strategies   [x] Establish goal of education for patient/family RE: fall prevention strategies    []  Use high risk prevention interventions High = Score of 45 and higher   [] Give patient handout and discuss fall prevention strategies   [] Establish goal of education for patient/family Re: fall prevention strategies   [] Discuss lifeline / other resources    Electronically signed by:   Natividad Hillman PT  Date: 10/3/2018

## 2018-10-03 NOTE — PROGRESS NOTES
VS GENERAL performed by Arthur Myles DO at [address] [address]     Family History   Problem Relation Age of Onset    Adopted: Yes    Depression Mother     Asthma Mother     Depression Father     High Blood Pressure Father     Diabetes Father     Asthma Father     Depression Sister     Asthma Sister     Depression Brother     Asthma Brother     Asthma Maternal Aunt     Asthma Maternal Uncle     Asthma Paternal Aunt     Asthma Paternal Uncle     Asthma Maternal Grandmother     Cancer Maternal Grandmother     Asthma Maternal Grandfather     Asthma Paternal Grandmother     Asthma Paternal Grandfather     Asthma Maternal Cousin     Asthma Paternal Cousin      Current Outpatient Prescriptions on File Prior to Visit   Medication Sig Dispense Refill    famotidine (PEPCID) 40 MG tablet TAKE 1 TABLET BY MOUTH IN THE EVENING  30 tablet 5    cyclobenzaprine (FLEXERIL) 5 MG tablet TAKE 1 OR 2 TABLETS BY MOUTH AT BEDTIME AS NEEDED 20 tablet 0    cetirizine (ZYRTEC) 10 MG tablet TAKE 1 TABLET BY MOUTH ONE TIME A DAY  30 tablet 5    Propranolol HCl (INDERAL PO) Take 10 mg by mouth nightly Takes 3 10 mg tablets at night      predniSONE (DELTASONE) 50 MG tablet TAKE 1 TABLET BY MOUTH ONE TIME A DAY AS NEEDED FOR ANGIOEDEMA 30 tablet 0    ferrous sulfate (FE TABS) 325 (65 Fe) MG EC tablet Take 1 tablet by mouth 2 times daily 90 tablet 3    conjugated estrogens (PREMARIN) 0.625 MG/GM vaginal cream Place 0.5 g vaginally daily Place vaginally daily. 1 Tube 1    chlorhexidine (PERIDEX) 0.12 % solution chlorhexidine gluconate 0.12 % mouthwash   Place 15 mL twice a day by mucous membrane route.       pravastatin (PRAVACHOL) 40 MG tablet TAKE 1 TABLET BY MOUTH AT BEDTIME  90 tablet 1    lamoTRIgine (LAMICTAL) 100 MG tablet Take 100 mg by mouth daily       naltrexone (DEPADE) 50 MG tablet Take 50 mg by mouth daily Prescribed by Dr. Paresh Mar hydroxychloroquine (PLAQUENIL) 200 MG tablet

## 2018-10-04 ENCOUNTER — TELEPHONE (OUTPATIENT)
Dept: OBGYN CLINIC | Age: 53
End: 2018-10-04

## 2018-10-04 NOTE — TELEPHONE ENCOUNTER
----- Message from John Harvey DO sent at 9/27/2018 10:08 PM EDT -----  Pt is menopausal . RTO for [place] options

## 2018-10-05 ENCOUNTER — HOSPITAL ENCOUNTER (OUTPATIENT)
Dept: PHYSICAL THERAPY | Age: 53
Setting detail: THERAPIES SERIES
Discharge: HOME | End: 2018-10-05
Payer: MEDICAID

## 2018-10-05 PROCEDURE — 97110 THERAPEUTIC EXERCISES: CPT

## 2018-10-05 NOTE — FLOWSHEET NOTE
[x] [facility]       Outpatient Physical        Therapy       [address]       Phone: [phone]       Fax: [phone]        Physical Therapy Daily Treatment Note    Date:  10/5/2018  Patient Name:  Luzmaria Good    :  1965  MRN: [id number]   Physician: Alcides Courser ABI                               Insurance: [facility] [place] [employer] (limit 30/yr,18 remaining)  Medical Diagnosis: Gait disorder R26.9                    Rehab Codes: R26.0, R26.81, R26.89, R29.6  Onset date: 2017                     Next 's appt.: 2018   Visit# / total visits:   Cancels/No Shows:0/0    Subjective:    Pain:  [x] Yes  [] No Location:  Gait disorder/weaks hips, proprioception def. Pain Rating: (0-10 scale) 0/10  Pain altered Tx:  [x] No  [] Yes  Action:  Comments:Reports khushboo feet ( from ball of feet to toes) are numb. States she needs to work on balance. No reports of pain today. Objective:  Modalities:  N/A  Precautions:  Exercises:  Exercise  Khushboo LE/add balance Reps/ Time Weight/ Level Comments   Nu step  5 mins L2          Standing      gastroc stretch 3x20\"  wedge   Heel/toe raises   15x     4 way hip TB khushboo 20x ea red UE support   HS curls khushboo 15x 1 lb    marching 15x 1 lb    squats    Add, pt fatigued todayh         Sitting      HS stretch khushboo 3x20\"  stool   LAQ w/ ball 10x A    Hip abd 15 red    4 way ankle  Khushboo 20x ea red          Supine      Bridging w/ball 10x3     LTR 10x5\"                 Other:    Specific Instructions for next treatment:  ISSUE RED TBAND AND FALL PREVENTION. Progressed hip/gluteal exercises, add static and dynamic balance as able. Treatment Charges: Mins Units   []  Modalities     [x]  Ther Exercise 40 3   []  Manual Therapy     []  Ther Activities     []  Aquatics     []  Vasocompression     []  Other     Total Treatment time 40 3       Assessment: [x] Progressing toward goals Initialed hip and ankle strengthening. Pt fatigues very easily.  Rotated

## 2018-10-09 ENCOUNTER — HOSPITAL ENCOUNTER (OUTPATIENT)
Dept: PHYSICAL THERAPY | Age: 53
Setting detail: THERAPIES SERIES
Discharge: HOME | End: 2018-10-09
Payer: MEDICAID

## 2018-10-09 PROCEDURE — 97110 THERAPEUTIC EXERCISES: CPT

## 2018-10-09 NOTE — FLOWSHEET NOTE
[x] Boris Leash       Outpatient Physical        Therapy       [address]       Phone: [phone]       Fax: [phone]        Physical Therapy Daily Treatment Note    Date:  10/9/2018  Patient Name:  Kiarra Call    :  1965  MRN: [id number]   Physician: Neeru Grimm APRN-CNP                               Insurance: [facility] [place] Medicaid (limit 30/yr,18 remaining)  Medical Diagnosis: Gait disorder R26.9                    Rehab Codes: R26.0, R26.81, R26.89, R29.6  Onset date: 2017                     Next 's appt.: 2018   Visit# / total visits:   Cancels/No Shows:0/0    Subjective:    Pain:  [x] Yes  [] No Location:  Gait disorder/weaks hips, proprioception def. Pain Rating: (0-10 scale) 0/10  Pain altered Tx:  [x] No  [] Yes  Action:  Comments: Reports she was fine post 10/5 session, just a little sore that evening. States she is addressing HEP (except tband ankle, due to this writer not issuing tband 10/5 in error) Reports she is having a good day. Has a MD appt today, needs to cut session short. Objective:  Modalities:  N/A  Precautions:  Exercises:  Exercise  Khushboo LE/add balance Reps/ Time Weight/ Level Comments   Nu step  6 mins L2          Standing      Gastroc stretch 3x20\"  wedge   Heel/toe raises   15x     4 way hip TB khushboo 15x ea red UE support   HS curls khushboo 15x 1 lb    marching 15x 1 lb    squats 10x  Added 10/   Rocker board 15x  M/L, A/P, Added 10/   SLS 3x6\" ea  finger support on rails, Added 10/9               Sitting      HS stretch khushboo 3x20\"  stool   LAQ w/ ball 15x 1 lbs Added wt and increased rep 10/9   Hip abd 15x red    4 way ankle  Khushboo 10  x ea red Held 10 due to pt time constraints         Supine      SKTC 3x15\"     Bridging w/ball 15x3\"     LTR 10x5\"  Held 10/ due to pt time constraints. SLR   add         Other:    Specific Instructions for next treatment: Progressed hip/gluteal exercises, add static and dynamic balance as able. Treatment Charges: Mins Units   []  Modalities     [x]  Ther Exercise  30 2   []  Manual Therapy     []  Ther Activities     []  Aquatics     []  Vasocompression     []  Other     Total Treatment time  30 2       Assessment: [x] Progressing toward goals slowly progressing Hip/gluteal strengthening with addition of squats. Added rocker board and SLS for balance work. Progressions limited due to short session short due to MD appt. Verbal cues for posture and technique with tband hip exercises. [] No change. [] Other:    STG: (to be met in 12 treatments)  1. ? Strength: B hips 4-/5, B knees 4-/5, R ankle DF 4-/5  2. ? Function:Optimal 22% loss of function  3. ? Balance: ?Tinetti Test to 22 (out of 28) to indicate decreased risk of falling  4. Independent with Home Exercise Programs  5. Pt report decreased frequency of falls  6. Demonstrate Knowledge of fall prevention  LTG: (to be met in 20 treatments)  1. ? Strength: B hips 4/5, B knees 4+/5, R ankle DF 4/5  2. ? Function:Optimal 12% loss of function  3. ? Balance: ?Tinetti Test to 25 (out of 28) to indicate low risk of falling  4. Pt demonstrate normal gait without deviated path  5. Pt report no falls x 2 weeks        Patient goals: \"Be able to walk without falling\"       Pt. Education:  [x] Yes  [] No  [] Reviewed Prior HEP/Ed  Method of Education: [x] Verbal  [x] Demo  [x] Written  Comprehension of Education:  [x] Verbalizes understanding. [x] Demonstrates understanding. [] Needs review. [] Demonstrates/verbalizes HEP/Ed previously given. 10/5/18: mat: 4 way hip, bridging. LAQ, HS and gastroc stretch, 4 way ankle   10/9/18: issued red tband and fall prevention handout. Plan: [x] Continue per plan of care.    [] Other:      Time In:  0815         Time Out:  5674    Electronically signed by:  Lucy Mckeon PTA

## 2018-10-10 ENCOUNTER — OFFICE VISIT (OUTPATIENT)
Dept: ORTHOPEDIC SURGERY | Age: 53
End: 2018-10-10

## 2018-10-10 VITALS — WEIGHT: 166.01 LBS | HEIGHT: 68 IN | BODY MASS INDEX: 25.16 KG/M2

## 2018-10-10 DIAGNOSIS — M65.341 TRIGGER FINGER, RIGHT RING FINGER: ICD-10-CM

## 2018-10-10 DIAGNOSIS — G56.02 CARPAL TUNNEL SYNDROME, LEFT: ICD-10-CM

## 2018-10-10 DIAGNOSIS — M65.331 TRIGGER FINGER, RIGHT MIDDLE FINGER: Primary | ICD-10-CM

## 2018-10-10 PROCEDURE — G8484 FLU IMMUNIZE NO ADMIN: HCPCS | Performed by: ORTHOPAEDIC SURGERY

## 2018-10-10 PROCEDURE — 1036F TOBACCO NON-USER: CPT | Performed by: ORTHOPAEDIC SURGERY

## 2018-10-10 PROCEDURE — G8417 CALC BMI ABV UP PARAM F/U: HCPCS | Performed by: ORTHOPAEDIC SURGERY

## 2018-10-10 PROCEDURE — G8427 DOCREV CUR MEDS BY ELIG CLIN: HCPCS | Performed by: ORTHOPAEDIC SURGERY

## 2018-10-10 PROCEDURE — 99024 POSTOP FOLLOW-UP VISIT: CPT | Performed by: ORTHOPAEDIC SURGERY

## 2018-10-10 PROCEDURE — 3017F COLORECTAL CA SCREEN DOC REV: CPT | Performed by: ORTHOPAEDIC SURGERY

## 2018-10-10 PROCEDURE — G8598 ASA/ANTIPLAT THER USED: HCPCS | Performed by: ORTHOPAEDIC SURGERY

## 2018-10-10 ASSESSMENT — ENCOUNTER SYMPTOMS
SHORTNESS OF BREATH: 0
BACK PAIN: 0
WHEEZING: 0

## 2018-10-11 ENCOUNTER — OFFICE VISIT (OUTPATIENT)
Dept: DERMATOLOGY | Age: 53
End: 2018-10-11
Payer: MEDICAID

## 2018-10-11 VITALS
OXYGEN SATURATION: 100 % | HEART RATE: 70 BPM | BODY MASS INDEX: 26.4 KG/M2 | SYSTOLIC BLOOD PRESSURE: 174 MMHG | DIASTOLIC BLOOD PRESSURE: 99 MMHG | HEIGHT: 68 IN | WEIGHT: 174.2 LBS

## 2018-10-11 DIAGNOSIS — L81.4 SOLAR LENTIGO: ICD-10-CM

## 2018-10-11 DIAGNOSIS — L82.0 INFLAMED SEBORRHEIC KERATOSIS: Primary | ICD-10-CM

## 2018-10-11 DIAGNOSIS — L91.8 INFLAMED ACROCHORDON: ICD-10-CM

## 2018-10-11 PROCEDURE — 3017F COLORECTAL CA SCREEN DOC REV: CPT | Performed by: DERMATOLOGY

## 2018-10-11 PROCEDURE — G8598 ASA/ANTIPLAT THER USED: HCPCS | Performed by: DERMATOLOGY

## 2018-10-11 PROCEDURE — 11200 RMVL SKIN TAGS UP TO&INC 15: CPT | Performed by: DERMATOLOGY

## 2018-10-11 PROCEDURE — G8417 CALC BMI ABV UP PARAM F/U: HCPCS | Performed by: DERMATOLOGY

## 2018-10-11 PROCEDURE — G8427 DOCREV CUR MEDS BY ELIG CLIN: HCPCS | Performed by: DERMATOLOGY

## 2018-10-11 PROCEDURE — G8484 FLU IMMUNIZE NO ADMIN: HCPCS | Performed by: DERMATOLOGY

## 2018-10-11 PROCEDURE — 99202 OFFICE O/P NEW SF 15 MIN: CPT | Performed by: DERMATOLOGY

## 2018-10-11 PROCEDURE — 17110 DESTRUCTION B9 LES UP TO 14: CPT | Performed by: DERMATOLOGY

## 2018-10-11 PROCEDURE — 1036F TOBACCO NON-USER: CPT | Performed by: DERMATOLOGY

## 2018-10-11 NOTE — PATIENT INSTRUCTIONS
Seborrheic Keratosis  Seborrheic keratoses are common benign growths of unknown cause seen in adults due to a thickening of an area of the top skin layer. Who's At Risk  Although they can occur anytime after puberty, almost everyone over 48 has one or more of these and they increase in number with age. Some families have an inherited tendency to grow multiple lesions. Men and women are equally as likely to develop seborrheic keratoses. Dark-skinned people are less affected than those with light skin; a variant seen in blacks is called dermatosis papulosa nigra. Signs & Symptoms  One or more spots can occur anywhere on the body, except for palms, soles, and mucous membranes (eg, in the mouth or rectum). They do not go away. They do not turn into cancers, but some cancers resemble seborrheic keratosis. They start as light brown to skin-colored, flat areas, which are round to oval and of varying size (usually less than a half inch, but sometimes much larger). As they grow thicker and rise above the skin surface, seborrheic keratoses may become dark brown to almost black with a \"stuck on\" appearance. The surface may feel smooth or rough. Self-Care Guidelines  No treatment is needed unless there is irritation from clothing with itching or bleeding. There is no way to prevent new spots from forming. Some lotions with alpha hydroxyl acids may make the areas feel smoother with regular use but will not eliminate them. OTC freezing techniques are available but usually not effective. When to [facility]  If a spot on the skin is growing, bleeding, painful, or itchy, or any other concerning changes, then see your doctor. Cryotherapy    Liquid Nitrogen - \"freeze\" (Cryotherapy)  Your doctor has treated your skin lesions with a very cold substance. The liquid nitrogen is so cold that it may feel like the skin is burning during application.   A clear blister or blood blister may form after treatment and may later

## 2018-10-11 NOTE — LETTER
less affected than those with light skin; a variant seen in blacks is called dermatosis papulosa nigra. Signs & Symptoms  One or more spots can occur anywhere on the body, except for palms, soles, and mucous membranes (eg, in the mouth or rectum). They do not go away. They do not turn into cancers, but some cancers resemble seborrheic keratosis. They start as light brown to skin-colored, flat areas, which are round to oval and of varying size (usually less than a half inch, but sometimes much larger). As they grow thicker and rise above the skin surface, seborrheic keratoses may become dark brown to almost black with a \"stuck on\" appearance. The surface may feel smooth or rough. Self-Care Guidelines  No treatment is needed unless there is irritation from clothing with itching or bleeding. There is no way to prevent new spots from forming. Some lotions with alpha hydroxyl acids may make the areas feel smoother with regular use but will not eliminate them. OTC freezing techniques are available but usually not effective. When to [facility]  If a spot on the skin is growing, bleeding, painful, or itchy, or any other concerning changes, then see your doctor. Cryotherapy    Liquid Nitrogen - \"freeze\" (Cryotherapy)  Your doctor has treated your skin lesions with a very cold substance. The liquid nitrogen is so cold that it may feel like the skin is burning during application. A clear blister or blood blister may form after treatment and may later form a scab. Leave the area alone. Usually this scab will fall of within 1-2 weeks. The area should be kept clean and can be covered with Vaseline and a Band-Aid if needed. If a large blister develops it is ok to use a clean needle to gently pop the blister. Please call our office with any concerns at [phone]. Follow up in one year        If you have questions, please do not hesitate to call me. I look forward to following Mia along with you.

## 2018-10-11 NOTE — PROGRESS NOTES
naltrexone (DEPADE) 50 MG tablet Take 50 mg by mouth daily Prescribed by Dr. Hilda Manzano hydroxychloroquine (PLAQUENIL) 200 MG tablet TAKE 1 TABLET BY MOUTH ONE TIME A DAY  90 tablet 1    disulfiram (ANTABUSE) 250 MG tablet Take 250 mg by mouth daily      aspirin 81 MG tablet Take 1 tablet by mouth 2 times daily 60 tablet 11    gabapentin (NEURONTIN) 800 MG tablet Take 800 mg by mouth 3 times daily. Leatha Prestjill omeprazole (PRILOSEC) 20 MG delayed release capsule TAKE 1 CAPSULE BY MOUTH TWO TIMES A DAY  180 capsule 1    losartan (COZAAR) 100 MG tablet Take 1 tablet by mouth daily 30 tablet 5    Blood Pressure Monitoring (B-D ASSURE BPM/AUTO WRIST CUFF) MISC 1 Device by Does not apply route daily 1 each 0    vitamin D (ERGOCALCIFEROL) 52246 units CAPS capsule Take 50,000 Units by mouth once a week Takes 3 times a week-(sunday,tuesday,thursday).  levothyroxine (SYNTHROID) 25 MCG tablet Take 25 mcg by mouth Daily      QUEtiapine (SEROQUEL) 200 MG tablet TAKE 1 TABLET BY MOUTH AT BEDTIME 60 tablet 1    albuterol sulfate  (90 Base) MCG/ACT inhaler Inhale 2 puffs into the lungs every 6 hours as needed for Wheezing 1 Inhaler 3    glucose blood VI test strips (ASCENSIA AUTODISC VI;ONE TOUCH ULTRA TEST VI) strip Use with associated glucose meter.  100 strip 11    EPIPEN 2-JIGAR 0.3 MG/0.3ML SOAJ injection INJECT 0.3 ML (0.3 MG) INTO THE MUSCLE ONCE AS NEEDED FOR UP TO 1 DOSE. for angioedema 1 each 2    potassium chloride (KLOR-CON M) 20 MEQ extended release tablet Take 20 mEq by mouth 2 times daily Prescribed by endocrinologist      felodipine (PLENDIL) 10 MG extended release tablet Take 1 tablet by mouth daily 30 tablet 3    docusate sodium (COLACE) 100 MG capsule Take 1 capsule by mouth 2 times daily as needed for Constipation (Patient taking differently: Take 100 mg by mouth daily as needed for Constipation ) 60 capsule 1    metFORMIN (GLUCOPHAGE) 500 MG tablet Take 500 mg by mouth daily (with 1. Inflamed seborrheic keratosis, right arm  Cryotherapy: After verbal consent was obtained including discussion of the risks (lesion persistence, lesion recurrence and hypo/hyperpigmentation) and benefits (resolution of the lesion) 1 total Inflamed Seborrheic Keratosis on the right arm were treated once with liquid nitrogen to achieve a 2-3 mm freeze border. 2. Inflamed acrochordon  Cryotherapy: After verbal consent was obtained including discussion of the risks (lesion persistence, lesion recurrence and hypo/hyperpigmentation) and benefits (resolution of the lesion) 3 total inflamed skin tags on the right neck and [place] back were treated once with liquid nitrogen to achieve a 2-3 mm freeze border. 3. Solar lentigo  Counseled on sun protection: avoidance, seek shade; use clothing/hats/scarves; use generous quantity of sunscreen, re-apply every 2 hours; vit D discussed. RTC 1 year          Patient Instructions   Seborrheic Keratosis  Seborrheic keratoses are common benign growths of unknown cause seen in adults due to a thickening of an area of the top skin layer. Who's At Risk  Although they can occur anytime after puberty, almost everyone over 48 has one or more of these and they increase in number with age. Some families have an inherited tendency to grow multiple lesions. Men and women are equally as likely to develop seborrheic keratoses. Dark-skinned people are less affected than those with light skin; a variant seen in blacks is called dermatosis papulosa nigra. Signs & Symptoms  One or more spots can occur anywhere on the body, except for palms, soles, and mucous membranes (eg, in the mouth or rectum). They do not go away. They do not turn into cancers, but some cancers resemble seborrheic keratosis. They start as light brown to skin-colored, flat areas, which are round to oval and of varying size (usually less than a half inch, but sometimes much larger).  As they grow thicker and rise above the

## 2018-10-12 ENCOUNTER — CARE COORDINATION (OUTPATIENT)
Dept: CARE COORDINATION | Age: 53
End: 2018-10-12

## 2018-10-12 ENCOUNTER — HOSPITAL ENCOUNTER (OUTPATIENT)
Dept: PHYSICAL THERAPY | Age: 53
Setting detail: THERAPIES SERIES
Discharge: HOME | End: 2018-10-12
Payer: MEDICAID

## 2018-10-12 PROCEDURE — 97110 THERAPEUTIC EXERCISES: CPT

## 2018-10-12 NOTE — FLOWSHEET NOTE
[x] Eminence Tip       Outpatient Physical        Therapy       [address]       Phone: [phone]       Fax: [phone]        Physical Therapy Daily Treatment Note    Date:  10/12/2018  Patient Name:  Alexsandra Sample    :  1965  MRN: [id number]   Physician: Layla Mendenhall APRN-CNP                               Insurance: [facility] [place] Medicaid (limit 30/yr,18 remaining)  Medical Diagnosis: Gait disorder R26.9                    Rehab Codes: R26.0, R26.81, R26.89, R29.6  Onset date: 2017                     Next 's appt.: 2018   Visit# / total visits:   Cancels/No Shows:0/0    Subjective:    Pain:  [] Yes  [x] No Location:  Gait disorder/weaks hips, proprioception def. Pain Rating: (0-10 scale) 0/10  Pain altered Tx:  [x] No  [] Yes  Action:  Comments: Reports no soreness or problems since last session; nothing new/different to report this date.      Objective:  Modalities:  N/A  Precautions:  Exercises:  Exercise  Khushboo LE/add balance Reps/ Time Weight/ Level Comments   Nu step  6 mins L2          Standing      Gastroc stretch 3x20\"  wedge   Heel/toe raises   15x     4 way hip TB khushboo 15x ea green UE support; progressed resistance 10/12   HS curls khushboo 15x 2 lb Progressed resistance 10/12   marching 20x 2 lb Progressed resistance 10/12   squats 2x10  First set 2 UE support, 2nd second   Rocker board 15x L1 M/L, A/P, Added 10/9   SLS 3x6\" ea  No UE support (stand by rails), Added 10/9   Tandem stance 2x30\" ea  Added 10/12, Alternate LLE/RLE in front   Tandem stance with head turns 2x30\" ea  Added 10/12, Up/down, M/L   Step taps 2x20 4 in Added 10/12, no UE support x   High marches x25 ft  Added 10/12, completed with gait; SBA         Sitting      HS stretch khushboo 3x20\"  stool   LAQ w/ ball 15x 2 lbs Added wt and increased rep 10/9   Hip abd 20x red    4 way ankle  Khushboo 10  x ea red Held 10/9 due to pt time constraints         Supine      SKTC 3x15\"     Bridging w/ball 15x3\"     LTR

## 2018-10-12 NOTE — CARE COORDINATION
mouthwash   Place 15 mL twice a day by mucous membrane route. Historical Provider, MD   pravastatin (PRAVACHOL) 40 MG tablet TAKE 1 TABLET BY MOUTH AT BEDTIME  7/2/18   COLTON Colvin CNP   lamoTRIgine (LAMICTAL) 100 MG tablet Take 100 mg by mouth daily  6/6/18   Historical Provider, MD   naltrexone (DEPADE) 50 MG tablet Take 50 mg by mouth daily Prescribed by Dr. Ne Green 5/14/18   Historical Provider, MD   hydroxychloroquine (PLAQUENIL) 200 MG tablet TAKE 1 TABLET BY MOUTH ONE TIME A DAY  6/6/18   COLTON Colvin CNP   disulfiram (ANTABUSE) 250 MG tablet Take 250 mg by mouth daily 4/18/18   Historical Provider, MD   aspirin 81 MG tablet Take 1 tablet by mouth 2 times daily 5/14/18   COLTON Colvin CNP   gabapentin (NEURONTIN) 800 MG tablet Take 800 mg by mouth 3 times daily. Chi Chand Historical Provider, MD   omeprazole (PRILOSEC) 20 MG delayed release capsule TAKE 1 CAPSULE BY MOUTH TWO TIMES A DAY  5/7/18   COLTON Colvin CNP   losartan (COZAAR) 100 MG tablet Take 1 tablet by mouth daily 4/10/18   COLTON Colvin CNP   Blood Pressure Monitoring (B-D ASSURE BPM/AUTO WRIST CUFF) MISC 1 Device by Does not apply route daily 2/21/18   COLTON Colvin CNP   vitamin D (ERGOCALCIFEROL) 06006 units CAPS capsule Take 50,000 Units by mouth once a week Takes 3 times a week-(sunday,tuesday,thursday). Historical Provider, MD   levothyroxine (SYNTHROID) 25 MCG tablet Take 25 mcg by mouth Daily    Historical Provider, MD   QUEtiapine (SEROQUEL) 200 MG tablet TAKE 1 TABLET BY MOUTH AT BEDTIME 12/4/17   COLTON Colvin CNP   albuterol sulfate  (90 Base) MCG/ACT inhaler Inhale 2 puffs into the lungs every 6 hours as needed for Wheezing 12/4/17   COLTON Colvin CNP   glucose blood VI test strips (ASCENSIA AUTODISC VI;ONE TOUCH ULTRA TEST VI) strip Use with associated glucose meter.  10/30/17   COLTON Colvin

## 2018-10-16 ENCOUNTER — HOSPITAL ENCOUNTER (OUTPATIENT)
Dept: PHYSICAL THERAPY | Age: 53
Setting detail: THERAPIES SERIES
Discharge: HOME | End: 2018-10-16
Payer: MEDICAID

## 2018-10-16 PROCEDURE — 97110 THERAPEUTIC EXERCISES: CPT

## 2018-10-19 ENCOUNTER — HOSPITAL ENCOUNTER (OUTPATIENT)
Dept: PHYSICAL THERAPY | Age: 53
Setting detail: THERAPIES SERIES
Discharge: HOME | End: 2018-10-19
Payer: MEDICAID

## 2018-10-19 ENCOUNTER — HOSPITAL ENCOUNTER (OUTPATIENT)
Age: 53
Discharge: HOME | End: 2018-10-19
Payer: MEDICAID

## 2018-10-19 LAB
EKG ATRIAL RATE: 64 BPM
EKG P AXIS: 39 DEGREES
EKG P-R INTERVAL: 224 MS
EKG Q-T INTERVAL: 416 MS
EKG QRS DURATION: 86 MS
EKG QTC CALCULATION (BAZETT): 429 MS
EKG R AXIS: 2 DEGREES
EKG T AXIS: 8 DEGREES
EKG VENTRICULAR RATE: 64 BPM

## 2018-10-19 PROCEDURE — 93005 ELECTROCARDIOGRAM TRACING: CPT

## 2018-10-19 PROCEDURE — 97110 THERAPEUTIC EXERCISES: CPT

## 2018-10-19 NOTE — FLOWSHEET NOTE
[x] Mayra Ruben       Outpatient Physical        Therapy       [address]       Phone: [phone]       Fax: [phone]        Physical Therapy Daily Treatment Note    Date:  10/19/2018  Patient Name:  Valeria Swain    :  1965  MRN: [id number]   Physician: Moses Garcia APRN-CNP                               Insurance: [facility] [place] Medicaid (limit 30/yr,18 remaining)  Medical Diagnosis: Gait disorder R26.9                    Rehab Codes: R26.0, R26.81, R26.89, R29.6  Onset date: 2017                     Next 's appt.: 2018   Visit# / total visits:   Cancels/No Shows:0/0    Subjective:    Pain:  [] Yes  [x] No Location:  Gait disorder/weaks hips, proprioception def. Pain Rating: (0-10 scale) 0/10  Pain altered Tx:  [x] No  [] Yes  Action:  Comments: States exercises are OK- not too easy or difficult. Otherwise no new events.     Objective:  Modalities:  N/A  Precautions:  Exercises:  Exercise  Khushboo LE/add balance Reps/ Time Weight/ Level Comments   Nu step  5 mins L4          Standing      Gastroc stretch 3x20\"  wedge   Heel/toe raises   15x     4 way hip TB khushboo 15x ea green UE support;     HS curls khushboo 15x 2 lb     Squats 2x10  First set 2 UE support, 2nd second without   Rocker board 20 x ea L1 M/L, A/P, requires UE support    Rocker  Board balance 15 secx3 L2 Added 10/16   SLS 1 x each foot 15 sec  1 x bilat feet x 30 sec    Blue foam    Added foam 10/16     Tandem stance 2x30\" ea   Alternate L LE/RLE in front   Tandem stance with head turns 2x30\" ea  First set: head up/down; second sec head rotate r/l   Alternating taps on step 2x20 4 in  no UE support     High marches 20 ft x 2 2 lbs dynamic ; SBA, increased 10/16   Retro walking 20 ft x 1 2 lbs CG/SBA, added 10/   Side stepping 20 ft x1 2 lbs SBA, added 10/         Sitting      HS stretch khushboo 3x20\"  Stool, held 10/15   LAQ w/ ball 15x 2 lbs     Hip abd 20x red    4 way ankle  Khushboo 10  x ea red           Supine by:  Vesta Cho, PT

## 2018-10-22 ENCOUNTER — HOSPITAL ENCOUNTER (OUTPATIENT)
Dept: PHYSICAL THERAPY | Age: 53
Setting detail: THERAPIES SERIES
Discharge: HOME | End: 2018-10-22
Payer: MEDICAID

## 2018-10-22 ENCOUNTER — ANESTHESIA EVENT (OUTPATIENT)
Dept: OPERATING ROOM | Age: 53
End: 2018-10-22
Payer: MEDICAID

## 2018-10-22 PROCEDURE — 97112 NEUROMUSCULAR REEDUCATION: CPT

## 2018-10-22 PROCEDURE — 97110 THERAPEUTIC EXERCISES: CPT

## 2018-10-23 ENCOUNTER — ANESTHESIA (OUTPATIENT)
Dept: OPERATING ROOM | Age: 53
End: 2018-10-23
Payer: MEDICAID

## 2018-10-23 ENCOUNTER — HOSPITAL ENCOUNTER (OUTPATIENT)
Age: 53
Setting detail: OUTPATIENT SURGERY
Discharge: HOME | End: 2018-10-23
Attending: ORTHOPAEDIC SURGERY | Admitting: ORTHOPAEDIC SURGERY
Payer: MEDICAID

## 2018-10-23 VITALS
SYSTOLIC BLOOD PRESSURE: 153 MMHG | TEMPERATURE: 98.2 F | DIASTOLIC BLOOD PRESSURE: 89 MMHG | RESPIRATION RATE: 16 BRPM | HEIGHT: 68 IN | WEIGHT: 166 LBS | HEART RATE: 65 BPM | OXYGEN SATURATION: 98 % | BODY MASS INDEX: 25.16 KG/M2

## 2018-10-23 VITALS — DIASTOLIC BLOOD PRESSURE: 81 MMHG | OXYGEN SATURATION: 100 % | SYSTOLIC BLOOD PRESSURE: 131 MMHG

## 2018-10-23 DIAGNOSIS — G89.18 POST-OPERATIVE PAIN: Primary | ICD-10-CM

## 2018-10-23 LAB — GLUCOSE BLD-MCNC: 78 MG/DL (ref 65–105)

## 2018-10-23 PROCEDURE — 7100000041 HC SPAR PHASE II RECOVERY - ADDTL 15 MIN: Performed by: ORTHOPAEDIC SURGERY

## 2018-10-23 PROCEDURE — 6360000002 HC RX W HCPCS: Performed by: STUDENT IN AN ORGANIZED HEALTH CARE EDUCATION/TRAINING PROGRAM

## 2018-10-23 PROCEDURE — 82947 ASSAY GLUCOSE BLOOD QUANT: CPT

## 2018-10-23 PROCEDURE — 2709999900 HC NON-CHARGEABLE SUPPLY: Performed by: ORTHOPAEDIC SURGERY

## 2018-10-23 PROCEDURE — 3600000012 HC SURGERY LEVEL 2 ADDTL 15MIN: Performed by: ORTHOPAEDIC SURGERY

## 2018-10-23 PROCEDURE — 2500000003 HC RX 250 WO HCPCS: Performed by: ORTHOPAEDIC SURGERY

## 2018-10-23 PROCEDURE — 2580000003 HC RX 258: Performed by: STUDENT IN AN ORGANIZED HEALTH CARE EDUCATION/TRAINING PROGRAM

## 2018-10-23 PROCEDURE — 3600000002 HC SURGERY LEVEL 2 BASE: Performed by: ORTHOPAEDIC SURGERY

## 2018-10-23 PROCEDURE — 2580000003 HC RX 258: Performed by: ANESTHESIOLOGY

## 2018-10-23 PROCEDURE — 3700000001 HC ADD 15 MINUTES (ANESTHESIA): Performed by: ORTHOPAEDIC SURGERY

## 2018-10-23 PROCEDURE — 64721 CARPAL TUNNEL SURGERY: CPT | Performed by: ORTHOPAEDIC SURGERY

## 2018-10-23 PROCEDURE — 2500000003 HC RX 250 WO HCPCS: Performed by: NURSE ANESTHETIST, CERTIFIED REGISTERED

## 2018-10-23 PROCEDURE — 6360000002 HC RX W HCPCS: Performed by: NURSE ANESTHETIST, CERTIFIED REGISTERED

## 2018-10-23 PROCEDURE — 2580000003 HC RX 258: Performed by: ORTHOPAEDIC SURGERY

## 2018-10-23 PROCEDURE — 7100000040 HC SPAR PHASE II RECOVERY - FIRST 15 MIN: Performed by: ORTHOPAEDIC SURGERY

## 2018-10-23 PROCEDURE — 3700000000 HC ANESTHESIA ATTENDED CARE: Performed by: ORTHOPAEDIC SURGERY

## 2018-10-23 RX ORDER — HYDROCODONE BITARTRATE AND ACETAMINOPHEN 5; 325 MG/1; MG/1
1 TABLET ORAL EVERY 6 HOURS PRN
Qty: 25 TABLET | Refills: 0 | Status: SHIPPED | OUTPATIENT
Start: 2018-10-23 | End: 2018-10-30

## 2018-10-23 RX ORDER — BUPIVACAINE HYDROCHLORIDE 5 MG/ML
INJECTION, SOLUTION EPIDURAL; INTRACAUDAL PRN
Status: DISCONTINUED | OUTPATIENT
Start: 2018-10-23 | End: 2018-10-23 | Stop reason: HOSPADM

## 2018-10-23 RX ORDER — MAGNESIUM HYDROXIDE 1200 MG/15ML
LIQUID ORAL CONTINUOUS PRN
Status: DISCONTINUED | OUTPATIENT
Start: 2018-10-23 | End: 2018-10-23 | Stop reason: HOSPADM

## 2018-10-23 RX ORDER — LIDOCAINE HYDROCHLORIDE 10 MG/ML
INJECTION, SOLUTION EPIDURAL; INFILTRATION; INTRACAUDAL; PERINEURAL PRN
Status: DISCONTINUED | OUTPATIENT
Start: 2018-10-23 | End: 2018-10-23 | Stop reason: HOSPADM

## 2018-10-23 RX ORDER — PROPOFOL 10 MG/ML
INJECTION, EMULSION INTRAVENOUS PRN
Status: DISCONTINUED | OUTPATIENT
Start: 2018-10-23 | End: 2018-10-23 | Stop reason: SDUPTHER

## 2018-10-23 RX ORDER — KETOROLAC TROMETHAMINE 30 MG/ML
INJECTION, SOLUTION INTRAMUSCULAR; INTRAVENOUS PRN
Status: DISCONTINUED | OUTPATIENT
Start: 2018-10-23 | End: 2018-10-23 | Stop reason: SDUPTHER

## 2018-10-23 RX ORDER — FENTANYL CITRATE 50 UG/ML
25 INJECTION, SOLUTION INTRAMUSCULAR; INTRAVENOUS EVERY 5 MIN PRN
Status: DISCONTINUED | OUTPATIENT
Start: 2018-10-23 | End: 2018-10-23 | Stop reason: HOSPADM

## 2018-10-23 RX ORDER — LIDOCAINE HYDROCHLORIDE 10 MG/ML
INJECTION, SOLUTION INFILTRATION; PERINEURAL PRN
Status: DISCONTINUED | OUTPATIENT
Start: 2018-10-23 | End: 2018-10-23 | Stop reason: SDUPTHER

## 2018-10-23 RX ORDER — SODIUM CHLORIDE, SODIUM LACTATE, POTASSIUM CHLORIDE, CALCIUM CHLORIDE 600; 310; 30; 20 MG/100ML; MG/100ML; MG/100ML; MG/100ML
INJECTION, SOLUTION INTRAVENOUS CONTINUOUS
Status: DISCONTINUED | OUTPATIENT
Start: 2018-10-23 | End: 2018-10-23 | Stop reason: HOSPADM

## 2018-10-23 RX ADMIN — SODIUM CHLORIDE, POTASSIUM CHLORIDE, SODIUM LACTATE AND CALCIUM CHLORIDE: 600; 310; 30; 20 INJECTION, SOLUTION INTRAVENOUS at 15:44

## 2018-10-23 RX ADMIN — PROPOFOL 50 MG: 10 INJECTION, EMULSION INTRAVENOUS at 15:58

## 2018-10-23 RX ADMIN — PROPOFOL 100 MG: 10 INJECTION, EMULSION INTRAVENOUS at 15:49

## 2018-10-23 RX ADMIN — LIDOCAINE HYDROCHLORIDE 50 MG: 10 INJECTION, SOLUTION INFILTRATION; PERINEURAL at 15:48

## 2018-10-23 RX ADMIN — DEXTROSE MONOHYDRATE 2 G: 50 INJECTION, SOLUTION INTRAVENOUS at 15:50

## 2018-10-23 RX ADMIN — PROPOFOL 50 MG: 10 INJECTION, EMULSION INTRAVENOUS at 15:50

## 2018-10-23 RX ADMIN — PROPOFOL 50 MG: 10 INJECTION, EMULSION INTRAVENOUS at 16:02

## 2018-10-23 RX ADMIN — KETOROLAC TROMETHAMINE 30 MG: 30 INJECTION, SOLUTION INTRAMUSCULAR; INTRAVENOUS at 16:02

## 2018-10-23 RX ADMIN — SODIUM CHLORIDE, POTASSIUM CHLORIDE, SODIUM LACTATE AND CALCIUM CHLORIDE: 600; 310; 30; 20 INJECTION, SOLUTION INTRAVENOUS at 14:47

## 2018-10-23 ASSESSMENT — PULMONARY FUNCTION TESTS
PIF_VALUE: 1
PIF_VALUE: 0
PIF_VALUE: 1
PIF_VALUE: 0
PIF_VALUE: 0
PIF_VALUE: 1
PIF_VALUE: 0
PIF_VALUE: 1

## 2018-10-23 ASSESSMENT — PAIN SCALES - GENERAL
PAINLEVEL_OUTOF10: 0

## 2018-10-23 ASSESSMENT — PAIN - FUNCTIONAL ASSESSMENT: PAIN_FUNCTIONAL_ASSESSMENT: 0-10

## 2018-10-23 NOTE — ANESTHESIA PRE PROCEDURE
disulfiram (ANTABUSE) 250 MG tablet Take 250 mg by mouth daily 4/18/18   Historical Provider, MD   aspirin 81 MG tablet Take 1 tablet by mouth 2 times daily 5/14/18   COLTON Dowd CNP   gabapentin (NEURONTIN) 800 MG tablet Take 800 mg by mouth 3 times daily. [place] Route Historical Provider, MD   omeprazole (PRILOSEC) 20 MG delayed release capsule TAKE 1 CAPSULE BY MOUTH TWO TIMES A DAY  5/7/18   COLTON Dowd CNP   losartan (COZAAR) 100 MG tablet Take 1 tablet by mouth daily 4/10/18   COLTON Dowd CNP   Blood Pressure Monitoring (B-D ASSURE BPM/AUTO WRIST CUFF) MISC 1 Device by Does not apply route daily 2/21/18   COLTON Dowd CNP   vitamin D (ERGOCALCIFEROL) 38445 units CAPS capsule Take 50,000 Units by mouth once a week Takes 3 times a week-(sunday,tuesday,thursday). Historical Provider, MD   levothyroxine (SYNTHROID) 25 MCG tablet Take 25 mcg by mouth Daily    Historical Provider, MD   QUEtiapine (SEROQUEL) 200 MG tablet TAKE 1 TABLET BY MOUTH AT BEDTIME 12/4/17   COLTON Dowd CNP   albuterol sulfate  (90 Base) MCG/ACT inhaler Inhale 2 puffs into the lungs every 6 hours as needed for Wheezing 12/4/17   COLTON Dowd CNP   glucose blood VI test strips (ASCENSIA AUTODISC VI;ONE TOUCH ULTRA TEST VI) strip Use with associated glucose meter.  10/30/17   COLTON Dowd CNP   EPIPEN 2-JIGAR 0.3 MG/0.3ML SOAJ injection INJECT 0.3 ML (0.3 MG) INTO THE MUSCLE ONCE AS NEEDED FOR UP TO 1 DOSE. for angioedema 9/29/17   COLTON Dowd CNP   potassium chloride (KLOR-CON M) 20 MEQ extended release tablet Take 20 mEq by mouth 2 times daily Prescribed by endocrinologist    Historical Provider, MD   felodipine (PLENDIL) 10 MG extended release tablet Take 1 tablet by mouth daily 7/28/17   Lorrayne Fiddler, APRN - CNP   docusate sodium (COLACE) 100 MG capsule Take 1 capsule by mouth 2 times daily as needed

## 2018-10-23 NOTE — H&P
transfusion; Hyperlipidemia; Hypertension; Lupus; Mood disorder ([address] Neuropathy; Osteoarthritis; PTSD (post-traumatic stress disorder); Pulmonary nodules; Raynaud's disease; Scleroderma ([address] Scleroderma ([address] Seizures ([address] Sleep walking disorder; Thyroid disease; TIA (transient ischemic attack); Transient insomnia; Tricuspid regurgitation; Vasospasm ([address] Vitamin D deficiency; and Wears glasses. Past Surgical History   has a past surgical history that includes Hysterectomy; Cholecystectomy (); Tonsillectomy (); Carpal tunnel release (Right, ); Wrist surgery (); Gastric bypass surgery (); Abdominoplasty (); Elbow surgery (Right); Total knee arthroplasty (); knee surgery (Right, 2017); Total knee arthroplasty (Right, 2017); gastrectomy; Nerve Block (Right, 2018); Nerve Block (Right, 2018); eye surgery; pr anterior colporraphy rpr cystocele w/cysto (N/A, 2018); bladder suspension (N/A, 2018); Nerve Block (Right, 08/10/2018); joint replacement (Bilateral); Finger surgery (Right, 2018); and pr office/outpt visit,procedure only (Right, 2018). Medications   Scheduled Meds:  Continuous Infusions:   lactated ringers       PRN Meds:. Allergies  is allergic to morphine; amlodipine; dilaudid [hydromorphone hcl]; and sulfa antibiotics. Family History    family history includes Asthma in her brother, father, maternal aunt, maternal cousin, maternal grandfather, maternal grandmother, maternal uncle, mother, paternal aunt, paternal cousin, paternal grandfather, paternal grandmother, paternal uncle, and sister; Cancer in her maternal grandmother; Depression in her brother, father, mother, and sister; Diabetes in her father; High Blood Pressure in her father. She was adopted.     Family Status   Relation Status    Mother     Father     Sister Alive    Brother Alive   [facility] (Not Specified)    [facility] (Not Specified)   [facility]

## 2018-10-26 ENCOUNTER — HOSPITAL ENCOUNTER (OUTPATIENT)
Dept: PHYSICAL THERAPY | Age: 53
Setting detail: THERAPIES SERIES
Discharge: HOME | End: 2018-10-26
Payer: MEDICAID

## 2018-10-26 PROCEDURE — 97110 THERAPEUTIC EXERCISES: CPT

## 2018-10-26 PROCEDURE — 97112 NEUROMUSCULAR REEDUCATION: CPT

## 2018-10-26 NOTE — FLOWSHEET NOTE
Needs review. [x] Demonstrates/verbalizes HEP/Ed previously given. 10/5/18: mat: 4 way hip, bridging. LAQ, HS and gastroc stretch, 4 way ankle   10/9/18: issued red tband and fall prevention handout. Plan: [x] Continue per plan of care.    [] Other:      Time In:  0900        Time Out:   1011    Electronically signed by:  Tolu Mota PTA

## 2018-10-29 ENCOUNTER — TELEPHONE (OUTPATIENT)
Dept: FAMILY MEDICINE CLINIC | Age: 53
End: 2018-10-29

## 2018-10-29 RX ORDER — OMEPRAZOLE 20 MG/1
CAPSULE, DELAYED RELEASE ORAL
Qty: 180 CAPSULE | Refills: 1 | Status: SHIPPED | OUTPATIENT
Start: 2018-10-29 | End: 2019-04-22 | Stop reason: SDUPTHER

## 2018-10-29 NOTE — TELEPHONE ENCOUNTER
Patient called davidg Leora Villareal office called her today requesting patient to contact her PCP and Cardiologist to get the okay to start hormone therapy.  Please Advise--

## 2018-10-30 ENCOUNTER — INITIAL CONSULT (OUTPATIENT)
Dept: PHYSICAL MEDICINE AND REHAB | Age: 53
End: 2018-10-30
Payer: MEDICAID

## 2018-10-30 VITALS
BODY MASS INDEX: 25.79 KG/M2 | SYSTOLIC BLOOD PRESSURE: 142 MMHG | DIASTOLIC BLOOD PRESSURE: 88 MMHG | WEIGHT: 169.6 LBS | TEMPERATURE: 98.7 F | HEART RATE: 76 BPM

## 2018-10-30 DIAGNOSIS — M50.30 DDD (DEGENERATIVE DISC DISEASE), CERVICAL: Primary | ICD-10-CM

## 2018-10-30 DIAGNOSIS — M79.18 MYOFASCIAL PAIN ON RIGHT SIDE: ICD-10-CM

## 2018-10-30 DIAGNOSIS — M48.02 SPINAL STENOSIS, CERVICAL REGION: ICD-10-CM

## 2018-10-30 DIAGNOSIS — M48.02 NEURAL FORAMINAL STENOSIS OF CERVICAL SPINE: ICD-10-CM

## 2018-10-30 PROCEDURE — G8484 FLU IMMUNIZE NO ADMIN: HCPCS | Performed by: PHYSICAL MEDICINE & REHABILITATION

## 2018-10-30 PROCEDURE — 3017F COLORECTAL CA SCREEN DOC REV: CPT | Performed by: PHYSICAL MEDICINE & REHABILITATION

## 2018-10-30 PROCEDURE — G8417 CALC BMI ABV UP PARAM F/U: HCPCS | Performed by: PHYSICAL MEDICINE & REHABILITATION

## 2018-10-30 PROCEDURE — 99244 OFF/OP CNSLTJ NEW/EST MOD 40: CPT | Performed by: PHYSICAL MEDICINE & REHABILITATION

## 2018-10-30 PROCEDURE — G8427 DOCREV CUR MEDS BY ELIG CLIN: HCPCS | Performed by: PHYSICAL MEDICINE & REHABILITATION

## 2018-10-30 RX ORDER — LIDOCAINE
POWDER (GRAM) MISCELLANEOUS
Qty: 120 G | Refills: 0 | Status: SHIPPED | OUTPATIENT
Start: 2018-10-30 | End: 2018-11-21 | Stop reason: SDUPTHER

## 2018-10-30 NOTE — TELEPHONE ENCOUNTER
Called patient. Patient states that he will not do the replacement therapy without you verbally saying its ok or write him a letter.

## 2018-10-30 NOTE — LETTER
[facility] PHYSICIANS  [facility] PHYSICAL MEDICINE AND REHABILITATION  [address]  [facility] [address]  Dept: [phone]        10/30/18    Patient: Valeria Swain  YOB: 1965    Dear Dr. Adalberto Pineda,    I had the pleasure of seeing one of your patients, Reddy Mcqueen today in the office today. Below are the relevant portions of my assessment and plan of care. IMPRESSION:      Diagnosis Orders   1. DDD (degenerative disc disease), cervical  Ambulatory referral to Occupational Therapy   2. Spinal stenosis, cervical region  Ambulatory referral to Occupational Therapy   3. Neural foraminal stenosis of cervical spine  Ambulatory referral to Occupational Therapy   4. Myofascial pain on right side  Lidocaine POWD       PLAN:     1. Patient presents with neck and upper trapezius pain with activity mainly. Patient has poor posture and had placement but good neurologic examination today. 2.  Patient will benefit from a good postural retraining program work on stretching and strengthening and provided the patient with a prescription for formal physical therapy. .  3.  I will write the patient with a prescription for compounding cream that she can use for her myofascial-type pain to massage in the upper trap lower paraspinal region up to 4 times a day. 4.  Provided the patient with an educational booklet on neck pain for her to read and start home exercise program until she is able to get into formal therapy. 5.  Patient states she is going back to Dr. Pita Swain for injections.     Orders Placed This Encounter   Procedures    Ambulatory referral to Occupational Therapy     Referral Priority:   Routine     Referral Type:   Eval and Treat     Referral Reason:   Specialty Services Required     Requested Specialty:   Occupational Therapy     Number of Visits Requested:   1     Orders Placed This Encounter   Medications    Lidocaine POWD

## 2018-10-30 NOTE — PROGRESS NOTES
problem    Hyperlipidemia     Hypertension     Lupus     Mood disorder (HCC)     Neuropathy     Osteoarthritis     PTSD (post-traumatic stress disorder)     Pulmonary nodules     Raynaud's disease     Scleroderma ([address]     Scleroderma ([address] 8/23/2018    Seizures (HCC)     Sleep walking disorder     Thyroid disease     TIA (transient ischemic attack)     Transient insomnia     Tricuspid regurgitation     Vasospasm ([address] 01/2016    bilat. legs. resulting in near complete absence of profusion to arteries of feet. [facility]     Vitamin D deficiency 8/23/2018    Wears glasses       Past Surgical History:   Procedure Laterality Date    ABDOMINOPLASTY  2013    BLADDER SUSPENSION N/A 7/30/2018    CYSTOSCOPY WITH OBTRYX MID URETHRAL SLING performed by Azalia Suárez MD at [address] Right 2016    CHOLECYSTECTOMY  1989    ELBOW SURGERY Right     EYE SURGERY      khushboo. lasik     FINGER SURGERY Right 08/28/2018    RING CARTER MASS EXCISION, TRIGGER RELEASE    GASTRECTOMY      GASTRIC BYPASS SURGERY  2012    HYSTERECTOMY      JOINT REPLACEMENT Bilateral     knees    KNEE SURGERY Right 05/02/2017    (femoral) patella replacement    NERVE BLOCK Right 05/04/2018     cervical facet #1 no steroid    NERVE BLOCK Right 06/29/2018    rt cervical diagnostic #2 decadron 10mg    NERVE BLOCK Right 08/10/2018    right cervical rfa decadron 10mg    NC ANTERIOR COLPORRAPHY RPR CYSTOCELE W/CYSTO N/A 7/30/2018    CYSTOCELE REPAIR performed by Camryn Upton DO at [address] [facility] OFFICE/OUTPT [address] Right 8/28/2018    RING CARTER MASS EXCISION, TRIGGER RELEASE, 3080 TABLE performed by Nancy Lagos DO at [address]/CARPAL TUNNEL SURG Left 10/23/2018    CARPAL TUNNEL RELEASE performed by Nancy Lagos DO at [address]  2011    left knee    TOTAL KNEE ARTHROPLASTY Right 5/2/2017 PATELLOFEMORAL REPLACEMENT KNEE - [place], 3080 TABLE, FEMORAL POPLITEAL BLOCK, NSA= SPINAL VS GENERAL performed [place] Bev Yates DO [place] [facility]  [place]     Family History   Problem Relation Age of Onset    Adopted: Yes    Depression Mother     Asthma Mother     Depression Father     High Blood Pressure Father     Diabetes Father     Asthma Father     Depression Sister     Asthma Sister     Depression Brother     Asthma Brother     Asthma Maternal Aunt     Asthma Maternal Uncle     Asthma Paternal [place]     Asthma Paternal Uncle     Asthma Maternal Grandmother     Cancer Maternal Grandmother     Asthma Maternal Grandfather     Asthma Paternal Grandmother     Asthma Paternal Grandfather     Asthma Maternal Cousin     Asthma Paternal Cousin      Social History   Substance Use Topics    Smoking status: Never Smoker    Smokeless tobacco: Never Used    Alcohol use Yes      Comment: [email or web address]      Occupation: not working    Current Outpatient Prescriptions   Medication Sig Dispense Refill    Lidocaine POWD Formula 74B: amit2%+carbamazapine2%+lido2%+prilo2%. Apply 1-2 gm topically to affected area TID-QID. 120 g 0    omeprazole (PRILOSEC) 20 MG delayed release capsule TAKE 1 CAPSULE BY MOUTH TWO TIMES A DAY  180 capsule 1    HYDROcodone-acetaminophen (NORCO) 5-325 MG per tablet Take 1 tablet by mouth every 6 hours as needed for Pain for up to 7 days. Debi Huertas Date: 10/23/18 25 tablet 0    famotidine (PEPCID) 40 MG tablet TAKE 1 TABLET BY MOUTH IN THE EVENING  30 tablet 5    cyclobenzaprine (FLEXERIL) 5 MG tablet TAKE 1 OR 2 TABLETS BY MOUTH AT BEDTIME AS NEEDED 20 tablet 0    cetirizine (ZYRTEC) 10 MG tablet TAKE 1 TABLET BY MOUTH ONE TIME A DAY  30 tablet 5    Propranolol HCl (INDERAL PO) Take 30 mg by mouth nightly Takes 3 10 mg tablets at night       predniSONE (DELTASONE) 50 MG tablet TAKE 1 TABLET BY MOUTH ONE TIME A DAY AS NEEDED FOR ANGIOEDEMA 30 tablet 0    Cervical back: She exhibits decreased range of motion, tenderness, pain and spasm. bony tenderness lower and level C7 and T1  paraspinal tenderness lower to the right  trapezius tenderness right  range of motion limited diffusely  Scapulothoracic motionNormal  Neer test- negative  Sensation- equal bilaterally  MS- 5/5 BUE.  MSR- equal in BUE   Neurological:   Reflex Scores:       Tricep reflexes are 2+ on the right side and 2+ on the left side. Bicep reflexes are 2+ on the right side and 2+ on the left side. Brachioradialis reflexes are 2+ on the right side and 2+ on the left side. Skin: Skin is warm and dry. Psychiatric: She has a normal mood and affect. Her behavior is normal.   Vitals reviewed. BP (!) 142/88   Pulse 76   Temp 98.7 °F (37.1 °C) (Tympanic)   Wt 169 lb 9.6 oz (76.9 kg)   BMI 25.79 kg/m²   Constitutional: She is oriented to person, place, and time. Sheappears well-developed and well-nourished. HENT:   Head: Normocephalic. Eyes: EOM are normal.   Pulmonary/Chest: Effort normal.   Neurological: She is alert and oriented to person, place, and time. She has normal reflexes. Skin: Skin is warm and dry. Psychiatric: She has a normal mood and affect. Her behavior is normal.Thought content normal.   Nursing note and vitals reviewed. Imaging:      MRI Cervical spine 4/2018-     FINDINGS:   BONES/ALIGNMENT: The craniocervical junction is intact.  Cervical spine   alignment is within normal limits.  Cervical vertebral bodies are normal in   height.  No acute cervical spine fracture.  Mild degenerative marrow changes   at C5-C6.  Remaining marrow signal pattern is within normal limits.       Congenital spinal canal stenosis.       SPINAL CORD: No abnormal cord signal is seen.       SOFT TISSUES: No abnormal enhancement of the cervical spine.  No paraspinal   mass identified.       C2-C3: There is no significant disc protrusion, spinal canal stenosis or   neural foraminal to read and start home exercise program until she is able to get into formal therapy. 5.  Patient states she is going back to Dr. Tammy Small for injections. Orders Placed This Encounter   Procedures    Ambulatory referral to Occupational Therapy     Referral Priority:   Routine     Referral Type:   Eval and Treat     Referral Reason:   Specialty Services Required     Requested Specialty:   Occupational Therapy     Number of Visits Requested:   1     Orders Placed This Encounter   Medications    Lidocaine POWD     Sig: Formula 74B: amit2%+carbamazapine2%+lido2%+prilo2%. Apply 1-2 gm topically to affected area TID-QID. Dispense:  120 g     Refill:  0     Return in about 9 weeks (around 1/1/2019) for re-evaluate neck pain. Electronically signed by Niraj Wray MD on 10/30/2018 at 3:01 PM.     Please note that this chart was generated using voicerecognition Dragon dictation software. Although every effort was made to ensurethe accuracy of this automated transcription, some errors in transcription may haveoccurred.

## 2018-10-31 ENCOUNTER — HOSPITAL ENCOUNTER (OUTPATIENT)
Dept: PHYSICAL THERAPY | Age: 53
Setting detail: THERAPIES SERIES
Discharge: HOME | End: 2018-10-31
Payer: MEDICAID

## 2018-10-31 PROCEDURE — 97112 NEUROMUSCULAR REEDUCATION: CPT

## 2018-10-31 PROCEDURE — 97110 THERAPEUTIC EXERCISES: CPT

## 2018-11-06 ENCOUNTER — OFFICE VISIT (OUTPATIENT)
Dept: NEUROLOGY | Age: 53
End: 2018-11-06
Payer: MEDICAID

## 2018-11-06 VITALS
DIASTOLIC BLOOD PRESSURE: 88 MMHG | HEIGHT: 68 IN | HEART RATE: 56 BPM | WEIGHT: 166.8 LBS | BODY MASS INDEX: 25.28 KG/M2 | SYSTOLIC BLOOD PRESSURE: 134 MMHG

## 2018-11-06 DIAGNOSIS — M50.30 DEGENERATIVE DISC DISEASE, CERVICAL: Primary | ICD-10-CM

## 2018-11-06 DIAGNOSIS — M48.02 NEURAL FORAMINAL STENOSIS OF CERVICAL SPINE: ICD-10-CM

## 2018-11-06 DIAGNOSIS — R56.9 SEIZURES (HCC): ICD-10-CM

## 2018-11-06 PROCEDURE — G8484 FLU IMMUNIZE NO ADMIN: HCPCS | Performed by: NURSE PRACTITIONER

## 2018-11-06 PROCEDURE — 1036F TOBACCO NON-USER: CPT | Performed by: NURSE PRACTITIONER

## 2018-11-06 PROCEDURE — G8598 ASA/ANTIPLAT THER USED: HCPCS | Performed by: NURSE PRACTITIONER

## 2018-11-06 PROCEDURE — 3017F COLORECTAL CA SCREEN DOC REV: CPT | Performed by: NURSE PRACTITIONER

## 2018-11-06 PROCEDURE — G8417 CALC BMI ABV UP PARAM F/U: HCPCS | Performed by: NURSE PRACTITIONER

## 2018-11-06 PROCEDURE — G8427 DOCREV CUR MEDS BY ELIG CLIN: HCPCS | Performed by: NURSE PRACTITIONER

## 2018-11-06 PROCEDURE — 99214 OFFICE O/P EST MOD 30 MIN: CPT | Performed by: NURSE PRACTITIONER

## 2018-11-06 RX ORDER — GABAPENTIN 800 MG/1
800 TABLET ORAL 4 TIMES DAILY
Qty: 120 TABLET | Refills: 5 | Status: SHIPPED | OUTPATIENT
Start: 2018-11-06 | End: 2019-05-24 | Stop reason: SDUPTHER

## 2018-11-06 NOTE — PROGRESS NOTES
[facility]  Vitamin D deficiency 8/23/2018    Wears glasses          Past Surgical History:   Procedure Laterality Date    ABDOMINOPLASTY  2013    BLADDER SUSPENSION N/A 7/30/2018    CYSTOSCOPY WITH OBTRYX MID URETHRAL SLING performed by Omar Painter MD at [address] Right 2016   [address]    ELBOW SURGERY Right     EYE SURGERY      khushboo. lasik     FINGER SURGERY Right 08/28/2018    RING CARTER MASS EXCISION, TRIGGER RELEASE    GASTRECTOMY      GASTRIC BYPASS SURGERY  2012    HYSTERECTOMY      JOINT REPLACEMENT Bilateral     knees    KNEE SURGERY Right 05/02/2017    (femoral) patella replacement    NERVE BLOCK Right 05/04/2018     cervical facet #1 no steroid    NERVE BLOCK Right 06/29/2018    rt cervical diagnostic #2 decadron 10mg    NERVE BLOCK Right 08/10/2018    right cervical rfa decadron 10mg    TX ANTERIOR COLPORRAPHY RPR CYSTOCELE W/CYSTO N/A 7/30/2018    CYSTOCELE REPAIR performed by Jada Turner DO at [address] [facility] OFFICE/OUTPT [address] Right 8/28/2018    RING CARTER MASS EXCISION, TRIGGER RELEASE, 3080 TABLE performed by Beatriz Hector DO at [address]/CARPAL TUNNEL SURG Left 10/23/2018    CARPAL TUNNEL RELEASE performed by Beatriz Hector DO at [address]  2011    left knee    TOTAL KNEE ARTHROPLASTY Right 5/2/2017    PATELLOFEMORAL REPLACEMENT KNEE - [address], 3080 TABLE, FEMORAL POPLITEAL BLOCK, NSA= SPINAL VS GENERAL performed by Beatriz Hector DO at [address] [facility]  2004       Family History   Problem Relation Age of Onset    Adopted:  Yes    Depression Mother     Asthma Mother     Depression Father     High Blood Pressure Father     Diabetes Father     Asthma Father     Depression Sister     Asthma Sister     Depression Brother     Asthma Brother     Asthma Maternal Aunt     Asthma Maternal Uncle     Asthma  Lidocaine POWD Formula 74B: amit2%+carbamazapine2%+lido2%+prilo2%. Apply 1-2 gm topically to affected area TID-QID. 120 g 0    omeprazole (PRILOSEC) 20 MG delayed release capsule TAKE 1 CAPSULE BY MOUTH TWO TIMES A DAY  180 capsule 1    famotidine (PEPCID) 40 MG tablet TAKE 1 TABLET BY MOUTH IN THE EVENING  30 tablet 5    cetirizine (ZYRTEC) 10 MG tablet TAKE 1 TABLET BY MOUTH ONE TIME A DAY  30 tablet 5    Propranolol HCl (INDERAL PO) Take 30 mg by mouth nightly Takes 3 10 mg tablets at night       predniSONE (DELTASONE) 50 MG tablet TAKE 1 TABLET BY MOUTH ONE TIME A DAY AS NEEDED FOR ANGIOEDEMA 30 tablet 0    ferrous sulfate (FE TABS) 325 (65 Fe) MG EC tablet Take 1 tablet by mouth 2 times daily 90 tablet 3    chlorhexidine (PERIDEX) 0.12 % solution chlorhexidine gluconate 0.12 % mouthwash   Place 15 mL twice a day by mucous membrane route.  pravastatin (PRAVACHOL) 40 MG tablet TAKE 1 TABLET BY MOUTH AT BEDTIME  90 tablet 1    lamoTRIgine (LAMICTAL) 100 MG tablet Take 100 mg by mouth daily       naltrexone (DEPADE) 50 MG tablet Take 50 mg by mouth daily Prescribed by Dr. Meliza Antunez hydroxychloroquine (PLAQUENIL) 200 MG tablet TAKE 1 TABLET BY MOUTH ONE TIME A DAY  90 tablet 1    Disulfiram 500 MG TABS Take 500 mg by mouth daily       aspirin 81 MG tablet Take 1 tablet by mouth 2 times daily 60 tablet 11    gabapentin (NEURONTIN) 800 MG tablet Take 800 mg by mouth 3 times daily. Nadiya Chauhan losartan (COZAAR) 100 MG tablet Take 1 tablet by mouth daily 30 tablet 5    Blood Pressure Monitoring (B-D ASSURE BPM/AUTO WRIST CUFF) MISC 1 Device by Does not apply route daily 1 each 0    vitamin D (ERGOCALCIFEROL) 74060 units CAPS capsule Take 50,000 Units by mouth once a week Takes 3 times a week-(sunday,tuesday,thursday).       levothyroxine (SYNTHROID) 25 MCG tablet Take 25 mcg by mouth Daily      QUEtiapine (SEROQUEL) 200 MG tablet TAKE 1 TABLET BY MOUTH AT BEDTIME (Patient taking differently: 300

## 2018-11-08 ENCOUNTER — APPOINTMENT (OUTPATIENT)
Dept: CT IMAGING | Age: 53
DRG: 308 | End: 2018-11-08
Payer: MEDICAID

## 2018-11-08 ENCOUNTER — ANESTHESIA (OUTPATIENT)
Dept: OPERATING ROOM | Age: 53
DRG: 308 | End: 2018-11-08
Payer: MEDICAID

## 2018-11-08 ENCOUNTER — HOSPITAL ENCOUNTER (INPATIENT)
Age: 53
LOS: 3 days | Discharge: HOME | DRG: 308 | End: 2018-11-11
Attending: EMERGENCY MEDICINE | Admitting: ORTHOPAEDIC SURGERY
Payer: MEDICAID

## 2018-11-08 ENCOUNTER — ANESTHESIA EVENT (OUTPATIENT)
Dept: OPERATING ROOM | Age: 53
DRG: 308 | End: 2018-11-08
Payer: MEDICAID

## 2018-11-08 ENCOUNTER — APPOINTMENT (OUTPATIENT)
Dept: GENERAL RADIOLOGY | Age: 53
DRG: 308 | End: 2018-11-08
Payer: MEDICAID

## 2018-11-08 DIAGNOSIS — G89.18 POST-OP PAIN: ICD-10-CM

## 2018-11-08 DIAGNOSIS — S72.452A: Primary | ICD-10-CM

## 2018-11-08 DIAGNOSIS — S72.402A CLOSED FRACTURE OF DISTAL END OF LEFT FEMUR, UNSPECIFIED FRACTURE MORPHOLOGY, INITIAL ENCOUNTER (HCC): ICD-10-CM

## 2018-11-08 PROBLEM — S72.409A CLOSED FRACTURE OF LOWER END OF FEMUR (HCC): Status: ACTIVE | Noted: 2018-11-08

## 2018-11-08 LAB
ALBUMIN SERPL-MCNC: 4.1 G/DL (ref 3.5–5.2)
ALBUMIN/GLOBULIN RATIO: 1.5 (ref 1–2.5)
ALP BLD-CCNC: 156 U/L (ref 35–104)
ALT SERPL-CCNC: 11 U/L (ref 5–33)
ANION GAP SERPL CALCULATED.3IONS-SCNC: 10 MMOL/L (ref 9–17)
AST SERPL-CCNC: 11 U/L
BILIRUB SERPL-MCNC: 0.24 MG/DL (ref 0.3–1.2)
BUN BLDV-MCNC: 15 MG/DL (ref 6–20)
BUN/CREAT BLD: ABNORMAL (ref 9–20)
CALCIUM SERPL-MCNC: 9.3 MG/DL (ref 8.6–10.4)
CHLORIDE BLD-SCNC: 108 MMOL/L (ref 98–107)
CO2: 25 MMOL/L (ref 20–31)
CREAT SERPL-MCNC: 1.18 MG/DL (ref 0.5–0.9)
EKG ATRIAL RATE: 52 BPM
EKG P AXIS: 46 DEGREES
EKG P-R INTERVAL: 226 MS
EKG Q-T INTERVAL: 430 MS
EKG QRS DURATION: 86 MS
EKG QTC CALCULATION (BAZETT): 399 MS
EKG R AXIS: 12 DEGREES
EKG T AXIS: 13 DEGREES
EKG VENTRICULAR RATE: 52 BPM
GFR AFRICAN AMERICAN: 58 ML/MIN
GFR NON-AFRICAN AMERICAN: 48 ML/MIN
GFR SERPL CREATININE-BSD FRML MDRD: ABNORMAL ML/MIN/{1.73_M2}
GFR SERPL CREATININE-BSD FRML MDRD: ABNORMAL ML/MIN/{1.73_M2}
GLUCOSE BLD-MCNC: 113 MG/DL (ref 65–105)
GLUCOSE BLD-MCNC: 51 MG/DL (ref 65–105)
GLUCOSE BLD-MCNC: 77 MG/DL (ref 65–105)
GLUCOSE BLD-MCNC: 94 MG/DL (ref 70–99)
HCT VFR BLD CALC: 33.3 % (ref 36.3–47.1)
HEMOGLOBIN: 10.4 G/DL (ref 11.9–15.1)
MCH RBC QN AUTO: 27.1 PG (ref 25.2–33.5)
MCHC RBC AUTO-ENTMCNC: 31.2 G/DL (ref 28.4–34.8)
MCV RBC AUTO: 86.7 FL (ref 82.6–102.9)
NRBC AUTOMATED: 0 PER 100 WBC
PDW BLD-RTO: 13.8 % (ref 11.8–14.4)
PLATELET # BLD: 275 K/UL (ref 138–453)
PMV BLD AUTO: 11.7 FL (ref 8.1–13.5)
POTASSIUM SERPL-SCNC: 4.4 MMOL/L (ref 3.7–5.3)
RBC # BLD: 3.84 M/UL (ref 3.95–5.11)
SODIUM BLD-SCNC: 143 MMOL/L (ref 135–144)
TOTAL PROTEIN: 6.9 G/DL (ref 6.4–8.3)
WBC # BLD: 8.7 K/UL (ref 3.5–11.3)

## 2018-11-08 PROCEDURE — 85027 COMPLETE CBC AUTOMATED: CPT

## 2018-11-08 PROCEDURE — 73700 CT LOWER EXTREMITY W/O DYE: CPT

## 2018-11-08 PROCEDURE — 73562 X-RAY EXAM OF KNEE 3: CPT

## 2018-11-08 PROCEDURE — 80053 COMPREHEN METABOLIC PANEL: CPT

## 2018-11-08 PROCEDURE — 6360000002 HC RX W HCPCS: Performed by: STUDENT IN AN ORGANIZED HEALTH CARE EDUCATION/TRAINING PROGRAM

## 2018-11-08 PROCEDURE — 96374 THER/PROPH/DIAG INJ IV PUSH: CPT

## 2018-11-08 PROCEDURE — 82947 ASSAY GLUCOSE BLOOD QUANT: CPT

## 2018-11-08 PROCEDURE — 6370000000 HC RX 637 (ALT 250 FOR IP): Performed by: STUDENT IN AN ORGANIZED HEALTH CARE EDUCATION/TRAINING PROGRAM

## 2018-11-08 PROCEDURE — 99231 SBSQ HOSP IP/OBS SF/LOW 25: CPT | Performed by: ORTHOPAEDIC SURGERY

## 2018-11-08 PROCEDURE — 93005 ELECTROCARDIOGRAM TRACING: CPT

## 2018-11-08 PROCEDURE — 73552 X-RAY EXAM OF FEMUR 2/>: CPT

## 2018-11-08 PROCEDURE — 73502 X-RAY EXAM HIP UNI 2-3 VIEWS: CPT

## 2018-11-08 PROCEDURE — 2580000003 HC RX 258: Performed by: ORTHOPAEDIC SURGERY

## 2018-11-08 PROCEDURE — 99254 IP/OBS CNSLTJ NEW/EST MOD 60: CPT | Performed by: INTERNAL MEDICINE

## 2018-11-08 PROCEDURE — 2580000003 HC RX 258: Performed by: STUDENT IN AN ORGANIZED HEALTH CARE EDUCATION/TRAINING PROGRAM

## 2018-11-08 PROCEDURE — 99285 EMERGENCY DEPT VISIT HI MDM: CPT

## 2018-11-08 PROCEDURE — 1200000000 HC SEMI PRIVATE

## 2018-11-08 RX ORDER — MEDICAL SUPPLY, MISCELLANEOUS
1 EACH MISCELLANEOUS DAILY
Status: DISCONTINUED | OUTPATIENT
Start: 2018-11-08 | End: 2018-11-08

## 2018-11-08 RX ORDER — FAMOTIDINE 20 MG/1
40 TABLET, FILM COATED ORAL DAILY
Status: DISCONTINUED | OUTPATIENT
Start: 2018-11-08 | End: 2018-11-11 | Stop reason: HOSPADM

## 2018-11-08 RX ORDER — LOSARTAN POTASSIUM 50 MG/1
100 TABLET ORAL DAILY
Status: DISCONTINUED | OUTPATIENT
Start: 2018-11-08 | End: 2018-11-08

## 2018-11-08 RX ORDER — FELODIPINE 5 MG/1
10 TABLET, EXTENDED RELEASE ORAL DAILY
Status: DISCONTINUED | OUTPATIENT
Start: 2018-11-08 | End: 2018-11-09

## 2018-11-08 RX ORDER — PANTOPRAZOLE SODIUM 40 MG/1
40 TABLET, DELAYED RELEASE ORAL
Status: DISCONTINUED | OUTPATIENT
Start: 2018-11-09 | End: 2018-11-11 | Stop reason: HOSPADM

## 2018-11-08 RX ORDER — VENLAFAXINE 75 MG/1
75 TABLET ORAL 2 TIMES DAILY WITH MEALS
Status: DISCONTINUED | OUTPATIENT
Start: 2018-11-08 | End: 2018-11-11 | Stop reason: HOSPADM

## 2018-11-08 RX ORDER — SODIUM CHLORIDE 0.9 % (FLUSH) 0.9 %
10 SYRINGE (ML) INJECTION PRN
Status: DISCONTINUED | OUTPATIENT
Start: 2018-11-08 | End: 2018-11-11 | Stop reason: HOSPADM

## 2018-11-08 RX ORDER — LANOLIN ALCOHOL/MO/W.PET/CERES
325 CREAM (GRAM) TOPICAL 2 TIMES DAILY
Status: DISCONTINUED | OUTPATIENT
Start: 2018-11-08 | End: 2018-11-11 | Stop reason: HOSPADM

## 2018-11-08 RX ORDER — NICOTINE POLACRILEX 4 MG
15 LOZENGE BUCCAL PRN
Status: DISCONTINUED | OUTPATIENT
Start: 2018-11-08 | End: 2018-11-11 | Stop reason: HOSPADM

## 2018-11-08 RX ORDER — DEXTROSE MONOHYDRATE 50 MG/ML
100 INJECTION, SOLUTION INTRAVENOUS PRN
Status: DISCONTINUED | OUTPATIENT
Start: 2018-11-08 | End: 2018-11-11 | Stop reason: HOSPADM

## 2018-11-08 RX ORDER — LEVOTHYROXINE SODIUM 0.03 MG/1
25 TABLET ORAL DAILY
Status: DISCONTINUED | OUTPATIENT
Start: 2018-11-08 | End: 2018-11-11 | Stop reason: HOSPADM

## 2018-11-08 RX ORDER — MORPHINE SULFATE 2 MG/ML
2 INJECTION, SOLUTION INTRAMUSCULAR; INTRAVENOUS
Status: DISCONTINUED | OUTPATIENT
Start: 2018-11-08 | End: 2018-11-09

## 2018-11-08 RX ORDER — ACETAMINOPHEN 325 MG/1
650 TABLET ORAL EVERY 4 HOURS PRN
Status: DISCONTINUED | OUTPATIENT
Start: 2018-11-08 | End: 2018-11-11 | Stop reason: HOSPADM

## 2018-11-08 RX ORDER — PRAVASTATIN SODIUM 20 MG
40 TABLET ORAL NIGHTLY
Status: DISCONTINUED | OUTPATIENT
Start: 2018-11-08 | End: 2018-11-11 | Stop reason: HOSPADM

## 2018-11-08 RX ORDER — FENTANYL CITRATE 50 UG/ML
50 INJECTION, SOLUTION INTRAMUSCULAR; INTRAVENOUS ONCE
Status: COMPLETED | OUTPATIENT
Start: 2018-11-08 | End: 2018-11-08

## 2018-11-08 RX ORDER — OXYCODONE HYDROCHLORIDE AND ACETAMINOPHEN 5; 325 MG/1; MG/1
2 TABLET ORAL EVERY 4 HOURS PRN
Status: DISCONTINUED | OUTPATIENT
Start: 2018-11-08 | End: 2018-11-09

## 2018-11-08 RX ORDER — ONDANSETRON 2 MG/ML
4 INJECTION INTRAMUSCULAR; INTRAVENOUS EVERY 6 HOURS PRN
Status: DISCONTINUED | OUTPATIENT
Start: 2018-11-08 | End: 2018-11-11 | Stop reason: HOSPADM

## 2018-11-08 RX ORDER — SODIUM CHLORIDE 0.9 % (FLUSH) 0.9 %
10 SYRINGE (ML) INJECTION EVERY 12 HOURS SCHEDULED
Status: DISCONTINUED | OUTPATIENT
Start: 2018-11-08 | End: 2018-11-11 | Stop reason: HOSPADM

## 2018-11-08 RX ORDER — CETIRIZINE HYDROCHLORIDE 10 MG/1
10 TABLET ORAL DAILY
Status: DISCONTINUED | OUTPATIENT
Start: 2018-11-08 | End: 2018-11-11 | Stop reason: HOSPADM

## 2018-11-08 RX ORDER — NALTREXONE HYDROCHLORIDE 50 MG/1
50 TABLET, FILM COATED ORAL DAILY
Status: DISCONTINUED | OUTPATIENT
Start: 2018-11-08 | End: 2018-11-11 | Stop reason: HOSPADM

## 2018-11-08 RX ORDER — QUETIAPINE FUMARATE 200 MG/1
200 TABLET, FILM COATED ORAL NIGHTLY
Status: DISCONTINUED | OUTPATIENT
Start: 2018-11-08 | End: 2018-11-11 | Stop reason: HOSPADM

## 2018-11-08 RX ORDER — OXYCODONE HYDROCHLORIDE AND ACETAMINOPHEN 5; 325 MG/1; MG/1
1 TABLET ORAL EVERY 4 HOURS PRN
Status: DISCONTINUED | OUTPATIENT
Start: 2018-11-08 | End: 2018-11-09

## 2018-11-08 RX ORDER — GLUCAGON 1 MG/ML
1 KIT INJECTION PRN
Status: DISCONTINUED | OUTPATIENT
Start: 2018-11-08 | End: 2018-11-11 | Stop reason: HOSPADM

## 2018-11-08 RX ORDER — LAMOTRIGINE 100 MG/1
100 TABLET ORAL DAILY
Status: DISCONTINUED | OUTPATIENT
Start: 2018-11-08 | End: 2018-11-11 | Stop reason: HOSPADM

## 2018-11-08 RX ORDER — HYDROXYCHLOROQUINE SULFATE 200 MG/1
200 TABLET, FILM COATED ORAL DAILY
Status: DISCONTINUED | OUTPATIENT
Start: 2018-11-08 | End: 2018-11-11 | Stop reason: HOSPADM

## 2018-11-08 RX ORDER — 0.9 % SODIUM CHLORIDE 0.9 %
1000 INTRAVENOUS SOLUTION INTRAVENOUS ONCE
Status: COMPLETED | OUTPATIENT
Start: 2018-11-08 | End: 2018-11-08

## 2018-11-08 RX ORDER — ALBUTEROL SULFATE 90 UG/1
2 AEROSOL, METERED RESPIRATORY (INHALATION) EVERY 6 HOURS PRN
Status: DISCONTINUED | OUTPATIENT
Start: 2018-11-08 | End: 2018-11-11 | Stop reason: HOSPADM

## 2018-11-08 RX ORDER — HYDRALAZINE HYDROCHLORIDE 20 MG/ML
10 INJECTION INTRAMUSCULAR; INTRAVENOUS EVERY 6 HOURS PRN
Status: DISCONTINUED | OUTPATIENT
Start: 2018-11-08 | End: 2018-11-11 | Stop reason: HOSPADM

## 2018-11-08 RX ORDER — MORPHINE SULFATE 4 MG/ML
4 INJECTION, SOLUTION INTRAMUSCULAR; INTRAVENOUS
Status: DISCONTINUED | OUTPATIENT
Start: 2018-11-08 | End: 2018-11-09

## 2018-11-08 RX ORDER — GABAPENTIN 400 MG/1
800 CAPSULE ORAL 4 TIMES DAILY
Status: DISCONTINUED | OUTPATIENT
Start: 2018-11-08 | End: 2018-11-11 | Stop reason: HOSPADM

## 2018-11-08 RX ORDER — IBUPROFEN 800 MG/1
800 TABLET ORAL ONCE
Status: COMPLETED | OUTPATIENT
Start: 2018-11-08 | End: 2018-11-08

## 2018-11-08 RX ORDER — DEXTROSE MONOHYDRATE 25 G/50ML
12.5 INJECTION, SOLUTION INTRAVENOUS PRN
Status: DISCONTINUED | OUTPATIENT
Start: 2018-11-08 | End: 2018-11-11 | Stop reason: HOSPADM

## 2018-11-08 RX ADMIN — NALTREXONE HYDROCHLORIDE 50 MG: 50 TABLET, FILM COATED ORAL at 13:31

## 2018-11-08 RX ADMIN — PRAVASTATIN SODIUM 40 MG: 20 TABLET ORAL at 21:25

## 2018-11-08 RX ADMIN — FAMOTIDINE 40 MG: 20 TABLET, FILM COATED ORAL at 13:30

## 2018-11-08 RX ADMIN — OXYCODONE HYDROCHLORIDE AND ACETAMINOPHEN 2 TABLET: 5; 325 TABLET ORAL at 21:26

## 2018-11-08 RX ADMIN — DEXTROSE MONOHYDRATE 12.5 G: 25 INJECTION, SOLUTION INTRAVENOUS at 16:22

## 2018-11-08 RX ADMIN — FERROUS SULFATE TAB EC 325 MG (65 MG FE EQUIVALENT) 325 MG: 325 (65 FE) TABLET DELAYED RESPONSE at 21:25

## 2018-11-08 RX ADMIN — HYDROXYCHLOROQUINE SULFATE 200 MG: 200 TABLET, FILM COATED ORAL at 13:30

## 2018-11-08 RX ADMIN — Medication 10 ML: at 16:23

## 2018-11-08 RX ADMIN — GABAPENTIN 800 MG: 400 CAPSULE ORAL at 21:25

## 2018-11-08 RX ADMIN — GABAPENTIN 800 MG: 400 CAPSULE ORAL at 13:31

## 2018-11-08 RX ADMIN — FENTANYL CITRATE 50 MCG: 50 INJECTION, SOLUTION INTRAMUSCULAR; INTRAVENOUS at 07:26

## 2018-11-08 RX ADMIN — OXYCODONE HYDROCHLORIDE AND ACETAMINOPHEN 2 TABLET: 5; 325 TABLET ORAL at 12:26

## 2018-11-08 RX ADMIN — OXYCODONE HYDROCHLORIDE AND ACETAMINOPHEN 2 TABLET: 5; 325 TABLET ORAL at 16:43

## 2018-11-08 RX ADMIN — LEVOTHYROXINE SODIUM 25 MCG: 25 TABLET ORAL at 13:30

## 2018-11-08 RX ADMIN — DEXTROSE MONOHYDRATE 100 ML/HR: 50 INJECTION, SOLUTION INTRAVENOUS at 16:30

## 2018-11-08 RX ADMIN — CETIRIZINE HYDROCHLORIDE 10 MG: 10 TABLET ORAL at 13:31

## 2018-11-08 RX ADMIN — IBUPROFEN 800 MG: 800 TABLET, FILM COATED ORAL at 06:33

## 2018-11-08 RX ADMIN — LAMOTRIGINE 100 MG: 100 TABLET ORAL at 13:31

## 2018-11-08 RX ADMIN — SODIUM CHLORIDE 1000 ML: 9 INJECTION, SOLUTION INTRAVENOUS at 13:31

## 2018-11-08 ASSESSMENT — PAIN DESCRIPTION - PAIN TYPE
TYPE: ACUTE PAIN
TYPE: ACUTE PAIN

## 2018-11-08 ASSESSMENT — PAIN SCALES - GENERAL
PAINLEVEL_OUTOF10: 5
PAINLEVEL_OUTOF10: 8
PAINLEVEL_OUTOF10: 10
PAINLEVEL_OUTOF10: 7
PAINLEVEL_OUTOF10: 6
PAINLEVEL_OUTOF10: 10
PAINLEVEL_OUTOF10: 6
PAINLEVEL_OUTOF10: 5
PAINLEVEL_OUTOF10: 10

## 2018-11-08 ASSESSMENT — PAIN DESCRIPTION - DESCRIPTORS
DESCRIPTORS: ACHING;SORE
DESCRIPTORS: BURNING

## 2018-11-08 ASSESSMENT — ENCOUNTER SYMPTOMS
VOMITING: 0
NAUSEA: 0
ABDOMINAL PAIN: 0
SHORTNESS OF BREATH: 0

## 2018-11-08 ASSESSMENT — PAIN DESCRIPTION - LOCATION
LOCATION: KNEE

## 2018-11-08 ASSESSMENT — PAIN DESCRIPTION - ORIENTATION
ORIENTATION: RIGHT
ORIENTATION: LEFT
ORIENTATION: LEFT

## 2018-11-08 ASSESSMENT — PAIN DESCRIPTION - PROGRESSION
CLINICAL_PROGRESSION: NOT CHANGED

## 2018-11-08 ASSESSMENT — PAIN DESCRIPTION - FREQUENCY
FREQUENCY: CONTINUOUS
FREQUENCY: CONTINUOUS

## 2018-11-08 ASSESSMENT — PAIN DESCRIPTION - ONSET: ONSET: ON-GOING

## 2018-11-08 NOTE — H&P
[]Manual[]Template  []Copied  Orthopedic Surgery Consult  (Dr. Katharyn Gaucher)                                                                                                  CC/Reason for consult:  L distal periprosthetic femur fracture     HPI:    The patient is a 48 y.o. female brought in by ambulance who underwent a fall from standing height while in her closet this morning. She has a history of ataxia and has experienced multiple falls in the past. Patient has a history of a L total knee performed by Dr. Melton in [place] in 2012. She has since followed up with Dr. Katharyn Gaucher for a contralateral patellafemoral replacement of her R knee in 2017. When she fell today, she reached out but missed the wall and fell on her L knee. She did not hit her head. Denies any LOC. She is complaining of lateral thigh pain and knee pain on admission. She denies any numbness or tingling in her LLE at this time. Prior this injury, patient states that she had developed a flexion contracture of her L knee and had been ambulating though, as mentioned above due to her ataxia, with some balance issues.     She also recently had a L carpal tunnel release on 10/23. She missed her clinic appointment yesterday for suture removal. She has been doing well since her surgery and states that her median nerve distribution symptoms are improved. She has continued ulnar nerve dysesthesias and medial elbow pain which is unchanged.  Her carpal tunnel sutures were removed today in ED.        Past Medical History:    Past Medical History        Past Medical History:   Diagnosis Date    Acute kidney injury ([facility]      Anemia      Angioedema      Antinuclear antibody (IQRA) titer greater than 1:80      Anxiety      Asthma      Ataxia      Atrial fibrillation (HCC)      Borderline personality disorder (HCC)      Bradycardia      CAD (coronary artery disease)      Chronic kidney disease      CKD (chronic kidney disease), stage II 8/23/2018    (0. 3 MG) INTO THE MUSCLE ONCE AS NEEDED FOR UP TO 1 DOSE. for angioedema 9/29/17     COLTON Orozco CNP   potassium chloride (KLOR-CON M) 20 MEQ extended release tablet Take 20 mEq by mouth 2 times daily Prescribed by endocrinologist       Historical Provider, MD   felodipine (PLENDIL) 10 MG extended release tablet Take 1 tablet by mouth daily 7/28/17     COLTON Orozco CNP   docusate sodium (COLACE) 100 MG capsule Take 1 capsule by mouth 2 times daily as needed for Constipation  Patient taking differently: Take 100 mg by mouth daily as needed for Constipation  5/3/17     Tang Dane Z Hicks, DO   metFORMIN (GLUCOPHAGE) 500 MG tablet Take 500 mg by mouth daily (with breakfast)        Historical Provider, MD   venlafaxine (EFFEXOR) 75 MG tablet Take 2 tablets (150 mg) in the morning and 1 tablet (75 mg) in the evening. 3/17/17     COLTON Orozco CNP   Multiple Vitamins-Minerals (THERAPEUTIC MULTIVITAMIN-MINERALS) tablet Take 1 tablet by mouth daily       Historical Provider, MD   Probiotic Product (PROBIOTIC & ACIDOPHILUS EX ST PO) Take 1 capsule by mouth daily        Historical Provider, MD   vitamin B-12 (CYANOCOBALAMIN) 1000 MCG tablet Take 1,000 mcg by mouth daily       Historical Provider, MD            Allergies:    Morphine; Amlodipine; Dilaudid [hydromorphone hcl]; and Sulfa antibiotics     Social History:   Social History   Social History            Social History    Marital status:         Spouse name: N/A    Number of children: N/A    Years of education: N/A             Social History Main Topics    Smoking status: Never Smoker    Smokeless tobacco: Never Used    Alcohol use Yes         Comment: brent    Drug use: No    Sexual activity: Not on file         Comment: pt has hysterectomy           Other Topics Concern    Not on file          Social History Narrative    No narrative on file            Family History:  Family History         Family

## 2018-11-08 NOTE — PLAN OF CARE
Problem: Pain:  Goal: Pain level will decrease  Pain level will decrease   Outcome: Ongoing    Goal: Control of acute pain  Control of acute pain   Outcome: Ongoing    Goal: Control of chronic pain  Control of chronic pain   Outcome: Ongoing      Problem: Falls - Risk of:  Goal: Will remain free from falls  Will remain free from falls    Outcome: Met This Shift    Goal: Absence of physical injury  Absence of physical injury    Outcome: Met This Shift

## 2018-11-08 NOTE — ED PROVIDER NOTES
Pulmonary nodules; Raynaud's disease; Scleroderma ([address] Scleroderma ([address] Seizures ([address] Sleep walking disorder; Thyroid disease; TIA (transient ischemic attack); Transient insomnia; Tricuspid regurgitation; Vasospasm ([address] Vitamin D deficiency; and Wears glasses. has a past surgical history that includes Hysterectomy; Cholecystectomy (1989); Tonsillectomy (1986); Carpal tunnel release (Right, 2016); Wrist surgery (2004); Gastric bypass surgery (2012); Abdominoplasty (2013); Elbow surgery (Right); Total knee arthroplasty (2011); knee surgery (Right, 05/02/2017); Total knee arthroplasty (Right, 5/2/2017); gastrectomy; Nerve Block (Right, 05/04/2018); Nerve Block (Right, 06/29/2018); eye surgery; pr anterior colporraphy rpr cystocele w/cysto (N/A, 7/30/2018); bladder suspension (N/A, 7/30/2018); Nerve Block (Right, 08/10/2018); joint replacement (Bilateral); Finger surgery (Right, 08/28/2018); pr office/outpt visit,procedure only (Right, 8/28/2018); and pr revise median n/carpal tunnel surg (Left, 10/23/2018). Social History     Social History    Marital status:      Spouse name: N/A    Number of children: N/A    Years of education: N/A     Occupational History    Not on file. Social History Main Topics    Smoking status: Never Smoker    Smokeless tobacco: Never Used    Alcohol use Yes      Comment: brent    Drug use: No    Sexual activity: Not on file      Comment: pt has hysterectomy     Other Topics Concern    Not on file     Social History Narrative    No narrative on file       Family History   Problem Relation Age of Onset    Adopted:  Yes    Depression Mother     Asthma Mother     Depression Father     High Blood Pressure Father     Diabetes Father     Asthma Father     Depression Sister     Asthma Sister     Depression Brother     Asthma Brother     Asthma Maternal Aunt     Asthma Maternal Uncle     Asthma Paternal Aunt     Asthma Paternal Uncle     Asthma Maternal Grandmother     Cancer Maternal Grandmother     Asthma Maternal Grandfather     Asthma Paternal Grandmother     Asthma Paternal Grandfather     Asthma Maternal Cousin     Asthma Paternal Cousin         Allergies:  Morphine; Amlodipine; Dilaudid [hydromorphone hcl]; and Sulfa antibiotics    Home Medications:  Prior to Admission medications    Medication Sig Start Date End Date Taking? Authorizing Provider   gabapentin (NEURONTIN) 800 MG tablet Take 1 tablet by mouth 4 times daily for 30 days. . 11/6/18 12/6/18  COLTNO Hutchinson CNP   cyclobenzaprine (FLEXERIL) 5 MG tablet TAKE 1 OR 2 TABLETS BY MOUTH AT BEDTIME AS NEEDED 10/31/18   COLTON Yu CNP   Lidocaine POWD Formula 74B: amit2%+carbamazapine2%+lido2%+prilo2%. Apply 1-2 gm topically to affected area TID-QID. 10/30/18   Kristy Barrno MD   omeprazole (PRILOSEC) 20 MG delayed release capsule TAKE 1 CAPSULE BY MOUTH TWO TIMES A DAY  10/29/18   COLTON Yu CNP   famotidine (PEPCID) 40 MG tablet TAKE 1 TABLET BY MOUTH IN THE EVENING  9/28/18   COLTON Yu CNP   cetirizine (ZYRTEC) 10 MG tablet TAKE 1 TABLET BY MOUTH ONE TIME A DAY  8/29/18   COLTON Yu CNP   Propranolol HCl (INDERAL PO) Take 30 mg by mouth nightly Takes 3 10 mg tablets at night     Historical Provider, MD   predniSONE (DELTASONE) 50 MG tablet TAKE 1 TABLET BY MOUTH ONE TIME A DAY AS NEEDED FOR ANGIOEDEMA 8/2/18   [facility] COLTON Moy CNP   ferrous sulfate (FE TABS) 325 (65 Fe) MG EC tablet Take 1 tablet by mouth 2 times daily 7/31/18   Robert Crow DO   chlorhexidine (PERIDEX) 0.12 % solution chlorhexidine gluconate 0.12 % mouthwash   Place 15 mL twice a day by mucous membrane route.     Historical Provider, MD   pravastatin (PRAVACHOL) 40 MG tablet TAKE 1 TABLET BY MOUTH AT BEDTIME  7/2/18   COLTON Yu CNP   lamoTRIgine (LAMICTAL) 100 MG tablet Take 100 mg by mouth daily  6/6/18 heard.  Bradycardic rate   Pulmonary/Chest: Effort normal and breath sounds normal. No respiratory distress. She has no wheezes. She has no rales. Abdominal: Soft. She exhibits no distension. There is no tenderness. There is no guarding. Musculoskeletal: She exhibits no edema. Jens Moellers over superior/anterior portion of the knee as well as lateral thigh. No deformity. No abrasions or lacerations. logroll test with mild left hip pain. Leg is neurovascularly intact. Decreased range of motion due to pain. No ankle tenderness or swelling. Neurological: She is alert and oriented to person, place, and time. No sensory deficit. Skin: No rash noted. No erythema. Nursing note and vitals reviewed. DIFFERENTIAL  DIAGNOSIS     PLAN (LABS / IMAGING / EKG):  Orders Placed This Encounter   Procedures    XR KNEE LEFT (3 VIEWS)    XR HIP LEFT (2-3 VIEWS)    XR FEMUR LEFT (MIN 2 VIEWS)    Inpatient consult to Orthopedic Surgery       MEDICATIONS ORDERED:  Orders Placed This Encounter   Medications    ibuprofen (ADVIL;MOTRIN) tablet 800 mg    fentaNYL (SUBLIMAZE) injection 50 mcg       DDX: Contusion, strain, fracture, dislocation    Initial MDM/Plan/ED course: 48 y.o. female who presents with Left knee pain. Patient states that prior to arrival she was walking into her closet and was about to lean on the door frame when her hand missed the door frame and she fell into the closet. She states that she was not able to get up and ambulate since this happened. She denies any head trauma or loss of consciousness. Denies any back pain, urinary/bowel incontinence. She notes a history of total left knee replacement but denies any surgery. She also complains of mild left hip pain. Patient describes the pain as burning/aching, constant, worse with palpation or movement, better with rest, nonradiating, and 10/10. On exam patient is bradycardic but asymptomatic besides the left knee and hip pain.   No acute and small knee joint effusion. Acute angulated left supracondylar fracture just above total knee prosthesis. EKG      All EKG's are interpreted by the [facility] Physician who either signs or Co-signs this chart in the absence of a cardiologist.      PROCEDURES:  None    CONSULTS:  IP CONSULT TO ORTHOPEDIC SURGERY    CRITICAL CARE:  Please see attending note    FINAL IMPRESSION      1. Closed supracondylar fracture of femur, left, initial encounter ([facility]          DISPOSITION / PLAN     DISPOSITION    Decision to admit    PATIENT REFERRED TO:  No follow-up provider specified.     DISCHARGE MEDICATIONS:  New Prescriptions    No medications on file       Jus Urrutia DO  Emergency Medicine Resident    (Please note that portions of this note were completed with a voice recognition program.Efforts were made to edit the dictations but occasionally words are mis-transcribed.)       Iqra Yip DO  Resident  11/08/18 [id number]

## 2018-11-08 NOTE — ED PROVIDER NOTES
[facility]     Emergency Department     Faculty Attestation    I performed a history and physical examination of the patient and discussed management with the resident. I have reviewed and agree with the residents findings including all diagnostic interpretations, and treatment plans as written. Any areas of disagreement are noted on the chart. I was personally present for the key portions of any procedures. I have documented in the chart those procedures where I was not present during the key portions. I have reviewed the emergency nurses triage note. I agree with the chief complaint, past medical history, past surgical history, allergies, medications, social and family history as documented unless otherwise noted below. Documentation of the HPI, Physical Exam and Medical Decision Making performed by scribkvng is based on my personal performance of the HPI, PE and MDM. For Physician Assistant/ Nurse Practitioner cases/documentation I have personally evaluated this patient and have completed at least one if not all key elements of the E/M (history, physical exam, and MDM). Additional findings are as noted. 47 yo F fell on l anterior knee around 0100 no head or neck injury   Past knee surgery by Dr Aman Sloan,   pe tender bilateral anterior l knee, skin intact nv intact no deformity, distally L calf is non tender, diffuse tenderness L thigh, no deformity   letha no cervical tenderness crepitus or deformity,   r knee non tender,     Care will be turned over to day shift,       Pre-hypertension/Hypertension: The patient has been informed that they may have pre-hypertension or Hypertension based on a blood pressure reading in the emergency department.  I recommend that the patient call the primary care provider listed on their discharge instructions or a physician of their choice this week to arrange follow up for further evaluation of possible pre-hypertension or Hypertension. EKG Interpretation    Interpreted by me      CRITICAL CARE: There was a high probability of clinically significant/life threatening deterioration in this patient's condition which required my urgent intervention. Total critical care time was 0  minutes. This excludes any time for separately reportable procedures.        [facility],   11/08/18 [facility]  11/08/18 [id number]

## 2018-11-08 NOTE — CONSULTS
[facility]    left knee    TOTAL KNEE ARTHROPLASTY Right 5/2/2017    PATELLOFEMORAL REPLACEMENT KNEE - Liliya Ye, 3080 TABLE, FEMORAL POPLITEAL BLOCK, NSA= SPINAL VS GENERAL performed by Cameron Miranda DO at [address] [facility]  2004       Medications Prior to Admission:   Prior to Admission medications    Medication Sig Start Date End Date Taking? Authorizing Provider   gabapentin (NEURONTIN) 800 MG tablet Take 1 tablet by mouth 4 times daily for 30 days. . 11/6/18 12/6/18  COLTON Rodriguez Ra, CNP   cyclobenzaprine (FLEXERIL) 5 MG tablet TAKE 1 OR 2 TABLETS BY MOUTH AT BEDTIME AS NEEDED 10/31/18   COLTON Ferguson CNP   Lidocaine POWD Formula 74B: amit2%+carbamazapine2%+lido2%+prilo2%. Apply 1-2 gm topically to affected area TID-QID. 10/30/18   Radha Vargas MD   omeprazole (PRILOSEC) 20 MG delayed release capsule TAKE 1 CAPSULE BY MOUTH TWO TIMES A DAY  10/29/18   COLTON Ferguson CNP   famotidine (PEPCID) 40 MG tablet TAKE 1 TABLET BY MOUTH IN THE EVENING  9/28/18   COLTON Ferguson CNP   cetirizine (ZYRTEC) 10 MG tablet TAKE 1 TABLET BY MOUTH ONE TIME A DAY  8/29/18   COLTON Ferguson CNP   Propranolol HCl (INDERAL PO) Take 30 mg by mouth nightly Takes 3 10 mg tablets at night     Historical Provider, MD   predniSONE (DELTASONE) 50 MG tablet TAKE 1 TABLET BY MOUTH ONE TIME A DAY AS NEEDED FOR ANGIOEDEMA 8/2/18   COLTON Morse CNP   ferrous sulfate (FE TABS) 325 (65 Fe) MG EC tablet Take 1 tablet by mouth 2 times daily 7/31/18   Martha Coyle DO   chlorhexidine (PERIDEX) 0.12 % solution chlorhexidine gluconate 0.12 % mouthwash   Place 15 mL twice a day by mucous membrane route.     Historical Provider, MD   pravastatin (PRAVACHOL) 40 MG tablet TAKE 1 TABLET BY MOUTH AT BEDTIME  7/2/18   COLTON Ferguson CNP lamoTRIgine (LAMICTAL) 100 MG tablet Take 100 mg by mouth daily  6/6/18   Historical Provider, MD   naltrexone (DEPADE) 50 MG tablet Take 50 mg by mouth daily Prescribed by Dr. Valeria Infante 5/14/18   Historical Provider, MD   hydroxychloroquine (PLAQUENIL) 200 MG tablet TAKE 1 TABLET BY MOUTH ONE TIME A DAY  6/6/18   [facility] COL[facility] - CNP   Disulfiram 500 MG TABS Take 500 mg by mouth daily  4/18/18   Historical Provider, MD   aspirin 81 MG tablet Take 1 tablet by mouth 2 times daily 5/14/18   [facility] COLTON - CNP   losartan (COZAAR) 100 MG tablet Take 1 tablet by mouth daily 4/10/18   [facility] COLTON - CNP   Blood Pressure Monitoring (B-D ASSURE BPM/AUTO WRIST CUFF) MISC 1 Device by Does not apply route daily 2/21/18   [facility] COLTON - CNP   vitamin D (ERGOCALCIFEROL) 33791 units CAPS capsule Take 50,000 Units by mouth once a week Takes 3 times a week-(sunday,tuesday,thursday). Historical Provider, MD   levothyroxine (SYNTHROID) 25 MCG tablet Take 25 mcg by mouth Daily    Historical Provider, MD   QUEtiapine (SEROQUEL) 200 MG tablet TAKE 1 TABLET BY MOUTH AT BEDTIME  Patient taking differently: 300 mg TAKE 1 TABLET BY MOUTH AT BEDTIME 12/4/17   COLTON De Santiago CNP   albuterol sulfate  (90 Base) MCG/ACT inhaler Inhale 2 puffs into the lungs every 6 hours as needed for Wheezing 12/4/17   COLTON De Santiago - CNP   glucose blood VI test strips (ASCENSIA AUTODISC VI;ONE TOUCH ULTRA TEST VI) strip Use with associated glucose meter.  10/30/17   COLTON De Santiago - CNP   EPIPEN 2-JIGAR 0.3 MG/0.3ML SOAJ injection INJECT 0.3 ML (0.3 MG) INTO THE MUSCLE ONCE AS NEEDED FOR UP TO 1 DOSE. for angioedema 9/29/17   COLTON De Santiago - CNP   potassium chloride (KLOR-CON M) 20 MEQ extended release tablet Take 20 mEq by mouth 2 times daily Prescribed by endocrinologist    Historical Provider, MD   felodipine (PLENDIL) 10 MG extended release Maternal Grandmother     Asthma Maternal Grandfather     Asthma Paternal Grandmother     Asthma Paternal Grandfather     Asthma Maternal Cousin     Asthma Paternal Cousin        REVIEW OF SYSTEMS:    Constitutional: Negative for fever and chills. HENT: Negative for congestion. Eyes: Negative for blurred vision and double vision. Respiratory: Negative for cough, shortness of breath and wheezing. Cardiovascular: Negative for chest pain and palpitations. Gastrointestinal: Negative for nausea. Negative for vomiting. Musculoskeletal: Positive for LLE pain. See HPI   Skin: Negative for itching and rash. Neurological: Negative for dizziness, sensory change and headaches. Psychiatric/Behavioral: Negative for depression and suicidal ideas. PHYSICAL EXAM:  /69   Pulse (!) 48   Temp 97.6 °F (36.4 °C) (Oral)   Resp 18   Ht 5' 8\" (1.727 m)   Wt 165 lb (74.8 kg)   SpO2 100%   BMI 25.09 kg/m²     Gen: AAOx3, NAD, cooperative    Head: normocephalic, atraumatic    Neck: supple     Cardiovascular: Regular rate, no dependent edema, distal pulses 2+     Respiratory: Chest symmetric, no accessory muscle use, normal respirations     Pelvis: stable to anterior and lateral compression     LUE: Carpal tunnel incision with skin edges well approximated and well healed. No surrounding erythema or discharge. Sutures removed without any incisional dehiscence. Compartments soft. 2+ rad pulse. Median/Radial/Ulnar/AIN/PIN motor intact. Median/Radial/Ulnar nerve SILT. LLE:  Anterior lateral and anterior medial scars across the knee joint. Flexion deformity noted to knee with TTP around distal thigh/knee. Skin intact. Compartments soft and compressible. EHL/FHL/TA/GSC gross motor intact. Sural, saphenous, superificial/deep peroneal, and plantar nerve distribution SILT. Foot and toes warm and well-perfused w/ BCR; DP pulses 2+.      LABS:  No results for input(s): WBC, HGB, HCT, PLT, INR, PTT, NA, K, BUN, CREATININE, GLUCOSE, SEDRATE, CRP in the last 72 hours. Invalid input(s): PT     Radiology:   L knee films: demonstrate distal periprosthetic femur fracture with flexion deformity. Previous total knee implant in place. A/P: 48 y.o. female being seen for L total knee distal periprosthetic femur fracture after fall from standing height    -Patient will require surgical fixation of her fracture. Dr John Israel office in [facility] contacted for operative note. They are faxing over at this time. -F/u CT scan L knee when completed  -Weight bearing: NWB LLE  -Maintain long leg knee immobilizer  -NPO for now pending decision on OR. -Medicine to evaluate for medical management, med rec, and surgical clearance.    -IV and PO pain meds  -Ice and elevation for pain/swelling  -DVT ppx: EPC, Hold chemical AC for potential OR  -Will discuss case with Dr. Jules Yuan and update plan accordingly  -Please page Ortho with any questions or concerns    Temo Hinton DO,  PGY-2 Orthopedic Surgery  7:26 AM 11/8/2018

## 2018-11-08 NOTE — ED NOTES
PT STATES HER PAIN UPON REASSESSMENT WAS STILL 10/10. RESIDENT CHECKED THE X-RAY AND PT HAS A LARGE FRACTURE. LEFT KNEE SWOLLEN. IV AND PAIN MEDS ORDERED. ORTHO CONSULTED.       Yessy Avery RN  11/08/18 0010

## 2018-11-08 NOTE — PROGRESS NOTES
[place]  Occupational Therapy Not Seen Note    DATE: 2018  Name: Alexsandar Sample  : 1965  MRN: [id number]    Patient not available for Occupational Therapy due to:    [] Testing:    [] Hemodialysis    [] Blood Transfusion in Progress    []Refusal by Patient:    [x] Surgery/Procedure: L distal periprosthetic femur fracture per Orthopedic Sx H&P, requires surgical fixation. [] Strict Bedrest    [] Sedation    [] Spine Precautions     [] Pt being transferred to palliative care at this time. Spoke with pt/family and OT services to be defered. [] Pt independent with functional mobility and functional tasks.  Pt with no OT acute care needs at this time, will defer OT eval.    [] Other    Next Scheduled Treatment: Re-check 2018    Signature: Gray Judd, OTR/L

## 2018-11-08 NOTE — ED NOTES
Bed: 24  Expected date:   Expected time:   Means of arrival:   Comments:  gregg Banerjee RN  11/08/18 [id number]

## 2018-11-08 NOTE — PROGRESS NOTES
Pt POCT glucose was 51. Pt says she is asymptomatic and only feels tired. Gave dextrose 50% IV and hung dextrose 5%, see MAR. Blood sugar recheck was 113. Pt says she feels the same, no different from before medication. Will cont to monitor.

## 2018-11-08 NOTE — CARE COORDINATION
Case Management Initial Discharge Plan  Mia Huggins,             Met with:patient to discuss discharge plans. Information verified: address, contacts, phone number, , insurance Yes  PCP: COLTON Jonas CNP  Date of last visit: 2 months ago    Insurance Provider: [employer]o    Discharge Planning    Living Arrangements:  Children, Family Members   Support Systems:  Family Members, [id number] has 2 stories  3 stairs to climb to get into front door, 1 flight of stairs to climb to reach second floor  Location of bedroom/bathroom in home up stairs    Patient able to perform ADL's:Independent    Current Services (outpatient & in home) dme  DME equipment: Rolling walker with a seat    Pharmacy: [facility] on [facility] Medications:  No  Does patient want to participate in local refill/ meds to beds program?  Yes    Potential Assistance Needed:  [facility][1], Outpatient PT/OT, [facility]    Patient agreeable to home care: No  [place] of choice provided:  no    Prior SNF/Rehab Placement and Facility: none  Agreeable to SNF/Rehab: No  [place] of choice provided: no   Evaluation: no    Expected Discharge date:  18  Patient expects to be discharged to:  Home vs Home w/ [facility] vs Rehab  Follow Up Appointment: Best Day/ Time:      Transportation provider: Daughter  Transportation arrangements needed for discharge: No    Readmission Risk              Risk of Unplanned Readmission:        0             Does patient have a readmission risk score greater than 14?: No  If yes, follow-up appointment must be made within 7 days of discharge. Discharge Plan: Met with patient to discuss transitional plan. Lives in a 2 story home, bedroom is up stairs. Lives with 3 Daughters, ages 22,19,29; daughters fiances ages 20,32 and a grand baby age 17 month. Patient states she will have a lot of support.   Plans on returning home after

## 2018-11-08 NOTE — ED PROVIDER NOTES
[facility] ED  Emergency Department  Emergency Medicine Resident Sign-out     Care of [facility] YFN Valdez was assumed from Dr. Gina Cervantes and is being seen for Knee Pain (pt fell on left knee.)  . The patient's initial evaluation and plan have been discussed with the prior provider who initially evaluated the patient. EMERGENCY DEPARTMENT COURSE / MEDICAL DECISION MAKING:       MEDICATIONS GIVEN:  Orders Placed This Encounter   Medications    ibuprofen (ADVIL;MOTRIN) tablet 800 mg    fentaNYL (SUBLIMAZE) injection 50 mcg       LABS / RADIOLOGY:     Labs Reviewed - No data to display    Xr Hip Left (2-3 Views)    Result Date: 11/8/2018  EXAMINATION: 2 XRAY VIEWS OF THE LEFT HIP 11/8/2018 6:59 am COMPARISON: None. HISTORY: ORDERING SYSTEM PROVIDED HISTORY: fall from  TECHNOLOGIST PROVIDED HISTORY: fall from [employer] notes: Mariluz Brown in the closet FINDINGS: Bone mineralization is normal.  There is also normal alignment of the bones. No erosions or abnormal periosteal reaction. No acute fracture. Soft tissues : No significant abnormalities. No acute osseous abnormality of the left hip. Xr Knee Left (3 Views)    Result Date: 11/8/2018  EXAMINATION: 3 XRAY VIEWS OF THE LEFT KNEE 11/8/2018 6:58 am COMPARISON: 07/27/2018 HISTORY: ORDERING SYSTEM PROVIDED HISTORY: fall from  TECHNOLOGIST PROVIDED HISTORY: fall from [employer] notes: Patient fell in the closet FINDINGS: Total knee prosthesis in place which is intact. There is acute supracondylar fracture of the femur just above the superior margin of the femoral component of the prosthesis. There is anterior apex angulation of the fracture with only minimal medial displacement of about 5 mm. There is some soft tissue swelling and small knee joint effusion. Acute angulated left supracondylar fracture just above total knee prosthesis.        RECENT VITALS:     Temp: 97.6 °F (36.4 °C),  Pulse: (!) 48, Resp: 18, BP: 118/69, SpO2: 100 %    This patient

## 2018-11-09 ENCOUNTER — APPOINTMENT (OUTPATIENT)
Dept: GENERAL RADIOLOGY | Age: 53
DRG: 308 | End: 2018-11-09
Payer: MEDICAID

## 2018-11-09 VITALS — SYSTOLIC BLOOD PRESSURE: 132 MMHG | DIASTOLIC BLOOD PRESSURE: 71 MMHG | OXYGEN SATURATION: 100 % | TEMPERATURE: 97.8 F

## 2018-11-09 LAB
ABSOLUTE EOS #: 0.16 K/UL (ref 0–0.44)
ABSOLUTE IMMATURE GRANULOCYTE: <0.03 K/UL (ref 0–0.3)
ABSOLUTE LYMPH #: 1.75 K/UL (ref 1.1–3.7)
ABSOLUTE MONO #: 0.53 K/UL (ref 0.1–1.2)
ANION GAP SERPL CALCULATED.3IONS-SCNC: 8 MMOL/L (ref 9–17)
BASOPHILS # BLD: 1 % (ref 0–2)
BASOPHILS ABSOLUTE: 0.06 K/UL (ref 0–0.2)
BLOOD BANK SPECIMEN: NORMAL
BUN BLDV-MCNC: 12 MG/DL (ref 6–20)
BUN/CREAT BLD: ABNORMAL (ref 9–20)
CALCIUM SERPL-MCNC: 8.5 MG/DL (ref 8.6–10.4)
CHLORIDE BLD-SCNC: 108 MMOL/L (ref 98–107)
CO2: 25 MMOL/L (ref 20–31)
CREAT SERPL-MCNC: 1.17 MG/DL (ref 0.5–0.9)
DIFFERENTIAL TYPE: ABNORMAL
EOSINOPHILS RELATIVE PERCENT: 4 % (ref 1–4)
GFR AFRICAN AMERICAN: 59 ML/MIN
GFR NON-AFRICAN AMERICAN: 48 ML/MIN
GFR SERPL CREATININE-BSD FRML MDRD: ABNORMAL ML/MIN/{1.73_M2}
GFR SERPL CREATININE-BSD FRML MDRD: ABNORMAL ML/MIN/{1.73_M2}
GLUCOSE BLD-MCNC: 107 MG/DL (ref 65–105)
GLUCOSE BLD-MCNC: 140 MG/DL (ref 65–105)
GLUCOSE BLD-MCNC: 166 MG/DL (ref 65–105)
GLUCOSE BLD-MCNC: 20 MG/DL (ref 65–105)
GLUCOSE BLD-MCNC: 54 MG/DL (ref 65–105)
GLUCOSE BLD-MCNC: 61 MG/DL (ref 65–105)
GLUCOSE BLD-MCNC: 62 MG/DL (ref 65–105)
GLUCOSE BLD-MCNC: 74 MG/DL (ref 65–105)
GLUCOSE BLD-MCNC: 80 MG/DL (ref 65–105)
GLUCOSE BLD-MCNC: 83 MG/DL (ref 65–105)
GLUCOSE BLD-MCNC: 85 MG/DL (ref 70–99)
HCT VFR BLD CALC: 29.9 % (ref 36.3–47.1)
HEMOGLOBIN: 9.2 G/DL (ref 11.9–15.1)
IMMATURE GRANULOCYTES: 0 %
LYMPHOCYTES # BLD: 40 % (ref 24–43)
MCH RBC QN AUTO: 26.5 PG (ref 25.2–33.5)
MCHC RBC AUTO-ENTMCNC: 30.8 G/DL (ref 28.4–34.8)
MCV RBC AUTO: 86.2 FL (ref 82.6–102.9)
MONOCYTES # BLD: 12 % (ref 3–12)
NRBC AUTOMATED: 0 PER 100 WBC
PDW BLD-RTO: 13.9 % (ref 11.8–14.4)
PLATELET # BLD: 188 K/UL (ref 138–453)
PLATELET ESTIMATE: ABNORMAL
PMV BLD AUTO: 10.9 FL (ref 8.1–13.5)
POTASSIUM SERPL-SCNC: 3.9 MMOL/L (ref 3.7–5.3)
RBC # BLD: 3.47 M/UL (ref 3.95–5.11)
RBC # BLD: ABNORMAL 10*6/UL
SEG NEUTROPHILS: 43 % (ref 36–65)
SEGMENTED NEUTROPHILS ABSOLUTE COUNT: 1.92 K/UL (ref 1.5–8.1)
SODIUM BLD-SCNC: 141 MMOL/L (ref 135–144)
WBC # BLD: 4.4 K/UL (ref 3.5–11.3)
WBC # BLD: ABNORMAL 10*3/UL

## 2018-11-09 PROCEDURE — 27511 TREATMENT OF THIGH FRACTURE: CPT | Performed by: ORTHOPAEDIC SURGERY

## 2018-11-09 PROCEDURE — 36415 COLL VENOUS BLD VENIPUNCTURE: CPT

## 2018-11-09 PROCEDURE — 2500000003 HC RX 250 WO HCPCS: Performed by: NURSE ANESTHETIST, CERTIFIED REGISTERED

## 2018-11-09 PROCEDURE — 2720000010 HC SURG SUPPLY STERILE: Performed by: ORTHOPAEDIC SURGERY

## 2018-11-09 PROCEDURE — 6360000002 HC RX W HCPCS: Performed by: STUDENT IN AN ORGANIZED HEALTH CARE EDUCATION/TRAINING PROGRAM

## 2018-11-09 PROCEDURE — 3700000001 HC ADD 15 MINUTES (ANESTHESIA): Performed by: ORTHOPAEDIC SURGERY

## 2018-11-09 PROCEDURE — 1200000000 HC SEMI PRIVATE

## 2018-11-09 PROCEDURE — 73552 X-RAY EXAM OF FEMUR 2/>: CPT

## 2018-11-09 PROCEDURE — 3600000004 HC SURGERY LEVEL 4 BASE: Performed by: ORTHOPAEDIC SURGERY

## 2018-11-09 PROCEDURE — 7100000001 HC PACU RECOVERY - ADDTL 15 MIN: Performed by: ORTHOPAEDIC SURGERY

## 2018-11-09 PROCEDURE — 2580000003 HC RX 258: Performed by: ANESTHESIOLOGY

## 2018-11-09 PROCEDURE — 2580000003 HC RX 258: Performed by: NURSE ANESTHETIST, CERTIFIED REGISTERED

## 2018-11-09 PROCEDURE — 80048 BASIC METABOLIC PNL TOTAL CA: CPT

## 2018-11-09 PROCEDURE — 86920 COMPATIBILITY TEST SPIN: CPT

## 2018-11-09 PROCEDURE — 82947 ASSAY GLUCOSE BLOOD QUANT: CPT

## 2018-11-09 PROCEDURE — 2580000003 HC RX 258: Performed by: STUDENT IN AN ORGANIZED HEALTH CARE EDUCATION/TRAINING PROGRAM

## 2018-11-09 PROCEDURE — 0QSC06Z REPOSITION LEFT LOWER FEMUR WITH INTRAMEDULLARY INTERNAL FIXATION DEVICE, OPEN APPROACH: ICD-10-PCS | Performed by: ORTHOPAEDIC SURGERY

## 2018-11-09 PROCEDURE — 7100000000 HC PACU RECOVERY - FIRST 15 MIN: Performed by: ORTHOPAEDIC SURGERY

## 2018-11-09 PROCEDURE — 6360000002 HC RX W HCPCS: Performed by: ANESTHESIOLOGY

## 2018-11-09 PROCEDURE — 86901 BLOOD TYPING SEROLOGIC RH(D): CPT

## 2018-11-09 PROCEDURE — 3600000014 HC SURGERY LEVEL 4 ADDTL 15MIN: Performed by: ORTHOPAEDIC SURGERY

## 2018-11-09 PROCEDURE — 6370000000 HC RX 637 (ALT 250 FOR IP): Performed by: STUDENT IN AN ORGANIZED HEALTH CARE EDUCATION/TRAINING PROGRAM

## 2018-11-09 PROCEDURE — 2709999900 HC NON-CHARGEABLE SUPPLY: Performed by: ORTHOPAEDIC SURGERY

## 2018-11-09 PROCEDURE — 6360000002 HC RX W HCPCS: Performed by: NURSE ANESTHETIST, CERTIFIED REGISTERED

## 2018-11-09 PROCEDURE — 3700000000 HC ANESTHESIA ATTENDED CARE: Performed by: ORTHOPAEDIC SURGERY

## 2018-11-09 PROCEDURE — 2580000003 HC RX 258: Performed by: ORTHOPAEDIC SURGERY

## 2018-11-09 PROCEDURE — 86850 RBC ANTIBODY SCREEN: CPT

## 2018-11-09 PROCEDURE — 99233 SBSQ HOSP IP/OBS HIGH 50: CPT | Performed by: INTERNAL MEDICINE

## 2018-11-09 PROCEDURE — C1713 ANCHOR/SCREW BN/BN,TIS/BN: HCPCS | Performed by: ORTHOPAEDIC SURGERY

## 2018-11-09 PROCEDURE — 86900 BLOOD TYPING SEROLOGIC ABO: CPT

## 2018-11-09 PROCEDURE — 85025 COMPLETE CBC W/AUTO DIFF WBC: CPT

## 2018-11-09 DEVICE — ENDCAP ORTH EXTN 0MM FEM G TI CANN T40 STARDRV RECESS FOR: Type: IMPLANTABLE DEVICE | Site: FEMUR | Status: FUNCTIONAL

## 2018-11-09 DEVICE — SCREW BNE L32MM DIA5MM NONSTERILE TIB LT GRN TI ST CANN LOK: Type: IMPLANTABLE DEVICE | Site: FEMUR | Status: FUNCTIONAL

## 2018-11-09 DEVICE — SCREW BNE L66MM DIA5MM TIB LT GRN TI ST CANN LOK FULL THRD: Type: IMPLANTABLE DEVICE | Site: FEMUR | Status: FUNCTIONAL

## 2018-11-09 DEVICE — IMPLANTABLE DEVICE: Type: IMPLANTABLE DEVICE | Site: FEMUR | Status: FUNCTIONAL

## 2018-11-09 RX ORDER — ROCURONIUM BROMIDE 10 MG/ML
INJECTION, SOLUTION INTRAVENOUS PRN
Status: DISCONTINUED | OUTPATIENT
Start: 2018-11-09 | End: 2018-11-09 | Stop reason: SDUPTHER

## 2018-11-09 RX ORDER — SODIUM CHLORIDE, SODIUM LACTATE, POTASSIUM CHLORIDE, CALCIUM CHLORIDE 600; 310; 30; 20 MG/100ML; MG/100ML; MG/100ML; MG/100ML
INJECTION, SOLUTION INTRAVENOUS CONTINUOUS PRN
Status: DISCONTINUED | OUTPATIENT
Start: 2018-11-09 | End: 2018-11-09 | Stop reason: SDUPTHER

## 2018-11-09 RX ORDER — DEXTROSE AND SODIUM CHLORIDE 5; .45 G/100ML; G/100ML
INJECTION, SOLUTION INTRAVENOUS CONTINUOUS
Status: DISCONTINUED | OUTPATIENT
Start: 2018-11-09 | End: 2018-11-09

## 2018-11-09 RX ORDER — FENTANYL CITRATE 50 UG/ML
INJECTION, SOLUTION INTRAMUSCULAR; INTRAVENOUS PRN
Status: DISCONTINUED | OUTPATIENT
Start: 2018-11-09 | End: 2018-11-09 | Stop reason: SDUPTHER

## 2018-11-09 RX ORDER — SODIUM CHLORIDE, SODIUM LACTATE, POTASSIUM CHLORIDE, CALCIUM CHLORIDE 600; 310; 30; 20 MG/100ML; MG/100ML; MG/100ML; MG/100ML
INJECTION, SOLUTION INTRAVENOUS CONTINUOUS
Status: DISCONTINUED | OUTPATIENT
Start: 2018-11-09 | End: 2018-11-09

## 2018-11-09 RX ORDER — DEXTROSE MONOHYDRATE 25 G/50ML
25 INJECTION, SOLUTION INTRAVENOUS ONCE
Status: COMPLETED | OUTPATIENT
Start: 2018-11-09 | End: 2018-11-09

## 2018-11-09 RX ORDER — MAGNESIUM HYDROXIDE 1200 MG/15ML
LIQUID ORAL CONTINUOUS PRN
Status: COMPLETED | OUTPATIENT
Start: 2018-11-09 | End: 2018-11-09

## 2018-11-09 RX ORDER — OXYCODONE HYDROCHLORIDE AND ACETAMINOPHEN 5; 325 MG/1; MG/1
2 TABLET ORAL EVERY 4 HOURS PRN
Status: DISCONTINUED | OUTPATIENT
Start: 2018-11-09 | End: 2018-11-11 | Stop reason: HOSPADM

## 2018-11-09 RX ORDER — LIDOCAINE HYDROCHLORIDE 10 MG/ML
INJECTION, SOLUTION INFILTRATION; PERINEURAL PRN
Status: DISCONTINUED | OUTPATIENT
Start: 2018-11-09 | End: 2018-11-09 | Stop reason: SDUPTHER

## 2018-11-09 RX ORDER — FENTANYL CITRATE 50 UG/ML
25 INJECTION, SOLUTION INTRAMUSCULAR; INTRAVENOUS
Status: DISCONTINUED | OUTPATIENT
Start: 2018-11-09 | End: 2018-11-11 | Stop reason: HOSPADM

## 2018-11-09 RX ORDER — DEXTROSE MONOHYDRATE 25 G/50ML
25 INJECTION, SOLUTION INTRAVENOUS PRN
Status: DISCONTINUED | OUTPATIENT
Start: 2018-11-09 | End: 2018-11-11 | Stop reason: HOSPADM

## 2018-11-09 RX ORDER — PROPOFOL 10 MG/ML
INJECTION, EMULSION INTRAVENOUS PRN
Status: DISCONTINUED | OUTPATIENT
Start: 2018-11-09 | End: 2018-11-09 | Stop reason: SDUPTHER

## 2018-11-09 RX ORDER — GLYCOPYRROLATE 1 MG/5 ML
SYRINGE (ML) INTRAVENOUS PRN
Status: DISCONTINUED | OUTPATIENT
Start: 2018-11-09 | End: 2018-11-09 | Stop reason: SDUPTHER

## 2018-11-09 RX ORDER — SODIUM CHLORIDE 9 MG/ML
INJECTION, SOLUTION INTRAVENOUS CONTINUOUS
Status: DISCONTINUED | OUTPATIENT
Start: 2018-11-09 | End: 2018-11-09

## 2018-11-09 RX ORDER — FENTANYL CITRATE 50 UG/ML
25 INJECTION, SOLUTION INTRAMUSCULAR; INTRAVENOUS EVERY 5 MIN PRN
Status: DISCONTINUED | OUTPATIENT
Start: 2018-11-09 | End: 2018-11-09 | Stop reason: HOSPADM

## 2018-11-09 RX ORDER — FENTANYL CITRATE 50 UG/ML
50 INJECTION, SOLUTION INTRAMUSCULAR; INTRAVENOUS EVERY 5 MIN PRN
Status: COMPLETED | OUTPATIENT
Start: 2018-11-09 | End: 2018-11-09

## 2018-11-09 RX ORDER — ONDANSETRON 2 MG/ML
INJECTION INTRAMUSCULAR; INTRAVENOUS PRN
Status: DISCONTINUED | OUTPATIENT
Start: 2018-11-09 | End: 2018-11-09 | Stop reason: SDUPTHER

## 2018-11-09 RX ORDER — OXYCODONE HYDROCHLORIDE AND ACETAMINOPHEN 5; 325 MG/1; MG/1
1 TABLET ORAL EVERY 4 HOURS PRN
Status: DISCONTINUED | OUTPATIENT
Start: 2018-11-09 | End: 2018-11-11 | Stop reason: HOSPADM

## 2018-11-09 RX ORDER — MIDAZOLAM HYDROCHLORIDE 1 MG/ML
1 INJECTION INTRAMUSCULAR; INTRAVENOUS EVERY 5 MIN PRN
Status: DISCONTINUED | OUTPATIENT
Start: 2018-11-09 | End: 2018-11-11 | Stop reason: HOSPADM

## 2018-11-09 RX ORDER — FENTANYL CITRATE 50 UG/ML
50 INJECTION, SOLUTION INTRAMUSCULAR; INTRAVENOUS
Status: DISCONTINUED | OUTPATIENT
Start: 2018-11-09 | End: 2018-11-11 | Stop reason: HOSPADM

## 2018-11-09 RX ADMIN — NALTREXONE HYDROCHLORIDE 50 MG: 50 TABLET, FILM COATED ORAL at 09:46

## 2018-11-09 RX ADMIN — GABAPENTIN 800 MG: 400 CAPSULE ORAL at 13:48

## 2018-11-09 RX ADMIN — QUETIAPINE FUMARATE 200 MG: 200 TABLET ORAL at 00:08

## 2018-11-09 RX ADMIN — FENTANYL CITRATE 50 MCG: 50 INJECTION, SOLUTION INTRAMUSCULAR; INTRAVENOUS at 18:18

## 2018-11-09 RX ADMIN — FENTANYL CITRATE 50 MCG: 50 INJECTION INTRAMUSCULAR; INTRAVENOUS at 20:20

## 2018-11-09 RX ADMIN — TRANEXAMIC ACID 1 G: 100 INJECTION, SOLUTION INTRAVENOUS at 17:14

## 2018-11-09 RX ADMIN — MIDAZOLAM HYDROCHLORIDE 1 MG: 1 INJECTION, SOLUTION INTRAMUSCULAR; INTRAVENOUS at 15:13

## 2018-11-09 RX ADMIN — NEOSTIGMINE METHYLSULFATE 3 MG: 1 INJECTION, SOLUTION INTRAMUSCULAR; INTRAVENOUS; SUBCUTANEOUS at 17:05

## 2018-11-09 RX ADMIN — DEXTROSE MONOHYDRATE 12.5 G: 25 INJECTION, SOLUTION INTRAVENOUS at 15:06

## 2018-11-09 RX ADMIN — FENTANYL CITRATE 50 MCG: 50 INJECTION, SOLUTION INTRAMUSCULAR; INTRAVENOUS at 18:00

## 2018-11-09 RX ADMIN — ROCURONIUM BROMIDE 50 MG: 10 INJECTION INTRAVENOUS at 15:41

## 2018-11-09 RX ADMIN — OXYCODONE HYDROCHLORIDE AND ACETAMINOPHEN 2 TABLET: 5; 325 TABLET ORAL at 01:23

## 2018-11-09 RX ADMIN — DEXTROSE MONOHYDRATE 12.5 G: 25 INJECTION, SOLUTION INTRAVENOUS at 11:42

## 2018-11-09 RX ADMIN — FAMOTIDINE 40 MG: 20 TABLET, FILM COATED ORAL at 09:46

## 2018-11-09 RX ADMIN — SODIUM CHLORIDE, POTASSIUM CHLORIDE, SODIUM LACTATE AND CALCIUM CHLORIDE: 600; 310; 30; 20 INJECTION, SOLUTION INTRAVENOUS at 14:34

## 2018-11-09 RX ADMIN — OXYCODONE HYDROCHLORIDE AND ACETAMINOPHEN 2 TABLET: 5; 325 TABLET ORAL at 09:44

## 2018-11-09 RX ADMIN — OXYCODONE HYDROCHLORIDE AND ACETAMINOPHEN 2 TABLET: 5; 325 TABLET ORAL at 19:03

## 2018-11-09 RX ADMIN — TRANEXAMIC ACID 1 G: 100 INJECTION, SOLUTION INTRAVENOUS at 16:01

## 2018-11-09 RX ADMIN — LIDOCAINE HYDROCHLORIDE 50 MG: 10 INJECTION, SOLUTION INFILTRATION; PERINEURAL at 15:41

## 2018-11-09 RX ADMIN — CETIRIZINE HYDROCHLORIDE 10 MG: 10 TABLET ORAL at 09:45

## 2018-11-09 RX ADMIN — PRAVASTATIN SODIUM 40 MG: 20 TABLET ORAL at 20:28

## 2018-11-09 RX ADMIN — FENTANYL CITRATE 100 MCG: 50 INJECTION INTRAMUSCULAR; INTRAVENOUS at 15:41

## 2018-11-09 RX ADMIN — SODIUM CHLORIDE: 9 INJECTION, SOLUTION INTRAVENOUS at 09:47

## 2018-11-09 RX ADMIN — PHENYLEPHRINE HYDROCHLORIDE 100 MCG: 10 INJECTION INTRAVENOUS at 16:56

## 2018-11-09 RX ADMIN — Medication 10 ML: at 20:28

## 2018-11-09 RX ADMIN — FENTANYL CITRATE 50 MCG: 50 INJECTION, SOLUTION INTRAMUSCULAR; INTRAVENOUS at 18:10

## 2018-11-09 RX ADMIN — Medication 0.4 MG: at 17:05

## 2018-11-09 RX ADMIN — GABAPENTIN 800 MG: 400 CAPSULE ORAL at 20:28

## 2018-11-09 RX ADMIN — LAMOTRIGINE 100 MG: 100 TABLET ORAL at 09:46

## 2018-11-09 RX ADMIN — OXYCODONE HYDROCHLORIDE AND ACETAMINOPHEN 2 TABLET: 5; 325 TABLET ORAL at 13:48

## 2018-11-09 RX ADMIN — DEXTROSE MONOHYDRATE 100 ML/HR: 50 INJECTION, SOLUTION INTRAVENOUS at 11:47

## 2018-11-09 RX ADMIN — VENLAFAXINE 75 MG: 75 TABLET ORAL at 09:46

## 2018-11-09 RX ADMIN — OXYCODONE HYDROCHLORIDE AND ACETAMINOPHEN 2 TABLET: 5; 325 TABLET ORAL at 05:21

## 2018-11-09 RX ADMIN — QUETIAPINE FUMARATE 200 MG: 200 TABLET ORAL at 20:28

## 2018-11-09 RX ADMIN — FERROUS SULFATE TAB EC 325 MG (65 MG FE EQUIVALENT) 325 MG: 325 (65 FE) TABLET DELAYED RESPONSE at 09:46

## 2018-11-09 RX ADMIN — Medication 0.2 MG: at 16:50

## 2018-11-09 RX ADMIN — PROPOFOL 150 MG: 10 INJECTION, EMULSION INTRAVENOUS at 15:41

## 2018-11-09 RX ADMIN — DEXTROSE AND SODIUM CHLORIDE: 5; 450 INJECTION, SOLUTION INTRAVENOUS at 15:02

## 2018-11-09 RX ADMIN — Medication 2 G: at 15:56

## 2018-11-09 RX ADMIN — GABAPENTIN 800 MG: 400 CAPSULE ORAL at 09:45

## 2018-11-09 RX ADMIN — LEVOTHYROXINE SODIUM 25 MCG: 25 TABLET ORAL at 06:12

## 2018-11-09 RX ADMIN — HYDROXYCHLOROQUINE SULFATE 200 MG: 200 TABLET, FILM COATED ORAL at 09:46

## 2018-11-09 RX ADMIN — FERROUS SULFATE TAB EC 325 MG (65 MG FE EQUIVALENT) 325 MG: 325 (65 FE) TABLET DELAYED RESPONSE at 20:28

## 2018-11-09 RX ADMIN — DEXTROSE MONOHYDRATE 12.5 G: 25 INJECTION, SOLUTION INTRAVENOUS at 15:27

## 2018-11-09 RX ADMIN — ONDANSETRON 4 MG: 2 INJECTION INTRAMUSCULAR; INTRAVENOUS at 16:56

## 2018-11-09 RX ADMIN — SODIUM CHLORIDE, POTASSIUM CHLORIDE, SODIUM LACTATE AND CALCIUM CHLORIDE: 600; 310; 30; 20 INJECTION, SOLUTION INTRAVENOUS at 15:41

## 2018-11-09 RX ADMIN — FENTANYL CITRATE 50 MCG: 50 INJECTION, SOLUTION INTRAMUSCULAR; INTRAVENOUS at 17:40

## 2018-11-09 ASSESSMENT — PULMONARY FUNCTION TESTS
PIF_VALUE: 15
PIF_VALUE: 15
PIF_VALUE: 16
PIF_VALUE: 15
PIF_VALUE: 3
PIF_VALUE: 2
PIF_VALUE: 13
PIF_VALUE: 2
PIF_VALUE: 21
PIF_VALUE: 3
PIF_VALUE: 16
PIF_VALUE: 15
PIF_VALUE: 15
PIF_VALUE: 16
PIF_VALUE: 15
PIF_VALUE: 16
PIF_VALUE: 17
PIF_VALUE: 15
PIF_VALUE: 15
PIF_VALUE: 16
PIF_VALUE: 15
PIF_VALUE: 2
PIF_VALUE: 15
PIF_VALUE: 17
PIF_VALUE: 15
PIF_VALUE: 6
PIF_VALUE: 14
PIF_VALUE: 2
PIF_VALUE: 17
PIF_VALUE: 16
PIF_VALUE: 15
PIF_VALUE: 1
PIF_VALUE: 15
PIF_VALUE: 16
PIF_VALUE: 15
PIF_VALUE: 3
PIF_VALUE: 15
PIF_VALUE: 2
PIF_VALUE: 15
PIF_VALUE: 15
PIF_VALUE: 7
PIF_VALUE: 15
PIF_VALUE: 15
PIF_VALUE: 16
PIF_VALUE: 2
PIF_VALUE: 15
PIF_VALUE: 15
PIF_VALUE: 13
PIF_VALUE: 16
PIF_VALUE: 16
PIF_VALUE: 15
PIF_VALUE: 16
PIF_VALUE: 13
PIF_VALUE: 16
PIF_VALUE: 15
PIF_VALUE: 15
PIF_VALUE: 16
PIF_VALUE: 15
PIF_VALUE: 13
PIF_VALUE: 1
PIF_VALUE: 15
PIF_VALUE: 15
PIF_VALUE: 16
PIF_VALUE: 3
PIF_VALUE: 14
PIF_VALUE: 15
PIF_VALUE: 16
PIF_VALUE: 17
PIF_VALUE: 16
PIF_VALUE: 2
PIF_VALUE: 13
PIF_VALUE: 15
PIF_VALUE: 16
PIF_VALUE: 16
PIF_VALUE: 15
PIF_VALUE: 2
PIF_VALUE: 15
PIF_VALUE: 1
PIF_VALUE: 17
PIF_VALUE: 14
PIF_VALUE: 15

## 2018-11-09 ASSESSMENT — PAIN DESCRIPTION - DESCRIPTORS
DESCRIPTORS: ACHING
DESCRIPTORS: THROBBING

## 2018-11-09 ASSESSMENT — PATIENT HEALTH QUESTIONNAIRE - PHQ9: SUM OF ALL RESPONSES TO PHQ QUESTIONS 1-9: 12

## 2018-11-09 ASSESSMENT — PAIN DESCRIPTION - PAIN TYPE
TYPE: SURGICAL PAIN
TYPE: SURGICAL PAIN

## 2018-11-09 ASSESSMENT — PAIN SCALES - GENERAL
PAINLEVEL_OUTOF10: 4
PAINLEVEL_OUTOF10: 6
PAINLEVEL_OUTOF10: 7
PAINLEVEL_OUTOF10: 6
PAINLEVEL_OUTOF10: 5
PAINLEVEL_OUTOF10: 8
PAINLEVEL_OUTOF10: 5
PAINLEVEL_OUTOF10: 7
PAINLEVEL_OUTOF10: 5
PAINLEVEL_OUTOF10: 9
PAINLEVEL_OUTOF10: 8
PAINLEVEL_OUTOF10: 8
PAINLEVEL_OUTOF10: 6
PAINLEVEL_OUTOF10: 8

## 2018-11-09 ASSESSMENT — PAIN DESCRIPTION - PROGRESSION
CLINICAL_PROGRESSION: GRADUALLY WORSENING
CLINICAL_PROGRESSION: NOT CHANGED

## 2018-11-09 ASSESSMENT — PAIN - FUNCTIONAL ASSESSMENT: PAIN_FUNCTIONAL_ASSESSMENT: 0-10

## 2018-11-09 ASSESSMENT — PAIN DESCRIPTION - LOCATION
LOCATION: LEG
LOCATION: LEG

## 2018-11-09 ASSESSMENT — PAIN DESCRIPTION - ORIENTATION
ORIENTATION: LEFT
ORIENTATION: LEFT

## 2018-11-09 ASSESSMENT — PAIN DESCRIPTION - ONSET
ONSET: AWAKENED FROM SLEEP
ONSET: ON-GOING

## 2018-11-09 ASSESSMENT — ENCOUNTER SYMPTOMS: STRIDOR: 0

## 2018-11-09 ASSESSMENT — PAIN DESCRIPTION - FREQUENCY
FREQUENCY: CONTINUOUS
FREQUENCY: INTERMITTENT

## 2018-11-09 NOTE — ANESTHESIA PRE PROCEDURE
Department of Anesthesiology  Preprocedure Note       Name:  Lázaro Yeh   Age:  48 y.o.  :  1965                                          MRN:  [id number]         Date:  2018      Surgeon: Avni Mccain):  Dianelys Noland DO    Procedure: FEMUR RETROGRADE NAILING (Left )  POSSIBLE PERIARTICULAR PLATING, POSSIBLE POLY/REVISION (Left )    Medications prior to admission:   Prior to Admission medications    Medication Sig Start Date End Date Taking? Authorizing Provider   gabapentin (NEURONTIN) 800 MG tablet Take 1 tablet by mouth 4 times daily for 30 days. . 18  COLTON York CNP   cyclobenzaprine (FLEXERIL) 5 MG tablet TAKE 1 OR 2 TABLETS BY MOUTH AT BEDTIME AS NEEDED 10/31/18   COLTON Landis CNP   Lidocaine POWD Formula 74B: amit2%+carbamazapine2%+lido2%+prilo2%. Apply 1-2 gm topically to affected area TID-QID. 10/30/18   Gonzalo Soto MD   omeprazole (PRILOSEC) 20 MG delayed release capsule TAKE 1 CAPSULE BY MOUTH TWO TIMES A DAY  10/29/18   COLTON Landis CNP   famotidine (PEPCID) 40 MG tablet TAKE 1 TABLET BY MOUTH IN THE EVENING  18   COLTON Landis CNP   cetirizine (ZYRTEC) 10 MG tablet TAKE 1 TABLET BY MOUTH ONE TIME A DAY  18   COLTON Landis CNP   Propranolol HCl (INDERAL PO) Take 30 mg by mouth nightly Takes 3 10 mg tablets at night     Historical Provider, MD   predniSONE (DELTASONE) 50 MG tablet TAKE 1 TABLET BY MOUTH ONE TIME A DAY AS NEEDED FOR ANGIOEDEMA 18   COLTON Dupont CNP   ferrous sulfate (FE TABS) 325 (65 Fe) MG EC tablet Take 1 tablet by mouth 2 times daily 18   Gricelda Burrows DO   chlorhexidine (PERIDEX) 0.12 % solution chlorhexidine gluconate 0.12 % mouthwash   Place 15 mL twice a day by mucous membrane route.     Historical Provider, MD   pravastatin (PRAVACHOL) 40 MG tablet TAKE 1 TABLET BY MOUTH AT BEDTIME  18   COLTON Landis CNP   lamoTRIgine tablet Oral Q4H PRN Cris Anne        Or    [MAR Hold] oxyCODONE-acetaminophen (PERCOCET) 5-325 MG per tablet 2 tablet  2 tablet Oral Q4H PRN Cris Anne   2 tablet at 11/09/18 1348    [MAR Hold] morphine injection 2 mg  2 mg Intravenous Q2H PRN Cris Anne        Or    [MAR Hold] morphine injection 4 mg  4 mg Intravenous Q2H PRN Cris Anne        [MAR Hold] influenza quadrivalent split vaccine (FLUZONE;FLUARIX;FLULAVAL;AFLURIA) injection 0.5 mL  0.5 mL Intramuscular Once Piero Zarate DO        Shanon Dull Hold] glucose (GLUTOSE) 40 % oral gel 15 g  15 g Oral PRN 51 Jeremiah Bobby MD        Shanon Dull Hold] dextrose 50 % solution 12.5 g  12.5 g Intravenous PRN 51 Jeremiah Bobby MD   12.5 g at 11/09/18 1142    [MAR Hold] glucagon injection 1 mg  1 mg Intramuscular PRN [address], MD        Critical access hospital Hold] dextrose 5 % solution  100 mL/hr Intravenous PRN 51 Jeremiah Bobby  mL/hr at 11/09/18 1147 100 mL/hr at 11/09/18 1147    [MAR Hold] hydrALAZINE (APRESOLINE) injection 10 mg  10 mg Intravenous Q6H PRN 51 Jeremiah Bobby MD        Critical access hospital Hold] QUEtiapine (SEROQUEL) tablet 200 mg  200 mg Oral Nightly Cora Dale MD   200 mg at 11/09/18 0008    [MAR Hold] ceFAZolin (ANCEF) 2 g in dextrose 5 % 50 mL IVPB  2 g Intravenous On Call to [address],, DO           Allergies:     Allergies   Allergen Reactions    Morphine Nausea And Vomiting     Pt states it changes her mental status    Amlodipine Other (See Comments)     diarrhea and stress    Dilaudid [Hydromorphone Hcl] Hives and Itching    Sulfa Antibiotics Hives       Problem List:    Patient Active Problem List   Diagnosis Code    Seizures ([facility] [facility] R56.9    Transient insomnia F51.02    Sleep walking disorder F51.3    Raynaud's disease I73.00    Pulmonary nodules R91.8    Neuropathy G62.9    Hypertension I10    GERD (gastroesophageal reflux disease) K21.9    Fibromyalgia M79.7    Depression F32.9    Degenerative disc disease, thoracic M51.34    CAD (coronary artery disease) I25.10    Bipolar disorder ([facility]) F31.9    Asthma J45.909    Anxiety F41.9    Antinuclear antibody (IQRA) titer greater than 1:80 R76.0    Anemia D64.9    Hx of Stroke ([facility]) I63.9    Controlled type 2 diabetes mellitus without complication ([facility]) V46.3    Degenerative arthritis of right knee M17.11    S/P knee surgery Z98.890    Chronic fatigue syndrome R53.82    Spondylosis of cervical region without myelopathy or radiculopathy M47.812    H/[facility] for benign disease 2014 Z90.710    Hx of total bilateral knee replacement Z96.653    H/O: hysterectomy Z90.710    Atrial fibrillation with slow ventricular response ([facility]) I48.91    H/O: stroke Z86.73    Acute cystitis without hematuria N30.00    Hypovolemia E86.1    Hypotension I95.9    Bradycardia R00.1    Cystocele, midline N81.11    JOYCE (stress urinary incontinence, female) N39.3    Overflow incontinence N39.490    7/30/18 Cystocele repair, Cystoscopy with OBTRYX Ureteral Sling Z98.890    Cystocele, unspecified (CODE) N81.10    Nuclear senile cataract H25.10    CKD (chronic kidney disease), stage II N18.2    Scleroderma ([facility] [address] M34.9    Vitamin D deficiency E55.9    Trigger middle finger of right hand M65.331    Trigger ring finger of right hand M65.341    Osteoarthritis of spine with radiculopathy, cervical region M47.22    Degenerative disc disease, cervical M50.30    Left carpal tunnel syndrome G56.02    Spinal stenosis, cervical region M48.02    Neural foraminal stenosis of cervical spine M99.81    Closed fracture of lower end of femur ([facility] [address] S72.409A    Closed supracondylar fracture of femur, left, initial encounter ([facility] [address] R00.125S       Past Medical History:        Diagnosis Date    Acute kidney injury ([facility] [address]     Anemia     Angioedema     Antinuclear antibody (IQRA) titer greater than 1:80     Anxiety     Asthma     Ataxia     Atrial fibrillation (HCC)     Borderline personality disorder (HCC)     Bradycardia     CAD (coronary artery disease)     Chronic kidney disease     CKD (chronic kidney disease), stage II 8/23/2018    Degenerative disc disease, thoracic     Depression     Diabetes mellitus (HCC)     Diastolic dysfunction     DJD (degenerative joint disease)     Fibromyalgia     GERD (gastroesophageal reflux disease)     H/O: hysterectomy     Headache     Hernia of abdominal wall     History of blood transfusion     no problem    Hyperlipidemia     Hypertension     Lupus     Mood disorder (HCC)     Neuropathy     Osteoarthritis     PTSD (post-traumatic stress disorder)     Pulmonary nodules     Raynaud's disease     Scleroderma ([address]     Scleroderma ([address] 8/23/2018    Seizures (HCC)     Sleep walking disorder     Thyroid disease     TIA (transient ischemic attack)     Transient insomnia     Tricuspid regurgitation     Vasospasm (HCC) 01/2016    bilat. legs. resulting in near complete absence of profusion to arteries of feet. ([facility]     Vitamin D deficiency 8/23/2018    Wears glasses        Past Surgical History:        Procedure Laterality Date    ABDOMINOPLASTY  2013    BLADDER SUSPENSION N/A 7/30/2018    CYSTOSCOPY WITH OBTRYX MID URETHRAL SLING performed by Juanpablo Shaver MD at [address] Right 2016    CHOLECYSTECTOMY  1989    ELBOW SURGERY Right     EYE SURGERY      khushboo. lasik     FINGER SURGERY Right 08/28/2018    RING CARTER MASS EXCISION, TRIGGER RELEASE    GASTRECTOMY      GASTRIC BYPASS SURGERY  2012    HYSTERECTOMY      JOINT REPLACEMENT Bilateral     knees    KNEE SURGERY Right 05/02/2017    (femoral) patella replacement    NERVE BLOCK Right 05/04/2018     cervical facet #1 no steroid    NERVE BLOCK Right 06/29/2018    rt cervical diagnostic #2 decadron 10mg    NERVE BLOCK Right 08/10/2018    right PROT 6.9 11/08/2018    CALCIUM 8.5 11/09/2018    BILITOT 0.24 11/08/2018    ALKPHOS 156 11/08/2018    AST 11 11/08/2018    ALT 11 11/08/2018       POC Tests:   Recent Labs      11/09/18   1448   POCGLU  54*       Coags:   Lab Results   Component Value Date    PROTIME 10.1 07/20/2018    INR 1.0 07/20/2018    APTT 25.4 07/20/2018       HCG (If Applicable):   Lab Results   Component Value Date    HCGQUANT 2 07/20/2018        ABGs: No results found for: PHART, PO2ART, EOO9LSQ, VQD6SWB, BEART, L4IJWLOQ     Type & Screen (If Applicable):  No results found for: [facility]    Anesthesia Evaluation  Patient summary reviewed no history of anesthetic complications:   Airway: Mallampati: II  TM distance: >3 FB   Neck ROM: full  Mouth opening: > = 3 FB Dental: normal exam         Pulmonary:   (+) asthma:     (-) stridor                           Cardiovascular:    (+) hypertension:, CAD:, hyperlipidemia    (-)  angina,  CHF and orthopnea        Rate: normal  Echocardiogram reviewed  Stress test reviewed       Beta Blocker:  Dose within 24 Hrs         Neuro/Psych:   (+) CVA:, neuromuscular disease:, TIA, headaches:, psychiatric history:            GI/Hepatic/Renal:   (+) GERD:,           Endo/Other:    (+) DiabetesType II DM, , : arthritis:., .                 Abdominal:       Abdomen: soft. Vascular:                                   Narrative     Sinus bradycardia with 1st degree A-V block  Otherwise normal ECG  When compared with ECG of 19-OCT-2018 09:37,  Minimal criteria for Anterior infarct are no longer Present   Lab and Collection     EKG 12 Lead on 11/8/2018       CONCLUSIONS    Summary  Normal left ventricle size, wall thickness and function with an estimated EF  > 55%. Left atrial dilatation. Mild mitral regurgitation. Trivial tricuspid regurgitation. No significant pericardial effusion is seen.     Signature  ----------------------------------------------------------------------------   Electronically

## 2018-11-09 NOTE — CARE COORDINATION
SBIRT completed - see below    Pt reports she has been a daily drinker the past 2 yrs but quit 3 weeks ago  Stated prior to 2 yrs ago, she was sober for 29yrs  Pt reports drinking a pint daily  However, stated 3 weeks ago, she started tx (IOP) at [facility]  Stated she goes M-T-W and the program is 6 weeks long  Pt also reports hx of depression  Pt follows with the psychiatrist at [facility] and see's him every 3 weeks  Pt plans to return to [facility] to cont with tx for both her alcoholism and depression            Alcohol Screening and Brief Intervention        No results for input(s): ALC in the last 72 hours. Alcohol Pre-screening     (WOMEN ONLY) How many times in the past year have you had 4 or more drinks in a day?: 1 or more    Alcohol Screening Audit  TOTAL SCORE[de-identified] 17    Drug Pre-Screening   How many times in the past year have you used a recreational drug or used a prescription medication for nonmedical reasons?: None    Drug Screening DAST       Mood Pre-Screening (PHQ-2)  During the past two weeks, have you been bothered by little interest or pleasure in doing things?: Yes  During the past two weeks, have you been bothered by feeling down, depressed, or hopeless?: Yes    Mood Pre-Screening (PHQ-9)  Total Score for PHQ-9: 12      I have interviewed Viviane Ziegler, [phone] regarding  Her alcohol consumption/drug use and risk for excessive use. Screenings were positive. Patient currently in tx for both her alcohol use and depression at [facility].     Deferred []    Completed on: 11/9/2018   Tori Patel, MSW, LSW

## 2018-11-09 NOTE — CARE COORDINATION
Subjective:      Osteoporosis/osteopenia: This 48 y.o., female patient is seen for fracture liaison consult d/t fragility fracture    Amount of current calcium supplement none  Current calcium intake is at least 1200 mg/day from diet and supplements: no -  Perhaps close. Amount of Vitamin D Supplement  47809 weekly   Vitamin D Hydroxy 30  Minutes of weekly sun exposure not much  Regular (daily) weight-bearing exercise: no.   Increased fall risk : yes - multiple falls, has ataxia, was just having balance therapy a nd finished the day prior to this fall. Smoking history: no   race: yes  Advanced age: no  Female sex: yes  Dementia: no  Poor health/frailty: no  Low body weight (<127 lbs): no  Estrogen deficiency     early menopause (age <45) or bilateral ovariectomy: no was age 50     prolonged premenopausal amenorrhea (>1 yr): no    Alcohol intake > 2 drinks daily: possible, states she is quitting  GI absorption disorder no  Autoimmune/RA has some autoimmune syndrome  Malignancy no  Radiation Therapy no      Factors contributing to low bone density include post-menopausal estrogen deficiency, inadequate calcium intake, sedentary lifestyle, excessive alcohol intake, proton pump inhibitor use-  , SSRI use- has been on several and  alk phosphatase elevated. Current pharmacologic therapy and date started: none. Prior pharmacologic therapy and duration of treatment: none. Medication side effects: none. Imaging  Bone Density: Spine T Score: 0.8, Hip T Score: -2  Other vertebral Imaging: none      1. Lifestyle counseling relative to Smoking, Alcohol Reduction, Weight bearing Exercise, nutrition (Protein/Calcium/Vitamin D), fall risk reduction and fall prevention occurred. Referral for PT or Balance training - she will have pt after this event. We discussed emphasize that she needs continued balance work.     Because of her multiple chronic conditions, we discussed that she will be seeing her

## 2018-11-09 NOTE — PROGRESS NOTES
[facility]  Inpatient Daily Progress Note  ______________________________________________________________________________    Patient: Tena Braxton  YOB: 1965   MRN: [id number]    Acct: [de-identified]     Admit date: 11/8/2018  Today's date: 11/09/18  Number of days in the hospital: 1  Expected Discharge Date: 11/09/18    Admitting Diagnosis: <principal problem not specified>    Subjective:   Pt seen and Chart reviewed. No acute significant overnight issues.     Objective:   Vital Sign:  BP (!) 105/55   Pulse 69   Temp 97.9 °F (36.6 °C) (Oral)   Resp 16   Ht 5' 8\" (1.727 m)   Wt 165 lb (74.8 kg)   SpO2 99%   BMI 25.09 kg/m²       Physical Exam:  General appearance:   alert, well appearing, and in no distress  Mental Status: alert, oriented to person, place, and time  Neurologic:  alert, oriented, normal speech, no focal findings or movement disorder noted  Lungs:  clear to auscultation, no wheezes, rales or rhonchi, symmetric air entry  Heart[de-identified] normal rate, regular rhythm, normal S1, S2, no murmurs, rubs, clicks or gallops  Abdomen:  soft, nontender, nondistended, no masses or organomegaly  Extremities: peripheral pulses normal, no pedal edema, no clubbing or cyanosis   Skin: normal coloration and turgor, no rashes, no suspicious skin lesions noted    Medications:  Scheduled Medications   sodium chloride flush  10 mL Intravenous 2 times per day    cetirizine  10 mg Oral Daily    famotidine  40 mg Oral Daily    felodipine  10 mg Oral Daily    ferrous sulfate  325 mg Oral BID    gabapentin  800 mg Oral 4x Daily    hydroxychloroquine  200 mg Oral Daily    lamoTRIgine  100 mg Oral Daily    levothyroxine  25 mcg Oral Daily    naltrexone  50 mg Oral Daily    pantoprazole  40 mg Oral QAM AC    pravastatin  40 mg Oral Nightly    venlafaxine  75 mg Oral BID WC    sodium chloride flush  10 mL Intravenous 2 times

## 2018-11-09 NOTE — BRIEF OP NOTE
Brief Postoperative Note  ______________________________________________________________    Patient: Hildreth Crigler  YOB: 1965  MRN: [id number]  Date of Procedure: 11/9/2018    Pre-Op Diagnosis: L distal femur periprosthetic fracture    Post-Op Diagnosis: Same       Procedure(s):  L FEMUR RETROGRADE IM NAILING PERIPROSTHETIC FRACTURE    Anesthesia: General, TIVA    Surgeon(s):  DO Sylvester Puente DO PGY2    Staff:  Scrub Person First: Elliot Brunner, RN     Estimated Blood Loss: 100cc    Fluids: 200cc crystalloids    Complications: None    Specimens:   * No specimens in log *    Implants:    Implant Name Type Inv.  Item Serial No.  Lot No. LRB No. Used   FEM NAIL EX ST TI GIULIANA RETRO ANTE [id number] Screw/Plate/Nail/Giovanny FEM NAIL EX ST TI GIULIANA RETRO ANTE [id number]  SYNTHES [id number] Left 1   SCREW LK W/ T25 STARDRIVE [id number] Screw/Plate/Nail/Giovanny SCREW LK W/ T25 STARDRIVE [id number]  SYNTHES  Left 1   SCREW LK W/ T25 STARDRIVE [id number] Screw/Plate/Nail/Giovanny SCREW LK W/ T25 STARDRIVE [id number]  SYNTHES  Left 1   FEM NAIL EX ST TI GIULIANA RETRO ANTE [id number] Screw/Plate/Nail/Giovanny FEM NAIL EX ST TI GIULIANA RETRO ANTE [id number]  SYNTHES  Left 1   CAP END NL FEM T40 STARDRIVE TI 0MM Screw/Plate/Nail/Giovanny CAP END NL FEM T40 STARDRIVE TI 0MM  SYNTHES  Left 1   BLADE SPIRAL RETRO FEM NAIL 70MM       SYNTHES   Left 1         Drains:      Findings: Left distal femur periprosthetic fracture    Stephon Russo DO  Date: 11/9/2018  Time: 5:15 PM

## 2018-11-10 LAB
ABSOLUTE EOS #: 0.13 K/UL (ref 0–0.44)
ABSOLUTE IMMATURE GRANULOCYTE: <0.03 K/UL (ref 0–0.3)
ABSOLUTE LYMPH #: 1.27 K/UL (ref 1.1–3.7)
ABSOLUTE MONO #: 0.55 K/UL (ref 0.1–1.2)
ANION GAP SERPL CALCULATED.3IONS-SCNC: 9 MMOL/L (ref 9–17)
BASOPHILS # BLD: 1 % (ref 0–2)
BASOPHILS ABSOLUTE: 0.04 K/UL (ref 0–0.2)
BUN BLDV-MCNC: 9 MG/DL (ref 6–20)
BUN/CREAT BLD: ABNORMAL (ref 9–20)
CALCIUM SERPL-MCNC: 8.7 MG/DL (ref 8.6–10.4)
CHLORIDE BLD-SCNC: 109 MMOL/L (ref 98–107)
CO2: 25 MMOL/L (ref 20–31)
CREAT SERPL-MCNC: 0.96 MG/DL (ref 0.5–0.9)
DIFFERENTIAL TYPE: ABNORMAL
EOSINOPHILS RELATIVE PERCENT: 3 % (ref 1–4)
GFR AFRICAN AMERICAN: >60 ML/MIN
GFR NON-AFRICAN AMERICAN: >60 ML/MIN
GFR SERPL CREATININE-BSD FRML MDRD: ABNORMAL ML/MIN/{1.73_M2}
GFR SERPL CREATININE-BSD FRML MDRD: ABNORMAL ML/MIN/{1.73_M2}
GLUCOSE BLD-MCNC: 108 MG/DL (ref 65–105)
GLUCOSE BLD-MCNC: 83 MG/DL (ref 65–105)
GLUCOSE BLD-MCNC: 85 MG/DL (ref 65–105)
GLUCOSE BLD-MCNC: 87 MG/DL (ref 70–99)
HCT VFR BLD CALC: 26.6 % (ref 36.3–47.1)
HEMOGLOBIN: 8.5 G/DL (ref 11.9–15.1)
IMMATURE GRANULOCYTES: 0 %
LYMPHOCYTES # BLD: 27 % (ref 24–43)
MCH RBC QN AUTO: 27.5 PG (ref 25.2–33.5)
MCHC RBC AUTO-ENTMCNC: 32 G/DL (ref 28.4–34.8)
MCV RBC AUTO: 86.1 FL (ref 82.6–102.9)
MONOCYTES # BLD: 12 % (ref 3–12)
NRBC AUTOMATED: 0 PER 100 WBC
PDW BLD-RTO: 14.1 % (ref 11.8–14.4)
PLATELET # BLD: 156 K/UL (ref 138–453)
PLATELET ESTIMATE: ABNORMAL
PMV BLD AUTO: 10.9 FL (ref 8.1–13.5)
POTASSIUM SERPL-SCNC: 4.1 MMOL/L (ref 3.7–5.3)
RBC # BLD: 3.09 M/UL (ref 3.95–5.11)
RBC # BLD: ABNORMAL 10*6/UL
SEG NEUTROPHILS: 57 % (ref 36–65)
SEGMENTED NEUTROPHILS ABSOLUTE COUNT: 2.66 K/UL (ref 1.5–8.1)
SODIUM BLD-SCNC: 143 MMOL/L (ref 135–144)
VITAMIN D 25-HYDROXY: 25 NG/ML (ref 30–100)
WBC # BLD: 4.7 K/UL (ref 3.5–11.3)
WBC # BLD: ABNORMAL 10*3/UL

## 2018-11-10 PROCEDURE — 6360000002 HC RX W HCPCS: Performed by: ORTHOPAEDIC SURGERY

## 2018-11-10 PROCEDURE — 6370000000 HC RX 637 (ALT 250 FOR IP): Performed by: STUDENT IN AN ORGANIZED HEALTH CARE EDUCATION/TRAINING PROGRAM

## 2018-11-10 PROCEDURE — G8979 MOBILITY GOAL STATUS: HCPCS

## 2018-11-10 PROCEDURE — 85025 COMPLETE CBC W/AUTO DIFF WBC: CPT

## 2018-11-10 PROCEDURE — 6360000002 HC RX W HCPCS: Performed by: STUDENT IN AN ORGANIZED HEALTH CARE EDUCATION/TRAINING PROGRAM

## 2018-11-10 PROCEDURE — 82947 ASSAY GLUCOSE BLOOD QUANT: CPT

## 2018-11-10 PROCEDURE — 1200000000 HC SEMI PRIVATE

## 2018-11-10 PROCEDURE — 36415 COLL VENOUS BLD VENIPUNCTURE: CPT

## 2018-11-10 PROCEDURE — 82306 VITAMIN D 25 HYDROXY: CPT

## 2018-11-10 PROCEDURE — 97162 PT EVAL MOD COMPLEX 30 MIN: CPT

## 2018-11-10 PROCEDURE — 90686 IIV4 VACC NO PRSV 0.5 ML IM: CPT | Performed by: ORTHOPAEDIC SURGERY

## 2018-11-10 PROCEDURE — 2580000003 HC RX 258: Performed by: STUDENT IN AN ORGANIZED HEALTH CARE EDUCATION/TRAINING PROGRAM

## 2018-11-10 PROCEDURE — 97530 THERAPEUTIC ACTIVITIES: CPT

## 2018-11-10 PROCEDURE — G0008 ADMIN INFLUENZA VIRUS VAC: HCPCS | Performed by: ORTHOPAEDIC SURGERY

## 2018-11-10 PROCEDURE — 80048 BASIC METABOLIC PNL TOTAL CA: CPT

## 2018-11-10 PROCEDURE — G8978 MOBILITY CURRENT STATUS: HCPCS

## 2018-11-10 RX ORDER — ASPIRIN 81 MG/1
81 TABLET, CHEWABLE ORAL 2 TIMES DAILY
Status: DISCONTINUED | OUTPATIENT
Start: 2018-11-10 | End: 2018-11-11 | Stop reason: HOSPADM

## 2018-11-10 RX ORDER — OXYCODONE HYDROCHLORIDE AND ACETAMINOPHEN 5; 325 MG/1; MG/1
1 TABLET ORAL EVERY 6 HOURS PRN
Qty: 28 TABLET | Refills: 0 | Status: SHIPPED | OUTPATIENT
Start: 2018-11-10 | End: 2018-11-17

## 2018-11-10 RX ADMIN — FENTANYL CITRATE 25 MCG: 50 INJECTION INTRAMUSCULAR; INTRAVENOUS at 18:25

## 2018-11-10 RX ADMIN — OXYCODONE HYDROCHLORIDE AND ACETAMINOPHEN 2 TABLET: 5; 325 TABLET ORAL at 09:21

## 2018-11-10 RX ADMIN — VENLAFAXINE 75 MG: 75 TABLET ORAL at 09:20

## 2018-11-10 RX ADMIN — DEXTROSE MONOHYDRATE 2 G: 50 INJECTION, SOLUTION INTRAVENOUS at 15:53

## 2018-11-10 RX ADMIN — GABAPENTIN 800 MG: 400 CAPSULE ORAL at 13:26

## 2018-11-10 RX ADMIN — QUETIAPINE FUMARATE 200 MG: 200 TABLET ORAL at 21:25

## 2018-11-10 RX ADMIN — HYDROXYCHLOROQUINE SULFATE 200 MG: 200 TABLET, FILM COATED ORAL at 09:21

## 2018-11-10 RX ADMIN — NALTREXONE HYDROCHLORIDE 50 MG: 50 TABLET, FILM COATED ORAL at 09:20

## 2018-11-10 RX ADMIN — LAMOTRIGINE 100 MG: 100 TABLET ORAL at 09:20

## 2018-11-10 RX ADMIN — GABAPENTIN 800 MG: 400 CAPSULE ORAL at 09:20

## 2018-11-10 RX ADMIN — GABAPENTIN 800 MG: 400 CAPSULE ORAL at 21:25

## 2018-11-10 RX ADMIN — CETIRIZINE HYDROCHLORIDE 10 MG: 10 TABLET ORAL at 09:21

## 2018-11-10 RX ADMIN — OXYCODONE HYDROCHLORIDE AND ACETAMINOPHEN 2 TABLET: 5; 325 TABLET ORAL at 04:19

## 2018-11-10 RX ADMIN — PANTOPRAZOLE SODIUM 40 MG: 40 TABLET, DELAYED RELEASE ORAL at 08:04

## 2018-11-10 RX ADMIN — GABAPENTIN 800 MG: 400 CAPSULE ORAL at 17:26

## 2018-11-10 RX ADMIN — OXYCODONE HYDROCHLORIDE AND ACETAMINOPHEN 2 TABLET: 5; 325 TABLET ORAL at 13:26

## 2018-11-10 RX ADMIN — FERROUS SULFATE TAB EC 325 MG (65 MG FE EQUIVALENT) 325 MG: 325 (65 FE) TABLET DELAYED RESPONSE at 09:20

## 2018-11-10 RX ADMIN — OXYCODONE HYDROCHLORIDE AND ACETAMINOPHEN 2 TABLET: 5; 325 TABLET ORAL at 00:08

## 2018-11-10 RX ADMIN — FENTANYL CITRATE 50 MCG: 50 INJECTION INTRAMUSCULAR; INTRAVENOUS at 06:17

## 2018-11-10 RX ADMIN — FERROUS SULFATE TAB EC 325 MG (65 MG FE EQUIVALENT) 325 MG: 325 (65 FE) TABLET DELAYED RESPONSE at 21:25

## 2018-11-10 RX ADMIN — Medication 10 ML: at 15:58

## 2018-11-10 RX ADMIN — LEVOTHYROXINE SODIUM 25 MCG: 25 TABLET ORAL at 08:04

## 2018-11-10 RX ADMIN — DEXTROSE MONOHYDRATE 2 G: 50 INJECTION, SOLUTION INTRAVENOUS at 08:04

## 2018-11-10 RX ADMIN — VENLAFAXINE 75 MG: 75 TABLET ORAL at 17:27

## 2018-11-10 RX ADMIN — Medication 10 ML: at 18:26

## 2018-11-10 RX ADMIN — FAMOTIDINE 40 MG: 20 TABLET, FILM COATED ORAL at 09:20

## 2018-11-10 RX ADMIN — OXYCODONE HYDROCHLORIDE AND ACETAMINOPHEN 2 TABLET: 5; 325 TABLET ORAL at 21:22

## 2018-11-10 RX ADMIN — ASPIRIN 81 MG: 81 TABLET, CHEWABLE ORAL at 21:00

## 2018-11-10 RX ADMIN — OXYCODONE HYDROCHLORIDE AND ACETAMINOPHEN 2 TABLET: 5; 325 TABLET ORAL at 17:26

## 2018-11-10 RX ADMIN — Medication 10 ML: at 21:27

## 2018-11-10 RX ADMIN — ASPIRIN 81 MG: 81 TABLET, CHEWABLE ORAL at 09:21

## 2018-11-10 RX ADMIN — INFLUENZA A VIRUS A/MICHIGAN/45/2015 X-275 (H1N1) ANTIGEN (FORMALDEHYDE INACTIVATED), INFLUENZA A VIRUS A/SINGAPORE/INFIMH-16-0019/2016 IVR-186 (H3N2) ANTIGEN (FORMALDEHYDE INACTIVATED), INFLUENZA B VIRUS B/PHUKET/3073/2013 ANTIGEN (FORMALDEHYDE INACTIVATED), AND INFLUENZA B VIRUS B/MARYLAND/15/2016 BX-69A ANTIGEN (FORMALDEHYDE INACTIVATED) 0.5 ML: 15; 15; 15; 15 INJECTION, SUSPENSION INTRAMUSCULAR at 13:18

## 2018-11-10 RX ADMIN — PRAVASTATIN SODIUM 40 MG: 20 TABLET ORAL at 21:25

## 2018-11-10 ASSESSMENT — PAIN SCALES - GENERAL
PAINLEVEL_OUTOF10: 7
PAINLEVEL_OUTOF10: 7
PAINLEVEL_OUTOF10: 5
PAINLEVEL_OUTOF10: 6
PAINLEVEL_OUTOF10: 9
PAINLEVEL_OUTOF10: 7
PAINLEVEL_OUTOF10: 7
PAINLEVEL_OUTOF10: 8
PAINLEVEL_OUTOF10: 8
PAINLEVEL_OUTOF10: 7
PAINLEVEL_OUTOF10: 5
PAINLEVEL_OUTOF10: 7
PAINLEVEL_OUTOF10: 7
PAINLEVEL_OUTOF10: 6
PAINLEVEL_OUTOF10: 5
PAINLEVEL_OUTOF10: 5

## 2018-11-10 ASSESSMENT — PAIN DESCRIPTION - LOCATION
LOCATION: LEG
LOCATION: KNEE

## 2018-11-10 ASSESSMENT — PAIN DESCRIPTION - ORIENTATION
ORIENTATION: LEFT
ORIENTATION: LEFT

## 2018-11-10 ASSESSMENT — PAIN DESCRIPTION - PROGRESSION
CLINICAL_PROGRESSION: NOT CHANGED

## 2018-11-10 ASSESSMENT — PAIN DESCRIPTION - ONSET: ONSET: ON-GOING

## 2018-11-10 ASSESSMENT — PAIN DESCRIPTION - FREQUENCY: FREQUENCY: CONTINUOUS

## 2018-11-10 ASSESSMENT — PAIN DESCRIPTION - PAIN TYPE
TYPE: SURGICAL PAIN;ACUTE PAIN
TYPE: ACUTE PAIN;SURGICAL PAIN

## 2018-11-10 ASSESSMENT — PAIN DESCRIPTION - DESCRIPTORS: DESCRIPTORS: ACHING;DISCOMFORT;SORE

## 2018-11-10 NOTE — PROGRESS NOTES
[facility] Residency [facility]  Inpatient Daily Progress Note  ______________________________________________________________________________    Patient: Griselda Littler  YOB: 1965   MRN: [id number]    Acct: [de-identified]     Admit date: 11/8/2018  Today's date: 11/10/18  Number of days in the hospital: 2  Expected Discharge Date: 11/09/18    Admitting Diagnosis: <principal problem not specified>    Subjective:   Pt seen and Chart reviewed. No acute significant overnight issues.     Objective:   Vital Sign:  /73   Pulse 88   Temp 98.1 °F (36.7 °C) (Oral)   Resp 14   Ht 5' 8\" (1.727 m)   Wt 165 lb (74.8 kg)   SpO2 95%   BMI 25.09 kg/m²       Physical Exam:  General appearance:   alert, well appearing, and in no distress  Mental Status: alert, oriented to person, place, and time  Neurologic:  alert, oriented, normal speech, no focal findings or movement disorder noted  Lungs:  clear to auscultation, no wheezes, rales or rhonchi, symmetric air entry  Heart[de-identified] normal rate, regular rhythm, normal S1, S2, no murmurs, rubs, clicks or gallops  Abdomen:  soft, nontender, nondistended, no masses or organomegaly  Extremities: peripheral pulses normal, no pedal edema, no clubbing or cyanosis   Skin: normal coloration and turgor, no rashes, no suspicious skin lesions noted    Medications:  Scheduled Medications   ceFAZolin  2 g Intravenous Q8H    aspirin  81 mg Oral BID    sodium chloride flush  10 mL Intravenous 2 times per day    cetirizine  10 mg Oral Daily    famotidine  40 mg Oral Daily    ferrous sulfate  325 mg Oral BID    gabapentin  800 mg Oral 4x Daily    hydroxychloroquine  200 mg Oral Daily    lamoTRIgine  100 mg Oral Daily    levothyroxine  25 mcg Oral Daily    naltrexone  50 mg Oral Daily    pantoprazole  40 mg Oral QAM AC    pravastatin  40 mg Oral Nightly    venlafaxine  75 mg Oral BID WC    sodium chloride flush

## 2018-11-10 NOTE — OP NOTE
[address] [facility] [address]                                OPERATIVE REPORT    PATIENT NAME: Noemy Storm                     :        1965  MED REC NO:   [id number]                             ROOM:       [id number]  ACCOUNT NO:   [de-identified]                           ADMIT DATE: 2018  PROVIDER:     Edgar Kerns D.O.    DATE OF PROCEDURE:  2018    PREOPERATIVE DIAGNOSIS:  Left distal femur periprosthetic fracture. POSTOPERATIVE DIAGNOSIS:  Left distal femur periprosthetic fracture. PROCEDURE:  Left femur retrograde intramedullary nailing. SURGEON:  Denver Rhody, DO    ASSISTANTS:  Selene Catalan DO; Edgar Kerns DO    ESTIMATED BLOOD LOSS:  100 mL. FLUIDS:  200 mL of crystalloid. IMPLANTS:  Synthes 13 x 350-mm retrograde cannulated femoral nail and a  70-mm spiral blade for distal locking as well as 5.0 mm locking screws  both proximally and distally. INDICATIONS FOR THE PROCEDURE:  The patient is a 14-year-old female, who  has a history of ataxia and has had multiple falls in the past.  She had  a left total knee done in  by a surgeon in [place], [place] named  Dr. Monique Potter. The patient has since developed a flexion contracture in  her left knee, which has posed difficulty with ambulation. Approximately 2 days ago the patient was ambulating to her  closet when she fell and states that she missed the wall and landed  directly on her left lower extremity, sustaining the injury mentioned  above. It was elected that the patient would need surgical fixation of  her distal femur to return her to her baseline ambulatory status. Risks and  benefits of the procedure were discussed with the patient and she  elected to move forward with the procedure. Her left lower extremity  was marked with a permanent marker and all questions were answered.    Risks discussed with the patient included bleeding, infection, need for  further surgery, damage to the surrounding structures or tissues,  hardware failure, malunion, nonunion, loss of fixation, risks of  anesthesia loss of limb, and loss of life. DESCRIPTION OF PROCEDURE:  The patient was met in the preoperative area,  and surgical consent was confirmed as well as her operative extremity. She was then transported from the preoperative area to the operative  suite where she was positioned in a supine position on a flat Daniel  table. She was induced under general anesthesia without any  complications. She was secured to the table after a bump was placed  under her left hip with a surgical safety belt. Her left lower  extremity was then sterilely prepped and draped in the standard fashion  and a proper time-out was performed with all members of the team  present. An incision was made following the previous incision which was her lateral anterior  knee incision. Full-thickness flaps were created down to the level of  the tibial tendon paratenon and dissected medially to the midline of  the patellar tendon. This was sharply incised down to the joint. Once we were able to appropriately access the trochlear notch, a  guidewire was placed into the trochlear notch and inserted into the  level of metaphysis with a mallet. This was checked on both AP and  lateral images. An opening awl was used to open up the femoral canal  and a reduction maneuver was achieved at that time, obtaining an  anatomic reduction of our distal femur fracture. A ball-tipped  guidewire was then inserted up to the level of the greater trochanter  and confirmed with imaging. A measuring guide was used to measure the  appropriate length of the nail, which was determined to be 350 mm. A  reamer was then used to ream up sequentially from 9.5 mm to 14 mm and a  13-mm nail was then inserted into the femur at that time.   The nail  position was confirmed with imaging and the distal locking blade was  then inserted from lateral to medial using the femoral guide. A distal  locking screw was then placed through the femoral guide as well and  finally the proximal locking screw was inserted in an A-to-P fashion  using perfect [place] technique. Final images were obtained and the  wound was then irrigated with Betadine solution and normal saline. The  tibial tendon was then closed with 0 Vicryl in a running locking fashion  and the subcutaneous tissue was then closed throughout all wounds with  2-0 Vicryl in an inverted fashion followed by staples for the skin. The  wounds were then sterilely dressed with Adaptic, 4 x 4's, ABDs and Ace  wrap. We then sterilely dressed the wounds with Optifoam dressings and  Mepilex Ag proximally and the patient was then awoken from anesthesia  without any complications. She was transferred to the PACU where care  was assumed by the PACU nurses.         Breana Briseno D.O.    D: 11/09/2018 21:27:54       T: 11/10/2018 3:20:32     [id number]  Job#: [id number]     Doc#: [id number]    CC:  Ubaldo Campbell D.O.

## 2018-11-10 NOTE — PROGRESS NOTES
Physical Therapy    Facility/Department: [address] ORTHO/MED SURG  Initial Assessment    NAME: Gigi Yi  : 1965  MRN: [id number]    Copied from ortho Brief Op Note filed :  Date of Procedure: 2018  Pre-Op Diagnosis: L distal femur periprosthetic fracture  Post-Op Diagnosis: Same  Procedure(s):  L FEMUR RETROGRADE IM NAILING PERIPROSTHETIC FRACTURE      Date of Service: 11/10/2018    Discharge Recommendations:  Continue to assess pending progress (steps, pt with unilateral rail to ascend to 2nd floor. may need commode for 1st floor. )   PT Equipment Recommendations  Other: TBD    Patient Diagnosis(es): The primary encounter diagnosis was Closed supracondylar fracture of femur, left, initial encounter ([address] A diagnosis of Post-op pain was also pertinent to this visit. has a past medical history of Acute kidney injury ([address] Anemia; Angioedema; Antinuclear antibody (IQRA) titer greater than 1:80; Anxiety; Asthma; Ataxia; Atrial fibrillation ([address] Borderline personality disorder ([address] Bradycardia; CAD (coronary artery disease); Chronic kidney disease; CKD (chronic kidney disease), stage II; Degenerative disc disease, thoracic; Depression; Diabetes mellitus ([address] Diastolic dysfunction; DJD (degenerative joint disease); Fibromyalgia; GERD (gastroesophageal reflux disease); H/O: hysterectomy; Headache; Hernia of abdominal wall; History of blood transfusion; Hyperlipidemia; Hypertension; Lupus; Mood disorder ([address] Neuropathy; Osteoarthritis; PTSD (post-traumatic stress disorder); Pulmonary nodules; Raynaud's disease; Scleroderma ([address] Scleroderma ([address] Seizures ([address] Sleep walking disorder; Thyroid disease; TIA (transient ischemic attack); Transient insomnia; Tricuspid regurgitation; Vasospasm ([address] Vitamin D deficiency; and Wears glasses. has a past surgical history that includes Hysterectomy; Cholecystectomy (); Tonsillectomy (); Carpal tunnel release (Right, 2016);  Wrist surgery

## 2018-11-10 NOTE — PLAN OF CARE
Problem: Pain:  Goal: Pain level will decrease  Pain level will decrease   Outcome: Ongoing    Goal: Control of acute pain  Control of acute pain   Outcome: Ongoing    Goal: Control of chronic pain  Control of chronic pain   Outcome: Ongoing      Problem: Falls - Risk of:  Goal: Will remain free from falls  Will remain free from falls    Outcome: Met This Shift    Goal: Absence of physical injury  Absence of physical injury    Outcome: Met This Shift      Problem: Risk for Impaired Skin Integrity  Goal: Tissue integrity - skin and mucous membranes  Structural intactness and normal physiological function of skin and  mucous membranes.    Outcome: Ongoing

## 2018-11-11 VITALS
OXYGEN SATURATION: 100 % | WEIGHT: 165 LBS | BODY MASS INDEX: 25.01 KG/M2 | DIASTOLIC BLOOD PRESSURE: 69 MMHG | SYSTOLIC BLOOD PRESSURE: 116 MMHG | HEART RATE: 73 BPM | HEIGHT: 68 IN | TEMPERATURE: 98.6 F | RESPIRATION RATE: 15 BRPM

## 2018-11-11 LAB
ABO/RH: NORMAL
ANTIBODY SCREEN: NEGATIVE
ARM BAND NUMBER: NORMAL
BLD PROD TYP BPU: NORMAL
BLD PROD TYP BPU: NORMAL
CROSSMATCH RESULT: NORMAL
CROSSMATCH RESULT: NORMAL
DISPENSE STATUS BLOOD BANK: NORMAL
DISPENSE STATUS BLOOD BANK: NORMAL
EXPIRATION DATE: NORMAL
GLUCOSE BLD-MCNC: 83 MG/DL (ref 65–105)
TRANSFUSION STATUS: NORMAL
TRANSFUSION STATUS: NORMAL
UNIT DIVISION: 0
UNIT DIVISION: 0
UNIT NUMBER: NORMAL
UNIT NUMBER: NORMAL

## 2018-11-11 PROCEDURE — 82947 ASSAY GLUCOSE BLOOD QUANT: CPT

## 2018-11-11 PROCEDURE — 2580000003 HC RX 258: Performed by: STUDENT IN AN ORGANIZED HEALTH CARE EDUCATION/TRAINING PROGRAM

## 2018-11-11 PROCEDURE — 6370000000 HC RX 637 (ALT 250 FOR IP): Performed by: STUDENT IN AN ORGANIZED HEALTH CARE EDUCATION/TRAINING PROGRAM

## 2018-11-11 PROCEDURE — 99232 SBSQ HOSP IP/OBS MODERATE 35: CPT | Performed by: INTERNAL MEDICINE

## 2018-11-11 PROCEDURE — 6360000002 HC RX W HCPCS: Performed by: STUDENT IN AN ORGANIZED HEALTH CARE EDUCATION/TRAINING PROGRAM

## 2018-11-11 PROCEDURE — 97530 THERAPEUTIC ACTIVITIES: CPT

## 2018-11-11 RX ADMIN — PANTOPRAZOLE SODIUM 40 MG: 40 TABLET, DELAYED RELEASE ORAL at 08:27

## 2018-11-11 RX ADMIN — FERROUS SULFATE TAB EC 325 MG (65 MG FE EQUIVALENT) 325 MG: 325 (65 FE) TABLET DELAYED RESPONSE at 08:27

## 2018-11-11 RX ADMIN — NALTREXONE HYDROCHLORIDE 50 MG: 50 TABLET, FILM COATED ORAL at 08:26

## 2018-11-11 RX ADMIN — FAMOTIDINE 40 MG: 20 TABLET, FILM COATED ORAL at 08:27

## 2018-11-11 RX ADMIN — LAMOTRIGINE 100 MG: 100 TABLET ORAL at 08:27

## 2018-11-11 RX ADMIN — OXYCODONE HYDROCHLORIDE AND ACETAMINOPHEN 2 TABLET: 5; 325 TABLET ORAL at 08:26

## 2018-11-11 RX ADMIN — LEVOTHYROXINE SODIUM 25 MCG: 25 TABLET ORAL at 08:27

## 2018-11-11 RX ADMIN — GABAPENTIN 800 MG: 400 CAPSULE ORAL at 08:27

## 2018-11-11 RX ADMIN — ASPIRIN 81 MG: 81 TABLET, CHEWABLE ORAL at 08:27

## 2018-11-11 RX ADMIN — HYDROXYCHLOROQUINE SULFATE 200 MG: 200 TABLET, FILM COATED ORAL at 08:26

## 2018-11-11 RX ADMIN — OXYCODONE HYDROCHLORIDE AND ACETAMINOPHEN 2 TABLET: 5; 325 TABLET ORAL at 00:21

## 2018-11-11 RX ADMIN — VENLAFAXINE 75 MG: 75 TABLET ORAL at 08:26

## 2018-11-11 RX ADMIN — Medication 10 ML: at 08:27

## 2018-11-11 RX ADMIN — FENTANYL CITRATE 50 MCG: 50 INJECTION INTRAMUSCULAR; INTRAVENOUS at 05:38

## 2018-11-11 RX ADMIN — OXYCODONE HYDROCHLORIDE AND ACETAMINOPHEN 2 TABLET: 5; 325 TABLET ORAL at 04:21

## 2018-11-11 RX ADMIN — CETIRIZINE HYDROCHLORIDE 10 MG: 10 TABLET ORAL at 08:27

## 2018-11-11 ASSESSMENT — PAIN SCALES - GENERAL
PAINLEVEL_OUTOF10: 9
PAINLEVEL_OUTOF10: 5
PAINLEVEL_OUTOF10: 7
PAINLEVEL_OUTOF10: 6
PAINLEVEL_OUTOF10: 5
PAINLEVEL_OUTOF10: 7
PAINLEVEL_OUTOF10: 8
PAINLEVEL_OUTOF10: 5
PAINLEVEL_OUTOF10: 7
PAINLEVEL_OUTOF10: 5
PAINLEVEL_OUTOF10: 9

## 2018-11-11 ASSESSMENT — PAIN DESCRIPTION - PAIN TYPE: TYPE: ACUTE PAIN;SURGICAL PAIN

## 2018-11-11 ASSESSMENT — PAIN DESCRIPTION - PROGRESSION
CLINICAL_PROGRESSION: NOT CHANGED

## 2018-11-11 ASSESSMENT — PAIN DESCRIPTION - ORIENTATION: ORIENTATION: LEFT

## 2018-11-11 ASSESSMENT — PAIN DESCRIPTION - LOCATION: LOCATION: LEG

## 2018-11-11 NOTE — DISCHARGE INSTR - COC
diabetes mellitus without complication ([facility]) W35.5    Degenerative arthritis of right knee M17.11    S/P knee surgery Z98.890    Chronic fatigue syndrome R53.82    Spondylosis of cervical region without myelopathy or radiculopathy M47.812    H/O hysterectomy with BSO at [facility] for benign disease 2014 Z90.710    Hx of total bilateral knee replacement Z96.653    H/O: hysterectomy Z90.710    Atrial fibrillation with slow ventricular response ([facility]) I48.91    H/O: stroke Z86.73    Acute cystitis without hematuria N30.00    Hypovolemia E86.1    Hypotension I95.9    Bradycardia R00.1    Cystocele, midline N81.11    JOYCE (stress urinary incontinence, female) N39.3    Overflow incontinence N39.490    7/30/18 Cystocele repair, Cystoscopy with OBTRYX Ureteral Sling Z98.890    Cystocele, unspecified (CODE) N81.10    Nuclear senile cataract H25.10    CKD (chronic kidney disease), stage II N18.2    Scleroderma ([facility]) M34.9    Vitamin D deficiency E55.9    Trigger middle finger of right hand M65.331    Trigger ring finger of right hand M65.341    Osteoarthritis of spine with radiculopathy, cervical region M47.22    Degenerative disc disease, cervical M50.30    Left carpal tunnel syndrome G56.02    Spinal stenosis, cervical region M48.02    Neural foraminal stenosis of cervical spine M99.81    Closed fracture of lower end of femur ([address] S72.409A    Closed supracondylar fracture of femur, left, initial encounter ([address] S72.452A       Isolation/Infection:   Isolation          No Isolation            Nurse Assessment:  Last Vital Signs: /69   Pulse 73   Temp 98.6 °F (37 °C) (Oral)   Resp 15   Ht 5' 8\" (1.727 m)   Wt 165 lb (74.8 kg)   SpO2 100%   BMI 25.09 kg/m²     Last documented pain score (0-10 scale): Pain Level: 8  Last Weight:   Wt Readings from Last 1 Encounters:   11/09/18 165 lb (74.8 kg)     Mental Status:  {IP PT MENTAL STATUS:67656}    IV Access:  [facility] IV

## 2018-11-11 NOTE — PROGRESS NOTES
Orthopedic Progress Note    Patient:  Bethany Vail  YOB: 1965     48 y.o. female    Subjective:  Patient seen and examined  No complaints or concerns  No issue overnight  Pain controlled on current regimen  Denies fever, HA, CP, SOB, N/V, dysuria  - BM / - flatus  PT: ambulated 100 feet, maintaining NWB staus    Vitals reviewed, afebrile    Objective:   Vitals:    11/11/18 0726   BP: 116/69   Pulse: 73   Resp: 15   Temp: 98.6 °F (37 °C)   SpO2: 100%     Gen: NAD, cooperative    LLE: surgical dressings in place, c/d/i w/o strikethrough bleeding or discharge. Compartments soft and compressible. EHL/FHL/TA/GSC motor complex intact grossly. Sural/saphenous/SP/DP/Plantar sensory nerves SILT grossly. DP 2+ w/ BCR. Recent Labs      11/10/18   0713   WBC  4.7   HGB  8.5*   HCT  26.6*   PLT  156   NA  143   K  4.1   BUN  9   CREATININE  0.96*   GLUCOSE  87      Meds: ASA   See rec for complete list    Impression/plan: 48 y.o. female s/p retrograde IMN for left periprosthetic distal femur fracture around TKA, POD #2     -WBAT LLE  -Optifoam dressing on, no need for dressing change  -Pain control PO/IV Medication. Attempt to Wean IV medications. Receiving: gabapentin 800 mg QID, Beverly 5-10 mg PO Q4H PRN  -DVT ppx: ASA 81 mg PO BID  -Cont to apply ice and elevation as needed for pain and swelling relief  -Encourage Incentive Spirometry use  -PT/OT eval and treat  -DC home today after working with PT. Patient to follow up with Dr. Brisa Tapia 10-14 days after discharge for re-evaluation.  DC instructions placed.  -Please page DO ortho with any questions       Millie Lovelace DO   Orthopedic Surgery Resident PGY-2  [address] [employer]

## 2018-11-11 NOTE — PROGRESS NOTES
Face-to-Face    Patient seen and examined. Discussed the need for medical equipment to assist with their recovery during the post-operative period.   Based on the patient's current physical condition and post-operative restrictions, we have determined that they will need: shahida Hicks DO  Orthopedic Surgery Resident, PGY-2  R [address]  10:21 AM 11/11/2018

## 2018-11-11 NOTE — PROGRESS NOTES
[facility] Medicine Residency Program  Inpatient Daily Progress Note  ______________________________________________________________________________    Patient: Heath Ascencio  YOB: 1965   MRN: [id number]    Acct: [de-identified]     Admit date: 11/8/2018  Today's date: 11/11/18  Number of days in the hospital: 3  Expected Discharge Date: 11/09/18    Admitting Diagnosis: <principal problem not specified>    Subjective:   Pt seen and Chart reviewed.   No significant overnight issues  She is not complaining of any symptoms of hypoglycemia  Objective:   Vital Sign:  /69   Pulse 73   Temp 98.6 °F (37 °C) (Oral)   Resp 15   Ht 5' 8\" (1.727 m)   Wt 165 lb (74.8 kg)   SpO2 100%   BMI 25.09 kg/m²       Physical Exam:  General appearance:   alert, well appearing, and in no distress  Mental Status: alert, oriented to person, place, and time  Neurologic:  alert, oriented, normal speech, no focal findings or movement disorder noted  Lungs:  clear to auscultation, no wheezes, rales or rhonchi, symmetric air entry  Heart[de-identified] normal rate, regular rhythm, normal S1, S2, no murmurs, rubs, clicks or gallops  Abdomen:  soft, nontender, nondistended, no masses or organomegaly  Extremities: peripheral pulses normal, no pedal edema, no clubbing or cyanosis   Skin: normal coloration and turgor, no rashes, no suspicious skin lesions noted    Medications:  Scheduled Medications   aspirin  81 mg Oral BID    sodium chloride flush  10 mL Intravenous 2 times per day    cetirizine  10 mg Oral Daily    famotidine  40 mg Oral Daily    ferrous sulfate  325 mg Oral BID    gabapentin  800 mg Oral 4x Daily    hydroxychloroquine  200 mg Oral Daily    lamoTRIgine  100 mg Oral Daily    levothyroxine  25 mcg Oral Daily    naltrexone  50 mg Oral Daily    pantoprazole  40 mg Oral QAM AC    pravastatin  40 mg Oral Nightly    venlafaxine  75 mg Oral BID WC    sodium

## 2018-11-11 NOTE — CARE COORDINATION
Paged ortho, need script for rolling walker and face to face. [phone]: Faxed Face to face, demographics and Rolling walker script to [facility] at [phone] to deliver to patients home.     Discharge [address] [facility]  Clinical Case Management Department  Written by: Karla Kirk RN    Patient Name: Melba Baltazar  Attending Provider: Yomi Cedeno DO  Admit Date: 2018  6:19 AM  MRN: [id number]  Account: [de-identified]                     : 1965  Discharge Date:       Disposition: home, to have [facility] deliver RW    Karla Kirk RN

## 2018-11-12 ENCOUNTER — CARE COORDINATION (OUTPATIENT)
Dept: CARE COORDINATION | Age: 53
End: 2018-11-12

## 2018-11-14 ENCOUNTER — OFFICE VISIT (OUTPATIENT)
Dept: FAMILY MEDICINE CLINIC | Age: 53
End: 2018-11-14
Payer: MEDICAID

## 2018-11-14 VITALS
TEMPERATURE: 97.5 F | BODY MASS INDEX: 27 KG/M2 | SYSTOLIC BLOOD PRESSURE: 104 MMHG | WEIGHT: 177.6 LBS | DIASTOLIC BLOOD PRESSURE: 68 MMHG | OXYGEN SATURATION: 98 % | HEART RATE: 72 BPM

## 2018-11-14 DIAGNOSIS — E11.9 CONTROLLED TYPE 2 DIABETES MELLITUS WITHOUT COMPLICATION, WITHOUT LONG-TERM CURRENT USE OF INSULIN (HCC): Primary | ICD-10-CM

## 2018-11-14 PROCEDURE — G8417 CALC BMI ABV UP PARAM F/U: HCPCS | Performed by: NURSE PRACTITIONER

## 2018-11-14 PROCEDURE — 99213 OFFICE O/P EST LOW 20 MIN: CPT | Performed by: NURSE PRACTITIONER

## 2018-11-14 PROCEDURE — 3017F COLORECTAL CA SCREEN DOC REV: CPT | Performed by: NURSE PRACTITIONER

## 2018-11-14 PROCEDURE — 1111F DSCHRG MED/CURRENT MED MERGE: CPT | Performed by: NURSE PRACTITIONER

## 2018-11-14 PROCEDURE — 2022F DILAT RTA XM EVC RTNOPTHY: CPT | Performed by: NURSE PRACTITIONER

## 2018-11-14 PROCEDURE — 3044F HG A1C LEVEL LT 7.0%: CPT | Performed by: NURSE PRACTITIONER

## 2018-11-14 PROCEDURE — 1036F TOBACCO NON-USER: CPT | Performed by: NURSE PRACTITIONER

## 2018-11-14 PROCEDURE — G8482 FLU IMMUNIZE ORDER/ADMIN: HCPCS | Performed by: NURSE PRACTITIONER

## 2018-11-14 PROCEDURE — G8598 ASA/ANTIPLAT THER USED: HCPCS | Performed by: NURSE PRACTITIONER

## 2018-11-14 PROCEDURE — G8427 DOCREV CUR MEDS BY ELIG CLIN: HCPCS | Performed by: NURSE PRACTITIONER

## 2018-11-14 RX ORDER — LOSARTAN POTASSIUM 100 MG/1
100 TABLET ORAL DAILY
COMMUNITY
Start: 2017-02-09 | End: 2018-11-30

## 2018-11-14 ASSESSMENT — ENCOUNTER SYMPTOMS
SHORTNESS OF BREATH: 0
COUGH: 0

## 2018-11-14 NOTE — PROGRESS NOTES
[address] [place]  [phone]    Caridad Sainz is a 48 y.o. female who presents today for her  medical conditions/complaints as noted below. Mia Sandra is c/o of 3 Month Follow-Up and Diabetes  . HPI:     HPI   Dm- a1c was 5.3 with endo. Not checking sugars. No problems with meds. She just had knee surgery from a broken bone d/t falling. She needs a disability paper filled out. Nursing note reviewed and discussed with patient. Patient's medications, allergies, past medical, surgical, social and family histories were reviewed and updated asappropriate. Current Outpatient Prescriptions   Medication Sig Dispense Refill    losartan (COZAAR) 100 MG tablet Take 100 mg by mouth daily       oxyCODONE-acetaminophen (PERCOCET) 5-325 MG per tablet Take 1 tablet by mouth every 6 hours as needed for Pain for up to 7 days. Intended supply: 7 days. Take lowest dose possible to manage pain. 28 tablet 0    gabapentin (NEURONTIN) 800 MG tablet Take 1 tablet by mouth 4 times daily for 30 days. . 120 tablet 5    cyclobenzaprine (FLEXERIL) 5 MG tablet TAKE 1 OR 2 TABLETS BY MOUTH AT BEDTIME AS NEEDED 40 tablet 2    Lidocaine POWD Formula 74B: amit2%+carbamazapine2%+lido2%+prilo2%. Apply 1-2 gm topically to affected area TID-QID.  120 g 0    omeprazole (PRILOSEC) 20 MG delayed release capsule TAKE 1 CAPSULE BY MOUTH TWO TIMES A DAY  180 capsule 1    famotidine (PEPCID) 40 MG tablet TAKE 1 TABLET BY MOUTH IN THE EVENING  30 tablet 5    cetirizine (ZYRTEC) 10 MG tablet TAKE 1 TABLET BY MOUTH ONE TIME A DAY  30 tablet 5    Propranolol HCl (INDERAL PO) Take 30 mg by mouth nightly Takes 3 10 mg tablets at night       predniSONE (DELTASONE) 50 MG tablet TAKE 1 TABLET BY MOUTH ONE TIME A DAY AS NEEDED FOR ANGIOEDEMA 30 tablet 0    ferrous sulfate (FE TABS) 325 (65 Fe) MG EC tablet Take 1 tablet by mouth 2 times daily 90 tablet 3    Anemia     Angioedema     Antinuclear antibody (IQRA) titer greater than 1:80     Anxiety     Asthma     Ataxia     Atrial fibrillation (HCC)     Borderline personality disorder (HCC)     Bradycardia     CAD (coronary artery disease)     Chronic kidney disease     CKD (chronic kidney disease), stage II 8/23/2018    Degenerative disc disease, thoracic     Depression     Diabetes mellitus (HCC)     Diastolic dysfunction     DJD (degenerative joint disease)     Fibromyalgia     GERD (gastroesophageal reflux disease)     H/O: hysterectomy     Headache     Hernia of abdominal wall     History of blood transfusion     no problem    Hyperlipidemia     Hypertension     Lupus     Mood disorder (HCC)     Neuropathy     Osteoarthritis     PTSD (post-traumatic stress disorder)     Pulmonary nodules     Raynaud's disease     Scleroderma ([address]     Scleroderma ([address] 8/23/2018    Seizures ([facility])     Sleep walking disorder     Thyroid disease     TIA (transient ischemic attack)     Transient insomnia     Tricuspid regurgitation     Vasospasm ([facility]) 01/2016    bilat. legs. resulting in near complete absence of profusion to arteries of feet. ([facility]).     Vitamin D deficiency 8/23/2018    Wears glasses       Past Surgical History:   Procedure Laterality Date    ABDOMINOPLASTY  2013    BLADDER SUSPENSION N/A 7/30/2018    CYSTOSCOPY WITH OBTRYX MID URETHRAL SLING performed by Jamal Aden [facility] at [facility] Right 2016    CHOLECYSTECTOMY  1989    ELBOW SURGERY Right     EYE SURGERY      khushboo. lasik     FEMUR FRACTURE SURGERY  11/09/2018    FINGER SURGERY Right 08/28/2018    RING CARTER MASS EXCISION, TRIGGER RELEASE    GASTRECTOMY      GASTRIC BYPASS SURGERY  2012    HYSTERECTOMY      JOINT REPLACEMENT Bilateral     knees    KNEE SURGERY Right 05/02/2017    (femoral) patella replacement    NERVE BLOCK Right 05/04/2018     cervical facet #1 no steroid    NERVE

## 2018-11-14 NOTE — PROGRESS NOTES
Patient is present for 3 month f/u for DM  Patient sees monse  Patient's last a1c was 5.3    Pharmacy and medication reviewed with patient    Visit Information    Have you changed or started any medications since your last visit including any over-the-counter medicines, vitamins, or herbal medicines? no   Have you stopped taking any of your medications? Is so, why? -  no  Are you having any side effects from any of your medications? - no    Have you seen any other physician or provider since your last visit? yes - endo, obgyn, urologist, ortho, nephrologist, pain management, neuro, rheumatologist, psych   Have you had any other diagnostic tests since your last visit? yes - labs, xrays, ekg, emg   Have you been seen in the emergency room and/or had an admission in a hospital since we last saw you?  yes - in hospital for carpal tunnel surgery, ballder surgery, leg surgery   Have you had your routine dental cleaning in the past 6 months?  no     Do you have an active MyChart account? If no, what is the barrier?   Yes    Patient Care Team:  COLTON Dennis CNP as PCP - General (Certified Nurse Practitioner)  COLTON Dennis CNP as PCP - [facility] Attributed Provider  COLTON Dennis CNP as Referring Physician (Certified Nurse Practitioner)  Gail Hayden (Psychiatry)  Anika Sweeney MD as Consulting Physician (Gastroenterology)  Hansa De Santiago RN as Care Coordinator  Danny Wells DO as Surgeon (Neurosurgery)  Josselin Vivas MD as Consulting Physician (Dermatology)  Bev Yates DO as Consulting Physician (Orthopedic Surgery)  Anil Tapia MD as Consulting Physician (Internal Medicine)  Gilbert Leon MD as Anesthesiologist (Anesthesiology)  Esdras Jaramillo MD as Consulting Physician (Endocrinology)  Valeria Mike MD as Consulting Physician (Cardiology)    Medical History Review  Past Medical, Family, and Social History reviewed and does contribute to the patient

## 2018-11-19 DIAGNOSIS — S72.402D CLOSED FRACTURE OF DISTAL END OF LEFT FEMUR WITH ROUTINE HEALING, UNSPECIFIED FRACTURE MORPHOLOGY, SUBSEQUENT ENCOUNTER: Primary | ICD-10-CM

## 2018-11-20 ENCOUNTER — CARE COORDINATION (OUTPATIENT)
Dept: CARE COORDINATION | Age: 53
End: 2018-11-20

## 2018-11-21 ENCOUNTER — OFFICE VISIT (OUTPATIENT)
Dept: ORTHOPEDIC SURGERY | Age: 53
End: 2018-11-21

## 2018-11-21 VITALS — WEIGHT: 170 LBS | BODY MASS INDEX: 25.76 KG/M2 | HEIGHT: 68 IN

## 2018-11-21 DIAGNOSIS — M79.18 MYOFASCIAL PAIN ON RIGHT SIDE: ICD-10-CM

## 2018-11-21 DIAGNOSIS — S72.402D CLOSED FRACTURE OF DISTAL END OF LEFT FEMUR WITH ROUTINE HEALING, UNSPECIFIED FRACTURE MORPHOLOGY, SUBSEQUENT ENCOUNTER: Primary | ICD-10-CM

## 2018-11-21 PROCEDURE — 99024 POSTOP FOLLOW-UP VISIT: CPT | Performed by: ORTHOPAEDIC SURGERY

## 2018-11-21 RX ORDER — OXYCODONE HYDROCHLORIDE 5 MG/1
5 TABLET ORAL EVERY 6 HOURS PRN
Qty: 28 TABLET | Refills: 0 | Status: SHIPPED | OUTPATIENT
Start: 2018-11-21 | End: 2018-12-04 | Stop reason: SDUPTHER

## 2018-11-21 RX ORDER — LIDOCAINE
POWDER (GRAM) MISCELLANEOUS
Qty: 30 G | Refills: 11 | Status: SHIPPED | OUTPATIENT
Start: 2018-11-21 | End: 2018-12-26 | Stop reason: SDUPTHER

## 2018-11-21 ASSESSMENT — ENCOUNTER SYMPTOMS
NAUSEA: 0
DIARRHEA: 0
COUGH: 0
CONSTIPATION: 0

## 2018-11-21 NOTE — PROGRESS NOTES
[address] [place] [address]  Dept: [phone]  Dept Fax: [phone]        Postoperative follow-up note    Subjective:   Sherri Lisa is a 48y.o. year old female who presents to our office today for postoperative followup regarding her   1. Closed fracture of distal end of left femur with routine healing, unspecified fracture morphology, subsequent encounter    . Chief Complaint   Patient presents with    Follow-up     left femur f/u/left CTS     Mia Camarillo  is a 48y.o. year old female who presents to our office today for postoperative follow up after having undergone a Left femur retrograde intramedullary nailing on 11/09/2018. The patient denies fevers, chills, nausea, vomiting, diarrhea. The patient has started physical therapy. Patient is ambulating with a rolling walker. Patient does complain of pain in the left knee. Patient states that she is leaving for [place] on Saturday and is traveling by car. Review of Systems   Constitutional: Negative for chills and fever. Respiratory: Negative for cough. Gastrointestinal: Negative for constipation, diarrhea and nausea. Musculoskeletal: Positive for arthralgias (Left knee). Negative for gait problem, joint swelling and myalgias. Neurological: Negative for dizziness, weakness and numbness. I have reviewed the CC, HPI, ROS, PMH, FHX, Social History, and if not present in this note, I have reviewed in the patient's chart. I agree with the documentation provided by other staff and have reviewed their documentation prior to providing my signature indicating agreement. Objective :   General: Sherri Lisa is a 48 y.o. female who is alert and oriented and sitting comfortably in our office. Ortho Exam  MS:   Mild swelling left knee. All incisions healing well on left leg. Left knee .  Motor, sensory, vascular examination left large cavities grossly intact without

## 2018-11-21 NOTE — TELEPHONE ENCOUNTER
[facility] pharmacy sent request for refill of the compound cream listed. The qty reads 30 g with 11 refills  B/c that is how the insurance will pay for hers. This is about a 2 month supply. She gets a tube a week.       Dr. Temo Lindquist last saw pt 10.30.8  Next follow-up appt is 01.14.19  Last refill was sent last week 11.15.18

## 2018-11-29 ENCOUNTER — CARE COORDINATION (OUTPATIENT)
Dept: CARE COORDINATION | Age: 53
End: 2018-11-29

## 2018-11-30 DIAGNOSIS — M79.18 MYOFASCIAL PAIN ON RIGHT SIDE: ICD-10-CM

## 2018-11-30 RX ORDER — LIDOCAINE
POWDER (GRAM) MISCELLANEOUS
Qty: 30 G | Refills: 11 | Status: CANCELLED | OUTPATIENT
Start: 2018-11-30

## 2018-12-03 ENCOUNTER — CARE COORDINATION (OUTPATIENT)
Dept: CARE COORDINATION | Age: 53
End: 2018-12-03

## 2018-12-03 NOTE — CARE COORDINATION
famotidine (PEPCID) 40 MG tablet TAKE 1 TABLET BY MOUTH IN THE EVENING  9/28/18   COLTON Maldonado CNP   cetirizine (ZYRTEC) 10 MG tablet TAKE 1 TABLET BY MOUTH ONE TIME A DAY  8/29/18   COLTON Maldonado CNP   Propranolol HCl (INDERAL PO) Take 30 mg by mouth nightly Takes 3 10 mg tablets at night     Historical Provider, MD   predniSONE (DELTASONE) 50 MG tablet TAKE 1 TABLET BY MOUTH ONE TIME A DAY AS NEEDED FOR ANGIOEDEMA 8/2/18   COLTON Recio CNP   ferrous sulfate (FE TABS) 325 (65 Fe) MG EC tablet Take 1 tablet by mouth 2 times daily 7/31/18   Jose De Jesus Lozano DO   pravastatin (PRAVACHOL) 40 MG tablet TAKE 1 TABLET BY MOUTH AT BEDTIME  7/2/18   COLTON Maldonado CNP   lamoTRIgine (LAMICTAL) 100 MG tablet Take 100 mg by mouth daily  6/6/18   Historical Provider, MD   naltrexone (DEPADE) 50 MG tablet Take 50 mg by mouth daily Prescribed by Dr. Chevy Noland 5/14/18   Historical Provider, MD   Disulfiram 500 MG TABS Take 500 mg by mouth daily  4/18/18   Historical Provider, MD   aspirin 81 MG tablet Take 1 tablet by mouth 2 times daily 5/14/18   COLTON Maldonado CNP   vitamin D (ERGOCALCIFEROL) 36872 units CAPS capsule Take 50,000 Units by mouth once a week Takes 3 times a week-(sunday,tuesday,thursday). Historical Provider, MD   levothyroxine (SYNTHROID) 25 MCG tablet Take 25 mcg by mouth Daily    Historical Provider, MD   QUEtiapine (SEROQUEL) 200 MG tablet TAKE 1 TABLET BY MOUTH AT BEDTIME  Patient taking differently: 300 mg TAKE 1 TABLET BY MOUTH AT BEDTIME 12/4/17   COLTON Maldonado CNP   albuterol sulfate  (90 Base) MCG/ACT inhaler Inhale 2 puffs into the lungs every 6 hours as needed for Wheezing 12/4/17   [facility], APRN - CNP   glucose blood VI test strips (ASCENSIA AUTODISC VI;ONE TOUCH ULTRA TEST VI) strip Use with associated glucose meter.  10/30/17   [facility], APRN - CNP   EPIPEN 2-JIGAR 0.3 MG/0.3ML Lerry Cools

## 2018-12-04 DIAGNOSIS — S72.402D CLOSED FRACTURE OF DISTAL END OF LEFT FEMUR WITH ROUTINE HEALING, UNSPECIFIED FRACTURE MORPHOLOGY, SUBSEQUENT ENCOUNTER: ICD-10-CM

## 2018-12-04 RX ORDER — OXYCODONE HYDROCHLORIDE 5 MG/1
5 TABLET ORAL EVERY 6 HOURS PRN
Qty: 28 TABLET | Refills: 0 | Status: SHIPPED | OUTPATIENT
Start: 2018-12-04 | End: 2018-12-11

## 2018-12-06 ENCOUNTER — HOSPITAL ENCOUNTER (OUTPATIENT)
Dept: PAIN MANAGEMENT | Age: 53
Discharge: HOME | End: 2018-12-06
Payer: MEDICAID

## 2018-12-06 VITALS
DIASTOLIC BLOOD PRESSURE: 93 MMHG | RESPIRATION RATE: 16 BRPM | HEIGHT: 68 IN | BODY MASS INDEX: 25.76 KG/M2 | TEMPERATURE: 97 F | WEIGHT: 170 LBS | HEART RATE: 110 BPM | SYSTOLIC BLOOD PRESSURE: 136 MMHG

## 2018-12-06 PROCEDURE — 99213 OFFICE O/P EST LOW 20 MIN: CPT | Performed by: PAIN MEDICINE

## 2018-12-06 PROCEDURE — 99214 OFFICE O/P EST MOD 30 MIN: CPT

## 2018-12-06 ASSESSMENT — ENCOUNTER SYMPTOMS: BOWEL INCONTINENCE: 0

## 2018-12-06 ASSESSMENT — PAIN DESCRIPTION - PAIN TYPE: TYPE: CHRONIC PAIN

## 2018-12-06 NOTE — PROGRESS NOTES
VISIT,PROCEDURE ONLY Left 11/9/2018    FEMUR RETROGRADE IM NAILING PERIPROSTHETIC FRACTURE performed by Melva Ramos DO at [address] N/CARPAL TUNNEL SURG Left 10/23/2018    CARPAL TUNNEL RELEASE performed by Melva Ramos DO at [address] [facility]  2011    left knee    TOTAL KNEE ARTHROPLASTY Right 5/2/2017    PATELLOFEMORAL REPLACEMENT KNEE - JAXSON, 3080 TABLE, FEMORAL POPLITEAL BLOCK, NSA= SPINAL VS GENERAL performed by Melva Ramos DO at [facility]  2004       Allergies   Allergen Reactions    Morphine Nausea And Vomiting     Pt states it changes her mental status    Amlodipine Other (See Comments)     diarrhea and stress    Dilaudid [Hydromorphone Hcl] Hives and Itching    Sulfa Antibiotics Hives and Rash         Current Outpatient Prescriptions:     oxyCODONE (ROXICODONE) 5 MG immediate release tablet, Take 1 tablet by mouth every 6 hours as needed for Pain for up to 7 days. ., Disp: 28 tablet, Rfl: 0    losartan (COZAAR) 100 MG tablet, TAKE 1 TABLET BY MOUTH ONE TIME A DAY , Disp: 90 tablet, Rfl: 1    hydroxychloroquine (PLAQUENIL) 200 MG tablet, TAKE 1 TABLET BY MOUTH ONE TIME A DAY , Disp: 90 tablet, Rfl: 1    Lidocaine POWD, Formula 74B: amit2%+carbamazapine2%+lido2%+prilo2%. Apply 1-2 gm topically to affected area TID-QID., Disp: 30 g, Rfl: 11    gabapentin (NEURONTIN) 800 MG tablet, Take 1 tablet by mouth 4 times daily for 30 days. ., Disp: 120 tablet, Rfl: 5    cyclobenzaprine (FLEXERIL) 5 MG tablet, TAKE 1 OR 2 TABLETS BY MOUTH AT BEDTIME AS NEEDED, Disp: 40 tablet, Rfl: 2    omeprazole (PRILOSEC) 20 MG delayed release capsule, TAKE 1 CAPSULE BY MOUTH TWO TIMES A DAY , Disp: 180 capsule, Rfl: 1    famotidine (PEPCID) 40 MG tablet, TAKE 1 TABLET BY MOUTH IN THE EVENING , Disp: 30 tablet, Rfl: 5    cetirizine (ZYRTEC) 10 MG tablet, TAKE 1 TABLET BY MOUTH ONE TIME A DAY , Disp: 30 tablet, Disp: 90 tablet, Rfl: 3    Multiple Vitamins-Minerals (THERAPEUTIC MULTIVITAMIN-MINERALS) tablet, Take 1 tablet by mouth daily, Disp: , Rfl:     Probiotic Product (PROBIOTIC & ACIDOPHILUS EX ST PO), Take 1 capsule by mouth daily , Disp: , Rfl:     vitamin B-12 (CYANOCOBALAMIN) 1000 MCG tablet, Take 1,000 mcg by mouth daily, Disp: , Rfl:     Family History   Problem Relation Age of Onset    Adopted: Yes    Depression Mother     Asthma Mother     Depression Father     High Blood Pressure Father     Diabetes Father     Asthma Father     Depression Sister     Asthma Sister     Depression Brother     Asthma Brother     Asthma Maternal Aunt     Asthma Maternal Uncle     Asthma Paternal [de-identified]     Asthma Paternal Uncle     Asthma Maternal Grandmother     Cancer Maternal Grandmother     Asthma Maternal Grandfather     Asthma Paternal Grandmother     Asthma Paternal Grandfather     Asthma Maternal Cousin     Asthma Paternal Cousin        Social History     Social History    Marital status:      Spouse name: N/A    Number of children: N/A    Years of education: N/A     Occupational History    Not on file. Social History Main Topics    Smoking status: Never Smoker    Smokeless tobacco: Never Used    Alcohol use Yes      Comment: brent    Drug use: No    Sexual activity: Not on file      Comment: pt has hysterectomy     Other Topics Concern    Not on file     Social History Narrative    No narrative on file       Review of Systems:  Review of Systems   Constitution: Negative for fever. Hematologic/Lymphatic: Does not bruise/bleed easily. Skin: Negative for rash. Musculoskeletal: Positive for neck pain. Gastrointestinal: Negative for bowel incontinence. Genitourinary: Negative for bladder incontinence. Neurological: Positive for headaches.        Physical Exam:  BP (!) 136/93   Pulse 110   Temp 97 °F (36.1 °C)   Resp 16   Ht 5' 8\" (1.727 m)   Wt 170 lb (77.1 kg)

## 2018-12-07 ENCOUNTER — CARE COORDINATION (OUTPATIENT)
Dept: CARE COORDINATION | Age: 53
End: 2018-12-07

## 2018-12-07 ENCOUNTER — OFFICE VISIT (OUTPATIENT)
Dept: ORTHOPEDIC SURGERY | Age: 53
End: 2018-12-07

## 2018-12-07 VITALS — BODY MASS INDEX: 25.76 KG/M2 | WEIGHT: 169.97 LBS | HEIGHT: 68 IN

## 2018-12-07 DIAGNOSIS — S72.402D CLOSED FRACTURE OF DISTAL END OF LEFT FEMUR WITH ROUTINE HEALING, UNSPECIFIED FRACTURE MORPHOLOGY, SUBSEQUENT ENCOUNTER: Primary | ICD-10-CM

## 2018-12-07 PROCEDURE — 99024 POSTOP FOLLOW-UP VISIT: CPT | Performed by: ORTHOPAEDIC SURGERY

## 2018-12-07 ASSESSMENT — ENCOUNTER SYMPTOMS
NAUSEA: 0
COUGH: 0
DIARRHEA: 0
CONSTIPATION: 0

## 2018-12-07 NOTE — PROGRESS NOTES
[address] [place] [address]  Dept: [phone]  Dept Fax: [phone]        Postoperative follow-up note    Subjective:   Dafne Gay is a 48y.o. year old female who presents to our office today for postoperative followup regarding her   1. Closed fracture of distal end of left femur with routine healing, unspecified fracture morphology, subsequent encounter    . Chief Complaint   Patient presents with   Wong Vladimir     Left DOS:11/09/2018     Brcue Funes  is a 48y.o. year old female who presents to our office today for postoperative follow up after having undergone a Left femur retrograde intramedullary nailing on 11/09/2018. The patient denies fevers, chills, nausea, vomiting, diarrhea. Patient has been doing her own therapy. Patient uses a rolling walker. She is here today for staple removal. She says she has more pain than from her previous left knee replacement. Patient says her left knee will give out at times. Review of Systems   Constitutional: Negative for chills and fever. Respiratory: Negative for cough. Gastrointestinal: Negative for constipation, diarrhea and nausea. Musculoskeletal: Positive for arthralgias (left knee). Negative for gait problem, joint swelling and myalgias. Neurological: Negative for dizziness, weakness and numbness. I have reviewed the CC, HPI, ROS, PMH, FHX, Social History, and if not present in this note, I have reviewed in the patient's chart. I agree with the documentation provided by other staff and have reviewed their documentation prior to providing my signature indicating agreement. Objective :   General: Dafne Gay is a 48 y.o. female who is alert and oriented and sitting comfortably in our office. Ortho Exam  MS:   Left knee . Incisions well healed to left knee and left thigh.  Motor, sensory, vascular examination of extremities grossly intact without focal

## 2018-12-11 DIAGNOSIS — S72.402D CLOSED FRACTURE OF DISTAL END OF LEFT FEMUR WITH ROUTINE HEALING, UNSPECIFIED FRACTURE MORPHOLOGY, SUBSEQUENT ENCOUNTER: Primary | ICD-10-CM

## 2018-12-11 RX ORDER — OXYCODONE HYDROCHLORIDE AND ACETAMINOPHEN 5; 325 MG/1; MG/1
1 TABLET ORAL EVERY 12 HOURS PRN
Qty: 14 TABLET | Refills: 0 | Status: SHIPPED | OUTPATIENT
Start: 2018-12-11 | End: 2018-12-18 | Stop reason: SDUPTHER

## 2018-12-11 NOTE — TELEPHONE ENCOUNTER
Patient of Emily Yost    Left femur retrograde intramedullary nailing on 11/09/2018. Patient taking appropriately. Please advise.

## 2018-12-14 ENCOUNTER — APPOINTMENT (OUTPATIENT)
Dept: CT IMAGING | Age: 53
End: 2018-12-14
Payer: MEDICAID

## 2018-12-14 ENCOUNTER — HOSPITAL ENCOUNTER (OUTPATIENT)
Age: 53
Setting detail: OBSERVATION
Discharge: HOME | End: 2018-12-15
Attending: EMERGENCY MEDICINE | Admitting: INTERNAL MEDICINE
Payer: MEDICAID

## 2018-12-14 ENCOUNTER — TELEPHONE (OUTPATIENT)
Dept: FAMILY MEDICINE CLINIC | Age: 53
End: 2018-12-14

## 2018-12-14 ENCOUNTER — APPOINTMENT (OUTPATIENT)
Dept: GENERAL RADIOLOGY | Age: 53
End: 2018-12-14
Payer: MEDICAID

## 2018-12-14 DIAGNOSIS — R20.0 NUMBNESS: Primary | ICD-10-CM

## 2018-12-14 LAB
-: ABNORMAL
ABSOLUTE EOS #: 0.2 K/UL (ref 0–0.4)
ABSOLUTE IMMATURE GRANULOCYTE: ABNORMAL K/UL (ref 0–0.3)
ABSOLUTE LYMPH #: 1.1 K/UL (ref 1–4.8)
ABSOLUTE MONO #: 0.4 K/UL (ref 0.1–1.3)
ALBUMIN SERPL-MCNC: 4.2 G/DL (ref 3.5–5.2)
ALBUMIN/GLOBULIN RATIO: ABNORMAL (ref 1–2.5)
ALP BLD-CCNC: 177 U/L (ref 35–104)
ALT SERPL-CCNC: 8 U/L (ref 5–33)
AMORPHOUS: ABNORMAL
AMPHETAMINE SCREEN URINE: NEGATIVE
ANION GAP SERPL CALCULATED.3IONS-SCNC: 10 MMOL/L (ref 9–17)
AST SERPL-CCNC: 11 U/L
BACTERIA: ABNORMAL
BARBITURATE SCREEN URINE: NEGATIVE
BASOPHILS # BLD: 1 % (ref 0–2)
BASOPHILS ABSOLUTE: 0.1 K/UL (ref 0–0.2)
BENZODIAZEPINE SCREEN, URINE: NEGATIVE
BILIRUB SERPL-MCNC: 0.15 MG/DL (ref 0.3–1.2)
BILIRUBIN URINE: NEGATIVE
BUN BLDV-MCNC: 12 MG/DL (ref 6–20)
BUN/CREAT BLD: ABNORMAL (ref 9–20)
BUPRENORPHINE URINE: NORMAL
CALCIUM SERPL-MCNC: 8.6 MG/DL (ref 8.6–10.4)
CANNABINOID SCREEN URINE: NEGATIVE
CASTS UA: ABNORMAL /LPF
CHLORIDE BLD-SCNC: 105 MMOL/L (ref 98–107)
CO2: 24 MMOL/L (ref 20–31)
COCAINE METABOLITE, URINE: NEGATIVE
COLOR: YELLOW
COMMENT UA: ABNORMAL
CREAT SERPL-MCNC: 0.9 MG/DL (ref 0.5–0.9)
CRYSTALS, UA: ABNORMAL /HPF
DIFFERENTIAL TYPE: ABNORMAL
EKG ATRIAL RATE: 77 BPM
EKG P AXIS: 38 DEGREES
EKG P-R INTERVAL: 208 MS
EKG Q-T INTERVAL: 378 MS
EKG QRS DURATION: 94 MS
EKG QTC CALCULATION (BAZETT): 427 MS
EKG R AXIS: -2 DEGREES
EKG T AXIS: 4 DEGREES
EKG VENTRICULAR RATE: 77 BPM
EOSINOPHILS RELATIVE PERCENT: 3 % (ref 0–4)
EPITHELIAL CELLS UA: ABNORMAL /HPF
GFR AFRICAN AMERICAN: >60 ML/MIN
GFR NON-AFRICAN AMERICAN: >60 ML/MIN
GFR SERPL CREATININE-BSD FRML MDRD: ABNORMAL ML/MIN/{1.73_M2}
GFR SERPL CREATININE-BSD FRML MDRD: ABNORMAL ML/MIN/{1.73_M2}
GLUCOSE BLD-MCNC: 146 MG/DL (ref 70–99)
GLUCOSE BLD-MCNC: 71 MG/DL (ref 65–105)
GLUCOSE URINE: NEGATIVE
HCT VFR BLD CALC: 31.9 % (ref 36–46)
HEMOGLOBIN: 10.3 G/DL (ref 12–16)
IMMATURE GRANULOCYTES: ABNORMAL %
INR BLD: 1
KETONES, URINE: NEGATIVE
LEUKOCYTE ESTERASE, URINE: ABNORMAL
LYMPHOCYTES # BLD: 20 % (ref 24–44)
MCH RBC QN AUTO: 27.7 PG (ref 26–34)
MCHC RBC AUTO-ENTMCNC: 32.4 G/DL (ref 31–37)
MCV RBC AUTO: 85.2 FL (ref 80–100)
MDMA URINE: NORMAL
METHADONE SCREEN, URINE: NEGATIVE
METHAMPHETAMINE, URINE: NORMAL
MONOCYTES # BLD: 8 % (ref 1–7)
MUCUS: ABNORMAL
NITRITE, URINE: NEGATIVE
NRBC AUTOMATED: ABNORMAL PER 100 WBC
OPIATES, URINE: NEGATIVE
OTHER OBSERVATIONS UA: ABNORMAL
OXYCODONE SCREEN URINE: NEGATIVE
PARTIAL THROMBOPLASTIN TIME: 26.6 SEC (ref 23–31)
PDW BLD-RTO: 15.7 % (ref 11.5–14.9)
PH UA: 6.5 (ref 5–8)
PHENCYCLIDINE, URINE: NEGATIVE
PLATELET # BLD: 221 K/UL (ref 150–450)
PLATELET ESTIMATE: ABNORMAL
PMV BLD AUTO: 8.5 FL (ref 6–12)
POTASSIUM SERPL-SCNC: 3.9 MMOL/L (ref 3.7–5.3)
PROPOXYPHENE, URINE: NORMAL
PROTEIN UA: NEGATIVE
PROTHROMBIN TIME: 10.5 SEC (ref 9.7–12)
RBC # BLD: 3.74 M/UL (ref 4–5.2)
RBC # BLD: ABNORMAL 10*6/UL
RBC UA: ABNORMAL /HPF
RENAL EPITHELIAL, UA: ABNORMAL /HPF
SEG NEUTROPHILS: 68 % (ref 36–66)
SEGMENTED NEUTROPHILS ABSOLUTE COUNT: 3.8 K/UL (ref 1.3–9.1)
SODIUM BLD-SCNC: 139 MMOL/L (ref 135–144)
SPECIFIC GRAVITY UA: 1.01 (ref 1–1.03)
TEST INFORMATION: NORMAL
TOTAL PROTEIN: 7.1 G/DL (ref 6.4–8.3)
TRICHOMONAS: ABNORMAL
TRICYCLIC ANTIDEPRESSANTS, UR: NORMAL
TROPONIN INTERP: NORMAL
TROPONIN T: <0.03 NG/ML
TURBIDITY: ABNORMAL
URINE HGB: NEGATIVE
UROBILINOGEN, URINE: NORMAL
WBC # BLD: 5.6 K/UL (ref 3.5–11)
WBC # BLD: ABNORMAL 10*3/UL
WBC UA: ABNORMAL /HPF
YEAST: ABNORMAL

## 2018-12-14 PROCEDURE — 94664 DEMO&/EVAL PT USE INHALER: CPT

## 2018-12-14 PROCEDURE — 70450 CT HEAD/BRAIN W/O DYE: CPT

## 2018-12-14 PROCEDURE — 71045 X-RAY EXAM CHEST 1 VIEW: CPT

## 2018-12-14 PROCEDURE — 99285 EMERGENCY DEPT VISIT HI MDM: CPT

## 2018-12-14 PROCEDURE — G0378 HOSPITAL OBSERVATION PER HR: HCPCS

## 2018-12-14 PROCEDURE — 80307 DRUG TEST PRSMV CHEM ANLYZR: CPT

## 2018-12-14 PROCEDURE — 85730 THROMBOPLASTIN TIME PARTIAL: CPT

## 2018-12-14 PROCEDURE — 82947 ASSAY GLUCOSE BLOOD QUANT: CPT

## 2018-12-14 PROCEDURE — 6370000000 HC RX 637 (ALT 250 FOR IP): Performed by: EMERGENCY MEDICINE

## 2018-12-14 PROCEDURE — 85025 COMPLETE CBC W/AUTO DIFF WBC: CPT

## 2018-12-14 PROCEDURE — 80053 COMPREHEN METABOLIC PANEL: CPT

## 2018-12-14 PROCEDURE — 85610 PROTHROMBIN TIME: CPT

## 2018-12-14 PROCEDURE — 36415 COLL VENOUS BLD VENIPUNCTURE: CPT

## 2018-12-14 PROCEDURE — 84484 ASSAY OF TROPONIN QUANT: CPT

## 2018-12-14 PROCEDURE — 81001 URINALYSIS AUTO W/SCOPE: CPT

## 2018-12-14 PROCEDURE — 6370000000 HC RX 637 (ALT 250 FOR IP): Performed by: INTERNAL MEDICINE

## 2018-12-14 PROCEDURE — 2580000003 HC RX 258: Performed by: INTERNAL MEDICINE

## 2018-12-14 RX ORDER — GABAPENTIN 400 MG/1
800 CAPSULE ORAL 4 TIMES DAILY
Status: DISCONTINUED | OUTPATIENT
Start: 2018-12-14 | End: 2018-12-15 | Stop reason: HOSPADM

## 2018-12-14 RX ORDER — ASPIRIN 81 MG/1
81 TABLET ORAL 2 TIMES DAILY
Status: DISCONTINUED | OUTPATIENT
Start: 2018-12-14 | End: 2018-12-15 | Stop reason: HOSPADM

## 2018-12-14 RX ORDER — PRAVASTATIN SODIUM 40 MG
40 TABLET ORAL NIGHTLY
Status: DISCONTINUED | OUTPATIENT
Start: 2018-12-14 | End: 2018-12-15 | Stop reason: HOSPADM

## 2018-12-14 RX ORDER — NICOTINE POLACRILEX 4 MG
15 LOZENGE BUCCAL PRN
Status: DISCONTINUED | OUTPATIENT
Start: 2018-12-14 | End: 2018-12-15 | Stop reason: HOSPADM

## 2018-12-14 RX ORDER — CETIRIZINE HYDROCHLORIDE 10 MG/1
1 TABLET ORAL DAILY
Status: DISCONTINUED | OUTPATIENT
Start: 2018-12-14 | End: 2018-12-14 | Stop reason: CLARIF

## 2018-12-14 RX ORDER — QUETIAPINE FUMARATE 300 MG/1
300 TABLET, FILM COATED ORAL NIGHTLY
Status: ON HOLD | COMMUNITY
End: 2018-12-15

## 2018-12-14 RX ORDER — LANOLIN ALCOHOL/MO/W.PET/CERES
1000 CREAM (GRAM) TOPICAL DAILY
Status: DISCONTINUED | OUTPATIENT
Start: 2018-12-14 | End: 2018-12-15 | Stop reason: HOSPADM

## 2018-12-14 RX ORDER — ASPIRIN 81 MG/1
324 TABLET, CHEWABLE ORAL ONCE
Status: COMPLETED | OUTPATIENT
Start: 2018-12-14 | End: 2018-12-14

## 2018-12-14 RX ORDER — FELODIPINE 10 MG/1
10 TABLET, EXTENDED RELEASE ORAL DAILY
Status: ON HOLD | COMMUNITY
End: 2018-12-15 | Stop reason: HOSPADM

## 2018-12-14 RX ORDER — HYDROXYCHLOROQUINE SULFATE 200 MG/1
1 TABLET, FILM COATED ORAL DAILY
Status: DISCONTINUED | OUTPATIENT
Start: 2018-12-14 | End: 2018-12-14 | Stop reason: CLARIF

## 2018-12-14 RX ORDER — CYCLOBENZAPRINE HCL 10 MG
5 TABLET ORAL NIGHTLY PRN
Status: DISCONTINUED | OUTPATIENT
Start: 2018-12-14 | End: 2018-12-15 | Stop reason: HOSPADM

## 2018-12-14 RX ORDER — DEXTROSE MONOHYDRATE 25 G/50ML
12.5 INJECTION, SOLUTION INTRAVENOUS PRN
Status: DISCONTINUED | OUTPATIENT
Start: 2018-12-14 | End: 2018-12-15 | Stop reason: HOSPADM

## 2018-12-14 RX ORDER — LOSARTAN POTASSIUM 100 MG/1
100 TABLET ORAL DAILY
Status: DISCONTINUED | OUTPATIENT
Start: 2018-12-15 | End: 2018-12-15 | Stop reason: HOSPADM

## 2018-12-14 RX ORDER — CETIRIZINE HYDROCHLORIDE 10 MG/1
10 TABLET ORAL DAILY
Status: DISCONTINUED | OUTPATIENT
Start: 2018-12-15 | End: 2018-12-15 | Stop reason: HOSPADM

## 2018-12-14 RX ORDER — PROPRANOLOL HYDROCHLORIDE 10 MG/1
30 TABLET ORAL NIGHTLY
Status: ON HOLD | COMMUNITY
End: 2019-06-11 | Stop reason: HOSPADM

## 2018-12-14 RX ORDER — ACETAMINOPHEN 325 MG/1
650 TABLET ORAL EVERY 4 HOURS PRN
Status: DISCONTINUED | OUTPATIENT
Start: 2018-12-14 | End: 2018-12-15 | Stop reason: HOSPADM

## 2018-12-14 RX ORDER — HYDROXYCHLOROQUINE SULFATE 200 MG/1
200 TABLET, FILM COATED ORAL DAILY
Status: DISCONTINUED | OUTPATIENT
Start: 2018-12-15 | End: 2018-12-15 | Stop reason: HOSPADM

## 2018-12-14 RX ORDER — LEVOTHYROXINE SODIUM 0.03 MG/1
25 TABLET ORAL DAILY
Status: DISCONTINUED | OUTPATIENT
Start: 2018-12-15 | End: 2018-12-15 | Stop reason: HOSPADM

## 2018-12-14 RX ORDER — FERROUS SULFATE 325(65) MG
325 TABLET ORAL 2 TIMES DAILY WITH MEALS
Status: DISCONTINUED | OUTPATIENT
Start: 2018-12-15 | End: 2018-12-15 | Stop reason: HOSPADM

## 2018-12-14 RX ORDER — LANOLIN ALCOHOL/MO/W.PET/CERES
325 CREAM (GRAM) TOPICAL 2 TIMES DAILY
Status: DISCONTINUED | OUTPATIENT
Start: 2018-12-14 | End: 2018-12-14 | Stop reason: CLARIF

## 2018-12-14 RX ORDER — LAMOTRIGINE 100 MG/1
100 TABLET ORAL DAILY
Status: DISCONTINUED | OUTPATIENT
Start: 2018-12-14 | End: 2018-12-15 | Stop reason: HOSPADM

## 2018-12-14 RX ORDER — OMEPRAZOLE 20 MG/1
20 CAPSULE, DELAYED RELEASE ORAL
Status: DISCONTINUED | OUTPATIENT
Start: 2018-12-15 | End: 2018-12-15 | Stop reason: HOSPADM

## 2018-12-14 RX ORDER — ALBUTEROL SULFATE 90 UG/1
2 AEROSOL, METERED RESPIRATORY (INHALATION) EVERY 6 HOURS PRN
Status: DISCONTINUED | OUTPATIENT
Start: 2018-12-14 | End: 2018-12-15 | Stop reason: HOSPADM

## 2018-12-14 RX ORDER — SAW/VIT E/SOD SEL/LYC/BETA/PYG 160-100
1 TABLET ORAL DAILY
Status: DISCONTINUED | OUTPATIENT
Start: 2018-12-15 | End: 2018-12-14 | Stop reason: CLARIF

## 2018-12-14 RX ORDER — LOSARTAN POTASSIUM 100 MG/1
1 TABLET ORAL DAILY
Status: DISCONTINUED | OUTPATIENT
Start: 2018-12-14 | End: 2018-12-14 | Stop reason: CLARIF

## 2018-12-14 RX ORDER — POTASSIUM CHLORIDE 20 MEQ/1
10 TABLET, EXTENDED RELEASE ORAL 2 TIMES DAILY
Status: DISCONTINUED | OUTPATIENT
Start: 2018-12-14 | End: 2018-12-15 | Stop reason: HOSPADM

## 2018-12-14 RX ORDER — SODIUM CHLORIDE 0.9 % (FLUSH) 0.9 %
10 SYRINGE (ML) INJECTION EVERY 12 HOURS SCHEDULED
Status: DISCONTINUED | OUTPATIENT
Start: 2018-12-14 | End: 2018-12-15 | Stop reason: HOSPADM

## 2018-12-14 RX ORDER — DEXTROSE MONOHYDRATE 50 MG/ML
100 INJECTION, SOLUTION INTRAVENOUS PRN
Status: DISCONTINUED | OUTPATIENT
Start: 2018-12-14 | End: 2018-12-15 | Stop reason: HOSPADM

## 2018-12-14 RX ORDER — FAMOTIDINE 40 MG/1
40 TABLET, FILM COATED ORAL DAILY
Status: ON HOLD | COMMUNITY
End: 2018-12-15 | Stop reason: HOSPADM

## 2018-12-14 RX ORDER — M-VIT,TX,IRON,MINS/CALC/FOLIC 27MG-0.4MG
1 TABLET ORAL DAILY
Status: DISCONTINUED | OUTPATIENT
Start: 2018-12-14 | End: 2018-12-15 | Stop reason: HOSPADM

## 2018-12-14 RX ORDER — LACTOBACILLUS RHAMNOSUS GG 10B CELL
1 CAPSULE ORAL
Status: DISCONTINUED | OUTPATIENT
Start: 2018-12-15 | End: 2018-12-15 | Stop reason: HOSPADM

## 2018-12-14 RX ORDER — SODIUM CHLORIDE 0.9 % (FLUSH) 0.9 %
10 SYRINGE (ML) INJECTION PRN
Status: DISCONTINUED | OUTPATIENT
Start: 2018-12-14 | End: 2018-12-15 | Stop reason: HOSPADM

## 2018-12-14 RX ADMIN — QUETIAPINE FUMARATE 300 MG: 200 TABLET ORAL at 22:53

## 2018-12-14 RX ADMIN — ASPIRIN 81 MG 324 MG: 81 TABLET ORAL at 16:42

## 2018-12-14 RX ADMIN — Medication 10 ML: at 22:54

## 2018-12-14 ASSESSMENT — ENCOUNTER SYMPTOMS
COUGH: 0
DIARRHEA: 0
VOMITING: 0
PHOTOPHOBIA: 0
CHEST TIGHTNESS: 0
BACK PAIN: 0
SHORTNESS OF BREATH: 0
ABDOMINAL PAIN: 0
NAUSEA: 0

## 2018-12-14 ASSESSMENT — PAIN DESCRIPTION - DESCRIPTORS: DESCRIPTORS: OTHER (COMMENT)

## 2018-12-14 ASSESSMENT — PAIN DESCRIPTION - LOCATION: LOCATION: LEG

## 2018-12-14 ASSESSMENT — PAIN DESCRIPTION - ORIENTATION: ORIENTATION: LEFT

## 2018-12-14 ASSESSMENT — PAIN SCALES - GENERAL: PAINLEVEL_OUTOF10: 7

## 2018-12-14 ASSESSMENT — PAIN DESCRIPTION - PAIN TYPE: TYPE: SURGICAL PAIN

## 2018-12-14 NOTE — ED PROVIDER NOTES
[facility] [address] [facility]  Emergency Department Encounter  EmergencyMedicine Resident     Pt Name:Mia Kapoor  MRN: [id number]  [place] 1965  Date of evaluation: 12/14/18  PCP:  COLTON Butterfield [id number]       Chief Complaint   Patient presents with    Numbness    Extremity Weakness    Blurred Vision         HISTORY OF PRESENT ILLNESS  (Location/Symptom, Timing/Onset, Context/Setting, Quality, Duration,Modifying Factors, Severity.)      Mia Zurita is a 48 y.o. female who presents with Left-sided facial numbness, jerking bilateral arm pain and blurred vision. Patient states that her symptoms began 2 days ago. She is unsure if abrupt or gradual in onset. She tells me that she called her PCP today who urged her to come into the emergency department for stroke evaluation. She says that her left side of her face feels \"numb\" and this has been constant for the past 2 days. States that she's been having waxing and waning bilateral arm pain with the left being greater in the right describes it as crampy. Also said that her vision seems to be slightly blurred. She is a diabetic sister glucose levels been well-controlled. Denies any chest pain or shortness of breath denies any difficulty with speech. She says that she has had difficulty ambulating because of her broken left leg. Says that she has had a lot of jerking over the past couple of days as well. PAST MEDICAL / SURGICAL / SOCIAL / FAMILY HISTORY      has a past medical history of Acute kidney injury ([address] Anemia; Angioedema; Antinuclear antibody (IQRA) titer greater than 1:80; Anxiety; Asthma; Ataxia; Atrial fibrillation ([address] Borderline personality disorder ([address] Bradycardia; CAD (coronary artery disease); Chronic kidney disease; CKD (chronic kidney disease), stage II; Degenerative disc disease, thoracic; Depression; Diabetes mellitus ([address]  Diastolic dysfunction; DJD (degenerative joint disease); Fibromyalgia; GERD (gastroesophageal reflux disease); H/O: hysterectomy; Headache; Hernia of abdominal wall; History of blood transfusion; Hyperlipidemia; Hypertension; Lupus; Mood disorder ([address] Neuropathy; Osteoarthritis; PTSD (post-traumatic stress disorder); Pulmonary nodules; Raynaud's disease; Scleroderma ([address] Scleroderma ([address] Seizures ([address] Sleep walking disorder; Thyroid disease; TIA (transient ischemic attack); Transient insomnia; Tricuspid regurgitation; Vasospasm ([address] Vitamin D deficiency; and Wears glasses. has a past surgical history that includes Hysterectomy; Cholecystectomy (1989); Tonsillectomy (1986); Carpal tunnel release (Right, 2016); Wrist surgery (2004); Gastric bypass surgery (2012); Abdominoplasty (2013); Elbow surgery (Right); Total knee arthroplasty (2011); knee surgery (Right, 05/02/2017); Total knee arthroplasty (Right, 5/2/2017); gastrectomy; Nerve Block (Right, 05/04/2018); Nerve Block (Right, 06/29/2018); eye surgery; pr anterior colporraphy rpr cystocele w/cysto (N/A, 7/30/2018); bladder suspension (N/A, 7/30/2018); Nerve Block (Right, 08/10/2018); joint replacement (Bilateral); Finger surgery (Right, 08/28/2018); pr office/outpt visit,procedure only (Right, 8/28/2018); pr revise median n/carpal tunnel surg (Left, 10/23/2018); Femur fracture surgery (11/09/2018); and pr office/outpt visit,procedure only (Left, 11/9/2018). Social History     Social History    Marital status:      Spouse name: N/A    Number of children: N/A    Years of education: N/A     Occupational History    Not on file.      Social History Main Topics    Smoking status: Never Smoker    Smokeless tobacco: Never Used    Alcohol use Yes      Comment: brent    Drug use: No    Sexual activity: Not on file      Comment: pt has hysterectomy     Other Topics Concern    Not on file     Social History Narrative    No narrative on file       Patient advised to stop smoking or to avoid tobacco

## 2018-12-15 VITALS
SYSTOLIC BLOOD PRESSURE: 112 MMHG | OXYGEN SATURATION: 100 % | DIASTOLIC BLOOD PRESSURE: 64 MMHG | BODY MASS INDEX: 25.61 KG/M2 | TEMPERATURE: 98.2 F | HEART RATE: 97 BPM | HEIGHT: 68 IN | RESPIRATION RATE: 16 BRPM | WEIGHT: 169 LBS

## 2018-12-15 LAB
ABSOLUTE EOS #: 0.09 K/UL (ref 0–0.4)
ABSOLUTE IMMATURE GRANULOCYTE: ABNORMAL K/UL (ref 0–0.3)
ABSOLUTE LYMPH #: 2.53 K/UL (ref 1–4.8)
ABSOLUTE MONO #: 0.23 K/UL (ref 0.1–1.3)
ANION GAP SERPL CALCULATED.3IONS-SCNC: 11 MMOL/L (ref 9–17)
BASOPHILS # BLD: 1 % (ref 0–2)
BASOPHILS ABSOLUTE: 0.05 K/UL (ref 0–0.2)
BUN BLDV-MCNC: 11 MG/DL (ref 6–20)
BUN/CREAT BLD: ABNORMAL (ref 9–20)
CALCIUM SERPL-MCNC: 9.3 MG/DL (ref 8.6–10.4)
CHLORIDE BLD-SCNC: 107 MMOL/L (ref 98–107)
CO2: 26 MMOL/L (ref 20–31)
CREAT SERPL-MCNC: 0.97 MG/DL (ref 0.5–0.9)
DIFFERENTIAL TYPE: ABNORMAL
EOSINOPHILS RELATIVE PERCENT: 2 % (ref 0–4)
GFR AFRICAN AMERICAN: >60 ML/MIN
GFR NON-AFRICAN AMERICAN: >60 ML/MIN
GFR SERPL CREATININE-BSD FRML MDRD: ABNORMAL ML/MIN/{1.73_M2}
GFR SERPL CREATININE-BSD FRML MDRD: ABNORMAL ML/MIN/{1.73_M2}
GLUCOSE BLD-MCNC: 61 MG/DL (ref 65–105)
GLUCOSE BLD-MCNC: 73 MG/DL (ref 65–105)
GLUCOSE BLD-MCNC: 77 MG/DL (ref 70–99)
HCT VFR BLD CALC: 30 % (ref 36–46)
HEMOGLOBIN: 9.9 G/DL (ref 12–16)
IMMATURE GRANULOCYTES: ABNORMAL %
LYMPHOCYTES # BLD: 55 % (ref 24–44)
MCH RBC QN AUTO: 27.8 PG (ref 26–34)
MCHC RBC AUTO-ENTMCNC: 33 G/DL (ref 31–37)
MCV RBC AUTO: 84.3 FL (ref 80–100)
MONOCYTES # BLD: 5 % (ref 1–7)
MORPHOLOGY: ABNORMAL
NRBC AUTOMATED: ABNORMAL PER 100 WBC
PDW BLD-RTO: 16 % (ref 11.5–14.9)
PLATELET # BLD: 193 K/UL (ref 150–450)
PLATELET ESTIMATE: ABNORMAL
PMV BLD AUTO: 8.4 FL (ref 6–12)
POTASSIUM SERPL-SCNC: 4.4 MMOL/L (ref 3.7–5.3)
RBC # BLD: 3.56 M/UL (ref 4–5.2)
RBC # BLD: ABNORMAL 10*6/UL
SEG NEUTROPHILS: 37 % (ref 36–66)
SEGMENTED NEUTROPHILS ABSOLUTE COUNT: 1.7 K/UL (ref 1.3–9.1)
SODIUM BLD-SCNC: 144 MMOL/L (ref 135–144)
WBC # BLD: 4.6 K/UL (ref 3.5–11)
WBC # BLD: ABNORMAL 10*3/UL

## 2018-12-15 PROCEDURE — 99220 PR INITIAL OBSERVATION CARE/DAY 70 MINUTES: CPT | Performed by: PSYCHIATRY & NEUROLOGY

## 2018-12-15 PROCEDURE — 2580000003 HC RX 258: Performed by: INTERNAL MEDICINE

## 2018-12-15 PROCEDURE — G0378 HOSPITAL OBSERVATION PER HR: HCPCS

## 2018-12-15 PROCEDURE — 80048 BASIC METABOLIC PNL TOTAL CA: CPT

## 2018-12-15 PROCEDURE — 6370000000 HC RX 637 (ALT 250 FOR IP): Performed by: INTERNAL MEDICINE

## 2018-12-15 PROCEDURE — 93005 ELECTROCARDIOGRAM TRACING: CPT

## 2018-12-15 PROCEDURE — 36415 COLL VENOUS BLD VENIPUNCTURE: CPT

## 2018-12-15 PROCEDURE — 99219 PR INITIAL OBSERVATION CARE/DAY 50 MINUTES: CPT | Performed by: INTERNAL MEDICINE

## 2018-12-15 PROCEDURE — 82947 ASSAY GLUCOSE BLOOD QUANT: CPT

## 2018-12-15 PROCEDURE — 85025 COMPLETE CBC W/AUTO DIFF WBC: CPT

## 2018-12-15 RX ORDER — QUETIAPINE FUMARATE 300 MG/1
200 TABLET, FILM COATED ORAL NIGHTLY
Qty: 60 TABLET | Refills: 3 | Status: SHIPPED | OUTPATIENT
Start: 2018-12-15 | End: 2022-09-26

## 2018-12-15 RX ADMIN — MULTIPLE VITAMINS W/ MINERALS TAB 1 TABLET: TAB at 07:56

## 2018-12-15 RX ADMIN — LAMOTRIGINE 100 MG: 100 TABLET ORAL at 07:52

## 2018-12-15 RX ADMIN — POTASSIUM CHLORIDE 10 MEQ: 20 TABLET, EXTENDED RELEASE ORAL at 07:50

## 2018-12-15 RX ADMIN — Medication 10 ML: at 07:52

## 2018-12-15 RX ADMIN — Medication 1 CAPSULE: at 07:50

## 2018-12-15 RX ADMIN — HYDROXYCHLOROQUINE SULFATE 200 MG: 200 TABLET, FILM COATED ORAL at 07:52

## 2018-12-15 RX ADMIN — FERROUS SULFATE TAB 325 MG (65 MG ELEMENTAL FE) 325 MG: 325 (65 FE) TAB at 07:50

## 2018-12-15 RX ADMIN — OMEPRAZOLE 20 MG: 20 CAPSULE, DELAYED RELEASE ORAL at 07:50

## 2018-12-15 RX ADMIN — METFORMIN HYDROCHLORIDE 500 MG: 500 TABLET ORAL at 07:50

## 2018-12-15 RX ADMIN — CETIRIZINE HYDROCHLORIDE 10 MG: 10 TABLET, FILM COATED ORAL at 07:50

## 2018-12-15 RX ADMIN — CYANOCOBALAMIN TAB 1000 MCG 1000 MCG: 1000 TAB at 07:56

## 2018-12-15 RX ADMIN — LEVOTHYROXINE SODIUM 25 MCG: 25 TABLET ORAL at 07:50

## 2018-12-15 RX ADMIN — LOSARTAN POTASSIUM 100 MG: 100 TABLET ORAL at 07:50

## 2018-12-15 RX ADMIN — ASPIRIN 81 MG: 81 TABLET, COATED ORAL at 07:50

## 2018-12-15 RX ADMIN — GABAPENTIN 800 MG: 400 CAPSULE ORAL at 07:50

## 2018-12-15 NOTE — DISCHARGE SUMMARY
mouth daily             omeprazole (PRILOSEC) 20 MG delayed release capsule  TAKE 1 CAPSULE BY MOUTH TWO TIMES A DAY              oxyCODONE-acetaminophen (PERCOCET) 5-325 MG per tablet  Take 1 tablet by mouth every 12 hours as needed for Pain for up to 7 days. .             potassium chloride (KLOR-CON M) 10 MEQ extended release tablet  Take 10 mEq by mouth 2 times daily Prescribed by endocrinologist             pravastatin (PRAVACHOL) 40 MG tablet  TAKE 1 TABLET BY MOUTH AT BEDTIME              propranolol (INDERAL) 10 MG tablet  Take 30 mg by mouth nightly             QUEtiapine (SEROQUEL) 300 MG tablet  Take 0.5 tablets by mouth nightly             venlafaxine (EFFEXOR) 75 MG tablet  Take 2 tablets (150 mg) in the morning and 1 tablet (75 mg) in the evening. vitamin B-12 (CYANOCOBALAMIN) 1000 MCG tablet  Take 1,000 mcg by mouth daily                 Discharge Instructions: Follow up with COLTON Salmon CNP in 2 weeks.       Hospital acquired Infections: None      Idania DUKE attending

## 2018-12-15 NOTE — ED NOTES
Pt to ED with c/o numbness to the face, \"shaking\" of the arms, and blurry vision that started two days ago. Pt states she called her PCP and was told to come in for stroke evaluation. Pt is awake, alert and oriented x4. Pt notes she has a fx to the left leg that makes her extremity weak. Pt has tremor-like activity that ceases when this RN talks with pt or when she is distracted.       Woo Patel, ROB  12/14/18 [id number]

## 2018-12-15 NOTE — PROGRESS NOTES
RN talked to Dr. Delcia Boxer who restarted most of the patient's home medications. The medications he did not want to restart were percocet, Disulfiram, and naltrexone. He also ordered a low dose sliding scale and a CBC and BMP for morning labs. Patient is on Medicaid so she will be using her home medications, however no one is available to bring them until tomorrow morning. The only medication the patient stated she needed tonight was Seroquel. Patient is aware the insurance may not pay for the medication since she is under observation.  Electronically signed by Odalys Holland RN on 12/14/2018

## 2018-12-15 NOTE — CONSULTS
NEUROLOGY INPATIENT CONSULT NOTE    12/15/2018         Mia Lamas is a  48 y.o. female admitted on 12/14/2018 with  Numbness [R20.0]  Numbness [R20.0]      History is obtained mostly from the patient and the medical record and from the caregivers. Chart is reviewed and patient is examined. Briefly, this is a  48 y.o. female with extensive medical history consisting of anxiety disorder, depression, borderline personality disorder, CAD, CK D, chronic neck pain made worse following radiofrequency ablation followed by pain clinic, lupus, Raynaud's disease, scleroderma, sleep disorder, seizures, TIA, insomnia, HTN, HLD, chronic headaches, DM, bradycardia, multiple surgical procedures, admitted on 12/14/2018 upon recommendation of her PCP after she reported 2 days of bilateral facial tingling paresthesias mainly in the cheeks, bilateral arm pain and jerks and blurred vision. The ED notes state that it is \"left facial numbness\" but the patient denies any significant asymmetry, she denies actual loss of sensation, she complains of tingling bilaterally and symmetrically as just described. She says that her principal concern actually was the jerking of her arms which she she actually has had for a couple months even if she thinks it is been getting somewhat more noticeable lately and sometimes makes her drop things. She had CT brain films reviewed by consultant showing nothing acute, mild especially bifrontal atrophy. She is status post left femur fracture on account of which she uses RW. Her long list of CNS active medication includes quetiapine 300 mg, gabapentin 800 mg 4 times a day, lamotrigine 100 mg daily, cyclobenzaprine 5 mg, also Percocet 5mg prn and naltrexone. No current facility-administered medications on file prior to encounter.       Current Outpatient Prescriptions on File Prior to Encounter   Medication Sig Dispense Refill    oxyCODONE-acetaminophen (PERCOCET) 5-325 MG per tablet Take 1 complete absence of profusion to arteries of feet. ([facility]).  Vitamin D deficiency 8/23/2018    Wears glasses        Past Surgical History:   Procedure Laterality Date    ABDOMINOPLASTY  2013    BLADDER SUSPENSION N/A 7/30/2018    CYSTOSCOPY WITH OBTRYX MID URETHRAL SLING performed by Tressie Fleischer, MD at [address] Right 2016   [facility]    ELBOW SURGERY Right     EYE SURGERY      [email or web address]. [email or web address]     FEMUR FRACTURE SURGERY  11/09/2018    FINGER SURGERY Right 08/28/2018    RING CARTER MASS EXCISION, TRIGGER RELEASE    GASTRECTOMY      GASTRIC BYPASS SURGERY  2012    HYSTERECTOMY      JOINT REPLACEMENT Bilateral     knees    KNEE SURGERY Right 05/02/2017    (femoral) patella replacement    NERVE BLOCK Right 05/04/2018     cervical facet #1 no steroid    NERVE BLOCK Right 06/29/2018    rt cervical diagnostic #2 decadron 10mg    NERVE BLOCK Right 08/10/2018    right cervical rfa decadron 10mg    AL ANTERIOR COLPORRAPHY RPR CYSTOCELE W/CYSTO N/A 7/30/2018    CYSTOCELE REPAIR performed by Stephanie Gudino DO at [address] [facility] OFFICE/OUTPT [facility] Right 8/28/2018    RING CARTER MASS EXCISION, TRIGGER RELEASE, [address] TABLE performed by Roni Baptiste DO at [address] OFFICE/OUTPT VISIT,PROCEDURE ONLY Left 11/9/2018    FEMUR RETROGRADE IM NAILING PERIPROSTHETIC FRACTURE performed by Roni Baptiste DO at [address]/CARPAL TUNNEL SURG Left 10/23/2018    CARPAL TUNNEL RELEASE [facility]  2011    left knee    TOTAL KNEE ARTHROPLASTY Right 5/2/2017    PATELLOFEMORAL REPLACEMENT KNEE - Amanda Noonan, 3080 TABLE, FEMORAL POPLITEAL BLOCK, NSA= SPINAL VS GENERAL performed by Roni Baptiste DO at [address]  2004     Social History: Real Sosa  reports that she has never smoked.  She has never used smokeless tobacco. Hematology:  Recent Labs      12/14/18   1536  12/15/18   0520   WBC  5.6  4.6   HGB  10.3*  9.9*   HCT  31.9*  30.0*   PLT  221  193   INR  1.0   --      Chemistry:  Recent Labs      12/14/18   1536  12/15/18   0520   NA  139  144   K  3.9  4.4   CL  105  107   CO2  24  26   GLUCOSE  146*  77   BUN  12  11   CREATININE  0.90  0.97*   CALCIUM  8.6  9.3     Recent Labs      12/14/18   1536   PROT  7.1   LABALBU  4.2   AST  11   ALT  8       Diagnostic data reviewed:    Ct Head Wo Contrast    Result Date: 12/14/2018  EXAMINATION: CT OF THE HEAD WITHOUT CONTRAST  12/14/2018 3:57 pm TECHNIQUE: CT of the head was performed without the administration of intravenous contrast. Dose modulation, iterative reconstruction, and/or weight based adjustment of the mA/kV was utilized to reduce the radiation dose to as low as reasonably achievable. COMPARISON: None. HISTORY: ORDERING SYSTEM PROVIDED HISTORY: left sided facial numbness, blurred vision TECHNOLOGIST PROVIDED HISTORY: Ordering Physician Provided Reason for Exam: Left sided facial numbness, blurred vision and Extremity Weakness. Patient states symptoms x2 days prior - worse today. Acuity: Unknown Type of Exam: Unknown Relevant Medical/Surgical History: Hx - HBP, Diabetes and Stroke. FINDINGS: BRAIN/VENTRICLES: There is no acute intracranial hemorrhage, mass effect or midline shift. No abnormal extra-axial fluid collection. The gray-white differentiation is maintained without evidence of an acute infarct. There is no evidence of hydrocephalus. ORBITS: The visualized portion of the orbits demonstrate no acute abnormality. SINUSES: The visualized paranasal sinuses and mastoid air cells demonstrate no acute abnormality. SOFT TISSUES/SKULL:  No acute abnormality of the visualized skull or soft tissues. No acute intracranial abnormality.      Xr Chest Portable    Result Date: 12/14/2018  EXAMINATION: SINGLE XRAY VIEW OF THE CHEST 12/14/2018 4:15 pm COMPARISON:

## 2018-12-15 NOTE — H&P
complete absence of profusion to arteries of feet. ([facility]).  Vitamin D deficiency 8/23/2018    Wears glasses      Allergies   Allergen Reactions    Morphine Nausea And Vomiting     Pt states it changes her mental status    Amlodipine Other (See Comments)     diarrhea and stress    Dilaudid [Hydromorphone Hcl] Hives and Itching    Sulfa Antibiotics Hives and Rash       Review of systems    Constitutional: Negative for fever and fatigue. Respiratory: Negative for cough and shortness of breath. Cardiovascular: Negative for chest pain, palpitations and leg swelling. Gastrointestinal: Negative for abdominal pain, diarrhea and blood in stool. Endocrine: Negative for cold intolerance. Genitourinary: Negative for dysuria and frequency. Musculoskeletal: Negative for back pain and arthralgias. Skin: Negative for color change and rash. Neurological: Negative for dizziness and headaches. Psychiatric/Behavioral: Negative for confusion. ROS  Physical exam     /64   Pulse 97   Temp 98.2 °F (36.8 °C) (Oral)   Resp 16   Ht 5' 8\" (1.727 m)   Wt 169 lb (76.7 kg)   SpO2 100%   BMI 25.70 kg/m²      General appearance: well nourished in no distress  Eyes:  JOHN   Head: AT/NC  ENT NAD   Neck: Trachea midline; supple  Lungs: normal effort, clear to auscultation. CVS sinus with no murmurs. Vasc No JVP, no carotid bruit  Abdomen: Soft, non-tender; no masses or HSM,   Extremities: no edema; no digital cyanosis or clubbing. Musculoskeletal NO joint effusion or synovitis  Skin: No rash or ulcers  Neurologic: Cranial nerves II-XII grossly intact; no motor deficit.   Psych: Appropriate affect, alert and oriented to person, place and time  NO lymphadenopathy            Relevant Labs/Imaging     CBC:   Recent Labs      12/15/18   0520   WBC  4.6   HGB  9.9*   PLT  193     BMP:    Recent Labs      12/14/18   1536  12/15/18   0520   NA  139  144   K  3.9  4.4   CL  105  107   CO2  24  26   BUN  12  11 Closed supracondylar fracture of femur, left, initial encounter [address]    Numbness       A/P    Facial numbness and twitching     Hx of DM     Hx of fibromyalgia     Hx of scleroderma     Change seroquel to 200 neuro recommendation noted   Dc today       MD JENN Kramer [address], [address] [place] [address].    Phone [phone]   Fax: [phone]  Answering Service: [phone]

## 2018-12-16 LAB — GLUCOSE BLD-MCNC: 160 MG/DL (ref 65–105)

## 2018-12-17 ENCOUNTER — CARE COORDINATION (OUTPATIENT)
Dept: CARE COORDINATION | Age: 53
End: 2018-12-17

## 2018-12-17 RX ORDER — DISULFIRAM 250 MG/1
1 TABLET ORAL DAILY
Status: ON HOLD | COMMUNITY
End: 2019-06-11 | Stop reason: HOSPADM

## 2018-12-17 RX ORDER — NALTREXONE HYDROCHLORIDE 50 MG/1
50 TABLET, FILM COATED ORAL DAILY
Status: ON HOLD | COMMUNITY
End: 2019-06-11 | Stop reason: HOSPADM

## 2018-12-17 RX ORDER — FELODIPINE 10 MG/1
10 TABLET, EXTENDED RELEASE ORAL DAILY
COMMUNITY
End: 2022-09-22 | Stop reason: ALTCHOICE

## 2018-12-17 RX ORDER — PREDNISONE 50 MG/1
50 TABLET ORAL PRN
Status: ON HOLD | COMMUNITY
End: 2019-06-11 | Stop reason: HOSPADM

## 2018-12-17 RX ORDER — ERGOCALCIFEROL (VITAMIN D2) 1250 MCG
50000 CAPSULE ORAL SEE ADMIN INSTRUCTIONS
COMMUNITY
End: 2022-10-21

## 2018-12-18 ENCOUNTER — TELEPHONE (OUTPATIENT)
Dept: FAMILY MEDICINE CLINIC | Age: 53
End: 2018-12-18

## 2018-12-18 DIAGNOSIS — S72.402D CLOSED FRACTURE OF DISTAL END OF LEFT FEMUR WITH ROUTINE HEALING, UNSPECIFIED FRACTURE MORPHOLOGY, SUBSEQUENT ENCOUNTER: ICD-10-CM

## 2018-12-18 NOTE — TELEPHONE ENCOUNTER
[facility] Transitions Initial Follow Up Call    Outreach made within 2 business days of discharge: Yes    Patient: Tena Braxton Patient : 1965   MRN: [id number]  Reason for Admission: There are no discharge diagnoses documented for the most recent discharge.   Discharge Date: 12/15/18       Spoke with: lmor    Discharge department/facility: [facility]        Scheduled appointment with PCP within 7-14 days    Follow Up  Future Appointments  Date Time Provider Sylvester Collins   2018 10:30 AM Shiela Biggs   2018 2:00 PM MD GABI PutnamVZ PAIN [facility]   2019 8:10 AM Mounika Ruiz DO ORTHO SPECIA [facility]   2019 1:10 PM Trenton Tobar MD Lynx Uro [facility]   2019 2:20 PM Emerald Dela Cruz MD PX Rehab [facility]   2019 8:15 AM COLTON Kyle CNP [facility] FP [facility]   2019 8:30 AM John Clark MD AFL Neph Malia None   3/6/2019 8:20 AM CLOTON Rodriguez CNP Neuro Spec [facility]   10/10/2019 9:00 AM Cheryl Nava MD  derm [facility]       [facility]

## 2018-12-18 NOTE — TELEPHONE ENCOUNTER
Left femur retrograde intramedullary nailing on 11/09/2018. Patient requesting refill on pain medication. She is taking as prescribed.   Please advise

## 2018-12-19 RX ORDER — OXYCODONE HYDROCHLORIDE AND ACETAMINOPHEN 5; 325 MG/1; MG/1
1 TABLET ORAL EVERY 12 HOURS PRN
Qty: 14 TABLET | Refills: 0 | Status: SHIPPED | OUTPATIENT
Start: 2018-12-19 | End: 2018-12-26

## 2018-12-20 ENCOUNTER — OFFICE VISIT (OUTPATIENT)
Dept: OBGYN CLINIC | Age: 53
End: 2018-12-20

## 2018-12-20 ENCOUNTER — HOSPITAL ENCOUNTER (OUTPATIENT)
Dept: PAIN MANAGEMENT | Age: 53
Discharge: HOME | End: 2018-12-20
Payer: MEDICAID

## 2018-12-20 VITALS
HEART RATE: 76 BPM | DIASTOLIC BLOOD PRESSURE: 76 MMHG | BODY MASS INDEX: 25.76 KG/M2 | WEIGHT: 170 LBS | HEIGHT: 68 IN | SYSTOLIC BLOOD PRESSURE: 128 MMHG

## 2018-12-20 VITALS
BODY MASS INDEX: 25.76 KG/M2 | SYSTOLIC BLOOD PRESSURE: 117 MMHG | DIASTOLIC BLOOD PRESSURE: 91 MMHG | TEMPERATURE: 97.8 F | HEIGHT: 68 IN | WEIGHT: 170 LBS | HEART RATE: 50 BPM | OXYGEN SATURATION: 100 % | RESPIRATION RATE: 16 BRPM

## 2018-12-20 DIAGNOSIS — N94.19 DYSPAREUNIA DUE TO MEDICAL CONDITION IN FEMALE: Primary | ICD-10-CM

## 2018-12-20 PROCEDURE — 20552 NJX 1/MLT TRIGGER POINT 1/2: CPT

## 2018-12-20 PROCEDURE — 20552 NJX 1/MLT TRIGGER POINT 1/2: CPT | Performed by: PAIN MEDICINE

## 2018-12-20 PROCEDURE — 99024 POSTOP FOLLOW-UP VISIT: CPT | Performed by: OBSTETRICS & GYNECOLOGY

## 2018-12-20 PROCEDURE — 2500000003 HC RX 250 WO HCPCS

## 2018-12-20 PROCEDURE — 6360000002 HC RX W HCPCS

## 2018-12-20 ASSESSMENT — PAIN - FUNCTIONAL ASSESSMENT: PAIN_FUNCTIONAL_ASSESSMENT: 0-10

## 2018-12-20 ASSESSMENT — ENCOUNTER SYMPTOMS: BOWEL INCONTINENCE: 0

## 2018-12-20 ASSESSMENT — PAIN DESCRIPTION - DESCRIPTORS: DESCRIPTORS: CONSTANT;SHARP

## 2018-12-20 NOTE — PROGRESS NOTES
to affected area TID-QID. 30 g 11    gabapentin (NEURONTIN) 800 MG tablet Take 1 tablet by mouth 4 times daily for 30 days. . (Patient taking differently: Take 800 mg by mouth 4 times daily. Prescribed by rheumatology.) 120 tablet 5    cyclobenzaprine (FLEXERIL) 5 MG tablet TAKE 1 OR 2 TABLETS BY MOUTH AT BEDTIME AS NEEDED 40 tablet 2    omeprazole (PRILOSEC) 20 MG delayed release capsule TAKE 1 CAPSULE BY MOUTH TWO TIMES A DAY  180 capsule 1    cetirizine (ZYRTEC) 10 MG tablet TAKE 1 TABLET BY MOUTH ONE TIME A DAY  30 tablet 5    ferrous sulfate (FE TABS) 325 (65 Fe) MG EC tablet Take 1 tablet by mouth 2 times daily 90 tablet 3    pravastatin (PRAVACHOL) 40 MG tablet TAKE 1 TABLET BY MOUTH AT BEDTIME  90 tablet 1    lamoTRIgine (LAMICTAL) 100 MG tablet Take 100 mg by mouth daily       aspirin 81 MG tablet Take 1 tablet by mouth 2 times daily 60 tablet 11    levothyroxine (SYNTHROID) 25 MCG tablet Take 25 mcg by mouth Daily      albuterol sulfate  (90 Base) MCG/ACT inhaler Inhale 2 puffs into the lungs every 6 hours as needed for Wheezing 1 Inhaler 3    glucose blood VI test strips (ASCENSIA AUTODISC VI;ONE TOUCH ULTRA TEST VI) strip Use with associated glucose meter. 100 strip 11    potassium chloride (KLOR-CON M) 10 MEQ extended release tablet Take 10 mEq by mouth 2 times daily Prescribed by endocrinologist      venlafaxine (EFFEXOR) 75 MG tablet Take 2 tablets (150 mg) in the morning and 1 tablet (75 mg) in the evening. 90 tablet 3    Multiple Vitamins-Minerals (THERAPEUTIC MULTIVITAMIN-MINERALS) tablet Take 1 tablet by mouth daily      vitamin B-12 (CYANOCOBALAMIN) 1000 MCG tablet Take 1,000 mcg by mouth daily       No current facility-administered medications for this visit.           ALLERGIES:  Allergies as of 12/20/2018 - Review Complete 12/20/2018   Allergen Reaction Noted    Morphine Nausea And Vomiting 02/24/2017    Amlodipine Other (See Comments) 02/24/2017    Dilaudid degenerative joint disease worse at L5-S1 and L4-L5. Pain management consultation pending.  Sleep walking disorder      Priority: Medium    Neuropathy      Priority: Medium    GERD (gastroesophageal reflux disease)      Priority: Medium    Antinuclear antibody (IQRA) titer greater than 1:80      Priority: Medium    Anemia      Priority: Medium    Seizures ([address] 02/24/2017     Priority: Medium     An isolated episode of witnessed generalized tonic-clonic seizure activity in January 2017 while in [place] likely provoked by Tramadol. An EEG in June 2017 was normal.  A MRI of the brain in April 2017 was normal.    She was taking Tramadol which was discontinued and also uses Lamotrigine primarily for her bipolar disorder.       Numbness 12/14/2018    Closed fracture of lower end of femur ([address] 11/08/2018    Closed supracondylar fracture of femur, left, initial encounter ([address]     Spinal stenosis, cervical region 10/30/2018    Neural foraminal stenosis of cervical spine 10/30/2018    Left carpal tunnel syndrome     Osteoarthritis of spine with radiculopathy, cervical region     Degenerative disc disease, cervical     Trigger middle finger of right hand     Trigger ring finger of right hand     CKD (chronic kidney disease), stage II 08/23/2018    Scleroderma ([address] 08/23/2018    Vitamin D deficiency 08/23/2018    Cystocele, unspecified (CODE) 07/31/2018  7/30/18 Cystocele repair, Cystoscopy with OBTRYX Ureteral Sling 07/30/2018     CYSTOCELE REPAIR  CYSTOSCOPY WITH OBTRYX URETERAL SLING      Overflow incontinence     Cystocele, midline 07/29/2018    JOYCE (stress urinary incontinence, female) 07/29/2018    Bradycardia 01/26/2018    Hypotension 01/24/2018    Nuclear senile cataract 01/24/2018    Acute cystitis without hematuria 01/16/2018    Hypovolemia 01/16/2018    Atrial fibrillation with slow ventricular response ([address] 01/15/2018    H/O: stroke 01/15/2018    Chronic fatigue

## 2018-12-26 DIAGNOSIS — M79.18 MYOFASCIAL PAIN ON RIGHT SIDE: ICD-10-CM

## 2018-12-26 RX ORDER — LIDOCAINE
POWDER (GRAM) MISCELLANEOUS
Qty: 30 G | Refills: 11 | Status: SHIPPED | OUTPATIENT
Start: 2018-12-26 | End: 2019-01-15 | Stop reason: SDUPTHER

## 2018-12-27 RX ORDER — PRAVASTATIN SODIUM 40 MG
TABLET ORAL
Qty: 90 TABLET | Refills: 1 | Status: SHIPPED | OUTPATIENT
Start: 2018-12-27 | End: 2020-04-30 | Stop reason: SDUPTHER

## 2018-12-27 NOTE — TELEPHONE ENCOUNTER
Last visit: 11/14/18  Last Med refill: 7/2/18  Does patient have enough medication for 72 hours: Yes    Next Visit Date: none  Future Appointments  Date Time Provider Sylvester Collins   1/7/2019 8:10 AM [place], DO ORTHO SPECIA [facility]   1/7/2019 1:10 PM Juan Adame MD [place] [facility]   1/10/2019 3:00 PM Saima Locke MD [facility]   1/14/2019 2:20 PM Autumn Avalos MD [facility]   2/18/2019 8:15 AM Butch Lujan APRN - CNP [facility]   2/28/2019 8:30 AM Ines Duarte MD [facility] Neph [facility] None   3/6/2019 8:20 AM Kati Osullivan APRN - CNP Neuro Spec [facility]   3/26/2019 12:00 PM Lobito Castillo DO [facility]   10/10/2019 9:00 AM Roel Toth MD  derm [address] Maintenance   Topic Date Due    Shingles Vaccine (1 of 2 - 2 Dose Series) 08/14/2019 (Originally 7/29/2015)    Diabetic retinal exam  01/24/2019    Diabetic foot exam  08/14/2019    Lipid screen  09/21/2019    A1C test (Diabetic or Prediabetic)  09/27/2019    Potassium monitoring  12/15/2019    Creatinine monitoring  12/15/2019    Breast cancer screen  01/12/2020    Colon cancer screen colonoscopy  06/25/2023    DTaP/Tdap/Td vaccine (3 - Td) 09/14/2027    Flu vaccine  Completed    Pneumococcal med risk  Completed    Hepatitis C screen  Completed    HIV screen  Completed       Hemoglobin A1C (%)   Date Value   09/27/2018 5.3   09/21/2018 5.7   08/14/2018 5.4             ( goal A1C is < 7)   Microalb/Crt.  Ratio (mcg/mg creat)   Date Value   09/21/2018 3     LDL Cholesterol (mg/dL)   Date Value   09/21/2018 86   06/26/2018 84       (goal LDL is <100)   AST (U/L)   Date Value   12/14/2018 11     ALT (U/L)   Date Value   12/14/2018 8     BUN (mg/dL)   Date Value   12/15/2018 11     BP Readings from Last 3 Encounters:   12/20/18 (!) 117/91   12/20/18 128/76   12/15/18 112/64          (goal 120/80)    All Future Testing planned in [facility]  Lab Frequency Next Occurrence   CBC Once 09/24/2018   Urinalysis Once 09/24/2018   Hemoglobin and Hematocrit, Blood Once 98/77/1657   Basic Metabolic Panel     CBC Auto Differential     Creatinine, Random Urine     Phosphorus     Protein, urine, random     Urinalysis Every 12 Weeks 1/18/2019, 4/12/2019, 7/5/2019, 9/27/2019, 12/20/2019, 3/13/2020, 6/5/2020, 8/28/2020, 11/20/2020, 2/12/2021, 5/7/2021, 8/79/6958   Basic Metabolic Panel Every 12 Weeks    CBC Auto Differential Every 12 Weeks    Creatinine, Random Urine Every 12 Weeks    Phosphorus Every 12 Weeks    Protein / creatinine ratio, urine Every 12 Weeks    Protein, urine, random Every 12 Weeks    PTH, INTACT WITH IONIZED CALCIUM Every 12 Weeks    Vitamin D 25 Hydroxy Every 12 Weeks                Patient Active Problem List:     Seizures (HCC)     Transient insomnia     Sleep walking disorder     Raynaud's disease     Pulmonary nodules     Neuropathy     Hypertension     GERD (gastroesophageal reflux disease)     Fibromyalgia     Depression     Degenerative disc disease, thoracic     CAD (coronary artery disease)     Bipolar disorder (HCC)     Asthma     Anxiety     Antinuclear antibody (IQRA) titer greater than 1:80     Anemia     Hx of Stroke ([address]     Controlled type 2 diabetes mellitus without complication (HCC)     Degenerative arthritis of right knee     S/P knee surgery     Chronic fatigue syndrome     Spondylosis of cervical region without myelopathy or radiculopathy     H/O hysterectomy with BSO at [facility] for benign disease 2014     Hx of total bilateral knee replacement     H/O: hysterectomy     Atrial fibrillation with slow ventricular response ([address]     H/O: stroke     Acute cystitis without hematuria     Hypovolemia     Hypotension     Bradycardia     Cystocele, midline     JOYCE (stress urinary incontinence, female)     Overflow incontinence     7/30/18 Cystocele repair, Cystoscopy with OBTRYX Ureteral Sling     Cystocele, unspecified (CODE)     Nuclear senile

## 2018-12-28 ENCOUNTER — CARE COORDINATION (OUTPATIENT)
Dept: CARE COORDINATION | Age: 53
End: 2018-12-28

## 2018-12-28 NOTE — CARE COORDINATION
Ambulatory Care Coordination Note  12/28/2018  CM Risk Score: 11  Jelena Mortality Risk Score:      ACC: Yohana Perkins, RN    Summary Note: No further symptoms of facial numbness or arm twitching, she did not reschedule a sooner neurology appt. She went to pain management for injections thoracic spine. She feels well, denied any blood sugar problems- hasn't checked it lately. She is getting around better, no falls. She was not able to talk long, unable to review medications or appts with her. CC Plan:   -Follow in one month to check on medications, review physician progress notes as she has several upcoming appts. Diabetes Assessment    Medic Alert ID:  No  Meal Planning:  None   How often do you test your blood sugar?:  Other, No Testing (Comment: only PRN if having symptoms)   Do you have barriers with adherence to non-pharmacologic self-management interventions?  (Nutrition/Exercise/Self-Monitoring):  Yes   Have you ever had to go to the ED for symptoms of low blood sugar?:  No       No patient-reported symptoms       and   General Assessment    Do you have any symptoms that are causing concern?:  No               Care Coordination Interventions    Program Enrollment:  Complex Care  Referral from Primary Care Provider:  No  Suggested Interventions and [address] [place] [address]:  Completed (Comment: Sees Dr. Araceli Santo every 3 weeks)  Fall Risk Prevention:  Completed  Home Health Services:  Declined  Physical Therapy:  Completed  Specialty Services Referral:  Completed (Comment: pain management)         Goals Addressed             Most Recent     Reduce Falls    On track (12/28/2018)             I will reduce my risk of falls by the following: Remove rugs or use non slip rugs  Install grab bars in bathroom  Use walking aids like cane or walker  Follow through on orders for PT    Barriers: none  Plan for overcoming my barriers: N/A, is going to physical therapy to improve balance  Confidence: 7/10  Anticipated Goal Completion Date: 3 months 12/30/18            Prior to Admission medications    Medication Sig Start Date End Date Taking? Authorizing Provider   pravastatin (PRAVACHOL) 40 MG tablet TAKE 1 TABLET BY MOUTH AT BEDTIME  12/27/18   Karina Waters PA-C   Lidocaine POWD Formula 74B: amit2%+carbamazapine2%+lido2%+prilo2%. Apply 1-2 gm topically to affected area TID-QID. 12/26/18   Batsheva Watson MD   conjugated estrogens (PREMARIN) 0.625 MG/GM vaginal cream Place 0.5 g vaginally daily Place twice daily at bedtime for first 2 weeks then at bedtime once daily for 2 weeks then 3 times/week.  12/20/18   Manas Pool,    EPINEPHrine HCl, Anaphylaxis, (EPIPEN 2-JIGAR IM) Inject 1 Syringe into the muscle PRN for angioedema, takes with the prednisone    Historical Provider, MD   predniSONE (DELTASONE) 50 MG tablet Take 50 mg by mouth as needed Takes with Epi pen for angioedema    Historical Provider, MD   Disulfiram 500 MG TABS Take 1 tablet by mouth daily Prescribed per psych    Historical Provider, MD   felodipine (PLENDIL) 10 MG extended release tablet Take 10 mg by mouth daily    Historical Provider, MD   ergocalciferol (ERGOCALCIFEROL) 39844 units capsule Take 50,000 Units by mouth See Admin Instructions Takes twice a week    Historical Provider, MD   naltrexone (DEPADE) 50 MG tablet Take 50 mg by mouth daily Prescribed by psych    Historical Provider, MD   QUEtiapine (SEROQUEL) 300 MG tablet Take 0.5 tablets by mouth nightly  Patient taking differently: Take 300 mg by mouth nightly  12/15/18   Chance Correa MD   propranolol (INDERAL) 10 MG tablet Take 30 mg by mouth nightly    Historical Provider, MD   losartan (COZAAR) 100 MG tablet TAKE 1 TABLET BY MOUTH ONE TIME A DAY  11/30/18   Sara Deal, APRN - [place]   hydroxychloroquine (PLAQUENIL) 200 MG tablet TAKE 1 TABLET BY MOUTH ONE TIME A DAY  11/30/18   Sara Deal, APRN - CNP   gabapentin (NEURONTIN) 800 MG tablet Take

## 2019-01-04 DIAGNOSIS — S72.402D CLOSED FRACTURE OF DISTAL END OF LEFT FEMUR WITH ROUTINE HEALING, UNSPECIFIED FRACTURE MORPHOLOGY, SUBSEQUENT ENCOUNTER: Primary | ICD-10-CM

## 2019-01-06 ENCOUNTER — APPOINTMENT (OUTPATIENT)
Dept: GENERAL RADIOLOGY | Age: 54
End: 2019-01-06
Payer: MEDICAID

## 2019-01-06 ENCOUNTER — HOSPITAL ENCOUNTER (EMERGENCY)
Age: 54
Discharge: HOME | End: 2019-01-06
Attending: EMERGENCY MEDICINE
Payer: MEDICAID

## 2019-01-06 VITALS
SYSTOLIC BLOOD PRESSURE: 135 MMHG | RESPIRATION RATE: 15 BRPM | DIASTOLIC BLOOD PRESSURE: 73 MMHG | OXYGEN SATURATION: 98 % | BODY MASS INDEX: 25.85 KG/M2 | HEART RATE: 76 BPM | TEMPERATURE: 97.5 F | WEIGHT: 170 LBS

## 2019-01-06 DIAGNOSIS — M79.671 RIGHT FOOT PAIN: Primary | ICD-10-CM
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
## 2019-01-06 DIAGNOSIS — R29.898 SUSPECTED FRACTURE OF BONE: ICD-10-CM

## 2019-01-06 PROCEDURE — 73630 X-RAY EXAM OF FOOT: CPT

## 2019-01-06 PROCEDURE — 73562 X-RAY EXAM OF KNEE 3: CPT

## 2019-01-06 PROCEDURE — 99284 EMERGENCY DEPT VISIT MOD MDM: CPT

## 2019-01-06 PROCEDURE — 6370000000 HC RX 637 (ALT 250 FOR IP): Performed by: STUDENT IN AN ORGANIZED HEALTH CARE EDUCATION/TRAINING PROGRAM

## 2019-01-06 PROCEDURE — 73552 X-RAY EXAM OF FEMUR 2/>: CPT

## 2019-01-06 PROCEDURE — 73610 X-RAY EXAM OF ANKLE: CPT

## 2019-01-06 RX ORDER — ACETAMINOPHEN 325 MG/1
650 TABLET ORAL EVERY 6 HOURS PRN
Qty: 20 TABLET | Refills: 0 | Status: ON HOLD | OUTPATIENT
Start: 2019-01-06 | End: 2019-06-11 | Stop reason: HOSPADM

## 2019-01-06 RX ORDER — ACETAMINOPHEN 325 MG/1
650 TABLET ORAL ONCE
Status: COMPLETED | OUTPATIENT
Start: 2019-01-06 | End: 2019-01-06

## 2019-01-06 RX ADMIN — ACETAMINOPHEN 650 MG: 325 TABLET ORAL at 18:31

## 2019-01-06 ASSESSMENT — PAIN DESCRIPTION - LOCATION
LOCATION: LEG
LOCATION_2: ANKLE

## 2019-01-06 ASSESSMENT — PAIN DESCRIPTION - ORIENTATION
ORIENTATION: LEFT
ORIENTATION_2: RIGHT

## 2019-01-06 ASSESSMENT — PAIN SCALES - GENERAL: PAINLEVEL_OUTOF10: 10

## 2019-01-06 ASSESSMENT — PAIN DESCRIPTION - DURATION: DURATION_2: CONTINUOUS

## 2019-01-06 ASSESSMENT — PAIN DESCRIPTION - DESCRIPTORS: DESCRIPTORS: ACHING

## 2019-01-06 ASSESSMENT — PAIN DESCRIPTION - INTENSITY: RATING_2: 10

## 2019-01-06 ASSESSMENT — PAIN DESCRIPTION - PAIN TYPE: TYPE: ACUTE PAIN

## 2019-01-07 ENCOUNTER — OFFICE VISIT (OUTPATIENT)
Dept: ORTHOPEDIC SURGERY | Age: 54
End: 2019-01-07

## 2019-01-07 ENCOUNTER — TELEPHONE (OUTPATIENT)
Dept: ORTHOPEDIC SURGERY | Age: 54
End: 2019-01-07

## 2019-01-07 ENCOUNTER — OFFICE VISIT (OUTPATIENT)
Dept: UROLOGY | Age: 54
End: 2019-01-07
Payer: MEDICAID

## 2019-01-07 ENCOUNTER — CARE COORDINATION (OUTPATIENT)
Dept: CARE COORDINATION | Age: 54
End: 2019-01-07

## 2019-01-07 VITALS — BODY MASS INDEX: 25.76 KG/M2 | WEIGHT: 169.97 LBS | HEIGHT: 68 IN

## 2019-01-07 VITALS
SYSTOLIC BLOOD PRESSURE: 113 MMHG | DIASTOLIC BLOOD PRESSURE: 68 MMHG | WEIGHT: 171.96 LBS | HEIGHT: 68 IN | TEMPERATURE: 79.6 F | BODY MASS INDEX: 26.06 KG/M2 | HEART RATE: 97 BPM

## 2019-01-07 DIAGNOSIS — R39.15 URGENCY OF URINATION: ICD-10-CM

## 2019-01-07 DIAGNOSIS — M79.89 SWELLING OF RIGHT FOOT: ICD-10-CM

## 2019-01-07 DIAGNOSIS — M25.471 PAIN AND SWELLING OF ANKLE, RIGHT: ICD-10-CM

## 2019-01-07 DIAGNOSIS — M25.571 PAIN AND SWELLING OF ANKLE, RIGHT: ICD-10-CM

## 2019-01-07 DIAGNOSIS — N39.3 STRESS INCONTINENCE: Primary | ICD-10-CM

## 2019-01-07 DIAGNOSIS — M79.671 ACUTE PAIN OF RIGHT FOOT: ICD-10-CM

## 2019-01-07 DIAGNOSIS — S72.402D CLOSED FRACTURE OF DISTAL END OF LEFT FEMUR WITH ROUTINE HEALING, UNSPECIFIED FRACTURE MORPHOLOGY, SUBSEQUENT ENCOUNTER: Primary | ICD-10-CM

## 2019-01-07 DIAGNOSIS — R39.12 WEAK URINARY STREAM: ICD-10-CM

## 2019-01-07 PROCEDURE — G8427 DOCREV CUR MEDS BY ELIG CLIN: HCPCS | Performed by: UROLOGY

## 2019-01-07 PROCEDURE — G8417 CALC BMI ABV UP PARAM F/U: HCPCS | Performed by: UROLOGY

## 2019-01-07 PROCEDURE — G8417 CALC BMI ABV UP PARAM F/U: HCPCS | Performed by: ORTHOPAEDIC SURGERY

## 2019-01-07 PROCEDURE — 1036F TOBACCO NON-USER: CPT | Performed by: UROLOGY

## 2019-01-07 PROCEDURE — 99024 POSTOP FOLLOW-UP VISIT: CPT | Performed by: ORTHOPAEDIC SURGERY

## 2019-01-07 PROCEDURE — 99214 OFFICE O/P EST MOD 30 MIN: CPT | Performed by: UROLOGY

## 2019-01-07 PROCEDURE — G8482 FLU IMMUNIZE ORDER/ADMIN: HCPCS | Performed by: ORTHOPAEDIC SURGERY

## 2019-01-07 PROCEDURE — G8427 DOCREV CUR MEDS BY ELIG CLIN: HCPCS | Performed by: ORTHOPAEDIC SURGERY

## 2019-01-07 PROCEDURE — G8598 ASA/ANTIPLAT THER USED: HCPCS | Performed by: UROLOGY

## 2019-01-07 PROCEDURE — 1036F TOBACCO NON-USER: CPT | Performed by: ORTHOPAEDIC SURGERY

## 2019-01-07 PROCEDURE — 3017F COLORECTAL CA SCREEN DOC REV: CPT | Performed by: ORTHOPAEDIC SURGERY

## 2019-01-07 PROCEDURE — G8482 FLU IMMUNIZE ORDER/ADMIN: HCPCS | Performed by: UROLOGY

## 2019-01-07 PROCEDURE — G8598 ASA/ANTIPLAT THER USED: HCPCS | Performed by: ORTHOPAEDIC SURGERY

## 2019-01-07 PROCEDURE — 3017F COLORECTAL CA SCREEN DOC REV: CPT | Performed by: UROLOGY

## 2019-01-07 RX ORDER — HYDROCODONE BITARTRATE AND ACETAMINOPHEN 5; 325 MG/1; MG/1
1 TABLET ORAL EVERY 6 HOURS PRN
Qty: 28 TABLET | Refills: 0 | Status: SHIPPED | OUTPATIENT
Start: 2019-01-07 | End: 2019-01-14

## 2019-01-07 ASSESSMENT — ENCOUNTER SYMPTOMS
VOMITING: 0
DIARRHEA: 0
NAUSEA: 0
SHORTNESS OF BREATH: 0
BACK PAIN: 0
COLOR CHANGE: 0
NAUSEA: 0
COLOR CHANGE: 1
ABDOMINAL PAIN: 0
CONSTIPATION: 0
COUGH: 0
EYE PAIN: 0
EYE REDNESS: 0
BACK PAIN: 0
NAUSEA: 0
WHEEZING: 0
RHINORRHEA: 0
ABDOMINAL PAIN: 0
SHORTNESS OF BREATH: 0
COUGH: 0
VOMITING: 0

## 2019-01-10 ENCOUNTER — HOSPITAL ENCOUNTER (OUTPATIENT)
Dept: PAIN MANAGEMENT | Age: 54
Discharge: HOME | End: 2019-01-10
Payer: MEDICAID

## 2019-01-10 VITALS
SYSTOLIC BLOOD PRESSURE: 105 MMHG | HEART RATE: 64 BPM | DIASTOLIC BLOOD PRESSURE: 71 MMHG | RESPIRATION RATE: 16 BRPM | TEMPERATURE: 98.1 F

## 2019-01-10 DIAGNOSIS — M48.02 SPINAL STENOSIS, CERVICAL REGION: Primary | ICD-10-CM

## 2019-01-10 PROCEDURE — 99213 OFFICE O/P EST LOW 20 MIN: CPT | Performed by: PAIN MEDICINE

## 2019-01-10 PROCEDURE — 99214 OFFICE O/P EST MOD 30 MIN: CPT

## 2019-01-11 DIAGNOSIS — S99.921A INJURY OF RIGHT FOOT, INITIAL ENCOUNTER: Primary | ICD-10-CM

## 2019-01-14 ENCOUNTER — OFFICE VISIT (OUTPATIENT)
Dept: ORTHOPEDIC SURGERY | Age: 54
End: 2019-01-14
Payer: MEDICAID

## 2019-01-14 VITALS — HEIGHT: 68 IN | WEIGHT: 171.96 LBS | BODY MASS INDEX: 26.06 KG/M2

## 2019-01-14 DIAGNOSIS — M79.89 SWELLING OF RIGHT FOOT: ICD-10-CM

## 2019-01-14 DIAGNOSIS — M25.571 PAIN AND SWELLING OF ANKLE, RIGHT: ICD-10-CM

## 2019-01-14 DIAGNOSIS — M25.471 PAIN AND SWELLING OF ANKLE, RIGHT: ICD-10-CM

## 2019-01-14 DIAGNOSIS — S72.402D CLOSED FRACTURE OF DISTAL END OF LEFT FEMUR WITH ROUTINE HEALING, UNSPECIFIED FRACTURE MORPHOLOGY, SUBSEQUENT ENCOUNTER: Primary | ICD-10-CM

## 2019-01-14 PROCEDURE — G8417 CALC BMI ABV UP PARAM F/U: HCPCS | Performed by: ORTHOPAEDIC SURGERY

## 2019-01-14 PROCEDURE — G8482 FLU IMMUNIZE ORDER/ADMIN: HCPCS | Performed by: ORTHOPAEDIC SURGERY

## 2019-01-14 PROCEDURE — G8427 DOCREV CUR MEDS BY ELIG CLIN: HCPCS | Performed by: ORTHOPAEDIC SURGERY

## 2019-01-14 PROCEDURE — 3017F COLORECTAL CA SCREEN DOC REV: CPT | Performed by: ORTHOPAEDIC SURGERY

## 2019-01-14 PROCEDURE — 1036F TOBACCO NON-USER: CPT | Performed by: ORTHOPAEDIC SURGERY

## 2019-01-14 PROCEDURE — 99213 OFFICE O/P EST LOW 20 MIN: CPT | Performed by: ORTHOPAEDIC SURGERY

## 2019-01-14 PROCEDURE — G8598 ASA/ANTIPLAT THER USED: HCPCS | Performed by: ORTHOPAEDIC SURGERY

## 2019-01-14 RX ORDER — HYDROCODONE BITARTRATE AND ACETAMINOPHEN 5; 325 MG/1; MG/1
1 TABLET ORAL EVERY 8 HOURS PRN
Qty: 28 TABLET | Refills: 0 | Status: SHIPPED | OUTPATIENT
Start: 2019-01-14 | End: 2019-01-23 | Stop reason: SDUPTHER

## 2019-01-14 ASSESSMENT — ENCOUNTER SYMPTOMS
WHEEZING: 0
BACK PAIN: 0
SHORTNESS OF BREATH: 0

## 2019-01-15 ENCOUNTER — CARE COORDINATION (OUTPATIENT)
Dept: CARE COORDINATION | Age: 54
End: 2019-01-15

## 2019-01-15 ENCOUNTER — TELEPHONE (OUTPATIENT)
Dept: GASTROENTEROLOGY | Age: 54
End: 2019-01-15

## 2019-01-15 DIAGNOSIS — E11.9 CONTROLLED TYPE 2 DIABETES MELLITUS WITHOUT COMPLICATION, WITHOUT LONG-TERM CURRENT USE OF INSULIN (HCC): Primary | ICD-10-CM

## 2019-01-15 DIAGNOSIS — M79.18 MYOFASCIAL PAIN ON RIGHT SIDE: ICD-10-CM

## 2019-01-15 RX ORDER — LIDOCAINE
POWDER (GRAM) MISCELLANEOUS
Qty: 30 G | Refills: 11 | Status: SHIPPED | OUTPATIENT
Start: 2019-01-15 | End: 2019-02-28 | Stop reason: SDUPTHER

## 2019-01-16 RX ORDER — BLOOD-GLUCOSE METER
1 KIT MISCELLANEOUS DAILY
Qty: 1 KIT | Refills: 0 | Status: ON HOLD | OUTPATIENT
Start: 2019-01-16 | End: 2019-06-11 | Stop reason: HOSPADM

## 2019-01-16 RX ORDER — GLUCOSAMINE HCL/CHONDROITIN SU 500-400 MG
CAPSULE ORAL
Qty: 100 STRIP | Refills: 5 | Status: ON HOLD | OUTPATIENT
Start: 2019-01-16 | End: 2019-06-11 | Stop reason: HOSPADM

## 2019-01-17 DIAGNOSIS — M25.571 RIGHT ANKLE PAIN, UNSPECIFIED CHRONICITY: Primary | ICD-10-CM

## 2019-01-18 ENCOUNTER — OFFICE VISIT (OUTPATIENT)
Dept: ORTHOPEDIC SURGERY | Age: 54
End: 2019-01-18
Payer: MEDICAID

## 2019-01-18 VITALS — WEIGHT: 171 LBS | HEIGHT: 67 IN | BODY MASS INDEX: 26.84 KG/M2

## 2019-01-18 DIAGNOSIS — M79.671 RIGHT FOOT PAIN: Primary | ICD-10-CM

## 2019-01-18 PROCEDURE — 1036F TOBACCO NON-USER: CPT | Performed by: ORTHOPAEDIC SURGERY

## 2019-01-18 PROCEDURE — 3017F COLORECTAL CA SCREEN DOC REV: CPT | Performed by: ORTHOPAEDIC SURGERY

## 2019-01-18 PROCEDURE — G8482 FLU IMMUNIZE ORDER/ADMIN: HCPCS | Performed by: ORTHOPAEDIC SURGERY

## 2019-01-18 PROCEDURE — 99214 OFFICE O/P EST MOD 30 MIN: CPT | Performed by: ORTHOPAEDIC SURGERY

## 2019-01-18 PROCEDURE — G8417 CALC BMI ABV UP PARAM F/U: HCPCS | Performed by: ORTHOPAEDIC SURGERY

## 2019-01-18 PROCEDURE — G8427 DOCREV CUR MEDS BY ELIG CLIN: HCPCS | Performed by: ORTHOPAEDIC SURGERY

## 2019-01-18 PROCEDURE — G8598 ASA/ANTIPLAT THER USED: HCPCS | Performed by: ORTHOPAEDIC SURGERY

## 2019-01-18 ASSESSMENT — ENCOUNTER SYMPTOMS
ABDOMINAL PAIN: 0
SHORTNESS OF BREATH: 0
EYE PAIN: 0

## 2019-01-21 ENCOUNTER — HOSPITAL ENCOUNTER (OUTPATIENT)
Dept: CT IMAGING | Age: 54
Discharge: HOME | End: 2019-01-23
Payer: MEDICAID

## 2019-01-21 DIAGNOSIS — M79.671 RIGHT FOOT PAIN: ICD-10-CM

## 2019-01-21 PROCEDURE — 73700 CT LOWER EXTREMITY W/O DYE: CPT

## 2019-01-23 ENCOUNTER — HOSPITAL ENCOUNTER (OUTPATIENT)
Dept: PHYSICAL THERAPY | Age: 54
Setting detail: THERAPIES SERIES
Discharge: HOME | End: 2019-01-23
Payer: MEDICAID

## 2019-01-23 PROCEDURE — 97110 THERAPEUTIC EXERCISES: CPT

## 2019-01-23 PROCEDURE — 97162 PT EVAL MOD COMPLEX 30 MIN: CPT

## 2019-01-25 ENCOUNTER — HOSPITAL ENCOUNTER (OUTPATIENT)
Dept: PHYSICAL THERAPY | Age: 54
Setting detail: THERAPIES SERIES
Discharge: HOME | End: 2019-01-25
Payer: MEDICAID

## 2019-01-25 ENCOUNTER — OFFICE VISIT (OUTPATIENT)
Dept: ORTHOPEDIC SURGERY | Age: 54
End: 2019-01-25
Payer: MEDICAID

## 2019-01-25 VITALS — WEIGHT: 171 LBS | BODY MASS INDEX: 29.19 KG/M2 | HEIGHT: 64 IN

## 2019-01-25 DIAGNOSIS — S92.241D CLOSED DISPLACED FRACTURE OF MEDIAL CUNEIFORM OF RIGHT FOOT WITH ROUTINE HEALING, SUBSEQUENT ENCOUNTER: ICD-10-CM

## 2019-01-25 DIAGNOSIS — M79.671 RIGHT FOOT PAIN: Primary | ICD-10-CM

## 2019-01-25 DIAGNOSIS — S92.221D: ICD-10-CM

## 2019-01-25 DIAGNOSIS — S92.231D: ICD-10-CM

## 2019-01-25 DIAGNOSIS — S92.301D CLOSED NONDISPLACED FRACTURE OF METATARSAL BONE OF RIGHT FOOT WITH ROUTINE HEALING, UNSPECIFIED METATARSAL, SUBSEQUENT ENCOUNTER: ICD-10-CM

## 2019-01-25 PROCEDURE — 3017F COLORECTAL CA SCREEN DOC REV: CPT | Performed by: ORTHOPAEDIC SURGERY

## 2019-01-25 PROCEDURE — G8427 DOCREV CUR MEDS BY ELIG CLIN: HCPCS | Performed by: ORTHOPAEDIC SURGERY

## 2019-01-25 PROCEDURE — 97164 PT RE-EVAL EST PLAN CARE: CPT

## 2019-01-25 PROCEDURE — G8417 CALC BMI ABV UP PARAM F/U: HCPCS | Performed by: ORTHOPAEDIC SURGERY

## 2019-01-25 PROCEDURE — 97116 GAIT TRAINING THERAPY: CPT

## 2019-01-25 PROCEDURE — G8482 FLU IMMUNIZE ORDER/ADMIN: HCPCS | Performed by: ORTHOPAEDIC SURGERY

## 2019-01-25 PROCEDURE — 97110 THERAPEUTIC EXERCISES: CPT

## 2019-01-25 PROCEDURE — 1036F TOBACCO NON-USER: CPT | Performed by: ORTHOPAEDIC SURGERY

## 2019-01-25 PROCEDURE — G8598 ASA/ANTIPLAT THER USED: HCPCS | Performed by: ORTHOPAEDIC SURGERY

## 2019-01-25 PROCEDURE — 99213 OFFICE O/P EST LOW 20 MIN: CPT | Performed by: ORTHOPAEDIC SURGERY

## 2019-01-25 ASSESSMENT — ENCOUNTER SYMPTOMS
ABDOMINAL PAIN: 0
EYE PAIN: 0
SHORTNESS OF BREATH: 0

## 2019-01-29 ENCOUNTER — HOSPITAL ENCOUNTER (OUTPATIENT)
Dept: PHYSICAL THERAPY | Age: 54
Setting detail: THERAPIES SERIES
Discharge: HOME | End: 2019-01-29
Payer: MEDICAID

## 2019-01-29 PROCEDURE — 97110 THERAPEUTIC EXERCISES: CPT

## 2019-01-30 ENCOUNTER — CARE COORDINATION (OUTPATIENT)
Dept: CARE COORDINATION | Age: 54
End: 2019-01-30

## 2019-02-01 ENCOUNTER — HOSPITAL ENCOUNTER (OUTPATIENT)
Dept: PHYSICAL THERAPY | Age: 54
Setting detail: THERAPIES SERIES
Discharge: HOME | End: 2019-02-01
Payer: MEDICAID

## 2019-02-05 ENCOUNTER — HOSPITAL ENCOUNTER (OUTPATIENT)
Dept: PHYSICAL THERAPY | Age: 54
Setting detail: THERAPIES SERIES
Discharge: HOME | End: 2019-02-05
Payer: MEDICAID

## 2019-02-05 PROCEDURE — 97110 THERAPEUTIC EXERCISES: CPT

## 2019-02-08 ENCOUNTER — HOSPITAL ENCOUNTER (OUTPATIENT)
Dept: PHYSICAL THERAPY | Age: 54
Setting detail: THERAPIES SERIES
Discharge: HOME | End: 2019-02-08
Payer: MEDICAID

## 2019-02-08 PROCEDURE — 97110 THERAPEUTIC EXERCISES: CPT

## 2019-02-12 ENCOUNTER — HOSPITAL ENCOUNTER (OUTPATIENT)
Dept: PHYSICAL THERAPY | Age: 54
Setting detail: THERAPIES SERIES
Discharge: HOME | End: 2019-02-12
Payer: MEDICAID

## 2019-02-15 ENCOUNTER — HOSPITAL ENCOUNTER (OUTPATIENT)
Dept: PHYSICAL THERAPY | Age: 54
Setting detail: THERAPIES SERIES
Discharge: HOME | End: 2019-02-15
Payer: MEDICAID

## 2019-02-15 PROCEDURE — 97110 THERAPEUTIC EXERCISES: CPT

## 2019-02-19 DIAGNOSIS — M79.671 RIGHT FOOT PAIN: Primary | ICD-10-CM

## 2019-02-20 ENCOUNTER — CARE COORDINATION (OUTPATIENT)
Dept: CARE COORDINATION | Age: 54
End: 2019-02-20

## 2019-02-22 ENCOUNTER — OFFICE VISIT (OUTPATIENT)
Dept: ORTHOPEDIC SURGERY | Age: 54
End: 2019-02-22
Payer: MEDICAID

## 2019-02-22 ENCOUNTER — HOSPITAL ENCOUNTER (OUTPATIENT)
Dept: PHYSICAL THERAPY | Age: 54
Setting detail: THERAPIES SERIES
Discharge: HOME | End: 2019-02-22
Payer: MEDICAID

## 2019-02-22 VITALS
HEART RATE: 60 BPM | HEIGHT: 68 IN | BODY MASS INDEX: 25.76 KG/M2 | SYSTOLIC BLOOD PRESSURE: 104 MMHG | DIASTOLIC BLOOD PRESSURE: 72 MMHG | WEIGHT: 170 LBS

## 2019-02-22 DIAGNOSIS — S92.231D: ICD-10-CM

## 2019-02-22 DIAGNOSIS — S92.241D CLOSED DISPLACED FRACTURE OF MEDIAL CUNEIFORM OF RIGHT FOOT WITH ROUTINE HEALING, SUBSEQUENT ENCOUNTER: ICD-10-CM

## 2019-02-22 DIAGNOSIS — S92.221D: ICD-10-CM

## 2019-02-22 DIAGNOSIS — S92.301D CLOSED NONDISPLACED FRACTURE OF METATARSAL BONE OF RIGHT FOOT WITH ROUTINE HEALING, UNSPECIFIED METATARSAL, SUBSEQUENT ENCOUNTER: Primary | ICD-10-CM

## 2019-02-22 PROCEDURE — 1036F TOBACCO NON-USER: CPT | Performed by: ORTHOPAEDIC SURGERY

## 2019-02-22 PROCEDURE — G8598 ASA/ANTIPLAT THER USED: HCPCS | Performed by: ORTHOPAEDIC SURGERY

## 2019-02-22 PROCEDURE — 99213 OFFICE O/P EST LOW 20 MIN: CPT | Performed by: ORTHOPAEDIC SURGERY

## 2019-02-22 PROCEDURE — G8417 CALC BMI ABV UP PARAM F/U: HCPCS | Performed by: ORTHOPAEDIC SURGERY

## 2019-02-22 PROCEDURE — G8482 FLU IMMUNIZE ORDER/ADMIN: HCPCS | Performed by: ORTHOPAEDIC SURGERY

## 2019-02-22 PROCEDURE — G8427 DOCREV CUR MEDS BY ELIG CLIN: HCPCS | Performed by: ORTHOPAEDIC SURGERY

## 2019-02-22 PROCEDURE — 3017F COLORECTAL CA SCREEN DOC REV: CPT | Performed by: ORTHOPAEDIC SURGERY

## 2019-02-22 RX ORDER — TRAMADOL HYDROCHLORIDE 50 MG/1
50 TABLET ORAL EVERY 6 HOURS PRN
Qty: 20 TABLET | Refills: 0 | Status: SHIPPED | OUTPATIENT
Start: 2019-02-22 | End: 2019-02-27

## 2019-02-22 ASSESSMENT — ENCOUNTER SYMPTOMS
EYE PAIN: 0
ABDOMINAL PAIN: 0
SHORTNESS OF BREATH: 0

## 2019-02-25 DIAGNOSIS — M79.671 RIGHT FOOT PAIN: Primary | ICD-10-CM

## 2019-02-25 RX ORDER — CETIRIZINE HYDROCHLORIDE 10 MG/1
TABLET ORAL
Qty: 30 TABLET | Refills: 4 | Status: SHIPPED | OUTPATIENT
Start: 2019-02-25 | End: 2019-07-21 | Stop reason: SDUPTHER

## 2019-02-26 ENCOUNTER — HOSPITAL ENCOUNTER (OUTPATIENT)
Age: 54
Setting detail: SPECIMEN
Discharge: HOME | End: 2019-02-26
Payer: MEDICAID

## 2019-02-26 ENCOUNTER — APPOINTMENT (OUTPATIENT)
Dept: PHYSICAL THERAPY | Age: 54
End: 2019-02-26
Payer: MEDICAID

## 2019-02-26 ENCOUNTER — OFFICE VISIT (OUTPATIENT)
Dept: FAMILY MEDICINE CLINIC | Age: 54
End: 2019-02-26
Payer: MEDICAID

## 2019-02-26 ENCOUNTER — CARE COORDINATION (OUTPATIENT)
Dept: CARE COORDINATION | Age: 54
End: 2019-02-26

## 2019-02-26 VITALS
DIASTOLIC BLOOD PRESSURE: 80 MMHG | BODY MASS INDEX: 26.64 KG/M2 | OXYGEN SATURATION: 98 % | HEART RATE: 80 BPM | WEIGHT: 175.2 LBS | TEMPERATURE: 98.3 F | SYSTOLIC BLOOD PRESSURE: 122 MMHG

## 2019-02-26 DIAGNOSIS — N89.8 VAGINAL DISCHARGE: ICD-10-CM

## 2019-02-26 DIAGNOSIS — J45.20 MILD INTERMITTENT ASTHMA WITHOUT COMPLICATION: ICD-10-CM

## 2019-02-26 DIAGNOSIS — E11.9 CONTROLLED TYPE 2 DIABETES MELLITUS WITHOUT COMPLICATION, WITHOUT LONG-TERM CURRENT USE OF INSULIN (HCC): ICD-10-CM

## 2019-02-26 DIAGNOSIS — E55.9 VITAMIN D DEFICIENCY: ICD-10-CM

## 2019-02-26 DIAGNOSIS — M85.852 OSTEOPENIA OF NECK OF LEFT FEMUR: Primary | ICD-10-CM

## 2019-02-26 DIAGNOSIS — I10 ESSENTIAL HYPERTENSION: ICD-10-CM

## 2019-02-26 DIAGNOSIS — N18.2 CKD (CHRONIC KIDNEY DISEASE), STAGE II: ICD-10-CM

## 2019-02-26 LAB
ABSOLUTE EOS #: 0.13 K/UL (ref 0–0.44)
ABSOLUTE IMMATURE GRANULOCYTE: <0.03 K/UL (ref 0–0.3)
ABSOLUTE LYMPH #: 2.17 K/UL (ref 1.1–3.7)
ABSOLUTE MONO #: 0.5 K/UL (ref 0.1–1.2)
ANION GAP SERPL CALCULATED.3IONS-SCNC: 11 MMOL/L (ref 9–17)
BASOPHILS # BLD: 2 % (ref 0–2)
BASOPHILS ABSOLUTE: 0.09 K/UL (ref 0–0.2)
BUN BLDV-MCNC: 12 MG/DL (ref 6–20)
BUN/CREAT BLD: NORMAL (ref 9–20)
CALCIUM SERPL-MCNC: 9.5 MG/DL (ref 8.6–10.4)
CHLORIDE BLD-SCNC: 105 MMOL/L (ref 98–107)
CO2: 24 MMOL/L (ref 20–31)
CREAT SERPL-MCNC: 0.87 MG/DL (ref 0.5–0.9)
DIFFERENTIAL TYPE: ABNORMAL
EOSINOPHILS RELATIVE PERCENT: 2 % (ref 1–4)
GFR AFRICAN AMERICAN: >60 ML/MIN
GFR NON-AFRICAN AMERICAN: >60 ML/MIN
GFR SERPL CREATININE-BSD FRML MDRD: NORMAL ML/MIN/{1.73_M2}
GFR SERPL CREATININE-BSD FRML MDRD: NORMAL ML/MIN/{1.73_M2}
GLUCOSE BLD-MCNC: 89 MG/DL (ref 70–99)
HCT VFR BLD CALC: 35.5 % (ref 36.3–47.1)
HEMOGLOBIN: 11.7 G/DL (ref 11.9–15.1)
IMMATURE GRANULOCYTES: 0 %
LYMPHOCYTES # BLD: 40 % (ref 24–43)
MCH RBC QN AUTO: 28.5 PG (ref 25.2–33.5)
MCHC RBC AUTO-ENTMCNC: 33 G/DL (ref 28.4–34.8)
MCV RBC AUTO: 86.6 FL (ref 82.6–102.9)
MONOCYTES # BLD: 9 % (ref 3–12)
NRBC AUTOMATED: 0 PER 100 WBC
PDW BLD-RTO: 14.3 % (ref 11.8–14.4)
PHOSPHORUS: 3.4 MG/DL (ref 2.6–4.5)
PLATELET # BLD: 247 K/UL (ref 138–453)
PLATELET ESTIMATE: ABNORMAL
PMV BLD AUTO: 11 FL (ref 8.1–13.5)
POTASSIUM SERPL-SCNC: 4.3 MMOL/L (ref 3.7–5.3)
RBC # BLD: 4.1 M/UL (ref 3.95–5.11)
RBC # BLD: ABNORMAL 10*6/UL
SEG NEUTROPHILS: 47 % (ref 36–65)
SEGMENTED NEUTROPHILS ABSOLUTE COUNT: 2.56 K/UL (ref 1.5–8.1)
SODIUM BLD-SCNC: 140 MMOL/L (ref 135–144)
THYROXINE, FREE: 1 NG/DL (ref 0.93–1.7)
TSH SERPL DL<=0.05 MIU/L-ACNC: 3.1 MIU/L (ref 0.3–5)
VITAMIN D 25-HYDROXY: 29.9 NG/ML (ref 30–100)
WBC # BLD: 5.5 K/UL (ref 3.5–11.3)
WBC # BLD: ABNORMAL 10*3/UL

## 2019-02-26 PROCEDURE — G8417 CALC BMI ABV UP PARAM F/U: HCPCS | Performed by: NURSE PRACTITIONER

## 2019-02-26 PROCEDURE — 2022F DILAT RTA XM EVC RTNOPTHY: CPT | Performed by: NURSE PRACTITIONER

## 2019-02-26 PROCEDURE — 3017F COLORECTAL CA SCREEN DOC REV: CPT | Performed by: NURSE PRACTITIONER

## 2019-02-26 PROCEDURE — G8482 FLU IMMUNIZE ORDER/ADMIN: HCPCS | Performed by: NURSE PRACTITIONER

## 2019-02-26 PROCEDURE — 3046F HEMOGLOBIN A1C LEVEL >9.0%: CPT | Performed by: NURSE PRACTITIONER

## 2019-02-26 PROCEDURE — G8598 ASA/ANTIPLAT THER USED: HCPCS | Performed by: NURSE PRACTITIONER

## 2019-02-26 PROCEDURE — 1036F TOBACCO NON-USER: CPT | Performed by: NURSE PRACTITIONER

## 2019-02-26 PROCEDURE — 99214 OFFICE O/P EST MOD 30 MIN: CPT | Performed by: NURSE PRACTITIONER

## 2019-02-26 PROCEDURE — G8427 DOCREV CUR MEDS BY ELIG CLIN: HCPCS | Performed by: NURSE PRACTITIONER

## 2019-02-26 RX ORDER — ANTACID TABLETS 648 MG/1
1 TABLET, CHEWABLE ORAL 2 TIMES DAILY
Qty: 60 TABLET | Refills: 5 | Status: SHIPPED | OUTPATIENT
Start: 2019-02-26 | End: 2020-02-26

## 2019-02-26 ASSESSMENT — ENCOUNTER SYMPTOMS
COUGH: 0
SHORTNESS OF BREATH: 0

## 2019-02-26 ASSESSMENT — PATIENT HEALTH QUESTIONNAIRE - PHQ9: SUM OF ALL RESPONSES TO PHQ QUESTIONS 1-9: 3

## 2019-02-27 ENCOUNTER — HOSPITAL ENCOUNTER (OUTPATIENT)
Age: 54
Setting detail: SPECIMEN
Discharge: HOME | End: 2019-02-27
Payer: MEDICAID

## 2019-02-27 ENCOUNTER — TELEPHONE (OUTPATIENT)
Dept: ORTHOPEDIC SURGERY | Age: 54
End: 2019-02-27

## 2019-02-27 DIAGNOSIS — N18.2 CKD (CHRONIC KIDNEY DISEASE), STAGE II: ICD-10-CM

## 2019-02-27 LAB
CALCIUM IONIZED: 1.26 MMOL/L (ref 1.13–1.33)
CREATININE URINE: 119.2 MG/DL (ref 28–217)
DIRECT EXAM: NORMAL
ESTIMATED AVERAGE GLUCOSE: 103 MG/DL
HBA1C MFR BLD: 5.2 % (ref 4–6)
Lab: NORMAL
PTH INTACT: 175.9 PG/ML (ref 15–65)
SPECIMEN DESCRIPTION: NORMAL
TOTAL PROTEIN, URINE: 11 MG/DL
URINE TOTAL PROTEIN CREATININE RATIO: 0.09 (ref 0–0.2)

## 2019-02-28 DIAGNOSIS — M79.18 MYOFASCIAL PAIN ON RIGHT SIDE: ICD-10-CM

## 2019-02-28 PROBLEM — E11.22 TYPE 2 DIABETES MELLITUS WITH STAGE 2 CHRONIC KIDNEY DISEASE, WITHOUT LONG-TERM CURRENT USE OF INSULIN (HCC): Status: ACTIVE | Noted: 2017-05-02

## 2019-02-28 PROBLEM — N18.2 TYPE 2 DIABETES MELLITUS WITH STAGE 2 CHRONIC KIDNEY DISEASE, WITHOUT LONG-TERM CURRENT USE OF INSULIN (HCC): Status: ACTIVE | Noted: 2017-05-02

## 2019-03-01 ENCOUNTER — OFFICE VISIT (OUTPATIENT)
Dept: ORTHOPEDIC SURGERY | Age: 54
End: 2019-03-01
Payer: MEDICAID

## 2019-03-01 VITALS — BODY MASS INDEX: 26.52 KG/M2 | HEIGHT: 68 IN | WEIGHT: 175 LBS

## 2019-03-01 DIAGNOSIS — S72.402D CLOSED FRACTURE OF DISTAL END OF LEFT FEMUR WITH ROUTINE HEALING, UNSPECIFIED FRACTURE MORPHOLOGY, SUBSEQUENT ENCOUNTER: Primary | ICD-10-CM

## 2019-03-01 PROCEDURE — G8482 FLU IMMUNIZE ORDER/ADMIN: HCPCS | Performed by: ORTHOPAEDIC SURGERY

## 2019-03-01 PROCEDURE — 3017F COLORECTAL CA SCREEN DOC REV: CPT | Performed by: ORTHOPAEDIC SURGERY

## 2019-03-01 PROCEDURE — 99213 OFFICE O/P EST LOW 20 MIN: CPT | Performed by: ORTHOPAEDIC SURGERY

## 2019-03-01 PROCEDURE — G8427 DOCREV CUR MEDS BY ELIG CLIN: HCPCS | Performed by: ORTHOPAEDIC SURGERY

## 2019-03-01 PROCEDURE — G8417 CALC BMI ABV UP PARAM F/U: HCPCS | Performed by: ORTHOPAEDIC SURGERY

## 2019-03-01 PROCEDURE — 1036F TOBACCO NON-USER: CPT | Performed by: ORTHOPAEDIC SURGERY

## 2019-03-01 PROCEDURE — G8598 ASA/ANTIPLAT THER USED: HCPCS | Performed by: ORTHOPAEDIC SURGERY

## 2019-03-01 RX ORDER — LIDOCAINE
POWDER (GRAM) MISCELLANEOUS
Qty: 30 G | Refills: 11 | Status: SHIPPED | OUTPATIENT
Start: 2019-03-01 | End: 2019-04-15 | Stop reason: SDUPTHER

## 2019-03-01 ASSESSMENT — ENCOUNTER SYMPTOMS
ABDOMINAL PAIN: 0
CHEST TIGHTNESS: 0
COUGH: 0
APNEA: 0
VOMITING: 0

## 2019-03-04 ENCOUNTER — OFFICE VISIT (OUTPATIENT)
Dept: FAMILY MEDICINE CLINIC | Age: 54
End: 2019-03-04
Payer: MEDICAID

## 2019-03-04 VITALS
WEIGHT: 178.8 LBS | HEART RATE: 75 BPM | BODY MASS INDEX: 27.19 KG/M2 | SYSTOLIC BLOOD PRESSURE: 134 MMHG | OXYGEN SATURATION: 100 % | TEMPERATURE: 96.6 F | DIASTOLIC BLOOD PRESSURE: 80 MMHG
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
## 2019-03-04 DIAGNOSIS — S33.5XXA LUMBAR SPRAIN, INITIAL ENCOUNTER: ICD-10-CM

## 2019-03-04 PROCEDURE — G8482 FLU IMMUNIZE ORDER/ADMIN: HCPCS | Performed by: NURSE PRACTITIONER

## 2019-03-04 PROCEDURE — G8427 DOCREV CUR MEDS BY ELIG CLIN: HCPCS | Performed by: NURSE PRACTITIONER

## 2019-03-04 PROCEDURE — 3017F COLORECTAL CA SCREEN DOC REV: CPT | Performed by: NURSE PRACTITIONER

## 2019-03-04 PROCEDURE — G8598 ASA/ANTIPLAT THER USED: HCPCS | Performed by: NURSE PRACTITIONER

## 2019-03-04 PROCEDURE — G8417 CALC BMI ABV UP PARAM F/U: HCPCS | Performed by: NURSE PRACTITIONER

## 2019-03-04 PROCEDURE — 1036F TOBACCO NON-USER: CPT | Performed by: NURSE PRACTITIONER

## 2019-03-04 PROCEDURE — 99213 OFFICE O/P EST LOW 20 MIN: CPT | Performed by: NURSE PRACTITIONER

## 2019-03-04 ASSESSMENT — ENCOUNTER SYMPTOMS
COUGH: 0
SHORTNESS OF BREATH: 0
BACK PAIN: 1

## 2019-03-05 ENCOUNTER — HOSPITAL ENCOUNTER (OUTPATIENT)
Dept: GENERAL RADIOLOGY | Facility: CLINIC | Age: 54
Discharge: HOME | End: 2019-03-07
Payer: MEDICAID

## 2019-03-05 ENCOUNTER — OFFICE VISIT (OUTPATIENT)
Dept: ORTHOPEDIC SURGERY | Age: 54
End: 2019-03-05
Payer: MEDICAID

## 2019-03-05 ENCOUNTER — HOSPITAL ENCOUNTER (OUTPATIENT)
Facility: CLINIC | Age: 54
Discharge: HOME | End: 2019-03-07
Payer: MEDICAID

## 2019-03-05 VITALS — HEIGHT: 68 IN | BODY MASS INDEX: 26.98 KG/M2 | WEIGHT: 178 LBS

## 2019-03-05 DIAGNOSIS — M79.671 RIGHT FOOT PAIN: ICD-10-CM

## 2019-03-05 DIAGNOSIS — S93.409A INVERSION SPRAIN OF ANKLE, INITIAL ENCOUNTER: Primary | ICD-10-CM
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
## 2019-03-05 PROCEDURE — 3017F COLORECTAL CA SCREEN DOC REV: CPT | Performed by: ORTHOPAEDIC SURGERY

## 2019-03-05 PROCEDURE — 99213 OFFICE O/P EST LOW 20 MIN: CPT | Performed by: ORTHOPAEDIC SURGERY

## 2019-03-05 PROCEDURE — G8417 CALC BMI ABV UP PARAM F/U: HCPCS | Performed by: ORTHOPAEDIC SURGERY

## 2019-03-05 PROCEDURE — G8598 ASA/ANTIPLAT THER USED: HCPCS | Performed by: ORTHOPAEDIC SURGERY

## 2019-03-05 PROCEDURE — 72100 X-RAY EXAM L-S SPINE 2/3 VWS: CPT

## 2019-03-05 PROCEDURE — G8427 DOCREV CUR MEDS BY ELIG CLIN: HCPCS | Performed by: ORTHOPAEDIC SURGERY

## 2019-03-05 PROCEDURE — 1036F TOBACCO NON-USER: CPT | Performed by: ORTHOPAEDIC SURGERY

## 2019-03-05 PROCEDURE — G8482 FLU IMMUNIZE ORDER/ADMIN: HCPCS | Performed by: ORTHOPAEDIC SURGERY

## 2019-03-05 ASSESSMENT — ENCOUNTER SYMPTOMS
EYE PAIN: 0
SHORTNESS OF BREATH: 0
ABDOMINAL PAIN: 0

## 2019-03-06 ENCOUNTER — OFFICE VISIT (OUTPATIENT)
Dept: NEUROLOGY | Age: 54
End: 2019-03-06
Payer: MEDICAID

## 2019-03-06 VITALS
SYSTOLIC BLOOD PRESSURE: 126 MMHG | HEIGHT: 68 IN | DIASTOLIC BLOOD PRESSURE: 89 MMHG | HEART RATE: 82 BPM | BODY MASS INDEX: 26.52 KG/M2 | WEIGHT: 175 LBS

## 2019-03-06 DIAGNOSIS — M47.22 OSTEOARTHRITIS OF SPINE WITH RADICULOPATHY, CERVICAL REGION: ICD-10-CM

## 2019-03-06 DIAGNOSIS — M47.812 SPONDYLOSIS OF CERVICAL REGION WITHOUT MYELOPATHY OR RADICULOPATHY: Primary | ICD-10-CM

## 2019-03-06 PROCEDURE — 3017F COLORECTAL CA SCREEN DOC REV: CPT | Performed by: NURSE PRACTITIONER

## 2019-03-06 PROCEDURE — G8427 DOCREV CUR MEDS BY ELIG CLIN: HCPCS | Performed by: NURSE PRACTITIONER

## 2019-03-06 PROCEDURE — 99214 OFFICE O/P EST MOD 30 MIN: CPT | Performed by: NURSE PRACTITIONER

## 2019-03-06 PROCEDURE — G8417 CALC BMI ABV UP PARAM F/U: HCPCS | Performed by: NURSE PRACTITIONER

## 2019-03-06 PROCEDURE — G8482 FLU IMMUNIZE ORDER/ADMIN: HCPCS | Performed by: NURSE PRACTITIONER

## 2019-03-06 PROCEDURE — 1036F TOBACCO NON-USER: CPT | Performed by: NURSE PRACTITIONER

## 2019-03-06 PROCEDURE — G8598 ASA/ANTIPLAT THER USED: HCPCS | Performed by: NURSE PRACTITIONER

## 2019-03-06 RX ORDER — LAMOTRIGINE 25 MG/1
25 TABLET ORAL 2 TIMES DAILY
COMMUNITY

## 2019-03-14 ENCOUNTER — CARE COORDINATION (OUTPATIENT)
Dept: CARE COORDINATION | Age: 54
End: 2019-03-14

## 2019-03-19 ENCOUNTER — HOSPITAL ENCOUNTER (OUTPATIENT)
Dept: PHYSICAL THERAPY | Age: 54
Setting detail: THERAPIES SERIES
Discharge: HOME | End: 2019-03-19
Payer: MEDICAID

## 2019-03-19 PROCEDURE — 97110 THERAPEUTIC EXERCISES: CPT

## 2019-03-22 ENCOUNTER — OFFICE VISIT (OUTPATIENT)
Dept: PHYSICAL MEDICINE AND REHAB | Age: 54
End: 2019-03-22
Payer: MEDICAID

## 2019-03-22 ENCOUNTER — CLINICAL DOCUMENTATION (OUTPATIENT)
Dept: PHYSICAL MEDICINE AND REHAB | Age: 54
End: 2019-03-22

## 2019-03-22 VITALS
WEIGHT: 169.8 LBS | DIASTOLIC BLOOD PRESSURE: 83 MMHG | SYSTOLIC BLOOD PRESSURE: 145 MMHG | BODY MASS INDEX: 25.73 KG/M2 | HEIGHT: 68 IN | TEMPERATURE: 98.6 F | HEART RATE: 92 BPM

## 2019-03-22 DIAGNOSIS — M79.18 MYOFASCIAL PAIN SYNDROME, CERVICAL: Primary | ICD-10-CM

## 2019-03-22 PROCEDURE — G8482 FLU IMMUNIZE ORDER/ADMIN: HCPCS | Performed by: PHYSICAL MEDICINE & REHABILITATION

## 2019-03-22 PROCEDURE — 99214 OFFICE O/P EST MOD 30 MIN: CPT | Performed by: PHYSICAL MEDICINE & REHABILITATION

## 2019-03-22 PROCEDURE — G8427 DOCREV CUR MEDS BY ELIG CLIN: HCPCS | Performed by: PHYSICAL MEDICINE & REHABILITATION

## 2019-03-22 PROCEDURE — 3017F COLORECTAL CA SCREEN DOC REV: CPT | Performed by: PHYSICAL MEDICINE & REHABILITATION

## 2019-03-22 PROCEDURE — 1036F TOBACCO NON-USER: CPT | Performed by: PHYSICAL MEDICINE & REHABILITATION

## 2019-03-22 PROCEDURE — G8417 CALC BMI ABV UP PARAM F/U: HCPCS | Performed by: PHYSICAL MEDICINE & REHABILITATION

## 2019-03-22 PROCEDURE — 20553 NJX 1/MLT TRIGGER POINTS 3/>: CPT | Performed by: PHYSICAL MEDICINE & REHABILITATION

## 2019-03-22 PROCEDURE — G8598 ASA/ANTIPLAT THER USED: HCPCS | Performed by: PHYSICAL MEDICINE & REHABILITATION

## 2019-03-22 RX ORDER — LIDOCAINE HYDROCHLORIDE 10 MG/ML
3 INJECTION, SOLUTION INFILTRATION; PERINEURAL ONCE
Status: CANCELLED | OUTPATIENT
Start: 2019-03-22 | End: 2019-03-22

## 2019-03-22 RX ORDER — LIDOCAINE HYDROCHLORIDE 10 MG/ML
3 INJECTION, SOLUTION EPIDURAL; INFILTRATION; INTRACAUDAL; PERINEURAL ONCE
Status: DISCONTINUED | OUTPATIENT
Start: 2019-03-22 | End: 2019-03-22

## 2019-03-22 RX ORDER — LIDOCAINE HYDROCHLORIDE 10 MG/ML
3 INJECTION, SOLUTION INFILTRATION; PERINEURAL ONCE
Status: COMPLETED | OUTPATIENT
Start: 2019-03-22 | End: 2019-03-22

## 2019-03-22 RX ADMIN — LIDOCAINE HYDROCHLORIDE 3 ML: 10 INJECTION, SOLUTION INFILTRATION; PERINEURAL at 10:27

## 2019-03-27 RX ORDER — FAMOTIDINE 40 MG/1
TABLET, FILM COATED ORAL
Qty: 30 TABLET | Refills: 4 | OUTPATIENT
Start: 2019-03-27

## 2019-03-29 ENCOUNTER — HOSPITAL ENCOUNTER (OUTPATIENT)
Dept: PHYSICAL THERAPY | Age: 54
Setting detail: THERAPIES SERIES
Discharge: HOME | End: 2019-03-29
Payer: MEDICAID

## 2019-03-29 PROCEDURE — 97110 THERAPEUTIC EXERCISES: CPT

## 2019-03-29 PROCEDURE — 97016 VASOPNEUMATIC DEVICE THERAPY: CPT

## 2019-04-02 ENCOUNTER — HOSPITAL ENCOUNTER (OUTPATIENT)
Dept: PHYSICAL THERAPY | Age: 54
Setting detail: THERAPIES SERIES
Discharge: HOME | End: 2019-04-02
Payer: MEDICAID

## 2019-04-02 PROCEDURE — 97110 THERAPEUTIC EXERCISES: CPT

## 2019-04-02 PROCEDURE — 97116 GAIT TRAINING THERAPY: CPT

## 2019-04-02 PROCEDURE — 97016 VASOPNEUMATIC DEVICE THERAPY: CPT

## 2019-04-02 NOTE — FLOWSHEET NOTE
[x] [facility] &  Therapy  [address]  [phone]  F: [phone]        Physical Therapy Daily Treatment Note    Date:  2019  Patient Name:  Natali Pemberton    :  1965  MRN: [id number]  Physician: Lian Evans DO/ Daniel Johnson MD/ Jose L Puentes MD     Insurance: [facility] Medicaid 30 v  Medical Diagnosis: Spinal Stenosis, cervical region M48.02, Closed fracture of distal end of left femur S72.402D R Foot Pain M79.671                                Rehab Codes: M54.2, M25.662, M25.562, M25.552, R26.2, M25.571  Onset Date: 2016                                 Next 's appt: 2019- for R foot/ankle  Visit# / total visits:    Cancels/No Shows: 2/0    Subjective:    Pain:  [x] Yes  [] No Location: Neck/shoulders, L knee, R foot Pain Rating: (0-10 scale) 2/10- neck/shoulders; 7/10-R ankle  Pain altered Tx:  [x] No  [] Yes  Action: NA  Comments:  Pt reports 7/10 pain plantar portion of foot.  No issues with neck and shoulders today    Objective:  Modalities:   Precautions: 4 weeks NWB R LE- begin PWB in CAM boot beginning week of 3/25/2019  Exercises:  Exercise Reps/ Time Weight/ Level  Comments   SciFit 6 min L3.0 x  Boot off          Standing       Pre gait stepping 20x ea  x Fwd,  Lateral with boot   Marching 20x  x R only   3 way hip 20x  x R only   HS curls  20x  x R only   Tband Row 20x  x    Tband Ext 20x  x           Gait Training       Gait with RW 5 min   x WBAT with boot                  Supine       SLR    20x 2 lb x R   Hip abd 30x 2 lb x    SAQ   20x 2 lb x    4 way Tband 20xea Lime x    Calf stretch with inversion 3x30\"  x           S/L       Clamshells 20x 5\"   R only   Hip abd 20x  0 lb  R only          Prone       Hip ext 20x 5 sec 0lb  ALT LEs   HS curls 20x  1 lb  ALT LEs   Ys and Ts 15x 0lb  Single-arm; towel at forehead progressed reps 3/29   Shld ext 10x 1lb  Single-arm; towel at forehead, progressed weight 3/29          Chin tucks 20x   FLORENCIO; with break          Seated       LAQ 20x 2lb x    HS/ calfstretch 3x30\"  x With belt gentle   Heel toe raises 20x  x Added 3/29   Flex bar Roll PF 2 min   x    Ankle circles  20x   Added 3/29   Ankle alphabet 1x   Added 3/29   BAPS 20x  x A/P, M/L, circles   Other: Cont ex per above log, initiated ankle AROM ex. Specific Instructions for next treatment: begin standing ex, change UE tband ex to standing; Per 2/22 order for R foot PWB x 2 weeks, then 75% WB x2 weeks, then WBAT if pt is comfortable, including gait training, ROM, light strengthening as tolerated    Treatment Charges: Mins Units   []  Modalities     [x]  Ther Exercise 35 2   []  Manual Therapy     []  Ther Activities     []  Aquatics     [x]  Vasocompression 15 1   [x]  Other: Gait 5 1   Total Treatment time 55 min 4     Assessment: [x] Progressing toward goals. Patient demonstrated good tolerance to pre gait and gait training with WBAT in boot. Expect a smooth transition to normal foot wear. Patient is demonstrating signs of Plantar fasciitis, will continue to monitor and treat as needed Active ankle AROM is improving well. [] No change. [] Other:     STG: (to be met in 8 treatments)  1. ? Pain: 3/10 neck pain with looking down. - MET  3/10 Left hip/knee pain with ambulating.- MET  2. ? ROM: Neck R rotation to 60° to improve head turns.- MET  Left knee extension to lacking 15° to improve gait mechanics.- MET  3. ? Strength: Left hip extension and abduction to 4/5 to improve joint stability and balance. - MET  4. ? Function: LEFS and NDI score to 30% impaired or better to improve ADLs.-  MET [NDI- 26%; LEFS- 58/80]  5.  Independent with Home Exercise Programs- MET    R foot  2. ? Pain: R foot 6/10 average pain- NOT MET- constant pain  3. ? Strength: R hip flex, extension to 4-/5, and abduction to 4+/5, R knee flex, ext to 4+/5 to improve joint stability and balance.- NOT MET- R hip: 4/5 ABD; 4-/5 EXT; R knee: 4+/5 quad, HS- no pain  3. ? Function: LEFS score to 45% impaired or better to improve mobility and ADLs.- MET [58/80]  4. Pt indep w/ bed mobility keeping R LE NWB- minimal difficulty maintaining NWB status- PARTIAL MET  5. Pt indep amb household distances w/crutches- MET     LTG: (to be met in 12 treatments)  1. Patient will be able to ambulate community distances without AD for L knee. 2. Patient will be able to read with improved posture and decreased neck pain. R foot  7. ? Pain: R foot 6/10 average pain   8. Patient will be able to ambulate community distances indep w/knee scooter for R  9. Pt indep w/ascending, descending steps NWB R w/crutches and rail    Pt. Education:  [x] Yes  [] No  [] Reviewed Prior HEP/Ed , New exercises listed above. Method of Education: [x] Verbal  [] Demo  [] Written  Comprehension of Education:  [x] Verbalizes understanding-purpose, benefits of game ready  [] Demonstrates understanding. [] Needs review. [] Demonstrates/verbalizes HEP/Ed previously given. Plan: [x] Continue per plan of care.    [] Other:        Time In: 1350       Time Out: [address]    Electronically signed by:   Loida Thompson, PT

## 2019-04-05 ENCOUNTER — HOSPITAL ENCOUNTER (OUTPATIENT)
Dept: PHYSICAL THERAPY | Age: 54
Setting detail: THERAPIES SERIES
Discharge: HOME | End: 2019-04-05
Payer: MEDICAID

## 2019-04-05 PROCEDURE — 97110 THERAPEUTIC EXERCISES: CPT

## 2019-04-05 PROCEDURE — 97116 GAIT TRAINING THERAPY: CPT

## 2019-04-05 NOTE — FLOWSHEET NOTE
Ys and Ts 15x 0lb  Single-arm; towel at forehead progressed reps 3/29   Shld ext 10x 1lb  Single-arm; towel at forehead, progressed weight 3/29          Chin tucks 20x   FLORENCIO; with break          Seated       LAQ 20x 2lb x    HS/calf stretch  3x30\"   x    With belt gentle   Heel toe raises 20x  x Added 3/29   Flex bar Roll PF 2 min   x    Ankle circles  20x  x Added 3/29   Ankle alphabet 1x  x Added 3/29   BAPS 20x  x A/P, M/L, circles   Other: Completed exercises marked with \"x\"     Specific Instructions for next treatment: progress standing wt bearing ex check 4/5 MD notes, change UE tband ex to standing; Per 2/22 order for R foot PWB x 2 weeks, then 75% WB x2 weeks, then WBAT if pt is comfortable, including gait training, ROM, light strengthening as tolerated    Treatment Charges: Mins Units   []  Modalities     [x]  Ther Exercise  40 2   []  Manual Therapy     []  Ther Activities     []  Aquatics     [x]  Vasocompression      [x]  Other: Gait   5 1   Total Treatment time  45 3     Assessment: [x] Progressing toward goals  Pre gait and Gait: verbal cue for R foot placement as pt tends to adduct and avoids wt bearing on 1st MTP joint. Addressed WBAT in boot and mat exercises as listed in log for ankle and R hip/gluteal strengthening. [] No change. [] Other:     STG: (to be met in 8 treatments)  1. ? Pain: 3/10 neck pain with looking down. - MET  3/10 Left hip/knee pain with ambulating.- MET  2. ? ROM: Neck R rotation to 60° to improve head turns.- MET  Left knee extension to lacking 15° to improve gait mechanics.- MET  3. ? Strength: Left hip extension and abduction to 4/5 to improve joint stability and balance. - MET  4. ? Function: LEFS and NDI score to 30% impaired or better to improve ADLs.-  MET [NDI- 26%; LEFS- 58/80]  5.  Independent with Home Exercise Programs- MET    R foot  2. ? Pain: R foot 6/10 average pain- NOT MET- constant pain  3. ? Strength: R hip flex, extension to 4-/5, and abduction to 4+/5, R knee flex, ext to 4+/5 to improve joint stability and balance.- NOT MET- R hip: 4/5 ABD; 4-/5 EXT; R knee: 4+/5 quad, HS- no pain  3. ? Function: LEFS score to 45% impaired or better to improve mobility and ADLs.- MET [58/80]  4. Pt indep w/ bed mobility keeping R LE NWB- minimal difficulty maintaining NWB status- PARTIAL MET  5. Pt indep amb household distances w/crutches- MET     LTG: (to be met in 12 treatments)  1. Patient will be able to ambulate community distances without AD for L knee. 2. Patient will be able to read with improved posture and decreased neck pain. R foot  7. ? Pain: R foot 6/10 average pain   8. Patient will be able to ambulate community distances indep w/knee scooter for R  9. Pt indep w/ascending, descending steps NWB R w/crutches and rail    Pt. Education:  [x] Yes  [] No  [] Reviewed Prior HEP/Ed , New exercises listed above. Method of Education: [x] Verbal  [] Demo  [] Written  Comprehension of Education:  [x] Verbalizes understanding-purpose, benefits of game ready  [] Demonstrates understanding. [] Needs review. [] Demonstrates/verbalizes HEP/Ed previously given. Plan: [x] Continue per plan of care.    [] Other:        Time [id number]        Time Out:  1887    Electronically signed by:   Jenna Malloy PTA

## 2019-04-09 ENCOUNTER — APPOINTMENT (OUTPATIENT)
Dept: PHYSICAL THERAPY | Age: 54
End: 2019-04-09
Payer: MEDICAID

## 2019-04-09 ENCOUNTER — OFFICE VISIT (OUTPATIENT)
Dept: ORTHOPEDIC SURGERY | Age: 54
End: 2019-04-09
Payer: MEDICAID

## 2019-04-09 VITALS — BODY MASS INDEX: 25.61 KG/M2 | HEIGHT: 68 IN | WEIGHT: 169 LBS

## 2019-04-09 DIAGNOSIS — S92.241D CLOSED DISPLACED FRACTURE OF MEDIAL CUNEIFORM OF RIGHT FOOT WITH ROUTINE HEALING, SUBSEQUENT ENCOUNTER: ICD-10-CM

## 2019-04-09 DIAGNOSIS — S92.301D CLOSED NONDISPLACED FRACTURE OF METATARSAL BONE OF RIGHT FOOT WITH ROUTINE HEALING, UNSPECIFIED METATARSAL, SUBSEQUENT ENCOUNTER: Primary | ICD-10-CM

## 2019-04-09 DIAGNOSIS — S92.231D: ICD-10-CM

## 2019-04-09 DIAGNOSIS — S92.221D: ICD-10-CM

## 2019-04-09 PROCEDURE — G8598 ASA/ANTIPLAT THER USED: HCPCS | Performed by: ORTHOPAEDIC SURGERY

## 2019-04-09 PROCEDURE — 99213 OFFICE O/P EST LOW 20 MIN: CPT | Performed by: ORTHOPAEDIC SURGERY

## 2019-04-09 PROCEDURE — G8417 CALC BMI ABV UP PARAM F/U: HCPCS | Performed by: ORTHOPAEDIC SURGERY

## 2019-04-09 PROCEDURE — 1036F TOBACCO NON-USER: CPT | Performed by: ORTHOPAEDIC SURGERY

## 2019-04-09 PROCEDURE — 3017F COLORECTAL CA SCREEN DOC REV: CPT | Performed by: ORTHOPAEDIC SURGERY

## 2019-04-09 PROCEDURE — G8427 DOCREV CUR MEDS BY ELIG CLIN: HCPCS | Performed by: ORTHOPAEDIC SURGERY

## 2019-04-09 ASSESSMENT — ENCOUNTER SYMPTOMS
EYE PAIN: 0
ABDOMINAL PAIN: 0
SHORTNESS OF BREATH: 0

## 2019-04-09 NOTE — PROGRESS NOTES
[facility]  [address] [place], [place] [address] [address] [place] [address]  Dept: [phone]    Ambulatory Orthopedic Consult      CHIEF COMPLAINT:    Chief Complaint   Patient presents with    Foot Pain     right       HISTORY OF PRESENT ILLNESS:      The patient is a 48 y.o. female who is being seen for consultation and evaluation of an injury that occurred 1/6/19 while at home when she rolled her foot. The patient reports she felt immediate pain and then had difficulty with weight bearing. The pain is localized to the dorsal midfoot and lateral hindfoot. Prior to being seen here today, the patient was seen in the emergency department and placed into a splint, and has since converted into a cam boot. The patient reports an associated swelling. The pain is worse with activity and better with rest. The pain has remained the same since the injury. Interval history 1/25/19: She is seen here today in the office with her daughter and granddaughter. She is ambulating with a cam boot and a rolling walker. She reports her pain is roughly the same, and does not have any calf pain, chest pain, or shortness of breath. She is here for CT follow-up today. She reports she does have swelling at this time. Interval history 2/22/19: She returns today for follow-up, proximally 6 weeks after her injury, in a cast for the past 4 weeks. She was unable to obtain a rolling knee scooter, secondary to insurance coverage. She reports she has been putting some weight on it despite her weightbearing restriction, does report that it was painful when doing so. She reports that her pain is slightly decreased overall however. She denies any chest pain, shortness of breath, calf pain. She does also report that she has fallen twice. Since I saw her last, she did go to physical therapy to go over nonweightbearing restrictions, and reports that it was somewhat helpful.     Interval history 3/5/19: She reports that 9 days ago in the middle of night, she tripped at home while walking down some steps, and reports an inversion injury, while walking without the boot in place. She is here for unscheduled follow-up because of this, and she reports a sharp pain in her lateral hindfoot and ankle. She also reports an associated swelling. She has been using the cam boot intermittently. She has not been to physical therapy since this happened. Interval history 4/9/19: She reports that her right foot is still bothering her, and she does still have pain with ambulating, but it does sound like it is improved somewhat. She does report a sense of cramping in the plantar aspect of her foot. She is ambulating with her CAM boot without assistive device. She has been going to physical therapy. She reports that her swelling is improved. She denies any other changes. REVIEW OF SYSTEMS:  Review of Systems   Constitutional: Negative for fever. HENT: Negative for tinnitus. Eyes: Negative for pain. Respiratory: Negative for shortness of breath. Cardiovascular: Negative for chest pain. Gastrointestinal: Negative for abdominal pain. Genitourinary: Negative for dysuria. Skin: Negative for rash. Neurological: Negative for headaches. Hematological: Does not bruise/bleed easily.      Musculoskeletal: See HPI for pertinent positives     Past Medical History:    Past Medical History:   Diagnosis Date    Acute kidney injury ([address]     Anemia     Angioedema     Antinuclear antibody (IQRA) titer greater than 1:80     Anxiety     Asthma     Ataxia     Atrial fibrillation (HCC)     Borderline personality disorder (HCC)     Bradycardia     CAD (coronary artery disease)     Chronic kidney disease     CKD (chronic kidney disease), stage II 8/23/2018    Degenerative disc disease, thoracic     Depression     Diabetes mellitus (HCC)     Diastolic dysfunction     DJD (degenerative joint disease)     Fibromyalgia     Foot pain, right     GERD (gastroesophageal reflux disease)     H/O: hysterectomy     Headache     Hernia of abdominal wall     History of blood transfusion     no problem    Hyperlipidemia     Hypertension     Lupus     Mood disorder (HCC)     Neuropathy     Osteoarthritis     PTSD (post-traumatic stress disorder)     Pulmonary nodules     Raynaud's disease     Scleroderma ([address]     Scleroderma ([address] 8/23/2018    Seizures (HCC)     Sleep walking disorder     Thyroid disease     TIA (transient ischemic attack)     Transient insomnia     Tricuspid regurgitation     Vasospasm ([address] 01/2016    bilat. legs. resulting in near complete absence of profusion to arteries of feet. ([facility]     Vitamin D deficiency 8/23/2018    Wears glasses        Past Surgical History:    Past Surgical History:   Procedure Laterality Date    ABDOMINOPLASTY  2013    BLADDER SUSPENSION N/A 7/30/2018    CYSTOSCOPY WITH OBTRYX MID URETHRAL SLING performed by Yordan Stacy MD at [address] Right 2016    CHOLECYSTECTOMY  1989    ELBOW SURGERY Right     EYE SURGERY      khushboo. lasik     FEMUR FRACTURE SURGERY  11/09/2018    FINGER SURGERY Right 08/28/2018    RING CARTER MASS EXCISION, TRIGGER RELEASE    FRACTURE SURGERY      left femur    GASTRECTOMY      GASTRIC BYPASS SURGERY  2012    HYSTERECTOMY      JOINT REPLACEMENT Bilateral     knees    KNEE SURGERY Right 05/02/2017    (femoral) patella replacement    NERVE BLOCK Right 05/04/2018     cervical facet #1 no steroid    NERVE BLOCK Right 06/29/2018    rt cervical diagnostic #2 decadron 10mg    NERVE BLOCK Right 08/10/2018    right cervical rfa decadron 10mg    IA ANTERIOR COLPORRAPHY RPR CYSTOCELE W/CYSTO N/A 7/30/2018    CYSTOCELE REPAIR performed by Deidra Winters DO at [facility] OFFICE/OUTPT VISIT,PROCEDURE ONLY Right 8/28/2018    RING CARTER MASS EXCISION, TRIGGER RELEASE, 3080 TABLE performed by Tigre De Luna DO Dimitris at [address] OFFICE/OUTPT VISIT,PROCEDURE ONLY Left 11/9/2018    FEMUR RETROGRADE IM NAILING PERIPROSTHETIC FRACTURE performed by Tiara Gage DO at [address] N/CARPAL TUNNEL SURG Left 10/23/2018    CARPAL TUNNEL RELEASE performed by Tiara Gage DO at [address]  2011    left knee    TOTAL KNEE ARTHROPLASTY Right 5/2/2017    PATELLOFEMORAL REPLACEMENT KNEE - Latosha Ernandez, 3080 TABLE, FEMORAL POPLITEAL BLOCK, NSA= SPINAL VS GENERAL performed by Tiara Gage DO at [facility]  2004       Current Medications:   Current Outpatient Medications   Medication Sig Dispense Refill    Misc. Devices MISC 1 each by Does not apply route once as needed (spasms) Please dispense one theracane device. Massage back as tolerated to relieve muscle spasms. 1 Device 0    lamoTRIgine (LAMICTAL) 25 MG tablet Take 25 mg by mouth nightly      Lidocaine POWD Formula 74B: amit2%+carbamazapine2%+lido2%+prilo2%. Apply 1-2 gm topically to affected area TID-QID. 30 g 11    calcium carbonate 648 MG TABS Take 1 tablet by mouth 2 times daily 60 tablet 5    cetirizine (ZYRTEC) 10 MG tablet TAKE 1 TABLET BY MOUTH ONE TIME A DAY  30 tablet 4    glucose monitoring kit (FREESTYLE) monitoring kit 1 kit by Does not apply route daily 1 kit 0    blood glucose monitor strips Test 4 times a day & as needed for symptoms of irregular blood glucose.  100 strip 5    acetaminophen (TYLENOL) 325 MG tablet Take 2 tablets by mouth every 6 hours as needed for Pain 20 tablet 0    cyclobenzaprine (FLEXERIL) 5 MG tablet TAKE 1 OR 2 TABLETS BY MOUTH AT BEDTIME AS NEEDED 20 tablet 0    pravastatin (PRAVACHOL) 40 MG tablet TAKE 1 TABLET BY MOUTH AT BEDTIME  90 tablet 1    conjugated estrogens (PREMARIN) 0.625 MG/GM vaginal cream Place 0.5 g vaginally daily Place twice daily at bedtime for first 2 weeks then at bedtime once daily for 2 weeks then 3 times/week. 1 Tube 3    EPINEPHrine HCl, Anaphylaxis, (EPIPEN 2-JIGAR IM) Inject 1 Syringe into the muscle PRN for angioedema, takes with the prednisone      predniSONE (DELTASONE) 50 MG tablet Take 50 mg by mouth as needed Takes with Epi pen for angioedema      Disulfiram 500 MG TABS Take 1 tablet by mouth daily Prescribed per psych      felodipine (PLENDIL) 10 MG extended release tablet Take 10 mg by mouth daily      ergocalciferol (ERGOCALCIFEROL) 73893 units capsule Take 50,000 Units by mouth See Admin Instructions Takes twice a week      naltrexone (DEPADE) 50 MG tablet Take 50 mg by mouth daily Prescribed by psych      QUEtiapine (SEROQUEL) 300 MG tablet Take 0.5 tablets by mouth nightly (Patient taking differently: Take 300 mg by mouth nightly ) 60 tablet 3    propranolol (INDERAL) 10 MG tablet Take 30 mg by mouth nightly      losartan (COZAAR) 100 MG tablet TAKE 1 TABLET BY MOUTH ONE TIME A DAY  90 tablet 1    hydroxychloroquine (PLAQUENIL) 200 MG tablet TAKE 1 TABLET BY MOUTH ONE TIME A DAY  90 tablet 1    gabapentin (NEURONTIN) 800 MG tablet Take 1 tablet by mouth 4 times daily for 30 days. . (Patient taking differently: Take 800 mg by mouth 4 times daily.  Prescribed by rheumatology.) 120 tablet 5    omeprazole (PRILOSEC) 20 MG delayed release capsule TAKE 1 CAPSULE BY MOUTH TWO TIMES A DAY  180 capsule 1    ferrous sulfate (FE TABS) 325 (65 Fe) MG EC tablet Take 1 tablet by mouth 2 times daily 90 tablet 3    lamoTRIgine (LAMICTAL) 100 MG tablet Take 100 mg by mouth daily       aspirin 81 MG tablet Take 1 tablet by mouth 2 times daily 60 tablet 11    levothyroxine (SYNTHROID) 25 MCG tablet Take 25 mcg by mouth Daily      albuterol sulfate  (90 Base) MCG/ACT inhaler Inhale 2 puffs into the lungs every 6 hours as needed for Wheezing 1 Inhaler 3    potassium chloride (KLOR-CON M) 10 MEQ extended release tablet Take 10 mEq by mouth 2 times daily Prescribed by endocrinologist     Jannie venlafaxine (EFFEXOR) 75 MG tablet Take 2 tablets (150 mg) in the morning and 1 tablet (75 mg) in the evening. 90 tablet 3    Multiple Vitamins-Minerals (THERAPEUTIC MULTIVITAMIN-MINERALS) tablet Take 1 tablet by mouth daily      vitamin B-12 (CYANOCOBALAMIN) 1000 MCG tablet Take 1,000 mcg by mouth daily       No current facility-administered medications for this visit. Allergies:    Morphine; Amlodipine; Dilaudid [hydromorphone hcl]; and Sulfa antibiotics    Family History:  Family History   Adopted: Yes   Problem Relation Age of Onset    Depression Mother     Asthma Mother     Depression Father     High Blood Pressure Father     Diabetes Father     Asthma Father     Depression Sister     Asthma Sister     Depression Brother     Asthma Brother     Asthma Maternal Aunt     Asthma Maternal Uncle     Asthma Paternal [de-identified]     Asthma Paternal Uncle     Asthma Maternal Grandmother     Cancer Maternal Grandmother     Asthma Maternal Grandfather     Asthma Paternal Grandmother     Asthma Paternal Grandfather     Asthma Maternal Cousin     Asthma Paternal Cousin        Social History:   Social History     Socioeconomic History    Marital status:       Spouse name: Not on file    Number of children: Not on file    Years of education: Not on file    Highest education level: Not on file   Occupational History    Not on file   Social Needs    Financial resource strain: Not on file    Food insecurity:     Worry: Not on file     Inability: Not on file    Transportation needs:     Medical: Not on file     Non-medical: Not on file   Tobacco Use    Smoking status: Never Smoker    Smokeless tobacco: Never Used   Substance and Sexual Activity    Alcohol use: Not Currently     Comment: notfor 2 months    Drug use: No    Sexual activity: Not on file     Comment: pt has hysterectomy   Lifestyle    Physical activity:     Days per week: Not on file     Minutes per session: Not on file    Stress: Not on file   Relationships    Social connections:     Talks on phone: Not on file     Gets together: Not on file     Attends Che[employer] service: Not on file     Active member of club or organization: Not on file     Attends meetings of clubs or organizations: Not on file     Relationship status: Not on file    Intimate partner violence:     Fear of current or ex partner: Not on file     Emotionally abused: Not on file     Physically abused: Not on file     Forced sexual activity: Not on file   Other Topics Concern    Not on file   Social History Narrative    Not on file     She is permanently medically disabled, and does frequently take care of her 13month-old granddaughter. OBJECTIVE:  Ht 5' 8\" (1.727 m)   Wt 169 lb (76.7 kg)   BMI 25.70 kg/m²    Physical Exam  Psych: alert and oriented to person, time, and place  Cardio:  well perfused extremities  Resp:  normal respiratory effort  Skin:  no cyanosis  Hem/lymph:  no lymphedema  Neuro:  sensation to light touch intact throughout all nerve distributions in the foot   Musculoskeletal:      MUSCULOSKELETAL (affected lower extremity):  Vascular: Toes warm and well perfused, compartments soft/compressible, mild swelling of foot. Skin:  Intact, without rash/lesions/AV malformations. Motion: Able to flex/extend all toes  -Range of motion not tested due to pain    -No tenderness at knee or proximal leg  -Negative squeeze test of leg  -Tenderness to palpation at midfoot and forefoot diffusely, improved        RADIOLOGY:   4/9/2019 FINDINGS:  Three weightbearing views (AP, Oblique, and Lateral) of the right foot were obtained in the office today and reviewed, revealing healing midfoot injury, with some displacement of the Lisfranc joint on weightbearing.     IMPRESSION:    Healing midfoot fractures with slight displacement of Lisfranc joint      3/5/19 FINDINGS:  Three weightbearing views (AP, Mortise, and Lateral) of the right ankle and three weightbearing views (AP, Oblique, Lateral) of the right foot were obtained in the office today and reviewed, revealing no new acute fracture, dislocation, or radioopaque foreign body/tumor. The ankle mortise is maintained with no widening of the clear spaces. Overall alignment is satisfactory. No fracture noted at the lateral malleolus, base of the fifth metatarsal, lateral process the talus or anterior process of the calcaneus. IMPRESSION:    Previous midfoot fractures redemonstrated, but difficult to visualize. No new fractures. FINDINGS:  Three semi-weightbearing views (AP, Mortise, and Lateral) of the right ankle and three semi-weightbearing views (AP, Oblique, Lateral) of the right foot were obtained in the office today and reviewed, showing no interval displacement; midfoot fractures are difficult to visualize. Overall alignment appears acceptable. IMPRESSION:    Midfoot injury without interval displacement, acceptable alignment    CT scan reviewed, as well as prior foot x-rays reviewed. Radiology report:   1. Acute fractures involving the bases of the 1st through 4th metatarsals and   medial, intermediate, and lateral cuneiforms with mild widening at the   Lisfranc joint.  The findings are most compatible with underlying Lisfranc   ligament injury.             3 view of right ankle reviewed, as well as prior foot x-rays reviewed. There is no definite fracture noted, no dislocation. Questionable Lisfranc injury with possible avulsion at first and second TMT. Overall alignment appears acceptable, and compared to contralateral.        ASSESSMENT AND PLAN:  Spencer Martinez was seen today for Foot Pain (right)  The primary encounter diagnosis was Closed nondisplaced fracture of metatarsal bone of right foot with routine healing, unspecified metatarsal, subsequent encounter.  Diagnoses of Closed displaced fracture of lateral cuneiform of right foot with routine healing, subsequent encounter, Closed displaced fracture of medial cuneiform of right foot with routine healing, subsequent encounter, and Closed displaced fracture of intermediate cuneiform of right foot with routine healing, subsequent encounter were also pertinent to this visit. Body mass index is 25.7 kg/m². She has a right midfoot injury, sustained on 1/6/19, with comminuted fractures at the bases of the first through fourth metatarsals, and all 3 cuneiforms, being treated conservatively. Notably, she does have a complex past medical history including seizures, type 2 diabetes with neuropathy, chronic kidney disease, fibromyalgia, coronary artery disease, history of stroke and atrial fibrillation, anxiety and bipolar disorder, chronic fatigue syndrome, scleroderma, and spinal stenosis with history of radiculopathy. Her pain has slightly improved, but is still present. Her swelling has improved as well. We discussed swelling control, and physical therapy. We also discussed rest/activity modification, bracing/immobilization, and shoe wear modification/orthotics. I ordered her more physical therapy, in order to help transition her out of the cam boot into a regular shoe. All questions were answered and the patient agrees with the above plan. The patient will return to clinic in 3 months with right foot x-rays. At that appointment, depending on how she is doing, I may consider recommending a rocker-bottom shoe. No follow-ups on file. No orders of the defined types were placed in this encounter.     Orders Placed This Encounter   Procedures    XR ANKLE RIGHT (MIN 3 VIEWS)     WEIGHT BEARING 3 VIEWS:  AP, MORTISE, LATERAL  Please include entire foot     Standing Status:   Future     Number of Occurrences:   1     Standing Expiration Date:   5/9/2019     Order Specific Question:   Reason for exam:     Answer:   eval alignment    XR FOOT RIGHT (MIN 3 VIEWS)     WEIGHTBEARING 3 views: AP, Oblique, Lateral     Standing Status: Future     Number of Occurrences:   1     Standing Expiration Date:   5/9/2019     Order Specific Question:   Reason for exam:     Answer:   eval alignment    XR FOOT LEFT (MIN 3 VIEWS)     WEIGHTBEARING 3 views: AP, Oblique, Lateral     Standing Status:   Future     Standing Expiration Date:   4/9/2020     Order Specific Question:   Reason for exam:     Answer:   eval alignment    Ambulatory referral to Physical Therapy     Referral Priority:   Routine     Referral Type:   Eval and Treat     Referral Reason:   Specialty Services Required     Requested Specialty:   Physical Therapy     Number of Visits Requested:   1         Mahnaz Oneal MD  Orthopedic Surgery, Foot and Ankle        Please excuse any typos/errors, as this note was created with the assistance of voice recognition software. While intending to generate a document that actually reflects the content of the visit, the document can still have some errors including those of syntax and sound-a-like substitutions which may escape proof reading. In such instances, actual meaning can be extrapolated by context.

## 2019-04-12 ENCOUNTER — HOSPITAL ENCOUNTER (OUTPATIENT)
Dept: PHYSICAL THERAPY | Age: 54
Setting detail: THERAPIES SERIES
Discharge: HOME | End: 2019-04-12
Payer: MEDICAID

## 2019-04-12 ENCOUNTER — HOSPITAL ENCOUNTER (OUTPATIENT)
Age: 54
Setting detail: SPECIMEN
Discharge: HOME | End: 2019-04-12
Payer: MEDICAID

## 2019-04-12 LAB
ESTIMATED AVERAGE GLUCOSE: 103 MG/DL
HBA1C MFR BLD: 5.2 % (ref 4–6)
POTASSIUM SERPL-SCNC: 4 MMOL/L (ref 3.7–5.3)
THYROXINE, FREE: 0.78 NG/DL (ref 0.93–1.7)
VITAMIN D 25-HYDROXY: 35.8 NG/ML (ref 30–100)

## 2019-04-12 PROCEDURE — 97110 THERAPEUTIC EXERCISES: CPT

## 2019-04-12 NOTE — FLOWSHEET NOTE
[x] [facility]) [facility]  Therapy  [address]  [phone]  F: [phone]        Physical Therapy Daily Treatment Note    Date:  2019  Patient Name:  Lobito Barriga    :  1965  MRN: [id number]  Physician: Anabell Pina DO/ Farhat Plummer MD/ Leopold Dales, MD     Insurance: [facility] Medicaid 30 v  Medical Diagnosis: Spinal Stenosis, cervical region M48.02, Closed fracture of distal end of left femur S72.402D R Foot Pain M79.671                                Rehab Codes: M54.2, M25.662, M25.562, M25.552, R26.2, M25.571  Onset Date: 2016                                 Next 's appt: 2019- for R foot/ankle  Visit# / total visits: 10/12   Cancels/No Shows: 2/0    Subjective:    Pain:  [x] Yes  [] No Location: Neck/shoulders, L knee, R foot Pain Rating: (0-10 scale) 2/10- neck/shoulders; 7/10-R ankle  Pain altered Tx:  [x] No  [] Yes  Action: NA  Comments: Pt forgot tennis shoe for R foot this date. States MD Travis Marin with transition into shoe and wrote add'l PT orders. Neck/shldr pain is intermittent. States she will get pain in arch of foot when out of boot. Objective:  Modalities:  Vaso compression R ankle x15 mins -omitted in error   Precautions: WBAT  With transitioning out of boot per 19 orders. Stretching gastro, ROM, light strengthening and gait training. 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   Exercises:  Exercise Reps/ Time Weight/ Level  Comments   SciFit 6 min L3.0 x  Boot off          Standing       Gastroc stretch 3x20\"  x Wedge, boot off   Pre gait stepping 15x ea  x Fwd,  Lateral, retro no boot and no shoe due to pt not bringing shoe.      Marching 20x  x khushboo no boot no R shoe, pt forgot shoe    3 way hip 20x  x R only   HS curls  20x  x R only   Tband Row 20x Med x    Tband Ext 20x Med x    Tband ER 20x Med x Added           Gait Training       Gait with RW 5 min   x WBAT with boot Supine       SLR    20x 2 lb x R   Hip abd 30x 2 lb      SAQ   20x 2 lb x    Calf stretch with inversion 2x1 mins    belt          S/L       Clamshells 20x 5\" Med x  R only   Hip abd 20x  2 lb x R only, added wt. Prone       Hip ext 10x 2 sec 2 lbs x ALT LEs   HS curls 20x  2 lb x ALT LEs   Ys and Ts 15x 1 lbs x Single-arm; towel at forehead, added wt 4/12     Shld ext 15x 1lb x Single-arm; towel at forehead, added wt 4/12          Chin tucks 20x   FLORENCIO; with break          Seated       LAQ 20x 2lb x    calf stretch  2x1 mins  x    Belt, inversion   HS stretch 3x30\"  x    Heel toe raises 20x  x     Flex bar Roll PF 2 min   x    Ankle circles  20x  x     Ankle alphabet 1x  x    4 way ankle tband  15x medium x Added, issued HEP   BAPS 20x  x A/P, M/L, circles   Other: Completed exercises marked with \"x\"     Specific Instructions for next treatment: progress standing wt bearing ex in shoe, add balance activities,  progress UE tband/periscapular strengthening. Treatment Charges: Mins Units   []  Modalities     [x]  Ther Exercise  53 4   []  Manual Therapy     []  Ther Activities     []  Aquatics     [x]  Vasocompression       [x]  Other: Gait        Total Treatment time  53 4     Assessment: [x] Progressing toward goals   Demonstrated good ROM with tband ankle  strengthening exercises. Good tolerance to beginning wt bearing exercises without boot this date. Progressions limited due to pt forgetting R tennis shoe. [] No change. [] Other:     STG: (to be met in 8 treatments)  1. ? Pain: 3/10 neck pain with looking down. - MET  3/10 Left hip/knee pain with ambulating.- MET  2. ? ROM: Neck R rotation to 60° to improve head turns.- MET  Left knee extension to lacking 15° to improve gait mechanics.- MET  3. ? Strength: Left hip extension and abduction to 4/5 to improve joint stability and balance. - MET  4. ? Function: LEFS and NDI score to 30% impaired or better to improve ADLs.-  MET [NDI- 26%; LEFS- 58/80]  5. Independent with Home Exercise Programs- MET    R foot  2. ? Pain: R foot 6/10 average pain- NOT MET- constant pain  3. ? Strength: R hip flex, extension to 4-/5, and abduction to 4+/5, R knee flex, ext to 4+/5 to improve joint stability and balance.- NOT MET- R hip: 4/5 ABD; 4-/5 EXT; R knee: 4+/5 quad, HS- no pain  3. ? Function: LEFS score to 45% impaired or better to improve mobility and ADLs.- MET [58/80]  4. Pt indep w/ bed mobility keeping R LE NWB- minimal difficulty maintaining NWB status- PARTIAL MET  5. Pt indep amb household distances w/crutches- MET     LTG: (to be met in 12 treatments)  1. Patient will be able to ambulate community distances without AD for L knee. 2. Patient will be able to read with improved posture and decreased neck pain. R foot  7. ? Pain: R foot 6/10 average pain   8. Patient will be able to ambulate community distances indep w/knee scooter for R  9. Pt indep w/ascending, descending steps NWB R w/crutches and rail    Pt. Education:  [x] Yes  [] No  [] Reviewed Prior HEP/Ed , New exercises listed above. Method of Education: [x] Verbal  [] Demo  [x] Written  Comprehension of Education:  [x] Verbalizes understanding-purpose, benefits of game ready  [x] Demonstrates understanding. [] Needs review. [x] Demonstrates/verbalizes HEP/Ed previously given. 4/12/19   4 way ankle tband and medium band. Plan: [x] Continue per plan of care.    [x] Other:  PT to write add'l plan of care with add'l orders in Epic dated 4/9/19      Time In:  0823   Time Out:  0830   Electronically signed by:   Audra Medina PTA

## 2019-04-15 DIAGNOSIS — M79.18 MYOFASCIAL PAIN ON RIGHT SIDE: ICD-10-CM

## 2019-04-15 RX ORDER — LIDOCAINE
POWDER (GRAM) MISCELLANEOUS
Qty: 30 G | Refills: 11 | Status: ON HOLD | OUTPATIENT
Start: 2019-04-15 | End: 2019-06-11 | Stop reason: HOSPADM

## 2019-04-16 ENCOUNTER — HOSPITAL ENCOUNTER (OUTPATIENT)
Dept: PHYSICAL THERAPY | Age: 54
Setting detail: THERAPIES SERIES
Discharge: HOME | End: 2019-04-16
Payer: MEDICAID

## 2019-04-16 PROCEDURE — 97110 THERAPEUTIC EXERCISES: CPT

## 2019-04-18 ENCOUNTER — HOSPITAL ENCOUNTER (OUTPATIENT)
Dept: NEUROLOGY | Age: 54
Discharge: HOME | End: 2019-04-18
Payer: MEDICAID

## 2019-04-18 ENCOUNTER — CLINICAL DOCUMENTATION (OUTPATIENT)
Dept: PHYSICAL MEDICINE AND REHAB | Age: 54
End: 2019-04-18

## 2019-04-18 PROCEDURE — 95886 MUSC TEST DONE W/N TEST COMP: CPT | Performed by: PHYSICAL MEDICINE & REHABILITATION

## 2019-04-18 PROCEDURE — 95909 NRV CNDJ TST 5-6 STUDIES: CPT | Performed by: PHYSICAL MEDICINE & REHABILITATION

## 2019-04-19 ENCOUNTER — CARE COORDINATION (OUTPATIENT)
Dept: CARE COORDINATION | Age: 54
End: 2019-04-19

## 2019-04-19 NOTE — CARE COORDINATION
Left VM asking she call me back at [phone] for care coordination call. She had EMG yesterday, follows with PMR physician next week for left hand weakness, neck pain. Will get MRI cervical spine next week. Has been going to physical therapy for right foot. Will call again next week.     Future Appointments   Date Time Provider Sylvester Delongi   4/22/2019  3:30  [facility] DEXA RM [facility] MAMMO [facility] Radiolog   4/22/2019  4:45 PM [facility] MRI  STC[facility] MRI [facility] Radiolog   4/23/2019  2:00 PM Gracy Hayward, PT [facility]   5/2/2019  3:40 PM [facility]   5/10/2019  9:00 AM DO Oliva Aguiar OB/Gyn [facility]   5/28/2019  8:45 AM COLTON Lemus - CNP [facility]   6/11/2019  9:00 AM Lizbeth Cota MD ORTHO SPECIA [facility]   7/8/2019 10:30 AM Bill Manriquez MD [facility]   8/22/2019  8:30 AM Vane Sheffield MD AFL Neph [facility] None   9/9/2019  8:40 AM COLTON Jackman - CNP Neuro Spec [facility]   10/10/2019  9:00 [facility]

## 2019-04-22 ENCOUNTER — HOSPITAL ENCOUNTER (OUTPATIENT)
Dept: WOMENS IMAGING | Age: 54
Discharge: HOME | End: 2019-04-24
Payer: MEDICAID

## 2019-04-22 ENCOUNTER — HOSPITAL ENCOUNTER (OUTPATIENT)
Dept: MRI IMAGING | Age: 54
Discharge: HOME | End: 2019-04-24
Payer: MEDICAID

## 2019-04-22 DIAGNOSIS — Z78.0 POST-MENOPAUSAL: ICD-10-CM

## 2019-04-22 DIAGNOSIS — M79.18 MYOFASCIAL PAIN SYNDROME, CERVICAL: ICD-10-CM

## 2019-04-22 PROCEDURE — 77080 DXA BONE DENSITY AXIAL: CPT

## 2019-04-22 PROCEDURE — 72141 MRI NECK SPINE W/O DYE: CPT

## 2019-04-22 RX ORDER — ASPIRIN 81 MG/1
TABLET, DELAYED RELEASE ORAL
Qty: 180 TABLET | Refills: 1 | Status: SHIPPED | OUTPATIENT
Start: 2019-04-22 | End: 2019-10-22 | Stop reason: SDUPTHER

## 2019-04-22 RX ORDER — OMEPRAZOLE 20 MG/1
CAPSULE, DELAYED RELEASE ORAL
Qty: 180 CAPSULE | Refills: 1 | Status: SHIPPED | OUTPATIENT
Start: 2019-04-22 | End: 2019-10-22 | Stop reason: SDUPTHER

## 2019-04-22 NOTE — TELEPHONE ENCOUNTER
Last visit: 3/4/19  Last Med refill: 5/14/18; 10/29/18    Next Visit Date:  Future Appointments   Date Time Provider Sylvester Delongi   4/22/2019  3:30  [facility] DEXA RM STCZ MAMMO [facility] Radiolog   4/22/2019  4:45 PM [facility] MRI  STCZ MRI [facility] Radiolog   4/23/2019  2:00 PM Herbert Craig, PT [facility]   5/2/2019  3:40 [facility]   5/10/2019  9:00 AM DO Oliva Kearney OB/Gyn [facility]   5/28/2019  8:45 AM [facility]   6/11/2019  9:00 AM Nikkie Ahumada MD ORTHO SPECIA [facility]   7/8/2019 10:30 AM Hafsa Bean MD [facility]   8/22/2019  8:30 AM Jazlyn Rodgers MD AFL Neph [facility] None   9/9/2019  8:40 AM Junior Thompson APRN - CNP Neuro Spec [facility]   10/10/2019  9:00 AM Cierra Calle MD  derm [address] Maintenance   Topic Date Due    Hepatitis B Vaccine (1 of 3 - Risk 3-dose series) 07/29/1984    Diabetic retinal exam  01/24/2019    Shingles Vaccine (1 of 2) 08/14/2019 (Originally 7/29/2015)    Diabetic foot exam  08/14/2019    Lipid screen  09/21/2019    Breast cancer screen  01/12/2020    Creatinine monitoring  02/26/2020    A1C test (Diabetic or Prediabetic)  04/12/2020    Potassium monitoring  04/12/2020    Colon cancer screen colonoscopy  06/25/2023    DTaP/Tdap/Td vaccine (3 - Td) 09/14/2027    Flu vaccine  Completed    Pneumococcal 0-64 years Vaccine  Completed    Hepatitis C screen  Completed    HIV screen  Completed       Hemoglobin A1C (%)   Date Value   04/12/2019 5.2   02/26/2019 5.2   09/27/2018 5.3             ( goal A1C is < 7)   Microalb/Crt.  Ratio (mcg/mg creat)   Date Value   09/21/2018 3     LDL Cholesterol (mg/dL)   Date Value   09/21/2018 86   06/26/2018 84       (goal LDL is <100)   AST (U/L)   Date Value   12/14/2018 11     ALT (U/L)   Date Value   12/14/2018 8     BUN (mg/dL)   Date Value   02/26/2019 12     BP Readings from Last 3 Encounters:   03/22/19 (!) 145/83   03/06/19 126/89   03/04/19 134/80          (goal 120/80)    All Future Testing planned in [facility]  Lab Frequency Next Occurrence   CBC Once 09/24/2018   Urinalysis Once 09/24/2018   Hemoglobin and Hematocrit, Blood Once 08/14/2018   EMG Once 03/23/2019   MRI Cervical Spine WO Contrast Once 03/22/2019   Urinalysis Every 12 Weeks 7/5/2019, 9/27/2019, 12/20/2019, 3/13/2020, 6/5/2020, 8/28/2020, 11/20/2020, 2/12/2021, 5/7/2021, 5/46/8937   Basic Metabolic Panel Every 12 Weeks    CBC Auto Differential Every 12 Weeks    Creatinine, Random Urine Every 12 Weeks    Phosphorus Every 12 Weeks    Protein / creatinine ratio, urine Every 12 Weeks    Protein, urine, random Every 12 Weeks    PTH, INTACT WITH IONIZED CALCIUM Every 12 Weeks    Vitamin D 25 Hydroxy Every 12 Weeks    Basic Metabolic Panel Every 12 Weeks    CBC Auto Differential Every 12 Weeks    Creatinine, Random Urine Every 12 Weeks    Phosphorus Every 12 Weeks    Protein / creatinine ratio, urine Every 12 Weeks    Protein, urine, random Every 12 Weeks    PTH, INTACT WITH IONIZED CALCIUM Every 12 Weeks    Vitamin D 25 Hydroxy Every 12 Weeks                Patient Active Problem List:     Seizures (HCC)     Transient insomnia     Sleep walking disorder     Raynaud's disease     Pulmonary nodules     Neuropathy     Hypertension     GERD (gastroesophageal reflux disease)     Fibromyalgia     Depression     Degenerative disc disease, thoracic     CAD (coronary artery disease)     Bipolar disorder (HCC)     Asthma     Anxiety     Antinuclear antibody (IQRA) titer greater than 1:80     Anemia     Hx of Stroke (HCC)     Type 2 diabetes mellitus with stage 2 chronic kidney disease, without long-term current use of insulin (HCC)     Degenerative arthritis of right knee     S/P knee surgery     Chronic fatigue syndrome     Spondylosis of cervical region without myelopathy or radiculopathy     H/O hysterectomy with BSO at [facility] for benign disease 2014     Hx of total bilateral knee replacement     H/O: hysterectomy     Atrial fibrillation with slow ventricular response ([facility])     H/O: stroke     Acute cystitis without hematuria     Hypovolemia     Hypotension     Bradycardia     Cystocele, midline     JOYCE (stress urinary incontinence, female)     Overflow incontinence     7/30/18 Cystocele repair, Cystoscopy with OBTRYX Ureteral Sling     Cystocele, unspecified (CODE)     Nuclear senile cataract     CKD (chronic kidney disease), stage II     Scleroderma ([facility])     Vitamin D deficiency     Trigger middle finger of right hand     Trigger ring finger of right hand     Osteoarthritis of spine with radiculopathy, cervical region     Degenerative disc disease, cervical     Left carpal tunnel syndrome     Spinal stenosis, cervical region     Neural foraminal stenosis of cervical spine     Closed fracture of lower end of femur ([facility] [address]     Closed supracondylar fracture of femur, left, initial encounter ([facility])     Numbness

## 2019-04-23 ENCOUNTER — HOSPITAL ENCOUNTER (OUTPATIENT)
Dept: PHYSICAL THERAPY | Age: 54
Setting detail: THERAPIES SERIES
End: 2019-04-23
Payer: MEDICAID

## 2019-04-25 DIAGNOSIS — M79.18 MYOFASCIAL PAIN SYNDROME, CERVICAL: ICD-10-CM

## 2019-04-26 ENCOUNTER — HOSPITAL ENCOUNTER (OUTPATIENT)
Dept: PHYSICAL THERAPY | Age: 54
Setting detail: THERAPIES SERIES
Discharge: HOME | End: 2019-04-26
Payer: MEDICAID

## 2019-04-26 PROCEDURE — 97110 THERAPEUTIC EXERCISES: CPT

## 2019-04-26 NOTE — FLOWSHEET NOTE
[x] [facility] [address]  [address]  [phone]  F: [phone]        Physical Therapy Daily Treatment Note    Date:  2019  Patient Name:  Luz Watson    :  1965  MRN: [id number]  Physician: Nessa Alfonso DO/ Sonia Torres MD/ Melodye Ahumada, MD     Insurance: [facility] Medicaid 30 v  Medical Diagnosis: Spinal Stenosis, cervical region M48.02, Closed fracture of distal end of left femur S72.402D R Foot Pain M79.671                                Rehab Codes: M54.2, M25.662, M25.562, M25.552, R26.2, M25.571  Onset Date: 2016                                 Next 's appt: 2019- for R foot/ankle  Visit# / total visits:    Cancels/No Shows: 2/0    Subjective:  Report  feeling okay. States she had EMG for neck and UE. [facility]. States bone density test was done also. Pain:  [x] Yes  [] No Location:   L knee, R foot Pain Rating: (0-10 scale)     3/10-R lateral  ankle  Pain altered Tx:  [x] No  [] Yes  Action: NA  Comments:      Objective:  Modalities:  Vaso compression R ankle x15 mins  -pt declined   Precautions: WBAT  With transitioning out of boot per 19 orders. Stretching gastro, ROM, light strengthening and gait training. 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   Exercises:  Exercise Reps/ Time Weight/ Level  Comments   SciFit 6 min L4.0 x  increased resistance.            Standing       Gastroc stretch 3x20\"  x Wedge,    Soleus mobility 15x 12 inch   Leaning into soleus stretch   Calf raises 20x  2 lbs x Added wt    bosu wt shifting 20x  x Fwd/post, added   bosu marching 20x  x added   bosu squat 15x  x added   SLS foam 3x8-10\"  x Joce, pt has to touch HR intermittently, added foam.    3 way hip 20x med x Limited UE, VC for pacing    Marching 20x 3 lb       HS curls  20x 3 lb  R only   BAPS 15x  15x L2  L1 X  x DF/PF, Inv/Ev,  Toes of Left foot on board, added  CW,CCW  L foot on 2 inch step, added   Rockerboard 20x L2 x added          Tandem Walk 15  ftx2  x     Heel walking 15 ftx 2  x added   [place] walking 15 ft x 2  x added   Supine       SLR    20x 2 lb   R   Hip abd 20x 3 lb x  L LE    SAQ   20x 2 lb      Calf stretch with inversion 2x1 mins    belt   SL slamshells L 15x2 Med.  x Added                   Seated       4 way ankle tband  15x medium   Added, issued HEP   Other: Completed exercises marked with \"x\" held some exercises due to time needed for reassessment. 4/26 4/26/19  L Hip flexion 5/5, abd 4//5, R hip flexion 5/5, hip abd 4+/5, ext 4+/5  khushboo HS/quads 5/5. R ankle 4+/5 all planes. Specific Instructions for next treatment: progress standing wt bearing ex in shoe, add balance activities,  progress UE tband/periscapular strengthening. Treatment Charges: Mins Units   []  Modalities     [x]  Ther Exercise 55 4   []  Manual Therapy     []  Ther Activities     []  Aquatics     []  Vasocompression       []  Other: Gait        Total Treatment time  55 4     Assessment: [x] Progressing toward goals progressed ankle strengthening and stability exercises as well as left hip gluteals as listed in log. (did not address Neck/UE as verbal instructed via primary PT). [] No change. [] Other:     STG: (to be met in 8 treatments)  1. ? Pain: 3/10 neck pain with looking down. - MET  3/10 Left hip/knee pain with ambulating.- MET  2. ? ROM: Neck R rotation to 60° to improve head turns.- MET  Left knee extension to lacking 15° to improve gait mechanics.- MET  3. ? Strength: Left hip extension and abduction to 4/5 to improve joint stability and balance. - MET  4. ? Function: LEFS and NDI score to 30% impaired or better to improve ADLs.-  MET [NDI- 26%; LEFS- 58/80]  5.  Independent with Home Exercise Programs- MET    R foot  2. ? Pain: R foot 6/10 average pain- NOT MET- constant pain  3. ? Strength: R hip flex, extension to 4-/5, and abduction to 4+/5, R knee flex, ext to 4+/5 to improve joint stability and balance.- NOT MET- R hip: 4/5 ABD; 4-/5 EXT; R knee: 4+/5 quad, HS- no pain  3. ? Function: LEFS score to 45% impaired or better to improve mobility and ADLs.- MET [58/80]  4. Pt indep w/ bed mobility keeping R LE NWB- minimal difficulty maintaining NWB status- PARTIAL MET  5. Pt indep amb household distances w/crutches- MET     LTG: (to be met in 12 treatments)  1. Patient will be able to ambulate community distances without AD for L knee. MET  2. Patient will be able to read with improved posture and decreased neck pain. MET    R foot  7. ? Pain: R foot 6/10 average pain  MET  8. Patient will be able to ambulate community distances indep w/knee scooter for R MET  9. Pt indep w/ascending, descending steps NWB R w/crutches and rail MET    Pt. Education:  [x] Yes  [] No  [] Reviewed Prior HEP/Ed , New exercises listed above. Method of Education: [x] Verbal  [] Demo  [x] Written   Comprehension of Education:  [x] Verbalizes understanding-purpose, benefits of game ready  [x] Demonstrates understanding. [] Needs review. [x] Demonstrates/verbalizes HEP/Ed previously given. 4/12/19   4 way ankle tband and medium band. Plan: [x] Continue per plan of care. [x] Other:  PT to write add'l plan of care with add'l orders in Epic dated 4/9/19 for R ankle and Left hip and possible discontinuation of neck/UE.        Time In: 0932    Time Out:   2061    Electronically signed by:   Audra Medina PTA

## 2019-04-29 ENCOUNTER — CARE COORDINATION (OUTPATIENT)
Dept: CARE COORDINATION | Age: 54
End: 2019-04-29

## 2019-04-29 NOTE — CARE COORDINATION
Ambulatory Care Coordination Note  4/29/2019  CM Risk Score: 7  Jelena Mortality Risk Score:      ACC: Army Swati RN    Summary Note: She has the chronic left shoulder and neck pain, left hand numbness, had MRI and EMG. Sees PMR physician this week for results. She is still going to physical therapy for her right ankle sprain, working on walking on center of foot instead of on outside of foot. Her bone density test showed osteoporosis, test ordered by rheumatology, will be addressed at appt 5/2. No falls since January. No respiratory issues, cough much improved, no dyspnea. Blood sugars stable in 70s, no symptoms of hypoglycemia. Last A1c 5.2 on 4/1. She continues on metformin 500 mg daily. She denied any needs for at home (care or equipment), feels she doesn't need to continue to get calls and she has shown improvement with medications, is going to appts. Graduated from care coordination. Diabetes Assessment    Medic Alert ID:  No  Meal Planning:  None   How often do you test your blood sugar?:  Other, No Testing (Comment: only PRN if having symptoms)   Do you have barriers with adherence to non-pharmacologic self-management interventions?  (Nutrition/Exercise/Self-Monitoring):  Yes   Have you ever had to go to the ED for symptoms of low blood sugar?:  No       No patient-reported symptoms   Do you have hyperglycemia symptoms?:  No   Do you have hypoglycemia symptoms?:  No   Blood Sugar Monitoring Regimen:  Morning Fasting   Blood Sugar Trends:  No Change (Comment: 70s)       and   General Assessment    Do you have any symptoms that are causing concern?:  Yes  Progression since Onset:  Unchanged  Reported Symptoms:  Pain (Comment: left shoulder and neck pain, left hand numbness)               Care Coordination Interventions    Program Enrollment:  Complex Care  Referral from Primary Care Provider:  No  Suggested Interventions and Community Resources  [facility]  Completed (Comment: Sees Dr. Suzanne Ahn every 3 weeks)  Fall Risk Prevention:  Completed  Home Health Services:  Declined  Physical Therapy:  Completed  Specialty Services Referral:  Completed (Comment: pain management)  Zone Management Tools: In Process (Comment: fall prevention)         Goals Addressed                 This Visit's Progress     COMPLETED: Reduce Falls    On track     I will reduce my risk of falls by the following: Remove rugs or use non slip rugs  Install grab bars in bathroom  Use walking aids like cane or walker  Follow through on orders for PT    Barriers: none  Plan for overcoming my barriers: N/A, is going to physical therapy to improve balance  Confidence: 7/10  Anticipated Goal Completion Date: 3 months 12/30/18   New goal date 5/20/19 2/20/19 No further falls but still is fall risk due to using crutches. Continuing ACC calls, encouraged to continue physical therapy and follow physician recommendations. 4/29/19 Going to physical therapy, not using crutches. Prior to Admission medications    Medication Sig Start Date End Date Taking? Authorizing Provider   DIOGO ASPIRIN ADULT LOW STRENGTH 81 MG EC tablet TAKE 1 TABLET BY MOUTH TWO TIMES A DAY  4/22/19   COLTON Gautam CNP   omeprazole (PRILOSEC) 20 MG delayed release capsule TAKE 1 CAPSULE BY MOUTH TWO TIMES A DAY  4/22/19   COLTON Gautam CNP   Lidocaine POWD Formula 74B: amit2%+carbamazapine2%+lido2%+prilo2%. Apply 1-2 gm topically to affected area TID-QID. 4/15/19   Drake Moctezuma MD   Misc. Devices MISC 1 each by Does not apply route once as needed (spasms) Please dispense one theracane device. Massage back as tolerated to relieve muscle spasms.  3/22/19 3/22/19  Drake Moctezuma MD   lamoTRIgine (LAMICTAL) 25 MG tablet Take 25 mg by mouth nightly    Historical Provider, MD   calcium carbonate 648 MG TABS Take 1 tablet by mouth 2 times daily 2/26/19 2/26/20  COLTON Gautam CNP   cetirizine (ZYRTEC) 10 MG tablet TAKE 1 TABLET BY MOUTH ONE TIME A DAY  2/25/19   COLTON Bobby CNP   glucose monitoring kit (FREESTYLE) monitoring kit 1 kit by Does not apply route daily 1/16/19   COLTON Bobby CNP   blood glucose monitor strips Test 4 times a day & as needed for symptoms of irregular blood glucose. 1/16/19   COLTON Bobby CNP   acetaminophen (TYLENOL) 325 MG tablet Take 2 tablets by mouth every 6 hours as needed for Pain 1/6/19   Gala Alvarado MD   cyclobenzaprine (FLEXERIL) 5 MG tablet TAKE 1 OR 2 TABLETS BY MOUTH AT BEDTIME AS NEEDED 1/2/19   COLTON Bobby CNP   pravastatin (PRAVACHOL) 40 MG tablet TAKE 1 TABLET BY MOUTH AT BEDTIME  12/27/18   Brianna Banerjee PA-C   conjugated estrogens (PREMARIN) 0.625 MG/GM vaginal cream Place 0.5 g vaginally daily Place twice daily at bedtime for first 2 weeks then at bedtime once daily for 2 weeks then 3 times/week.  12/20/18   Chapin Martinez DO   EPINEPHrine HCl, Anaphylaxis, (EPIPEN 2-JIGAR IM) Inject 1 Syringe into the muscle PRN for angioedema, takes with the prednisone    Historical Provider, MD   predniSONE (DELTASONE) 50 MG tablet Take 50 mg by mouth as needed Takes with Epi pen for angioedema    Historical Provider, MD   Disulfiram 500 MG TABS Take 1 tablet by mouth daily Prescribed per psych    Historical Provider, MD   felodipine (PLENDIL) 10 MG extended release tablet Take 10 mg by mouth daily Prescribed by Dr. Woo Elvis Provider, MD   ergocalciferol (ERGOCALCIFEROL) 31723 units capsule Take 50,000 Units by mouth See Admin Instructions Takes twice a week    Historical Provider, MD   naltrexone (DEPADE) 50 MG tablet Take 50 mg by mouth daily Prescribed by psych    Historical Provider, MD   QUEtiapine (SEROQUEL) 300 MG tablet Take 0.5 tablets by mouth nightly  Patient taking differently: Take 300 mg by mouth nightly  12/15/18   Darion Bonilla MD   propranolol (INDERAL) 10 MG tablet Take 30 mg by mouth nightly    Historical Provider, MD   losartan (COZAAR) 100 MG tablet TAKE 1 TABLET BY MOUTH ONE TIME A DAY  11/30/18   COLTON Griffin CNP   hydroxychloroquine (PLAQUENIL) 200 MG tablet TAKE 1 TABLET BY MOUTH ONE TIME A DAY  11/30/18   COLTON Griffin CNP   gabapentin (NEURONTIN) 800 MG tablet Take 1 tablet by mouth 4 times daily for 30 days. .  Patient taking differently: Take 800 mg by mouth 4 times daily. Prescribed by rheumatology. 11/6/18 3/6/19  COLTON Garcia CNP   ferrous sulfate (FE TABS) 325 (65 Fe) MG EC tablet Take 1 tablet by mouth 2 times daily 7/31/18   Jonas Nieves DO   lamoTRIgine (LAMICTAL) 100 MG tablet Take 100 mg by mouth daily  6/6/18   Historical Provider, MD   levothyroxine (SYNTHROID) 25 MCG tablet Take 25 mcg by mouth Daily    Historical Provider, MD   albuterol sulfate  (90 Base) MCG/ACT inhaler Inhale 2 puffs into the lungs every 6 hours as needed for Wheezing 12/4/17   COLTON Griffin CNP   potassium chloride (KLOR-CON M) 10 MEQ extended release tablet Take 10 mEq by mouth 2 times daily Prescribed by endocrinologist    Historical Provider, MD   venlafaxine (EFFEXOR) 75 MG tablet Take 2 tablets (150 mg) in the morning and 1 tablet (75 mg) in the evening.  3/17/17   COLTON Griffin CNP   Multiple Vitamins-Minerals (THERAPEUTIC MULTIVITAMIN-MINERALS) tablet Take 1 tablet by mouth daily    Historical Provider, MD   vitamin B-12 (CYANOCOBALAMIN) 1000 MCG tablet Take 1,000 mcg by mouth daily    Historical Provider, MD       Future Appointments   Date Time Provider Sylvester Collins   4/30/2019  3:00 PM Ronaldo Snyder PT [facility] PT [facility]   5/2/2019  3:40 PM Kari Gore MD [facility]   5/3/2019  8:00 AM En Up PTA STVZ PT [facility]   5/10/2019  9:00 AM DO Oliva Chino [facility]   5/28/2019  8:45 AM COLTON Griffin CNP [facility]   6/11/2019  9:00 AM Mike Hurley MD ORTHO SPECIA [facility]   7/8/2019 10:30 AM Chase Vidal MD [facility]   8/22/2019  8:30 AM Philip Bernal MD [facility] Neph [facility] None   9/9/2019  8:40 AM Shaggy Guillen APRN - CNP Neuro Spec [facility]   10/10/2019  9:00 AM Marisela Rubio MD  derm [facility]

## 2019-04-29 NOTE — FLOWSHEET NOTE
[x] [facility]) - [facility]  [facility]  [address]  [phone]  F: [phone] [] [address]  [place]   [address]  P: [phone]  F: [phone] [] [place]  [address] [facility]  P: [phone]  F: [phone] [] [address]  [facility]   [address]  [place]: [phone]  F: [phone]     Physical Therapy Progress Note    Date: 2019      Patient: Lobito Barriga  : 1965  MRN: [id number]    Physician: Anabell Pina DO/ Farhat Plummer MD/ Leopold Dales, MD     Insurance: [facility] Medicaid 30 v  Medical Diagnosis: Spinal Stenosis, cervical region M48.02, Closed fracture of distal end of left femur S72.402D R Foot Pain M79.671                                Rehab Codes: M54.2, M25.662, M25.562, M25.552, R26.2, M25.571  Onset Date: 2016                                 Next 's appt: 2019- for R foot/ankle  Visit# / total visits:                               Cancels/No Shows: 2/0    Subjective:  Pain:  [x] Yes  [] No  Location: L knee, R foot Pain Rating: (0-10 scale) 2/10  Pain altered Tx:  [x] No  [] Yes  Action:  Comments: Patient reports R foot pain and ability to ambulate are improving well. Objective:  Test Measurements:   STRENGTH  STRENGTH     LEFT RIGHT   HIP FLEX 4+/5 4+/5   HIPEXT 4+/5 4+/5   HIP ER     HIP IR     HIP ABD 4+/5 4+/5   HIP ADD          KNEE FLEX 5/5 5/5   KNEE EXT 5/5 5/5        ANKLE DF  4/5                PF  4/5                INV  4/5                EVER  4/5     Function: [NDI- 26%; LEFS- 58/80]    Assessment:  Patient has met therapeutic goals for the diagnosis of Spinal Stenosis, cervical region M48.02, Closed fracture of distal end of left femur S72.402D.  Will continue PT x 6 additional visits for Right foot pain to improve ROM, joint stability, improve gait and balance. Per PT order issued by Dr. Shavon Guillen on 4/9/18      Goals being discharged at this time  STG: (to be met in 8 treatments)  1. ? Pain: 3/10 neck pain with looking down. - MET  3/10 Left hip/knee pain with ambulating.- MET  2. ? ROM: Neck R rotation to 60° to improve head turns.- MET  Left knee extension to lacking 15° to improve gait mechanics.- MET  3. ? Strength: Left hip extension and abduction to 4/5 to improve joint stability and balance. - MET  4. ? Function: LEFS and NDI score to 30% impaired or better to improve ADLs.-  MET  5. Independent with Home Exercise Programs- MET     R foot  2. ? Pain: R foot 6/10 average pain- MET  3. ? Strength: R hip flex, extension to 4-/5, and abduction to 4+/5, R knee flex, ext to 4+/5 to improve joint stability and balance. - Met- R hip: 4/5 ABD; 4-/5 EXT; R knee: 4+/5 quad, HS- MET  3. ? Function: LEFS score to 45% impaired or better to improve mobility and ADLs.- MET   4. Pt indep w/ bed mobility keeping R LE NWB- minimal difficulty maintaining NWB status-  MET  5. Pt indep amb household distances w/crutches- MET        LTG: (to be met in 12 treatments)  1. Patient will be able to ambulate community distances without AD for L knee. Met  2. Patient will be able to read with improved posture and decreased neck pain. Met      Continuing R foot goals to be met in 18 treatments  7. ? Pain: R foot 2/10 average pain with ambulation. 8. Patient will be able to ambulate community distances. 9. Pt indep w/ascending, descending steps. 10. Patient will be able demonstrate improved balance by performing R SLS x 30 seconds.        Treatment to Date:  [x] Therapeutic Exercise    [] Modalities:  [] Therapeutic Activity     [] Ultrasound  [] Electrical Stimulation  [x] Gait Training     [] Massage       [] Lumbar/Cervical Traction  [x] Neuromuscular Re-education [] Cold/hotpack [] Iontophoresis: 4 mg/mL  [x] Instruction

## 2019-04-30 ENCOUNTER — HOSPITAL ENCOUNTER (OUTPATIENT)
Dept: PHYSICAL THERAPY | Age: 54
Setting detail: THERAPIES SERIES
Discharge: HOME | End: 2019-04-30
Payer: MEDICAID

## 2019-04-30 PROCEDURE — 97110 THERAPEUTIC EXERCISES: CPT

## 2019-04-30 NOTE — FLOWSHEET NOTE
added   Toe walking 15 ft x 2  x added                        Other: Completed exercises marked with \"x\" held some exercises due to time needed for reassessment. 4/26 4/26/19  L Hip flexion 5/5, abd 4//5, R hip flexion 5/5, hip abd 4+/5, ext 4+/5  khushboo HS/quads 5/5. R ankle 4+/5 all planes. Specific Instructions for next treatment: progress standing wt bearing ex in shoe, add balance activities,  progress UE tband/periscapular strengthening. Treatment Charges: Mins Units   []  Modalities     [x]  Ther Exercise 40 3   []  Manual Therapy     []  Ther Activities     []  Aquatics     []  Vasocompression       []  Other: Gait        Total Treatment time  40 3     Assessment: [x] Progressing toward goals. Fair tolerance to new balance and stability exercises. Patient still demonstrates limited neuromuscular control during SLS exercises. [] No change. [] Other:     Continuing R foot goals to be met in 18 treatments  7. ? Pain: R foot 2/10 average pain with ambulation. 8. Patient will be able to ambulate community distances. 9. Pt indep w/ascending, descending steps. 10. Patient will be able demonstrate improved balance by performing R SLS x 30 seconds. Pt. Education:  [x] Yes  [] No  [] Reviewed Prior HEP/Ed , New exercises listed above. Method of Education: [x] Verbal  [] Demo  [x] Written   Comprehension of Education:  [x] Verbalizes understanding-purpose, benefits of game ready  [x] Demonstrates understanding. [] Needs review. [x] Demonstrates/verbalizes HEP/Ed previously given. 4/12/19   4 way ankle tband and medium band. Plan: [x] Continue per plan of care.    [x] Other:       Time In: 1510    Time Out:   1550    Electronically signed by:   Lon Morrison, PT

## 2019-05-01 RX ORDER — FAMOTIDINE 40 MG/1
TABLET, FILM COATED ORAL
Qty: 90 TABLET | Refills: 2 | Status: SHIPPED | OUTPATIENT
Start: 2019-05-01 | End: 2020-01-27

## 2019-05-02 ENCOUNTER — OFFICE VISIT (OUTPATIENT)
Dept: PHYSICAL MEDICINE AND REHAB | Age: 54
End: 2019-05-02
Payer: MEDICAID

## 2019-05-02 VITALS
BODY MASS INDEX: 25.46 KG/M2 | HEART RATE: 76 BPM | WEIGHT: 168 LBS | TEMPERATURE: 98.1 F | HEIGHT: 68 IN | DIASTOLIC BLOOD PRESSURE: 90 MMHG | SYSTOLIC BLOOD PRESSURE: 142 MMHG

## 2019-05-02 DIAGNOSIS — M51.34 DEGENERATIVE DISC DISEASE, THORACIC: ICD-10-CM

## 2019-05-02 DIAGNOSIS — M50.30 DEGENERATIVE DISC DISEASE, CERVICAL: Primary | ICD-10-CM

## 2019-05-02 DIAGNOSIS — M89.9 SCAPULAR DYSFUNCTION: ICD-10-CM

## 2019-05-02 PROCEDURE — G8427 DOCREV CUR MEDS BY ELIG CLIN: HCPCS | Performed by: PHYSICAL MEDICINE & REHABILITATION

## 2019-05-02 PROCEDURE — 3017F COLORECTAL CA SCREEN DOC REV: CPT | Performed by: PHYSICAL MEDICINE & REHABILITATION

## 2019-05-02 PROCEDURE — 1036F TOBACCO NON-USER: CPT | Performed by: PHYSICAL MEDICINE & REHABILITATION

## 2019-05-02 PROCEDURE — G8417 CALC BMI ABV UP PARAM F/U: HCPCS | Performed by: PHYSICAL MEDICINE & REHABILITATION

## 2019-05-02 PROCEDURE — G8598 ASA/ANTIPLAT THER USED: HCPCS | Performed by: PHYSICAL MEDICINE & REHABILITATION

## 2019-05-02 PROCEDURE — 99214 OFFICE O/P EST MOD 30 MIN: CPT | Performed by: PHYSICAL MEDICINE & REHABILITATION

## 2019-05-02 RX ORDER — ALENDRONATE SODIUM 70 MG/1
TABLET ORAL
Status: ON HOLD | COMMUNITY
End: 2019-06-11 | Stop reason: HOSPADM

## 2019-05-02 NOTE — PROGRESS NOTES
P PHYSICIANS  [facility] PHYSICAL MEDICINE & REHABILITATION  [address] [place] [address],  [address]  [address] [place] [address]  Dept: [phone]  Dept Fax: [phone]    Outpatient Followup Note     Isidro Cote, 48 y.o., female, presents for follow up c/o of Neck Pain (f/u)  . HPI:     HPI  The patient had relief from the trigger point injections for several weeks. She states that her neck pain is improved. She has had no relief of the right scapular pain. Past Medical History:   Diagnosis Date    Acute kidney injury ([address]     Anemia     Angioedema     Antinuclear antibody (IQRA) titer greater than 1:80     Anxiety     Asthma     Ataxia     Atrial fibrillation (HCC)     Borderline personality disorder (HCC)     Bradycardia     CAD (coronary artery disease)     Chronic kidney disease     CKD (chronic kidney disease), stage II 8/23/2018    Degenerative disc disease, thoracic     Depression     Diabetes mellitus (HCC)     Diastolic dysfunction     DJD (degenerative joint disease)     Fibromyalgia     Foot pain, right     GERD (gastroesophageal reflux disease)     H/O: hysterectomy     Headache     Hernia of abdominal wall     History of blood transfusion     no problem    Hyperlipidemia     Hypertension     Lupus     Mood disorder (HCC)     Neuropathy     Osteoarthritis     PTSD (post-traumatic stress disorder)     Pulmonary nodules     Raynaud's disease     Scleroderma ([address]     Scleroderma ([address] 8/23/2018    Seizures (HCC)     Sleep walking disorder     Thyroid disease     TIA (transient ischemic attack)     Transient insomnia     Tricuspid regurgitation     Vasospasm ([address] 01/2016    bilat. legs. resulting in near complete absence of profusion to arteries of feet. ([facility]     Vitamin D deficiency 8/23/2018    Wears glasses       Past Surgical History:   Procedure Laterality Date    ABDOMINOPLASTY  2013    BLADDER SUSPENSION N/A 7/30/2018    CYSTOSCOPY WITH Zara Garcias MID URETHRAL SLING performed by Leonora Escalante MD at [address] Right 2016    CHOLECYSTECTOMY  1989    ELBOW SURGERY Right     EYE SURGERY      khushboo. lasik     FEMUR FRACTURE SURGERY  11/09/2018    FINGER SURGERY Right 08/28/2018    RING CARTER MASS EXCISION, TRIGGER RELEASE    FRACTURE SURGERY      left femur    GASTRECTOMY      GASTRIC BYPASS SURGERY  2012    HYSTERECTOMY      JOINT REPLACEMENT Bilateral     knees    KNEE SURGERY Right 05/02/2017    (femoral) patella replacement    NERVE BLOCK Right 05/04/2018     cervical facet #1 no steroid    NERVE BLOCK Right 06/29/2018    rt cervical diagnostic #2 decadron 10mg    NERVE BLOCK Right 08/10/2018    right cervical rfa decadron 10mg    MN ANTERIOR COLPORRAPHY RPR CYSTOCELE W/CYSTO N/A 7/30/2018    CYSTOCELE REPAIR performed by Bernadine Thomas DO at [facility] OFFICE/OUTPT VISIT,PROCEDURE ONLY Right 8/28/2018    RING CARTER MASS EXCISION, TRIGGER RELEASE, 3080 TABLE performed by Severino Ribeiro DO at [facility] OFFICE/OUTPT VISIT,PROCEDURE ONLY Left 11/9/2018    FEMUR RETROGRADE IM NAILING PERIPROSTHETIC FRACTURE performed by Severino Ribeiro DO at [address]/CARPAL TUNNEL SURG Left 10/23/2018    CARPAL TUNNEL RELEASE performed by Severino Ribeiro DO at [address] TOTAL KNEE ARTHROPLASTY  2011    left knee    TOTAL KNEE ARTHROPLASTY Right 5/2/2017    PATELLOFEMORAL REPLACEMENT KNEE - JAXSON, 3080 TABLE, FEMORAL POPLITEAL BLOCK, NSA= SPINAL VS GENERAL performed by Severino Ribeiro DO at [facility]  2004     Family History   Adopted: Yes   Problem Relation Age of Onset    Depression Mother     Asthma Mother     Depression Father     High Blood Pressure Father     Diabetes Father     Asthma Father     Depression Sister     Asthma Sister     Depression Brother     Asthma Brother     Asthma Maternal Aunt     Asthma Maternal Uncle     Asthma Paternal Aunt     Asthma Paternal Uncle     Asthma Maternal Grandmother     Cancer Maternal Grandmother     Asthma Maternal Grandfather     Asthma Paternal Grandmother     Asthma Paternal Grandfather     Asthma Maternal Cousin     Asthma Paternal Cousin      Social History     Tobacco Use    Smoking status: Never Smoker    Smokeless tobacco: Never Used   Substance Use Topics    Alcohol use: Not Currently     Comment: notfor 2 months        Current Outpatient Medications   Medication Sig Dispense Refill    alendronate (FOSAMAX) 70 MG tablet alendronate 70 mg tablet   Take 1 tablet every week by oral route for 90 days.  famotidine (PEPCID) 40 MG tablet TAKE 1 TABLET BY MOUTH IN THE EVENING 90 tablet 2    metFORMIN (GLUCOPHAGE) 500 MG tablet       SM ASPIRIN ADULT LOW STRENGTH 81 MG EC tablet TAKE 1 TABLET BY MOUTH TWO TIMES A DAY  180 tablet 1    omeprazole (PRILOSEC) 20 MG delayed release capsule TAKE 1 CAPSULE BY MOUTH TWO TIMES A DAY  180 capsule 1    Lidocaine POWD Formula 74B: amit2%+carbamazapine2%+lido2%+prilo2%. Apply 1-2 gm topically to affected area TID-QID. 30 g 11    lamoTRIgine (LAMICTAL) 25 MG tablet Take 25 mg by mouth nightly      calcium carbonate 648 MG TABS Take 1 tablet by mouth 2 times daily 60 tablet 5    cetirizine (ZYRTEC) 10 MG tablet TAKE 1 TABLET BY MOUTH ONE TIME A DAY  30 tablet 4    glucose monitoring kit (FREESTYLE) monitoring kit 1 kit by Does not apply route daily 1 kit 0    blood glucose monitor strips Test 4 times a day & as needed for symptoms of irregular blood glucose.  100 strip 5    acetaminophen (TYLENOL) 325 MG tablet Take 2 tablets by mouth every 6 hours as needed for Pain 20 tablet 0    cyclobenzaprine (FLEXERIL) 5 MG tablet TAKE 1 OR 2 TABLETS BY MOUTH AT BEDTIME AS NEEDED 20 tablet 0    pravastatin (PRAVACHOL) 40 MG tablet TAKE 1 TABLET BY MOUTH AT BEDTIME  90 tablet 1    conjugated estrogens (PREMARIN) 0.625 MG/GM vaginal cream Place 0.5 g vaginally daily Place twice daily at bedtime for first 2 weeks then at bedtime once daily for 2 weeks then 3 times/week. 1 Tube 3    EPINEPHrine HCl, Anaphylaxis, (EPIPEN 2-JIGAR IM) Inject 1 Syringe into the muscle PRN for angioedema, takes with the prednisone      predniSONE (DELTASONE) 50 MG tablet Take 50 mg by mouth as needed Takes with Epi pen for angioedema      Disulfiram 500 MG TABS Take 1 tablet by mouth daily Prescribed per psych      felodipine (PLENDIL) 10 MG extended release tablet Take 10 mg by mouth daily Prescribed by Dr. Shahbaz Turner ergocalciferol (ERGOCALCIFEROL) 97860 units capsule Take 50,000 Units by mouth See Admin Instructions Takes twice a week      naltrexone (DEPADE) 50 MG tablet Take 50 mg by mouth daily Prescribed by psych      QUEtiapine (SEROQUEL) 300 MG tablet Take 0.5 tablets by mouth nightly (Patient taking differently: Take 300 mg by mouth nightly ) 60 tablet 3    propranolol (INDERAL) 10 MG tablet Take 30 mg by mouth nightly      losartan (COZAAR) 100 MG tablet TAKE 1 TABLET BY MOUTH ONE TIME A DAY  90 tablet 1    hydroxychloroquine (PLAQUENIL) 200 MG tablet TAKE 1 TABLET BY MOUTH ONE TIME A DAY  90 tablet 1    ferrous sulfate (FE TABS) 325 (65 Fe) MG EC tablet Take 1 tablet by mouth 2 times daily 90 tablet 3    lamoTRIgine (LAMICTAL) 100 MG tablet Take 100 mg by mouth daily       levothyroxine (SYNTHROID) 25 MCG tablet Take 25 mcg by mouth Daily      albuterol sulfate  (90 Base) MCG/ACT inhaler Inhale 2 puffs into the lungs every 6 hours as needed for Wheezing 1 Inhaler 3    potassium chloride (KLOR-CON M) 10 MEQ extended release tablet Take 10 mEq by mouth 2 times daily Prescribed by endocrinologist      venlafaxine (EFFEXOR) 75 MG tablet Take 2 tablets (150 mg) in the morning and 1 tablet (75 mg) in the evening.  90 tablet 3    Multiple Vitamins-Minerals (THERAPEUTIC MULTIVITAMIN-MINERALS) tablet Take 1 tablet by mouth daily      vitamin B-12 (CYANOCOBALAMIN) 1000 MCG tablet Take 1,000 mcg by mouth daily      Misc. Devices MISC 1 each by Does not apply route once as needed (spasms) Please dispense one theracane device. Massage back as tolerated to relieve muscle spasms. 1 Device 0    gabapentin (NEURONTIN) 800 MG tablet Take 1 tablet by mouth 4 times daily for 30 days. . (Patient taking differently: Take 800 mg by mouth 4 times daily. Prescribed by rheumatology.) 120 tablet 5     No current facility-administered medications for this visit. Allergies   Allergen Reactions    Morphine Nausea And Vomiting     Pt states it changes her mental status    Amlodipine Other (See Comments)     diarrhea and stress    Dilaudid [Hydromorphone Hcl] Hives and Itching    Sulfa Antibiotics Hives and Rash       Subjective:      Review of Systems  Constitutional: Negative for fever, chills and unexpectedweight change. HENT: Negative for trouble swallowing. Respiratory: Negativefor cough and shortness of breath. Cardiovascular: Negative for chest pain. Endocrine: Negative for polyuria. Genitourinary: Negative for dysuria, urgency, frequency and difficultyurinating. Musculoskeletal: Negativefor back pain and arthralgias. Neurological: Negative for headaches. Objective:      Physical Exam   Constitutional: She appears well-developed and well-nourished. HENT:   Head: Normocephalic and atraumatic. Eyes: EOM are normal.   Musculoskeletal: She exhibits tenderness. Cervical back: She exhibits decreased range of motion, tenderness, pain and spasm. bony tenderness negative  Strength-5/5. Positive pain to palpation along medial border of the right scapula. Full active range of motion of right shoulder. Date across arm test.    Negative Concepcion test.  Negative -empty can test.   Skin: Skin is warm and dry. Psychiatric: She has a normal mood and affect. Her behavior is normal.   Vitals reviewed.     BP (!) 142/90   Pulse 76 Temp 98.1 °F (36.7 °C) (Oral)   Ht 5' 8\" (1.727 m)   Wt 168 lb (76.2 kg)   BMI 25.54 kg/m²   Constitutional: She is oriented to person, place,and time. She appears well-developed and well-nourished. HENT:   Head: Normocephalic. Eyes: EOM are normal.   Pulmonary/Chest: Effort normal.   Neurological: She is alert and orientedto person, place, and time. She has normal reflexes. MSK:    Skin: Skin is warm and dry. Psychiatric: She has a normal mood and affect. Her behavior is normal. Thought content normal.   Nursing note and vitals reviewed. Imaging:  MRI cervical spine 4/22/2019-     FINDINGS:   BONES/ALIGNMENT: There straightening of the normal lordotic curvature.  There   is no acute displaced fracture.  There is no focal bone edema.       SPINAL CORD: No focal abnormalities noted in the spinal cord.       SOFT TISSUES: No paraspinal mass identified.       C2-C3: Facet hypertrophic changes are noted       C3-C4: Mild disc bulging is noted.  Minimal endplate and mild facet   hypertrophic changes are noted.       C4-C5: Mild disc bulging is noted.  Mild endplate and facet hypertrophic   changes are noted.       C5-C6: Mild disc bulging is noted diffusely.  There is a small right proximal   foraminal protrusion and spur complex.  Endplate and facet hypertrophic   changes are noted.  Mild-to-moderate right foraminal narrowing is noted       C6-C7: Facet hypertrophic changes are noted       C7-T1: Minimal disc bulging is noted.  Facet hypertrophic changes are noted           Impression   Multilevel degenerative disc disease as described.  See above for details of   each level     EMG/NCS 4/18/2019 -  1. 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 2.  Mild chronic right C5-6 radiculopathy. Assessment:       Diagnosis Orders   1. Degenerative disc disease, cervical  [facility]   2.  Degenerative disc disease, thoracic  [facility]

## 2019-05-03 ENCOUNTER — HOSPITAL ENCOUNTER (OUTPATIENT)
Dept: PHYSICAL THERAPY | Age: 54
Setting detail: THERAPIES SERIES
Discharge: HOME | End: 2019-05-03
Payer: MEDICAID

## 2019-05-03 PROCEDURE — 97110 THERAPEUTIC EXERCISES: CPT

## 2019-05-07 ENCOUNTER — HOSPITAL ENCOUNTER (OUTPATIENT)
Dept: PHYSICAL THERAPY | Age: 54
Setting detail: THERAPIES SERIES
Discharge: HOME | End: 2019-05-07
Payer: MEDICAID

## 2019-05-07 PROCEDURE — 97110 THERAPEUTIC EXERCISES: CPT

## 2019-05-07 NOTE — PLAN OF CARE
[x] Debra Sheffield        Outpatient Physical                Therapy       [address]       Phone: [phone]       Fax: [phone] [] [facility]       Promotion at [facility]       Phone: [phone]       Fax: [phone] [] Karen Turneron      for Health Promotion    [address]     Phone: [phone]     Fax:  [phone]     Physical Therapy Plan of Care    Date:  2019  Patient: Abhilash Santana  : 1965  MRN: [id number]  Physician: Shen Denny MD    Insurance: [facility] 15/30 remaining  Medical Diagnosis:Degenerative disc disease, cervical M50.30 Degenerative disc disease, thoracic M51.34 ; Scapular dysfunction M89.9     Rehab Codes: M89.9, R53.1, M54.2  Onset Date: 16                                  Next 's appt:     Subjective:   CC: Patient reports ongoing neck and bilateral shoulder pain for past 2 years. Patient reports she has tender points in her R shoulder that never relax. No pain with lifting or carrying, but believes her arms are weak. Objective:  3/22/19 Cervical MRI  Multilevel degenerative disc disease as described. Annamaria Gomez above for details of   each level   MMT: 4/5 Bilateral scapular HAB, Scaption, ER  Assessment:  Problems:  Patient demonstrates scapular weakness and poor posture with rounded shoulders and forward head. Patient overcompensates with upper trapezius causing trigger points and increased myofascial pain. Will initiate PT to correct functional impairments. STG/LTG( 8 weeks)  1. Patient will improve B scapular strength to 4+/5 for horizontal abduction, scaption, and extension. 2. Patient will reports decreased neck and shoulder pain with UE ADLs. 3. Patient will be able to demonstrate correct sitting and standing posture.      Treatment Plan:  [x] Therapeutic Exercise    [] Modalities:  [] Therapeutic Activity     [] Ultrasound  [] Electrical Stimulation  [] Gait Training     [] Massage       [] Lumbar/Cervical Traction  [x] Neuromuscular Re-education [x] Cold/hotpack [] Iontophoresis: 4 mg/mL  [x] Instruction in HEP             Dexamethasone Sodium  [x] Manual Therapy             Phosphate  mAmin  [] Aquatic Therapy    [] Other:     [] Vasocompression/       Game Ready      []  Medication allergies reviewed for use of    Dexamethasone Sodium Phosphate 4mg/ml     with iontophoresis treatments. Pt is not allergic.       Frequency:  2 x/week for 8 visits      Electronically signed by: Siobhan Cho PT

## 2019-05-07 NOTE — FLOWSHEET NOTE
[x] [facility] &  Therapy  [address]  [phone]  F: [phone]        Physical Therapy Daily Treatment Note    Date:  2019  Patient Name:  Georgi Babinski    :  1965  MRN: [id number]  Physician:  Cesar Ruiz MD     Insurance: [facility] Medicaid 30 v  Medical Diagnosis: Spinal Stenosis, cervical region M48.02, Closed fracture of distal end of left femur S72.402D R Foot Pain M79.671                                Rehab Codes: M54.2, M25.662, M25.562, M25.552, R26.2, M25.571  Onset Date: 2016                                 Next 's appt: 2019- for R foot/ankle  Visit# / total visits: 15/18  Cancels/No Shows: 2/0    Subjective:    Pain:  [x] Yes  [] No Location:   L knee, R foot Pain Rating: (0-10 scale)     2/10-R lateral  ankle  Pain altered Tx:  [x] No  [] Yes  Action: NA  Comments:  Patient arrives, new referral in [facility] for cervical and thoracic DDD and scapular dysfunction. Objective:  Modalities:  Vaso compression R ankle x15 mins   Held /3  Precautions: WBAT  With transitioning out of boot per 19 orders. Stretching gastro, ROM, light strengthening and gait training. 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   Exercises:  Exercise Reps/ Time Weight/ Level  Comments   SciFit 6 min L4.0 x             Standing       Gastroc stretch 3x20\"  x Wedge,   Prostretch 3x20\"  x added   Heel tap  4 inch     Soleus mobility 5x20\" 12 inch x Leaning into soleus stretch   SL Calf raises 2x20x   x     bosu wt shifting 1 min ea  x Fwd/back, M/L    bosu squat 15x  x    SLS on foam 3x30\"  x    3 way hip 20x  x Limited UE, VC for pacing standing on R with blue foam    BAPS 15x   L3 x   DF/PF, Inv/Ev,  Toes of Left foot on board, added   Rockerboard 20x L2 x   limit UE          Tandem Walk 15  ftx2  x     Heel walking 15 ftx 2       Toe walking 15 ft x 2   Discontinue next session.     rebounder  10x2 ylw  R LE CKC and Toes touching floor, added          Tband     Scapular strengthening   -Rows 20x      - Ex 20x  x    - ER 20x  x    - IR 20x  x    - B ER  20x  x    - HAB 20x  x    - Flex 20x  x Face band          FW flex 2x10x  x    FW abd 2x10x  x           Prone       Is Ys Ts                     Other: Completed exercises marked with \"x\"       Specific Instructions for next treatment: progress standing wt bearing ex in shoe, add balance activities,  progress UE tband/periscapular strengthening. Treatment Charges: Mins Units   []  Modalities     [x]  Ther Exercise 55  4   []  Manual Therapy     []  Ther Activities     []  Aquatics     []  Vasocompression       []  Other: Gait        Total Treatment time 55 4     Assessment: [x] Progressing toward goals continue to progress exercises for  R ankle stability. Still lacks adequate R ankle proprioception, has difficulty with tandem ambulation and SLS. Initiated scapular exercises to improve weakness and dysfunction. See updated Plan of care. [] No change. [] Other:     Continuing R foot goals to be met in 18 treatments  7. ? Pain: R foot 2/10 average pain with ambulation. 8. Patient will be able to ambulate community distances. 9. Pt indep w/ascending, descending steps. 10. Patient will be able demonstrate improved balance by performing R SLS x 30 seconds. Pt. Education:  [x] Yes  [] No  [] Reviewed Prior HEP/Ed , New exercises listed above. Method of Education: [x] Verbal  [] Demo  [x] Written   Comprehension of Education:  [x] Verbalizes understanding-purpose, benefits of game ready  [x] Demonstrates understanding. [] Needs review. [x] Demonstrates/verbalizes HEP/Ed previously given. 4/12/19   4 way ankle tband and medium band. Plan: [x] Continue per plan of care.    [x] Other: See New POC for cervical and Thoracic DDD, scapular dysfunction      Time In:  1000    Time Out:  1055       Electronically signed by:   Harini Bedolla, PT

## 2019-05-09 NOTE — FLOWSHEET NOTE
[x] [facility] &  Therapy  [address]    [phone]  F: [phone]   [] [address] [facility] [address]   [address]  P: [phone]  F: [phone]  [] [address]  [address] [facility]  P: [phone]  F: [phone]   [] [address]  [facility] [address]   [place]: [phone]  F: [phone]  [] [place] [address] [facility] [address]  [place]: [phone]   F: [phone]     Physical Therapy Cancel/No Show note    Date: 2019  Patient: Maribell Lieberman  : 1965  MRN: [id number]    Cancels/No Shows to date: 3/0    For today's appointment patient:    [x]  Cancelled  5/10 appt    [] Rescheduled appointment    [] No-show     Reason given by patient:    []  Patient ill    []  Conflicting appointment    [] No transportation      [] Conflict with work    [] No reason given    [] Weather related    [] Other:      Comments:  Going on Vacation a day early.        [x] Next appointment was confirmed    Electronically signed by: Lon Morrison PT

## 2019-05-10 ENCOUNTER — OFFICE VISIT (OUTPATIENT)
Dept: OBGYN CLINIC | Age: 54
End: 2019-05-10
Payer: MEDICAID

## 2019-05-10 ENCOUNTER — HOSPITAL ENCOUNTER (OUTPATIENT)
Dept: PHYSICAL THERAPY | Age: 54
Setting detail: THERAPIES SERIES
Discharge: HOME | End: 2019-05-10
Payer: MEDICAID

## 2019-05-10 VITALS
SYSTOLIC BLOOD PRESSURE: 122 MMHG | HEART RATE: 78 BPM | DIASTOLIC BLOOD PRESSURE: 82 MMHG | WEIGHT: 166 LBS | BODY MASS INDEX: 25.16 KG/M2 | HEIGHT: 68 IN

## 2019-05-10 DIAGNOSIS — Z01.419 WELL WOMAN EXAM WITH ROUTINE GYNECOLOGICAL EXAM: Primary | ICD-10-CM

## 2019-05-10 DIAGNOSIS — Z12.31 ENCOUNTER FOR SCREENING MAMMOGRAM FOR BREAST CANCER: ICD-10-CM

## 2019-05-10 DIAGNOSIS — M81.0 AGE-RELATED OSTEOPOROSIS WITHOUT CURRENT PATHOLOGICAL FRACTURE: ICD-10-CM

## 2019-05-10 DIAGNOSIS — Z12.11 COLON CANCER SCREENING: ICD-10-CM

## 2019-05-10 PROCEDURE — 99396 PREV VISIT EST AGE 40-64: CPT | Performed by: OBSTETRICS & GYNECOLOGY

## 2019-05-10 NOTE — PROGRESS NOTES
History and Physical  [address], [address] [facility] [address] [phone]   Fax [phone]  Monique Elizabeth  5/10/2019              48 y.o. Chief Complaint   Patient presents with    Annual Exam       No LMP recorded. Patient has had a hysterectomy. Primary Care Physician: COLTON Cooper - CNP    The patient was seen and examined. She has no chief complaint today and is here for her annual exam.  Her bowels are regular. There are no voiding complaints. She denies any bloating. She denies vaginal discharge and was counseled on STD's and the need for barrier contraception. HPI : Monique Elizabeth is a 48 y.o. female [id number]    Annual No CC. Has a rheumatologist managing her Osteoporosis. Colonoscopy and Mammogarm ordered.  [facility]  ________________________________________________________________________  OB History    Para Term  AB Living   4 4 4 0 0 4   SAB TAB Ectopic Molar Multiple Live Births   0 0 0 0 0 4      # Outcome Date GA Lbr Flaquito/2nd Weight Sex Delivery Anes PTL Lv   4 Term      Vag-Spont  N JF   3 Term      Vag-Spont  N JF   2 Term      Vag-Spont  N JF   1 Term      Vag-Spont  N JF     Past Medical History:   Diagnosis Date    [facility]     Anemia     Angioedema     Antinuclear antibody (IQRA) titer greater than 1:80     Anxiety     Asthma     Ataxia     Atrial fibrillation (HCC)     Borderline personality disorder (HCC)     Bradycardia     CAD (coronary artery disease)     Chronic kidney disease     CKD (chronic kidney disease), stage II 2018    Degenerative disc disease, thoracic     Depression     Diabetes mellitus (HCC)     Diastolic dysfunction     DJD (degenerative joint disease)     Fibromyalgia     Foot pain, right     GERD (gastroesophageal reflux disease)     H/O: hysterectomy     Headache     Hernia of abdominal wall  History of blood transfusion     no problem    Hyperlipidemia     Hypertension     Lupus     Mood disorder (HCC)     Neuropathy     Osteoarthritis     PTSD (post-traumatic stress disorder)     Pulmonary nodules     Raynaud's disease     Scleroderma ([address]     Scleroderma ([address] 8/23/2018    Seizures (HCC)     Sleep walking disorder     Thyroid disease     TIA (transient ischemic attack)     Transient insomnia     Tricuspid regurgitation     Vasospasm ([address] 01/2016    bilat. legs. resulting in near complete absence of profusion to arteries of feet. ([facility]     Vitamin D deficiency 8/23/2018    Wears glasses                                                                    Past Surgical History:   Procedure Laterality Date    ABDOMINOPLASTY  2013    BLADDER SUSPENSION N/A 7/30/2018    CYSTOSCOPY WITH OBTRYX MID URETHRAL SLING performed by Bee Keys MD at [address] Right 2016    CHOLECYSTECTOMY  1989    ELBOW SURGERY Right     EYE SURGERY      khushboo. lasik     FEMUR FRACTURE SURGERY  11/09/2018    FINGER SURGERY Right 08/28/2018    RING CARTER MASS EXCISION, TRIGGER RELEASE    FRACTURE SURGERY      left femur    GASTRECTOMY      GASTRIC BYPASS SURGERY  2012    HYSTERECTOMY      JOINT REPLACEMENT Bilateral     knees    KNEE SURGERY Right 05/02/2017    (femoral) patella replacement    NERVE BLOCK Right 05/04/2018     cervical facet #1 no steroid    NERVE BLOCK Right 06/29/2018    rt cervical diagnostic #2 decadron 10mg    NERVE BLOCK Right 08/10/2018    right cervical rfa decadron 10mg    CO ANTERIOR COLPORRAPHY RPR CYSTOCELE W/CYSTO N/A 7/30/2018    CYSTOCELE REPAIR performed by Filippo Choi DO at [facility] OFFICE/OUTPT VISIT,PROCEDURE ONLY Right 8/28/2018    RING CARTER MASS EXCISION, TRIGGER RELEASE, 3080 TABLE performed by Michael Mcclain DO at [facility] OFFICE/OUTPT VISIT,PROCEDURE ONLY Left 11/9/2018    FEMUR RETROGRADE IM NAILING PERIPROSTHETIC FRACTURE performed by Ashleigh Rinaldi DO at [address]/CARPAL TUNNEL SURG Left 10/23/2018    CARPAL TUNNEL RELEASE performed by Ashleigh Rinaldi DO at [address]  2011    left knee    TOTAL KNEE ARTHROPLASTY Right 5/2/2017    PATELLOFEMORAL REPLACEMENT KNEE - JAXSON, 3080 TABLE, FEMORAL POPLITEAL BLOCK, NSA= SPINAL VS GENERAL performed by Ashleigh Rinaldi DO at [address] [address]     Family History   Adopted: Yes   Problem Relation Age of Onset    Depression Mother     Asthma Mother     Depression Father     High Blood Pressure Father     Diabetes Father     Asthma Father     Depression Sister     Asthma Sister     Depression Brother     Asthma Brother     Asthma Maternal Aunt     Asthma Maternal Uncle     Asthma Paternal Aunt     Asthma Paternal Uncle     Asthma Maternal Grandmother     Cancer Maternal Grandmother     Asthma Maternal Grandfather     Asthma Paternal Grandmother     Asthma Paternal Grandfather     Asthma Maternal Cousin     Asthma Paternal Cousin      Social History     Socioeconomic History    Marital status:       Spouse name: Not on file    Number of children: Not on file    Years of education: Not on file    Highest education level: Not on file   Occupational History    Not on file   Social Needs    Financial resource strain: Not on file    Food insecurity:     Worry: Not on file     Inability: Not on file    Transportation needs:     Medical: Not on file     Non-medical: Not on file   Tobacco Use    Smoking status: Never Smoker    Smokeless tobacco: Never Used   Substance and Sexual Activity    Alcohol use: Not Currently     Comment: notfor 2 months    Drug use: No    Sexual activity: Not on file     Comment: pt has hysterectomy   Lifestyle    Physical activity:     Days per week: Not on file     Minutes per session: Not on file  Stress: Not on file   Relationships    Social connections:     Talks on phone: Not on file     Gets together: Not on file     Attends [employer] service: Not on file     Active member of club or organization: Not on file     Attends meetings of clubs or organizations: Not on file     Relationship status: Not on file    Intimate partner violence:     Fear of current or ex partner: Not on file     Emotionally abused: Not on file     Physically abused: Not on file     Forced sexual activity: Not on file   Other Topics Concern    Not on file   Social History Narrative    Not on file       MEDICATIONS:  Current Outpatient Medications   Medication Sig Dispense Refill    alendronate (FOSAMAX) 70 MG tablet alendronate 70 mg tablet   Take 1 tablet every week by oral route for 90 days.  famotidine (PEPCID) 40 MG tablet TAKE 1 TABLET BY MOUTH IN THE EVENING 90 tablet 2    metFORMIN (GLUCOPHAGE) 500 MG tablet       SM ASPIRIN ADULT LOW STRENGTH 81 MG EC tablet TAKE 1 TABLET BY MOUTH TWO TIMES A DAY  180 tablet 1    omeprazole (PRILOSEC) 20 MG delayed release capsule TAKE 1 CAPSULE BY MOUTH TWO TIMES A DAY  180 capsule 1    Lidocaine POWD Formula 74B: amit2%+carbamazapine2%+lido2%+prilo2%. Apply 1-2 gm topically to affected area TID-QID. 30 g 11    lamoTRIgine (LAMICTAL) 25 MG tablet Take 25 mg by mouth nightly      calcium carbonate 648 MG TABS Take 1 tablet by mouth 2 times daily 60 tablet 5    cetirizine (ZYRTEC) 10 MG tablet TAKE 1 TABLET BY MOUTH ONE TIME A DAY  30 tablet 4    glucose monitoring kit (FREESTYLE) monitoring kit 1 kit by Does not apply route daily 1 kit 0    blood glucose monitor strips Test 4 times a day & as needed for symptoms of irregular blood glucose.  100 strip 5    acetaminophen (TYLENOL) 325 MG tablet Take 2 tablets by mouth every 6 hours as needed for Pain 20 tablet 0    cyclobenzaprine (FLEXERIL) 5 MG tablet TAKE 1 OR 2 TABLETS BY MOUTH AT BEDTIME AS NEEDED 20 tablet 0    pravastatin (PRAVACHOL) 40 MG tablet TAKE 1 TABLET BY MOUTH AT BEDTIME  90 tablet 1    conjugated estrogens (PREMARIN) 0.625 MG/GM vaginal cream Place 0.5 g vaginally daily Place twice daily at bedtime for first 2 weeks then at bedtime once daily for 2 weeks then 3 times/week.  1 Tube 3    EPINEPHrine HCl, Anaphylaxis, (EPIPEN 2-JIGAR IM) Inject 1 Syringe into the muscle PRN for angioedema, takes with the prednisone      predniSONE (DELTASONE) 50 MG tablet Take 50 mg by mouth as needed Takes with Epi pen for angioedema      Disulfiram 500 MG TABS Take 1 tablet by mouth daily Prescribed per psych      felodipine (PLENDIL) 10 MG extended release tablet Take 10 mg by mouth daily Prescribed by Dr. Melissa Lima ergocalciferol (ERGOCALCIFEROL) 17629 units capsule Take 50,000 Units by mouth See Admin Instructions Takes twice a week      naltrexone (DEPADE) 50 MG tablet Take 50 mg by mouth daily Prescribed by psych      QUEtiapine (SEROQUEL) 300 MG tablet Take 0.5 tablets by mouth nightly (Patient taking differently: Take 300 mg by mouth nightly ) 60 tablet 3    propranolol (INDERAL) 10 MG tablet Take 30 mg by mouth nightly      losartan (COZAAR) 100 MG tablet TAKE 1 TABLET BY MOUTH ONE TIME A DAY  90 tablet 1    hydroxychloroquine (PLAQUENIL) 200 MG tablet TAKE 1 TABLET BY MOUTH ONE TIME A DAY  90 tablet 1    ferrous sulfate (FE TABS) 325 (65 Fe) MG EC tablet Take 1 tablet by mouth 2 times daily 90 tablet 3    lamoTRIgine (LAMICTAL) 100 MG tablet Take 100 mg by mouth daily       levothyroxine (SYNTHROID) 25 MCG tablet Take 25 mcg by mouth Daily      albuterol sulfate  (90 Base) MCG/ACT inhaler Inhale 2 puffs into the lungs every 6 hours as needed for Wheezing 1 Inhaler 3    potassium chloride (KLOR-CON M) 10 MEQ extended release tablet Take 10 mEq by mouth 2 times daily Prescribed by endocrinologist      venlafaxine (EFFEXOR) 75 MG tablet Take 2 tablets (150 mg) in the morning and 1 tablet (75 mg) in the hx  Colposcopy History:   Date of Last Mammogram: ordered  Date of Last Colonoscopy: ordered  Date of Last Bone Density: DX Osteoporosis-sees rheumatology on Fosamax      ________________________________________________________________________        REVIEW OF SYSTEMS:       A minimum of an eleven point review of systems was completed. Review Of Systems (11 point):  Constitutional: No fever, chills or malaise; No weight change or fatigue  Head and Eyes: No Headache, Dizziness or trauma in last 12 months; + glasses  ENT ROS: No hearing, Tinnitis, sinus or taste problems  Hematological and Lymphatic ROS:No Lymphoma, Von Willebrand's, Hemophillia or Bleeding History; +transfusion 2012 (Intra-op knee replacements)  Psych ROS: No Depression, Homicidal thoughts,suicidal thoughts, or anxiety  Breast ROS: No prior breast abnormalities or lumps  Respiratory ROS: No SOB, Pneumoniae,Cough, or Pulmonary Embolism History  Cardiovascular ROS: No Chest Pain with Exertion, Palpitations, Syncope, Edema;  +HTN & Bradycardia  Gastrointestinal ROS: No Nausea, vomiting, Diarrhea, Constipation,or Bowel Changes; No Bloody Stools or melena; +GERD  Genito-Urinary ROS: No Dysuria, Hematuria or Nocturia. No Urinary Incontinence or Vaginal Discharge  Musculoskeletal ROS: No Arthralgia, Arthritis,Gout,or Rheumatism; + Osteoporosis   Neurological ROS: No CVA, Migraines, Epilepsy, Seizure Hx, or Limb Weakness  Dermatological ROS: No Rash, Itching, Hives, Mole Changes or Cancer                                                                                                                                                                                                                                  PHYSICAL Exam:     Constitutional:  Vitals:    05/10/19 0846   BP: 122/82   Site: Right Upper Arm   Position: Sitting   Cuff Size: Medium Adult   Pulse: 78   Weight: 166 lb (75.3 kg)   Height: 5' 8\" (1.727 m)         General Appearance:   This  is a well Developed, well Nourished, well groomed female. Her BMI was reviewed. Nutritional decision making was discussed. Skin:  There was a Normal Inspection of the skin without rashes or lesions. There were no rashes. (Papular, Maculopapular, Hives, Pustular, Macular)     There were no lesions (Ulcers, Erythema, Abn. Appearing Nevi)            Lymphatic:  No Lymph Nodes were Palpable in the neck , axilla or groin.  0 # Of Lymph Nodes; Location ; Character [Normal]  [Shotty] [Tender] [Enlarged]     Neck and EENT:  The neck was supple. There were no masses   The thyroid was not enlarged and had no masses. Perrla, EOMI B/L, TMI B/L No Abnormalities. Throat inspected-No exudates or Masses, Nares Patent No Masses        Respiratory: The lungs were auscultated and found to be clear. There were no rales, rhonchi or wheezes. There was a good respiratory effort. Cardiovascular: The heart was in a regular rate and rhythm. . No S3 or S4. There was no murmur appreciated. Location, grade, and radiation are not applicable. Extremities: The patients extremities were without calf tenderness, edema, or varicosities. There was full range of motion in all four extremities. Pulses in all four extremities were appreciated and are 2/4. Abdomen: The abdomen was soft and non-tender. There were good bowel sounds in all quadrants and there was no guarding, rebound or rigidity. On evaluation there was no evidence of hepatosplenomegaly and there was no costal vertebral abdiaziz tenderness bilaterally. No hernias were appreciated. Abdominal Scars: yes    Psych:   The patient had a normal Orientation to: Time, Place, Person, and Situation  There is no Mood / Affect changes    Breast:  (Chest)  normal appearance, no masses or tenderness, Inspection negative, No nipple retraction or dimpling, No nipple discharge or bleeding, No axillary or supraclavicular adenopathy, Normal to palpation without dominant masses, Taught  Neuropathy     Osteoarthritis     PTSD (post-traumatic stress disorder)     Pulmonary nodules     Raynaud's disease     Scler[facility]a ([facility]     Scleroderma ([facility] 8/23/2018    Seizures (HCC)     Sleep walking disorder     Thyroid disease     TIA (transient ischemic attack)     Transient insomnia     Tricuspid regurgitation     Vasospasm ([facility] 01/2016    bilat. legs. resulting in near complete absence of profusion to arteries of feet. ([facility]  Vitamin D deficiency 8/23/2018    Wears glasses          Patient Active Problem List    Diagnosis Date Noted    Age-related osteoporosis without current pathological fracture 05/10/2019     Priority: High     On Calcium and Vit D  RX Fosamax through Rheumatologist      H/O: hysterectomy      Priority: High    Hx of total bilateral knee replacement 11/29/2017     Priority: High    Type 2 diabetes mellitus with stage 2 chronic kidney disease, without long-term current use of insulin ([facility] [address] 05/02/2017     Priority: High    Hx of Stroke ([facility] 04/06/2017     Priority: High    Raynaud's disease      Priority: High    Hypertension      Priority: High    CAD (coronary artery disease)      Priority: High    H/O hysterectomy with BSO at [facility] for benign disease 2014 11/29/2017     Priority: Medium     Hysterectomy in 2014 for [facility] menses [facility] Spondylosis of cervical region without myelopathy or radiculopathy      Priority: Medium     Suffers with neck pain. A MRI of the cervical spine revealed multilevel degenerative joint disease. Her neck pain is constant and radiates down both upper extremities. An EMG nerve conduction study in February 2016 found a bilateral carpal tunnel syndrome and possible ulnar neuropathy. A MRI of the cervical spine in August 2016 found multilevel degenerative joint disease and at C5-C6 a disc protrusion mildly indenting the thecal sac and closely abutting the ventral cord.     Additionally she reports having low back pain and previously had a lumbar spine MRI in September 2014 done at the [address] [place] [address],[address] in [place] which revealed multilevel lumbar degenerative joint disease worse at L5-S1 and L4-L5. Pain management consultation pending.  Sleep walking disorder      Priority: Medium    Neuropathy      Priority: Medium    GERD (gastroesophageal reflux disease)      Priority: Medium    Antinuclear antibody (IQRA) titer greater than 1:80      Priority: Medium    Anemia      Priority: Medium    Seizures ([facility] 02/24/2017     Priority: Medium     An isolated episode of witnessed generalized tonic-clonic seizure activity in January 2017 while in [place] likely provoked by Tramadol. An EEG in June 2017 was normal.  A MRI of the brain in April 2017 was normal.    She was taking Tramadol which was discontinued and also uses Lamotrigine primarily for her bipolar disorder.       Numbness 12/14/2018    Closed fracture of lower end of femur ([address] 11/08/2018    Closed supracondylar fracture of femur, left, initial encounter ([facility] [facility]     Spinal stenosis, cervical region 10/30/2018    Neural foraminal stenosis of cervical spine 10/30/2018    Left carpal tunnel syndrome     Osteoarthritis of spine with radiculopathy, cervical region     Degenerative disc disease, cervical     Trigger middle finger of right hand     Trigger ring finger of right hand     CKD (chronic kidney disease), stage II 08/23/2018    Scleroderma ([address] 08/23/2018    Vitamin D deficiency 08/23/2018  7/30/18 Cystocele repair, Cystoscopy with OBTRYX Ureteral Sling 07/30/2018     CYSTOCELE REPAIR  CYSTOSCOPY WITH OBTRYX URETERAL SLING      Overflow incontinence     Cystocele, midline 07/29/2018    JOYCE (stress urinary incontinence, female) 07/29/2018    Bradycardia 01/26/2018    Hypotension 01/24/2018    Nuclear senile cataract 01/24/2018    Acute cystitis without hematuria 01/16/2018    Hypovolemia 01/16/2018    Atrial fibrillation with slow ventricular response ([facility] 01/15/2018    H/O: stroke 01/15/2018    Chronic fatigue syndrome 06/15/2017    S/P knee surgery     Degenerative arthritis of right knee 05/02/2017    Transient insomnia     Pulmonary nodules     Fibromyalgia     Depression     Degenerative disc disease, thoracic     Bipolar disorder ([facility]     Asthma     Anxiety           Hereditary Breast, Ovarian, Colon and Uterine Cancer screening Done. Tobacco & Secondary smoke risks reviewed; instructed on cessation and avoidance      Counseling Completed:  Preventative Health Recommendations and Follow up. PLAN:  Return in about 1 year (around 5/10/2020) for Annual.  Repeat Annual every 1 year  Cervical Cytology Evaluation begins at 24years old. If Negative Cytology, Follow-up screening per current guidelines. Mammograms every 1 year. If 37 yo and last mammogram was negative. Calcium and Vitamin D dosing reviewed. Colonoscopy screening reviewed as well as onset for bone density testing. Barrier recommendations discussed. STD counseling and prevention reviewed. Routine health maintenance per patients PCP.   Orders Placed This Encounter   Procedures    TERESA DIGITAL SCREEN W CAD BILATERAL     Standing Status:   Future     Standing Expiration Date:   5/10/2020     Order Specific Question:   Reason for exam:     Answer:   screen for breast cancer

## 2019-05-20 ENCOUNTER — HOSPITAL ENCOUNTER (OUTPATIENT)
Dept: PHYSICAL THERAPY | Age: 54
Setting detail: THERAPIES SERIES
Discharge: HOME | End: 2019-05-20
Payer: MEDICAID

## 2019-05-20 ENCOUNTER — HOSPITAL ENCOUNTER (OUTPATIENT)
Dept: WOMENS IMAGING | Age: 54
Discharge: HOME | End: 2019-05-22
Payer: MEDICAID

## 2019-05-20 DIAGNOSIS — Z12.31 ENCOUNTER FOR SCREENING MAMMOGRAM FOR BREAST CANCER: ICD-10-CM

## 2019-05-20 PROCEDURE — 77063 BREAST TOMOSYNTHESIS BI: CPT

## 2019-05-20 PROCEDURE — 97110 THERAPEUTIC EXERCISES: CPT

## 2019-05-20 NOTE — FLOWSHEET NOTE
[x] [facility]  [address]  [phone]  F: [phone]        Physical Therapy Daily Treatment Note    Date:  2019  Patient Name:  Liliam Joe    :  1965  MRN: [id number]  Physician:  Alexander Solorio MD     Insurance: [facility] Medicaid 30 v  Medical Diagnosis: Spinal Stenosis, cervical region M48.02, Closed fracture of distal end of left femur S72.402D R Foot Pain M79.671                                Rehab Codes: M54.2, M25.662, M25.562, M25.552, R26.2, M25.571  Onset Date: 2016                                 Next 's appt: TBD   Visit# / total visits:  R ankle, L knee  Visit# / neck/scap. Cancels/No Shows: 3/0    Subjective:    Pain:  [x] Yes  [] No Location:   L knee, R foot. Neck Pain Rating: (0-10 scale)     2/10-R lateral  Ankle.     /10 neck/R EUE/scapular    Pain altered Tx:  [x] No  [] Yes  Action: NA  Comments:   No complaints. States she does not have a written HEP for scapula/neck     Objective:  Modalities:  Vaso compression R ankle x15 mins   Held    Precautions: WBAT  With transitioning out of boot per 19 orders. Stretching gastro, ROM, light strengthening and gait training.  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   Exercises:  Exercise Reps/ Time Weight/ Level  Comments   SciFit 6 min L4.0 x             Standing       Gastroc stretch 3x20\"  x Wedge,   Prostretch 3x20\"  x     Heel tap  4 inch     Soleus mobility 5x20\" 12 inch x Leaning into soleus stretch, wedge 5/20   SL Calf raises 2x20x   x     bosu wt shifting 1 min ea  x Fwd/back, M/L    bosu squat 20x  x incr reps    SLS on foam 3x5-15\"  x  touch wall often, Poor balance   3 way hip 20x blue x Limited UE, VC for pacing standing on R with blue foam    BAPS 15x   L3 x   DF/PF, Inv/Ev,  Toes of Left foot on board, added   Rockerboard 20x L2 x   limit UE           Tandem Walk 20 ftx1  x     Heel walking  20 ft x 1  x      grapevine 20 ft x 1  x  added   rebounder  10x2 ylw  R LE CKC and L Toes touching floor,     Star ex 10x ea  x R LE CKC, added 5/20   Tband     Scapular strengthening   -Rows 20x citron x    - Ext 20x citron x    - ER 20x citron x    - IR 20x citron x    - B ER  20x citron x    - HAB 20x citron x    - Flex 20x citron x Face band          FW flex 2x10x 2 lbs x    FW abd 2x10x 2 lbs x    Pectoralis stretching       Prone TG L30   Added all prone   rows 20x 2 lbs x    ext 20x 2 lbs x    Is Ys Ts 15x ea 2 lbs x                   Other: Completed exercises marked with \"x\"       Specific Instructions for next treatment: progress standing wt bearing ex in shoe, add balance activities,  progress UE tband/periscapular strengthening, add cervical ROM/and isometric next session and issue HEP. Check if pt is still address HEP for 4 way ankle Tband. Treatment Charges: Mins Units   []  Modalities     [x]  Ther Exercise 58  4   []  Manual Therapy     []  Ther Activities     []  Aquatics     []  Vasocompression       []  Other: Gait        Total Treatment time 58 4     Assessment: [x] Progressing toward goals added prone scapular strengthening exercises. Verbal cues for tech. Added addition exercises for L knee stability/strengtening and R ankle proprioception. Verbal cues to slow pace with many balance exercises and to decreased UE support. Pt demonstrates  R ankle stability. [] No change. [] Other:     Continuing R foot goals to be met in 18 treatments  7. ? Pain: R foot 2/10 average pain with ambulation. 8. Patient will be able to ambulate community distances. 9. Pt indep w/ascending, descending steps. 10. Patient will be able demonstrate improved balance by performing R SLS x 30 seconds. 11. STG/LTG( 8 visits)  1. Patient will improve B scapular strength to 4+/5 for horizontal abduction, scaption, and extension. 2. Patient will reports decreased neck and shoulder pain with UE ADLs.    3. Patient will be

## 2019-05-24 ENCOUNTER — HOSPITAL ENCOUNTER (OUTPATIENT)
Dept: PHYSICAL THERAPY | Age: 54
Setting detail: THERAPIES SERIES
Discharge: HOME | End: 2019-05-24
Payer: MEDICAID

## 2019-05-24 PROCEDURE — 97110 THERAPEUTIC EXERCISES: CPT

## 2019-05-24 PROCEDURE — 97112 NEUROMUSCULAR REEDUCATION: CPT

## 2019-05-24 RX ORDER — GABAPENTIN 800 MG/1
TABLET ORAL
Qty: 120 TABLET | Refills: 4 | Status: SHIPPED | OUTPATIENT
Start: 2019-05-24 | End: 2019-09-09 | Stop reason: SDUPTHER

## 2019-05-24 RX ORDER — LOSARTAN POTASSIUM 100 MG/1
TABLET ORAL
Qty: 90 TABLET | Refills: 1 | Status: SHIPPED | OUTPATIENT
Start: 2019-05-24 | End: 2019-11-18 | Stop reason: SDUPTHER

## 2019-05-24 RX ORDER — HYDROXYCHLOROQUINE SULFATE 200 MG/1
TABLET, FILM COATED ORAL
Qty: 90 TABLET | Refills: 1 | Status: SHIPPED | OUTPATIENT
Start: 2019-05-24 | End: 2019-11-18 | Stop reason: SDUPTHER

## 2019-05-24 NOTE — TELEPHONE ENCOUNTER
Occurrence   CBC Once 09/24/2018   Urinalysis Once 09/24/2018   Hemoglobin and Hematocrit, Blood Once 08/14/2018   Urinalysis Every 12 Weeks 7/5/2019, 9/27/2019, 12/20/2019, 3/13/2020, 6/5/2020, 8/28/2020, 11/20/2020, 2/12/2021, 5/7/2021, 9/28/7923   Basic Metabolic Panel Every 12 Weeks    CBC Auto Differential Every 12 Weeks    Creatinine, Random Urine Every 12 Weeks    Phosphorus Every 12 Weeks    Protein / creatinine ratio, urine Every 12 Weeks    Protein, urine, random Every 12 Weeks    PTH, INTACT WITH IONIZED CALCIUM Every 12 Weeks    Vitamin D 25 Hydroxy Every 12 Weeks    Basic Metabolic Panel Every 12 Weeks    CBC Auto Differential Every 12 Weeks    Creatinine, Random Urine Every 12 Weeks    Phosphorus Every 12 Weeks    Protein / creatinine ratio, urine Every 12 Weeks    Protein, urine, random Every 12 Weeks    PTH, INTACT WITH IONIZED CALCIUM Every 12 Weeks    Vitamin D 25 Hydroxy Every 12 Weeks                Patient Active Problem List:     Seizures (HCC)     Transient insomnia     Sleep walking disorder     Raynaud's disease     Pulmonary nodules     Neuropathy     Hypertension     GERD (gastroesophageal reflux disease)     Fibromyalgia     Depression     Degenerative disc disease, thoracic     CAD (coronary artery disease)     Bipolar disorder (HCC)     Asthma     Anxiety     Antinuclear antibody (IQRA) titer greater than 1:80     Anemia     Hx of Stroke (HCC)     Type 2 diabetes mellitus with stage 2 chronic kidney disease, without long-term current use of insulin (HCC)     Degenerative arthritis of right knee     S/P knee surgery     Chronic fatigue syndrome     Spondylosis of cervical region without myelopathy or radiculopathy     H/O hysterectomy with BSO at [facility] for benign disease 2014     Hx of total bilateral knee replacement     H/O: hysterectomy     Atrial fibrillation with slow ventricular response ([facility] [address]     H/O: stroke     Acute cystitis without hematuria     Hypovolemia     Hypotension Bradycardia     Cystocele, midline     JOYCE (stress urinary incontinence, female)     Overflow incontinence     7/30/18 Cystocele repair, Cystoscopy with OBTRYX Ureteral Sling     Nuclear senile cataract     CKD (chronic kidney disease), stage II     Scleroderma ([address]     Vitamin D deficiency     Trigger middle finger of right hand     Trigger ring finger of right hand     Osteoarthritis of spine with radiculopathy, cervical region     Degenerative disc disease, cervical     Left carpal tunnel syndrome     Spinal stenosis, cervical region     Neural foraminal stenosis of cervical spine     Closed fracture of lower end of femur ([address]     Closed supracondylar fracture of femur, left, initial encounter ([address]     Numbness     Age-related osteoporosis without current pathological fracture

## 2019-05-24 NOTE — FLOWSHEET NOTE
[x] [facility]  [address]  [phone]  F: [phone]        Physical Therapy Daily Treatment Note    Date:  2019  Patient Name:  Adrian Jean Baptiste    :  1965  MRN: [id number]  Physician:  Rusyt Hess MD     Insurance: [facility] Medicaid 30 v  Medical Diagnosis: Spinal Stenosis, cervical region M48.02, Closed fracture of distal end of left femur S72.402D R Foot Pain M79.671                                Rehab Codes: M54.2, M25.662, M25.562, M25.552, R26.2, M25.571  Onset Date: 2016                                 Next 's appt: TBD   Visit# / total visits:  R ankle, L knee  Visit# 3/ neck/scap. Cancels/No Shows: 3/0    Subjective:    Pain:  [] Yes  [x] No Location:   L knee, R foot. Neck Pain Rating: (0-10 scale)     0/10-R lateral  Ankle.     /10 neck/R EUE/scapular    Pain altered Tx:  [x] No  [] Yes  Action: NA  Comments:  No complaints with arrival.      Objective:  Modalities:  Vaso compression R ankle x15 mins   Held    Precautions: WBAT  With transitioning out of boot per 19 orders. Stretching gastro, ROM, light strengthening and gait training. 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   Exercises:  Exercise Reps/ Time Weight/ Level  Comments   SciFit 6 min L4.0 x      HS stretching L  3x30\"  x 12 inch step, added    Standing       Gastroc stretch 3x20\"  x Wedge,   Prostretch 3x20\"        Soleus mobility 5x20\" 12 inch   Leaning into soleus stretch,     SL Calf raises on bosu 20x   x progressed to bosu     bosu wt shifting SL 1 min ea  x Fwd/back, M/L    bosu squat 20x  x I     SLS on foam 3x20\"  x Able to address without UE support    tandom standing.   2x20\"  x Added    NBOS w/ nudging pt  10x  x Added    3 way hip 20x blue   Limited UE, VC for pacing standing on R with blue foam    TKE L 10x5' BLUE x Added /24   Step ups 15x 8 in x Added 5/24   Step downs 15x 6 in x L LE wt bearing, added    heel taps  10x 6 in x     Wall slide squats w/ hip add 10x5\"  x Added 5/24   BAPS 15x   L3 x   DF/PF, Inv/Ev,  Toes of Left foot on board, added   Rockerboard 20x L2     limit UE           Tandem Walk 20 ftx1        Heel walking 20 ft x 1  x      grapevine 20 ft x 1  x      rebounder  10x2 ylw  R LE CKC and L Toes touching floor,     Star ex 25x ea   x Joce, towel under foot sliding, Fingers on rail, progressed 5/24   Tband     Scapular strengthening   -Rows 20x citron      - Ext 20x citron     - ER 20x citron     - IR 20x citron     - B ER  20x citron     - HAB 20x citron     - Flex 20x citron  Face band          FW flex 2x10x 2 lbs x    FW abd 2x10x 2 lbs x    Pectoralis stretching       Prone TG L30   15x with prone 5/24    rows 20x 2 lbs x    ext 20x 2 lbs x    Is Ys Ts 15x ea 2 lbs x            Education  x  x Cervical roll, lumbar roll, added 5/24           Cervical Sitting    Added all cervical 5/24   Retraction  10x  x    UT stretch 3x15  x    Levator stretch 3x15\"  x    Cervical ROM 5x5\"  x SB, rotaton , ext/flex                 Other: Completed exercises marked with \"x\"  NOTED PT STANDING WITH L KNEE FLEXED. HELD ALL TBAND SCAPULAR/UE STRENGTHEING DUE TO TIME CONSTRAINTS WITH ADDING CERVICAL ROM/STRETCHING. WILL HAVE TO ROTATE EXERCISES DUE TO VOLUME OF DX AND TIME  ALLOWANCE. Specific Instructions for next treatment: progress standing wt bearing ex in shoe, add balance activities,  progress UE tband/periscapular strengthening, add cervical ROM/and isometric next session and issue HEP. Check if pt is still address HEP for 4 way ankle Tband. Treatment Charges: Mins Units   []  Modalities     [x]  Ther Exercise  58 4   []  Manual Therapy     []  Ther Activities     []  Aquatics     []  Vasocompression       []  Other: Gait        Total Treatment time  58 4     Assessment: [x] Progressing toward goals added many exercises for cervical ROM and strengthening.  Secondly,  added several exercises for Left knee and R ankle strengthening  as listed in log. Thirdly, progressed static and dynamic balance and stability exercises for R ankle and left knees. Pt demonstrated improved pacing of balance/ankle strengthening for muscle cocontraction's vs momentum. [] No change. [] Other:     Continuing R foot goals to be met in 18 treatments  7. ? Pain: R foot 2/10 average pain with ambulation. 8. Patient will be able to ambulate community distances. 9. Pt indep w/ascending, descending steps. 10. Patient will be able demonstrate improved balance by performing R SLS x 30 seconds. 11. STG/LTG( 8 visits)  1. Patient will improve B scapular strength to 4+/5 for horizontal abduction, scaption, and extension. 2. Patient will reports decreased neck and shoulder pain with UE ADLs. 3. Patient will be able to demonstrate correct sitting and standing posture      Pt. Education:  [x] Yes  [] No  [] Reviewed Prior HEP/Ed , New exercises listed above. Method of Education: [x] Verbal  [] Demo  [x] Written   Comprehension of Education:  [x] Verbalizes understanding-purpose, benefits of game ready  [x] Demonstrates understanding. [] Needs review. [x] Demonstrates/verbalizes HEP/Ed previously given. 4/12/19   4 way ankle tband and medium band. Plan: [x] Continue per plan of care. [x] Other:  Rotate exercises due to volume of dx and time allowance.        Time In:  4187    Time Out:  1054       Electronically signed by:   Naomie Celaya PTA

## 2019-05-28 ENCOUNTER — OFFICE VISIT (OUTPATIENT)
Dept: FAMILY MEDICINE CLINIC | Age: 54
End: 2019-05-28
Payer: MEDICAID

## 2019-05-28 VITALS
DIASTOLIC BLOOD PRESSURE: 80 MMHG | TEMPERATURE: 97 F | HEART RATE: 72 BPM | BODY MASS INDEX: 26.12 KG/M2 | SYSTOLIC BLOOD PRESSURE: 130 MMHG | WEIGHT: 171.8 LBS | OXYGEN SATURATION: 96 %

## 2019-05-28 DIAGNOSIS — J40 BRONCHITIS: ICD-10-CM

## 2019-05-28 DIAGNOSIS — R05.8 COUGH PRODUCTIVE OF PURULENT SPUTUM: Primary | ICD-10-CM

## 2019-05-28 PROCEDURE — G8427 DOCREV CUR MEDS BY ELIG CLIN: HCPCS | Performed by: NURSE PRACTITIONER

## 2019-05-28 PROCEDURE — G8598 ASA/ANTIPLAT THER USED: HCPCS | Performed by: NURSE PRACTITIONER

## 2019-05-28 PROCEDURE — 99213 OFFICE O/P EST LOW 20 MIN: CPT | Performed by: NURSE PRACTITIONER

## 2019-05-28 PROCEDURE — G8417 CALC BMI ABV UP PARAM F/U: HCPCS | Performed by: NURSE PRACTITIONER

## 2019-05-28 PROCEDURE — 3017F COLORECTAL CA SCREEN DOC REV: CPT | Performed by: NURSE PRACTITIONER

## 2019-05-28 PROCEDURE — 1036F TOBACCO NON-USER: CPT | Performed by: NURSE PRACTITIONER

## 2019-05-28 RX ORDER — AZITHROMYCIN 250 MG/1
TABLET, FILM COATED ORAL
Qty: 1 PACKET | Refills: 0 | Status: SHIPPED | OUTPATIENT
Start: 2019-05-28 | End: 2019-06-07

## 2019-05-28 RX ORDER — EPINEPHRINE 0.3 MG/.3ML
INJECTION SUBCUTANEOUS
Qty: 1 EACH | Refills: 0 | Status: SHIPPED | OUTPATIENT
Start: 2019-05-28 | End: 2020-08-10

## 2019-05-28 ASSESSMENT — ENCOUNTER SYMPTOMS
COUGH: 1
RHINORRHEA: 1
SHORTNESS OF BREATH: 0

## 2019-05-28 NOTE — PROGRESS NOTES
Patient is present complaining of chest pain x3 weeks  Patient states the pain is not all the time, mostly at night  Patient states she has been coughing up green phlegm  Patient has been taking OTC cold medicaion    Pharmacy and medication reviewed with patient    Visit Information    Have you changed or started any medications since your last visit including any over-the-counter medicines, vitamins, or herbal medicines? no   Have you stopped taking any of your medications? Is so, why? -  no  Are you having any side effects from any of your medications? - no    Have you seen any other physician or provider since your last visit? yes - ortho, pain management, urologist   Have you had any other diagnostic tests since your last visit? yes - emg, dexa   Have you been seen in the emergency room and/or had an admission in a hospital since we last saw you?  no   Have you had your routine dental cleaning in the past 6 months?  no     Do you have an active MyChart account? If no, what is the barrier?   Yes    Patient Care Team:  COLTON Camarillo CNP as PCP - General (Certified Nurse Practitioner)  COLTON Camarillo CNP as PCP - S Attributed Provider  COLTON Camarillo CNP as Referring Physician (Certified Nurse Practitioner)  Angel Hilario MD (Psychiatry)  Elmo Oliveira MD as Consulting Physician (Gastroenterology)  Chaim Peralta DO as Surgeon (Neurosurgery)  Bret Matthews MD as Consulting Physician (Dermatology)  Ivone Espinoza DO as Consulting Physician (Orthopedic Surgery)  Hetal Corral MD as Consulting Physician (Internal Medicine)  Aric Jefferson MD as Anesthesiologist (Anesthesiology)  Mychal Wakefield MD as Consulting Physician (Endocrinology)  Melo Cary MD as Consulting Physician (Cardiology)    Medical History Review  Past Medical, Family, and Social History reviewed and does contribute to the patient presenting condition    Health Maintenance   Topic Date Due    Hepatitis B Vaccine (1 of 3 - Risk 3-dose series) 07/29/1984    Shingles Vaccine (1 of 2) 08/14/2019 (Originally 7/29/2015)    Diabetic foot exam  08/14/2019    Lipid screen  09/21/2019    Creatinine monitoring  02/26/2020    A1C test (Diabetic or Prediabetic)  04/12/2020    Potassium monitoring  04/12/2020    Diabetic retinal exam  05/09/2020    Breast cancer screen  05/20/2021    Colon cancer screen colonoscopy  06/25/2023    DTaP/Tdap/Td vaccine (3 - Td) 09/14/2027    Flu vaccine  Completed    Pneumococcal 0-64 years Vaccine  Completed    Hepatitis C screen  Completed    HIV screen  Completed

## 2019-05-28 NOTE — PROGRESS NOTES
[address] [place]  [phone]    Roxane Johnson is a 48 y.o. female who presents today for her  medical conditions/complaints as noted below. Mia YFN Paez is c/o of Chest Pain and Cough    HPI:     HPI   Pt states she is having chest discomfort due to coughing. She states she is coughing up green mucus. She does not know if she has had a fever. This has been occurring for three weeks. No sore throat. Slight rhinorrhea. She is fatigued. She feels this gets worse at night. She has tried otc meds without relief. Nursing note reviewed and discussed with patient. Patient's medications, allergies, past medical, surgical, social and family histories were reviewed and updated asappropriate. Current Outpatient Medications   Medication Sig Dispense Refill    EPINEPHrine (EPIPEN 2-JIGAR) 0.3 MG/0.3ML SOAJ injection To be used if experiencing angioedema. 1 each 0    azithromycin (ZITHROMAX) 250 MG tablet 2 tablets now then 1 daily until gone. 1 packet 0    losartan (COZAAR) 100 MG tablet TAKE 1 TABLET BY MOUTH ONE TIME A DAY  90 tablet 1    hydroxychloroquine (PLAQUENIL) 200 MG tablet TAKE 1 TABLET BY MOUTH ONE TIME A DAY  90 tablet 1    gabapentin (NEURONTIN) 800 MG tablet TAKE 1 TABLET BY MOUTH FOUR TIMES A DAY  120 tablet 4    alendronate (FOSAMAX) 70 MG tablet alendronate 70 mg tablet   Take 1 tablet every week by oral route for 90 days.  famotidine (PEPCID) 40 MG tablet TAKE 1 TABLET BY MOUTH IN THE EVENING 90 tablet 2    metFORMIN (GLUCOPHAGE) 500 MG tablet       SM ASPIRIN ADULT LOW STRENGTH 81 MG EC tablet TAKE 1 TABLET BY MOUTH TWO TIMES A DAY  180 tablet 1    omeprazole (PRILOSEC) 20 MG delayed release capsule TAKE 1 CAPSULE BY MOUTH TWO TIMES A DAY  180 capsule 1    Lidocaine POWD Formula 74B: amit2%+carbamazapine2%+lido2%+prilo2%. Apply 1-2 gm topically to affected area TID-QID. 30 g 11    Misc.  Devices MISC 1 each by Does not apply route once as needed (spasms) Please dispense one theracane device. Massage back as tolerated to relieve muscle spasms. 1 Device 0    lamoTRIgine (LAMICTAL) 25 MG tablet Take 25 mg by mouth nightly      calcium carbonate 648 MG TABS Take 1 tablet by mouth 2 times daily 60 tablet 5    cetirizine (ZYRTEC) 10 MG tablet TAKE 1 TABLET BY MOUTH ONE TIME A DAY  30 tablet 4    glucose monitoring kit (FREESTYLE) monitoring kit 1 kit by Does not apply route daily 1 kit 0    blood glucose monitor strips Test 4 times a day & as needed for symptoms of irregular blood glucose. 100 strip 5    acetaminophen (TYLENOL) 325 MG tablet Take 2 tablets by mouth every 6 hours as needed for Pain 20 tablet 0    cyclobenzaprine (FLEXERIL) 5 MG tablet TAKE 1 OR 2 TABLETS BY MOUTH AT BEDTIME AS NEEDED 20 tablet 0    pravastatin (PRAVACHOL) 40 MG tablet TAKE 1 TABLET BY MOUTH AT BEDTIME  90 tablet 1    conjugated estrogens (PREMARIN) 0.625 MG/GM vaginal cream Place 0.5 g vaginally daily Place twice daily at bedtime for first 2 weeks then at bedtime once daily for 2 weeks then 3 times/week.  1 Tube 3    EPINEPHrine HCl, Anaphylaxis, (EPIPEN 2-JIGAR IM) Inject 1 Syringe into the muscle PRN for angioedema, takes with the prednisone      predniSONE (DELTASONE) 50 MG tablet Take 50 mg by mouth as needed Takes with Epi pen for angioedema      Disulfiram 500 MG TABS Take 1 tablet by mouth daily Prescribed per psych      felodipine (PLENDIL) 10 MG extended release tablet Take 10 mg by mouth daily Prescribed by Dr. Eliazar Espinoza ergocalciferol (ERGOCALCIFEROL) 77939 units capsule Take 50,000 Units by mouth See Admin Instructions Takes twice a week      naltrexone (DEPADE) 50 MG tablet Take 50 mg by mouth daily Prescribed by psych      QUEtiapine (SEROQUEL) 300 MG tablet Take 0.5 tablets by mouth nightly (Patient taking differently: Take 300 mg by mouth nightly ) 60 tablet 3    propranolol (INDERAL) 10 MG tablet Take 30 mg by mouth nightly      ferrous sulfate (FE TABS) 325 (65 Fe) MG EC tablet Take 1 tablet by mouth 2 times daily 90 tablet 3    lamoTRIgine (LAMICTAL) 100 MG tablet Take 100 mg by mouth daily       levothyroxine (SYNTHROID) 25 MCG tablet Take 25 mcg by mouth Daily      albuterol sulfate  (90 Base) MCG/ACT inhaler Inhale 2 puffs into the lungs every 6 hours as needed for Wheezing 1 Inhaler 3    potassium chloride (KLOR-CON M) 10 MEQ extended release tablet Take 10 mEq by mouth 2 times daily Prescribed by endocrinologist      venlafaxine (EFFEXOR) 75 MG tablet Take 2 tablets (150 mg) in the morning and 1 tablet (75 mg) in the evening. 90 tablet 3    Multiple Vitamins-Minerals (THERAPEUTIC MULTIVITAMIN-MINERALS) tablet Take 1 tablet by mouth daily      vitamin B-12 (CYANOCOBALAMIN) 1000 MCG tablet Take 1,000 mcg by mouth daily       No current facility-administered medications for this visit.       Past Medical History:   Diagnosis Date    Acute kidney injury ([facility] [address]     Anemia     Angioedema     Antinuclear antibody (IQRA) titer greater than 1:80     Anxiety     Asthma     Ataxia     Atrial fibrillation (HCC)     Borderline personality disorder (HCC)     Bradycardia     CAD (coronary artery disease)     Chronic kidney disease     CKD (chronic kidney disease), stage II 8/23/2018    Degenerative disc disease, thoracic     Depression     Diabetes mellitus (HCC)     Diastolic dysfunction     DJD (degenerative joint disease)     Fibromyalgia     Foot pain, right     GERD (gastroesophageal reflux disease)     H/O: hysterectomy     Headache     Hernia of abdominal wall     History of blood transfusion     no problem    Hyperlipidemia     Hypertension     Lupus     Mood disorder (HCC)     Neuropathy     Osteoarthritis     PTSD (post-traumatic stress disorder)     Pulmonary nodules     Raynaud's disease     Scleroderma ([facility] [address]     Scleroderma ([facility] [address] 8/23/2018    Seizures ([facility] [address] [facility]     Sleep walking disorder     Thyroid disease     TIA (transient ischemic attack)     Transient insomnia     Tricuspid regurgitation     Vasospasm (HCC) 01/2016    bilat. legs. resulting in near complete absence of profusion to arteries of feet. ([facility]     Vitamin D deficiency 8/23/2018    Wears glasses       Past Surgical History:   Procedure Laterality Date    ABDOMINOPLASTY  2013    BLADDER SUSPENSION N/A 7/30/2018    CYSTOSCOPY WITH OBTRYX MID URETHRAL SLING performed by Kemal Storm MD at [address] Right 2016   [address]    ELBOW SURGERY Right     EYE SURGERY      khushboo. lasik     FEMUR FRACTURE SURGERY  11/09/2018    FINGER SURGERY Right 08/28/2018    RING CARTER MASS EXCISION, TRIGGER RELEASE    FRACTURE SURGERY      left femur    GASTRECTOMY      GASTRIC BYPASS SURGERY  2012    HYSTERECTOMY      JOINT REPLACEMENT Bilateral     knees    KNEE SURGERY Right 05/02/2017    (femoral) patella replacement    NERVE BLOCK Right 05/04/2018     cervical facet #1 no steroid    NERVE BLOCK Right 06/29/2018    rt cervical diagnostic #2 decadron 10mg    NERVE BLOCK Right 08/10/2018    right cervical rfa decadron 10mg    NH ANTERIOR COLPORRAPHY RPR CYSTOCELE W/CYSTO N/A 7/30/2018    CYSTOCELE REPAIR performed by Shady Humphrey DO at [address] OFFICE/OUTPT [address] Right 8/28/2018    RING CARTER MASS EXCISION, TRIGGER RELEASE, 30[address] TABLE performed by Staci Conway DO at [address] OFFICE/OUTPT VISIT,PROCEDURE ONLY Left 11/9/2018    FEMUR RETROGRADE IM NAILING PERIPROSTHETIC FRACTURE performed by Staci Conway DO at [address]/CARPAL TUNNEL SURG Left 10/23/2018    CARPAL TUNNEL RELEASE performed by Staci Conway DO at [address] [employer]  2011    left knee    TOTAL KNEE ARTHROPLASTY Right 5/2/2017    PATELLOFEMORAL REPLACEMENT KNEE - Yasmin Squires, 3080 TABLE, FEMORAL POPLITEAL BLOCK, NSA= SPINAL VS GENERAL performed by Edgard Kaur DO at [address] [address]     Family History   Adopted: Yes   Problem Relation Age of Onset    Depression Mother     Asthma Mother     Depression Father     High Blood Pressure Father     Diabetes Father     Asthma Father     Depression Sister     Asthma Sister     Depression Brother     Asthma Brother     Asthma Maternal Aunt     Asthma Maternal Uncle     Asthma Paternal Aunt     Asthma Paternal Uncle     Asthma Maternal Grandmother     Cancer Maternal Grandmother     Asthma Maternal Grandfather     Asthma Paternal Grandmother     Asthma Paternal Grandfather     Asthma Maternal Cousin     Asthma Paternal Cousin      Social History     Tobacco Use    Smoking status: Never Smoker    Smokeless tobacco: Never Used   Substance Use Topics    Alcohol use: Not Currently     Comment: notfor 2 months      Allergies   Allergen Reactions    Morphine Nausea And Vomiting     Pt states it changes her mental status    Amlodipine Other (See Comments)     diarrhea and stress    Dilaudid [Hydromorphone Hcl] Hives and Itching    Sulfa Antibiotics Hives and Rash       Subjective:      Review of Systems   Constitutional: Negative for chills and fever. HENT: Positive for rhinorrhea. Respiratory: Positive for cough. Negative for shortness of breath. Cardiovascular: Negative for chest pain, palpitations and leg swelling. Other pertinent ROS in HPI  Objective:     /80 (Site: Left Upper Arm, Position: Sitting, Cuff Size: Medium Adult)   Pulse 72   Temp 97 °F (36.1 °C) (Oral)   Wt 171 lb 12.8 oz (77.9 kg)   SpO2 96%   BMI 26.12 kg/m²    Physical Exam   Constitutional: She is oriented to person, place, and time. She appears well-developed and well-nourished. She is active.    HENT:   Right Ear: External ear normal.   Left Ear: External ear normal.   Nose: Nose normal.   Eyes: EOM are normal.   Neck: Normal range of motion and full passive range of motion without pain. Cardiovascular: Normal rate, regular rhythm, S1 normal, S2 normal and normal heart sounds. Pulmonary/Chest: Effort normal and breath sounds normal. No respiratory distress. Musculoskeletal: Normal range of motion. She exhibits no deformity. Neurological: She is alert and oriented to person, place, and time. Skin: Skin is warm and dry. Psychiatric: She has a normal mood and affect. Assessment/PLAN     1. Cough productive of purulent sputum  Increase fluids  Ok to continue otc meds  Notify if no improvement or worsening.   - azithromycin (ZITHROMAX) 250 MG tablet; 2 tablets now then 1 daily until gone. Dispense: 1 packet; Refill: 0    2. Bronchitis    - azithromycin (ZITHROMAX) 250 MG tablet; 2 tablets now then 1 daily until gone. Dispense: 1 packet; Refill: 0      RTO if symptoms worsen or fail to improve  Pt agreeable with plan      Patient given educational materials - see patientinstructions. Discussed use, benefit, and side effects of prescribed medications. All patient questions answered. Pt voiced understanding. Reviewed health maintenance. Instructed to continue current medications, diet andexercise. 1.  Mia received counseling on the following healthy behaviors: nutrition, exercise and medication adherence  2. Patient given educationalmaterials when available - see patient instructions when applicable  3. Discussed use, benefit, and side effects of prescribed medications. Barriersto medication compliance addressed. All patient questions answered. Pt voiced understanding. 4.  Reviewed prior labs and health maintenance  5. Continue current medications, diet and exercise. CompletedRefills   Requested Prescriptions     Signed Prescriptions Disp Refills    EPINEPHrine (EPIPEN 2-JIGAR) 0.3 MG/0.3ML SOAJ injection 1 each 0     Sig: To be used if experiencing angioedema.     azithromycin (ZITHROMAX) 250 MG

## 2019-05-31 ENCOUNTER — HOSPITAL ENCOUNTER (OUTPATIENT)
Dept: PHYSICAL THERAPY | Age: 54
Setting detail: THERAPIES SERIES
Discharge: HOME | End: 2019-05-31
Payer: MEDICAID

## 2019-05-31 PROCEDURE — 97140 MANUAL THERAPY 1/> REGIONS: CPT

## 2019-05-31 PROCEDURE — 97110 THERAPEUTIC EXERCISES: CPT

## 2019-05-31 NOTE — FLOWSHEET NOTE
[x] [facility]  Therapy  [address]  [phone]  F: [phone]        Physical Therapy Daily Treatment Note    Date:  2019  Patient Name:  Anjelica Benson    :  1965  MRN: [id number]  Physician:  Toney Barriga MD     Insurance: [facility] Medicaid 30 v  Medical Diagnosis: Spinal Stenosis, cervical region M48.02, Closed fracture of distal end of left femur S72.402D R Foot Pain M79.671                                Rehab Codes: M54.2, M25.662, M25.562, M25.552, R26.2, M25.571  Onset Date: 2016                                 Next 's appt: TBD   Visit# / total visits:  R ankle, L knee  Visit#  neck/scap. Cancels/No Shows: 4/0    Subjective:    Pain:  [] Yes  [x] No Location:   L knee, R foot. Neck Pain Rating: (0-10 scale)     0/10-R lateral  Ankle.     /10 neck/R EUE/scapular    Pain altered Tx:  [x] No  [] Yes  Action: NA  Comments:   Reports she feels okay, strep throat has been on medication. (only reported R     Objective:  Modalities:  Vaso compression R ankle x15 mins   Held    Precautions: WBAT  With transitioning out of boot per 19 orders. Stretching gastro, ROM, light strengthening and gait training. 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   Exercises:  Exercise Reps/ Time Weight/ Level  Comments   SciFit 6 min L4.0 x      HS stretching L  3x30\"    12 inch step, added    Sitting        domers 1 mins  x Issue cardboard roller for HEP, also education on frozen water bottle.     Manual  x  x Manual mobs of metatarsal with education for pt with HEP    Towel curls  x  x Demo and verbal cues only  for HEP    Standing       Gastroc stretch 3x20\"  x Wedge,   Prostretch 3x20\"  x      Soleus mobility 5x20\" 12 inch   Leaning into soleus stretch,     SL Calf raises on bosu 20x         bosu wt shifting SL 1 min ea    Fwd/back, M/L    bosu squat 20x   I     SLS on foam 3x20\"  x      SLS on foam cervical ROM 2x15\"  x added   tandom standing. 2x20\"        NBOS w/ nudging pt  10x      3 way hip 20x blue x  Limited UE,   joce     TKE L 10x5' BLUE       Step ups 15x 8 in x    Step downs 15x 6 in x L LE wt bearing, added    heel taps  10x 6 in x     Wall slide squats w/ hip add 10x5\"  x     BAPS 15x   L3     DF/PF, Inv/Ev,  Toes of Left foot on board, added   Rockerboard 20x L2     limit UE           Tandem Walk 20 ftx1        Heel walking 20 ft x 1         grapevine 20 ft x 1       rebounder  10x2 ylw  R LE CKC and L Toes touching floor,     Star ex 25x ea   x Joce, towel under foot sliding, Fingers on rail, progressed 5/24   Tband     Scapular strengthening   -Rows 20x citron x    - Ext 20x citron x    - ER 20x citron x    - IR 20x citron x    - B ER  20x citron x    - HAB 20x citron     - Flex 20x citron  Face band          FW flex 2x10x 2 lbs      FW abd 2x10x 2 lbs     Pectoralis stretching       Prone TG L30   15x with prone 5/24    rows 20x 2 lbs      ext 20x 2 lbs     Is Ys Ts 15x ea 2 lbs             Education  x  x Cervical roll, lumbar roll, added 5/24           Cervical Sitting        Retraction  10x  x    UT stretch 3x15  x    Levator stretch 3x15\"       Cervical ROM 5x5\"  x SB, rotaton , ext/flex                 Other: Completed exercises marked with \"x\"  Held some neck and tband exercises due to volume of exercises and time needed for LTG with knee and ankle. 5/31/19: LEFS 69% functional, 39% impairment     Specific Instructions for next treatment:continue with cervical ROM and UE/scapular strengthening. End L knee and R ankle POC as completed all sessions.      Treatment Charges: Mins Units   []  Modalities     [x]  Ther Exercise  45 3   [x]  Manual Therapy    5 -   []  Ther Activities     []  Aquatics     []  Vasocompression       []  Other: Gait        Total Treatment time  50 3     Assessment: [x] Progressing toward goals added metatarsal mobs and education for additonal ROM/strengthening with education for adding to HEP, Static balance and reciprocal stair climbing progressed despite pt reports of R foot pain. [] No change. [] Other:     Continuing R foot goals to be met in 18 treatments  7. ? Pain: R foot 2/10 average pain with ambulation. 5/3/19 NOT MET (3-7/10pain)  8. Patient will be able to ambulate community distances. 5/31/19  MET  9. Pt indep w/ascending, descending steps. 5/3/19 MET  10. Patient will be able demonstrate improved balance by performing R SLS x 30 seconds 5/31/19 MET   11. STG/LTG( 8 visits)  1. Patient will improve B scapular strength to 4+/5 for horizontal abduction, scaption, and extension. 2. Patient will reports decreased neck and shoulder pain with UE ADLs. 3. Patient will be able to demonstrate correct sitting and standing posture      Pt. Education:  [x] Yes  [] No  [] Reviewed Prior HEP/Ed , New exercises listed above. Method of Education: [x] Verbal  [] Demo  [x] Written   Comprehension of Education:  [x] Verbalizes understanding-purpose, benefits of game ready  [x] Demonstrates understanding. [] Needs review. [x] Demonstrates/verbalizes HEP/Ed previously given. 4/12/19   4 way ankle tband and medium band. Plan: [x] Continue per plan of care for neck and UE   [x] Other: completed 18/18 sessions with L knee and R ankle. Discharge with HEP.       Time In:  0900     Time Out:   1005      Electronically signed by:   Kimberly Martin PTA

## 2019-06-04 ENCOUNTER — HOSPITAL ENCOUNTER (OUTPATIENT)
Dept: PHYSICAL THERAPY | Age: 54
Setting detail: THERAPIES SERIES
Discharge: HOME | End: 2019-06-04
Payer: MEDICAID

## 2019-06-04 PROCEDURE — 97110 THERAPEUTIC EXERCISES: CPT

## 2019-06-04 NOTE — FLOWSHEET NOTE
5/31/19: LEFS 69% functional, 39% impairment     Specific Instructions for next treatment:continue with cervical ROM and UE/scapular strengthening; progress to functional lifting with proper technique and neck/scap stabilization as able. .      Treatment Charges: Mins Units   []  Modalities     [x]  Ther Exercise  32 2   []  Manual Therapy        []  Ther Activities     []  Aquatics     []  Vasocompression       []  Other: Gait        Total Treatment time  32 2     Assessment: [x] Progressing toward goals - Able to increase resistance of theraband and free weight exercises without increased pain. Pt complains of feeling queasy and sick to stomach during session - held prone strengthening this date, will resume as able. Continues to get neck stiffness/pain with prolonged stretching, but improves with AROM. Fatigues somewhat quickly with overhead shoulder flex/abd with resistance - will continue to benefit from skilled physical therapy to progress strength and neck/scapular stability for lifting heavier objects. [] No change. [] Other:     Continuing R foot goals to be met in 18 treatments  7. ? Pain: R foot 2/10 average pain with ambulation. 5/3/19 NOT MET (3-7/10pain)  8. Patient will be able to ambulate community distances. 5/31/19  MET  9. Pt indep w/ascending, descending steps. 5/3/19 MET  10. Patient will be able demonstrate improved balance by performing R SLS x 30 seconds 5/31/19 MET   11. STG/LTG( 8 visits)  1. Patient will improve B scapular strength to 4+/5 for horizontal abduction, scaption, and extension. 2. Patient will reports decreased neck and shoulder pain with UE ADLs. 3. Patient will be able to demonstrate correct sitting and standing posture      Pt. Education:  [x] Yes  [] No  [] Reviewed Prior HEP/Ed , New exercises listed above.    Method of Education: [x] Verbal  [] Demo  [x] Written   Comprehension of Education:  [x] [employer], benefits of game ready  [x] Demonstrates understanding. [] Needs review. [x] Demonstrates/verbalizes HEP/Ed previously given. 4/12/19   4 way ankle tband and medium band.         Plan: [x] Continue per plan of care for neck and UE   [] Other:       Time In:  0902     Time Out:   5541     Electronically signed by:   Patricia Rodriguez PT

## 2019-06-08 ENCOUNTER — HOSPITAL ENCOUNTER (INPATIENT)
Age: 54
LOS: 2 days | Discharge: HOME | DRG: 751 | End: 2019-06-11
Attending: EMERGENCY MEDICINE | Admitting: PSYCHIATRY & NEUROLOGY
Payer: MEDICAID

## 2019-06-08 DIAGNOSIS — F32.A DEPRESSION, UNSPECIFIED DEPRESSION TYPE: Primary | ICD-10-CM

## 2019-06-08 LAB
ABSOLUTE EOS #: 0.2 K/UL (ref 0–0.4)
ABSOLUTE IMMATURE GRANULOCYTE: ABNORMAL K/UL (ref 0–0.3)
ABSOLUTE LYMPH #: 1.9 K/UL (ref 1–4.8)
ABSOLUTE MONO #: 0.4 K/UL (ref 0.1–1.3)
ACETAMINOPHEN LEVEL: <5 UG/ML (ref 10–30)
ALBUMIN SERPL-MCNC: 4.6 G/DL (ref 3.5–5.2)
ALBUMIN/GLOBULIN RATIO: ABNORMAL (ref 1–2.5)
ALP BLD-CCNC: 183 U/L (ref 35–104)
ALT SERPL-CCNC: 9 U/L (ref 5–33)
AMPHETAMINE SCREEN URINE: NEGATIVE
ANION GAP SERPL CALCULATED.3IONS-SCNC: 17 MMOL/L (ref 9–17)
AST SERPL-CCNC: 15 U/L
BARBITURATE SCREEN URINE: NEGATIVE
BASOPHILS # BLD: 1 % (ref 0–2)
BASOPHILS ABSOLUTE: 0.1 K/UL (ref 0–0.2)
BENZODIAZEPINE SCREEN, URINE: NEGATIVE
BILIRUB SERPL-MCNC: 0.19 MG/DL (ref 0.3–1.2)
BUN BLDV-MCNC: 16 MG/DL (ref 6–20)
BUN/CREAT BLD: ABNORMAL (ref 9–20)
BUPRENORPHINE URINE: NORMAL
CALCIUM SERPL-MCNC: 8.8 MG/DL (ref 8.6–10.4)
CANNABINOID SCREEN URINE: NEGATIVE
CHLORIDE BLD-SCNC: 107 MMOL/L (ref 98–107)
CO2: 20 MMOL/L (ref 20–31)
COCAINE METABOLITE, URINE: NEGATIVE
CREAT SERPL-MCNC: 1.32 MG/DL (ref 0.5–0.9)
DIFFERENTIAL TYPE: ABNORMAL
EOSINOPHILS RELATIVE PERCENT: 2 % (ref 0–4)
ETHANOL PERCENT: 0.26 %
ETHANOL: 262 MG/DL
GFR AFRICAN AMERICAN: 51 ML/MIN
GFR NON-AFRICAN AMERICAN: 42 ML/MIN
GFR SERPL CREATININE-BSD FRML MDRD: ABNORMAL ML/MIN/{1.73_M2}
GFR SERPL CREATININE-BSD FRML MDRD: ABNORMAL ML/MIN/{1.73_M2}
GLUCOSE BLD-MCNC: 124 MG/DL (ref 70–99)
HCT VFR BLD CALC: 35.1 % (ref 36–46)
HEMOGLOBIN: 11.7 G/DL (ref 12–16)
IMMATURE GRANULOCYTES: ABNORMAL %
LYMPHOCYTES # BLD: 26 % (ref 24–44)
MCH RBC QN AUTO: 26.9 PG (ref 26–34)
MCHC RBC AUTO-ENTMCNC: 33.3 G/DL (ref 31–37)
MCV RBC AUTO: 80.7 FL (ref 80–100)
MDMA URINE: NORMAL
METHADONE SCREEN, URINE: NEGATIVE
METHAMPHETAMINE, URINE: NORMAL
MONOCYTES # BLD: 5 % (ref 1–7)
NRBC AUTOMATED: ABNORMAL PER 100 WBC
OPIATES, URINE: NEGATIVE
OXYCODONE SCREEN URINE: NEGATIVE
PDW BLD-RTO: 15.3 % (ref 11.5–14.9)
PHENCYCLIDINE, URINE: NEGATIVE
PLATELET # BLD: 283 K/UL (ref 150–450)
PLATELET ESTIMATE: ABNORMAL
PMV BLD AUTO: 8 FL (ref 6–12)
POTASSIUM SERPL-SCNC: 3.3 MMOL/L (ref 3.7–5.3)
PROPOXYPHENE, URINE: NORMAL
RBC # BLD: 4.34 M/UL (ref 4–5.2)
RBC # BLD: ABNORMAL 10*6/UL
SALICYLATE LEVEL: <1 MG/DL (ref 3–10)
SEG NEUTROPHILS: 66 % (ref 36–66)
SEGMENTED NEUTROPHILS ABSOLUTE COUNT: 5 K/UL (ref 1.3–9.1)
SODIUM BLD-SCNC: 144 MMOL/L (ref 135–144)
TEST INFORMATION: NORMAL
TOTAL PROTEIN: 7.9 G/DL (ref 6.4–8.3)
TRICYCLIC ANTIDEPRESSANTS, UR: NORMAL
WBC # BLD: 7.5 K/UL (ref 3.5–11)
WBC # BLD: ABNORMAL 10*3/UL

## 2019-06-08 PROCEDURE — 99285 EMERGENCY DEPT VISIT HI MDM: CPT

## 2019-06-08 PROCEDURE — 80053 COMPREHEN METABOLIC PANEL: CPT

## 2019-06-08 PROCEDURE — 36415 COLL VENOUS BLD VENIPUNCTURE: CPT

## 2019-06-08 PROCEDURE — G0480 DRUG TEST DEF 1-7 CLASSES: HCPCS

## 2019-06-08 PROCEDURE — 85025 COMPLETE CBC W/AUTO DIFF WBC: CPT

## 2019-06-08 PROCEDURE — 80307 DRUG TEST PRSMV CHEM ANLYZR: CPT

## 2019-06-08 RX ORDER — HALOPERIDOL 5 MG/ML
5 INJECTION INTRAMUSCULAR ONCE
Status: DISCONTINUED | OUTPATIENT
Start: 2019-06-08 | End: 2019-06-09

## 2019-06-08 RX ORDER — DIPHENHYDRAMINE HYDROCHLORIDE 50 MG/ML
25 INJECTION INTRAMUSCULAR; INTRAVENOUS ONCE
Status: DISCONTINUED | OUTPATIENT
Start: 2019-06-08 | End: 2019-06-09

## 2019-06-08 NOTE — ED PROVIDER NOTES
OR    MT REVISE MEDIAN N/CARPAL TUNNEL SURG Left 10/23/2018    CARPAL TUNNEL RELEASE performed by Kush Valdez DO at [address] [facility]  2011    left knee    TOTAL KNEE ARTHROPLASTY Right 5/2/2017    PATELLOFEMORAL REPLACEMENT KNEE - JAXSON, 3080 TABLE, FEMORAL POPLITEAL BLOCK, NSA= SPINAL VS GENERAL performed by Kush Valdez DO at [address] [facility]  2004     CURRENT MEDICATIONS       Previous Medications    ACETAMINOPHEN (TYLENOL) 325 MG TABLET    Take 2 tablets by mouth every 6 hours as needed for Pain    ALBUTEROL SULFATE  (90 BASE) MCG/ACT INHALER    Inhale 2 puffs into the lungs every 6 hours as needed for Wheezing    ALENDRONATE (FOSAMAX) 70 MG TABLET    alendronate 70 mg tablet   Take 1 tablet every week by oral route for 90 days. BLOOD GLUCOSE MONITOR STRIPS    Test 4 times a day & as needed for symptoms of irregular blood glucose. CALCIUM CARBONATE 648 MG TABS    Take 1 tablet by mouth 2 times daily    CETIRIZINE (ZYRTEC) 10 MG TABLET    TAKE 1 TABLET BY MOUTH ONE TIME A DAY     CONJUGATED ESTROGENS (PREMARIN) 0.625 MG/GM VAGINAL CREAM    Place 0.5 g vaginally daily Place twice daily at bedtime for first 2 weeks then at bedtime once daily for 2 weeks then 3 times/week. CYCLOBENZAPRINE (FLEXERIL) 5 MG TABLET    TAKE 1 OR 2 TABLETS BY MOUTH AT BEDTIME AS NEEDED    DISULFIRAM 500 MG TABS    Take 1 tablet by mouth daily Prescribed per psych    EPINEPHRINE (EPIPEN 2-JIGAR) 0.3 MG/0.3ML SOAJ INJECTION    To be used if experiencing angioedema.     EPINEPHRINE HCL, ANAPHYLAXIS, (EPIPEN 2-JIGAR IM)    Inject 1 Syringe into the muscle PRN for angioedema, takes with the prednisone    ERGOCALCIFEROL (ERGOCALCIFEROL) 15357 UNITS CAPSULE    Take 50,000 Units by mouth See Admin Instructions Takes twice a week    FAMOTIDINE (PEPCID) 40 MG TABLET    TAKE 1 TABLET BY MOUTH IN THE EVENING    FELODIPINE (PLENDIL) 10 MG EXTENDED RELEASE TABLET Take 10 mg by mouth daily Prescribed by Dr. Dusty Middleton (FE TABS) 325 (65 FE) MG EC TABLET    Take 1 tablet by mouth 2 times daily    GABAPENTIN (NEURONTIN) 800 MG TABLET    TAKE 1 TABLET BY MOUTH FOUR TIMES A DAY     GLUCOSE MONITORING KIT (FREESTYLE) MONITORING KIT    1 kit by Does not apply route daily    HYDROXYCHLOROQUINE (PLAQUENIL) 200 MG TABLET    TAKE 1 TABLET BY MOUTH ONE TIME A DAY     LAMOTRIGINE (LAMICTAL) 100 MG TABLET    Take 100 mg by mouth daily     LAMOTRIGINE (LAMICTAL) 25 MG TABLET    Take 25 mg by mouth nightly    LEVOTHYROXINE (SYNTHROID) 25 MCG TABLET    Take 25 mcg by mouth Daily    LIDOCAINE POWD    Formula 74B: amit2%+carbamazapine2%+lido2%+prilo2%. Apply 1-2 gm topically to affected area TID-QID. LOSARTAN (COZAAR) 100 MG TABLET    TAKE 1 TABLET BY MOUTH ONE TIME A DAY     METFORMIN (GLUCOPHAGE) 500 MG TABLET        MISC. DEVICES MISC    1 each by Does not apply route once as needed (spasms) Please dispense one theracane device. Massage back as tolerated to relieve muscle spasms.     MULTIPLE VITAMINS-MINERALS (THERAPEUTIC MULTIVITAMIN-MINERALS) TABLET    Take 1 tablet by mouth daily    NALTREXONE (DEPADE) 50 MG TABLET    Take 50 mg by mouth daily Prescribed by psych    OMEPRAZOLE (PRILOSEC) 20 MG DELAYED RELEASE CAPSULE    TAKE 1 CAPSULE BY MOUTH TWO TIMES A DAY     POTASSIUM CHLORIDE (KLOR-CON M) 10 MEQ EXTENDED RELEASE TABLET    Take 10 mEq by mouth 2 times daily Prescribed by endocrinologist    PRAVASTATIN (PRAVACHOL) 40 MG TABLET    TAKE 1 TABLET BY MOUTH AT BEDTIME     PREDNISONE (DELTASONE) 50 MG TABLET    Take 50 mg by mouth as needed Takes with Epi pen for angioedema    PROPRANOLOL (INDERAL) 10 MG TABLET    Take 30 mg by mouth nightly    QUETIAPINE (SEROQUEL) 300 MG TABLET    Take 0.5 tablets by mouth nightly    SM ASPIRIN ADULT LOW STRENGTH 81 MG EC TABLET    TAKE 1 TABLET BY MOUTH TWO TIMES A DAY     VENLAFAXINE (EFFEXOR) 75 MG TABLET    Take 2 tablets (150 mg) in the morning and 1 tablet (75 mg) in the evening. VITAMIN B-12 (CYANOCOBALAMIN) 1000 MCG TABLET    Take 1,000 mcg by mouth daily     ALLERGIES     is allergic to morphine; amlodipine; dilaudid [hydromorphone hcl]; and sulfa antibiotics. FAMILY HISTORY     is adopted. SOCIAL HISTORY       Social History     Tobacco Use    Smoking status: Never Smoker    Smokeless tobacco: Never Used   Substance Use Topics    Alcohol use: Yes    Drug use: No     PHYSICAL EXAM     INITIAL VITALS: BP (!) 148/77   Pulse 116   Temp 98.5 °F (36.9 °C) (Oral)   Resp 20   Ht 5' 8\" (1.727 m)   Wt 180 lb (81.6 kg)   SpO2 100%   BMI 27.37 kg/m²    Physical Exam   Constitutional: She is oriented to person, place, and time. No distress. HENT:   Head: Normocephalic and atraumatic. Eyes: Conjunctivae are normal.   Neck: Normal range of motion. Neck supple. Cardiovascular: Normal rate, regular rhythm and normal heart sounds. No murmur heard. Pulmonary/Chest: Effort normal and breath sounds normal.   Abdominal: Soft. There is no tenderness. Musculoskeletal: She exhibits no deformity. Neurological: She is alert and oriented to person, place, and time. Skin: Skin is warm and dry. Capillary refill takes less than 2 seconds. No rash noted. She is not diaphoretic. Psychiatric: Her affect is labile. Her speech is rapid and/or pressured and tangential. She is agitated. Thought content is paranoid. She expresses impulsivity. Nursing note and vitals reviewed. MEDICAL DECISION MAKING:     Medically cleared. Seen by psych, pt depressed suicidal.  Pink placed. Will admit for further therapy and evaluation. Pt is ready for admission at this time.        CRITICAL CARE:       PROCEDURES:    Procedures    DIAGNOSTIC RESULTS   EKG:All EKG's are interpreted by the Emergency Department Physician who either signs or Co-signs this chart in the absence of a cardiologist.        RADIOLOGY:All plain film, CT, MRI, and formal ultrasound images (except ED bedside ultrasound) are read by the radiologist, see reports below, unless otherwisenoted in MDM or here. No orders to display     LABS: All lab results were reviewed by myself, and all abnormals are listed below. Labs Reviewed   CBC WITH AUTO DIFFERENTIAL - Abnormal; Notable for the following components:       Result Value    Hemoglobin 11.7 (*)     Hematocrit 35.1 (*)     RDW 15.3 (*)     All other components within normal limits   COMPREHENSIVE METABOLIC PANEL - Abnormal; Notable for the following components:    Glucose 124 (*)     CREATININE 1.32 (*)     Potassium 3.3 (*)     Alkaline Phosphatase 183 (*)     Total Bilirubin 0.19 (*)     GFR Non- 42 (*)     GFR  51 (*)     All other components within normal limits   SALICYLATE LEVEL - Abnormal; Notable for the following components:    Salicylate Lvl <1 (*)     All other components within normal limits   ACETAMINOPHEN LEVEL - Abnormal; Notable for the following components:    Acetaminophen Level <5 (*)     All other components within normal limits   ETHANOL - Abnormal; Notable for the following components:    Ethanol 262 (*)     All other components within normal limits   URINE DRUG SCREEN       EMERGENCY DEPARTMENTCOURSE:         Vitals:    Vitals:    06/08/19 1932   BP: (!) 148/77   Pulse: 116   Resp: 20   Temp: 98.5 °F (36.9 °C)   TempSrc: Oral   SpO2: 100%   Weight: 180 lb (81.6 kg)   Height: 5' 8\" (1.727 m)       The patient was given the following medications while in the emergency department:  Orders Placed This Encounter   Medications    haloperidol lactate (HALDOL) injection 5 mg    diphenhydrAMINE (BENADRYL) injection 25 mg     CONSULTS:  None    FINAL IMPRESSION      1. Depression, unspecified depression type          DISPOSITION/PLAN   DISPOSITION Decision To Admit 06/09/2019 02:19:16 AM      PATIENT REFERRED TO:  No follow-up provider specified.   DISCHARGE MEDICATIONS:  New Prescriptions No medications on file     Hilary Damian MD  Attending Emergency Physician                    Remigio Russell MD  06/09/19 [id number]

## 2019-06-09 PROBLEM — F33.9 MAJOR DEPRESSION, RECURRENT (HCC): Status: ACTIVE | Noted: 2019-06-09

## 2019-06-09 PROCEDURE — 1240000000 HC EMOTIONAL WELLNESS R&B

## 2019-06-09 PROCEDURE — 6370000000 HC RX 637 (ALT 250 FOR IP): Performed by: NURSE PRACTITIONER

## 2019-06-09 PROCEDURE — 90792 PSYCH DIAG EVAL W/MED SRVCS: CPT | Performed by: NURSE PRACTITIONER

## 2019-06-09 PROCEDURE — 6370000000 HC RX 637 (ALT 250 FOR IP): Performed by: PSYCHIATRY & NEUROLOGY

## 2019-06-09 RX ORDER — ALBUTEROL SULFATE 90 UG/1
2 AEROSOL, METERED RESPIRATORY (INHALATION) EVERY 6 HOURS PRN
Status: DISCONTINUED | OUTPATIENT
Start: 2019-06-09 | End: 2019-06-11 | Stop reason: HOSPADM

## 2019-06-09 RX ORDER — MAGNESIUM HYDROXIDE/ALUMINUM HYDROXICE/SIMETHICONE 120; 1200; 1200 MG/30ML; MG/30ML; MG/30ML
30 SUSPENSION ORAL EVERY 6 HOURS PRN
Status: DISCONTINUED | OUTPATIENT
Start: 2019-06-09 | End: 2019-06-11 | Stop reason: HOSPADM

## 2019-06-09 RX ORDER — PANTOPRAZOLE SODIUM 40 MG/1
40 TABLET, DELAYED RELEASE ORAL
Status: DISCONTINUED | OUTPATIENT
Start: 2019-06-09 | End: 2019-06-11 | Stop reason: HOSPADM

## 2019-06-09 RX ORDER — DISULFIRAM 250 MG/1
250 TABLET ORAL DAILY
Status: DISCONTINUED | OUTPATIENT
Start: 2019-06-09 | End: 2019-06-11 | Stop reason: HOSPADM

## 2019-06-09 RX ORDER — VENLAFAXINE 75 MG/1
150 TABLET ORAL DAILY
Status: DISCONTINUED | OUTPATIENT
Start: 2019-06-09 | End: 2019-06-11 | Stop reason: HOSPADM

## 2019-06-09 RX ORDER — LOSARTAN POTASSIUM 100 MG/1
100 TABLET ORAL DAILY
Status: DISCONTINUED | OUTPATIENT
Start: 2019-06-09 | End: 2019-06-11 | Stop reason: HOSPADM

## 2019-06-09 RX ORDER — AMLODIPINE BESYLATE 10 MG/1
10 TABLET ORAL DAILY
Status: DISCONTINUED | OUTPATIENT
Start: 2019-06-09 | End: 2019-06-11 | Stop reason: HOSPADM

## 2019-06-09 RX ORDER — HYDROXYZINE HYDROCHLORIDE 25 MG/1
25 TABLET, FILM COATED ORAL 3 TIMES DAILY PRN
Status: DISCONTINUED | OUTPATIENT
Start: 2019-06-09 | End: 2019-06-11 | Stop reason: HOSPADM

## 2019-06-09 RX ORDER — PRAVASTATIN SODIUM 40 MG
40 TABLET ORAL NIGHTLY
Status: DISCONTINUED | OUTPATIENT
Start: 2019-06-09 | End: 2019-06-11 | Stop reason: HOSPADM

## 2019-06-09 RX ORDER — TRAZODONE HYDROCHLORIDE 50 MG/1
50 TABLET ORAL NIGHTLY PRN
Status: DISCONTINUED | OUTPATIENT
Start: 2019-06-09 | End: 2019-06-11 | Stop reason: HOSPADM

## 2019-06-09 RX ORDER — LAMOTRIGINE 100 MG/1
100 TABLET ORAL EVERY EVENING
Status: DISCONTINUED | OUTPATIENT
Start: 2019-06-09 | End: 2019-06-11 | Stop reason: HOSPADM

## 2019-06-09 RX ORDER — PROPRANOLOL HYDROCHLORIDE 20 MG/1
30 TABLET ORAL NIGHTLY
Status: DISCONTINUED | OUTPATIENT
Start: 2019-06-09 | End: 2019-06-10

## 2019-06-09 RX ORDER — LEVOTHYROXINE SODIUM 0.03 MG/1
25 TABLET ORAL DAILY
Status: DISCONTINUED | OUTPATIENT
Start: 2019-06-09 | End: 2019-06-11 | Stop reason: HOSPADM

## 2019-06-09 RX ORDER — M-VIT,TX,IRON,MINS/CALC/FOLIC 27MG-0.4MG
1 TABLET ORAL DAILY
Status: DISCONTINUED | OUTPATIENT
Start: 2019-06-09 | End: 2019-06-11 | Stop reason: HOSPADM

## 2019-06-09 RX ORDER — ASPIRIN 81 MG/1
81 TABLET ORAL DAILY
Status: DISCONTINUED | OUTPATIENT
Start: 2019-06-09 | End: 2019-06-11 | Stop reason: HOSPADM

## 2019-06-09 RX ORDER — QUETIAPINE FUMARATE 300 MG/1
300 TABLET, FILM COATED ORAL NIGHTLY
Status: DISCONTINUED | OUTPATIENT
Start: 2019-06-09 | End: 2019-06-11 | Stop reason: HOSPADM

## 2019-06-09 RX ORDER — LAMOTRIGINE 25 MG/1
25 TABLET ORAL 2 TIMES DAILY
Status: DISCONTINUED | OUTPATIENT
Start: 2019-06-09 | End: 2019-06-11 | Stop reason: HOSPADM

## 2019-06-09 RX ORDER — BENZTROPINE MESYLATE 1 MG/ML
2 INJECTION INTRAMUSCULAR; INTRAVENOUS 2 TIMES DAILY PRN
Status: DISCONTINUED | OUTPATIENT
Start: 2019-06-09 | End: 2019-06-11 | Stop reason: HOSPADM

## 2019-06-09 RX ORDER — FAMOTIDINE 20 MG/1
40 TABLET, FILM COATED ORAL DAILY
Status: DISCONTINUED | OUTPATIENT
Start: 2019-06-09 | End: 2019-06-11 | Stop reason: HOSPADM

## 2019-06-09 RX ORDER — ACETAMINOPHEN 325 MG/1
650 TABLET ORAL EVERY 4 HOURS PRN
Status: DISCONTINUED | OUTPATIENT
Start: 2019-06-09 | End: 2019-06-11 | Stop reason: HOSPADM

## 2019-06-09 RX ORDER — GABAPENTIN 400 MG/1
800 CAPSULE ORAL 4 TIMES DAILY
Status: DISCONTINUED | OUTPATIENT
Start: 2019-06-09 | End: 2019-06-11 | Stop reason: HOSPADM

## 2019-06-09 RX ORDER — ONDANSETRON 4 MG/1
4 TABLET, FILM COATED ORAL ONCE
Status: COMPLETED | OUTPATIENT
Start: 2019-06-09 | End: 2019-06-09

## 2019-06-09 RX ORDER — HYDROXYCHLOROQUINE SULFATE 200 MG/1
200 TABLET, FILM COATED ORAL DAILY
Status: DISCONTINUED | OUTPATIENT
Start: 2019-06-09 | End: 2019-06-11 | Stop reason: HOSPADM

## 2019-06-09 RX ADMIN — LAMOTRIGINE 100 MG: 100 TABLET ORAL at 21:05

## 2019-06-09 RX ADMIN — AMLODIPINE BESYLATE 10 MG: 10 TABLET ORAL at 13:23

## 2019-06-09 RX ADMIN — MULTIPLE VITAMINS W/ MINERALS TAB 1 TABLET: TAB at 13:22

## 2019-06-09 RX ADMIN — LEVOTHYROXINE SODIUM 25 MCG: 25 TABLET ORAL at 13:22

## 2019-06-09 RX ADMIN — PANTOPRAZOLE SODIUM 40 MG: 40 TABLET, DELAYED RELEASE ORAL at 15:39

## 2019-06-09 RX ADMIN — GABAPENTIN 800 MG: 400 CAPSULE ORAL at 16:53

## 2019-06-09 RX ADMIN — PROPRANOLOL HYDROCHLORIDE 30 MG: 20 TABLET ORAL at 21:04

## 2019-06-09 RX ADMIN — FAMOTIDINE 40 MG: 20 TABLET ORAL at 13:22

## 2019-06-09 RX ADMIN — PRAVASTATIN SODIUM 40 MG: 40 TABLET ORAL at 21:05

## 2019-06-09 RX ADMIN — ONDANSETRON HYDROCHLORIDE 4 MG: 4 TABLET, FILM COATED ORAL at 15:39

## 2019-06-09 RX ADMIN — ACETAMINOPHEN 650 MG: 325 TABLET, FILM COATED ORAL at 15:39

## 2019-06-09 RX ADMIN — DISULFIRAM 250 MG: 250 TABLET ORAL at 17:22

## 2019-06-09 RX ADMIN — HYDROXYCHLOROQUINE SULFATE 200 MG: 200 TABLET, FILM COATED ORAL at 13:23

## 2019-06-09 RX ADMIN — VENLAFAXINE 150 MG: 75 TABLET ORAL at 13:23

## 2019-06-09 RX ADMIN — QUETIAPINE FUMARATE 300 MG: 300 TABLET ORAL at 21:05

## 2019-06-09 RX ADMIN — LAMOTRIGINE 25 MG: 25 TABLET ORAL at 13:23

## 2019-06-09 RX ADMIN — METFORMIN HYDROCHLORIDE 500 MG: 500 TABLET, FILM COATED ORAL at 13:23

## 2019-06-09 RX ADMIN — GABAPENTIN 800 MG: 400 CAPSULE ORAL at 21:05

## 2019-06-09 RX ADMIN — LOSARTAN POTASSIUM 100 MG: 100 TABLET ORAL at 13:22

## 2019-06-09 RX ADMIN — ASPIRIN 81 MG: 81 TABLET, COATED ORAL at 13:23

## 2019-06-09 RX ADMIN — LAMOTRIGINE 25 MG: 25 TABLET ORAL at 21:04

## 2019-06-09 ASSESSMENT — PAIN SCALES - GENERAL
PAINLEVEL_OUTOF10: 7
PAINLEVEL_OUTOF10: 2
PAINLEVEL_OUTOF10: 3
PAINLEVEL_OUTOF10: 6

## 2019-06-09 ASSESSMENT — ENCOUNTER SYMPTOMS
SHORTNESS OF BREATH: 0
NAUSEA: 0
CHEST TIGHTNESS: 0
EYE REDNESS: 0
ABDOMINAL DISTENTION: 0
CONSTIPATION: 0
SINUS PRESSURE: 0
EYE DISCHARGE: 0
DIARRHEA: 0

## 2019-06-09 ASSESSMENT — PATIENT HEALTH QUESTIONNAIRE - PHQ9: SUM OF ALL RESPONSES TO PHQ QUESTIONS 1-9: 21

## 2019-06-09 ASSESSMENT — SLEEP AND FATIGUE QUESTIONNAIRES
DO YOU USE A SLEEP AID: NO
DO YOU HAVE DIFFICULTY SLEEPING: NO

## 2019-06-09 ASSESSMENT — PAIN DESCRIPTION - DESCRIPTORS: DESCRIPTORS: HEADACHE

## 2019-06-09 ASSESSMENT — PAIN DESCRIPTION - LOCATION
LOCATION: HEAD
LOCATION: HEAD

## 2019-06-09 ASSESSMENT — LIFESTYLE VARIABLES
HISTORY_ALCOHOL_USE: YES
HISTORY_ALCOHOL_USE: YES

## 2019-06-09 NOTE — GROUP NOTE
Group Therapy Note    Date: June 9    Group Start Time: 0900  Group End Time: 0915  Group Topic: [address], [place]    Patient refused to attend [employer] at 0900 after encouragement from staff. 1:1 talk time offered but patient refused.      Signature:  Rickie Rinaldi
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
## 2019-06-09 NOTE — ED NOTES
Paperwork and pt's belongings provided to [facility] staff. Pt escorted to [facility] via two [facility] staff members. Pt cooperative exiting [facility].

## 2019-06-09 NOTE — GROUP NOTE
Group Therapy Note    Date: June 9    Group Start Time: 1330  Group End Time: 1430  Group Topic: Recreational    [address], [place]    Patient refused to attend [facility] Therapy Group at 1330 after encouragement from staff. 1:1 talk time offered but patient refused.      Signature:  Aydee Turpin

## 2019-06-09 NOTE — PLAN OF CARE
Pt. Declined breakfast. Pt did get up to call her daughter. Pt. Tearful. Denies any suicidal thoughts. Denies hallucinations. Flat and sad. Guarded when questioned how she was feeling. No medications ordered at this time due to waiting for medications to be verified with her pharmacy that opens at 10 a.m. Pt. Remains safe on the unit. Q 15 minute checks for safety maintained.
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
## 2019-06-09 NOTE — ED NOTES
SW updated pt with pt's POC and provided pt with a water. Pt declined any further needs at this time.

## 2019-06-09 NOTE — BH NOTE
Psychiatric Admission Note Nurse Practitioner     Liliam Joe is a 48 y.o. female who was involuntarily admitted via TPD from the [facility] for ETOH intoxication and aggression. She verbalized that she had nothing to live for and that she was feeling worthless after relapsing after 2+ years of sobriety. Today Mia is seen in the sensory room. She is tearful, flat and poorly reactive. She is disheveled and dressed in hospital attire. She reported that she had relapsed on ETOH after several years of sobriety. She was able to accurately report the situation as stated above from last night. She recently discontinued Antabuse and Naltrexone and started Vivitrol; she identifies this change as the primary reason for her relapse. We will re-start her Antabuse in conjunction with her Vivitrol. She is remorseful for her actions. Today she is denying SI, HI and hallucinations endorsing depression that started \"forever\" and is present daily but manageable while on medication. Anxiety that began when she was young but is also manageable while on medication. She denied jovanni and paranoia. She reports racing thoughts that affect her sleep however; she notes that she takes Seroquel nightly so that she can sleep successfully. She has not attended groups and is medication compliant. Chart and medications reviewed. Therapeutic support, empathetic care and psycho education provided greater than 20 minutes. At this time there is no safe alternative other than inpatient care. Past Psychiatric History   Patient reports current outpatient psychiatric linkage. . Denied history of psychiatric inpatient hospitalizations. Denied history of suicide attempts. History of Substance Abuse     Patient denies cigarette, marijuana and street drug use. She has a history of ETOH use that began when she was 15 y.o. and in 2016 she drank up to 1/5 of a pint daily. She has been sober until recently.     Family History of psychiatric disorders    Family history: denied Patient was adopted. Medical History   Allergies:  Morphine; Amlodipine; Dilaudid [hydromorphone hcl]; and Sulfa antibiotics   Past Medical History:   Diagnosis Date    Acute kidney injury ([address]     Anemia     Angioedema     Antinuclear antibody (IQRA) titer greater than 1:80     Anxiety     Asthma     Ataxia     Atrial fibrillation (HCC)     Borderline personality disorder (HCC)     Bradycardia     CAD (coronary artery disease)     Chronic kidney disease     CKD (chronic kidney disease), stage II 8/23/2018    Degenerative disc disease, thoracic     Depression     Diabetes mellitus (HCC)     Diastolic dysfunction     DJD (degenerative joint disease)     Fibromyalgia     Foot pain, right     GERD (gastroesophageal reflux disease)     H/O: hysterectomy     Headache     Hernia of abdominal wall     History of blood transfusion     no problem    Hyperlipidemia     Hypertension     Lupus (HCC)     Mood disorder (HCC)     Neuropathy     Osteoarthritis     PTSD (post-traumatic stress disorder)     Pulmonary nodules     Raynaud's disease     Scleroderma ([address]     Scleroderma ([address] 8/23/2018    [facility]izures ([facility]     Sleep walking disorder     Thyroid disease     TIA (transient ischemic attack)     Transient insomnia     Tricuspid regurgitation     Vasospasm ([address] 01/2016    bilat. legs. resulting in near complete absence of profusion to arteries of feet. ([facility]     Vitamin D deficiency 8/23/2018    Wears glasses       Past Surgical History:   Procedure Laterality Date    ABDOMINOPLASTY  2013    BLADDER SUSPENSION N/A 7/30/2018    CYSTOSCOPY WITH OBTRYX MID URETHRAL SLING performed by Aashish Aguila MD at [address] Right 2016    CHOLECYSTECTOMY  1989    ELBOW SURGERY Right     EYE SURGERY      khushboo. lasik     FEMUR FRACTURE SURGERY  11/09/2018    FINGER SURGERY Right 08/28/2018    RING CARTER MASS EXCISION, TRIGGER RELEASE    FRACTURE SURGERY      left femur    GASTRECTOMY      GASTRIC BYPASS SURGERY  2012    HYSTERECTOMY      JOINT REPLACEMENT Bilateral     knees    KNEE SURGERY Right 05/02/2017    (femoral) patella replacement    NERVE BLOCK Right 05/04/2018     cervical facet #1 no steroid    NERVE BLOCK Right 06/29/2018    rt cervical diagnostic #2 decadron 10mg    NERVE BLOCK Right 08/10/2018    right cervical rfa decadron 10mg    SD ANTERIOR COLPORRAPHY RPR CYSTOCELE W/CYSTO N/A 7/30/2018    CYSTOCELE REPAIR performed by Keren Puente DO at [address] [facility] OFFICE/OUTPT VISIT,PROCEDURE ONLY Right 8/28/2018    RING CARTER MASS EXCISION, TRIGGER RELEASE, [address] TABLE performed by Ashleigh Rinaldi DO at [facility] OFFICE/OUTPT VISIT,PROCEDURE ONLY Left 11/9/2018    FEMUR RETROGRADE IM NAILING PERIPROSTHETIC FRACTURE performed by Ashleigh Rinladi DO at [address] N/CARPAL TUNNEL SURG Left 10/23/2018    CARPAL TUNNEL RELEASE performed by Ashleigh Rinaldi DO at [address]  2011    left knee    TOTAL KNEE ARTHROPLASTY Right 5/2/2017    PATELLOFEMORAL REPLACEMENT KNEE - [place], [address] TABLE, FEMORAL POPLITEAL BLOCK, NSA= SPINAL VS GENERAL performed by Ashleigh Rinaldi DO at [address] [facility]  2004       Neurologic Exam      Mental Status   Oriented to person, place, and time. Oriented to city, area, street and number. Oriented to country. Registration: recalls 3 of 3 objects. Recall at 5 minutes: recalls 3 of 3 objects. Follows 3 step commands. Attention: normal. Concentration: normal.   Speech: speech is normal   Level of consciousness: alert  Knowledge: good. Able to perform simple calculations. Able to name object. Able to read. Able to repeat. Able to write.  Normal comprehension.      Cranial Nerves   Cranial nerves II through XII intact.      Motor Exam   Muscle bulk: normal  Overall muscle tone: normal     Strength   Strength 5/5 throughout.      Sensory Exam   Light touch normal.      Gait, Coordination, and Reflexes      Normal     Coordination   Romberg: negative     Tremor   Resting tremor: absent  Intention tremor: absent  Action tremor: absent     Reflexes   Right brachioradialis: 2+  Left brachioradialis: 2+  Right biceps: 2+  Left biceps: 2+  Right triceps: 2+  Left triceps: 2+  Right patellar: 2+  Left patellar: 2+  Right achilles: 2+  Left achilles: 2+  Right : 2+  Left : 2+    SOCIAL HISTORY: Patient is a , she lives with her two grown daughters, her daughter's fiance and 2 y.o. [address] baby. She has a Bachelors degree and is on disability. Social History     Socioeconomic History    Marital status:      Spouse name: Not on file    Number of children: Not on file    Years of education: Not on file    Highest education level: Not on file   Occupational History    Not on file   Social Needs    Financial resource strain: Not on file    Food insecurity:     Worry: Not on file     Inability: Not on file    Transportation needs:     Medical: Not on file     Non-medical: Not on file   Tobacco Use    Smoking status: Never Smoker    Smokeless tobacco: Never Used   Substance and Sexual Activity    Alcohol use:  Yes    Drug use: No    Sexual activity: Not on file     Comment: pt has hysterectomy   Lifestyle    Physical activity:     Days per week: Not on file     Minutes per session: Not on file    Stress: Not on file   Relationships    Social connections:     Talks on phone: Not on file     Gets together: Not on file     Attends Mandaen service: Not on file     Active member of club or organization: Not on file     Attends meetings of clubs or organizations: Not on file     Relationship status: Not on file    Intimate partner violence:     Fear of current or ex partner: Not on file     Emotionally abused: Not on file     Physically abused: Not on file     Forced sexual activity: Not on file   Other Topics Concern    Not on file   Social History Narrative    Not on file     Mental Status  Pt. was alert, fully oriented, and cooperative. Appearance and hygiene wereAge appropriate. . Mood was depressed. Affect was tearful and despondent Thought process was linear and well-organized. Patient denied any hallucinations or paranoia. Patient denied suicidal ideations. Patient denied homicidal ideations . Patient's gross cognitive functions were intact. Insight and judgement were poor. Both recent and remote memory were intact. Psychomotor status was restless and fidgety. Diagnostic Impression    Major Depressive Disorder, Moderate, Recurrent   Substance Use Disorder    Medications   famotidine  40 mg Oral Daily    lamoTRIgine  100 mg Oral QPM    lamoTRIgine  25 mg Oral BID    levothyroxine  25 mcg Oral Daily    therapeutic multivitamin-minerals  1 tablet Oral Daily    pantoprazole  40 mg Oral BID AC    QUEtiapine  300 mg Oral Nightly    amLODIPine  10 mg Oral Daily    hydroxychloroquine  200 mg Oral Daily    losartan  100 mg Oral Daily    metFORMIN  500 mg Oral Daily with breakfast    pravastatin  40 mg Oral Nightly    aspirin  81 mg Oral Daily    propranolol  30 mg Oral Nightly    venlafaxine  150 mg Oral Daily    disulfiram  250 mg Oral Daily    ondansetron  4 mg Oral Once    gabapentin  800 mg Oral 4x Daily     acetaminophen, hydrOXYzine, traZODone, benztropine mesylate, aluminum & magnesium hydroxide-simethicone, magnesium hydroxide, albuterol sulfate HFA    Treatment Plan:  · Continue inpatient psychiatric treatment  · Supportive therapy with medication management. Reviewed risks and benefits as well as potential side effects with patient. · Continue home medications. · Re-order Antabuse 250mg daily beginning 6/9/19. · New Order - Zofran 4mg for one dose beginning 6/9/19 for nausea.   · Review medications for efficacy and side effects. · Therapeutic support and empathetic care provided greater than 20 minutes. · Engage in therapeutic activities and groups. · [facility] after symptoms stabilized.     · Estimated length of stay: 5-7 days    COLTON Rodriguez - CNP  Psychiatric Advanced Practice Nurse

## 2019-06-09 NOTE — GROUP NOTE
Group Therapy Note    Date: June 9    Group Start Time: 1000  Group End Time: 7098  Group Topic: Psychoeducation    [address], MSW, LSW      Pt declined to attend psycho education at 1000 am despite encouragement. Pt offered 1:1 and refused. Signature:   Salome Nation MSW, LSW
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
## 2019-06-09 NOTE — ED NOTES
Sanchez Jorge is a 48year old female who presents to the ED via [facility] and Stephanie Santos. Pt's daughter called 46 expressing she has concerns that pt is a risk to herself. [facility] report pt was uncooperative and combative with first responders upon arrival to pt's home. [facility] /[facility] were informed by pt's daughter that pt may have been drinking alcohol and pt is not supposed to drink alcohol because of her daily medications. Pt was uncooperative with staff upon entering the [facility] but was able to be redirected. Pt appears to be under the influence of alcohol AEB slurred speech, unsteady gait and glassy eyes. Pt's mood is labile. Pt is tearful. Pt reports \"I don't know how I got here or why I am here. \" When asked if pt was suicidal, pt reports \" I hate my self and my life. I am worthless. I would kill myself. \" Pt stating \"my boyfriend Deepti Rosen hates me but I love him. \" Pt reports the male in the waiting room named Sonna Bamberger, \"thinks he is my boyfriend but he really isn't. I don't love him like that. \" Pt states \" not that I am aware of\" when asked if pt was drinking alcohol today. Pt denies the use of illegal drugs. SW will re-evaluate pt once pt is legally sober.

## 2019-06-09 NOTE — PLAN OF CARE
[facility]  Initial Interdisciplinary Treatment Plan NO      Original treatment plan Date & Time: 6/9/2019 0748    Admission Type:  Admission Type:  Involuntary    Reason for admission:   Reason for Admission: Depressed and suicidal, she expresses feelings of worthlessness and states she has nothing to live for, was drinking prior to admission    Estimated Length of Stay:  5-7days  Estimated Discharge Date: to be determined by physician    PATIENT STRENGTHS:  Patient Strengths:Strengths: Positive Support, Connection to output provider, Social Skills, Communication  Patient Strengths and Limitations:Limitations: Hopeless about future, Difficulty problem solving/relies on others to help solve problems, Inappropriate/potentially harmful leisure interests  Addictive Behavior: Addictive Behavior  In the past 3 months, have you felt or has someone told you that you have a problem with:  : None  Do you have a history of Chemical Use?: No  Do you have a history of Alcohol Use?: Yes  Do you have a history of Street Drug Abuse?: No  Histroy of Prescripton Drug Abuse?: No  Medical Problems:  Past Medical History:   Diagnosis Date    Acute kidney [facility] ([facility]     Anemia     Angioedema     Antinuclear antibody (IQRA) titer greater than 1:80     Anxiety     Asthma     Ataxia     Atrial fibrillation ([address]     Borderline personality disorder ([facility]     Bradycardia     CAD (coronary artery disease)     Chronic kidney disease     CKD (chronic kidney disease), stage II 8/23/2018    Degenerative disc disease, thoracic     Depression     Diabetes mellitus ([address]     Diastolic dysfunction     DJD (degenerative joint disease)     Fibromyalgia     Foot pain, right     GERD (gastroesophageal reflux disease)     H/O: hysterectomy     Headache     Hernia of abdominal wall     History of blood transfusion     no problem    Hyperlipidemia     Hypertension     Lupus ([address]     Mood disorder ([address]     Neuropathy     Osteoarthritis PTSD (post-traumatic stress disorder)     Pulmonary nodules     Raynaud's disease     Scleroderma (HCC)     Scleroderma ([facility] 8/23/2018    Seizures (HCC)     Sleep walking disorder     Thyroid disease     TIA (transient ischemic attack)     Transient insomnia     Tricuspid regurgitation     Vasospasm ([facility] 01/2016    bilat. legs. resulting in near complete absence of profusion to arteries of feet. ([facility]     Vitamin D deficiency 8/23/2018    Wears glasses      Status EXAM:Status and Exam  Normal: No  Facial Expression: Flat  Affect: Blunt  Level of Consciousness: Alert  Mood:Normal: No  Mood: Depressed, Helpless, Anxious  Motor Activity:Normal: Yes  Interview Behavior: Cooperative, Evasive  Preception: [place] to Person, [place] to Time, [place] to Place, [place] to Situation  Attention:Normal: No  Attention: Distractible  Thought Processes: Blocking  Thought Content:Normal: No  Thought Content: Preoccupations  Hallucinations: None  Delusions: No  Memory:Normal: No  Memory: Poor Remote  Insight and Judgment: No  Insight and Judgment: Poor Judgment, Poor Insight  Present Suicidal Ideation: No  Present Homicidal Ideation: No    EDUCATION:   Learner Progress Toward Treatment Goals: reviewed group plans and strategies for care    Method:group therapy, medication compliance, individualized assessments and care planning    Outcome: needs reinforcement    PATIENT GOALS: to be discussed with patient within 72 hours    PLAN/TREATMENT RECOMMENDATIONS:     continue group therapy , medications compliance, goal setting, individualized assessments and care, continue to monitor pt on unit      SHORT-TERM GOALS:   Time frame for Short-Term Goals: 5-7 days    LONG-TERM GOALS:  Time frame for Long-Term Goals: 6 months  Members Present in Team Meeting: See Signature Sheet    Maria E, [address]

## 2019-06-09 NOTE — GROUP NOTE
Group Therapy Note    Date: June 9    Group Start Time: 1600  Group End Time: 2086  Group Topic: Cognitive Skills    [facility] [place]        Group Therapy Note    Attendees: 13/23         Patient's Goal: Pt. To participate appropriately with peers in group setting    Notes:  Trivia 70's, 80's, 90's, movies    Status After Intervention:  Improved    Participation Level: Active Listener and Interactive    Participation Quality: Appropriate and Attentive      Speech:  normal      Thought Process/Content: Logical      Affective Functioning: Congruent      Mood: stable      Level of consciousness:  Alert, Oriented x4 and Attentive      Response to Learning: Progressing to goal      Endings: None Reported    Modes of Intervention: Socialization and Problem-solving      Discipline Responsible: Behavorial Health Tech      Signature:   Tamiko [place]

## 2019-06-10 ENCOUNTER — APPOINTMENT (OUTPATIENT)
Dept: CT IMAGING | Age: 54
DRG: 751 | End: 2019-06-10
Payer: MEDICAID

## 2019-06-10 LAB
CHOLESTEROL/HDL RATIO: 2
CHOLESTEROL: 179 MG/DL
ESTIMATED AVERAGE GLUCOSE: 114 MG/DL
GLUCOSE BLD-MCNC: 64 MG/DL (ref 65–105)
HBA1C MFR BLD: 5.6 % (ref 4–6)
HDLC SERPL-MCNC: 89 MG/DL
LDL CHOLESTEROL: 74 MG/DL (ref 0–130)
THYROXINE, FREE: 0.86 NG/DL (ref 0.93–1.7)
TRIGL SERPL-MCNC: 79 MG/DL
TSH SERPL DL<=0.05 MIU/L-ACNC: 1.6 MIU/L (ref 0.3–5)
VLDLC SERPL CALC-MCNC: NORMAL MG/DL (ref 1–30)

## 2019-06-10 PROCEDURE — 84439 ASSAY OF FREE THYROXINE: CPT

## 2019-06-10 PROCEDURE — 6370000000 HC RX 637 (ALT 250 FOR IP): Performed by: NURSE PRACTITIONER

## 2019-06-10 PROCEDURE — 6370000000 HC RX 637 (ALT 250 FOR IP)

## 2019-06-10 PROCEDURE — 80061 LIPID PANEL: CPT

## 2019-06-10 PROCEDURE — 99232 SBSQ HOSP IP/OBS MODERATE 35: CPT | Performed by: NURSE PRACTITIONER

## 2019-06-10 PROCEDURE — 36415 COLL VENOUS BLD VENIPUNCTURE: CPT

## 2019-06-10 PROCEDURE — 83036 HEMOGLOBIN GLYCOSYLATED A1C: CPT

## 2019-06-10 PROCEDURE — 90833 PSYTX W PT W E/M 30 MIN: CPT | Performed by: NURSE PRACTITIONER

## 2019-06-10 PROCEDURE — 82947 ASSAY GLUCOSE BLOOD QUANT: CPT

## 2019-06-10 PROCEDURE — 93005 ELECTROCARDIOGRAM TRACING: CPT | Performed by: NURSE PRACTITIONER

## 2019-06-10 PROCEDURE — 1240000000 HC EMOTIONAL WELLNESS R&B

## 2019-06-10 PROCEDURE — 70450 CT HEAD/BRAIN W/O DYE: CPT

## 2019-06-10 PROCEDURE — 84443 ASSAY THYROID STIM HORMONE: CPT

## 2019-06-10 RX ORDER — PROPRANOLOL HYDROCHLORIDE 20 MG/1
10 TABLET ORAL NIGHTLY
Status: DISCONTINUED | OUTPATIENT
Start: 2019-06-10 | End: 2019-06-11

## 2019-06-10 RX ADMIN — PROPRANOLOL HYDROCHLORIDE 10 MG: 20 TABLET ORAL at 22:33

## 2019-06-10 RX ADMIN — LAMOTRIGINE 100 MG: 100 TABLET ORAL at 22:34

## 2019-06-10 RX ADMIN — GABAPENTIN 800 MG: 400 CAPSULE ORAL at 22:33

## 2019-06-10 RX ADMIN — PRAVASTATIN SODIUM 40 MG: 40 TABLET ORAL at 22:34

## 2019-06-10 RX ADMIN — ASPIRIN 81 MG: 81 TABLET, COATED ORAL at 08:43

## 2019-06-10 RX ADMIN — METFORMIN HYDROCHLORIDE 500 MG: 500 TABLET, FILM COATED ORAL at 08:43

## 2019-06-10 RX ADMIN — VENLAFAXINE 150 MG: 75 TABLET ORAL at 08:43

## 2019-06-10 RX ADMIN — LEVOTHYROXINE SODIUM 25 MCG: 25 TABLET ORAL at 06:54

## 2019-06-10 RX ADMIN — MULTIPLE VITAMINS W/ MINERALS TAB 1 TABLET: TAB at 08:44

## 2019-06-10 RX ADMIN — LAMOTRIGINE 25 MG: 25 TABLET ORAL at 08:43

## 2019-06-10 RX ADMIN — LAMOTRIGINE 25 MG: 25 TABLET ORAL at 22:37

## 2019-06-10 RX ADMIN — FAMOTIDINE 40 MG: 20 TABLET ORAL at 08:43

## 2019-06-10 RX ADMIN — GABAPENTIN 800 MG: 400 CAPSULE ORAL at 17:16

## 2019-06-10 RX ADMIN — GABAPENTIN 800 MG: 400 CAPSULE ORAL at 08:44

## 2019-06-10 RX ADMIN — HYDROXYCHLOROQUINE SULFATE 200 MG: 200 TABLET, FILM COATED ORAL at 08:42

## 2019-06-10 RX ADMIN — GABAPENTIN 800 MG: 400 CAPSULE ORAL at 12:47

## 2019-06-10 RX ADMIN — DISULFIRAM 250 MG: 250 TABLET ORAL at 08:42

## 2019-06-10 RX ADMIN — PANTOPRAZOLE SODIUM 40 MG: 40 TABLET, DELAYED RELEASE ORAL at 17:17

## 2019-06-10 RX ADMIN — PANTOPRAZOLE SODIUM 40 MG: 40 TABLET, DELAYED RELEASE ORAL at 06:54

## 2019-06-10 RX ADMIN — QUETIAPINE FUMARATE 300 MG: 300 TABLET ORAL at 22:33

## 2019-06-10 NOTE — PLAN OF CARE
Problem: Pain:  Goal: Pain level will decrease  Description  Pain level will decrease  Outcome: Ongoing     Problem: Depressive Behavior With or Without Suicide Precautions:  Goal: Absence of self-harm  Description  Absence of self-harm  Outcome: Ongoing   Denies SI and remains free from harm att. Reports continued chronic pain.

## 2019-06-10 NOTE — PLAN OF CARE
5 [facility]  Day 3 Interdisciplinary Treatment Plan NOTE    Review Date & Time: 6/10/19 3199    Admission Type:   Admission Type:  Involuntary    Reason for admission:  Reason for Admission: Depressed and suicidal, she expresses feelings of worthlessness and states she has nothing to live for, was drinking prior to admission  Estimated Length of Stay:  5-7 days  Estimated Discharge Date Update:   to be determined by physician    PATIENT STRENGTHS:  Patient Strengths:Strengths: Communication, Positive Support, Medication Compliance, Social Skills  Patient Strengths and Limitations:Limitations: Difficulty problem solving/relies on others to help solve problems, Difficult relationships / poor social skills  Addictive Behavior:Addictive Behavior  In the past 3 months, have you felt or has someone told you that you have a problem with:  : None  Do you have a history of Chemical Use?: No  Do you have a history of Alcohol Use?: Yes  Do you have a history of Street Drug Abuse?: No  Histroy of Prescripton Drug Abuse?: No  Medical Problems:  Past Medical History:   Diagnosis Date    Acute kidney injury ([facility]     Anemia     Angioedema     Antinuclear antibody (IQRA) titer greater than 1:80     Anxiety     Asthma     Ataxia     Atrial fibrillation ([facility]     Borderline personality disorder ([address]     Bradycardia     CAD (coronary artery disease)     Chronic kidney disease     CKD (chronic kidney disease), stage II 8/23/2018    Degenerative disc disease, thoracic     Depression     Diabetes mellitus ([address]     Diastolic dysfunction     DJD (degenerative joint disease)     Fibromyalgia     Foot pain, right     GERD (gastroesophageal reflux disease)     H/O: hysterectomy     Headache     Hernia of abdominal wall     History of blood transfusion     no problem    Hyperlipidemia     Hypertension     Lupus ([address]     Mood disorder (HCC)     Neuropathy     Osteoarthritis     PTSD (post-traumatic stress disorder) Pulmonary nodules     Raynaud's disease     Scleroderma ([facility]     Scleroderma ([facility] 8/23/2018    Seizures (HCC)     Sleep walking disorder     Thyroid disease     TIA (transient ischemic attack)     Transient insomnia     Tricuspid regurgitation     Vasospasm ([facility] 01/2016    bilat. legs. resulting in near complete absence of profusion to arteries of feet. ([facility] Vitamin D deficiency 8/23/2018    Wears glasses        Risk:  Fall RiskTotal: 75  Marcus Scale Marcus Scale Score: 23  BVC Total: 0  Change in scores:  No Changes to plan of Care:  No    Status EXAM:   Status and Exam  Normal: No  Facial Expression: Flat  Affect: Blunt, Congruent  Level of Consciousness: Alert  Mood:Normal: No  Mood: Depressed, Anxious, Anhedonia  Motor Activity:Normal: Yes  Motor Activity: Decreased  Interview Behavior: Cooperative, Evasive  Preception: [place] to Person, Karlynn Silvia to Time, [place] to Place, [place] to Situation  Attention:Normal: No  Attention: Distractible  Thought Processes: Circumstantial  Thought Content:Normal: No  Thought Content: Preoccupations  Hallucinations: None  Delusions: No  Memory:Normal: No  Memory: Poor Recent  Insight and Judgment: No  Insight and Judgment: Poor Judgment, Poor Insight  Present Suicidal Ideation: No  Present Homicidal Ideation: No    Daily Assessment Last Entry:   Daily Sleep (WDL): Within Defined Limits         Patient Currently in Pain: Denies  Daily Nutrition (WDL): Exceptions to WDL(declined breakfast)  Appetite Change: No appetite  Barriers to Nutrition: None  Level of Assistance: Independent/Self    Patient Monitoring:  Frequency of Checks: 4 times per hour, close    Psychiatric Symptoms:   Depression Symptoms  Depression Symptoms: Feelings of helplessness, Feelings of hopelessess, Isolative, Crying  Anxiety Symptoms  Anxiety Symptoms: Generalized  Jennifer Symptoms  Jennifer Symptoms: No problems reported or observed.      Psychosis Symptoms  Delusion Type: No problems reported or

## 2019-06-10 NOTE — GROUP NOTE
Group Therapy Note    Date: Kizzy 10    Group Start Time: 1000  Group End Time: 4176  Group Topic: Psychotherapy    [address], MSW, LSW    Pt declined to attend psychotherapy at 1000 am despite encouragement. Pt offered 1:1 and refused. Signature:   Sergio Wyman, MSW, LSW

## 2019-06-10 NOTE — PROGRESS NOTES
Charting done this shift reviewed by Electronically signed by John Carranza RN on 6/10/2019 at 12:38 AM

## 2019-06-10 NOTE — PLAN OF CARE
Patient denies suicidal ideations and pain. Patient is eating, drinking, and sleeping adequately. Takes medications as prescribed. Attends groups and is cooperative upon approach and during 1:1 talk time. Safety checks every 15 min; patient safety maintained.

## 2019-06-10 NOTE — GROUP NOTE
Group Therapy Note    Date: Kizzy 10    Group Start Time: 1100  Group End Time: 1150  Group Topic: Recreational    STCZ BANDARI A    Radha Reese, CTRS    Patient did not attend Leisure Skills Group after encouragement from staff. 1:1 talk time offered but patient refused.       Signature:  Daphine Mcburney

## 2019-06-10 NOTE — GROUP NOTE
Group Therapy Note    Date: Kizzy 10    Group Start Time: 1440  Group End Time: 2029  Group Topic: Cognitive Skills    NIKIA Hodges Guardian, CTRS    Patient refused to attend Cognitive Skills Group after encouragement from staff. 1:1 talk time offered but patient refused.       Signature:  Mahin Garza

## 2019-06-10 NOTE — GROUP NOTE
Group Therapy Note    Date: Kizzy 10    Group Start Time: 1600  Group End Time: 1700  Group Topic: Group Documentation    NIKIA Guadarrama        Group Therapy Note    Attendees: 12/21         Patient's Goal:  Attend and participate in group  Notes:  Green Behavior scale - Life enhancing skills/+coping  Status After Intervention:  Improved    Participation Level: Interactive    Participation Quality: Appropriate, Attentive and Sharing      Speech:  normal      Thought Process/Content: Logical      Affective Functioning: Congruent      Mood: anxious      Level of consciousness:  Alert and Oriented x4      Response to Learning: Able to verbalize current knowledge/experience and Able to verbalize/acknowledge new learning      Endings: None Reported    Modes of Intervention: Education      Discipline Responsible: [address], [employer] [facility] [email or web address]      Signature:  Nunu Guadarrama

## 2019-06-10 NOTE — PROGRESS NOTES
Psychiatric Progress Note Nurse Practitioner  Pertinent History & Psychiatric Examination    HPI: Isreal Jim is a 48 y.o. female who was involuntarily admitted via TPD from the [facility] for ETOH intoxication and aggression. She verbalized that she had nothing to live for and that she was feeling worthless after relapsing after 2+ years of sobriety. Today Mia was seen in her room. She was in the process of being brought to treatment team when she c/o dizziness. UR staff provided support and helped her to the floor. In the process, she hit her head. Medical support was given including glucose monitoring, EKG, vitals, CT of head ordered as well as Cardiology consult for abnormal EKG. She does admit to cardiac issues and is being seen by Cardiology. Additionally, patient's propranolol was decreased from 30mg @ HS to 10mg. She is verbalizing that she has these symptoms at home approximately twice per month but that she is able to manage them. We will continue to monitor. Physical assessment included light headedness, mild tremor with touch, hand and leg strength equal bilaterally, PERRLA, and glucose 64. Patient given orange juice. She reports that she did eat breakfast.     Today Mia is denying SI, HI, hallucinations, depression and anxiety. She denies a history of ETOH withdrawal. She did report that she told her daughter to throw away all of the ETOH in the house and told her where she \"hid\" her stash. She reported good sleep and appetite. Chart and medications reviewed. Therapeutic support, empathetic care and psycho education provided greater than 20 minutes. At this time there is no safe alternative other than inpatient care.     Complaints of Pain: none  Functioning Relationships: good support system      Mental Status Evaluation:  Orientation: alertness: alert   Mood:. depressed      Affect:  flat      Appearance:  age appropriate   Activity:  restless   Gait/Posture: Normal   Speech:  normal pitch and normal volume   Thought Process:  circumstantial   Thought Content:  preoccupied   Sensorium:  person, place, time/date and situation   Cognition:  grossly intact   Memory: intact   Insight:  fair   Judgment: fair   Suicidal Ideations: denies suicidal ideation   Homicidal Ideations: Negative for homicidal ideation      Medication Side Effects: Home medication side effects currently being evaluated. Attention Span attention span and concentration were age appropriate     Clinical Assessment Medical Decision    Axis I: Major Depression, Rec  Substance Use Disorder    Precautions with Justification:   Fall Risk    Medication Review/Mgmt: Medications reviewed with changes    Medical Issues: See Chart    Assessment of Risk for Harm to Self/Others:  Assessment performed. Q15 minute rounds in place. PLAN:  · Continue inpatient psychiatric treatment  · Supportive therapy with medication management. Reviewed risks and benefits as well as potential side effects with patient. · Continue home medications. · Re-order Antabuse 250mg daily beginning 6/9/19. · Decrease propranolol to 10mg @ HS beginning 6/10/19. · Consult order for Cardiology placed 6/10/19 due to hypotension. · Order for head CT placed 6/10/19 b/c of fall. · EKG ordered 6/10/19 b/c of fall. · New Order - Zofran 4mg for one dose beginning 6/9/19 for nausea. · Review medications for efficacy and side effects. · Therapeutic support and empathetic care provided greater than 20 minutes. · Engage in therapeutic activities and groups. · Follow up at [facility] [place] after symptoms stabilized.       Anticipated Discharge Date: TBD    Patient's Response to Treatment: positive    Sulma Watson  6/10/2019  10:30 AM

## 2019-06-10 NOTE — GROUP NOTE
Group Therapy Note    Date: Kizzy 10    Group Start Time: 1330  Group End Time: 9792  Group Topic: Recreational    STCZ [address], CTRS        Group Therapy Note    Attendees: 8         Patient's Goal:   Patient will demonstrate increased interpersonal interaction     Notes:  Patient attended group and participated fully     Status After Intervention:  Improved    Participation Level:  Active Listener and Interactive    Participation Quality: Appropriate, Attentive and Sharing      Speech:  normal      Thought Process/Content: Logical      Affective Functioning: Congruent      Mood: euthymic      Level of consciousness:  Alert and Oriented x4      Response to Learning: Progressing to goal      Endings: None Reported    Modes of Intervention: Socialization and Activity      Discipline Responsible: Psychoeducational Specialist      Signature:  Garcia Boyle

## 2019-06-11 VITALS
DIASTOLIC BLOOD PRESSURE: 63 MMHG | HEIGHT: 68 IN | BODY MASS INDEX: 27.28 KG/M2 | RESPIRATION RATE: 14 BRPM | TEMPERATURE: 97.9 F | OXYGEN SATURATION: 100 % | WEIGHT: 180 LBS | HEART RATE: 58 BPM | SYSTOLIC BLOOD PRESSURE: 117 MMHG

## 2019-06-11 PROBLEM — F33.9 MAJOR DEPRESSION, RECURRENT (HCC): Status: RESOLVED | Noted: 2019-06-09 | Resolved: 2019-06-11

## 2019-06-11 PROCEDURE — 6370000000 HC RX 637 (ALT 250 FOR IP): Performed by: NURSE PRACTITIONER

## 2019-06-11 PROCEDURE — 5130000000 HC BRIDGE APPOINTMENT

## 2019-06-11 PROCEDURE — 99238 HOSP IP/OBS DSCHRG MGMT 30/<: CPT | Performed by: NURSE PRACTITIONER

## 2019-06-11 RX ORDER — HYDROXYZINE HYDROCHLORIDE 25 MG/1
25 TABLET, FILM COATED ORAL 3 TIMES DAILY PRN
Qty: 90 TABLET | Refills: 0 | Status: SHIPPED | OUTPATIENT
Start: 2019-06-11 | End: 2019-06-21

## 2019-06-11 RX ORDER — PROPRANOLOL HYDROCHLORIDE 20 MG/1
20 TABLET ORAL NIGHTLY
Qty: 30 TABLET | Refills: 0 | Status: SHIPPED | OUTPATIENT
Start: 2019-06-11 | End: 2020-08-05 | Stop reason: ALTCHOICE

## 2019-06-11 RX ORDER — PROPRANOLOL HYDROCHLORIDE 20 MG/1
20 TABLET ORAL NIGHTLY
Status: DISCONTINUED | OUTPATIENT
Start: 2019-06-11 | End: 2019-06-11 | Stop reason: HOSPADM

## 2019-06-11 RX ORDER — DISULFIRAM 250 MG/1
250 TABLET ORAL DAILY
Qty: 30 TABLET | Refills: 0 | Status: SHIPPED | OUTPATIENT
Start: 2019-06-12 | End: 2021-02-25 | Stop reason: ALTCHOICE

## 2019-06-11 RX ADMIN — PANTOPRAZOLE SODIUM 40 MG: 40 TABLET, DELAYED RELEASE ORAL at 07:05

## 2019-06-11 RX ADMIN — DISULFIRAM 250 MG: 250 TABLET ORAL at 09:24

## 2019-06-11 RX ADMIN — HYDROXYCHLOROQUINE SULFATE 200 MG: 200 TABLET, FILM COATED ORAL at 09:25

## 2019-06-11 RX ADMIN — MULTIPLE VITAMINS W/ MINERALS TAB 1 TABLET: TAB at 08:37

## 2019-06-11 RX ADMIN — LEVOTHYROXINE SODIUM 25 MCG: 25 TABLET ORAL at 09:01

## 2019-06-11 RX ADMIN — GABAPENTIN 800 MG: 400 CAPSULE ORAL at 14:36

## 2019-06-11 RX ADMIN — AMLODIPINE BESYLATE 10 MG: 10 TABLET ORAL at 08:38

## 2019-06-11 RX ADMIN — GABAPENTIN 800 MG: 400 CAPSULE ORAL at 08:38

## 2019-06-11 RX ADMIN — METFORMIN HYDROCHLORIDE 500 MG: 500 TABLET, FILM COATED ORAL at 08:38

## 2019-06-11 RX ADMIN — ASPIRIN 81 MG: 81 TABLET, COATED ORAL at 08:38

## 2019-06-11 RX ADMIN — LAMOTRIGINE 25 MG: 25 TABLET ORAL at 08:37

## 2019-06-11 RX ADMIN — FAMOTIDINE 40 MG: 20 TABLET ORAL at 08:38

## 2019-06-11 RX ADMIN — LOSARTAN POTASSIUM 100 MG: 100 TABLET ORAL at 08:38

## 2019-06-11 RX ADMIN — VENLAFAXINE 150 MG: 75 TABLET ORAL at 08:38

## 2019-06-11 NOTE — GROUP NOTE
Group Therapy Note    Date: June 11    Group Start Time: 1330  Group End Time: 1098  Group Topic: Cognitive Skills    NIKIA BHCESIA Rueda, CTRS    Patient's Goal:  Increase cognitive skills, increase communication skills, demonstrate increased interpersonal interaction      Status After Intervention:  Improved    Participation Level:  Active Listener and Interactive    Participation Quality: Appropriate, Attentive and Sharing    Speech:  normal    Thought Process/Content: Logical    Affective Functioning: Congruent    Level of consciousness:  Alert, Oriented x4 and Attentive    Response to Learning: Able to verbalize current knowledge/experience, Capable of insight and Progressing to goal    Endings: None Reported    Modes of Intervention: Education, Socialization, Exploration, Problem-solving, Activity and Limit-setting    Discipline Responsible: Psychoeducational Specialist    Signature:  Yohana Rueda, [address]

## 2019-06-11 NOTE — DISCHARGE INSTR - COC
Continuity of Care Form    Patient Name: Maribell Lieberman   :  1965  MRN:  [id number]    Admit date:  2019  Discharge date:  ***    Code Status Order: Full Code   Advance Directives:   Advance Care Flowsheet Documentation     Date/Time Healthcare Directive Type of Healthcare Directive Copy in [address] [place] [address] [address] Agent's Name Healthcare Agent's Phone Number    [phone]  No, patient does not have an advance directive for healthcare treatment -- -- -- -- --          Admitting Physician:  Cathy Vences MD  PCP: COLTON Garza CNP    Discharging Nurse: [facility] Unit/Room#: 0101/0101-01  Discharging Unit Phone Number: ***    Emergency Contact:   Extended Emergency Contact Information  Primary Emergency Contact: [address] [place] [address] Phone: [phone]  Work Phone: [phone]  Mobile Phone: [phone]  Relation: Child  Secondary Emergency Contact: [address] Phone: [phone]  Work Phone: [phone]  Mobile Phone: [phone]  Relation: Child    Past Surgical History:  Past Surgical History:   Procedure Laterality Date    ABDOMINOPLASTY  2013    BLADDER SUSPENSION N/A 2018    CYSTOSCOPY WITH OBTRYX MID URETHRAL SLING performed by Adan Grey MD at [address] [place] [address] Right 2016   [facility] Right     EYE SURGERY      [email or web address]    [facility]  2018    FINGER SURGERY Right 2018    RING CARTER MASS EXCISION, TRIGGER RELEASE    FRACTURE SURGERY      left femur    GASTRECTOMY      GASTRIC BYPASS SURGERY  2012    HYSTERECTOMY      JOINT REPLACEMENT Bilateral     knees    KNEE SURGERY Right 2017    (femoral) patella replacement    NERVE BLOCK Right 2018     cervical facet #1 no steroid    NERVE BLOCK Right 2018    rt cervical diagnostic #2 decadron 10mg    NERVE BLOCK Right 08/10/2018    right cervical rfa decadron 10mg    IN ANTERIOR COLPORRAPHY RPR CYSTOCELE W/CYSTO N/A 7/30/2018    CYSTOCELE REPAIR performed by Hipolito Sylvester DO at [address] OFFICE/OUTPT [address] [facility] [address] Right 8/28/2018    RING CARTER MASS EXCISION, TRIGGER RELEASE, [address] TABLE performed by Luis Tong DO at [address] OFFICE/OUTPT VISIT,PROCEDURE ONLY Left 11/9/2018    FEMUR RETROGRADE IM NAILING PERIPROSTHETIC FRACTURE performed by Luis Tong DO at [address]/CARPAL TUNNEL SURG Left 10/23/2018    CARPAL TUNNEL RELEASE performed by Luis Tong DO at [address] [facility]  2011    left knee    TOTAL KNEE ARTHROPLASTY Right 5/2/2017    PATELLOFEMORAL REPLACEMENT KNEE - [facility], [address] TABLE, FEMORAL POPLITEAL BLOCK, NSA= SPINAL VS GENERAL performed by Luis Tong DO at [address] [facility]  2004       Immunization History:   Immunization History   Administered Date(s) Administered    Influenza Vaccine, unspecified formulation 10/06/2016    Influenza Virus Vaccine 12/22/2008, 12/20/2010, 10/18/2011, 09/18/2013, 11/24/2015    Influenza, Chio Per, 3 Years and older, IM (Fluzone 3 yrs and older or Afluria 5 yrs and older) 09/11/2017    Influenza, [place]io Per, 6 mo and older, IM, PF (Flulaval, Fluarix) 11/10/2018    Pneumococcal Polysaccharide (Hzmedgisp22) 11/08/2007    Tdap (Boostrix, Adacel) 04/10/2014, 09/14/2017       Active Problems:  Patient Active Problem List   Diagnosis Code    [facility] R56.9    Transient insomnia F51.02    Sleep walking disorder F51.3    Raynaud's disease I73.00    Pulmonary nodules R91.8    Neuropathy G62.9    Hypertension I10    GERD (gastroesophageal reflux disease) K21.9    Fibromyalgia M79.7    Depression F32.9    Degenerative disc disease, thoracic M51.34    CAD (coronary artery disease) I25.10    Bipolar disorder ([facility] F31.9    Asthma J45.909    Anxiety F41.9    Antinuclear antibody (IQRA) titer greater than 1:80 R76.0    Anemia D64.9    Hx of Stroke ([facility]) I63.9    Type 2 diabetes mellitus with stage 2 chronic kidney disease, without long-term current use of insulin ([facility]) E11.22, N18.2    Degenerative arthritis of right knee M17.11    S/P knee surgery Z98.890    Chronic fatigue syndrome R53.82    Spondylosis of cervical region without myelopathy or radiculopathy M47.812    H/O hysterectomy [facility] at [facility] for benign disease 2014 Z90.710    Hx of total bilateral knee replacement Z96.653    H/O: hysterectomy Z90.710    Atrial fibrillation with slow ventricular response ([facility]) I48.91    H/O: stroke Z86.73    Acute cystitis without hematuria N30.00    Hypovolemia E86.1    Hypotension I95.9    Bradycardia R00.1    Cystocele, midline N81.11    JOYCE (stress urinary incontinence, female) N39.3    Overflow incontinence N39.490    7/30/18 Cystocele repair, Cystoscopy with OBTRYX Ureteral Sling Z98.890    Nuclear senile cataract H25.10    CKD (chronic kidney disease), stage II N18.2    Scleroderma ([facility]) M34.9    Vitamin D deficiency E55.9    Trigger middle finger of right hand M65.331    Trigger ring finger of right hand M65.341    Osteoarthritis of spine with radiculopathy, cervical region M47.22    Degenerative disc disease, cervical M50.30    Left carpal tunnel syndrome G56.02    Spinal stenosis, cervical region M48.02    Neural foraminal stenosis of cervical spine M99.81    Closed fracture of lower end of femur ([address] S72.409A    Closed supracondylar fracture of femur, left, initial encounter ([facility]) S72.452A    Numbness R20.0    Age-related osteoporosis without current pathological fracture M81.0       Isolation/Infection:   Isolation          No Isolation            Nurse Assessment:  Last Vital Signs: /63   Pulse 58   Temp 97.9 °F (36.6 °C) (Oral)   Resp 14   Ht 5' 8\" (1.727 m)   Wt 180 lb (81.6 kg)   SpO2 100% BMI 27.37 kg/m²     Last documented pain score (0-10 scale): Pain Level: 2  Last Weight:   Wt Readings from Last 1 Encounters:   19 180 lb (81.6 kg)     Mental Status:  {IP PT MENTAL STATUS:}    IV Access:  { JENNY IV ACCESS:479158950}    Nursing Mobility/ADLs:  Walking   {CHP DME JOVZ:540092691}  Transfer  {CHP DME RATB:484395172}  Bathing  {CHP DME OKOX:000239957}  Dressing  {CHP DME YQOK:335059648}  Toileting  {CHP DME AGDL:157261444}  Feeding  {CHP DME XKAZ:297989475}  Med Admin  {P DME QWOC:021463841}  Med Delivery   { JENNY MED Delivery:538821683}    Wound Care Documentation and Therapy:        Elimination:  Continence:   · Bowel: {YES / AV:40846}  · Bladder: {YES / NE:95884}  Urinary Catheter: {Urinary Catheter:933341375}   Colostomy/Ileostomy/Ileal Conduit: {YES / I}       Date of Last BM: ***  No intake or output data in the 24 hours ending 19 1313  No intake/output data recorded.     Safety Concerns:     [address] Safety Concerns:895867363}    Impairments/Disabilities:      [address] Impairments/Disabilities:574390652}    Nutrition Therapy:  Current Nutrition Therapy:   [address] Diet List:770578712}    Routes of Feeding: {[facility] DME Other Feedings:313101313}  Liquids: {Slp liquid thickness:30039}  Daily Fluid Restriction: {CHP DME Yes amt example:858761166}  Last Modified Barium Swallow with Video (Video Swallowing Test): {Done Not Done ODVO:363349740}    Treatments at the Time of Hospital Discharge:   Respiratory Treatments: ***  Oxygen Therapy:  {Therapy; copd oxygen:18980}  Ventilator:    { CARLOS A Vent ZNKV:497875757}    Rehab Therapies: {THERAPEUTIC INTERVENTION:0191583853}  Weight Bearing Status/Restrictions: [facility] Weight Bearin}  Other Medical Equipment (for information only, NOT a DME order):  {EQUIPMENT:453057155}  Other Treatments: ***    Patient's personal belongings (please select all that are sent with patient):  {[place] DME Belongings:831303419}    RN SIGNATURE:

## 2019-06-11 NOTE — DISCHARGE SUMMARY
DISCHARGE SUMMARY NURSE PRACTITIONER  Patient ID:  Sherrie Slater  [id number]  75 y.o.  1965    Admit date: 6/8/2019    Discharge date and time: 6/11/2019  10:34 AM     Admitting Physician: Bennie Castellanos MD     Discharge Physician:  COLTON Martinez CNP    Admission Diagnoses: Major depression, recurrent ([facility] [F33.9]    Discharge Diagnoses:   Major Depressive Disorder, Mild, In Remission    Patient Active Problem List   Diagnosis Code    Seizures ([facility] R56.9    Transient insomnia F51.02    Sleep walking disorder F51.3    Raynaud's disease I73.00    Pulmonary nodules R91.8    Neuropathy G62.9    Hypertension I10    GERD (gastroesophageal reflux disease) K21.9    Fibromyalgia M79.7    Depression F32.9    Degenerative disc disease, thoracic M51.34    CAD (coronary artery disease) I25.10    Bipolar disorder (HCC) F31.9    Asthma J45.909    Anxiety F41.9    Antinuclear antibody (IQRA) titer greater than 1:80 R76.0    Anemia D64.9    Hx of Stroke (HCC) I63.9    Type 2 diabetes mellitus with stage 2 chronic kidney disease, without long-term current use of insulin ([facility]) E11.22, N18.2    Degenerative arthritis of right knee M17.11    S/P knee surgery Z98.890    Chronic fatigue syndrome R53.82    Spondylosis of cervical region without myelopathy or radiculopathy M47.812    [facility] Z90.710    Hx of total bilateral knee replacement Z96.653    H/O: hysterectomy Z90.710    Atrial fibrillation with slow ventricular response ([facility] I48.91    H/O: stroke Z86.73    Acute cystitis without hematuria N30.00    Hypovolemia E86.1    Hypotension I95.9    Bradycardia R00.1    Cystocele, midline N81.11    JOYCE (stress urinary incontinence, female) N39.3    Overflow incontinence N39.490    7/30/18 Cystocele repair, Cystoscopy with OBTRYX Ureteral Sling Z98.890    Nuclear senile cataract H25.10    CKD (chronic kidney disease), stage II N18.2 every 6 hours as needed for Wheezing  Qty: 1 Inhaler, Refills: 3      Multiple Vitamins-Minerals (THERAPEUTIC MULTIVITAMIN-MINERALS) tablet Take 1 tablet by mouth daily       ! ! - Potential duplicate medications found. Please discuss with provider. STOP taking these medications       alendronate (FOSAMAX) 70 MG tablet Comments:   Reason for Stopping:         Lidocaine POWD Comments:   Reason for Stopping:         Misc.  Devices MISC Comments:   Reason for Stopping:         glucose monitoring kit (FREESTYLE) monitoring kit Comments:   Reason for Stopping:         blood glucose monitor strips Comments:   Reason for Stopping:         acetaminophen (TYLENOL) 325 MG tablet Comments:   Reason for Stopping:         cyclobenzaprine (FLEXERIL) 5 MG tablet Comments:   Reason for Stopping:         predniSONE (DELTASONE) 50 MG tablet Comments:   Reason for Stopping:         naltrexone (DEPADE) 50 MG tablet Comments:   Reason for Stopping:         ferrous sulfate (FE TABS) 325 (65 Fe) MG EC tablet Comments:   Reason for Stopping:         potassium chloride (KLOR-CON M) 10 MEQ extended release tablet Comments:   Reason for Stopping:         vitamin B-12 (CYANOCOBALAMIN) 1000 MCG tablet Comments:   Reason for Stopping:           Discharge Exam:  Level of consciousness:  Within normal limits  Appearance: Street clothes, seated, with good grooming  Behavior/Motor: No abnormalities noted  Attitude toward examiner:  Cooperative, attentive, good eye contact  Speech:  spontaneous, normal rate, normal volume and well articulated  Mood:  euthymic  Affect:  mood congruent  Thought processes:  linear, goal directed and coherent  Thought content:  Homocidal ideation denies  Suicidal Ideation:  denies suicidal ideation  Delusions:  no evidence of delusions  Perceptual Disturbance:  denies any perceptual disturbance  Cognition:  In tact  Memory: age appropriate  Insight & Judgement: fair  Medication side effects:  denies     Disposition:

## 2019-06-11 NOTE — GROUP NOTE
Group Therapy Note    Date: Kizzy 10    Group Start Time: 2030  Group End Time: 2045  Group Topic: Wrap-Up    [facility]        [facility]    Attendees: 11/21             Status After Intervention:  Improved    Participation Level: None    Participation Quality: Appropriate      Speech:  mute      Thought Process/Content: Logical      Affective Functioning: Flat and Constricted/Restricted      Mood: depressed      Level of consciousness:  Alert and Oriented x4      Response to Learning: Resistant      Endings: None Reported    Modes of Intervention: Support      Discipline Responsible: [facility] [address], [facility]      Signature:  Bette Land

## 2019-06-11 NOTE — GROUP NOTE
Group Therapy Note    Date: June 11    Group Start Time: 0900  Group End Time: 0930  Group Topic: Community Meeting    NIKIA COULTER    [place], [place]    Attendees: 8         Patient's Goal for Today:  Attend groups. Talk with others on the unit. Notes:      Status After Intervention:  Improved    Participation Level:  Active Listener and Interactive    Participation Quality: Appropriate, Attentive and Sharing      Speech:  normal      Thought Process/Content: Logical      Affective Functioning: Flat      Mood: dysphoric      Level of consciousness:  Alert, Oriented x4 and Attentive      Response to Learning: Able to verbalize current knowledge/experience, Able to verbalize/acknowledge new learning, Able to retain information, Capable of insight and Progressing to goal      Endings: None Reported       Modes of Intervention: Education, Support, Socialization, Exploration, Clarifying, Problem-solving, Confrontation, Limit-setting and Reality-testing      Discipline Responsible: Psychoeducational Specialist      Signature:  Milagro Whelan

## 2019-06-11 NOTE — DISCHARGE INSTR - OTHER ORDERS
Make sure to take all medications prescribed by your Dr. Rachelle Parrish not double up on doses. If you miss or skip a dose make sure to take the next scheduled dose. Make sure to not do any illicit drugs or alcohol. Make sure to attend all follow up appointments.

## 2019-06-11 NOTE — PLAN OF CARE
Patient consulted for bradycardia. She follows with Dr Jenny Jalloh as OP, who was contacted over the phone, will defer care, discussed with RB and patient.

## 2019-06-11 NOTE — BH NOTE
09/21/2018     06/26/2018     05/22/2018     04/07/2017     No components found for: LDLCAL  No results found for: Cassi Dasilva RN

## 2019-06-11 NOTE — BH NOTE
Dr Héctor Lea Cardiologist seen pt today d/t hypotension. Patient follows up with Dr Kike Ordonez ([phone]). Dr Kike Ordonez notified of Dr Héctor Lea seeing pt today. Dr Kike Ordonez stated \"OK, and pt can follow up with me on discharge. \"

## 2019-06-12 LAB
EKG ATRIAL RATE: 58 BPM
EKG P AXIS: 53 DEGREES
EKG P-R INTERVAL: 224 MS
EKG Q-T INTERVAL: 426 MS
EKG QRS DURATION: 94 MS
EKG QTC CALCULATION (BAZETT): 418 MS
EKG R AXIS: 51 DEGREES
EKG T AXIS: 61 DEGREES
EKG VENTRICULAR RATE: 58 BPM

## 2019-06-12 PROCEDURE — 93010 ELECTROCARDIOGRAM REPORT: CPT | Performed by: INTERNAL MEDICINE

## 2019-06-12 NOTE — CARE COORDINATION
Name: Luz Watson    : 1965    Discharge Date: 2019    Primary Auth/Cert #: [id number]    Discharge Medications:      Medication List      START taking these medications    hydrOXYzine 25 MG tablet  Commonly known as:  ATARAX  Take 1 tablet by mouth 3 times daily as needed for Anxiety  Notes to patient:  As needed for anxiety         CHANGE how you take these medications    disulfiram 250 MG tablet  Commonly known as:  ANTABUSE  Take 1 tablet by mouth daily  What changed:  additional instructions  Notes to patient: To treat alcoholism      propranolol 20 MG tablet  Commonly known as:  INDERAL  Take 1 tablet by mouth nightly  What changed:    · medication strength  · how much to take  Notes to patient: To treat high blood pressure      QUEtiapine 300 MG tablet  Commonly known as:  SEROQUEL  Take 0.5 tablets by mouth nightly  What changed:  how much to take  Notes to patient: To lower depression         CONTINUE taking these medications    albuterol sulfate  (90 Base) MCG/ACT inhaler  Inhale 2 puffs into the lungs every 6 hours as needed for Wheezing  Notes to patient:  As needed for wheezing      calcium carbonate 648 MG Tabs  Take 1 tablet by mouth 2 times daily  Notes to patient:  Calcium      cetirizine 10 MG tablet  Commonly known as:  ZYRTEC  TAKE 1 TABLET BY MOUTH ONE TIME A DAY  Notes to patient:  For allergies      conjugated estrogens 0.625 MG/GM vaginal cream  Commonly known as:  PREMARIN  Place 0.5 g vaginally daily Place twice daily at bedtime for first 2 weeks then at bedtime once daily for 2 weeks then 3 times/week. Notes to patient:  Vaginal cream      EPINEPHrine 0.3 MG/0.3ML Soaj injection  Commonly known as:  EPIPEN 2-JIGAR  To be used if experiencing angioedema.   Notes to patient:  Angioedema      EPIPEN 2-JIGAR IM  Notes to patient:  Angioedema      ergocalciferol 36261 units capsule  Commonly known as:  ERGOCALCIFEROL  Notes to patient:  Vitamin d     famotidine 40 MG provider to review them with you. STOP taking these medications    acetaminophen 325 MG tablet  Commonly known as:  TYLENOL     alendronate 70 MG tablet  Commonly known as:  FOSAMAX     blood glucose test strips     cyclobenzaprine 5 MG tablet  Commonly known as:  FLEXERIL     ferrous sulfate 325 (65 Fe) MG EC tablet  Commonly known as:  FE TABS     glucose monitoring kit monitoring kit     Lidocaine Powd     Misc.  Devices Misc     naltrexone 50 MG tablet  Commonly known as:  DEPADE     potassium chloride 10 MEQ extended release tablet  Commonly known as:  KLOR-CON M     predniSONE 50 MG tablet  Commonly known as:  DELTASONE     vitamin B-12 1000 MCG tablet  Commonly known as:  CYANOCOBALAMIN           Where to Get Your Medications      These medications were sent to [address] [place] [address] [phone]  [address] [place] [address],  Ryan Sal     Phone:  [phone]   · disulfiram 250 MG tablet  · hydrOXYzine 25 MG tablet  · propranolol 20 MG tablet      Pharmacy Instructions:      Medication has been sent to [address] [place] [address].                   Follow Up Appointment: COLTON Camarillo - CNP  [address] [facility], [address] [place] [address]  [phone]          [facility]  [address] [place] [address], [place], [facility]  (E) [phone] (Z) [phone]    On 6/20/2019  @10:30 am for medication management

## 2019-06-13 ENCOUNTER — OFFICE VISIT (OUTPATIENT)
Dept: FAMILY MEDICINE CLINIC | Age: 54
End: 2019-06-13
Payer: MEDICAID

## 2019-06-13 VITALS
DIASTOLIC BLOOD PRESSURE: 80 MMHG | OXYGEN SATURATION: 98 % | BODY MASS INDEX: 25.45 KG/M2 | WEIGHT: 167.4 LBS | HEART RATE: 57 BPM | SYSTOLIC BLOOD PRESSURE: 128 MMHG | TEMPERATURE: 98.4 F

## 2019-06-13 DIAGNOSIS — F41.9 ANXIETY: Primary | ICD-10-CM

## 2019-06-13 PROBLEM — N30.00 ACUTE CYSTITIS WITHOUT HEMATURIA: Status: RESOLVED | Noted: 2018-01-16 | Resolved: 2019-06-13

## 2019-06-13 PROCEDURE — G8417 CALC BMI ABV UP PARAM F/U: HCPCS | Performed by: NURSE PRACTITIONER

## 2019-06-13 PROCEDURE — G8427 DOCREV CUR MEDS BY ELIG CLIN: HCPCS | Performed by: NURSE PRACTITIONER

## 2019-06-13 PROCEDURE — 1111F DSCHRG MED/CURRENT MED MERGE: CPT | Performed by: NURSE PRACTITIONER

## 2019-06-13 PROCEDURE — G8598 ASA/ANTIPLAT THER USED: HCPCS | Performed by: NURSE PRACTITIONER

## 2019-06-13 PROCEDURE — 3017F COLORECTAL CA SCREEN DOC REV: CPT | Performed by: NURSE PRACTITIONER

## 2019-06-13 PROCEDURE — 99213 OFFICE O/P EST LOW 20 MIN: CPT | Performed by: NURSE PRACTITIONER

## 2019-06-13 PROCEDURE — 1036F TOBACCO NON-USER: CPT | Performed by: NURSE PRACTITIONER

## 2019-06-13 ASSESSMENT — ENCOUNTER SYMPTOMS
SHORTNESS OF BREATH: 0
COUGH: 0
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
## 2019-06-13 NOTE — PROGRESS NOTES
Diabetic retinal exam  05/09/2020    Potassium monitoring  06/08/2020    Creatinine monitoring  06/08/2020    A1C test (Diabetic or Prediabetic)  06/10/2020    Lipid screen  06/10/2020    Breast cancer screen  05/20/2021    Colon cancer screen colonoscopy  06/25/2023    DTaP/Tdap/Td vaccine (3 - Td) 09/14/2027    Flu vaccine  Completed    Pneumococcal 0-64 years Vaccine  Completed    Hepatitis C screen  Completed    HIV screen  Completed

## 2019-06-14 ENCOUNTER — HOSPITAL ENCOUNTER (OUTPATIENT)
Dept: PHYSICAL THERAPY | Age: 54
Setting detail: THERAPIES SERIES
Discharge: HOME | End: 2019-06-14
Payer: MEDICAID

## 2019-06-18 ENCOUNTER — OFFICE VISIT (OUTPATIENT)
Dept: ORTHOPEDIC SURGERY | Age: 54
End: 2019-06-18
Payer: MEDICAID

## 2019-06-18 VITALS — BODY MASS INDEX: 25.36 KG/M2 | WEIGHT: 167.33 LBS | HEIGHT: 68 IN

## 2019-06-18 DIAGNOSIS — S92.301D CLOSED NONDISPLACED FRACTURE OF METATARSAL BONE OF RIGHT FOOT WITH ROUTINE HEALING, UNSPECIFIED METATARSAL, SUBSEQUENT ENCOUNTER: Primary | ICD-10-CM

## 2019-06-18 PROCEDURE — 99213 OFFICE O/P EST LOW 20 MIN: CPT | Performed by: ORTHOPAEDIC SURGERY

## 2019-06-18 PROCEDURE — 3017F COLORECTAL CA SCREEN DOC REV: CPT | Performed by: ORTHOPAEDIC SURGERY

## 2019-06-18 PROCEDURE — 1111F DSCHRG MED/CURRENT MED MERGE: CPT | Performed by: ORTHOPAEDIC SURGERY

## 2019-06-18 PROCEDURE — G8427 DOCREV CUR MEDS BY ELIG CLIN: HCPCS | Performed by: ORTHOPAEDIC SURGERY

## 2019-06-18 PROCEDURE — G8598 ASA/ANTIPLAT THER USED: HCPCS | Performed by: ORTHOPAEDIC SURGERY

## 2019-06-18 PROCEDURE — G8417 CALC BMI ABV UP PARAM F/U: HCPCS | Performed by: ORTHOPAEDIC SURGERY

## 2019-06-18 PROCEDURE — 1036F TOBACCO NON-USER: CPT | Performed by: ORTHOPAEDIC SURGERY

## 2019-06-18 ASSESSMENT — ENCOUNTER SYMPTOMS
ABDOMINAL PAIN: 0
SHORTNESS OF BREATH: 0
EYE PAIN: 0

## 2019-06-18 NOTE — PROGRESS NOTES
[facility]  [address] [place], [place] [address]y [address] [place] [address] [id number]  Dept: [phone]    Ambulatory Orthopedic Consult      CHIEF COMPLAINT:    Chief Complaint   Patient presents with    Follow-up     R Foot       HISTORY OF PRESENT ILLNESS:      The patient is a 48 y.o. female who is being seen for consultation and evaluation of an injury that occurred 1/6/19 while at home when she rolled her foot. The patient reports she felt immediate pain and then had difficulty with weight bearing. The pain is localized to the dorsal midfoot and lateral hindfoot. Prior to being seen here today, the patient was seen in the emergency department and placed into a splint, and has since converted into a cam boot. The patient reports an associated swelling. The pain is worse with activity and better with rest. The pain has remained the same since the injury. Interval history 1/25/19: She is seen here today in the office with her daughter and granddaughter. She is ambulating with a cam boot and a rolling walker. She reports her pain is roughly the same, and does not have any calf pain, chest pain, or shortness of breath. She is here for CT follow-up today. She reports she does have swelling at this time. Interval history 2/22/19: She returns today for follow-up, proximally 6 weeks after her injury, in a cast for the past 4 weeks. She was unable to obtain a rolling knee scooter, secondary to insurance coverage. She reports she has been putting some weight on it despite her weightbearing restriction, does report that it was painful when doing so. She reports that her pain is slightly decreased overall however. She denies any chest pain, shortness of breath, calf pain. She does also report that she has fallen twice. Since I saw her last, she did go to physical therapy to go over nonweightbearing restrictions, and reports that it was somewhat helpful.     Interval history 3/5/19: She reports that 9 days ago in the middle of night, she tripped at home while walking down some steps, and reports an inversion injury, while walking without the boot in place. She is here for unscheduled follow-up because of this, and she reports a sharp pain in her lateral hindfoot and ankle. She also reports an associated swelling. She has been using the cam boot intermittently. She has not been to physical therapy since this happened. Interval history 4/9/19: She reports that her right foot is still bothering her, and she does still have pain with ambulating, but it does sound like it is improved somewhat. She does report a sense of cramping in the plantar aspect of her foot. She is ambulating with her CAM boot without assistive device. She has been going to physical therapy. She reports that her swelling is improved. She denies any other changes. INTERVAL HISTORY 6/18/2019:  She is seen again today in the office for follow up, having been seen here last a little over 2 months ago on 4/9/2019. Since being seen last, she reports the problem has significantly improved, and she is ambulating in sandals today without a brace or assistive device. She denies any significant pain with walking or otherwise, but does report a sense of cramping in her midfoot, which resolves with manual massage. She has finished physical therapy. She does report a mild associated stiffness at times. She reports her swelling has improved. She denies any other significant changes. REVIEW OF SYSTEMS:  Review of Systems   Constitutional: Negative for fever. HENT: Negative for tinnitus. Eyes: Negative for pain. Respiratory: Negative for shortness of breath. Cardiovascular: Negative for chest pain. Gastrointestinal: Negative for abdominal pain. Genitourinary: Negative for dysuria. Skin: Negative for rash. Neurological: Negative for headaches. Hematological: Does not bruise/bleed easily.      Musculoskeletal: See HPI for pertinent positives     Past Medical History:    Past Medical History:   Diagnosis Date    Acute kidney injury ([address]     Anemia     Angioedema     Antinuclear antibody (IQRA) titer greater than 1:80     Anxiety     Asthma     Ataxia     Atrial fibrillation (HCC)     Borderline personality disorder (HCC)     Bradycardia     CAD (coronary artery disease)     Chronic kidney disease     CKD (chronic kidney disease), stage II 8/23/2018    Degenerative disc disease, thoracic     Depression     Diabetes mellitus (HCC)     Diastolic dysfunction     DJD (degenerative joint disease)     Fibromyalgia     Foot pain, right     GERD (gastroesophageal reflux disease)     H/O: hysterectomy     Headache     Hernia of abdominal wall     History of blood transfusion     no problem    Hyperlipidemia     Hypertension     Lupus (HCC)     Mood disorder (HCC)     Neuropathy     Osteoarthritis     PTSD (post-traumatic stress disorder)     Pulmonary nodules     Raynaud's disease     Scleroderma ([address]     Scleroderma ([address] 8/23/2018    Seizures ([facility])     Sleep walking disorder     Thyroid disease     TIA (transient ischemic attack)     Transient insomnia     Tricuspid regurgitation     Vasospasm ([address] 01/2016    bilat. legs. resulting in near complete absence of profusion to arteries of feet. ([facility]     Vitamin D deficiency 8/23/2018    Wears glasses        Past Surgical History:    Past Surgical History:   Procedure Laterality Date    ABDOMINOPLASTY  2013    BLADDER SUSPENSION N/A 7/30/2018    CYSTOSCOPY WITH OBTRYX MID URETHRAL SLING performed by Ngozi Arrington MD at [address] Right 2016    CHOLECYSTECTOMY  1989    ELBOW SURGERY Right     EYE SURGERY      khushboo. lasik     FEMUR FRACTURE SURGERY  11/09/2018    FINGER SURGERY Right 08/28/2018    RING CARTER MASS EXCISION, TRIGGER RELEASE    FRACTURE SURGERY      left femur    GASTRECTOMY      GASTRIC BYPASS TABLET BY MOUTH FOUR TIMES A DAY  120 tablet 4    famotidine (PEPCID) 40 MG tablet TAKE 1 TABLET BY MOUTH IN THE EVENING 90 tablet 2    metFORMIN (GLUCOPHAGE) 500 MG tablet Take 500 mg by mouth daily       SM ASPIRIN ADULT LOW STRENGTH 81 MG EC tablet TAKE 1 TABLET BY MOUTH TWO TIMES A DAY  180 tablet 1    omeprazole (PRILOSEC) 20 MG delayed release capsule TAKE 1 CAPSULE BY MOUTH TWO TIMES A DAY  180 capsule 1    lamoTRIgine (LAMICTAL) 25 MG tablet Take 25 mg by mouth 2 times daily       calcium carbonate 648 MG TABS Take 1 tablet by mouth 2 times daily 60 tablet 5    cetirizine (ZYRTEC) 10 MG tablet TAKE 1 TABLET BY MOUTH ONE TIME A DAY  30 tablet 4    pravastatin (PRAVACHOL) 40 MG tablet TAKE 1 TABLET BY MOUTH AT BEDTIME  90 tablet 1    conjugated estrogens (PREMARIN) 0.625 MG/GM vaginal cream Place 0.5 g vaginally daily Place twice daily at bedtime for first 2 weeks then at bedtime once daily for 2 weeks then 3 times/week. 1 Tube 3    EPINEPHrine HCl, Anaphylaxis, (EPIPEN 2-JIGAR IM) Inject 1 Syringe into the muscle PRN for angioedema, takes with the prednisone      felodipine (PLENDIL) 10 MG extended release tablet Take 10 mg by mouth daily Pres[facility] ergocalciferol (ERGOCALCIFEROL) 02632 units capsule Take 50,000 Units by mouth See Admin Instructions Takes twice a week      QUEtiapine (SEROQUEL) 300 MG tablet Take 0.5 tablets by mouth nightly (Patient taking differently: Take 300 mg by mouth nightly ) 60 tablet 3    lamoTRIgine (LAMICTAL) 100 MG tablet Take 100 mg by mouth every evening       levothyroxine (SYNTHROID) 25 MCG tablet Take 25 mcg by mouth Daily      albuterol sulfate  (90 Base) MCG/ACT inhaler Inhale 2 puffs into the lungs every 6 hours as needed for Wheezing 1 Inhaler 3    venlafaxine (EFFEXOR) 75 MG tablet Take 2 tablets (150 mg) in the morning and 1 tablet (75 mg) in the evening.  90 tablet 3    Multiple Vitamins-Minerals (THERAPEUTIC MULTIVITAMIN-MINERALS) tablet Take 1 tablet by mouth daily       No current facility-administered medications for this visit. Allergies:    Morphine; Amlodipine; Dilaudid [hydromorphone hcl]; and Sulfa antibiotics    Family History:  Family History   Adopted: Yes   Problem Relation Age of Onset    Depression Mother     Asthma Mother     Depression Father     High Blood Pressure Father     Diabetes Father     Asthma Father     Depression Sister     Asthma Sister     Depression Brother     Asthma Brother     Asthma Maternal Aunt     Asthma Maternal Uncle     Asthma Paternal [de-identified]     Asthma Paternal Uncle     Asthma Maternal Grandmother     Cancer Maternal Grandmother     Asthma Maternal Grandfather     Asthma Paternal Grandmother     Asthma Paternal Grandfather     Asthma Maternal Cousin     Asthma Paternal Cousin        Social History:   Social History     Socioeconomic History    Marital status:      Spouse name: Not on file    Number of children: Not on file    Years of education: Not on file    Highest education level: Not on file   Occupational History    Not on file   Social Needs    Financial resource strain: Not on file    Food insecurity:     Worry: Not on file     Inability: Not on file    Transportation needs:     Medical: Not on file     Non-medical: Not on file   Tobacco Use    Smoking status: Never Smoker    Smokeless tobacco: Never Used   Substance and Sexual Activity    Alcohol use:  Yes    Drug use: No    Sexual activity: Not on file     Comment: pt has hysterectomy   Lifestyle    Physical activity:     Days per week: Not on file     Minutes per session: Not on file    Stress: Not on file   Relationships    Social connections:     Talks on phone: Not on file     Gets together: Not on file     Attends Sikhism service: Not on file     Active member of club or organization: Not on file     Attends meetings of clubs or organizations: Not on file Relationship status: Not on file    Intimate partner violence:     Fear of current or ex partner: Not on file     Emotionally abused: Not on file     Physically abused: Not on file     Forced sexual activity: Not on file   Other Topics Concern    Not on file   Social History Narrative    Not on file     She is permanently medically disabled, and does frequently take care of her 13month-old granddaughter. OBJECTIVE:  Ht 5' 7.99\" (1.727 m)   Wt 167 lb 5.3 oz (75.9 kg)   BMI 25.45 kg/m²    Physical Exam  Psych: alert and oriented to person, time, and place  Cardio:  well perfused extremities  Resp:  normal respiratory effort  Skin:  no cyanosis  Hem/lymph:  no lymphedema  Neuro:  sensation to light touch intact throughout all nerve distributions in the foot   Musculoskeletal:      MUSCULOSKELETAL (affected lower extremity):  Vascular: Toes warm and well perfused, compartments soft/compressible, no significant swelling of foot. Skin:  Intact, without rash/lesions/AV malformations. Motion: Able to flex/extend all toes  -Range of motion of ankle painless and normal    -No tenderness at knee or proximal leg  -Negative squeeze test of leg  -No tenderness to palpation of the lateral hindfoot, very mild tenderness diffusely throughout the midfoot        RADIOLOGY:   6/18/2019 FINDINGS:  Three weightbearing views (AP, Oblique, and Lateral) of the right foot were obtained in the office today and reviewed, revealing healing midfoot injury, with some displacement of the Lisfranc joint on weightbearing. Mild degenerative changes diffusely throughout the midfoot. IMPRESSION:    Healing midfoot fractures with slight displacement of Lisfranc joint, mild degenerative changes diffusely throughout the midfoot.       3/5/19 FINDINGS:  Three weightbearing views (AP, Mortise, and Lateral) of the right ankle and three weightbearing views (AP, Oblique, Lateral) of the right foot were obtained in the office today and reviewed, revealing no new acute fracture, dislocation, or radioopaque foreign body/tumor. The ankle mortise is maintained with no widening of the clear spaces. Overall alignment is satisfactory. No fracture noted at the lateral malleolus, base of the fifth metatarsal, lateral process the talus or anterior process of the calcaneus. IMPRESSION:    Previous midfoot fractures redemonstrated, but difficult to visualize. No new fractures. FINDINGS:  Three semi-weightbearing views (AP, Mortise, and Lateral) of the right ankle and three semi-weightbearing views (AP, Oblique, Lateral) of the right foot were obtained in the office today and reviewed, showing no interval displacement; midfoot fractures are difficult to visualize. Overall alignment appears acceptable. IMPRESSION:    Midfoot injury without interval displacement, acceptable alignment    CT scan reviewed, as well as prior foot x-rays reviewed. Radiology report:   1. Acute fractures involving the bases of the 1st through 4th metatarsals and   medial, intermediate, and lateral cuneiforms with mild widening at the   Lisfranc joint.  The findings are most compatible with underlying Lisfranc   ligament injury.             3 view of right ankle reviewed, as well as prior foot x-rays reviewed. There is no definite fracture noted, no dislocation. Questionable Lisfranc injury with possible avulsion at first and second TMT. Overall alignment appears acceptable, and compared to contralateral.        ASSESSMENT AND PLAN:  Cesar Barroso was seen today for Follow-up (R Foot)  The encounter diagnosis was Closed nondisplaced fracture of metatarsal bone of right foot with routine healing, unspecified metatarsal, subsequent encounter. Body mass index is 25.45 kg/m².      She has a right midfoot injury, sustained on 1/6/19, with comminuted fractures at the bases of the first through fourth metatarsals, and all 3 cuneiforms, treated conservatively without surgery; she is doing well overall. Notably, she does have a complex past medical history including seizures, type 2 diabetes with neuropathy, chronic kidney disease, fibromyalgia, coronary artery disease, history of stroke and atrial fibrillation, anxiety and bipolar disorder, chronic fatigue syndrome, scleroderma, and spinal stenosis with history of radiculopathy. Her pain has significantly improved, along with her swelling, and reports some occasional midfoot cramping and some intermittent mild stiffness. She has finished physical therapy. We also discussed rest/activity modification, bracing/immobilization, and shoe wear modification/orthotics. All questions were answered and the patient agrees with the above plan. The patient will return to clinic as needed with right foot x-rays. In the future, I may consider recommending a rocker-bottom shoe. No follow-ups on file. No orders of the defined types were placed in this encounter. Orders Placed This Encounter   Procedures    XR FOOT RIGHT (MIN 3 VIEWS)     WEIGHTBEARING 3 views: AP, Oblique, Lateral     Order Specific Question:   Reason for exam:     Answer:   eval alignment         Boo White MD  Orthopedic Surgery, Foot and Ankle        Please excuse any typos/errors, as this note was created with the assistance of voice recognition software. While intending to generate a document that actually reflects the content of the visit, the document can still have some errors including those of syntax and sound-a-like substitutions which may escape proof reading. In such instances, actual meaning can be extrapolated by context.

## 2019-06-24 ENCOUNTER — ANESTHESIA (OUTPATIENT)
Dept: ENDOSCOPY | Age: 54
End: 2019-06-24
Payer: MEDICAID

## 2019-06-24 ENCOUNTER — HOSPITAL ENCOUNTER (OUTPATIENT)
Age: 54
Setting detail: OUTPATIENT SURGERY
Discharge: HOME | End: 2019-06-24
Attending: SURGERY | Admitting: SURGERY
Payer: MEDICAID

## 2019-06-24 ENCOUNTER — ANESTHESIA EVENT (OUTPATIENT)
Dept: ENDOSCOPY | Age: 54
End: 2019-06-24
Payer: MEDICAID

## 2019-06-24 VITALS
OXYGEN SATURATION: 97 % | HEART RATE: 66 BPM | SYSTOLIC BLOOD PRESSURE: 142 MMHG | TEMPERATURE: 97.4 F | DIASTOLIC BLOOD PRESSURE: 86 MMHG | RESPIRATION RATE: 14 BRPM | WEIGHT: 165 LBS | HEIGHT: 68 IN | BODY MASS INDEX: 25.01 KG/M2

## 2019-06-24 VITALS — OXYGEN SATURATION: 91 % | SYSTOLIC BLOOD PRESSURE: 105 MMHG | DIASTOLIC BLOOD PRESSURE: 65 MMHG

## 2019-06-24 LAB — GLUCOSE BLD-MCNC: 75 MG/DL (ref 65–105)

## 2019-06-24 PROCEDURE — 2500000003 HC RX 250 WO HCPCS: Performed by: NURSE ANESTHETIST, CERTIFIED REGISTERED

## 2019-06-24 PROCEDURE — 7100000010 HC PHASE II RECOVERY - FIRST 15 MIN: Performed by: SURGERY

## 2019-06-24 PROCEDURE — 2709999900 HC NON-CHARGEABLE SUPPLY: Performed by: SURGERY

## 2019-06-24 PROCEDURE — 3700000000 HC ANESTHESIA ATTENDED CARE: Performed by: SURGERY

## 2019-06-24 PROCEDURE — 7100000000 HC PACU RECOVERY - FIRST 15 MIN: Performed by: SURGERY

## 2019-06-24 PROCEDURE — 3609027000 HC COLONOSCOPY: Performed by: SURGERY

## 2019-06-24 PROCEDURE — 7100000031 HC ASPR PHASE II RECOVERY - ADDTL 15 MIN: Performed by: SURGERY

## 2019-06-24 PROCEDURE — 6360000002 HC RX W HCPCS: Performed by: NURSE ANESTHETIST, CERTIFIED REGISTERED

## 2019-06-24 PROCEDURE — 82947 ASSAY GLUCOSE BLOOD QUANT: CPT

## 2019-06-24 PROCEDURE — 2580000003 HC RX 258: Performed by: ANESTHESIOLOGY

## 2019-06-24 PROCEDURE — 7100000001 HC PACU RECOVERY - ADDTL 15 MIN: Performed by: SURGERY

## 2019-06-24 PROCEDURE — 3700000001 HC ADD 15 MINUTES (ANESTHESIA): Performed by: SURGERY

## 2019-06-24 PROCEDURE — 7100000030 HC ASPR PHASE II RECOVERY - FIRST 15 MIN: Performed by: SURGERY

## 2019-06-24 PROCEDURE — 7100000011 HC PHASE II RECOVERY - ADDTL 15 MIN: Performed by: SURGERY

## 2019-06-24 RX ORDER — PROPOFOL 10 MG/ML
INJECTION, EMULSION INTRAVENOUS PRN
Status: DISCONTINUED | OUTPATIENT
Start: 2019-06-24 | End: 2019-06-24 | Stop reason: SDUPTHER

## 2019-06-24 RX ORDER — ONDANSETRON 2 MG/ML
INJECTION INTRAMUSCULAR; INTRAVENOUS PRN
Status: DISCONTINUED | OUTPATIENT
Start: 2019-06-24 | End: 2019-06-24 | Stop reason: SDUPTHER

## 2019-06-24 RX ORDER — LIDOCAINE HYDROCHLORIDE 10 MG/ML
INJECTION, SOLUTION EPIDURAL; INFILTRATION; INTRACAUDAL; PERINEURAL PRN
Status: DISCONTINUED | OUTPATIENT
Start: 2019-06-24 | End: 2019-06-24 | Stop reason: SDUPTHER

## 2019-06-24 RX ORDER — SODIUM CHLORIDE 9 MG/ML
INJECTION, SOLUTION INTRAVENOUS CONTINUOUS
Status: DISCONTINUED | OUTPATIENT
Start: 2019-06-24 | End: 2019-06-24 | Stop reason: HOSPADM

## 2019-06-24 RX ORDER — DEXAMETHASONE SODIUM PHOSPHATE 4 MG/ML
INJECTION, SOLUTION INTRA-ARTICULAR; INTRALESIONAL; INTRAMUSCULAR; INTRAVENOUS; SOFT TISSUE PRN
Status: DISCONTINUED | OUTPATIENT
Start: 2019-06-24 | End: 2019-06-24 | Stop reason: SDUPTHER

## 2019-06-24 RX ADMIN — SODIUM CHLORIDE: 9 INJECTION, SOLUTION INTRAVENOUS at 07:55

## 2019-06-24 RX ADMIN — DEXAMETHASONE SODIUM PHOSPHATE 8 MG: 4 INJECTION, SOLUTION INTRA-ARTICULAR; INTRALESIONAL; INTRAMUSCULAR; INTRAVENOUS; SOFT TISSUE at 08:35

## 2019-06-24 RX ADMIN — PROPOFOL 200 MG: 10 INJECTION, EMULSION INTRAVENOUS at 08:35

## 2019-06-24 RX ADMIN — ONDANSETRON 4 MG: 2 INJECTION INTRAMUSCULAR; INTRAVENOUS at 08:48

## 2019-06-24 RX ADMIN — LIDOCAINE HYDROCHLORIDE 50 MG: 10 INJECTION, SOLUTION EPIDURAL; INFILTRATION; INTRACAUDAL; PERINEURAL at 08:35

## 2019-06-24 ASSESSMENT — PAIN SCALES - GENERAL
PAINLEVEL_OUTOF10: 0

## 2019-06-24 ASSESSMENT — PULMONARY FUNCTION TESTS
PIF_VALUE: 12
PIF_VALUE: 1
PIF_VALUE: 12
PIF_VALUE: 1
PIF_VALUE: 13
PIF_VALUE: 3
PIF_VALUE: 12
PIF_VALUE: 12
PIF_VALUE: 2
PIF_VALUE: 13
PIF_VALUE: 12
PIF_VALUE: 1
PIF_VALUE: 18
PIF_VALUE: 13

## 2019-06-24 ASSESSMENT — PAIN - FUNCTIONAL ASSESSMENT: PAIN_FUNCTIONAL_ASSESSMENT: 0-10

## 2019-06-24 NOTE — ANESTHESIA PRE PROCEDURE
Department of Anesthesiology  Preprocedure Note       Name:  Marrian Dubin   Age:  48 y.o.  :  1965                                          MRN:  [id number]         Date:  2019      Surgeon: Danielle Herrera):  Key Barraza MD    Procedure: COLORECTAL CANCER SCREENING, NOT HIGH RISK (N/A )    Medications prior to admission:   Prior to Admission medications    Medication Sig Start Date End Date Taking?  Authorizing Provider   propranolol (INDERAL) 20 MG tablet Take 1 tablet by mouth nightly 19  Yes Wade Hives APRN - CNP   disulfiram (ANTABUSE) 250 MG tablet Take 1 tablet by mouth daily 19  Yes Wade Hives APRMORE - CNP   losartan (COZAAR) 100 MG tablet TAKE 1 TABLET BY MOUTH ONE TIME A DAY  19  Yes Elizabeth Jolley MD   hydroxychloroquine (PLAQUENIL) 200 MG tablet TAKE 1 TABLET BY MOUTH ONE TIME A DAY  19  Yes Elizabeth Jolley MD   gabapentin (NEURONTIN) 800 MG tablet TAKE 1 TABLET BY MOUTH FOUR TIMES A DAY  19 Yes Marixa Rodgers MD   famotidine (PEPCID) 40 MG tablet TAKE 1 TABLET BY MOUTH IN THE EVENING 19  Yes Jacobson So, APRN - CNP   metFORMIN (GLUCOPHAGE) 500 MG tablet Take 500 mg by mouth daily  19  Yes Historical Provider, MD LIND ASPIRIN ADULT LOW STRENGTH 81 MG EC tablet TAKE 1 TABLET BY MOUTH TWO TIMES A DAY  19  Yes Jacobson So, APRN - CNP   omeprazole (PRILOSEC) 20 MG delayed release capsule TAKE 1 CAPSULE BY MOUTH TWO TIMES A DAY  19  Yes Jacobson So, APRN - CNP   lamoTRIgine (LAMICTAL) 25 MG tablet Take 25 mg by mouth 2 times daily    Yes Historical Provider, MD   calcium carbonate 648 MG TABS Take 1 tablet by mouth 2 times daily 19 Yes Jacobson So, APRN - CNP   cetirizine (ZYRTEC) 10 MG tablet TAKE 1 TABLET BY MOUTH ONE TIME A DAY  19  Yes Jacobson So, APRN - [place]   pravastatin (PRAVACHOL) 40 MG tablet TAKE 1 TABLET BY MOUTH AT BEDTIME  18  Yes Panfilo Cuellar GIULIANA Philippe   conjugated estrogens (PREMARIN) 0.625 MG/GM vaginal cream Place 0.5 g vaginally daily Place twice daily at bedtime for first 2 weeks then at bedtime once daily for 2 weeks then 3 times/week. 12/20/18  Yes Rogerio Day,    felodipine (PLENDIL) 10 MG extended release tablet Take 10 mg by mouth daily Prescribed by Dr. Ame Burleson   Yes Historical Provider, MD   ergocalciferol (ERGOCALCIFEROL) 19385 units capsule Take 50,000 Units by mouth See Admin Instructions Takes twice a week   Yes Historical Provider, MD   QUEtiapine (SEROQUEL) 300 MG tablet Take 0.5 tablets by mouth nightly  Patient taking differently: Take 300 mg by mouth nightly  12/15/18  Yes Chelo Malone MD   lamoTRIgine (LAMICTAL) 100 MG tablet Take 100 mg by mouth every evening  6/6/18  Yes Historical Provider, MD   levothyroxine (SYNTHROID) 25 MCG tablet Take 25 mcg by mouth Daily   Yes Historical Provider, MD   venlafaxine (EFFEXOR) 75 MG tablet Take 2 tablets (150 mg) in the morning and 1 tablet (75 mg) in the evening. 3/17/17  Yes COLTON Clemens CNP   Multiple Vitamins-Minerals (THERAPEUTIC MULTIVITAMIN-MINERALS) tablet Take 1 tablet by mouth daily   Yes Historical Provider, MD   EPINEPHrine (EPIPEN 2-JIGAR) 0.3 MG/0.3ML SOAJ injection To be used if experiencing angioedema. 5/28/19   COLTON Clemens CNP   EPINEPHrine HCl, Anaphylaxis, (EPIPEN 2-JIGAR IM) Inject 1 Syringe into the muscle PRN for angioedema, takes with the prednisone    Historical Provider, MD   albuterol sulfate  (90 Base) MCG/ACT inhaler Inhale 2 puffs into the lungs every 6 hours as needed for Wheezing 12/4/17   COLTON Clemens CNP       Current medications:    Current Facility-Administered Medications   Medication Dose Route Frequency Provider Last Rate Last Dose    0.9 % sodium chloride infusion   Intravenous Continuous Tena Parson MD           Allergies:     Allergies   Allergen Reactions    Morphine Nausea And Vomiting     Pt states it changes her mental status    Amlodipine Other (See Comments)     diarrhea and stress    Dilaudid [Hydromorphone Hcl] Hives and Itching    Sulfa Antibiotics Hives and Rash       Problem List:    Patient Active Problem List   Diagnosis Code    Seizures ([facility] R56.9    Transient insomnia F51.02    Sleep walking disorder F51.3    Raynaud's disease I73.00    Pulmonary nodules R91.8    Neuropathy G62.9    Hypertension I10    GERD (gastroesophageal reflux disease) K21.9    Fibromyalgia M79.7    Depression F32.9    Degenerative disc disease, thoracic M51.34    CAD (coronary artery disease) I25.10    Bipolar disorder (HCC) F31.9    Asthma J45.909    Anxiety F41.9    Antinuclear antibody (IQRA) titer greater than 1:80 R76.0    Anemia D64.9    Hx of Stroke ([facility]) I63.9    Type 2 diabetes mellitus with stage 2 chronic kidney disease, without long-term current use of insulin ([facility]) E11.22, N18.2    Degenerative arthritis of right knee M17.11    S/P knee surgery Z98.890    Chronic fatigue syndrome R53.82    Spondylosis of cervical region without myelopathy or radiculopathy M47.812    H/O hysterectomy with [facility]SO at [facility] for benign disease 2014 Z90.710    Hx of total bilateral knee replacement Z96.653    H/O: hysterectomy Z90.710    Atrial fibrillation with slow ventricular response ([facility]) I48.91    H/O: stroke Z86.73    Hypovolemia E86.1    Hypotension I95.9    Bradycardia R00.1    Cystocele, midline N81.11    OJYCE (stress urinary incontinence, female) N39.3    Overflow incontinence N39.490    7/30/18 Cystocele repair, Cystoscopy with OBTRYX Ureteral Sling Z98.890    Nuclear senile cataract H25.10    CKD (chronic kidney disease), stage II N18.2    Scleroderma ([facility] M34.9    Vitamin D deficiency E55.9    Trigger middle finger of right hand M65.331    Trigger ring finger of right hand M65.341    Osteoarthritis of spine with radiculopathy, cervical region M47.22    Degenerative disc disease, cervical M50.30    Left carpal tunnel syndrome G56.02    Spinal stenosis, cervical region M48.02    Neural foraminal stenosis of cervical spine M99.81    Closed fracture of lower end of femur (HCC) S72.409A    Closed supracondylar fracture of femur, left, initial encounter ([facility] S72.452A    Numbness R20.0    Age-related osteoporosis without current pathological fracture M81.0       Past Medical History:        Diagnosis Date    [facility]     Anemia     Angioedema     Antinuclear antibody (IQRA) titer greater than 1:80     Anxiety     Asthma     Ataxia     [facility]rial fibrillation ([facility])     Borderline personality disorder ([facility])     Bradycardia     CAD (coronary artery disease)     Chronic kidney disease     CKD (chronic kidney disease), stage II 8/23/2018    Degenerative disc disease, thoracic     Depression     Diabetes mellitus ([facility])     Diastolic dysfunction     DJD (degenerative joint disease)     Fibromyalgia     Foot pain, right     GERD (gastroesophageal reflux disease)     H/O: hysterectomy     Headache     Hernia of abdominal wall     History of blood transfusion     no problem    Hyperlipidemia     Hypertension     Lupus ([facility])     Mood disorder ([facility])     Neuropathy     Osteoarthritis     PTSD (post-traumatic stress disorder)     Pulmonary nodules     Raynaud's disease     Scleroderma ([facility]     Scleroderma ([facility] 8/23/2018    Seizures ([facility])     Sleep walking disorder     Thyroid disease     TIA (transient ischemic attack)     Transient insomnia     Tricuspid regurgitation     Vasospasm ([facility] 01/2016    bilat. legs. resulting in near complete absence of profusion to arteries of feet. [facility]     Vitamin D deficiency 8/23/2018    Wears glasses        Past Surgical History:        Procedure Laterality Date    ABDOMINOPLASTY  2013    BLADDER SUSPENSION N/A 7/30/2018    CYSTOSCOPY WITH OBTRYX MID URETHRAL SLING performed by SpO2: 100%   Weight: 165 lb (74.8 kg)   Height: 5' 8\" (1.727 m)                                              BP Readings from Last 3 Encounters:   06/24/19 130/81   06/13/19 128/80   05/28/19 130/80       NPO Status: Time of last liquid consumption: 2000                        Time of last solid consumption: 1800                        Date of last liquid consumption: 06/23/19                        Date of last solid food consumption: 06/22/19    BMI:   Wt Readings from Last 3 Encounters:   06/24/19 165 lb (74.8 kg)   06/18/19 167 lb 5.3 oz (75.9 kg)   06/13/19 167 lb 6.4 oz (75.9 kg)     Body mass index is 25.09 kg/m². CBC:   Lab Results   Component Value Date    WBC 7.5 06/08/2019    RBC 4.34 06/08/2019    HGB 11.7 06/08/2019    HCT 35.1 06/08/2019    MCV 80.7 06/08/2019    RDW 15.3 06/08/2019     06/08/2019       CMP:   Lab Results   Component Value Date     06/08/2019    K 3.3 06/08/2019     06/08/2019    CO2 20 06/08/2019    BUN 16 06/08/2019    CREATININE 1.32 06/08/2019    GFRAA 51 06/08/2019    LABGLOM 42 06/08/2019    GLUCOSE 124 06/08/2019    PROT 7.9 06/08/2019    CALCIUM 8.8 06/08/2019    BILITOT 0.19 06/08/2019    ALKPHOS 183 06/08/2019    AST 15 06/08/2019    ALT 9 06/08/2019       POC Tests: No results for input(s): POCGLU, POCNA, POCK, POCCL, POCBUN, POCHEMO, POCHCT in the last 72 hours.     Coags:   Lab Results   Component Value Date    PROTIME 10.5 12/14/2018    INR 1.0 12/14/2018    APTT 26.6 12/14/2018       HCG (If Applicable):   Lab Results   Component Value Date    HCGQUANT 2 07/20/2018        ABGs: No results found for: PHART, PO2ART, RWL2XMY, UYB0FXS, BEART, Z5SXFFZR     Type & Screen (If Applicable):  No results found for: LABABO, [facility]    Anesthesia Evaluation  Patient summary reviewed and Nursing notes reviewed  Airway: Mallampati: II  TM distance: >3 FB   Neck ROM: full  Mouth opening: > = 3 FB Dental:          Pulmonary:normal exam  breath sounds clear to auscultation  (+) asthma:                            Cardiovascular:    (+) hypertension:, CAD:,         Rhythm: regular  Rate: normal                    Neuro/Psych:   (+) CVA:, neuromuscular disease:, TIA, headaches:, psychiatric history:depression/anxiety              ROS comment: Fibromyalgia  Chronic Fatigue Syndrome  Bipolar Disorder  DDD and cervical stenosis - cervical region GI/Hepatic/Renal:   (+) GERD:, renal disease: CRI,           Endo/Other:    (+) DiabetesType II DM, no interval change, , : arthritis: OA., .                  ROS comment: scleroderma Abdominal:           Vascular:           ROS comment: Raynaud's. Anesthesia Plan      general     ASA 3       Induction: intravenous. MIPS: Postoperative opioids intended and Prophylactic antiemetics administered. Anesthetic plan and risks discussed with patient. Plan discussed with CRNA.                   Mariann Brady MD   6/24/2019

## 2019-06-24 NOTE — H&P
Wears glasses        SURGICAL HISTORY       Past Surgical History:   Procedure Laterality Date    ABDOMINOPLASTY  2013    BLADDER SUSPENSION N/A 7/30/2018    CYSTOSCOPY WITH OBTRYX MID URETHRAL SLING performed by Lilly Duncan MD at [address] Right 2016   [address]    ELBOW SURGERY Right     EYE SURGERY      khushboo. lasik     FEMUR FRACTURE SURGERY  11/09/2018    FINGER SURGERY Right 08/28/2018    RING CARTER MASS EXCISION, TRIGGER RELEASE    FRACTURE SURGERY      left femur    GASTRECTOMY      GASTRIC BYPASS SURGERY  2012    HYSTERECTOMY      JOINT REPLACEMENT Bilateral     knees    KNEE SURGERY Right 05/02/2017    (femoral) patella replacement    NERVE BLOCK Right 05/04/2018     cervical facet #1 no steroid    NERVE BLOCK Right 06/29/2018    rt cervical diagnostic #2 decadron 10mg    NERVE BLOCK Right 08/10/2018    right cervical rfa decadron 10mg    ND ANTERIOR COLPORRAPHY RPR CYSTOCELE W/CYSTO N/A 7/30/2018    CYSTOCELE REPAIR performed by Charlie Gould DO at [address] OFFICE/OUTPT [address] Right 8/28/2018    RING CARTER MASS EXCISION, TRIGGER RELEASE, [address] TABLE performed by Federico Mata DO at [address] OFFICE/OUTPT VISIT,PROCEDURE ONLY Left 11/9/2018    FEMUR RETROGRADE IM NAILING PERIPROSTHETIC FRACTURE performed by Federico Mata DO at [address] N/CARPAL TUNNEL SURG Left 10/23/2018    CARPAL TUNNEL RELEASE performed by Federico Mata DO at [address] [address]  2011    left knee    TOTAL KNEE ARTHROPLASTY Right 5/2/2017    PATELLOFEMORAL REPLACEMENT KNEE - Micheline Peres, [address] TABLE, FEMORAL POPLITEAL BLOCK, NSA= SPINAL VS GENERAL performed by Federico Mata DO at [address] [facility]  2004       FAMILY HISTORY       Family History   Adopted: Yes   Problem Relation Age of Onset    Depression Mother     Asthma Mother     Depression Father     High Blood Pressure Father     Diabetes Father     Asthma Father     Depression Sister     Asthma Sister     Depression Brother     Asthma Brother     Asthma Maternal Aunt     Asthma Maternal Uncle     Asthma Paternal [de-identified]     Asthma Paternal Uncle     Asthma Maternal Grandmother     Cancer Maternal Grandmother     Asthma Maternal Grandfather     Asthma Paternal Grandmother     Asthma Paternal Grandfather     Asthma Maternal Cousin     Asthma Paternal Cousin        SOCIAL HISTORY       Social History     Socioeconomic History    Marital status:      Spouse name: None    Number of children: None    Years of education: None    Highest education level: None   Occupational History    None   Social Needs    Financial resource strain: None    Food insecurity:     Worry: None     Inability: None    Transportation needs:     Medical: None     Non-medical: None   Tobacco Use    Smoking status: Never Smoker    Smokeless tobacco: Never Used   Substance and Sexual Activity    Alcohol use:  Yes    Drug use: No    Sexual activity: None     Comment: pt has hysterectomy   Lifestyle    Physical activity:     Days per week: None     Minutes per session: None    Stress: None   Relationships    Social connections:     Talks on phone: None     Gets together: None     Attends Yazidi service: None     Active member of club or organization: None     Attends meetings of clubs or organizations: None     Relationship status: None    Intimate partner violence:     Fear of current or ex partner: None     Emotionally abused: None     Physically abused: None     Forced sexual activity: None   Other Topics Concern    None   Social History Narrative    None           REVIEW OF SYSTEMS      Allergies   Allergen Reactions    Morphine Nausea And Vomiting     Pt states it changes her mental status    Amlodipine Other (See Comments)     diarrhea and stress    Dilaudid [Hydromorphone Hcl] Hives and Itching    Sulfa Antibiotics Hives and Rash       No current facility-administered medications on file prior to encounter. Current Outpatient Medications on File Prior to Encounter   Medication Sig Dispense Refill    famotidine (PEPCID) 40 MG tablet TAKE 1 TABLET BY MOUTH IN THE EVENING 90 tablet 2    metFORMIN (GLUCOPHAGE) 500 MG tablet Take 500 mg by mouth daily       SM ASPIRIN ADULT LOW STRENGTH 81 MG EC tablet TAKE 1 TABLET BY MOUTH TWO TIMES A DAY  180 tablet 1    omeprazole (PRILOSEC) 20 MG delayed release capsule TAKE 1 CAPSULE BY MOUTH TWO TIMES A DAY  180 capsule 1    lamoTRIgine (LAMICTAL) 25 MG tablet Take 25 mg by mouth 2 times daily       calcium carbonate 648 MG TABS Take 1 tablet by mouth 2 times daily 60 tablet 5    cetirizine (ZYRTEC) 10 MG tablet TAKE 1 TABLET BY MOUTH ONE TIME A DAY  30 tablet 4    pravastatin (PRAVACHOL) 40 MG tablet TAKE 1 TABLET BY MOUTH AT BEDTIME  90 tablet 1    conjugated estrogens (PREMARIN) 0.625 MG/GM vaginal cream Place 0.5 g vaginally daily Place twice daily at bedtime for first 2 weeks then at bedtime once daily for 2 weeks then 3 times/week. 1 Tube 3    felodipine (PLENDIL) 10 MG extended release tablet Take 10 mg by mouth daily Prescribed by Dr. Candi Dakin ergocalciferol (ERGOCALCIFEROL) 19113 units capsule Take 50,000 Units by mouth See Admin Instructions Takes twice a week      QUEtiapine (SEROQUEL) 300 MG tablet Take 0.5 tablets by mouth nightly (Patient taking differently: Take 300 mg by mouth nightly ) 60 tablet 3    lamoTRIgine (LAMICTAL) 100 MG tablet Take 100 mg by mouth every evening       levothyroxine (SYNTHROID) 25 MCG tablet Take 25 mcg by mouth Daily      venlafaxine (EFFEXOR) 75 MG tablet Take 2 tablets (150 mg) in the morning and 1 tablet (75 mg) in the evening.  90 tablet 3    Multiple Vitamins-Minerals (THERAPEUTIC MULTIVITAMIN-MINERALS) tablet Take 1 tablet by mouth daily      EPINEPHrine HCl, Anaphylaxis, (EPIPEN 2-JIGAR IM) Inject 1 Syringe into the muscle PRN for angioedema, takes with the prednisone      albuterol sulfate  (90 Base) MCG/ACT inhaler Inhale 2 puffs into the lungs every 6 hours as needed for Wheezing 1 Inhaler 3       Negative except for what is mentioned in the HPI. GENERAL PHYSICAL EXAM     Vitals: /81   Pulse 68   Temp 97.9 °F (36.6 °C) (Oral)   Resp 16   Ht 5' 8\" (1.727 m)   Wt 165 lb (74.8 kg)   SpO2 100%   BMI 25.09 kg/m²  Body mass index is 25.09 kg/m². GENERAL APPEARANCE:   Jade Steve is 48 y.o.,  female, mildly obese, nourished, conscious, alert. Does not appear to be distress or pain at this time. SKIN:  Warm, dry, no cyanosis or jaundice. HEAD:  Normocephalic, atraumatic, no swelling or tenderness. EYES:  Pupils equal, reactive to light. EARS:  No discharge, no marked hearing loss. NOSE:  No rhinorrhea, epistaxis or septal deformity. THROAT:  Not congested. No ulceration bleeding or discharge. NECK:  No stiffness, trachea central.  No palpable masses or L.N.                 CHEST:  Symmetrical and equal on expansion. HEART:  RRR S1 > S2. No audible murmurs or gallops. LUNGS:  Equal on expansion, normal breath sounds. No adventitious sounds. ABDOMEN:  Mildly obese. Soft on palpation. No localized tenderness. No guarding or rigidity. No palpable hepatosplenomegaly. LYMPHATICS:  No palpable cervical lymphadenopathy. LOCOMOTOR, BACK AND SPINE:  No tenderness or deformities. EXTREMITIES:  Symmetrical, no pretibial edema. Ionas sign negative. No discoloration or ulcerations. NEUROLOGIC:  The patient is conscious, alert, oriented,Cranial nerve II-XII intact, taste was not examined. No apparent focal sensory or motor deficits.              PROVISIONAL DIAGNOSES / SURGERY: SCREENING    COLORECTAL CANCER SCREENING    Patient Active Problem List    Diagnosis Date Noted    Hx of total bilateral knee replacement 11/29/2017     Priority: Medium    Age-related osteoporosis without current pathological fracture 05/10/2019    Numbness 12/14/2018    Closed fracture of lower end of femur ([address] 11/08/2018    Closed supracondylar fracture of femur, left, initial encounter ([address]     Spinal stenosis, cervical region 10/30/2018    Neural foraminal stenosis of cervical spine 10/30/2018    Left carpal tunnel syndrome     Osteoarthritis of spine with radiculopathy, cervical region     Degenerative disc disease, cervical     Trigger middle finger of right hand     Trigger ring finger of right hand     CKD (chronic kidney disease), stage II 08/23/2018    Scleroderma ([address] 08/23/2018    Vitamin D deficiency 08/23/2018  7/30/18 Cystocele repair, Cystoscopy with OBTRYX Ureteral Sling 07/30/2018    Overflow incontinence     Cystocele, midline 07/29/2018    JOYCE (stress urinary incontinence, female) 07/29/2018    Bradycardia 01/26/2018    Hypotension 01/24/2018    Nuclear senile cataract 01/24/2018    Hypovolemia 01/16/2018    Atrial fibrillation with slow ventricular response ([address] 01/15/2018    H/O: stroke 01/15/2018    H/O: hysterectomy     H/O hysterectomy [facility] BSO at [facility] for benign disease 2014 11/29/2017    Spondylosis of cervical region without myelopathy or radiculopathy     Chronic fatigue syndrome 06/15/2017    S/P knee surgery     Type 2 diabetes mellitus with stage 2 chronic kidney disease, without long-term current use of insulin ([address] 05/02/2017    Degenerative arthritis of right knee 05/02/2017    Hx of Stroke ([address] 04/06/2017    Transient insomnia     Sleep walking disorder     Raynaud's disease     Pulmonary nodules     Neuropathy     Hypertension     GERD (gastroesophageal reflux disease)     Fibromyalgia     Depression     Degenerative disc

## 2019-06-24 NOTE — OP NOTE
PROCEDURE NOTE    DATE OF PROCEDURE: 6/24/2019    SURGEON: Cali Ledbetter MD    ASSISTANT: None    PREOPERATIVE DIAGNOSIS: Screening colonoscopy    POSTOPERATIVE DIAGNOSIS: Very poor prep    OPERATION: Flexible sigmoidoscopy    ANESTHESIA: General    ESTIMATED BLOOD LOSS: None    COMPLICATIONS: None     SPECIMENS:  Was Not Obtained    HISTORY: The patient is a 48y.o. year old female with history of above preop diagnosis. I recommended colonoscopy with possible biopsy or polypectomy and I explained the risk, benefits, expected outcome, and alternatives to the procedure. Risks included but are not limited to bleeding, infection, respiratory distress, hypotension, and perforation of the colon and possibility of missing a lesion. The patient understands and is in agreement. PROCEDURE:     Findings:    Descending/Sigmoid colon: Very poor prep    Rectum/Anus: examined in normal and retroflexed positions and was very poor prep    Withdrawal Time was (minutes): 5      Next screening colonoscopy: Repeat colonoscopy with a 2-day prep    The colon was decompressed. While withdrawing the scope the above findings were verified and the scope was removed. The patient tolerated the procedure and conscious sedation without unusual events. In the recovery room patient was examined and remains hemodynamically stable. Discharge home when criteria met. Recommendations/Plan:   1. Repeat colonoscopy with a 2-day prep  2. Discussed with the family  3. High fiber diet   4.  Precautions to avoid constipation    Electronically signed by Cali Ledbetter MD  on 6/24/2019 at 8:46 AM

## 2019-06-24 NOTE — ANESTHESIA POSTPROCEDURE EVALUATION
Department of Anesthesiology  Postprocedure Note    Patient: Gentry Winters  MRN: [id number]  YOB: 1965  Date of evaluation: 6/24/2019  Time:  2:50 PM     Procedure Summary     Date:  06/24/19 Room / Location:  [facility] ENDO 05 / [facility]    Anesthesia Start:  0830 Anesthesia Stop:  5065    Procedure:  COLORECTAL CANCER SCREENING, NOT HIGH RISK (N/A ) Diagnosis:  (SCREENING   PAT ON ADMIT OFFICE TO FAX)    Surgeon:  Demarco Fregoso MD Responsible Provider:  Pancho Fernando MD    Anesthesia Type:  general ASA Status:  3          Anesthesia Type: general    Faiza Phase I: Faiza Score: 8    Faiza Phase II: Faiza Score: 10    Last vitals: Reviewed and per EMR flowsheets.        Anesthesia Post Evaluation    Comments: POST- ANESTHESIA EVALUATION       Pt Name: Gentry Winters  MRN: [id number]  YOB: 1965  Date of evaluation: 6/24/2019  Time:  2:50 PM      BP (!) 142/86   Pulse 66   Temp 97.4 °F (36.3 °C) (Temporal)   Resp 14   Ht 5' 8\" (1.727 m)   Wt 165 lb (74.8 kg)   SpO2 97%   BMI 25.09 kg/m²      Consciousness Level  Awake  Cardiopulmonary Status  Stable  Pain Adequately Treated YES  Nausea / Vomiting  NO  Adequate Hydration  YES  Anesthesia Related Complications NONE      Electronically signed by Pancho Fernando MD on 6/24/2019 at 2:50 PM

## 2019-07-02 ENCOUNTER — HOSPITAL ENCOUNTER (OUTPATIENT)
Age: 54
Setting detail: OUTPATIENT SURGERY
Discharge: HOME | End: 2019-07-02
Attending: SURGERY | Admitting: SURGERY
Payer: MEDICAID

## 2019-07-02 ENCOUNTER — ANESTHESIA EVENT (OUTPATIENT)
Dept: ENDOSCOPY | Age: 54
End: 2019-07-02
Payer: MEDICAID

## 2019-07-02 ENCOUNTER — ANESTHESIA (OUTPATIENT)
Dept: ENDOSCOPY | Age: 54
End: 2019-07-02
Payer: MEDICAID

## 2019-07-02 VITALS — TEMPERATURE: 96.8 F | DIASTOLIC BLOOD PRESSURE: 60 MMHG | SYSTOLIC BLOOD PRESSURE: 106 MMHG | OXYGEN SATURATION: 99 %

## 2019-07-02 VITALS
OXYGEN SATURATION: 97 % | RESPIRATION RATE: 16 BRPM | BODY MASS INDEX: 24.25 KG/M2 | SYSTOLIC BLOOD PRESSURE: 135 MMHG | HEIGHT: 68 IN | HEART RATE: 85 BPM | TEMPERATURE: 96.5 F | DIASTOLIC BLOOD PRESSURE: 80 MMHG | WEIGHT: 160 LBS

## 2019-07-02 LAB
GLUCOSE BLD-MCNC: 46 MG/DL (ref 65–105)
GLUCOSE BLD-MCNC: 73 MG/DL (ref 65–105)

## 2019-07-02 PROCEDURE — 7100000030 HC ASPR PHASE II RECOVERY - FIRST 15 MIN: Performed by: SURGERY

## 2019-07-02 PROCEDURE — 7100000000 HC PACU RECOVERY - FIRST 15 MIN: Performed by: SURGERY

## 2019-07-02 PROCEDURE — 3609027000 HC COLONOSCOPY: Performed by: SURGERY

## 2019-07-02 PROCEDURE — 3700000000 HC ANESTHESIA ATTENDED CARE: Performed by: SURGERY

## 2019-07-02 PROCEDURE — 7100000011 HC PHASE II RECOVERY - ADDTL 15 MIN: Performed by: SURGERY

## 2019-07-02 PROCEDURE — 2580000003 HC RX 258: Performed by: NURSE ANESTHETIST, CERTIFIED REGISTERED

## 2019-07-02 PROCEDURE — 6360000002 HC RX W HCPCS: Performed by: NURSE ANESTHETIST, CERTIFIED REGISTERED

## 2019-07-02 PROCEDURE — 2580000003 HC RX 258: Performed by: ANESTHESIOLOGY

## 2019-07-02 PROCEDURE — 7100000001 HC PACU RECOVERY - ADDTL 15 MIN: Performed by: SURGERY

## 2019-07-02 PROCEDURE — 82947 ASSAY GLUCOSE BLOOD QUANT: CPT

## 2019-07-02 PROCEDURE — 2500000003 HC RX 250 WO HCPCS: Performed by: NURSE ANESTHETIST, CERTIFIED REGISTERED

## 2019-07-02 PROCEDURE — 3700000001 HC ADD 15 MINUTES (ANESTHESIA): Performed by: SURGERY

## 2019-07-02 PROCEDURE — 2709999900 HC NON-CHARGEABLE SUPPLY: Performed by: SURGERY

## 2019-07-02 PROCEDURE — 7100000031 HC ASPR PHASE II RECOVERY - ADDTL 15 MIN: Performed by: SURGERY

## 2019-07-02 PROCEDURE — 7100000010 HC PHASE II RECOVERY - FIRST 15 MIN: Performed by: SURGERY

## 2019-07-02 RX ORDER — SODIUM CHLORIDE 9 MG/ML
INJECTION, SOLUTION INTRAVENOUS CONTINUOUS PRN
Status: DISCONTINUED | OUTPATIENT
Start: 2019-07-02 | End: 2019-07-02 | Stop reason: SDUPTHER

## 2019-07-02 RX ORDER — SODIUM CHLORIDE 9 MG/ML
INJECTION, SOLUTION INTRAVENOUS CONTINUOUS
Status: DISCONTINUED | OUTPATIENT
Start: 2019-07-02 | End: 2019-07-02 | Stop reason: HOSPADM

## 2019-07-02 RX ORDER — ONDANSETRON 2 MG/ML
INJECTION INTRAMUSCULAR; INTRAVENOUS PRN
Status: DISCONTINUED | OUTPATIENT
Start: 2019-07-02 | End: 2019-07-02 | Stop reason: SDUPTHER

## 2019-07-02 RX ORDER — LIDOCAINE HYDROCHLORIDE 10 MG/ML
INJECTION, SOLUTION EPIDURAL; INFILTRATION; INTRACAUDAL; PERINEURAL PRN
Status: DISCONTINUED | OUTPATIENT
Start: 2019-07-02 | End: 2019-07-02 | Stop reason: SDUPTHER

## 2019-07-02 RX ORDER — PROPOFOL 10 MG/ML
INJECTION, EMULSION INTRAVENOUS PRN
Status: DISCONTINUED | OUTPATIENT
Start: 2019-07-02 | End: 2019-07-02 | Stop reason: SDUPTHER

## 2019-07-02 RX ORDER — DEXAMETHASONE SODIUM PHOSPHATE 4 MG/ML
INJECTION, SOLUTION INTRA-ARTICULAR; INTRALESIONAL; INTRAMUSCULAR; INTRAVENOUS; SOFT TISSUE PRN
Status: DISCONTINUED | OUTPATIENT
Start: 2019-07-02 | End: 2019-07-02 | Stop reason: SDUPTHER

## 2019-07-02 RX ADMIN — SODIUM CHLORIDE: 9 INJECTION, SOLUTION INTRAVENOUS at 14:21

## 2019-07-02 RX ADMIN — ONDANSETRON 4 MG: 2 INJECTION INTRAMUSCULAR; INTRAVENOUS at 14:45

## 2019-07-02 RX ADMIN — PROPOFOL 200 MG: 10 INJECTION, EMULSION INTRAVENOUS at 14:22

## 2019-07-02 RX ADMIN — LIDOCAINE HYDROCHLORIDE 50 MG: 10 INJECTION, SOLUTION EPIDURAL; INFILTRATION; INTRACAUDAL; PERINEURAL at 14:22

## 2019-07-02 RX ADMIN — SODIUM CHLORIDE: 9 INJECTION, SOLUTION INTRAVENOUS at 12:18

## 2019-07-02 RX ADMIN — DEXAMETHASONE SODIUM PHOSPHATE 4 MG: 4 INJECTION, SOLUTION INTRA-ARTICULAR; INTRALESIONAL; INTRAMUSCULAR; INTRAVENOUS; SOFT TISSUE at 14:45

## 2019-07-02 ASSESSMENT — PULMONARY FUNCTION TESTS
PIF_VALUE: 10
PIF_VALUE: 2
PIF_VALUE: 10
PIF_VALUE: 10
PIF_VALUE: 1
PIF_VALUE: 10
PIF_VALUE: 3
PIF_VALUE: 10
PIF_VALUE: 4
PIF_VALUE: 10
PIF_VALUE: 10
PIF_VALUE: 1
PIF_VALUE: 10
PIF_VALUE: 10
PIF_VALUE: 2
PIF_VALUE: 10
PIF_VALUE: 2
PIF_VALUE: 10
PIF_VALUE: 19
PIF_VALUE: 10
PIF_VALUE: 0
PIF_VALUE: 1
PIF_VALUE: 10
PIF_VALUE: 12
PIF_VALUE: 10

## 2019-07-02 ASSESSMENT — PAIN SCALES - GENERAL
PAINLEVEL_OUTOF10: 0
PAINLEVEL_OUTOF10: 0

## 2019-07-02 ASSESSMENT — PAIN - FUNCTIONAL ASSESSMENT: PAIN_FUNCTIONAL_ASSESSMENT: 0-10

## 2019-07-02 NOTE — ANESTHESIA POSTPROCEDURE EVALUATION
POST- ANESTHESIA EVALUATION       Pt Name: Gertrudis Bernal  MRN: [id number]  YOB: 1965  Date of evaluation: 7/2/2019  Time:  4:08 PM      BP (!) 140/74   Pulse 81   Temp 97.7 °F (36.5 °C)   Resp 15   Ht 5' 8\" (1.727 m)   Wt 160 lb (72.6 kg)   SpO2 98%   BMI 24.33 kg/m²      Consciousness Level  Awake  Cardiopulmonary Status  Stable  Pain Adequately Treated YES  Nausea / Vomiting  NO  Adequate Hydration  YES  Anesthesia Related Complications NONE      Electronically signed by Paola Finley MD on 7/2/2019 at 4:08 PM       Department of Anesthesiology  Postprocedure Note    Patient: Gertrudis Bernal  MRN: [id number]  YOB: 1965  Date of evaluation: 7/2/2019  Time:  4:08 PM     Procedure Summary     Date:  07/02/19 Room / Location:  [facility] 04 / [facility]    Anesthesia Start:  6047 Anesthesia Stop:  8714    Procedure:  COLORECTAL CANCER SCREENING, NOT HIGH RISK (N/A ) Diagnosis:  (SCREENING  UPDATE ON ADMIT OFFICE TO FAX (F[facility] 450 ON 6/24/19))    Surgeon:  Immanuel Potter MD Responsible Provider:  Paola Finley MD    Anesthesia Type:  general ASA Status:  3          Anesthesia Type: general    Faiza Phase I: Faiza Score: 10    Faiza Phase II:      Last vitals: Reviewed and per EMR flowsheets.        Anesthesia Post Evaluation

## 2019-07-02 NOTE — ANESTHESIA PRE PROCEDURE
GIULIANA Philippe   conjugated estrogens (PREMARIN) 0.625 MG/GM vaginal cream Place 0.5 g vaginally daily Place twice daily at bedtime for first 2 weeks then at bedtime once daily for 2 weeks then 3 times/week. 12/20/18  Yes Sophie Mireles DO   felodipine (PLENDIL) 10 MG extended release tablet Take 10 mg by mouth daily Prescribed by Dr. Alexia Rodgers   Yes Historical Provider, MD   ergocalciferol (ERGOCALCIFEROL) 23282 units capsule Take 50,000 Units by mouth See Admin Instructions Takes twice a week   Yes Historical Provider, MD   QUEtiapine (SEROQUEL) 300 MG tablet Take 0.5 tablets by mouth nightly  Patient taking differently: Take 300 mg by mouth nightly  12/15/18  Yes Yady Lozano MD   lamoTRIgine (LAMICTAL) 100 MG tablet Take 100 mg by mouth every evening  6/6/18  Yes Historical Provider, MD   levothyroxine (SYNTHROID) 25 MCG tablet Take 25 mcg by mouth Daily   Yes Historical Provider, MD   venlafaxine (EFFEXOR) 75 MG tablet Take 2 tablets (150 mg) in the morning and 1 tablet (75 mg) in the evening. 3/17/17  Yes COLTON Miller CNP   Multiple Vitamins-Minerals (THERAPEUTIC MULTIVITAMIN-MINERALS) tablet Take 1 tablet by mouth daily   Yes Historical Provider, MD   EPINEPHrine (EPIPEN 2-JIGAR) 0.3 MG/0.3ML SOAJ injection To be used if experiencing angioedema. 5/28/19   COLTON Miller CNP   EPINEPHrine HCl, Anaphylaxis, (EPIPEN 2-JIGAR IM) Inject 1 Syringe into the muscle PRN for angioedema, takes with the prednisone    Historical Provider, MD   albuterol sulfate  (90 Base) MCG/ACT inhaler Inhale 2 puffs into the lungs every 6 hours as needed for Wheezing 12/4/17   COLTON Miller CNP       Current medications:    Current Facility-Administered Medications   Medication Dose Route Frequency Provider Last Rate Last Dose    0.9 % sodium chloride infusion   Intravenous Continuous Daniel Kong  mL/hr at 07/02/19 1218         Allergies:     Allergies   Allergen Reactions URETHRAL SLING performed by Mame Kohli MD at [address] Right 2016   [address]    COLONOSCOPY N/A 6/24/2019    COLORECTAL CANCER SCREENING, NOT HIGH RISK performed by Liliana Saeed MD at [address] Right     EYE SURGERY      khushboo. lasik     FEMUR FRACTURE SURGERY  11/09/2018    FINGER SURGERY Right 08/28/2018    RING CARTER MASS EXCISION, TRIGGER RELEASE    FRACTURE SURGERY      left femur    GASTRECTOMY      GASTRIC BYPASS SURGERY  2012    HYSTERECTOMY      JOINT REPLACEMENT Bilateral     knees    KNEE SURGERY Right 05/02/2017    (femoral) patella replacement    NERVE BLOCK Right 05/04/2018     cervical facet #1 no steroid    NERVE BLOCK Right 06/29/2018    rt cervical diagnostic #2 decadron 10mg    NERVE BLOCK Right 08/10/2018    right cervical rfa decadron 10mg    CA ANTERIOR COLPORRAPHY RPR CYSTOCELE W/CYSTO N/A 7/30/2018    CYSTOCELE REPAIR performed by Linsey Lucas DO at [address] OFFICE/OUTPT [address] Right 8/28/2018    RING CARTER MASS EXCISION, TRIGGER RELEASE, 30[address] TABLE performed by Gio Pedraza DO at [address] OFFICE/OUTPT VISIT,PROCEDURE ONLY Left 11/9/2018    FEMUR RETROGRADE IM NAILING PERIPROSTHETIC FRACTURE performed by Gio Pedraza DO at [address] [place] N/CARPAL TUNNEL SURG Left 10/23/2018    CARPAL TUNNEL RELEASE performed by Gio Pedraza DO at [address] [facility]  2011    left knee    TOTAL KNEE ARTHROPLASTY Right 5/2/2017    PATELLOFEMORAL REPLACEMENT KNEE - Carter Riley, 3080 TABLE, FEMORAL POPLITEAL BLOCK, NSA= SPINAL VS GENERAL performed by Gio Pedraza DO at [address] [facility]  2004       Social History:    Social History     Tobacco Use    Smoking status: Never Smoker    Smokeless tobacco: Never Used   Substance Use Topics    Alcohol use:  Yes                                Counseling given:

## 2019-07-08 ENCOUNTER — OFFICE VISIT (OUTPATIENT)
Dept: UROLOGY | Age: 54
End: 2019-07-08
Payer: MEDICAID

## 2019-07-08 VITALS
HEART RATE: 76 BPM | TEMPERATURE: 97.8 F | SYSTOLIC BLOOD PRESSURE: 142 MMHG | WEIGHT: 162 LBS | DIASTOLIC BLOOD PRESSURE: 98 MMHG | BODY MASS INDEX: 24.55 KG/M2 | HEIGHT: 68 IN

## 2019-07-08 DIAGNOSIS — R39.15 URGENCY OF URINATION: ICD-10-CM

## 2019-07-08 DIAGNOSIS — R39.12 WEAK URINARY STREAM: ICD-10-CM

## 2019-07-08 DIAGNOSIS — N39.3 STRESS INCONTINENCE: Primary | ICD-10-CM

## 2019-07-08 LAB — GLUCOSE BLD-MCNC: 41 MG/DL (ref 65–105)

## 2019-07-08 PROCEDURE — G8420 CALC BMI NORM PARAMETERS: HCPCS | Performed by: UROLOGY

## 2019-07-08 PROCEDURE — G8428 CUR MEDS NOT DOCUMENT: HCPCS | Performed by: UROLOGY

## 2019-07-08 PROCEDURE — 1036F TOBACCO NON-USER: CPT | Performed by: UROLOGY

## 2019-07-08 PROCEDURE — 99214 OFFICE O/P EST MOD 30 MIN: CPT | Performed by: UROLOGY

## 2019-07-08 PROCEDURE — 3017F COLORECTAL CA SCREEN DOC REV: CPT | Performed by: UROLOGY

## 2019-07-08 PROCEDURE — G8598 ASA/ANTIPLAT THER USED: HCPCS | Performed by: UROLOGY

## 2019-07-08 PROCEDURE — 1111F DSCHRG MED/CURRENT MED MERGE: CPT | Performed by: UROLOGY

## 2019-07-08 NOTE — PROGRESS NOTES
History:   Diagnosis Date    Acute kidney injury ([facility]     Anemia     Angioedema     Antinuclear antibody (IQRA) titer greater than 1:80     Anxiety     Asthma     Ataxia     Atrial fibrillation (HCC)     Borderline personality disorder (HCC)     Bradycardia     CAD (coronary artery disease)     Chronic kidney disease     CKD (chronic kidney disease), stage II 8/23/2018    Degenerative disc disease, thoracic     Depression     Diabetes mellitus (HCC)     Diastolic dysfunction     DJD (degenerative joint disease)     Fibromyalgia     Foot pain, right     GERD (gastroesophageal reflux disease)     H/O: hysterectomy     Headache     Hernia of abdominal wall     History of blood transfusion     no problem    Hyperlipidemia     Hypertension     Lupus (HCC)     Mood disorder (HCC)     Neuropathy     Osteoarthritis     PTSD (post-traumatic stress disorder)     Pulmonary nodules     Raynaud's disease     Scleroderma ([facility]     Scleroderma ([facility] 8/23/2018    Seizures ([facility]     Sleep walking disorder     Thyroid disease     TIA (transient ischemic attack)     Transient insomnia     Tricuspid regurgitation     Vasospasm ([address] 01/2016    bilat. legs. resulting in near complete absence of profusion to arteries of feet. ([facility]     Vitamin D deficiency 8/23/2018    Wears glasses      Past Surgical History:   Procedure Laterality Date    ABDOMINOPLASTY  2013    BLADDER SUSPENSION N/A 7/30/2018    CYSTOSCOPY WITH OBTRYX MID URETHRAL SLING performed by Chuyita Thakur MD at [address] Right 2016   [address]    COLONOSCOPY N/A 6/24/2019    COLORECTAL CANCER SCREENING, NOT HIGH RISK performed by Kyle Zimmer MD at [address] [facility] COLONOSCOPY N/A 7/2/2019    COLORECTAL CANCER SCREENING, NOT HIGH RISK performed by Kyle Zimmer MD at [address] [facility] FRACTURE SURGERY  11/09/2018    FINGER

## 2019-07-09 LAB
GLUCOSE BLD-MCNC: 129 MG/DL (ref 65–105)
GLUCOSE BLD-MCNC: 57 MG/DL (ref 65–105)

## 2019-07-15 ENCOUNTER — HOSPITAL ENCOUNTER (OUTPATIENT)
Dept: GENERAL RADIOLOGY | Age: 54
Discharge: HOME | End: 2019-07-17
Payer: MEDICAID

## 2019-07-15 PROCEDURE — A4641 RADIOPHARM DX AGENT NOC: HCPCS | Performed by: SURGERY

## 2019-07-15 PROCEDURE — 6360000004 HC RX CONTRAST MEDICATION: Performed by: SURGERY

## 2019-07-15 PROCEDURE — 74280 X-RAY XM COLON 2CNTRST STD: CPT

## 2019-07-15 RX ADMIN — BARIUM SULFATE 1000 ML: 1.05 SUSPENSION ORAL; RECTAL at 09:27

## 2019-07-17 ENCOUNTER — TELEPHONE (OUTPATIENT)
Dept: FAMILY MEDICINE CLINIC | Age: 54
End: 2019-07-17

## 2019-07-17 DIAGNOSIS — I10 ESSENTIAL HYPERTENSION: Primary | ICD-10-CM

## 2019-07-17 RX ORDER — BLOOD PRESSURE TEST KIT
1 KIT MISCELLANEOUS DAILY
Qty: 1 KIT | Refills: 0 | Status: CANCELLED | OUTPATIENT
Start: 2019-07-17

## 2019-07-17 RX ORDER — BLOOD PRESSURE TEST KIT
1 KIT MISCELLANEOUS ONCE
Qty: 1 KIT | Refills: 0 | Status: SHIPPED | OUTPATIENT
Start: 2019-07-17 | End: 2020-09-14

## 2019-07-22 RX ORDER — CETIRIZINE HYDROCHLORIDE 10 MG/1
TABLET ORAL
Qty: 90 TABLET | Refills: 1 | Status: SHIPPED | OUTPATIENT
Start: 2019-07-22 | End: 2020-02-26

## 2019-08-05 ENCOUNTER — HOSPITAL ENCOUNTER (OUTPATIENT)
Dept: OCCUPATIONAL THERAPY | Age: 54
Setting detail: THERAPIES SERIES
Discharge: HOME | End: 2019-08-05
Payer: MEDICAID

## 2019-08-05 PROCEDURE — 97110 THERAPEUTIC EXERCISES: CPT

## 2019-08-05 PROCEDURE — 97165 OT EVAL LOW COMPLEX 30 MIN: CPT

## 2019-08-05 PROCEDURE — 97140 MANUAL THERAPY 1/> REGIONS: CPT

## 2019-08-05 NOTE — CONSULTS
[x] [address] [facility] floor       [address] [facility]         Phone: [phone]       Fax: [phone] [] 6[address] [facility] at [address] , [address] [place] [address]  Phone: [phone]  Fax: [phone]       Occupational Therapy Hand & Upper Extremity  Initial Evaluation      Date: 2019      Patient: Katie Jones  : 1965  MRN: [id number]    Physician: Aline Poon MD  Insurance: [facility] Diagnosis: Palmar fascial fibromatosis (dupuytren's) M72.0   Rehab Codes: stiffness in hand M25.64,, lack of coordination R27.8,, fine motor skills loss R29.818,, adherent scar L90.5,, pain in left hand M79.642, or pain in left finger(s) M79.645,  Onset Date: 6-15-19    Next Dr. 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   0-80 scale, with 80 = no Deficits  (The UEFI model does not provide any specific cut off points that could classify the upper limb disability degree, however, a minimal detectable change of 9 points is provided. This means that for improvement or deterioration to be considered, between two subsequent evaluations, the scores must differ by at least 9 points.)    STRENGTH      RIGHT LEFT    59 32   Lateral pinch 12 11   2 point pinch 8 7   3 jaw pinch 10 6     The affected extremity is 46% weaker than the unaffected extremity. (affected score/unaffected score, take the total and subtract from 100)    COORDINATION  - Fine Motor      Right in seconds Percentile Left in seconds Percentile   9 Hole Peg Test 21.15  22.13      DIGITS         Extension/Flexion   INDEX MIDDLE RING LITTLE   MCP  -6/83     PIP  0/81     DIP  -12/52                  Problems: Pain, ROM, Strength, Function and Adherent Scar      Short Term Goals: (  6    Treatments)  1. Decrease Pain: to 2/10 with simple ADL participation   2. Increase AROM (degrees)  a. L middle finger MCP to 0/90 for independence with meal prep tasks  b. L middle PIP to 0/95 for independence with meal prep tasks  c. L middle DIP to 0/70 for independence with meal prep tasks  3. Increase strength (pounds)  a. L  to 40 pounds for ease of childcare tasks  4. Increase function:UE Functional Index Score to 70 or more to promote increased function  5. Scar will be soft and pliable with minimal tethering  6. Independent with HEP in 3 sessions    Long Term Goals: (  13  Treatments)  1. Patient will increase AROM to make full, tight fist, for use of L hand to complete IADLs at prior level of function   2. Patient will increase L  strength to 45 pounds or more to complete outdoor leisure activities at prior level of function      Patient Goals: use hand without pain    Treatment Potential: Good Suggested Professional Referral: No  Domestic Concerns: No   Barriers to Goal Achievement: No    Comments/Assessment: Patient presents this date s/p trigger finger release of L middle finger with deficits from dupuytren's disease.  Pt reports having trigger finger release on R hand 8997       Electronically signed by Katia Rawls OT on 8/5/2019 at 8:08 AM        Physician Signature: _________________________ Date: _______________  By signing above or cosigning this note, I have reviewed this plan of care and certify a need for medically necessary rehabilitation services.      *PLEASE SIGN ABOVE AND FAX BACK ALL PAGES*

## 2019-08-09 ENCOUNTER — HOSPITAL ENCOUNTER (OUTPATIENT)
Dept: OCCUPATIONAL THERAPY | Age: 54
Setting detail: THERAPIES SERIES
Discharge: HOME | End: 2019-08-09
Payer: MEDICAID

## 2019-08-09 NOTE — FLOWSHEET NOTE
[x] [address] [facility] floor       [address] [place], [place]         Phone: [phone]       Fax: [phone] [] [address] [facility] at [address] [facility] , [address] [place] [address]  Phone: [phone]  Fax: [phone]     Occupational Therapy Daily Treatment Note    Date:  2019  Patient Name:  Almon Seip    :  1965  MRN: [id number]  Patient: Almon Seip                      : 1965                      MRN: [id number]                         Physician: Heath Solano MD  Insurance: [facility] Diagnosis: Palmar fascial fibromatosis (dupuytren's) M72.0           Rehab Codes: stiffness in hand M25.64,, lack of coordination R27.8,, fine motor skills loss R29.818,, adherent scar L90.5,, pain in left hand M79.642, or pain in left finger(s) M79.645,  Onset Date: 6-15-19                Next Dr. Sameera Baron: TBD  Visit# / total visits: 2 Cancels/No Shows: 0/0    Subjective:    Pain:  Yes Location:  Pain Rating: (0-10 scale) 0/10 until using hand or hits hand on something  Pain altered Tx:  No  Action:  Comments:    Objective:  Modalities:     Modality Flow Sheet:  START STOP Tx Modality       Electrical Stim:      19   Ultrasound: _0.8__ W/cm2 x _8__ mins  Duty factor: _X_100%  __50%  __33% __20%  Head size: 2.0cm  MHz: __1mHz  _X_3mHz  Location: L volar hand, base of middle finger        Hot Pack:       Cold Pack:            Flow Sheet:  Exercise Reps/Time Weight/Level Comments   Scar massage      Administered    Digit AROM 10   Issued for HEP, completed    Ultrasound     Administered    theraputty   strengthening  Finger flexion  Finger extension  Palmar pinch  Three jaw ke  Finger and thumb ext       Added this date, issued putty for HEP with putty precautions. Other: Pt arrived with noted palmar facial fibromatosis on long finger.  Pt tolerated US and tissue massage for promotion of full extension of long

## 2019-08-13 ENCOUNTER — HOSPITAL ENCOUNTER (OUTPATIENT)
Dept: OCCUPATIONAL THERAPY | Age: 54
Setting detail: THERAPIES SERIES
Discharge: HOME | End: 2019-08-13
Payer: MEDICAID

## 2019-08-13 PROCEDURE — 97140 MANUAL THERAPY 1/> REGIONS: CPT

## 2019-08-13 PROCEDURE — 97110 THERAPEUTIC EXERCISES: CPT

## 2019-08-13 PROCEDURE — 97035 APP MDLTY 1+ULTRASOUND EA 15: CPT

## 2019-08-16 ENCOUNTER — HOSPITAL ENCOUNTER (OUTPATIENT)
Dept: OCCUPATIONAL THERAPY | Age: 54
Setting detail: THERAPIES SERIES
Discharge: HOME | End: 2019-08-16
Payer: MEDICAID

## 2019-08-16 PROCEDURE — 97110 THERAPEUTIC EXERCISES: CPT

## 2019-08-16 PROCEDURE — 97035 APP MDLTY 1+ULTRASOUND EA 15: CPT

## 2019-08-19 ENCOUNTER — HOSPITAL ENCOUNTER (OUTPATIENT)
Age: 54
Setting detail: SPECIMEN
Discharge: HOME | End: 2019-08-19
Payer: MEDICAID

## 2019-08-19 ENCOUNTER — HOSPITAL ENCOUNTER (OUTPATIENT)
Dept: OCCUPATIONAL THERAPY | Age: 54
Setting detail: THERAPIES SERIES
Discharge: HOME | End: 2019-08-19
Payer: MEDICAID

## 2019-08-19 DIAGNOSIS — N18.2 CKD (CHRONIC KIDNEY DISEASE), STAGE II: ICD-10-CM

## 2019-08-19 DIAGNOSIS — E55.9 VITAMIN D DEFICIENCY: ICD-10-CM

## 2019-08-19 LAB
ABSOLUTE EOS #: 0.08 K/UL (ref 0–0.4)
ABSOLUTE IMMATURE GRANULOCYTE: 0 K/UL (ref 0–0.3)
ABSOLUTE LYMPH #: 1.68 K/UL (ref 1–4.8)
ABSOLUTE MONO #: 0.35 K/UL (ref 0.1–0.8)
ANION GAP SERPL CALCULATED.3IONS-SCNC: 15 MMOL/L (ref 9–17)
ATYPICAL LYMPHOCYTE ABSOLUTE COUNT: 0.04 K/UL
ATYPICAL LYMPHOCYTES: 1 %
BASOPHILS # BLD: 0 % (ref 0–2)
BASOPHILS ABSOLUTE: 0 K/UL (ref 0–0.2)
BUN BLDV-MCNC: 11 MG/DL (ref 6–20)
BUN/CREAT BLD: ABNORMAL (ref 9–20)
CALCIUM IONIZED: 1.33 MMOL/L (ref 1.13–1.33)
CALCIUM SERPL-MCNC: 9.7 MG/DL (ref 8.6–10.4)
CHLORIDE BLD-SCNC: 104 MMOL/L (ref 98–107)
CO2: 25 MMOL/L (ref 20–31)
CREAT SERPL-MCNC: 0.96 MG/DL (ref 0.5–0.9)
CREATININE URINE: 53.9 MG/DL (ref 28–217)
DIFFERENTIAL TYPE: ABNORMAL
EOSINOPHILS RELATIVE PERCENT: 2 % (ref 1–4)
GFR AFRICAN AMERICAN: >60 ML/MIN
GFR NON-AFRICAN AMERICAN: >60 ML/MIN
GFR SERPL CREATININE-BSD FRML MDRD: ABNORMAL ML/MIN/{1.73_M2}
GFR SERPL CREATININE-BSD FRML MDRD: ABNORMAL ML/MIN/{1.73_M2}
GLUCOSE BLD-MCNC: 85 MG/DL (ref 70–99)
HCT VFR BLD CALC: 37.2 % (ref 36.3–47.1)
HEMOGLOBIN: 11.4 G/DL (ref 11.9–15.1)
IMMATURE GRANULOCYTES: 0 %
LYMPHOCYTES # BLD: 43 % (ref 24–44)
MCH RBC QN AUTO: 26.2 PG (ref 25.2–33.5)
MCHC RBC AUTO-ENTMCNC: 30.6 G/DL (ref 28.4–34.8)
MCV RBC AUTO: 85.5 FL (ref 82.6–102.9)
MONOCYTES # BLD: 9 % (ref 1–7)
MORPHOLOGY: ABNORMAL
NRBC AUTOMATED: 0 PER 100 WBC
PDW BLD-RTO: 14.6 % (ref 11.8–14.4)
PHOSPHORUS: 4.1 MG/DL (ref 2.6–4.5)
PLATELET # BLD: 188 K/UL (ref 138–453)
PLATELET ESTIMATE: ABNORMAL
PMV BLD AUTO: 11.5 FL (ref 8.1–13.5)
POTASSIUM SERPL-SCNC: 4.7 MMOL/L (ref 3.7–5.3)
PTH INTACT: 99.15 PG/ML (ref 15–65)
RBC # BLD: 4.35 M/UL (ref 3.95–5.11)
RBC # BLD: ABNORMAL 10*6/UL
SEG NEUTROPHILS: 45 % (ref 36–66)
SEGMENTED NEUTROPHILS ABSOLUTE COUNT: 1.75 K/UL (ref 1.8–7.7)
SODIUM BLD-SCNC: 144 MMOL/L (ref 135–144)
TOTAL PROTEIN, URINE: 6 MG/DL
VITAMIN D 25-HYDROXY: 33.2 NG/ML (ref 30–100)
WBC # BLD: 3.9 K/UL (ref 3.5–11.3)
WBC # BLD: ABNORMAL 10*3/UL

## 2019-08-19 PROCEDURE — 97140 MANUAL THERAPY 1/> REGIONS: CPT

## 2019-08-19 PROCEDURE — 97110 THERAPEUTIC EXERCISES: CPT

## 2019-08-19 NOTE — FLOWSHEET NOTE
fibromatosis on long finger. Pt completed above added exercises and tolerated US and scar massage for promotion of full extension of long finger. Pt completed putty exercises without difficulty. Pt reports noted soreness after last therapy visit. Specific Instructions for next treatment:    Treatment Charges: Mins Units   Therapeutic Exercise 29979    40    1 co tx   Ultrasound 24365      8    1   Manual Therapy    8    0                 Assessment: Progressing Towards Goals    Short Term Goals: (  6    Treatments)  1. Decrease Pain: to 2/10 with simple ADL participation   2. Increase AROM (degrees)  a. L middle finger MCP to 0/90 for independence with meal prep tasks  b. L middle PIP to 0/95 for independence with meal prep tasks  c. L middle DIP to 0/70 for independence with meal prep tasks  3. Increase strength (pounds)  a. L  to 40 pounds for ease of childcare tasks  4. Increase function:UE Functional Index Score to 70 or more to promote increased function  5. Scar will be soft and pliable with minimal tethering  6. Independent with HEP in 3 sessions     Long Term Goals: (  13  Treatments)  1. Patient will increase AROM to make full, tight fist, for use of L hand to complete IADLs at prior level of function   2. Patient will increase L  strength to 45 pounds or more to complete outdoor leisure activities at prior level of function       Pt.  Education:  Yes and Reviewed Prior HEP/Ed  Method of Education: Verbal, Written and Demo  Comprehension of Education: Needs Review      Plan:  Continue with current plan           Time In/Out:  0900 - 1000   Total Treatment Time: 54 Min      Electronically signed by: Garland MADSEN/SHANIKA MANZANO

## 2019-08-20 ENCOUNTER — HOSPITAL ENCOUNTER (OUTPATIENT)
Dept: VASCULAR LAB | Age: 54
Discharge: HOME | End: 2019-08-20
Payer: MEDICAID

## 2019-08-20 PROCEDURE — 93922 UPR/L XTREMITY ART 2 LEVELS: CPT

## 2019-08-22 ENCOUNTER — HOSPITAL ENCOUNTER (OUTPATIENT)
Dept: OCCUPATIONAL THERAPY | Age: 54
Setting detail: THERAPIES SERIES
Discharge: HOME | End: 2019-08-22
Payer: MEDICAID

## 2019-08-22 PROCEDURE — 97110 THERAPEUTIC EXERCISES: CPT

## 2019-08-22 PROCEDURE — 97035 APP MDLTY 1+ULTRASOUND EA 15: CPT

## 2019-08-22 NOTE — PROGRESS NOTES
[x] [address] [facility] floor       [address] [place], [place]         Phone: [phone]       Fax: [phone] [] [address] [facility] [address] [place] [address] , [address] [place] [address]  Phone: [phone]  Fax: [phone]       Occupational Therapy Hand & Upper Extremity  Progress Note      Date: 2019      Patient: Arlene Rosenthal  : 1965  MRN: [id number]    Physician: Ana Paula Augustine MD  Insurance: [facility] Diagnosis: Palmar fascial fibromatosis (dupuytren's) M72.0   Rehab Codes: stiffness in hand M25.64,, lack of coordination R27.8,, fine motor skills loss R29.818,, adherent scar L90.5,, pain in left hand M79.642, or pain in left finger(s) M79.645,  Onset Date: 6-15-19    Next Dr. 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   0-80 scale, with 80 = no Deficits  (The UEFI model does not provide any specific cut off points that could classify the upper limb disability degree, however, a minimal MET    Long Term Goals: (  13  Treatments)  1. Patient will increase AROM to make full, tight fist, for use of L hand to complete IADLs at prior level of function ONGOING  2. Patient will increase L  strength to 45 pounds or more to complete outdoor leisure activities at prior level of function ONGOING      Patient Goals: use hand without pain    Treatment Potential: Good Suggested Professional Referral: No  Domestic Concerns: No   Barriers to Goal Achievement: No    Comments/Assessment: Patient presents this date s/p trigger finger release of L middle finger with deficits from dupuytren's disease. Pt reports having trigger finger release on R hand approximately 1 year ago but did not experience the same symptoms. Pt presents with decrease AROM of L middle finger, all other digits AROM WNL, and decreased  strength with L hand. Pt reports 5/10 pain in L hand with activity or when hand is bumped into something, and 0/10 pain without movement. Pt denies numbness/tingling in L hand. Pt reports using L hand to complete ADL/IADLs with noted pain. Pt has demo benefit from occupational therapy for  increase strength, AROM, and function of L hand while decreasing pain to return to prior level of ADL/IADL participation. Pt L UE  strength is 33% weaker, an increase in strength by 11 pounds up to 72. Pt also had an increase in lateral pinch by 1 pound and an increase in 2 pt pinch by 1 pound. Home Program Initiated: Written, Verbal and Demo     Comprehension of Education: Yes  Plans, Goals, Risks, Benefits, Discussed with and Patient    Treatment Plan:  Frequency/Duration:   2    Times a week, for   12   Visits.   Therapeutic Exercise 65978, Iontophoresis [id number], Manual Therapy 77398, Ultrasound [id number], Therapeutic Activity 14569 and Massage [id number]         Treatment This Date:        Manual Therapy 23593        Minutes  Therapeutic Exercise [id number] 45  Minutes  Ultrasound 48552    8     Minutes     Modality Flow

## 2019-08-27 ENCOUNTER — HOSPITAL ENCOUNTER (OUTPATIENT)
Dept: OCCUPATIONAL THERAPY | Age: 54
Setting detail: THERAPIES SERIES
Discharge: HOME | End: 2019-08-27
Payer: MEDICAID

## 2019-09-09 ENCOUNTER — OFFICE VISIT (OUTPATIENT)
Dept: NEUROLOGY | Age: 54
End: 2019-09-09
Payer: MEDICAID

## 2019-09-09 VITALS
HEART RATE: 71 BPM | HEIGHT: 68 IN | BODY MASS INDEX: 24.86 KG/M2 | DIASTOLIC BLOOD PRESSURE: 96 MMHG | SYSTOLIC BLOOD PRESSURE: 145 MMHG | WEIGHT: 164 LBS

## 2019-09-09 DIAGNOSIS — R56.9 SEIZURES (HCC): ICD-10-CM

## 2019-09-09 DIAGNOSIS — M50.30 DEGENERATIVE DISC DISEASE, CERVICAL: Primary | ICD-10-CM

## 2019-09-09 DIAGNOSIS — M48.02 SPINAL STENOSIS, CERVICAL REGION: ICD-10-CM

## 2019-09-09 PROCEDURE — 1036F TOBACCO NON-USER: CPT | Performed by: NURSE PRACTITIONER

## 2019-09-09 PROCEDURE — G8420 CALC BMI NORM PARAMETERS: HCPCS | Performed by: NURSE PRACTITIONER

## 2019-09-09 PROCEDURE — G8598 ASA/ANTIPLAT THER USED: HCPCS | Performed by: NURSE PRACTITIONER

## 2019-09-09 PROCEDURE — 3017F COLORECTAL CA SCREEN DOC REV: CPT | Performed by: NURSE PRACTITIONER

## 2019-09-09 PROCEDURE — G8427 DOCREV CUR MEDS BY ELIG CLIN: HCPCS | Performed by: NURSE PRACTITIONER

## 2019-09-09 PROCEDURE — 99214 OFFICE O/P EST MOD 30 MIN: CPT | Performed by: NURSE PRACTITIONER

## 2019-09-09 RX ORDER — CYCLOBENZAPRINE HCL 5 MG
TABLET ORAL
Qty: 20 TABLET | Refills: 0 | OUTPATIENT
Start: 2019-09-09

## 2019-09-09 RX ORDER — GABAPENTIN 800 MG/1
TABLET ORAL
Qty: 120 TABLET | Refills: 5 | Status: SHIPPED | OUTPATIENT
Start: 2019-09-09 | End: 2020-03-11 | Stop reason: SDUPTHER

## 2019-09-09 NOTE — PROGRESS NOTES
Anxiety: present, Hallucination: absent, Depression: present   HEMATOLOGIC Abnormal bleeding: absent, Anemia: absent,            PHYSICAL EXAMINATION:       Vitals:    09/09/19 0851   BP: (!) 145/96   Pulse: 71                                              .                                                                                                    General Appearance:  Alert, cooperative, no signs of distress, appears stated age   Head:  Normocephalic, no signs of trauma   Eyes:  Conjunctiva/corneas clear;  eyelids intact   Ears:  Normal external ear and canals   Nose: Nares normal, mucosa normal, no drainage    Throat: Lips and tongue normal; teeth normal;  gums normal   Neck: Supple, intact flexion, extension and rotation;   trachea midline;  no adenopathy;   thyroid: not enlarged;   no carotid pulse abnormality   Back:   Symmetric, no curvature, ROM adequate   Lungs:   Respirations unlabored   Heart:  Regular rate and rhythm           Extremities: Extremities normal, no cyanosis, no edema   Pulses: Symmetric over head and neck   Skin: Skin color, texture normal, no rashes, no lesions                                     NEUROLOGIC EXAMINATION    Neurologic Exam  Mental status    Alert and oriented x 3; intact memory with no confusion, speech or language problems; no hallucinations or delusions  Fund of information appropriate for level of education    Cranial nerves    II - visual fields intact to confrontation bilaterally  III, IV, VI - extra-ocular muscles full: no pupillary defect; no LYNSEY, no nystagmus, no ptosis   V - normal facial sensation                                                               VII - normal facial symmetry                                                             VIII - intact hearing                                                                             IX, X - symmetrical palate                                                                  XI - symmetrical shoulder

## 2019-09-11 ENCOUNTER — OFFICE VISIT (OUTPATIENT)
Dept: FAMILY MEDICINE CLINIC | Age: 54
End: 2019-09-11
Payer: MEDICAID

## 2019-09-11 VITALS
TEMPERATURE: 96.5 F | SYSTOLIC BLOOD PRESSURE: 110 MMHG | HEART RATE: 73 BPM | DIASTOLIC BLOOD PRESSURE: 70 MMHG | WEIGHT: 161.2 LBS | OXYGEN SATURATION: 100 % | BODY MASS INDEX: 24.51 KG/M2

## 2019-09-11 DIAGNOSIS — M50.30 DEGENERATIVE DISC DISEASE, CERVICAL: Primary | ICD-10-CM

## 2019-09-11 DIAGNOSIS — I48.91 ATRIAL FIBRILLATION WITH SLOW VENTRICULAR RESPONSE (HCC): ICD-10-CM

## 2019-09-11 DIAGNOSIS — Z23 ENCOUNTER FOR IMMUNIZATION: ICD-10-CM

## 2019-09-11 DIAGNOSIS — F31.9 BIPOLAR AFFECTIVE DISORDER, REMISSION STATUS UNSPECIFIED (HCC): ICD-10-CM

## 2019-09-11 PROCEDURE — 90471 IMMUNIZATION ADMIN: CPT | Performed by: NURSE PRACTITIONER

## 2019-09-11 PROCEDURE — 90688 IIV4 VACCINE SPLT 0.5 ML IM: CPT | Performed by: NURSE PRACTITIONER

## 2019-09-11 PROCEDURE — G8427 DOCREV CUR MEDS BY ELIG CLIN: HCPCS | Performed by: NURSE PRACTITIONER

## 2019-09-11 PROCEDURE — 1036F TOBACCO NON-USER: CPT | Performed by: NURSE PRACTITIONER

## 2019-09-11 PROCEDURE — G8420 CALC BMI NORM PARAMETERS: HCPCS | Performed by: NURSE PRACTITIONER

## 2019-09-11 PROCEDURE — 99213 OFFICE O/P EST LOW 20 MIN: CPT | Performed by: NURSE PRACTITIONER

## 2019-09-11 PROCEDURE — G8598 ASA/ANTIPLAT THER USED: HCPCS | Performed by: NURSE PRACTITIONER

## 2019-09-11 PROCEDURE — 3017F COLORECTAL CA SCREEN DOC REV: CPT | Performed by: NURSE PRACTITIONER

## 2019-09-11 RX ORDER — CYCLOBENZAPRINE HCL 10 MG
10 TABLET ORAL NIGHTLY PRN
Qty: 30 TABLET | Refills: 3 | Status: SHIPPED | OUTPATIENT
Start: 2019-09-11 | End: 2019-12-11 | Stop reason: SDUPTHER

## 2019-09-11 ASSESSMENT — ENCOUNTER SYMPTOMS
SHORTNESS OF BREATH: 0
COUGH: 0

## 2019-09-11 NOTE — PROGRESS NOTES
hydroxychloroquine (PLAQUENIL) 200 MG tablet TAKE 1 TABLET BY MOUTH ONE TIME A DAY  90 tablet 1    famotidine (PEPCID) 40 MG tablet TAKE 1 TABLET BY MOUTH IN THE EVENING 90 tablet 2    metFORMIN (GLUCOPHAGE) 500 MG tablet Take 500 mg by mouth daily       SM ASPIRIN ADULT LOW STRENGTH 81 MG EC tablet TAKE 1 TABLET BY MOUTH TWO TIMES A DAY  180 tablet 1    omeprazole (PRILOSEC) 20 MG delayed release capsule TAKE 1 CAPSULE BY MOUTH TWO TIMES A DAY  180 capsule 1    lamoTRIgine (LAMICTAL) 25 MG tablet Take 25 mg by mouth 2 times daily       calcium carbonate 648 MG TABS Take 1 tablet by mouth 2 times daily 60 tablet 5    pravastatin (PRAVACHOL) 40 MG tablet TAKE 1 TABLET BY MOUTH AT BEDTIME  90 tablet 1    felodipine (PLENDIL) 10 MG extended release tablet Take 10 mg by mouth daily Prescribed by Dr. Capo Ashley ergocalciferol (ERGOCALCIFEROL) 19168 units capsule Take 50,000 Units by mouth See Admin Instructions Takes twice a week      QUEtiapine (SEROQUEL) 300 MG tablet Take 0.5 tablets by mouth nightly (Patient taking differently: Take 300 mg by mouth nightly ) 60 tablet 3    lamoTRIgine (LAMICTAL) 100 MG tablet Take 100 mg by mouth every evening       levothyroxine (SYNTHROID) 25 MCG tablet Take 25 mcg by mouth Daily      venlafaxine (EFFEXOR) 75 MG tablet Take 2 tablets (150 mg) in the morning and 1 tablet (75 mg) in the evening. 90 tablet 3    Multiple Vitamins-Minerals (THERAPEUTIC MULTIVITAMIN-MINERALS) tablet Take 1 tablet by mouth daily       No current facility-administered medications for this visit.       Past Medical History:   Diagnosis Date    Acute kidney injury ([address]     Anemia     Angioedema     Antinuclear antibody (IQRA) titer greater than 1:80     Anxiety     Asthma     Ataxia     Atrial fibrillation (HCC)     Borderline personality disorder (HCC)     Bradycardia     CAD (coronary artery disease)     Chronic kidney disease     CKD (chronic kidney disease), stage II 8/23/2018 06/29/2018    rt cervical diagnostic #2 decadron 10mg    NERVE BLOCK Right 08/10/2018    right cervical rfa decadron 10mg    TN ANTERIOR COLPORRAPHY RPR CYSTOCELE W/CYSTO N/A 7/30/2018    CYSTOCELE REPAIR performed by Verenice Campos DO at [facility] OFFICE/OUTPT [address] Right 8/28/2018    RING CARTER MASS EXCISION, TRIGGER RELEASE, 3080 TABLE performed by Ami Nieves DO at [facility] OFFICE/OUTPT VISIT,PROCEDURE ONLY Left 11/9/2018    FEMUR RETROGRADE IM NAILING PERIPROSTHETIC FRACTURE performed by Ami Nieves DO at [address] N/CARPAL TUNNEL SURG Left 10/23/2018    CARPAL TUNNEL RELEASE performed by Ami Nieves DO at [address]  2011    left knee    TOTAL KNEE ARTHROPLASTY Right 5/2/2017    PATELLOFEMORAL REPLACEMENT KNEE - JAXSON, 3080 TABLE, FEMORAL POPLITEAL BLOCK, NSA= SPINAL VS GENERAL performed by Ami Nieves DO at [facility]  2004     Family History   Adopted: Yes   Problem Relation Age of Onset    Depression Mother     Asthma Mother     Depression Father     High Blood Pressure Father     Diabetes Father     Asthma Father     Depression Sister     Asthma Sister     Depression Brother     Asthma Brother     Asthma Maternal Aunt     Asthma Maternal Uncle     Asthma Paternal [de-identified]     Asthma Paternal Uncle     Asthma Maternal Grandmother     Cancer Maternal Grandmother     Asthma Maternal Grandfather     Asthma Paternal Grandmother     Asthma Paternal Grandfather     Asthma Maternal Cousin     Asthma Paternal Cousin      Social History     Tobacco Use    Smoking status: Never Smoker    Smokeless tobacco: Never Used   Substance Use Topics    Alcohol use:  Yes      Allergies   Allergen Reactions    Morphine Nausea And Vomiting     Pt states it changes her mental status    Amlodipine Other (See Comments)     diarrhea and stress    Dilaudid [Hydromorphone Hcl] Hives and Itching    Sulfa Antibiotics Hives and Rash       Subjective:      Review of Systems   Constitutional: Negative for chills and fever. Respiratory: Negative for cough and shortness of breath. Cardiovascular: Negative for chest pain, palpitations and leg swelling. Other pertinent ROS in HPI  Objective:     /70 (Site: Left Upper Arm, Position: Sitting, Cuff Size: Medium Adult)   Pulse 73   Temp 96.5 °F (35.8 °C) (Oral)   Wt 161 lb 3.2 oz (73.1 kg)   SpO2 100%   BMI 24.51 kg/m²    Physical Exam   Constitutional: She is oriented to person, place, and time. She appears well-developed and well-nourished. She is active. HENT:   Right Ear: External ear normal.   Left Ear: External ear normal.   Nose: Nose normal.   Eyes: EOM are normal.   Neck: Normal range of motion and full passive range of motion without pain. Cardiovascular: Normal rate, regular rhythm, S1 normal, S2 normal and normal heart sounds. Pulmonary/Chest: Effort normal and breath sounds normal. No respiratory distress. Musculoskeletal: Normal range of motion. She exhibits no deformity. Neurological: She is alert and oriented to person, place, and time. Skin: Skin is warm and dry. Psychiatric: She has a normal mood and affect. Assessment/PLAN   1. Degenerative disc disease, cervical    - cyclobenzaprine (FLEXERIL) 10 MG tablet; Take 1 tablet by mouth nightly as needed for Muscle spasms  Dispense: 30 tablet; Refill: 3    2. Atrial fibrillation with slow ventricular response (HCC)  Stable  RR currently     3. Bipolar affective disorder, remission status unspecified ([facility]  Stable   Sees psych    4. Encounter for immunization    - INFLUENZA, QUADV, 3 YRS AND OLDER, IM, MDV, 0.5ML ([place])      RTO if symptoms worsen or fail to improve  Pt agreeable with plan      Patient given educational materials - see patientinstructions.   Discussed use, benefit, and side effects of prescribed

## 2019-09-16 NOTE — DISCHARGE SUMMARY
[] [place]       Occupational Therapy             1st floor       [address], [place]         Phone: [phone]       Fax: [phone] [] [facility] at            [address] , [address] [place] [address]     Phone: [phone]     Fax: [phone]         Occupational Therapy Discharge Note  Patient: Pierre Alcazar                      : 1965                      MRN: [id number]   Physician: Chip Murray MD  Insurance: [facility] Diagnosis: Palmar fascial fibromatosis (dupuytren's) M72.0           Rehab Codes: stiffness in hand M25.64,, lack of coordination R27.8,, fine motor skills loss R29.818,, adherent scar L90.5,, pain in left hand M79.642, or pain in left finger(s) M79.645,  Onset Date: 6-15-19                Next Dr. Whitney Peña: TBD            Discharge Status:     [] Pt recovered from conditions. Treatment goals were met. [] Pt received maximum benefit. No further therapy indicated at this time. [] Pt to continue exercise/home instructions independently. [] Therapy interrupted due to:    [] Pt has 2 or more no shows/cancels, is discontinued per our policy. [] Pt has completed prescribed number of treatment sessions. [x] Other:  Pt cancelled last scheduled appointment, never called back to schedule further appointments. Electronically signed by: Sheree Adams, OTR/L     If you have any questions or concerns, please don't hesitate to call.   Thank you for your referral.

## 2019-09-19 ENCOUNTER — HOSPITAL ENCOUNTER (OUTPATIENT)
Dept: GENERAL RADIOLOGY | Age: 54
Discharge: HOME | End: 2019-09-21
Payer: MEDICAID

## 2019-09-19 DIAGNOSIS — R13.19 OTHER DYSPHAGIA: ICD-10-CM

## 2019-09-19 PROCEDURE — 74230 X-RAY XM SWLNG FUNCJ C+: CPT

## 2019-09-19 ASSESSMENT — PAIN SCALES - GENERAL: PAINLEVEL_OUTOF10: 0

## 2019-09-19 ASSESSMENT — PAIN - FUNCTIONAL ASSESSMENT: PAIN_FUNCTIONAL_ASSESSMENT: 0-10

## 2019-09-23 RX ORDER — FERROUS SULFATE 325(65) MG
TABLET ORAL
Qty: 60 TABLET | Refills: 2 | Status: SHIPPED | OUTPATIENT
Start: 2019-09-23 | End: 2022-10-21 | Stop reason: ALTCHOICE

## 2019-09-25 ENCOUNTER — HOSPITAL ENCOUNTER (OUTPATIENT)
Age: 54
Setting detail: SPECIMEN
Discharge: HOME | End: 2019-09-25
Payer: MEDICAID

## 2019-09-25 DIAGNOSIS — N18.2 CKD (CHRONIC KIDNEY DISEASE), STAGE II: ICD-10-CM

## 2019-09-25 LAB
ABSOLUTE EOS #: 0.12 K/UL (ref 0–0.44)
ABSOLUTE IMMATURE GRANULOCYTE: 0.03 K/UL (ref 0–0.3)
ABSOLUTE LYMPH #: 1.38 K/UL (ref 1.1–3.7)
ABSOLUTE MONO #: 0.35 K/UL (ref 0.1–1.2)
ANION GAP SERPL CALCULATED.3IONS-SCNC: 17 MMOL/L (ref 9–17)
BASOPHILS # BLD: 2 % (ref 0–2)
BASOPHILS ABSOLUTE: 0.08 K/UL (ref 0–0.2)
BUN BLDV-MCNC: 13 MG/DL (ref 6–20)
BUN/CREAT BLD: ABNORMAL (ref 9–20)
CALCIUM IONIZED: 1.26 MMOL/L (ref 1.13–1.33)
CALCIUM SERPL-MCNC: 9.6 MG/DL (ref 8.6–10.4)
CHLORIDE BLD-SCNC: 104 MMOL/L (ref 98–107)
CO2: 23 MMOL/L (ref 20–31)
CREAT SERPL-MCNC: 1.06 MG/DL (ref 0.5–0.9)
CREATININE URINE: 267.4 MG/DL (ref 28–217)
DIFFERENTIAL TYPE: ABNORMAL
EOSINOPHILS RELATIVE PERCENT: 3 % (ref 1–4)
GFR AFRICAN AMERICAN: >60 ML/MIN
GFR NON-AFRICAN AMERICAN: 54 ML/MIN
GFR SERPL CREATININE-BSD FRML MDRD: ABNORMAL ML/MIN/{1.73_M2}
GFR SERPL CREATININE-BSD FRML MDRD: ABNORMAL ML/MIN/{1.73_M2}
GLUCOSE BLD-MCNC: 95 MG/DL (ref 70–99)
HCT VFR BLD CALC: 37.5 % (ref 36.3–47.1)
HEMOGLOBIN: 11.4 G/DL (ref 11.9–15.1)
IMMATURE GRANULOCYTES: 1 %
LYMPHOCYTES # BLD: 29 % (ref 24–43)
MCH RBC QN AUTO: 26 PG (ref 25.2–33.5)
MCHC RBC AUTO-ENTMCNC: 30.4 G/DL (ref 28.4–34.8)
MCV RBC AUTO: 85.6 FL (ref 82.6–102.9)
MONOCYTES # BLD: 7 % (ref 3–12)
NRBC AUTOMATED: 0 PER 100 WBC
PDW BLD-RTO: 13.2 % (ref 11.8–14.4)
PHOSPHORUS: 4.5 MG/DL (ref 2.6–4.5)
PLATELET # BLD: 214 K/UL (ref 138–453)
PLATELET ESTIMATE: ABNORMAL
PMV BLD AUTO: 11.5 FL (ref 8.1–13.5)
POTASSIUM SERPL-SCNC: 4.2 MMOL/L (ref 3.7–5.3)
PTH INTACT: 170.6 PG/ML (ref 15–65)
RBC # BLD: 4.38 M/UL (ref 3.95–5.11)
RBC # BLD: ABNORMAL 10*6/UL
SEG NEUTROPHILS: 58 % (ref 36–65)
SEGMENTED NEUTROPHILS ABSOLUTE COUNT: 2.8 K/UL (ref 1.5–8.1)
SODIUM BLD-SCNC: 144 MMOL/L (ref 135–144)
TOTAL PROTEIN, URINE: 35 MG/DL
URINE TOTAL PROTEIN CREATININE RATIO: 0.13 (ref 0–0.2)
VITAMIN D 25-HYDROXY: 34.6 NG/ML (ref 30–100)
WBC # BLD: 4.8 K/UL (ref 3.5–11.3)
WBC # BLD: ABNORMAL 10*3/UL

## 2019-09-26 ENCOUNTER — TELEPHONE (OUTPATIENT)
Dept: DERMATOLOGY | Age: 54
End: 2019-09-26

## 2019-09-26 ENCOUNTER — OFFICE VISIT (OUTPATIENT)
Dept: DERMATOLOGY | Age: 54
End: 2019-09-26
Payer: MEDICAID

## 2019-09-26 VITALS
SYSTOLIC BLOOD PRESSURE: 124 MMHG | BODY MASS INDEX: 24.25 KG/M2 | OXYGEN SATURATION: 98 % | DIASTOLIC BLOOD PRESSURE: 85 MMHG | HEIGHT: 68 IN | HEART RATE: 69 BPM | WEIGHT: 160 LBS

## 2019-09-26 DIAGNOSIS — D48.5 NEOPLASM OF UNCERTAIN BEHAVIOR OF SKIN: ICD-10-CM

## 2019-09-26 DIAGNOSIS — L71.9 ROSACEA: ICD-10-CM

## 2019-09-26 DIAGNOSIS — L57.0 ACTINIC KERATOSES: Primary | ICD-10-CM

## 2019-09-26 DIAGNOSIS — D22.9 MULTIPLE BENIGN NEVI: ICD-10-CM

## 2019-09-26 DIAGNOSIS — L82.1 SEBORRHEIC KERATOSES: ICD-10-CM

## 2019-09-26 PROCEDURE — 3017F COLORECTAL CA SCREEN DOC REV: CPT | Performed by: DERMATOLOGY

## 2019-09-26 PROCEDURE — 17003 DESTRUCT PREMALG LES 2-14: CPT | Performed by: DERMATOLOGY

## 2019-09-26 PROCEDURE — 1036F TOBACCO NON-USER: CPT | Performed by: DERMATOLOGY

## 2019-09-26 PROCEDURE — G8420 CALC BMI NORM PARAMETERS: HCPCS | Performed by: DERMATOLOGY

## 2019-09-26 PROCEDURE — G8427 DOCREV CUR MEDS BY ELIG CLIN: HCPCS | Performed by: DERMATOLOGY

## 2019-09-26 PROCEDURE — 99214 OFFICE O/P EST MOD 30 MIN: CPT | Performed by: DERMATOLOGY

## 2019-09-26 PROCEDURE — G8598 ASA/ANTIPLAT THER USED: HCPCS | Performed by: DERMATOLOGY

## 2019-09-26 PROCEDURE — 17000 DESTRUCT PREMALG LESION: CPT | Performed by: DERMATOLOGY

## 2019-09-26 NOTE — PATIENT INSTRUCTIONS
Cryotherapy    Liquid Nitrogen - \"freeze\" (Cryotherapy)  Your doctor has treated your skin lesions with a very cold substance. The liquid nitrogen is so cold that it may feel like the skin is burning during application. A clear blister or blood blister may form after treatment and may later form a scab. Leave the area alone. Usually this scab will fall of within 1-2 weeks. The area should be kept clean and can be covered with Vaseline and a Band-Aid if needed. If a large blister develops it is ok to use a clean needle to gently pop the blister. Please call our office with any concerns at [phone]. ROSACEA    What is rosacea? Rosacea is a common skin condition, usually occurring on the face, which predominantly affects fair-skinned but may affect all skin types in people aged 36to 61years old. It is more common in women but when affecting men, it may be more severe. It is a chronic condition, and can persist for a long time and, in any individual, the severity tends to fluctuate. Rosacea tends to affect the cheeks, forehead, chin and nose, and is characterised by persistent redness caused by dilated blood vessels, small bumps and pus-filled spots similar to acne There may also be uncomfortable inflammation of the surface of the eyes and eyelids. Rosacea is classified into 4 subtypes that may overlap. Your doctor will advise you of the type you have. What causes rosacea? The cause of rosacea is not fully understood. Your genetics, immune system factors, and environmental factors may all play a part. Factors that trigger rosacea cause the blood vessels in the skin of the face to enlarge (dilate). The theory that rosacea is due to bacteria on the skin or in the gut has not been proven. However, antibiotics have proven helpful to treat rosacea. This is because of their anti-inflammatory effect. Rosacea is not contagious. There are a variety of triggers that may make rosacea worse.  These

## 2019-09-26 NOTE — PROGRESS NOTES
TAKE 1 TABLET BY MOUTH ONE TIME A DAY  90 tablet 1    hydroxychloroquine (PLAQUENIL) 200 MG tablet TAKE 1 TABLET BY MOUTH ONE TIME A DAY  90 tablet 1    famotidine (PEPCID) 40 MG tablet TAKE 1 TABLET BY MOUTH IN THE EVENING 90 tablet 2    metFORMIN (GLUCOPHAGE) 500 MG tablet Take 500 mg by mouth daily       SM ASPIRIN ADULT LOW STRENGTH 81 MG EC tablet TAKE 1 TABLET BY MOUTH TWO TIMES A DAY  180 tablet 1    omeprazole (PRILOSEC) 20 MG delayed release capsule TAKE 1 CAPSULE BY MOUTH TWO TIMES A DAY  180 capsule 1    lamoTRIgine (LAMICTAL) 25 MG tablet Take 25 mg by mouth 2 times daily       calcium carbonate 648 MG TABS Take 1 tablet by mouth 2 times daily 60 tablet 5    pravastatin (PRAVACHOL) 40 MG tablet TAKE 1 TABLET BY MOUTH AT BEDTIME  90 tablet 1    felodipine (PLENDIL) 10 MG extended release tablet Take 10 mg by mouth daily Prescribed by Dr. Capo Ashley ergocalciferol (ERGOCALCIFEROL) 77126 units capsule Take 50,000 Units by mouth See Admin Instructions Takes twice a week      QUEtiapine (SEROQUEL) 300 MG tablet Take 0.5 tablets by mouth nightly (Patient taking differently: Take 300 mg by mouth nightly ) 60 tablet 3    lamoTRIgine (LAMICTAL) 100 MG tablet Take 100 mg by mouth every evening       levothyroxine (SYNTHROID) 25 MCG tablet Take 25 mcg by mouth Daily      venlafaxine (EFFEXOR) 75 MG tablet Take 2 tablets (150 mg) in the morning and 1 tablet (75 mg) in the evening. 90 tablet 3    Multiple Vitamins-Minerals (THERAPEUTIC MULTIVITAMIN-MINERALS) tablet Take 1 tablet by mouth daily      Blood Pressure KIT 1 kit by Does not apply route once for 1 dose 1 kit 0     No current facility-administered medications for this visit.         ALLERGIES:   Allergies   Allergen Reactions    Morphine Nausea And Vomiting     Pt states it changes her mental status    Amlodipine Other (See Comments)     diarrhea and stress    Dilaudid [Hydromorphone Hcl] Hives and Itching    Sulfa Antibiotics Hives and Action is as Follows:  Assessment   1. Actinic keratoses  Cryotherapy: After verbal consent was obtained including discussion of the risks (lesion persistence, lesion recurrence and hypo/hyperpigmentation) and benefits (resolution of the lesion) 3 total Actinic Keratosis on the bilateral cheeks were treated once with liquid [id number] to achieve a 2-3 mm freeze border.  - NC DESTRUC PREMALIGNANT, FIRST LESION  - NC DESTRUC PREMALIGNANT,2-14 LESIONS    2. Irritated nevus vs. SK vs. Condyloma  - schedule for removal in procedure clinic    3. Multiple benign nevi  - Clinically and Dermatoscopically Benign on Exam Today  - Reviewed ABCDE of moles and melanoma  - Discussed sunscreen and sun protection - recommend SPF 30 or greater sunscreen applied every 2-3 hours, sun protective clothing and avoidance of peak sun. 4. Seborrheic keratoses  reassurance and education    5. Rosacea  - discussed diagnosis, etiology, natural course, and treatment options  - metroNIDAZOLE (METROCREAM) 0.75 % cream; Apply topically 2 times daily. Dispense: 60 g; Refill: 2  - do not use metrocream and disulfiram concurrently (she is not currently taking disulfiram)    RTC for mole removal            Patient Instructions   Cryotherapy    Liquid Nitrogen - \"freeze\" (Cryotherapy)  Your doctor has treated your skin lesions with a very cold substance. The liquid nitrogen is so cold that it may feel like the skin is burning during application. A clear blister or blood blister may form after treatment and may later form a scab. Leave the area alone. Usually this scab will fall of within 1-2 weeks. The area should be kept clean and can be covered with Vaseline and a Band-Aid if needed. If a large blister develops it is ok to use a clean needle to gently pop the blister. Please call our office with any concerns at [phone]. ROSACEA    What is rosacea?     Rosacea is a common skin condition, usually occurring on the face, which

## 2019-10-14 ENCOUNTER — TELEPHONE (OUTPATIENT)
Dept: FAMILY MEDICINE CLINIC | Age: 54
End: 2019-10-14

## 2019-10-15 RX ORDER — ALBUTEROL SULFATE 90 UG/1
2 AEROSOL, METERED RESPIRATORY (INHALATION) EVERY 6 HOURS PRN
Qty: 1 INHALER | Refills: 3 | Status: SHIPPED | OUTPATIENT
Start: 2019-10-15 | End: 2022-05-16 | Stop reason: SDUPTHER

## 2019-10-23 RX ORDER — OMEPRAZOLE 20 MG/1
CAPSULE, DELAYED RELEASE ORAL
Qty: 180 CAPSULE | Refills: 1 | Status: SHIPPED | OUTPATIENT
Start: 2019-10-23 | End: 2020-05-26

## 2019-10-23 RX ORDER — ASPIRIN 81 MG/1
TABLET, DELAYED RELEASE ORAL
Qty: 180 TABLET | Refills: 1 | Status: SHIPPED | OUTPATIENT
Start: 2019-10-23 | End: 2020-05-26

## 2019-10-28 ENCOUNTER — OFFICE VISIT (OUTPATIENT)
Dept: NEUROLOGY | Age: 54
End: 2019-10-28
Payer: MEDICARE

## 2019-10-28 VITALS
SYSTOLIC BLOOD PRESSURE: 134 MMHG | HEIGHT: 68 IN | HEART RATE: 79 BPM | BODY MASS INDEX: 23.98 KG/M2 | DIASTOLIC BLOOD PRESSURE: 85 MMHG | WEIGHT: 158.2 LBS

## 2019-10-28 DIAGNOSIS — G62.9 NEUROPATHY: ICD-10-CM

## 2019-10-28 DIAGNOSIS — M50.30 DEGENERATIVE DISC DISEASE, CERVICAL: ICD-10-CM

## 2019-10-28 DIAGNOSIS — R26.81 GAIT INSTABILITY: Primary | ICD-10-CM

## 2019-10-28 PROCEDURE — 99214 OFFICE O/P EST MOD 30 MIN: CPT | Performed by: NURSE PRACTITIONER

## 2019-10-28 RX ORDER — ALENDRONATE SODIUM 70 MG/1
70 TABLET ORAL
COMMUNITY
End: 2022-05-20 | Stop reason: ALTCHOICE

## 2019-10-28 RX ORDER — SPIRONOLACTONE 25 MG/1
25 TABLET ORAL DAILY
COMMUNITY
End: 2022-09-26

## 2019-11-19 RX ORDER — LOSARTAN POTASSIUM 100 MG/1
TABLET ORAL
Qty: 90 TABLET | Refills: 1 | Status: SHIPPED | OUTPATIENT
Start: 2019-11-19 | End: 2020-01-28

## 2019-11-19 RX ORDER — HYDROXYCHLOROQUINE SULFATE 200 MG/1
TABLET, FILM COATED ORAL
Qty: 90 TABLET | Refills: 1 | Status: SHIPPED | OUTPATIENT
Start: 2019-11-19 | End: 2020-03-02

## 2019-12-11 ENCOUNTER — HOSPITAL ENCOUNTER (OUTPATIENT)
Age: 54
Setting detail: SPECIMEN
Discharge: HOME | End: 2019-12-11
Payer: MEDICARE

## 2019-12-11 ENCOUNTER — OFFICE VISIT (OUTPATIENT)
Dept: FAMILY MEDICINE CLINIC | Age: 54
End: 2019-12-11
Payer: MEDICARE

## 2019-12-11 VITALS
BODY MASS INDEX: 24.48 KG/M2 | OXYGEN SATURATION: 97 % | TEMPERATURE: 97.1 F | WEIGHT: 161 LBS | HEART RATE: 71 BPM | SYSTOLIC BLOOD PRESSURE: 130 MMHG | DIASTOLIC BLOOD PRESSURE: 80 MMHG

## 2019-12-11 DIAGNOSIS — M50.30 DEGENERATIVE DISC DISEASE, CERVICAL: ICD-10-CM

## 2019-12-11 PROBLEM — F10.21 ALCOHOLISM IN RECOVERY (HCC): Status: ACTIVE | Noted: 2019-08-04

## 2019-12-11 PROBLEM — H25.13 AGE-RELATED NUCLEAR CATARACT OF BOTH EYES: Status: ACTIVE | Noted: 2019-05-01

## 2019-12-11 PROBLEM — E03.9 ACQUIRED HYPOTHYROIDISM: Status: ACTIVE | Noted: 2019-08-04

## 2019-12-11 PROBLEM — F41.1 GENERALIZED ANXIETY DISORDER: Status: ACTIVE | Noted: 2019-08-01

## 2019-12-11 PROBLEM — F31.31 BIPOLAR AFFECTIVE DISORDER, CURRENTLY DEPRESSED, MILD (HCC): Status: ACTIVE | Noted: 2019-08-01

## 2019-12-11 LAB
CALCIUM SERPL-MCNC: 9.4 MG/DL (ref 8.6–10.4)
CREATININE URINE: 84.6 MG/DL (ref 28–217)
ESTIMATED AVERAGE GLUCOSE: 111 MG/DL
HBA1C MFR BLD: 5.5 % (ref 4–6)
MICROALBUMIN/CREAT 24H UR: <12 MG/L
MICROALBUMIN/CREAT UR-RTO: NORMAL MCG/MG CREAT
PTH INTACT: 127.7 PG/ML (ref 15–65)
THYROXINE, FREE: 0.75 NG/DL (ref 0.93–1.7)

## 2019-12-11 PROCEDURE — 99213 OFFICE O/P EST LOW 20 MIN: CPT | Performed by: NURSE PRACTITIONER

## 2019-12-11 RX ORDER — CYCLOBENZAPRINE HCL 10 MG
10 TABLET ORAL NIGHTLY PRN
Qty: 30 TABLET | Refills: 5 | Status: SHIPPED | OUTPATIENT
Start: 2019-12-11 | End: 2020-06-29

## 2019-12-11 ASSESSMENT — ENCOUNTER SYMPTOMS
COUGH: 0
SHORTNESS OF BREATH: 0

## 2019-12-19 DIAGNOSIS — L71.9 ROSACEA: ICD-10-CM

## 2019-12-23 ENCOUNTER — CARE COORDINATION (OUTPATIENT)
Dept: CARE COORDINATION | Age: 54
End: 2019-12-23

## 2019-12-23 SDOH — ECONOMIC STABILITY: FOOD INSECURITY: WITHIN THE PAST 12 MONTHS, YOU WORRIED THAT YOUR FOOD WOULD RUN OUT BEFORE YOU GOT MONEY TO BUY MORE.: NEVER TRUE

## 2019-12-23 SDOH — SOCIAL STABILITY: SOCIAL NETWORK: ARE YOU MARRIED, WIDOWED, DIVORCED, SEPARATED, NEVER MARRIED, OR LIVING WITH A PARTNER?: WIDOWED

## 2019-12-23 SDOH — ECONOMIC STABILITY: TRANSPORTATION INSECURITY
IN THE PAST 12 MONTHS, HAS THE LACK OF TRANSPORTATION KEPT YOU FROM MEDICAL APPOINTMENTS OR FROM GETTING MEDICATIONS?: NO

## 2019-12-23 SDOH — HEALTH STABILITY: PHYSICAL HEALTH: ON AVERAGE, HOW MANY MINUTES DO YOU ENGAGE IN EXERCISE AT THIS LEVEL?: 0 MIN

## 2019-12-23 SDOH — SOCIAL STABILITY: SOCIAL NETWORK
DO YOU BELONG TO ANY CLUBS OR ORGANIZATIONS SUCH AS CHURCH GROUPS UNIONS, FRATERNAL OR ATHLETIC GROUPS, OR SCHOOL GROUPS?: NO

## 2019-12-23 SDOH — ECONOMIC STABILITY: INCOME INSECURITY: HOW HARD IS IT FOR YOU TO PAY FOR THE VERY BASICS LIKE FOOD, HOUSING, MEDICAL CARE, AND HEATING?: NOT VERY HARD

## 2019-12-23 SDOH — SOCIAL STABILITY: SOCIAL NETWORK: HOW OFTEN DO YOU GET TOGETHER WITH FRIENDS OR RELATIVES?: ONCE A WEEK

## 2019-12-23 SDOH — SOCIAL STABILITY: SOCIAL NETWORK: HOW OFTEN DO YOU ATTENT MEETINGS OF THE CLUB OR ORGANIZATION YOU BELONG TO?: NEVER

## 2019-12-23 SDOH — HEALTH STABILITY: PHYSICAL HEALTH: ON AVERAGE, HOW MANY DAYS PER WEEK DO YOU ENGAGE IN MODERATE TO STRENUOUS EXERCISE (LIKE A BRISK WALK)?: 0 DAYS

## 2019-12-23 SDOH — ECONOMIC STABILITY: TRANSPORTATION INSECURITY
IN THE PAST 12 MONTHS, HAS LACK OF TRANSPORTATION KEPT YOU FROM MEETINGS, WORK, OR FROM GETTING THINGS NEEDED FOR DAILY LIVING?: NO

## 2019-12-23 SDOH — ECONOMIC STABILITY: FOOD INSECURITY: WITHIN THE PAST 12 MONTHS, THE FOOD YOU BOUGHT JUST DIDN'T LAST AND YOU DIDN'T HAVE MONEY TO GET MORE.: NEVER TRUE

## 2019-12-23 SDOH — HEALTH STABILITY: MENTAL HEALTH
STRESS IS WHEN SOMEONE FEELS TENSE, NERVOUS, ANXIOUS, OR CAN'T SLEEP AT NIGHT BECAUSE THEIR MIND IS TROUBLED. HOW STRESSED ARE YOU?: TO SOME EXTENT

## 2019-12-23 SDOH — SOCIAL STABILITY: SOCIAL NETWORK: HOW OFTEN DO YOU ATTEND CHURCH OR RELIGIOUS SERVICES?: NEVER

## 2019-12-23 SDOH — SOCIAL STABILITY: SOCIAL NETWORK: IN A TYPICAL WEEK, HOW MANY TIMES DO YOU TALK ON THE PHONE WITH FAMILY, FRIENDS, OR NEIGHBORS?: THREE TIMES A WEEK

## 2020-01-07 ENCOUNTER — CARE COORDINATION (OUTPATIENT)
Dept: CARE COORDINATION | Age: 55
End: 2020-01-07

## 2020-01-15 ENCOUNTER — CARE COORDINATION (OUTPATIENT)
Dept: CARE COORDINATION | Age: 55
End: 2020-01-15

## 2020-01-22 ENCOUNTER — CARE COORDINATION (OUTPATIENT)
Dept: CARE COORDINATION | Age: 55
End: 2020-01-22

## 2020-01-22 RX ORDER — HYDROCODONE BITARTRATE AND ACETAMINOPHEN 5; 325 MG/1; MG/1
1 TABLET ORAL PRN
COMMUNITY
Start: 2020-01-02 | End: 2021-02-12 | Stop reason: ALTCHOICE

## 2020-01-22 RX ORDER — IBUPROFEN 800 MG/1
1 TABLET ORAL PRN
COMMUNITY
Start: 2020-01-02

## 2020-01-22 NOTE — CARE COORDINATION
Ambulatory Care Coordination Note  1/22/2020  CM Risk Score: 6  Charlson 10 Year Mortality Risk Score: 100%     ACC: Satinder Thomas RN    Summary Note: She had her right hand surgery for carpal tunnel and ulnar nerve transplantation. Able to bend hand forward but can't bend it backward. Incision ok, she applies Neosporin silver to it. She had her postop visit with surgeon. She is supposed to go to physical therapy 3 times a week for 6 weeks but per her new insurance, she has a $45 copay with each visit so she doesn't know how many visits she will go to. Not needing much pain medication any more for it. She got this insurance plan because her rheumatologist is on the plan. He may put her in a trial for an injectable medication that treats Reynauds. It is not an approved drug yet in [place] but physician told her some patients are getting relief for several months after taking it. No asthma symptoms right now. She is feeling good right now. Expects breathing to get worse in spring like every year and weather changes. No falls recently. Reminded her of next PCP appt. CC Plan:   -Follow in a few weeks to check on therapy, symptoms, any specialist appts. Diabetes Assessment    Medic Alert ID:  No  Meal Planning:  None   How often do you test your blood sugar?:  No Testing (Comment: only PRN if having symptoms)   Do you have barriers with adherence to non-pharmacologic self-management interventions?  (Nutrition/Exercise/Self-Monitoring):  Yes   Have you ever had to go to the ED for symptoms of low blood sugar?:  No       Do you have hyperglycemia symptoms?:  No   Do you have hypoglycemia symptoms?:  No       and   General Assessment    Do you have any symptoms that are causing concern?:  Yes  Progression since Onset:  Intermittent - Waxing/Waning  Reported Symptoms:  Pain (Comment: right hand post op pain)               Care Coordination Interventions    Program Enrollment:  Complex Care  Referral from Primary Care Provider:  No  Suggested Interventions and [address] [place] [address]:  Completed (Comment: Alex)  Medi Set or Pill Pack:  Declined         Goals Addressed                 This Visit's Progress     Conditions and Symptoms   On track     I will schedule office visits, as directed by my provider. I will keep my appointment or reschedule if I have to cancel. I will notify my provider of any barriers to my plan of care. I will follow my Zone Management tool to seek urgent or emergent care. I will notify my provider of any symptoms that indicate a worsening of my condition. Asthma, DM    Barriers: lack of education  Plan for overcoming my barriers: ACM calls, education  Confidence: 8/10  Anticipated Goal Completion Date: 3/30/20              Prior to Admission medications    Medication Sig Start Date End Date Taking? Authorizing Provider   HYDROcodone-acetaminophen (NORCO) 5-325 MG per tablet Take 1 tablet by mouth as needed.  Post op from surgeon after right CTR sx 1/2/20   Historical Provider, MD   ibuprofen (ADVIL;MOTRIN) 800 MG tablet Take 1 tablet by mouth as needed For post op pain prescribed by surgeon after right CTR sx 1/2/20   Historical Provider, MD   VORTIoxetine (TRINTELLIX) 10 MG TABS tablet Take 10 mg by mouth daily    Historical Provider, MD   metroNIDAZOLE (METROCREAM) 0.75 % cream APPLY TOPICALLY TWICE DAILY  Patient not taking: Reported on 12/23/2019 12/19/19   Venecia Pan MD   cyclobenzaprine (FLEXERIL) 10 MG tablet Take 1 tablet by mouth nightly as needed for Muscle spasms 12/11/19   COLTON Burns CNP   losartan (COZAAR) 100 MG tablet TAKE 1 TABLET BY MOUTH ONE TIME A DAY  11/19/19   COLTON Burns CNP   hydroxychloroquine (PLAQUENIL) 200 MG tablet TAKE 1 TABLET BY MOUTH ONE TIME A DAY  11/19/19   COLTON Burns CNP   spironolactone (ALDACTONE) 25 MG tablet Take 25 mg by mouth daily    Historical Provider, MD   alendronate (FOSAMAX) 70 MG tablet Take 70 mg by mouth every 7 days    Historical Provider, MD   omeprazole (PRILOSEC) 20 MG delayed release capsule TAKE 1 CAPSULE BY MOUTH TWO TIMES A DAY  10/23/19   COLTON Alberto CNP   SM ASPIRIN ADULT LOW STRENGTH 81 MG EC tablet TAKE 1 TABLET BY MOUTH TWO TIMES A DAY  10/23/19   COLTON Alberto CNP   albuterol sulfate HFA (VENTOLIN HFA) 108 (90 Base) MCG/ACT inhaler Inhale 2 puffs into the lungs every 6 hours as needed for Wheezing 10/15/19   COLTON Alberto CNP   ferrous sulfate 325 (65 Fe) MG tablet TAKE 1 TABLET BY MOUTH TWO TIMES A DAY  9/23/19   Stephanie Copeland DO   naltrexone (VIVITROL) 380 MG injection Inject 380 mg into the muscle once Not currently taking, waiting for insurance approval    Historical Provider, MD   gabapentin (NEURONTIN) 800 MG tablet TAKE 1 TABLET BY MOUTH FOUR TIMES A DAY 9/9/19 9/9/20  COLTON Gamino CNP   PROAIR  (90 Base) MCG/ACT inhaler INHALE 2 PUFFS BY MOUTH EVERY SIX HOURS AS NEEDED FOR WHEEZING 8/19/19   Elizabeth Zavala [facility], MD   cetirizine (ZYRTEC) 10 MG tablet TAKE 1 TABLET BY MOUTH ONE TIME A DAY  7/22/19   COLTON Alberto CNP   Blood Pressure KIT 1 kit by Does not apply route once for 1 dose 7/17/19 12/23/19  COLTON Alberto CNP   propranolol (INDERAL) 20 MG tablet Take 1 tablet by mouth nightly 6/11/19   COLTON Valdez CNP   disulfiram (ANTABUSE) 250 MG tablet Take 1 tablet by mouth daily 6/12/19   COLTON Valdez CNP   EPINEPHrine (EPIPEN 2-JIGAR) 0.3 MG/0.3ML SOAJ injection To be used if experiencing angioedema.  5/28/19   COLTON Alberto CNP   famotidine (PEPCID) 40 MG tablet TAKE 1 TABLET BY MOUTH IN THE EVENING 5/1/19   COLTON Alberto CNP   metFORMIN (GLUCOPHAGE) 500 MG tablet Take 500 mg by mouth daily  4/26/19   Historical Provider, MD   lamoTRIgine (LAMICTAL) 25 MG tablet Take 25 mg by mouth 2 times daily     Historical Provider,

## 2020-01-23 RX ORDER — GLUCOSAMINE HCL/CHONDROITIN SU 500-400 MG
CAPSULE ORAL
Qty: 100 STRIP | Refills: 5 | Status: SHIPPED | OUTPATIENT
Start: 2020-01-23

## 2020-01-28 RX ORDER — LOSARTAN POTASSIUM 100 MG/1
TABLET ORAL
Qty: 90 TABLET | Refills: 1 | Status: SHIPPED | OUTPATIENT
Start: 2020-01-28 | End: 2020-07-20 | Stop reason: SDUPTHER

## 2020-01-28 NOTE — TELEPHONE ENCOUNTER
25 Hydroxy     Basic Metabolic Panel     CBC Auto Differential     Creatinine, Random Urine     Phosphorus     Protein / creatinine ratio, urine     Protein, urine, random     PTH, INTACT WITH IONIZED CALCIUM     Vitamin D 25 Hydroxy                 Patient Active Problem List:     Seizures (HCC)     Transient insomnia     Sleep walking disorder     Raynaud's disease     Pulmonary nodules     Neuropathy     Hypertension     GERD (gastroesophageal reflux disease)     Fibromyalgia     Depression     Degenerative disc disease, thoracic     CAD (coronary artery disease)     Bipolar disorder (HCC)     Asthma     Anxiety     Antinuclear antibody (IQRA) titer greater than 1:80     Anemia     Hx of Stroke (HCC)     Type 2 diabetes mellitus with stage 2 chronic kidney disease, without long-term current use of insulin (HCC)     Degenerative arthritis of right knee     S/P knee surgery     Chronic fatigue syndrome     Spondylosis of cervical region without myelopathy or radiculopathy     H/O hysterectomy with BSO at [facility] for benign disease 2014     Hx of total bilateral knee replacement     H/O: hysterectomy     Atrial fibrillation with slow ventricular response ([facility])     H/O: stroke     Hypovolemia     Hypotension     Bradycardia     Cystocele, midline     JOYCE (stress urinary incontinence, female)     Overflow incontinence     7/30/18 Cystocele repair, Cystoscopy with OBTRYX Ureteral Sling     Nuclear senile cataract     CKD (chronic kidney disease), stage II     Scleroderma ([address]     Vitamin D deficiency     Trigger middle finger of right hand     Trigger ring finger of right hand     Osteoarthritis of spine with radiculopathy, cervical region     Degenerative disc disease, cervical     Left carpal tunnel syndrome     Spinal stenosis, cervical region     Neural foraminal stenosis of cervical spine     Closed fracture of lower end of femur ([address]     Closed supracondylar fracture of femur, left, initial encounter ([address] Numbness     Age-related osteoporosis without current pathological fracture     Acquired hypothyroidism     Age-related nuclear cataract of both eyes     Alcoholism in recovery ([facility]     Bipolar affective disorder, currently depressed, mild (HCC)     Generalized anxiety disorder

## 2020-02-10 ENCOUNTER — CARE COORDINATION (OUTPATIENT)
Dept: CARE COORDINATION | Age: 55
End: 2020-02-10

## 2020-02-10 NOTE — CARE COORDINATION
Left VM message asking patient to call me back at [phone] for care management call. Will call again next week.     Future Appointments   Date Time Provider Sylvester Karina   2/27/2020  8:30 AM Julia Antunez MD [facility] None   3/11/2020  8:15 AM COLTON Tucker - CNP [facility]   3/11/2020 11:20 AM Deloras Bloch, APRN - CNP [facility] [place]

## 2020-02-21 ENCOUNTER — CARE COORDINATION (OUTPATIENT)
Dept: CARE COORDINATION | Age: 55
End: 2020-02-21

## 2020-02-21 ENCOUNTER — HOSPITAL ENCOUNTER (OUTPATIENT)
Age: 55
Setting detail: SPECIMEN
Discharge: HOME | End: 2020-02-21
Payer: MEDICARE

## 2020-02-21 LAB
ABSOLUTE EOS #: 0.18 K/UL (ref 0–0.44)
ABSOLUTE IMMATURE GRANULOCYTE: <0.03 K/UL (ref 0–0.3)
ABSOLUTE LYMPH #: 1.43 K/UL (ref 1.1–3.7)
ABSOLUTE MONO #: 0.22 K/UL (ref 0.1–1.2)
ANION GAP SERPL CALCULATED.3IONS-SCNC: 17 MMOL/L (ref 9–17)
BASOPHILS # BLD: 3 % (ref 0–2)
BASOPHILS ABSOLUTE: 0.1 K/UL (ref 0–0.2)
BUN BLDV-MCNC: 12 MG/DL (ref 6–20)
BUN/CREAT BLD: ABNORMAL (ref 9–20)
CALCIUM IONIZED: 1.17 MMOL/L (ref 1.13–1.33)
CALCIUM SERPL-MCNC: 9.3 MG/DL (ref 8.6–10.4)
CHLORIDE BLD-SCNC: 107 MMOL/L (ref 98–107)
CO2: 23 MMOL/L (ref 20–31)
CREAT SERPL-MCNC: 0.8 MG/DL (ref 0.5–0.9)
CREATININE URINE: 42.7 MG/DL (ref 28–217)
DIFFERENTIAL TYPE: ABNORMAL
EOSINOPHILS RELATIVE PERCENT: 5 % (ref 1–4)
GFR AFRICAN AMERICAN: >60 ML/MIN
GFR NON-AFRICAN AMERICAN: >60 ML/MIN
GFR SERPL CREATININE-BSD FRML MDRD: ABNORMAL ML/MIN/{1.73_M2}
GFR SERPL CREATININE-BSD FRML MDRD: ABNORMAL ML/MIN/{1.73_M2}
GLUCOSE BLD-MCNC: 85 MG/DL (ref 70–99)
HCT VFR BLD CALC: 36.6 % (ref 36.3–47.1)
HEMOGLOBIN: 11.4 G/DL (ref 11.9–15.1)
IMMATURE GRANULOCYTES: 0 %
LYMPHOCYTES # BLD: 42 % (ref 24–43)
MCH RBC QN AUTO: 26.3 PG (ref 25.2–33.5)
MCHC RBC AUTO-ENTMCNC: 31.1 G/DL (ref 28.4–34.8)
MCV RBC AUTO: 84.5 FL (ref 82.6–102.9)
MONOCYTES # BLD: 6 % (ref 3–12)
NRBC AUTOMATED: 0 PER 100 WBC
PDW BLD-RTO: 15.9 % (ref 11.8–14.4)
PHOSPHORUS: 4 MG/DL (ref 2.6–4.5)
PLATELET # BLD: 282 K/UL (ref 138–453)
PLATELET ESTIMATE: ABNORMAL
PMV BLD AUTO: 10.3 FL (ref 8.1–13.5)
POTASSIUM SERPL-SCNC: 4.6 MMOL/L (ref 3.7–5.3)
PTH INTACT: 96.74 PG/ML (ref 15–65)
RBC # BLD: 4.33 M/UL (ref 3.95–5.11)
RBC # BLD: ABNORMAL 10*6/UL
SEG NEUTROPHILS: 44 % (ref 36–65)
SEGMENTED NEUTROPHILS ABSOLUTE COUNT: 1.49 K/UL (ref 1.5–8.1)
SODIUM BLD-SCNC: 147 MMOL/L (ref 135–144)
TOTAL PROTEIN, URINE: 7 MG/DL
URINE TOTAL PROTEIN CREATININE RATIO: 0.16 (ref 0–0.2)
VITAMIN D 25-HYDROXY: 24.3 NG/ML (ref 30–100)
WBC # BLD: 3.4 K/UL (ref 3.5–11.3)
WBC # BLD: ABNORMAL 10*3/UL

## 2020-02-28 ENCOUNTER — CARE COORDINATION (OUTPATIENT)
Dept: CARE COORDINATION | Age: 55
End: 2020-02-28

## 2020-02-28 RX ORDER — CETIRIZINE HYDROCHLORIDE 10 MG/1
TABLET ORAL
Qty: 90 TABLET | Refills: 1 | Status: SHIPPED | OUTPATIENT
Start: 2020-02-28 | End: 2020-07-06

## 2020-02-28 NOTE — CARE COORDINATION
Left VM message asking patient to call me back at [phone] for care management call. Will plan on seeing at upcoming PCP appt.     Future Appointments   Date Time Provider Sylvester Collins   3/11/2020  8:15 AM COLTON Hua - CNP [facility]   3/11/2020 11:20 AM COLTON Dyer CNP [facility] [place]   4/23/2020  8:50 AM Arlene Gamez MD [facility] None

## 2020-03-02 RX ORDER — FERROUS SULFATE 325(65) MG
TABLET ORAL
Qty: 60 TABLET | Refills: 0 | OUTPATIENT
Start: 2020-03-02

## 2020-03-02 RX ORDER — HYDROXYCHLOROQUINE SULFATE 200 MG/1
TABLET, FILM COATED ORAL
Qty: 90 TABLET | Refills: 1 | Status: SHIPPED | OUTPATIENT
Start: 2020-03-02 | End: 2020-10-28

## 2020-03-02 NOTE — TELEPHONE ENCOUNTER
Protein, urine, random Once 02/27/2020   Creatinine, Random Urine Once 02/27/2020   Microalbumin / Creatinine Urine Ratio Once 71/54/0150   Basic Metabolic Panel     CBC Auto Differential     Creatinine, Random Urine     Phosphorus     Protein / creatinine ratio, urine     Protein, urine, random     PTH, INTACT WITH IONIZED CALCIUM     Vitamin D 25 Hydroxy                 Patient Active Problem List:     Seizures ([facility])     Transient insomnia     Sleep walking disorder     Raynaud's disease     Pulmonary nodules     Neuropathy     Hypertension     GERD (gastroesophageal reflux disease)     Fibromyalgia     Depression     Degenerative disc disease, thoracic     CAD (coronary artery disease)     Bipolar disorder (HCC)     Asthma     Anxiety     Antinuclear antibody (IQRA) titer greater than 1:80     Anemia     Hx of Stroke ([facility])     Type 2 diabetes mellitus with stage 2 chronic kidney disease, without long-term current use of insulin ([facility])     Degenerative arthritis of right knee     S/P knee surgery     Chronic fatigue syndrome     Spondylosis of cervical region without myelopathy or radiculopathy     H/O hysterectomy with BSO at [facility] for benign disease 2014     Hx of total bilateral knee replacement     H/O: hysterectomy     Atrial fibrillation with slow ventricular response ([address]     H/O: stroke     Hypovolemia     Hypotension     Bradycardia     Cystocele, midline     JOYCE (stress urinary incontinence, female)     Overflow incontinence     7/30/18 Cystocele repair, Cystoscopy with OBTRYX Ureteral Sling     Nuclear senile cataract     CKD (chronic kidney disease), stage II     Scleroderma ([address]     Vitamin D deficiency     Trigger middle finger of right hand     Trigger ring finger of right hand     Osteoarthritis of spine with radiculopathy, cervical region     Degenerative disc disease, cervical     Left carpal tunnel syndrome     Spinal stenosis, cervical region     Neural foraminal stenosis of cervical spine Closed fracture of lower end of femur ([address]     Closed supracondylar fracture of femur, left, initial encounter ([address]     Numbness     Age-related osteoporosis without current pathological fracture     Acquired hypothyroidism     Age-related nuclear cataract of both eyes     Alcoholism in recovery ([address]     Bipolar affective disorder, currently depressed, mild (HCC)     Generalized anxiety disorder

## 2020-03-11 ENCOUNTER — OFFICE VISIT (OUTPATIENT)
Dept: NEUROLOGY | Age: 55
End: 2020-03-11
Payer: MEDICARE

## 2020-03-11 VITALS
HEIGHT: 68 IN | HEART RATE: 77 BPM | BODY MASS INDEX: 23.34 KG/M2 | DIASTOLIC BLOOD PRESSURE: 104 MMHG | WEIGHT: 154 LBS | SYSTOLIC BLOOD PRESSURE: 155 MMHG

## 2020-03-11 PROBLEM — R56.9 SEIZURES (HCC): Status: RESOLVED | Noted: 2017-02-24 | Resolved: 2020-03-11

## 2020-03-11 PROCEDURE — 99214 OFFICE O/P EST MOD 30 MIN: CPT | Performed by: NURSE PRACTITIONER

## 2020-03-11 RX ORDER — GABAPENTIN 800 MG/1
TABLET ORAL
Qty: 120 TABLET | Refills: 5 | Status: SHIPPED | OUTPATIENT
Start: 2020-03-11 | End: 2020-09-14 | Stop reason: SDUPTHER

## 2020-03-11 NOTE — PROGRESS NOTES
walking disorder     Thyroid disease     TIA (transient ischemic attack)     Transient insomnia     Tricuspid regurgitation     Vasospasm (HCC) 01/2016    bilat. legs. resulting in near complete absence of profusion to arteries of feet. ([facility]).     Vitamin D deficiency 8/23/2018    Wears glasses         PAST SURGICAL HISTORY:         Procedure Laterality Date    ABDOMINOPLASTY  2013    BLADDER SUSPENSION N/A 7/30/2018    CYSTOSCOPY WITH OBTRYX MID URETHRAL SLING performed by Cassidy Alvares MD at [address] Right 2016    CARPAL TUNNEL RELEASE Right 01/02/2020    CHOLECYSTECTOMY  1989    COLONOSCOPY N/A 6/24/2019    COLORECTAL CANCER SCREENING, NOT HIGH RISK performed by Loni Singh MD at [address] 7/2/2019    COLORECTAL CANCER SCREENING, NOT HIGH RISK performed by Loni Singh MD at [address] Right     EYE SURGERY      [email or web address]. lasik     FEMUR FRACTURE SURGERY  11/09/2018    FINGER SURGERY Right 08/28/2018    RING CARTER MASS EXCISION, TRIGGER RELEASE    FRACTURE SURGERY      left femur    GASTRECTOMY      GASTRIC BYPASS SURGERY  2012    HYSTERECTOMY      JOINT REPLACEMENT Bilateral     knees    KNEE SURGERY Right 05/02/2017    (femoral) patella replacement    NERVE BLOCK Right 05/04/2018     cervical facet #1 no steroid    NERVE BLOCK Right 06/29/2018    rt cervical diagnostic #2 decadron 10mg    NERVE BLOCK Right 08/10/2018    right cervical rfa decadron 10mg    ID ANTERIOR COLPORRAPHY RPR CYSTOCELE W/CYSTO N/A 7/30/2018    CYSTOCELE REPAIR performed by Hussein Hartley DO at [address] OFF[address]PT [address] Right 8/28/2018    RING CARTER MASS EXCISION, TRIGGER RELEASE, [address] performed by Ankush Cunningham DO at [address] OFFICE/OUTPT [address] Left 11/9/2018    FEMUR RETROGRADE IM NAILING PERIPROSTHETIC FRACTURE performed by Ankush Cunningham DO at [address] MEDIAN N/CARPAL TUNNEL SURG Left 10/23/2018    CARPAL TUNNEL RELEASE [facility]  [facility]    left knee    TOTAL KNEE ARTHROPLASTY Right 5/2/2017    PATELLOFEMORAL REPLACEMENT KNEE - Le , [address] TABLE, FEMORAL POPLITEAL BLOCK, NSA= SPINAL VS GENERAL performed by Bennie Walker DO at [address] [address]        SOCIAL HISTORY:     Social History     Socioeconomic History    Marital status:      Spouse name: Not on file    Number of children: Not on file    Years of education: Not on file    Highest education level: Not on file   Occupational History    Not on file   Social Needs    Financial resource strain: Not very hard    Food insecurity     Worry: Never true     Inability: Never true    Transportation needs     Medical: No     Non-medical: No   Tobacco Use    Smoking status: Never Smoker    Smokeless tobacco: Never Used   Substance and Sexual Activity    Alcohol use: Not Currently    Drug use: No    Sexual activity: Not Currently     Birth control/protection: Surgical     Comment: pt has hysterectomy   Lifestyle    Physical activity     Days per week: 0 days     Minutes per session: 0 min    Stress: To some extent   Relationships    Social connections     Talks on phone: Three times a week     Gets together: Once a week     Attends Holiness service: Never     Active member of club or organization: No     Attends meetings of clubs or organizations: Never     Relationship status:      Intimate partner violence     Fear of current or ex partner: Not on file     Emotionally abused: Not on file     Physically abused: Not on file     Forced sexual activity: Not on file   Other Topics Concern    Not on file   Social History Narrative    Not on file       CURRENT MEDICATIONS:     Current Outpatient Medications   Medication Sig Dispense Refill    gabapentin (NEURONTIN) 800 MG tablet TAKE 1 TABLET BY MOUTH FOUR TIMES A  tablet 5    hydroxychloroquine (PLAQUENIL) 200 MG tablet TAKE 1 TABLET BY MOUTH ONE TIME A DAY  90 tablet 1    cetirizine (ZYRTEC) 10 MG tablet TAKE 1 TABLET BY MOUTH ONE TIME A DAY  90 tablet 1    losartan (COZAAR) 100 MG tablet TAKE 1 TABLET BY MOUTH ONE TIME A DAY  90 tablet 1    famotidine (PEPCID) 40 MG tablet TAKE 1 TABLET BY MOUTH IN THE EVENING  30 tablet 5    blood glucose monitor strips Test 4 times a day & as needed for symptoms of irregular blood glucose. 100 strip 5    HYDROcodone-acetaminophen (NORCO) 5-325 MG per tablet Take 1 tablet by mouth as needed. Post op from surgeon after right CTR sx      VORTIoxetine (TRINTELLIX) 10 MG TABS tablet Take 10 mg by mouth daily      cyclobenzaprine (FLEXERIL) 10 MG tablet Take 1 tablet by mouth nightly as needed for Muscle spasms 30 tablet 5    spironolactone (ALDACTONE) 25 MG tablet Take 25 mg by mouth daily      alendronate (FOSAMAX) 70 MG tablet Take 70 mg by mouth every 7 days      omeprazole (PRILOSEC) 20 MG delayed release capsule TAKE 1 CAPSULE BY MOUTH TWO TIMES A DAY  180 capsule 1    SM ASPIRIN ADULT LOW STRENGTH 81 MG EC tablet TAKE 1 TABLET BY MOUTH TWO TIMES A DAY  180 tablet 1    albuterol sulfate HFA (VENTOLIN HFA) 108 (90 Base) MCG/ACT inhaler Inhale 2 puffs into the lungs every 6 hours as needed for Wheezing 1 Inhaler 3    ferrous sulfate 325 (65 Fe) MG tablet TAKE 1 TABLET BY MOUTH TWO TIMES A DAY  60 tablet 2    PROAIR  (90 Base) MCG/ACT inhaler INHALE 2 PUFFS BY MOUTH EVERY SIX HOURS AS NEEDED FOR WHEEZING 8.5 g 2    Blood Pressure KIT 1 kit by Does not apply route once for 1 dose 1 kit 0    propranolol (INDERAL) 20 MG tablet Take 1 tablet by mouth nightly 30 tablet 0    disulfiram (ANTABUSE) 250 MG tablet Take 1 tablet by mouth daily 30 tablet 0    EPINEPHrine (EPIPEN 2-JIGAR) 0.3 MG/0.3ML SOAJ injection To be used if experiencing angioedema.  1 each 0    metFORMIN absent, Diarrhea: absent, Swallowing change: absent       Urinary frequency: absent, Urinary urgency: absent,   MUSCULOSKELETAL Neck pain: absent, Back pain: absent, Stiffness: absent, Muscle pain: absent, Joint pain: absent Restless legs: absent   DERMATOLOGIC Hair loss: absent, Skin changes: absent   NEUROLOGIC Memory loss: present, Confusion: absent, Seizures: absent Trouble walking or imbalance: absent, Dizziness: absent, Weakness: absent, Numbness: present Tremor: absent, Spasm: absent, Speech difficulty: absent, Headache: absent, Light sensitivity: absent   PSYCHIATRIC Anxiety: present, Hallucination: absent, Mood disorder: present   HEMATOLOGIC Abnormal bleeding: absent, Anemia: absent,            PHYSICAL EXAMINATION:       Vitals:    03/11/20 1127   BP: (!) 155/104   Pulse: 77                                              .                                                                                                    General Appearance:  Alert, cooperative, no signs of distress, appears stated age   Head:  Normocephalic, no signs of trauma   Eyes:  Conjunctiva/corneas clear;  eyelids intact   Ears:  Normal external ear and canals   Nose: Nares normal, mucosa normal, no drainage    Throat: Lips and tongue normal; teeth normal;  gums normal   Neck: Supple, intact flexion, extension and rotation;   trachea midline;  no adenopathy;   thyroid: not enlarged;   no carotid pulse abnormality   Back:   Symmetric, no curvature, ROM adequate   Lungs:   Respirations unlabored   Heart:  Regular rate and rhythm           Extremities: Extremities normal, no cyanosis, no edema   Pulses: Symmetric over head and neck   Skin: Skin color, texture normal, no rashes, no lesions                                     NEUROLOGIC EXAMINATION    Neurologic Exam  Mental status    Alert and oriented x 3; intact memory with no confusion, speech or language problems; no hallucinations or delusions  Fund of information appropriate for level of education    Cranial nerves    II - visual fields intact to confrontation bilaterally  III, IV, VI - extra-ocular muscles full: no pupillary defect; no LYNSEY, no nystagmus, no ptosis   V - normal facial sensation                                                               VII - normal facial symmetry                                                             VIII - intact hearing                                                                             IX, X - symmetrical palate                                                                  XI - symmetrical shoulder shrug                                                       XII - tongue midline without atrophy or fasciculation      Motor function  Normal muscle bulk and tone; strength 5/5 on all 4 extremities, no pronator drift      Sensory function diminished light touch, pinprick, temperature and vibration in a glove stocking distribution in her legs bilaterally      Cerebellar Intact fine motor movement. No involuntary movements or tremors. No ataxia or dysmetria on finger to nose or heel to shin testing      Reflex function DTR 2+ on bilateral UE and LE, symmetric. Negative Babinski      Gait                   wide base and arm swing                  ASSESSMENT AND PLAN:           In summary, your patient, Irene Fink exhibits the following, with associated plan:    1. Patient has diminished sensation in her feet bilaterally in a glove stocking distribution, likely associated with a peripheral neuropathy. 1. Obtain EMG/NCV studies of bilateral lower extremities  2. Chronic cervical radiculopathy with neck pain radiating into her right shoulder  1. Continue gabapentin 800 mg 4 times daily  3. Previously isolated seizure secondary to tramadol  1.  No intervention necessary            Signed: Carter Gore CNP      *Please note that portions of this note were completed with a voice recognition program.  Although every effort was made to insure

## 2020-03-12 ENCOUNTER — CARE COORDINATION (OUTPATIENT)
Dept: CARE COORDINATION | Age: 55
End: 2020-03-12

## 2020-03-18 ENCOUNTER — CARE COORDINATION (OUTPATIENT)
Dept: CARE COORDINATION | Age: 55
End: 2020-03-18

## 2020-03-18 NOTE — CARE COORDINATION
Left VM message asking patient to call me at [phone] if she has any needs in the future. Discharged from care management as unable to contact her, she has not returned my calls.     Future Appointments   Date Time Provider Sylvester Karina   4/1/2020  8:15 AM COLTON Perez - CNP [facility]   4/23/2020  8:50 AM Mandy Rocha MD [facility] Neph [facility] None   9/14/2020 10:40 AM COLTON Delgado CNP Neuro Spec Reina Bare

## 2020-04-24 ENCOUNTER — TELEPHONE (OUTPATIENT)
Dept: FAMILY MEDICINE CLINIC | Age: 55
End: 2020-04-24

## 2020-04-24 NOTE — LETTER
[facility]  Prescriber:  Shira Coley MD  [address] [place]  /  [place] [address]  Phone: [phone]       Fax: [phone]     Patient:  Dale Watson   1965  [address] [address]  [phone] (home)      Rationale:   Patient stated they were interested in changing to 90 day supply of medication to assist with adherence. Patient would like the following 90 day prescription(s):    Medication: metformin 500mg                Si tab po daily                #: 90    R: 1       Prescriber Response:    Prescription(s) approved (please [place] one):  YES  NO    Other response: ________________________________________      ______________________________________   __________________  Authorized By       Date     Pharmacy: [facility]                              Phone:     [phone]    [address] [place] [address]         Fax:     [phone]    Sakina Corona      The information transmitted is intended only for the person or entity to which it is addressed and may contain confidential and/or privileged material. Any review, retransmission, dissemination or other use of, or taking of any action in reliance upon, this information by persons or entities other than the intended recipient is prohibited. This document contains information covered under the Privacy Act, 5 (a), and/or the [employer] and Accountability Act ([address]) and its various implementing regulations and must be protected in accordance with those provisions. If you received this in error, please contact the sender and delete the material from any computer.

## 2020-04-29 RX ORDER — FERROUS SULFATE 325(65) MG
TABLET ORAL
Qty: 60 TABLET | Refills: 0 | OUTPATIENT
Start: 2020-04-29

## 2020-04-29 NOTE — TELEPHONE ENCOUNTER
CLINICAL PHARMACY CONSULT: MED RECONCILIATION/REVIEW ADDENDUM    For Pharmacy Admin Tracking Only    PHSO: Yes  Total # of Interventions Recommended: 3  - New Order #: 0 New Medication Order Reason(s): Adherence  - Maintenance Safety Lab Monitoring #: 1  Recommended intervention potential cost savings: 1  Total Interventions Accepted: 2  Time Spent (min): 15    Trinity Scott CPhT.   [address]
Left msg on home TAD to call back.     · Verify if she's been on statin-pravastatin 40mg  · 90 day supply (LIS-1) on metformin and losartan from Dr. Jose Webster
Spoke to Génesis Parker in pharmacy. Verified pravastatin 40mg hasn't been filled since 5/26/19. Losartan 100mg last refilled 1/26/20 with NR  Metformin 500mg last refilled 3/27/20 30 day supply 2 RR.
Spoke with Hardy Moreno in pharmacy to refill the following:  Losartan 100mg 90DS 1RR  Metformin 500mg 30DS 1RR    Will follow up for  then route to Dr. Giovanny Howe for 90 day supply
Carlos Clancy., et al., 2013) failed to calculate for the following reasons: The patient has a prior MI or stroke diagnosis      ASSESSMENT:  Hyperlipidemia Goal: Patient has a history of ASCVD and therefore may be a candidate for high-intensity statin therapy based on updated guidelines. · Pravastatin 80mg daily (moderate-intensity statin) on patient's current medication list, however, per patient's insurance report, no prescription claims for statin therapy this year.     PLAN:  - Confirm with pharmacy and/or patient if filling/taking pravastatin 80mg daily

## 2020-04-30 RX ORDER — PRAVASTATIN SODIUM 40 MG
40 TABLET ORAL DAILY
Qty: 90 TABLET | Refills: 1 | Status: SHIPPED | OUTPATIENT
Start: 2020-04-30 | End: 2020-10-01

## 2020-05-22 ENCOUNTER — TELEPHONE (OUTPATIENT)
Dept: PHARMACY | Facility: CLINIC | Age: 55
End: 2020-05-22

## 2020-05-22 NOTE — TELEPHONE ENCOUNTER
CLINICAL PHARMACY CONSULT: MED RECONCILIATION/REVIEW ADDENDUM    For Pharmacy Admin Tracking Only    PHSO: Yes  Total # of Interventions Recommended: 3  - New Order #: 0 New Medication Order Reason(s): Adherence  - Maintenance Safety Lab Monitoring #: 1  Recommended intervention potential cost savings: 1  Total Interventions Accepted: 1  Time Spent (min): 15    Trinity Scott CPhT.   [address]

## 2020-05-26 RX ORDER — OMEPRAZOLE 20 MG/1
CAPSULE, DELAYED RELEASE ORAL
Qty: 180 CAPSULE | Refills: 1 | Status: SHIPPED | OUTPATIENT
Start: 2020-05-26 | End: 2020-12-16 | Stop reason: SDUPTHER

## 2020-05-26 RX ORDER — ASPIRIN 81 MG/1
TABLET, DELAYED RELEASE ORAL
Qty: 180 TABLET | Refills: 1 | Status: SHIPPED | OUTPATIENT
Start: 2020-05-26 | End: 2020-12-28

## 2020-05-26 NOTE — TELEPHONE ENCOUNTER
stenosis of cervical spine     Closed fracture of lower end of femur ([address]     Closed supracondylar fracture of femur, left, initial encounter ([address]     Numbness     Age-related osteoporosis without current pathological fracture     Acquired hypothyroidism     Age-related nuclear cataract of both eyes     Alcoholism in recovery ([address]     Bipolar affective disorder, currently depressed, mild (HCC)     Generalized anxiety disorder

## 2020-05-29 RX ORDER — CETIRIZINE HYDROCHLORIDE 10 MG/1
TABLET ORAL
Qty: 90 TABLET | Refills: 0 | OUTPATIENT
Start: 2020-05-29

## 2020-07-16 ENCOUNTER — HOSPITAL ENCOUNTER (OUTPATIENT)
Age: 55
Discharge: HOME | End: 2020-07-16
Payer: MEDICARE

## 2020-07-16 LAB
CALCIUM SERPL-MCNC: 9.1 MG/DL (ref 8.6–10.4)
IRON SATURATION: 15 % (ref 20–55)
IRON: 62 UG/DL (ref 37–145)
POTASSIUM SERPL-SCNC: 3.9 MMOL/L (ref 3.7–5.3)
PTH INTACT: 95.39 PG/ML (ref 15–65)
THYROXINE, FREE: 0.82 NG/DL (ref 0.93–1.7)
TOTAL IRON BINDING CAPACITY: 424 UG/DL (ref 250–450)
UNSATURATED IRON BINDING CAPACITY: 362 UG/DL (ref 112–347)
VITAMIN D 25-HYDROXY: 30.1 NG/ML (ref 30–100)

## 2020-07-16 PROCEDURE — 83550 IRON BINDING TEST: CPT

## 2020-07-16 PROCEDURE — 83970 ASSAY OF PARATHORMONE: CPT

## 2020-07-16 PROCEDURE — 84439 ASSAY OF FREE THYROXINE: CPT

## 2020-07-16 PROCEDURE — 36415 COLL VENOUS BLD VENIPUNCTURE: CPT

## 2020-07-16 PROCEDURE — 84132 ASSAY OF SERUM POTASSIUM: CPT

## 2020-07-16 PROCEDURE — 82306 VITAMIN D 25 HYDROXY: CPT

## 2020-07-16 PROCEDURE — 83540 ASSAY OF IRON: CPT

## 2020-07-16 PROCEDURE — 82310 ASSAY OF CALCIUM: CPT

## 2020-07-20 ENCOUNTER — TELEPHONE (OUTPATIENT)
Dept: FAMILY MEDICINE CLINIC | Age: 55
End: 2020-07-20

## 2020-07-20 ENCOUNTER — OFFICE VISIT (OUTPATIENT)
Dept: FAMILY MEDICINE CLINIC | Age: 55
End: 2020-07-20
Payer: MEDICARE

## 2020-07-20 VITALS
HEART RATE: 74 BPM | TEMPERATURE: 97.2 F | DIASTOLIC BLOOD PRESSURE: 78 MMHG | BODY MASS INDEX: 21.59 KG/M2 | OXYGEN SATURATION: 98 % | SYSTOLIC BLOOD PRESSURE: 130 MMHG | WEIGHT: 142 LBS

## 2020-07-20 PROCEDURE — 99214 OFFICE O/P EST MOD 30 MIN: CPT | Performed by: NURSE PRACTITIONER

## 2020-07-20 RX ORDER — FAMOTIDINE 40 MG/1
TABLET, FILM COATED ORAL
Qty: 90 TABLET | Refills: 1 | Status: SHIPPED | OUTPATIENT
Start: 2020-07-20 | End: 2020-12-28

## 2020-07-20 RX ORDER — LOSARTAN POTASSIUM 100 MG/1
TABLET ORAL
Qty: 90 TABLET | Refills: 1 | Status: SHIPPED | OUTPATIENT
Start: 2020-07-20 | End: 2021-01-18 | Stop reason: SDUPTHER

## 2020-07-20 ASSESSMENT — ENCOUNTER SYMPTOMS
SHORTNESS OF BREATH: 0
COUGH: 0

## 2020-07-20 NOTE — PROGRESS NOTES
[address] [place]  [phone]    Graciela Taylor is a 47 y.o. female who presents today for her  medical conditions/complaints as noted below. [place] is c/o of Medication Refill  . HPI:     HPI   Hypertension: Patient here for follow-up of elevated blood pressure. She is not exercising and is adherent to low salt diet. Blood pressure is well controlled at home. Cardiac symptoms none. Patient denies chest pain, chest pressure/discomfort, exertional chest pressure/discomfort, irregular heart beat and lower extremity edema. Cardiovascular risk factors: hypertension and sedentary lifestyle. Use of agents associated with hypertension: none. History of target organ damage: none. pepcid working fine for gerd. No concerns. Nursing note reviewed and discussed with patient. Patient's medications, allergies, past medical, surgical, social and family histories were reviewed and updated asappropriate.     Current Outpatient Medications   Medication Sig Dispense Refill    losartan (COZAAR) 100 MG tablet TAKE 1 TABLET BY MOUTH ONE TIME A DAY 90 tablet 1    famotidine (PEPCID) 40 MG tablet TAKE 1 TABLET BY MOUTH IN THE EVENING 90 tablet 1    cyclobenzaprine (FLEXERIL) 10 MG tablet TAKE 1 TABLET BY MOUTH ONE TIME A DAY AT NIGHT AS NEEDED FOR MUSCLE SPASM 90 tablet 1    cetirizine (ZYRTEC) 10 MG tablet TAKE 1 TABLET BY MOUTH ONE TIME A DAY  90 tablet 1    SM ASPIRIN ADULT LOW STRENGTH 81 MG EC tablet TAKE 1 TABLET BY MOUTH TWO TIMES A DAY  180 tablet 1    omeprazole (PRILOSEC) 20 MG delayed release capsule TAKE 1 CAPSULE BY MOUTH TWO TIMES A DAY  180 capsule 1    pravastatin (PRAVACHOL) 40 MG tablet Take 1 tablet by mouth daily 90 tablet 1    gabapentin (NEURONTIN) 800 MG tablet TAKE 1 TABLET BY MOUTH FOUR TIMES A  tablet 5    hydroxychloroquine (PLAQUENIL) 200 MG tablet TAKE 1 TABLET BY MOUTH ONE TIME A DAY  90 tablet 1    blood glucose monitor strips Test 4 times a day & as needed for symptoms of irregular blood glucose. 100 strip 5    HYDROcodone-acetaminophen (NORCO) 5-325 MG per tablet Take 1 tablet by mouth as needed. Post op from surgeon after right CTR sx      ibuprofen (ADVIL;MOTRIN) 800 MG tablet Take 1 tablet by mouth as needed For post op pain prescribed by surgeon after right CTR sx      VORTIoxetine (TRINTELLIX) 10 MG TABS tablet Take 10 mg by mouth daily      spironolactone (ALDACTONE) 25 MG tablet Take 25 mg by mouth daily      alendronate (FOSAMAX) 70 MG tablet Take 70 mg by mouth every 7 days      albuterol sulfate HFA (VENTOLIN HFA) 108 (90 Base) MCG/ACT inhaler Inhale 2 puffs into the lungs every 6 hours as needed for Wheezing 1 Inhaler 3    ferrous sulfate 325 (65 Fe) MG tablet TAKE 1 TABLET BY MOUTH TWO TIMES A DAY  60 tablet 2    PROAIR  (90 Base) MCG/ACT inhaler INHALE 2 PUFFS BY MOUTH EVERY SIX HOURS AS NEEDED FOR WHEEZING 8.5 g 2    propranolol (INDERAL) 20 MG tablet Take 1 tablet by mouth nightly 30 tablet 0    disulfiram (ANTABUSE) 250 MG tablet Take 1 tablet by mouth daily 30 tablet 0    EPINEPHrine (EPIPEN 2-JIGAR) 0.3 MG/0.3ML SOAJ injection To be used if experiencing angioedema.  1 each 0    metFORMIN (GLUCOPHAGE) 500 MG tablet Take 500 mg by mouth daily       lamoTRIgine (LAMICTAL) 25 MG tablet Take 25 mg by mouth 2 times daily       felodipine (PLENDIL) 10 MG extended release tablet Take 10 mg by mouth daily Prescribed by Dr. Dipti Clark ergocalciferol (ERGOCALCIFEROL) 59442 units capsule Take 50,000 Units by mouth See Admin Instructions Takes twice a week      QUEtiapine (SEROQUEL) 300 MG tablet Take 0.5 tablets by mouth nightly (Patient taking differently: Take 300 mg by mouth nightly ) 60 tablet 3    lamoTRIgine (LAMICTAL) 100 MG tablet Take 100 mg by mouth every evening       levothyroxine (SYNTHROID) 25 MCG tablet Take 25 mcg by mouth Daily      Multiple Vitamins-Minerals WRIST SURGERY  2004     Family History   Adopted: Yes   Problem Relation Age of Onset    Depression Mother     Asthma Mother     Depression Father     High Blood Pressure Father     Diabetes Father     Asthma Father     Depression Sister     Asthma Sister     Depression Brother     Asthma Brother     Asthma Maternal Aunt     Asthma Maternal Uncle     Asthma Paternal Aunt     Asthma Paternal Uncle     Asthma Maternal Grandmother     Cancer Maternal Grandmother     Asthma Maternal Grandfather     Asthma Paternal Grandmother     Asthma Paternal Grandfather     Asthma Maternal Cousin     Asthma Paternal Cousin      Social History     Tobacco Use    Smoking status: Never Smoker    Smokeless tobacco: Never Used   Substance Use Topics    Alcohol use: Not Currently      Allergies   Allergen Reactions    Morphine Nausea And Vomiting     Pt states it changes her mental status    Amlodipine Other (See Comments)     diarrhea and stress    Dilaudid [Hydromorphone Hcl] Hives and Itching    Sulfa Antibiotics Hives and Rash       Subjective:      Review of Systems   Constitutional: Negative for chills and fever. Respiratory: Negative for cough and shortness of breath. Cardiovascular: Negative for chest pain, palpitations and leg swelling. Other pertinent ROS in HPI  Objective:     /78 (Site: Left Upper Arm, Position: Sitting, Cuff Size: Large Adult)   Pulse 74   Temp 97.2 °F (36.2 °C)   Wt 142 lb (64.4 kg)   SpO2 98%   BMI 21.59 kg/m²    Physical Exam  Constitutional:       Appearance: She is well-developed. HENT:      Right Ear: External ear normal.      Left Ear: External ear normal.      Nose: Nose normal.   Neck:      Musculoskeletal: Full passive range of motion without pain and normal range of motion. Cardiovascular:      Rate and Rhythm: Normal rate and regular rhythm.       Heart sounds: Normal heart sounds, S1 normal and S2 normal.   Pulmonary:      Effort: Pulmonary

## 2020-07-27 ENCOUNTER — TELEPHONE (OUTPATIENT)
Dept: ONCOLOGY | Age: 55
End: 2020-07-27

## 2020-07-28 ENCOUNTER — HOSPITAL ENCOUNTER (OUTPATIENT)
Age: 55
Setting detail: SPECIMEN
Discharge: HOME | End: 2020-07-28
Payer: MEDICARE

## 2020-07-28 ENCOUNTER — NURSE ONLY (OUTPATIENT)
Dept: FAMILY MEDICINE CLINIC | Age: 55
End: 2020-07-28

## 2020-07-29 LAB
C TRACH DNA GENITAL QL NAA+PROBE: NEGATIVE
DIRECT EXAM: NORMAL
Lab: NORMAL
N. GONORRHOEAE DNA: NEGATIVE
SPECIMEN DESCRIPTION: NORMAL
SPECIMEN DESCRIPTION: NORMAL

## 2020-08-05 ENCOUNTER — HOSPITAL ENCOUNTER (OUTPATIENT)
Age: 55
Discharge: HOME | End: 2020-08-05
Payer: MEDICARE

## 2020-08-05 LAB
ANION GAP SERPL CALCULATED.3IONS-SCNC: 12 MMOL/L (ref 9–17)
BUN BLDV-MCNC: 15 MG/DL (ref 6–20)
BUN/CREAT BLD: NORMAL (ref 9–20)
CALCIUM SERPL-MCNC: 9.3 MG/DL (ref 8.6–10.4)
CHLORIDE BLD-SCNC: 103 MMOL/L (ref 98–107)
CO2: 24 MMOL/L (ref 20–31)
CREAT SERPL-MCNC: 0.83 MG/DL (ref 0.5–0.9)
GFR AFRICAN AMERICAN: >60 ML/MIN
GFR NON-AFRICAN AMERICAN: >60 ML/MIN
GFR SERPL CREATININE-BSD FRML MDRD: NORMAL ML/MIN/{1.73_M2}
GFR SERPL CREATININE-BSD FRML MDRD: NORMAL ML/MIN/{1.73_M2}
GLUCOSE BLD-MCNC: 92 MG/DL (ref 70–99)
POTASSIUM SERPL-SCNC: 4 MMOL/L (ref 3.7–5.3)
SODIUM BLD-SCNC: 139 MMOL/L (ref 135–144)

## 2020-08-05 PROCEDURE — 36415 COLL VENOUS BLD VENIPUNCTURE: CPT

## 2020-08-05 PROCEDURE — 80048 BASIC METABOLIC PNL TOTAL CA: CPT

## 2020-08-10 RX ORDER — EPINEPHRINE 0.3 MG/.3ML
INJECTION INTRAMUSCULAR
Qty: 2 EACH | Refills: 5 | Status: SHIPPED | OUTPATIENT
Start: 2020-08-10

## 2020-08-10 NOTE — TELEPHONE ENCOUNTER
Auto Differential Every 16 weeks    Creatinine, Random Urine Every 16 weeks    Phosphorus Every 16 weeks    Protein / creatinine ratio, urine Every 16 weeks    Protein, urine, random Every 16 weeks    PTH, INTACT WITH IONIZED CALCIUM Every 16 weeks    Vitamin D 25 Hydroxy Every 16 weeks    Basic Metabolic Panel Every 16 weeks    CBC Auto Differential Every 16 weeks    Creatinine, Random Urine Every 16 weeks    Protein / creatinine ratio, urine Every 16 weeks    Protein, urine, random Every 16 weeks                Patient Active Problem List:     Transient insomnia     Sleep walking disorder     Raynaud's disease     Pulmonary nodules     Neuropathy     Hypertension     GERD (gastroesophageal reflux disease)     Fibromyalgia     Depression     Degenerative disc disease, thoracic     CAD (coronary artery disease)     Bipolar disorder (HCC)     Asthma     Anxiety     Antinuclear antibody (IQRA) titer greater than 1:80     Anemia     Hx of Stroke ([facility]     Type 2 diabetes mellitus with stage 2 chronic kidney disease, without long-term current use of insulin ([facility])     Degenerative arthritis of right knee     S/P knee surgery     Chronic fatigue syndrome     Spondylosis of cervical region without myelopathy or radiculopathy     H/O hysterectomy with BSO at [facility] for benign disease 2014     Hx of total bilateral knee replacement     H/O: hysterectomy     Atrial fibrillation with slow ventricular response ([facility])     H/O: stroke     Hypovolemia     Hypotension     Bradycardia     Cystocele, midline     JOYCE (stress urinary incontinence, female)     Overflow incontinence     7/30/18 Cystocele repair, Cystoscopy with OBTRYX Ureteral Sling     Nuclear senile cataract     CKD (chronic kidney disease), stage II     Scleroderma ([facility] [address]     Vitamin D deficiency     Trigger middle finger of right hand     Trigger ring finger of right hand     Osteoarthritis of spine with radiculopathy, cervical region     Degenerative disc disease, cervical     Left carpal tunnel syndrome     Spinal stenosis, cervical region     Neural foraminal stenosis of cervical spine     Closed fracture of lower end of femur (HCC)     Closed supracondylar fracture of femur, left, initial encounter ([facility] [address]     Numbness     Age-related osteoporosis without current pathological fracture     Acquired hypothyroidism     Age-related nuclear cataract of both eyes     Alcoholism in recovery ([facility] [address]     Bipolar affective disorder, currently depressed, mild ([facility])     Generalized anxiety disorder

## 2020-08-14 ENCOUNTER — TELEPHONE (OUTPATIENT)
Dept: ONCOLOGY | Age: 55
End: 2020-08-14

## 2020-08-14 ENCOUNTER — HOSPITAL ENCOUNTER (OUTPATIENT)
Facility: MEDICAL CENTER | Age: 55
Discharge: HOME | End: 2020-08-14
Payer: MEDICARE

## 2020-08-14 ENCOUNTER — INITIAL CONSULT (OUTPATIENT)
Dept: ONCOLOGY | Age: 55
End: 2020-08-14
Payer: MEDICARE

## 2020-08-14 VITALS
TEMPERATURE: 98 F | RESPIRATION RATE: 18 BRPM | SYSTOLIC BLOOD PRESSURE: 151 MMHG | WEIGHT: 177.6 LBS | BODY MASS INDEX: 26.92 KG/M2 | HEART RATE: 70 BPM | DIASTOLIC BLOOD PRESSURE: 99 MMHG | HEIGHT: 68 IN

## 2020-08-14 DIAGNOSIS — K90.9 IRON MALABSORPTION: ICD-10-CM

## 2020-08-14 DIAGNOSIS — Z98.84 H/O GASTRIC BYPASS: ICD-10-CM

## 2020-08-14 DIAGNOSIS — D50.8 OTHER IRON DEFICIENCY ANEMIA: ICD-10-CM

## 2020-08-14 DIAGNOSIS — N18.2 CKD (CHRONIC KIDNEY DISEASE), STAGE II: ICD-10-CM

## 2020-08-14 DIAGNOSIS — E53.8 B12 DEFICIENCY: ICD-10-CM

## 2020-08-14 LAB
ABSOLUTE EOS #: 0.17 K/UL (ref 0–0.44)
ABSOLUTE IMMATURE GRANULOCYTE: 0.01 K/UL (ref 0–0.3)
ABSOLUTE LYMPH #: 1.41 K/UL (ref 1.1–3.7)
ABSOLUTE MONO #: 0.34 K/UL (ref 0.1–1.2)
ABSOLUTE RETIC #: 0.04 M/UL (ref 0.03–0.08)
BASOPHILS # BLD: 2 % (ref 0–2)
BASOPHILS ABSOLUTE: 0.09 K/UL (ref 0–0.2)
DIFFERENTIAL TYPE: ABNORMAL
EOSINOPHILS RELATIVE PERCENT: 4 % (ref 1–4)
FERRITIN: 7 UG/L (ref 13–150)
FOLATE: 10.4 NG/ML
HCT VFR BLD CALC: 31.7 % (ref 36.3–47.1)
HEMOGLOBIN: 9.8 G/DL (ref 11.9–15.1)
IMMATURE GRANULOCYTES: 0 %
IMMATURE RETIC FRACT: 15.1 % (ref 2.7–18.3)
IRON SATURATION: 7 % (ref 20–55)
IRON: 32 UG/DL (ref 37–145)
LYMPHOCYTES # BLD: 32 % (ref 24–43)
MCH RBC QN AUTO: 25.5 PG (ref 25.2–33.5)
MCHC RBC AUTO-ENTMCNC: 30.9 G/DL (ref 28.4–34.8)
MCV RBC AUTO: 82.6 FL (ref 82.6–102.9)
MONOCYTES # BLD: 8 % (ref 3–12)
NRBC AUTOMATED: 0 PER 100 WBC
PDW BLD-RTO: 15.6 % (ref 11.8–14.4)
PLATELET # BLD: 224 K/UL (ref 138–453)
PLATELET ESTIMATE: ABNORMAL
PMV BLD AUTO: 10 FL (ref 8.1–13.5)
RBC # BLD: 3.84 M/UL (ref 3.95–5.11)
RBC # BLD: ABNORMAL 10*6/UL
RETIC %: 1 % (ref 0.5–1.9)
RETIC HEMOGLOBIN: 26.4 PG (ref 28.2–35.7)
SEG NEUTROPHILS: 54 % (ref 36–65)
SEGMENTED NEUTROPHILS ABSOLUTE COUNT: 2.41 K/UL (ref 1.5–8.1)
TOTAL IRON BINDING CAPACITY: 453 UG/DL (ref 250–450)
UNSATURATED IRON BINDING CAPACITY: 421 UG/DL (ref 112–347)
VITAMIN B-12: 310 PG/ML (ref 232–1245)
WBC # BLD: 4.4 K/UL (ref 3.5–11.3)
WBC # BLD: ABNORMAL 10*3/UL

## 2020-08-14 PROCEDURE — 85025 COMPLETE CBC W/AUTO DIFF WBC: CPT

## 2020-08-14 PROCEDURE — 99201 HC NEW PT, E/M LEVEL 1: CPT | Performed by: INTERNAL MEDICINE

## 2020-08-14 PROCEDURE — 99204 OFFICE O/P NEW MOD 45 MIN: CPT | Performed by: INTERNAL MEDICINE

## 2020-08-14 PROCEDURE — 83550 IRON BINDING TEST: CPT

## 2020-08-14 PROCEDURE — 82607 VITAMIN B-12: CPT

## 2020-08-14 PROCEDURE — 85045 AUTOMATED RETICULOCYTE COUNT: CPT

## 2020-08-14 PROCEDURE — 36415 COLL VENOUS BLD VENIPUNCTURE: CPT

## 2020-08-14 PROCEDURE — 82728 ASSAY OF FERRITIN: CPT

## 2020-08-14 PROCEDURE — 82746 ASSAY OF FOLIC ACID SERUM: CPT

## 2020-08-14 PROCEDURE — 83540 ASSAY OF IRON: CPT

## 2020-08-14 RX ORDER — NIFEDIPINE 60 MG/1
90 TABLET, FILM COATED, EXTENDED RELEASE ORAL DAILY
COMMUNITY
Start: 2020-08-04 | End: 2022-09-22 | Stop reason: SDUPTHER

## 2020-08-14 RX ORDER — ARIPIPRAZOLE 5 MG/1
5 TABLET ORAL DAILY
COMMUNITY
Start: 2020-08-05

## 2020-08-14 NOTE — LETTER
Venessa Hernandez MD    8/14/2020     [address]    Dear Jesús Rawls : Thank you for referring Ambar Baumann, 1965, to me for evaluation. Below are the relevant portions of my assessment and plan of care. DIAGNOSIS:   1. Anemia, malabsorption component  2. History of gastric bypass surgery  3. History of iron deficiency and B12 likely due to malabsorption    CURRENT THERAPY:  Plan for lab work and IV iron    BRIEF CASE HISTORY:   Ambar Baumann is a very pleasant 54 y.o. female who is referred to us for anemia. She reports she has long history of anemia and has received IV iron in the past and been on oral iron for the last 10 years. Her last iron infusion was 2014 at [facility]. She had gastric by-pass in 2012, she did have anemia prior but worsened following surgery. She does have fatigue, PICA, shortness of breath. She had colonoscopy 2019 which was negative, she does have chronic colonoscopy. She has history of B12 deficiency and takes oral supplementation. She has parathyroid issue and takes synthroid. She has multiple complications and is seen by several specialists including rheumatology, cardiology, ortho surg. She has scleroderma, osteoporosis, and Raynaud syndrome. Her scleroderma is diffused but currently well controlled. She has had both knees replacements twice. She was adopted and family history is unknown.         PAST MEDICAL HISTORY: has a past medical history of Acute kidney injury ([address] Anemia, Angioedema, Antinuclear antibody (IQRA) titer greater than 1:80, Anxiety, Asthma, Ataxia, Atrial fibrillation ([address] Borderline personality disorder ([address] Bradycardia, CAD (coronary artery disease), Chronic kidney disease, CKD (chronic kidney disease), stage II, Degenerative disc disease, thoracic, Depression, Diabetes mellitus ([address] Diastolic dysfunction, DJD (degenerative joint disease), Fibromyalgia, Foot pain, right, GERD (gastroesophageal reflux disease), H/O: hysterectomy, Headache, Hernia of abdominal wall, History of blood transfusion, Hyperlipidemia, Hypertension, Lupus ([address] Mood disorder ([address] Neuropathy, Osteoarthritis, PTSD (post-traumatic stress disorder), Pulmonary nodules, Raynaud's disease, Scleroderma ([address] Scleroderma ([address] Seizures ([address] Sleep walking disorder, Thyroid disease, TIA (transient ischemic attack), Transient insomnia, Tricuspid regurgitation, Vasospasm ([address] Vitamin D deficiency, and Wears glasses. PAST SURGICAL HISTORY: has a past surgical history that includes Hysterectomy; Cholecystectomy (1989); Tonsillectomy (1986); Carpal tunnel release (Right, 2016); Wrist surgery (2004); Gastric bypass surgery (2012); Abdominoplasty (2013); Elbow surgery (Right); Total knee arthroplasty (2011); knee surgery (Right, 05/02/2017); Total knee arthroplasty (Right, 5/2/2017); gastrectomy; Nerve Block (Right, 05/04/2018); Nerve Block (Right, 06/29/2018); eye surgery; pr anterior colporraphy rpr cystocele w/cysto (N/A, 7/30/2018); bladder suspension (N/A, 7/30/2018); Nerve Block (Right, 08/10/2018); joint replacement (Bilateral); Finger surgery (Right, 08/28/2018); pr office/outpt visit,procedure only (Right, 8/28/2018); pr revise median n/carpal tunnel surg (Left, 10/23/2018); Femur fracture surgery (11/09/2018); pr office/outpt visit,procedure only (Left, 11/9/2018); fracture surgery; Colonoscopy (N/A, 6/24/2019); Colonoscopy (N/A, 7/2/2019); and Carpal tunnel release (Right, 01/02/2020).      CURRENT MEDICATIONS:  has a current medication list which includes the following prescription(s): losartan, famotidine, cyclobenzaprine, cetirizine, sm aspirin adult low strength, omeprazole, pravastatin, gabapentin, hydroxychloroquine, blood glucose test strips, hydrocodone-acetaminophen, ibuprofen, vortioxetine, spironolactone, alendronate, albuterol sulfate hfa, ferrous sulfate, proair hfa, auscultation bilaterally. Heart: Regular without any murmur. Abdomen: Soft, nontender. No hepatosplenomegaly. Extremities: Multiple scars on both legs from previous surgery. Lower extremities show no edema, clubbing, or cyanosis. Breasts: Examination not done today. Neuro exam: intact cranial nerves bilaterally no motor or sensory deficit, gait is normal. Lymphatic: no adenopathy appreciated in the supraclavicular, axillary, cervical or inguinal area  Sclerodactyly on fingers      REVIEW OF LABORATORY DATA:   Lab Results   Component Value Date    WBC 3.4 (L) 02/21/2020    HGB 11.4 (L) 02/21/2020    HCT 36.6 02/21/2020    MCV 84.5 02/21/2020     02/21/2020       Chemistry        Component Value Date/Time     08/05/2020 1212    K 4.0 08/05/2020 1212     08/05/2020 1212    CO2 24 08/05/2020 1212    BUN 15 08/05/2020 1212    CREATININE 0.83 08/05/2020 1212        Component Value Date/Time    CALCIUM 9.3 08/05/2020 1212    ALKPHOS 183 (H) 06/08/2019 1945    AST 15 06/08/2019 1945    ALT 9 06/08/2019 1945    BILITOT 0.19 (L) 06/08/2019 1945        Lab Results   Component Value Date    IRON 62 07/16/2020    TIBC 424 07/16/2020    FERRITIN 65 05/24/2017         REVIEW OF RADIOLOGICAL RESULTS:     IMPRESSION:   1. Anemia, malabsorption component   2. Symptomatic   3. HX gastric bypass   4. Plan for iron studies and IV iron    PLAN:   1. I had a long discussion with the patient. I believe her anemia is multifactorial but her major component of it is due to iron and B12 malabsorption due to her gastric bypass surgery and extensive use of antacids because of her scleroderma. 2. There is no clear evidence of GI bleeding and she had a colonoscopy that was negative. I am convinced that nutritional and absorptive issue as a major component of her anemia. However work-up will be done to complete the evaluation  3.  Because of her gastric bypass and malabsorption, the patient required

## 2020-08-14 NOTE — PROGRESS NOTES
DIAGNOSIS:   1. Anemia, malabsorption component  2. History of gastric bypass surgery  3. History of iron deficiency and B12 likely due to malabsorption    CURRENT THERAPY:  Plan for lab work and IV iron    BRIEF CASE HISTORY:   Eligio Varma is a very pleasant 54 y.o. female who is referred to us for anemia. She reports she has long history of anemia and has received IV iron in the past and been on oral iron for the last 10 years. Her last iron infusion was 2014 at [facility]. She had gastric by-pass in 2012, she did have anemia prior but worsened following surgery. She does have fatigue, PICA, shortness of breath. She had colonoscopy 2019 which was negative, she does have chronic colonoscopy. She has history of B12 deficiency and takes oral supplementation. She has parathyroid issue and takes synthroid. She has multiple complications and is seen by several specialists including rheumatology, cardiology, ortho surg. She has scleroderma, osteoporosis, and Raynaud syndrome. Her scleroderma is diffused but currently well controlled. She has had both knees replacements twice. She was adopted and family history is unknown.         PAST MEDICAL HISTORY: has a past medical history of Acute kidney injury ([address] Anemia, Angioedema, Antinuclear antibody (IQRA) titer greater than 1:80, Anxiety, Asthma, Ataxia, Atrial fibrillation ([address] Borderline personality disorder ([address] Bradycardia, CAD (coronary artery disease), Chronic kidney disease, CKD (chronic kidney disease), stage II, Degenerative disc disease, thoracic, Depression, Diabetes mellitus ([address] Diastolic dysfunction, DJD (degenerative joint disease), Fibromyalgia, Foot pain, right, GERD (gastroesophageal reflux disease), H/O: hysterectomy, Headache, Hernia of abdominal wall, History of blood transfusion, Hyperlipidemia, Hypertension, Lupus ([address] Mood disorder ([address] Neuropathy, Osteoarthritis, PTSD (post-traumatic stress disorder), Pulmonary nodules, Raynaud's disease, Scleroderma ([address] Scleroderma ([address] Seizures ([address] Sleep walking disorder, Thyroid disease, TIA (transient ischemic attack), Transient insomnia, Tricuspid regurgitation, Vasospasm ([address] Vitamin D deficiency, and Wears glasses. PAST SURGICAL HISTORY: has a past surgical history that includes Hysterectomy; Cholecystectomy (1989); Tonsillectomy (1986); Carpal tunnel release (Right, 2016); Wrist surgery (2004); Gastric bypass surgery (2012); Abdominoplasty (2013); Elbow surgery (Right); Total knee arthroplasty (2011); knee surgery (Right, 05/02/2017); Total knee arthroplasty (Right, 5/2/2017); gastrectomy; Nerve Block (Right, 05/04/2018); Nerve Block (Right, 06/29/2018); eye surgery; pr anterior colporraphy rpr cystocele w/cysto (N/A, 7/30/2018); bladder suspension (N/A, 7/30/2018); Nerve Block (Right, 08/10/2018); joint replacement (Bilateral); Finger surgery (Right, 08/28/2018); pr office/outpt visit,procedure only (Right, 8/28/2018); pr revise median n/carpal tunnel surg (Left, 10/23/2018); Femur fracture surgery (11/09/2018); pr office/outpt visit,procedure only (Left, 11/9/2018); fracture surgery; Colonoscopy (N/A, 6/24/2019); Colonoscopy (N/A, 7/2/2019); and Carpal tunnel release (Right, 01/02/2020). CURRENT MEDICATIONS:  has a current medication list which includes the following prescription(s): losartan, famotidine, cyclobenzaprine, cetirizine, sm aspirin adult low strength, omeprazole, pravastatin, gabapentin, hydroxychloroquine, blood glucose test strips, hydrocodone-acetaminophen, ibuprofen, vortioxetine, spironolactone, alendronate, albuterol sulfate hfa, ferrous sulfate, proair hfa, disulfiram, metformin, lamotrigine, felodipine, ergocalciferol, quetiapine, lamotrigine, levothyroxine, therapeutic multivitamin-minerals, aripiprazole, nifedipine, epipen 2-patty, naltrexone, and blood pressure.     ALLERGIES:  is allergic to morphine; amlodipine; dilaudid [hydromorphone hcl]; and sulfa antibiotics. FAMILY HISTORY: Negative for any hematological or oncological conditions. SOCIAL HISTORY:  reports that she has never smoked. She has never used smokeless tobacco. She reports previous alcohol use. She reports that she does not use drugs. REVIEW OF SYSTEMS:   General: no fever or night sweats, Weight is stable. +fatigue and PICA  ENT: No double or blurred vision, no tinnitus or hearing problem, no dysphagia or sore throat   Respiratory: No chest pain, no cough or hemoptysis. +shortness of breath  Cardiovascular: Denies chest pain, PND or orthopnea. No L E swelling or palpitations. Gastrointestinal:    No nausea or vomiting, abdominal pain, diarrhea or constipation. Genitourinary: Denies dysuria, hematuria, frequency, urgency or incontinence. Neurological: Denies headaches, decreased LOC, no sensory or motor focal deficits. Musculoskeletal:  No arthralgia no back pain or joint swelling. Skin: There are no rashes or bleeding. Psychiatric:  No anxiety, no depression. Endocrine: no diabetes or thyroid disease. Hematologic: no bleeding , no adenopathy. PHYSICAL EXAM: Shows a well appearing 54y.o.-year-old female who is not in pain or distress. Vital Signs: Blood pressure (!) 151/99, pulse 70, temperature 98 °F (36.7 °C), temperature source Oral, resp. rate 18, height 5' 8\" (1.727 m), weight 177 lb 9.6 oz (80.6 kg), not currently breastfeeding. HEENT: Normocephalic and atraumatic. Pupils are equal, round, reactive to light and accommodation. Extraocular muscles are intact. Neck: Showed no JVD, no carotid bruit . Lungs: Clear to auscultation bilaterally. Heart: Regular without any murmur. Abdomen: Soft, nontender. No hepatosplenomegaly. Extremities: Multiple scars on both legs from previous surgery. Lower extremities show no edema, clubbing, or cyanosis. Breasts: Examination not done today.  Neuro exam: intact cranial nerves bilaterally no motor or sensory deficit, gait is normal. Lymphatic: no adenopathy appreciated in the supraclavicular, axillary, cervical or inguinal area  Sclerodactyly on fingers      REVIEW OF LABORATORY DATA:   Lab Results   Component Value Date    WBC 3.4 (L) 02/21/2020    HGB 11.4 (L) 02/21/2020    HCT 36.6 02/21/2020    MCV 84.5 02/21/2020     02/21/2020       Chemistry        Component Value Date/Time     08/05/2020 1212    K 4.0 08/05/2020 1212     08/05/2020 1212    CO2 24 08/05/2020 1212    BUN 15 08/05/2020 1212    CREATININE 0.83 08/05/2020 1212        Component Value Date/Time    CALCIUM 9.3 08/05/2020 1212    ALKPHOS 183 (H) 06/08/2019 1945    AST 15 06/08/2019 1945    ALT 9 06/08/2019 1945    BILITOT 0.19 (L) 06/08/2019 1945        Lab Results   Component Value Date    IRON 62 07/16/2020    TIBC 424 07/16/2020    FERRITIN 65 05/24/2017         REVIEW OF RADIOLOGICAL RESULTS:     IMPRESSION:   1. Anemia, malabsorption component   2. Symptomatic   3. HX gastric bypass   4. Plan for iron studies and IV iron    PLAN:   1. I had a long discussion with the patient. I believe her anemia is multifactorial but her major component of it is due to iron and B12 malabsorption due to her gastric bypass surgery and extensive use of antacids because of her scleroderma. 2. There is no clear evidence of GI bleeding and she had a colonoscopy that was negative. I am convinced that nutritional and absorptive issue as a major component of her anemia. However work-up will be done to complete the evaluation  3. Because of her gastric bypass and malabsorption, the patient required IV iron previously. And if she is iron deficient, I do not anticipate that she would benefit from oral iron and she will need IV iron. 4. Patient scleroderma seems to be under good control. I do not see any evidence of extensive telangiectasia on my evaluation. But will try to obtain GI work-up and records. 5. The patient leukopenia is also concerning.   This could be due to the B12 deficiency. We will check her levels and will also check her folic acid levels. 6. Return visit in 6 months, if her testing suggest iron or B12 deficiency, will suggest parenteral replacement and will arrange for that to be done sooner.

## 2020-08-14 NOTE — TELEPHONE ENCOUNTER
R[facility]IN ARRIVES AMBULATORY FOR CONSULTATION APPT  DR Re Murray IN TO SEE PATIENT  ORDERS RECEIVED  LABS TODAY  RV 6 MONTHS W/CDP FERRITIN PRIOR  LABS ON EXIT CDP FE TIBC FERRITIN VITAMIN B12 & FOLATE RETIC  LABS CDP FERRITIN 02/05/21  MD VISIT 02/12/21  @10AM  AVS PRINTED AND GIVEN TO PATIENT WITH INSTRUCTIONS  PATIENT DISCHARGED AMBULATORY

## 2020-09-01 ENCOUNTER — TELEPHONE (OUTPATIENT)
Dept: ONCOLOGY | Age: 55
End: 2020-09-01

## 2020-09-01 PROBLEM — D50.9 IRON DEFICIENCY ANEMIA: Status: ACTIVE | Noted: 2020-09-01

## 2020-09-01 PROBLEM — K90.9 IRON MALABSORPTION: Status: ACTIVE | Noted: 2020-09-01

## 2020-09-01 RX ORDER — HEPARIN SODIUM (PORCINE) LOCK FLUSH IV SOLN 100 UNIT/ML 100 UNIT/ML
500 SOLUTION INTRAVENOUS PRN
Status: CANCELLED | OUTPATIENT
Start: 2020-09-17

## 2020-09-01 RX ORDER — SODIUM CHLORIDE 0.9 % (FLUSH) 0.9 %
10 SYRINGE (ML) INJECTION PRN
Status: CANCELLED | OUTPATIENT
Start: 2020-09-17

## 2020-09-01 RX ORDER — SODIUM CHLORIDE 9 MG/ML
INJECTION, SOLUTION INTRAVENOUS CONTINUOUS
Status: CANCELLED | OUTPATIENT
Start: 2020-09-17

## 2020-09-01 RX ORDER — METHYLPREDNISOLONE SODIUM SUCCINATE 125 MG/2ML
125 INJECTION, POWDER, LYOPHILIZED, FOR SOLUTION INTRAMUSCULAR; INTRAVENOUS ONCE
Status: CANCELLED | OUTPATIENT
Start: 2020-09-17

## 2020-09-01 RX ORDER — DIPHENHYDRAMINE HYDROCHLORIDE 50 MG/ML
50 INJECTION INTRAMUSCULAR; INTRAVENOUS ONCE
Status: CANCELLED | OUTPATIENT
Start: 2020-09-17

## 2020-09-01 RX ORDER — EPINEPHRINE 1 MG/ML
0.3 INJECTION, SOLUTION, CONCENTRATE INTRAVENOUS PRN
Status: CANCELLED | OUTPATIENT
Start: 2020-09-17

## 2020-09-01 RX ORDER — SODIUM CHLORIDE 0.9 % (FLUSH) 0.9 %
5 SYRINGE (ML) INJECTION PRN
Status: CANCELLED | OUTPATIENT
Start: 2020-09-17

## 2020-09-01 NOTE — TELEPHONE ENCOUNTER
I called, no answer, left message for her to call tomorrow,   Please explain results to her, she needs IV iron, see orders.

## 2020-09-02 NOTE — TELEPHONE ENCOUNTER
Writer spoke with [employer] via phone to update on lab work results and orders. Questions answered and understanding voiced. Will call us back if needs any status updates prior to hearing from us to schedule infusion.

## 2020-09-03 ENCOUNTER — TELEPHONE (OUTPATIENT)
Dept: INFUSION THERAPY | Facility: MEDICAL CENTER | Age: 55
End: 2020-09-03

## 2020-09-03 NOTE — TELEPHONE ENCOUNTER
I TRIED TO CALL ROBIN TO [address] [place] [address] [address] [facility] AND HAD TO LEAVE A MESSAGE TO CALL THE OFFICE BACK TO SCHEDULE.

## 2020-09-14 ENCOUNTER — OFFICE VISIT (OUTPATIENT)
Dept: NEUROLOGY | Age: 55
End: 2020-09-14
Payer: MEDICARE

## 2020-09-14 VITALS
DIASTOLIC BLOOD PRESSURE: 91 MMHG | HEART RATE: 81 BPM | SYSTOLIC BLOOD PRESSURE: 136 MMHG | TEMPERATURE: 97.2 F | WEIGHT: 155 LBS | BODY MASS INDEX: 23.49 KG/M2 | HEIGHT: 68 IN

## 2020-09-14 PROCEDURE — 99214 OFFICE O/P EST MOD 30 MIN: CPT | Performed by: NURSE PRACTITIONER

## 2020-09-14 RX ORDER — GABAPENTIN 800 MG/1
TABLET ORAL
Qty: 120 TABLET | Refills: 5 | Status: SHIPPED | OUTPATIENT
Start: 2020-09-14 | End: 2021-07-09 | Stop reason: DRUGHIGH

## 2020-09-14 NOTE — PROGRESS NOTES
[facility]            [address]          [place], [address] [facility]          Dept: [phone]          Dept Fax: [phone]        MD Tiny Martell, MD Demond Choi, MD Carlos Prince MD Billee Dumas, CNP            9/14/2020      HISTORY OF PRESENT ILLNESS:       I had the pleasure of seeing Mary Hensley, who returns for continuing neurologic care. Patient is a 59-year-old woman who was seen last on March 11, 2020 for management of neck pain and gait instability. The patient has had previous hospitalizations for myoclonic jerking in each time, this had been associated with polypharmacy. The patient has cervical radiculopathy with mild degenerative disc disease at C5-6 and mild spinal stenosis also present at that level. She is treated with gabapentin 800 mg 4 times daily and Flexeril 10 mg at bedtime daily. She had an EMG of her lower extremities which was normal and an MRI of the lumbar spine which showed degenerative disc disease which was worse at L5-S1 and L4-L5. The patient had an isolated seizure in January 2017 which was provoked by the use of tramadol. During her previous evaluations, the patient was noted to have diminished sensation in her feet bilaterally which was worse than it had previously been. She was having difficulty with her walking associated with this. An EMG/NCV study was ordered, but not completed. The patient is following with her rheumatologist and is treated with Plaquenil. She also has ray nods disease and recently had injections in her hands which is part of a clinical trial which did improve some of her ischemic symptoms. Patient is here today for reevaluation and continues to have chronic neck and low back pain. The gabapentin 800 mg 4 times daily is effective in relieving her discomfort.   She does continue to have diminished sensation in her feet bilaterally with associated gait instability. Prior testing reviewed:    -MRI cervical spine 4/2/18  Impression   1. Mild degenerative disc disease at C5-C6 with mild spinal canal stenosis. Severe right neural foraminal stenosis at this level. 2. Minimal spinal canal stenosis at C3-C4 and C4-C5.                    - MRI brain 7/2/18  pression   1. No ictal focus is identified. 2. Trace chronic microvascular white matter ischemic disease.    3. There is a partially empty sella.  Correlate with clinical signs/symptoms   of endocrine dysfunction as well as serum laboratory values.      -EMG lower extremities 10/2/18  This is a normal electrophysiological study. Sari Carota is no evidence of large fiber sensory neuropathy, lumbosacral plexopathy or radiculopathy.  If clinically indicated, a skin punch biopsy may be obtained to rule out small fiber neuropathy.  Clinical correlation is recommended.               PAST MEDICAL HISTORY:         Diagnosis Date    Acute kidney injury ([facility]     Anemia     Angioedema     Antinuclear antibody (IQRA) titer greater than 1:80     Anxiety     Asthma     Ataxia     Atrial fibrillation (HCC)     Borderline personality disorder (HCC)     Bradycardia     CAD (coronary artery disease)     Chronic kidney disease     CKD (chronic kidney disease), stage II 8/23/2018    Degenerative disc disease, thoracic     Depression     Diabetes mellitus (HCC)     Diastolic dysfunction     DJD (degenerative joint disease)     Fibromyalgia     Foot pain, right     GERD (gastroesophageal reflux disease)     H/O: hysterectomy     Headache     Hernia of abdominal wall     History of blood transfusion     no problem    Hyperlipidemia     Hypertension     Lupus (HCC)     Mood disorder (HCC)     Neuropathy     Osteoarthritis     PTSD (post-traumatic stress disorder)     Pulmonary nodules     Raynaud's disease     Scleroderma ([facility] [address]  Scleroderma ([facility] [address] 8/23/2018    Seizures (HCC)     Sleep walking disorder     Thyroid disease     TIA (transient ischemic attack)     Transient insomnia     Tricuspid regurgitation     Vasospasm ([facility] [address] 01/2016    bilat. legs. resulting in near complete absence of profusion to arteries of feet. ([facility]     Vitamin D deficiency 8/23/2018    Wears glasses         PAST SURGICAL HISTORY:         Procedure Laterality Date    ABDOMINOPLASTY  2013    BLADDER SUSPENSION N/A 7/30/2018    CYSTOSCOPY WITH OBTRYX MID URETHRAL SLING performed by Tiny Cervantes MD at [address] Right 2016    CARPAL TUNNEL RELEASE Right 01/02/2020    CHOLECYSTECTOMY  1989    COLONOSCOPY N/A 6/24/2019    COLORECTAL CANCER SCREENING, NOT HIGH RISK performed by Louisa Salazar MD at [address] 7/2/2019    COLORECTAL CANCER SCREENING, NOT HIGH RISK performed by Louisa Salazar MD at [address] Right     EYE SURGERY      khushboo. lasik     FEMUR FRACTURE SURGERY  11/09/2018    FINGER SURGERY Right 08/28/2018    RING CARTER MASS EXCISION, TRIGGER RELEASE    FRACTURE SURGERY      left femur    GASTRECTOMY      GASTRIC BYPASS SURGERY  2012    HYSTERECTOMY      JOINT REPLACEMENT Bilateral     knees    KNEE SURGERY Right 05/02/2017    (femoral) patella replacement    NERVE BLOCK Right 05/04/2018     cervical facet #1 no steroid    NERVE BLOCK Right 06/29/2018    rt cervical diagnostic #2 decadron 10mg    NERVE BLOCK Right 08/10/2018    right cervical rfa decadron 10mg    CO ANTERIOR COLPORRAPHY RPR CYSTOCELE W/CYSTO N/A 7/30/2018    CYSTOCELE REPAIR performed by Claus Palomino DO at [address] OFFICE/OUTPT VISIT,PROCEDURE ONLY Right 8/28/2018    RING CARTER MASS EXCISION, TRIGGER RELEASE, [address] TABLE performed by Kayce Ramirez DO at [address] OFFICE/OUTPT VISIT,PROCEDURE ONLY Left 11/9/2018    FEMUR RETROGRADE IM NAILING PERIPROSTHETIC FRACTURE performed by Irving Torres DO at [address]/CARPAL TUNNEL SURG Left 10/23/2018    CARPAL T[facility]NEL [facility]  [facility]    left knee    TOTAL KNEE ARTHROPLASTY Right 5/2/2017    PATELLOFEMORAL REPLACEMENT KNEE - Kesha Larson, [address] TABLE, FEMORAL POPLITEAL BLOCK, NSA= SPINAL VS GENERAL performed by Irving Torres DO at [facility]  2004        SOCIAL HISTORY:     Social History     Socioeconomic History    Marital status:      Spouse name: Not on file    Number of children: Not on file    Years of education: Not on file    Highest education level: Not on file   Occupational History    Not on file   Social Needs    Financial resource strain: Not very hard    Food insecurity     Worry: Never true     Inability: Never true    Transportation needs     Medical: No     Non-medical: No   Tobacco Use    Smoking status: Never Smoker    Smokeless tobacco: Never Used   Substance and Sexual Activity    Alcohol use: Not Currently    Drug use: No    Sexual activity: Not Currently     Birth control/protection: Surgical     Comment: pt has hysterectomy   Lifestyle    Physical activity     Days per week: 0 days     Minutes per session: 0 min    Stress: To some extent   Relationships    Social connections     Talks on phone: Three times a week     Gets together: Once a week     Attends [employer] service: Never     Active member of club or organization: No     Attends meetings of clubs or organizations: Never     Relationship status:      Intimate partner violence     Fear of current or ex partner: Not on file     Emotionally abused: Not on file     Physically abused: Not on file     Forced sexual activity: Not on file   Other Topics Concern    Not on file   Social History Narrative    Not on file       CURRENT MEDICATIONS:     Current Outpatient Medications needed for Wheezing 1 Inhaler 3    ferrous sulfate 325 (65 Fe) MG tablet TAKE 1 TABLET BY MOUTH TWO TIMES A DAY  60 tablet 2    PROAIR  (90 Base) MCG/ACT inhaler INHALE 2 PUFFS BY MOUTH EVERY SIX HOURS AS NEEDED FOR WHEEZING 8.5 g 2    disulfiram (ANTABUSE) 250 MG tablet Take 1 tablet by mouth daily 30 tablet 0    metFORMIN (GLUCOPHAGE) 500 MG tablet Take 500 mg by mouth daily       lamoTRIgine (LAMICTAL) 25 MG tablet Take 25 mg by mouth 2 times daily       felodipine (PLENDIL) 10 MG extended release tablet Take 10 mg by mouth daily Prescribed by Dr. Cynthia Alonso ergocalciferol (ERGOCALCIFEROL) 53375 units capsule Take 50,000 Units by mouth See Admin Instructions Takes twice a week      QUEtiapine (SEROQUEL) 300 MG tablet Take 0.5 tablets by mouth nightly (Patient taking differently: Take 300 mg by mouth nightly ) 60 tablet 3    lamoTRIgine (LAMICTAL) 100 MG tablet Take 100 mg by mouth every evening       levothyroxine (SYNTHROID) 25 MCG tablet Take 25 mcg by mouth Daily      Multiple Vitamins-Minerals (THERAPEUTIC MULTIVITAMIN-MINERALS) tablet Take 1 tablet by mouth daily       No current facility-administered medications for this visit. ALLERGIES:     Allergies   Allergen Reactions    Morphine Nausea And Vomiting     Pt states it changes her mental status    Amlodipine Other (See Comments)     diarrhea and stress    Dilaudid [Hydromorphone Hcl] Hives and Itching    Sulfa Antibiotics Hives and Rash                                 REVIEW OF SYSTEMS                   All items selected indicate a positive finding. Those items not selected are negative.   Constitutional [] Weight loss/gain   [x] Fatigue  [] Fever/Chills   HEENT [] Hearing Loss  [] Visual Disturbance  [] Tinnitus  [] Eye pain   Respiratory [] Shortness of Breath  [] Cough  [] Snoring   Cardiovascular [] Chest Pain  [] Palpitations  [] Lightheaded   GI [] Constipation  [] Diarrhea  [] Swallowing change  [] Nausea/vomiting  [] Urinary Frequency  [] Urinary Urgency   Musculoskeletal [x] Neck pain  [x] Back pain  [x] Muscle pain  [] Restless legs   Dermatologic [] Skin changes   Neurologic [x] Memory loss/confusion  [] Seizures  [x] Trouble walking or imbalance  [] Dizziness  [] Sleep disturbance  [] Weakness  [x] Numbness  [] Tremors  [] Speech Difficulty  [] Headaches  [] Light Sensitivity  [] Sound Sensitivity   Endocrinology []Excessive thirst  []Excessive hunger   Psychiatric [x] Anxiety/Depression  [] Hallucination   Allergy/immunology []Hives/environmental allergies   Hematologic/lymph [] Abnormal bleeding  [] Abnormal bruising         PHYSICAL EXAMINATION:       Vitals:    09/14/20 1047   BP: (!) 136/91   Pulse: 81   Temp: 97.2 °F (36.2 °C)                                              .                                                                                                     General Appearance:  Alert, cooperative, no signs of distress, appears stated age   Head:  Normocephalic, no signs of trauma   Eyes:  Conjunctiva/corneas clear;  eyelids intact   Ears:  Normal external ear and canals   Nose: Nares normal, mucosa normal, no drainage    Throat: Lips and tongue normal; teeth normal;  gums normal   Neck: Supple, intact flexion, extension and rotation;   trachea midline;  no adenopathy;   thyroid: not enlarged;   no carotid pulse abnormality   Back:   Symmetric, no curvature, ROM adequate   Lungs:   Respirations unlabored   Heart:  Regular rate and rhythm           Extremities: Extremities normal, no cyanosis, no edema   Pulses: Symmetric over head and neck   Skin: Skin color, texture normal, no rashes, no lesions                                     NEUROLOGIC EXAMINATION    Neurologic Exam  Mental status    Alert and oriented x 3; intact memory with no confusion, speech or language problems; no hallucinations or delusions  Fund of information appropriate for level of education    Cranial nerves    II - visual fields

## 2020-09-17 ENCOUNTER — HOSPITAL ENCOUNTER (OUTPATIENT)
Dept: INFUSION THERAPY | Facility: MEDICAL CENTER | Age: 55
Discharge: HOME | End: 2020-09-17
Payer: MEDICARE

## 2020-09-17 VITALS
TEMPERATURE: 98.7 F | DIASTOLIC BLOOD PRESSURE: 94 MMHG | SYSTOLIC BLOOD PRESSURE: 154 MMHG | RESPIRATION RATE: 16 BRPM | HEART RATE: 81 BPM

## 2020-09-17 DIAGNOSIS — G93.32 CHRONIC FATIGUE SYNDROME: ICD-10-CM

## 2020-09-17 DIAGNOSIS — K90.9 IRON MALABSORPTION: ICD-10-CM

## 2020-09-17 DIAGNOSIS — D50.8 OTHER IRON DEFICIENCY ANEMIA: Primary | ICD-10-CM

## 2020-09-17 PROCEDURE — 2580000003 HC RX 258: Performed by: INTERNAL MEDICINE

## 2020-09-17 PROCEDURE — 96365 THER/PROPH/DIAG IV INF INIT: CPT

## 2020-09-17 PROCEDURE — 6360000002 HC RX W HCPCS: Performed by: INTERNAL MEDICINE

## 2020-09-17 RX ORDER — HEPARIN SODIUM (PORCINE) LOCK FLUSH IV SOLN 100 UNIT/ML 100 UNIT/ML
500 SOLUTION INTRAVENOUS PRN
Status: CANCELLED | OUTPATIENT
Start: 2020-09-24

## 2020-09-17 RX ORDER — SODIUM CHLORIDE 0.9 % (FLUSH) 0.9 %
10 SYRINGE (ML) INJECTION PRN
Status: CANCELLED | OUTPATIENT
Start: 2020-09-24

## 2020-09-17 RX ORDER — SODIUM CHLORIDE 9 MG/ML
INJECTION, SOLUTION INTRAVENOUS CONTINUOUS
Status: CANCELLED | OUTPATIENT
Start: 2020-09-24

## 2020-09-17 RX ORDER — SODIUM CHLORIDE 0.9 % (FLUSH) 0.9 %
5 SYRINGE (ML) INJECTION PRN
Status: CANCELLED | OUTPATIENT
Start: 2020-09-24

## 2020-09-17 RX ORDER — HEPARIN SODIUM (PORCINE) LOCK FLUSH IV SOLN 100 UNIT/ML 100 UNIT/ML
500 SOLUTION INTRAVENOUS PRN
Status: DISCONTINUED | OUTPATIENT
Start: 2020-09-17 | End: 2020-09-18 | Stop reason: HOSPADM

## 2020-09-17 RX ORDER — DIPHENHYDRAMINE HYDROCHLORIDE 50 MG/ML
50 INJECTION INTRAMUSCULAR; INTRAVENOUS ONCE
Status: CANCELLED | OUTPATIENT
Start: 2020-09-24

## 2020-09-17 RX ORDER — SODIUM CHLORIDE 0.9 % (FLUSH) 0.9 %
10 SYRINGE (ML) INJECTION PRN
Status: DISCONTINUED | OUTPATIENT
Start: 2020-09-17 | End: 2020-09-18 | Stop reason: HOSPADM

## 2020-09-17 RX ORDER — SODIUM CHLORIDE 9 MG/ML
INJECTION, SOLUTION INTRAVENOUS CONTINUOUS
Status: ACTIVE | OUTPATIENT
Start: 2020-09-17 | End: 2020-09-17

## 2020-09-17 RX ORDER — SODIUM CHLORIDE 0.9 % (FLUSH) 0.9 %
5 SYRINGE (ML) INJECTION PRN
Status: DISCONTINUED | OUTPATIENT
Start: 2020-09-17 | End: 2020-09-18 | Stop reason: HOSPADM

## 2020-09-17 RX ORDER — METHYLPREDNISOLONE SODIUM SUCCINATE 125 MG/2ML
125 INJECTION, POWDER, LYOPHILIZED, FOR SOLUTION INTRAMUSCULAR; INTRAVENOUS ONCE
Status: CANCELLED | OUTPATIENT
Start: 2020-09-24

## 2020-09-17 RX ADMIN — FERRIC CARBOXYMALTOSE INJECTION 750 MG: 50 INJECTION, SOLUTION INTRAVENOUS at 11:06

## 2020-09-17 RX ADMIN — SODIUM CHLORIDE: 9 INJECTION, SOLUTION INTRAVENOUS at 11:05

## 2020-09-17 NOTE — PROGRESS NOTES
Pt arrive ambulatory for 1 of 2 injectafer infusion  Denies any complaint or concern. Vitals as charted. Peripheral IV established per policy. Injectafer infused with no sign of adverse reaction; line flushed. Pt educated on Injectafer , Pt verbalizes understanding of possible adverse reaction; denies questions. Pt states he has had Injectafer in past with  no issue   Intact IV catheter removed with pressure dressing applied.   Pt ambulate off unit at discharge

## 2020-09-17 NOTE — PLAN OF CARE
Problem: SAFETY  Goal: Free from accidental physical injury  9/17/2020 1301 by Kellie Card RN  Outcome: Completed  9/17/2020 1301 by Kellie Card RN  Outcome: Met This Shift

## 2020-09-24 ENCOUNTER — HOSPITAL ENCOUNTER (OUTPATIENT)
Dept: INFUSION THERAPY | Facility: MEDICAL CENTER | Age: 55
Discharge: HOME | End: 2020-09-24
Payer: MEDICARE

## 2020-09-24 VITALS
RESPIRATION RATE: 18 BRPM | SYSTOLIC BLOOD PRESSURE: 130 MMHG | HEART RATE: 79 BPM | DIASTOLIC BLOOD PRESSURE: 87 MMHG | TEMPERATURE: 99.1 F

## 2020-09-24 DIAGNOSIS — G93.32 CHRONIC FATIGUE SYNDROME: ICD-10-CM

## 2020-09-24 DIAGNOSIS — K90.9 IRON MALABSORPTION: ICD-10-CM

## 2020-09-24 DIAGNOSIS — D50.8 OTHER IRON DEFICIENCY ANEMIA: Primary | ICD-10-CM

## 2020-09-24 PROCEDURE — 2580000003 HC RX 258: Performed by: INTERNAL MEDICINE

## 2020-09-24 PROCEDURE — 6360000002 HC RX W HCPCS: Performed by: INTERNAL MEDICINE

## 2020-09-24 PROCEDURE — 96365 THER/PROPH/DIAG IV INF INIT: CPT

## 2020-09-24 RX ORDER — SODIUM CHLORIDE 0.9 % (FLUSH) 0.9 %
10 SYRINGE (ML) INJECTION PRN
Status: CANCELLED | OUTPATIENT
Start: 2020-09-24

## 2020-09-24 RX ORDER — DIPHENHYDRAMINE HYDROCHLORIDE 50 MG/ML
50 INJECTION INTRAMUSCULAR; INTRAVENOUS ONCE
Status: CANCELLED | OUTPATIENT
Start: 2020-09-24

## 2020-09-24 RX ORDER — SODIUM CHLORIDE 0.9 % (FLUSH) 0.9 %
5 SYRINGE (ML) INJECTION PRN
Status: CANCELLED | OUTPATIENT
Start: 2020-09-24

## 2020-09-24 RX ORDER — METHYLPREDNISOLONE SODIUM SUCCINATE 125 MG/2ML
125 INJECTION, POWDER, LYOPHILIZED, FOR SOLUTION INTRAMUSCULAR; INTRAVENOUS ONCE
Status: CANCELLED | OUTPATIENT
Start: 2020-09-24

## 2020-09-24 RX ORDER — SODIUM CHLORIDE 9 MG/ML
INJECTION, SOLUTION INTRAVENOUS CONTINUOUS
Status: ACTIVE | OUTPATIENT
Start: 2020-09-24 | End: 2020-09-24

## 2020-09-24 RX ORDER — HEPARIN SODIUM (PORCINE) LOCK FLUSH IV SOLN 100 UNIT/ML 100 UNIT/ML
500 SOLUTION INTRAVENOUS PRN
Status: CANCELLED | OUTPATIENT
Start: 2020-09-24

## 2020-09-24 RX ORDER — SODIUM CHLORIDE 9 MG/ML
INJECTION, SOLUTION INTRAVENOUS CONTINUOUS
Status: CANCELLED | OUTPATIENT
Start: 2020-09-24

## 2020-09-24 RX ADMIN — FERRIC CARBOXYMALTOSE INJECTION 750 MG: 50 INJECTION, SOLUTION INTRAVENOUS at 10:28

## 2020-09-24 RX ADMIN — SODIUM CHLORIDE: 9 INJECTION, SOLUTION INTRAVENOUS at 10:28

## 2020-09-24 NOTE — PROGRESS NOTES
Mia arrives ambulatory alone  Order for #2 injectafer infusion  Denies side effects from last infusion  Iv started with #24 cathlon to lt antecubital area per policy   Good blood return noted  injectafer initiated and completed    No reaction noted  Iv line flushed and iv line discontinued with needle intact  Pressure dressing applied  Discharged per ambulatory

## 2020-10-01 RX ORDER — PRAVASTATIN SODIUM 40 MG
TABLET ORAL
Qty: 90 TABLET | Refills: 1 | Status: SHIPPED | OUTPATIENT
Start: 2020-10-01 | End: 2021-03-29 | Stop reason: SDUPTHER

## 2020-10-01 NOTE — TELEPHONE ENCOUNTER
Last visit: 7/20/20  Last Med refill: 4/30/20  Does patient have enough medication for 72 hours: Yes    Next Visit Date:  Future Appointments   Date Time Provider Sylvester Collins   10/9/2020 10:30 AM STC DIGITAL RM [facility] MAMMO [address] [facility]   1/18/2021  9:30 AM COLTON Bishop - CNP [facility]   2/5/2021 11:30 AM SCHEDULE, [facility]   2/12/2021 10:00 AM Pritesh Bucio MD [facility]   2/25/2021 10:10 AM Jarod Bauer MD AFL Neph [facility] None   3/15/2021  9:40 AM COLTON Duong CNP Neuro Spec [facility]       Health Maintenance   Topic Date Due    Hepatitis B vaccine (1 of 3 - Risk 3-dose series) 07/29/1984    Shingles Vaccine (1 of 2) 07/29/2015    Annual Wellness Visit (AWV)  11/01/2019    Diabetic retinal exam  05/09/2020    Breast cancer screen  05/20/2020    Lipid screen  06/10/2020    TSH testing  06/10/2020    Flu vaccine (1) 09/01/2020    Diabetic foot exam  04/13/2021    Statin Therapy  04/30/2021    A1C test (Diabetic or Prediabetic)  07/27/2021    Potassium monitoring  08/05/2021    Creatinine monitoring  08/05/2021    DTaP/Tdap/Td vaccine (3 - Td) 09/14/2027    Colon cancer screen colonoscopy  07/02/2029    Pneumococcal 0-64 years Vaccine  Completed    Hepatitis C screen  Completed    HIV screen  Completed    Hepatitis A vaccine  Aged Out    Hib vaccine  Aged Out    Meningococcal (ACWY) vaccine  Aged Out       Hemoglobin A1C (%)   Date Value   12/11/2019 5.5   06/10/2019 5.6   04/12/2019 5.2             ( goal A1C is < 7)   Microalb/Crt.  Ratio (mcg/mg creat)   Date Value   12/11/2019 CANNOT BE CALCULATED     LDL Cholesterol (mg/dL)   Date Value   06/10/2019 74   09/21/2018 86       (goal LDL is <100)   AST (U/L)   Date Value   06/08/2019 15     ALT (U/L)   Date Value   06/08/2019 9     BUN (mg/dL)   Date Value   08/05/2020 15     BP Readings from Last 3 Encounters:   09/24/20 130/87   09/17/20 (!) 154/94   09/14/20 (!) 136/91          (goal 120/80)    All Future Testing planned in [facility]  Lab Frequency Next Occurrence   TERESA DIGITAL SCREEN W CAD BILATERAL Once 08/20/2020   Ferritin Once 68/28/4162   Basic Metabolic Panel Every 16 weeks    CBC Auto Differential Every 16 weeks    Creatinine, Random Urine Every 16 weeks    Protein / creatinine ratio, urine Every 16 weeks    Protein, urine, random Every 16 weeks    CBC Auto Differential     Ferritin                 Patient Active Problem List:     Transient insomnia     Sleep walking disorder     Raynaud's disease     Pulmonary nodules     Neuropathy     Hypertension     GERD (gastroesophageal reflux disease)     Fibromyalgia     Depression     Degenerative disc disease, thoracic     CAD (coronary artery disease)     Bipolar disorder (HCC)     Asthma     Anxiety     Antinuclear antibody (IQRA) titer greater than 1:80     Anemia     Hx of Stroke ([address]     Type 2 diabetes mellitus with stage 2 chronic kidney disease, without long-term current use of insulin ([facility])     Degenerative arthritis of right knee     S/P knee surgery     Chronic fatigue syndrome     Spondylosis of cervical region without myelopathy or radiculopathy     H/O hysterectomy with BSO at [facility] for benign disease 2014     Hx of total bilateral knee replacement     H/O: hysterectomy     Atrial fibrillation with slow ventricular response ([address]     H/O: stroke     Hypovolemia     Hypotension     Bradycardia     Cystocele, midline     JOYCE (stress urinary incontinence, female)     Overflow incontinence     7/30/18 Cystocele repair, Cystoscopy with OBTRYX Ureteral Sling     Nuclear senile cataract     CKD (chronic kidney disease), stage II     Scleroderma ([address]     Vitamin D deficiency     Trigger middle finger of right hand     Trigger ring finger of right hand     Osteoarthritis of spine with radiculopathy, cervical region     Degenerative disc disease, cervical     Left carpal tunnel syndrome     Spinal stenosis, cervical

## 2020-10-09 ENCOUNTER — HOSPITAL ENCOUNTER (OUTPATIENT)
Dept: WOMENS IMAGING | Age: 55
Discharge: HOME | End: 2020-10-11
Payer: MEDICARE

## 2020-10-09 ENCOUNTER — TELEPHONE (OUTPATIENT)
Dept: UROLOGY | Age: 55
End: 2020-10-09

## 2020-10-09 PROCEDURE — 77063 BREAST TOMOSYNTHESIS BI: CPT

## 2020-10-19 ENCOUNTER — OFFICE VISIT (OUTPATIENT)
Dept: UROLOGY | Age: 55
End: 2020-10-19
Payer: MEDICARE

## 2020-10-19 VITALS — TEMPERATURE: 97.9 F

## 2020-10-19 PROCEDURE — 99213 OFFICE O/P EST LOW 20 MIN: CPT | Performed by: UROLOGY

## 2020-10-19 ASSESSMENT — ENCOUNTER SYMPTOMS
COLOR CHANGE: 0
ABDOMINAL PAIN: 0
SHORTNESS OF BREATH: 0
EYE PAIN: 0
NAUSEA: 0
EYE REDNESS: 0
VOMITING: 0
BACK PAIN: 0
WHEEZING: 0
COUGH: 0

## 2020-10-19 NOTE — PROGRESS NOTES
Review of Systems   Constitutional: Negative for activity change, chills and fever. Eyes: Negative for pain, redness and visual disturbance. Respiratory: Negative for cough, shortness of breath and wheezing. Cardiovascular: Negative for chest pain and leg swelling. Gastrointestinal: Negative for abdominal pain, nausea and vomiting. Genitourinary: Positive for urgency and vaginal discharge. Negative for difficulty urinating, dysuria, frequency, hematuria, pelvic pain and vaginal bleeding. Musculoskeletal: Negative for back pain, joint swelling and myalgias. Skin: Negative for color change and rash. Neurological: Negative for dizziness, tremors and numbness. Hematological: Negative for adenopathy. Does not bruise/bleed easily.

## 2020-10-19 NOTE — PROGRESS NOTES
[address] [address]  Dept: [address] [facility] Urology Office Note - Established    Patient:  Eduardo Diaz  YOB: 1965  Date: 10/19/2020    The patient is a 54 y.o. female whopresents today for evaluation of the following problems:   Chief Complaint   Patient presents with    Incontinence     yearly check s/p sling; pt states she doesn't leak urine urine, but does c/o intermittent urgency        HPI  This is a very pleasant 77-year-old female who had a mid urethral sling placed almost 2 years ago. She has had complete resolution of her stress incontinence. She does have some urgency of urination. Has gotten a bit worse over the last year  Has good stream.  Feels that she empties well. Voids about every 2-3 hours. Summary of old records: N/A    Additional History: N/A    Procedures Today: N/A    Urinalysis today:  No results found for this visit on 10/19/20.     Imaging Reviewed during this Office Visit: none  (results were independently reviewed by physician and radiology report verified)    AUA Symptom Score (10/19/2020):  INCOMPLETE EMPTYING: How often have you had the sensation of not emptying your bladder?: Not at all  FREQUENCY: How often do you have to urinate less than every two hours?: Less than Half the time  INTERMITTENCY: How often have you found you stopped and started again several times when you urinated?: Not at all  URGENCY: How often have you found it difficult to postpone urination?: About Half the time  WEAK STREAM: How often have you had a weak urinary stream?: Not at all  STRAINING: How often have you had to strain to start  urination?: Not at all  NOCTURIA: How many times did you typically get up at night to uriniate?: 1 Time  TOTAL I-PSS SCORE[de-identified] 6  How would you feel if you were to spend the rest of your life with your urinary condition?: Mostly Satisfied    Last BUN and creatinine:  Lab Results   Component Value Date    BUN 15 08/05/2020     Lab Results   Component Value Date    CREATININE 0.83 08/05/2020       Additional Lab/Culture results: none    PAST MEDICAL, FAMILY AND SOCIAL HISTORY UPDATE:  Past Medical History:   Diagnosis Date    Acute kidney injury ([address]     Anemia     Angioedema     Antinuclear antibody (IQRA) titer greater than 1:80     Anxiety     Asthma     Ataxia     Atrial fibrillation (HCC)     Borderline personality disorder (HCC)     Bradycardia     CAD (coronary artery disease)     Chronic kidney disease     CKD (chronic kidney disease), stage II 8/23/2018    Degenerative disc disease, thoracic     Depression     Diabetes mellitus (HCC)     Diastolic dysfunction     DJD (degenerative joint disease)     Fibromyalgia     Foot pain, right     GERD (gastroesophageal reflux disease)     H/O: hysterectomy     Headache     Hernia of abdominal wall     History of blood transfusion     no problem    Hyperlipidemia     Hypertension     Lupus (HCC)     Mood disorder (HCC)     Neuropathy     Osteoarthritis     PTSD (post-traumatic stress disorder)     Pulmonary nodules     Raynaud's disease     Scleroderma ([address]     Scleroderma ([address] 8/23/2018    Seizures ([facility])     Sleep walking disorder     Thyroid disease     TIA (transient ischemic attack)     Transient insomnia     Tricuspid regurgitation     Vasospasm ([address] 01/2016    bilat. legs. resulting in near complete absence of profusion to arteries [facility] feet. [facility]     Vitamin D deficiency 8/23/2018    Wears glasses      Past Surgical History:   Procedure Laterality Date    ABDOMINOPLASTY  2013    BLADDER SUSPENSION N/A 7/30/2018    CYSTOSCOPY WITH OBTRYX MID URETHRAL SLING performed by Karime Ying MD at [address] [place] [address] Right 2016    CARPAL TUNNEL RELEASE Right 01/02/2020    CHOLECYSTECTOMY  1989    COLONOSCOPY N/A 6/24/2019    COLORECTAL CANCER SCREENING, NOT HIGH RISK performed by Airam Pineda MD at [address] 7/2/2019    COLORECTAL CANCER SCREENING, NOT HIGH RISK performed by Airam Pineda MD at [address] Right     EYE SURGERY      khushboo. lasik     FEMUR FRACTURE SURGERY  11/09/2018    FINGER SURGERY Right 08/28/2018    RING CARTER MASS EXCISION, TRIGGER RELEASE    FRACTURE SURGERY      left femur    GASTRECTOMY      GASTRIC BYPASS SURGERY  2012    HYSTERECTOMY      JOINT REPLACEMENT Bilateral     knees    KNEE SURGERY Right 05/02/2017    (femoral) patella replacement    NERVE BLOCK Right 05/04/2018     cervical facet #1 no steroid    NERVE BLOCK Right 06/29/2018    rt cervical diagnostic #2 decadron 10mg    NERVE BLOCK Right 08/10/2018    right cervical rfa decadron 10mg    OK ANTERIOR COLPORRAPHY RPR CYSTOCELE W/CYSTO N/A 7/30/2018    CYSTOCELE REPAIR performed by Sonny Wagner DO at [address] OFFICE/OUTPT [facility] Right 8/28/2018    RING CARTER MASS EXCISION, TRIGGER RELEASE, 3080 TABLE performed by Sissy Blackwood DO at [address] OFFICE/OUTPT VISIT,PROCEDURE ONLY Left 11/9/2018    FEMUR RETROGRADE IM NAILING PERIPROSTHETIC FRACTURE performed by Sissy Blackwood DO at [address]/CARPAL TUNNEL SURG Left 10/23/2018    CARPAL TUNNEL RELEASE performed by Sissy Blackwood DO at [address]  2011    left knee    TOTAL KNEE ARTHROPLASTY Right 5/2/2017    PATELLOFEMORAL REPLACEMENT KNEE - JAXSON, 3080 TABLE, FEMORAL POPLITEAL BLOCK, NSA= SPINAL VS GENERAL performed by Sissy Blackwood DO at [facility]  2004     Family History   Adopted: Yes   Problem Relation Age of Onset    Depression Mother     Asthma Mother     Depression Father     High Blood Pressure Father     Diabetes Father     Asthma Father     Depression Sister     Asthma Sister     Depression Brother    [facility] Asthma Brother     Asthma Maternal Aunt     Asthma Maternal Uncle     Asthma Paternal Aunt     Asthma Paternal Uncle     Asthma Maternal Grandmother     Cancer Maternal Grandmother     Asthma Maternal Grandfather     Asthma Paternal Grandmother     Asthma Paternal Grandfather     Asthma Maternal Cousin     Asthma Paternal Cousin      Outpatient Medications Marked as Taking for the 10/19/20 encounter (Office Visit) with Aimee Mcqueen MD   Medication Sig Dispense Refill    pravastatin (PRAVACHOL) 40 MG tablet TAKE 1 TABLET BY MOUTH ONE TIME A DAY  90 tablet 1    gabapentin (NEURONTIN) 800 MG tablet TAKE 1 TABLET BY MOUTH FOUR TIMES A  tablet 5    ARIPiprazole (ABILIFY) 2 MG tablet TAKE 1 TABLET BY MOUTH IN THE MORNING      NIFEdipine (ADALAT CC) 60 MG extended release tablet TAKE 1 TABLET BY MOUTH ONE TIME A DAY      EPIPEN 2-JIGAR 0.3 MG/0.3ML SOAJ injection INJECT 1 PEN INTO THE MUSCLE ONCE AS NEEDED FOR UP TO 1 DOSE FOR ANGIOEDEMA. 2 each 5    losartan (COZAAR) 100 MG tablet TAKE 1 TABLET BY MOUTH ONE TIME A DAY 90 tablet 1    famotidine (PEPCID) 40 MG tablet TAKE 1 TABLET BY MOUTH IN THE EVENING 90 tablet 1    cyclobenzaprine (FLEXERIL) 10 MG tablet TAKE 1 TABLET BY MOUTH ONE TIME A DAY AT NIGHT AS NEEDED FOR MUSCLE SPASM 90 tablet 1    cetirizine (ZYRTEC) 10 MG tablet TAKE 1 TABLET BY MOUTH ONE TIME A DAY  90 tablet 1    SM ASPIRIN ADULT LOW STRENGTH 81 MG EC tablet TAKE 1 TABLET BY MOUTH TWO TIMES A DAY  180 tablet 1    omeprazole (PRILOSEC) 20 MG delayed release capsule TAKE 1 CAPSULE BY MOUTH TWO TIMES A DAY  180 capsule 1    hydroxychloroquine (PLAQUENIL) 200 MG tablet TAKE 1 TABLET BY MOUTH ONE TIME A DAY  90 tablet 1    blood glucose monitor strips Test 4 times a day & as needed for symptoms of irregular blood glucose. 100 strip 5    HYDROcodone-acetaminophen (NORCO) 5-325 MG per tablet Take 1 tablet by mouth as needed.  Post op from surgeon after right CTR sx      ibuprofen (ADVIL;MOTRIN) 800 MG tablet Take 1 tablet by mouth as needed For post op pain prescribed by surgeon after right CTR sx      VORTIoxetine (TRINTELLIX) 10 MG TABS tablet Take 20 mg by mouth daily       spironolactone (ALDACTONE) 25 MG tablet Take 25 mg by mouth daily      alendronate (FOSAMAX) 70 MG tablet Take 70 mg by mouth every 7 days      albuterol sulfate HFA (VENTOLIN HFA) 108 (90 Base) MCG/ACT inhaler Inhale 2 puffs into the lungs every 6 hours as needed for Wheezing 1 Inhaler 3    ferrous sulfate 325 (65 Fe) MG tablet TAKE 1 TABLET BY MOUTH TWO TIMES A DAY  60 tablet 2    PROAIR  (90 Base) MCG/ACT inhaler INHALE 2 PUFFS BY MOUTH EVERY SIX HOURS AS NEEDED FOR WHEEZING 8.5 g 2    disulfiram (ANTABUSE) 250 MG tablet Take 1 tablet by mouth daily 30 tablet 0    metFORMIN (GLUCOPHAGE) 500 MG tablet Take 500 mg by mouth daily       lamoTRIgine (LAMICTAL) 25 MG tablet Take 25 mg by mouth 2 times daily       felodipine (PLENDIL) 10 MG extended release tablet Take 10 mg by mouth daily Prescribed by Dr. Miguel Mejia ergocalciferol (ERGOCALCIFEROL) 61711 units capsule Take 50,000 Units by mouth See Admin Instructions Takes twice a week      QUEtiapine (SEROQUEL) 300 MG tablet Take 0.5 tablets by mouth nightly (Patient taking differently: Take 300 mg by mouth nightly ) 60 tablet 3    lamoTRIgine (LAMICTAL) 100 MG tablet Take 100 mg by mouth every evening       levothyroxine (SYNTHROID) 25 MCG tablet Take 25 mcg by mouth Daily      Multiple Vitamins-Minerals (THERAPEUTIC MULTIVITAMIN-MINERALS) tablet Take 1 tablet by mouth daily        (All medications reviewed and updated by provider sincelast office visit or hospitalization)   Morphine;  Amlodipine; Dilaudid [hydromorphone hcl]; and Sulfa antibiotics  Social History     Tobacco Use   Smoking Status Never Smoker   Smokeless Tobacco Never Used      (If patient a smoker, smoking cessation counseling offered)     Social History     Substance and Sexual Activity   Alcohol Use Not Currently       REVIEW OF SYSTEMS:  Review of Systems      Physical Exam:      Vitals:    10/19/20 1042   Temp: 97.9 °F (36.6 °C)     There is no height or weight on file to calculate BMI. Patient is a 54 y.o. female in noacute distress and alert and oriented to person, place and time. Physical Exam  Constitutional: Patient in no acute distress. Neuro: Alert andoriented to person, place and time. Psych: Mood normal, affect normal  Skin: No rash noted  HEENT: Head: Normocephalic and atraumatic  Conjunctivae and EOM are normal. Pupils are equal, round  Nose: Normal  Right External Ear: Normal; Left External Ear: Normal  Mouth: Mucosa Moist  Neck: Supple  Lungs: Respiratory effort is normal  Cardiovascular: Warm & Pink  Abdomen: Soft, non-tender, non-distended with no CVA,  No flank tenderness,  Or hepatosplenomegaly   Lymphatics: No palpable lymphadenopathy. Bladder non-tender and not distended. Musculoskeletal: Normalgait and station      and Plan      1. Urgency of urination    2. Frequency of urination           Plan:   Timed voiding (pt has strong urges about every 3 hours)  F/u 1 year  If no improvement with behavioral modification, will consider pharmacotherapy for oab     Return in about 1 year (around 10/19/2021) for bladder scan. Prescriptions Ordered:  No orders of the defined types were placed in this encounter. Orders Placed:  No orders of the defined types were placed in this encounter. Bryson Gage MD    Agree with the ROS entered by the MA.

## 2020-10-28 RX ORDER — HYDROXYCHLOROQUINE SULFATE 200 MG/1
TABLET, FILM COATED ORAL
Qty: 90 TABLET | Refills: 1 | Status: SHIPPED | OUTPATIENT
Start: 2020-10-28 | End: 2021-04-19

## 2020-10-28 NOTE — TELEPHONE ENCOUNTER
Last visit: 7/20/20  Last Med refill: 3/2/20  Does patient have enough medication for 72 hours: No:     Next Visit Date:  Future Appointments   Date Time Provider Sylvester Collins   1/18[facility]2021  9[facility]   2/5/2021 11:30 AM SCHEDULE, MHP [facility]   2/12/2021 10:00 AM Boris Bucio MD [facility]   2/25/2021 10:10 AM Vanesa Page MD [facility] Neph [facility] None   3/15/2021  9:40 AM COLTON Lyons CNP Neuro [facility]       Health Maintenance   Topic Date Due    Hepatitis B vaccine (1 of 3 - Risk 3-dose series) 07/29/1984    Shingles Vaccine (1 of 2) 07/29/2015    Annual Wellness Visit (AWV)  11/01/2019    Diabetic retinal exam  05/09/2020    Lipid screen  06/10/2020    TSH testing  06/10/2020    Flu vaccine (1) 09/01/2020    Diabetic foot exam  04/13/2021    A1C test (Diabetic or Prediabetic)  07/27/2021    Potassium monitoring  08/05/2021    Creatinine monitoring  08/05/2021    Breast cancer screen  10/09/2021    DTaP/Tdap/Td vaccine (3 - Td) 09/14/2027    Colon cancer screen colonoscopy  07/02/2029    Pneumococcal 0-64 years Vaccine  Completed    Hepatitis C screen  Completed    HIV screen  Completed    Hepatitis A vaccine  Aged Out    Hib vaccine  Aged Out    Meningococcal (ACWY) vaccine  Aged Out       Hemoglobin A1C (%)   Date Value   12/11/2019 5.5   06/10/2019 5.6   04/12/2019 5.2             ( goal A1C is < 7)   Microalb/Crt.  Ratio (mcg/mg creat)   Date Value   12/11/2019 CANNOT BE CALCULATED     LDL Cholesterol (mg/dL)   Date Value   06/10/2019 74   09/21/2018 86       (goal LDL is <100)   AST (U/L)   Date Value   06/08/2019 15     ALT (U/L)   Date Value   06/08/2019 9     BUN (mg/dL)   Date Value   08/05/2020 15     BP Readings from Last 3 Encounters:   09/24/20 130/87   09/17/20 (!) 154/94   09/14/20 (!) 136/91          (goal 120/80)    All Future Testing planned in [facility]  Lab Frequency Next

## 2020-11-24 NOTE — TELEPHONE ENCOUNTER
Last visit: 7/20/20  Last Med refill: 8/19/19  Does patient have enough medication for 72 hours: No:     Next Visit Date:  Future Appointments   Date Time Provider Sylvester Collins   1/18/2021  9:30 AM COLTON Whitt - CNP [facility]   2/5/2021 11:30 AM SCHEDULE, MHP [facility]   2/12/2021 10:00 AM Michelle Bucio MD [facility]   2/25/2021 10:10 AM Naila Prince MD AFL Neph Malia None   3/15/2021  9:40 AM Rama Rojas APRN - CNP Neuro Spec [facility]       Health Maintenance   Topic Date Due    Hepatitis B vaccine (1 of 3 - Risk 3-dose series) 07/29/1984    Shingles Vaccine (1 of 2) 07/29/2015    Annual Wellness Visit (AWV)  11/01/2019    Diabetic retinal exam  05/09/2020    Lipid screen  06/10/2020    TSH testing  06/10/2020    Flu vaccine (1) 09/01/2020    Diabetic foot exam  04/13/2021    A1C test (Diabetic or Prediabetic)  07/27/2021    Potassium monitoring  08/05/2021    Creatinine monitoring  08/05/2021    Breast cancer screen  10/09/2021    DTaP/Tdap/Td vaccine (3 - Td) 09/14/2027    Colon cancer screen colonoscopy  07/02/2029    Pneumococcal 0-64 years Vaccine  Completed    Hepatitis C screen  Completed    HIV screen  Completed    Hepatitis A vaccine  Aged Out    Hib vaccine  Aged Out    Meningococcal (ACWY) vaccine  Aged Out       Hemoglobin A1C (%)   Date Value   12/11/2019 5.5   06/10/2019 5.6   04/12/2019 5.2             ( goal A1C is < 7)   Microalb/Crt.  Ratio (mcg/mg creat)   Date Value   12/11/2019 CANNOT BE CALCULATED     LDL Cholesterol (mg/dL)   Date Value   06/10/2019 74   09/21/2018 86       (goal LDL is <100)   AST (U/L)   Date Value   06/08/2019 15     ALT (U/L)   Date Value   06/08/2019 9     BUN (mg/dL)   Date Value   08/05/2020 15     BP Readings from Last 3 Encounters:   09/24/20 130/87   09/17/20 (!) 154/94   09/14/20 (!) 136/91          (goal 120/80)    All Future Testing planned in [facility]  Lab Frequency Next Occurrence   Ferritin Once 18/86/3296   Basic Metabolic Panel Every 16 weeks    CBC Auto Differential Every 16 weeks    Creatinine, Random Urine Every 16 weeks    Protein / creatinine ratio, urine Every 16 weeks    Protein, urine, random Every 16 weeks    CBC Auto Differential     Ferritin                 Patient Active Problem List:     Transient insomnia     Sleep walking disorder     Raynaud's disease     Pulmonary nodules     Neuropathy     Hypertension     GERD (gastroesophageal reflux disease)     Fibromyalgia     Depression     Degenerative disc disease, thoracic     CAD (coronary artery disease)     Bipolar disorder (HCC)     Asthma     Anxiety     Antinuclear antibody (IQRA) titer greater than 1:80     Anemia     Hx of Stroke (HCC)     Type 2 diabetes mellitus with stage 2 chronic kidney disease, without long-term current use of insulin (HCC)     Degenerative arthritis of right knee     S/P knee surgery     Chronic fatigue syndrome     Spondylosis of cervical region without myelopathy or radiculopathy     H/O hysterectomy with BSO at [facility] for benign disease 2014     Hx of total bilateral knee replacement     H/O: hysterectomy     Atrial fibrillation with slow ventricular response ([address]     H/O: stroke     Hypovolemia     Hypotension     Bradycardia     Cystocele, midline     JOYCE (stress urinary incontinence, female)     Overflow incontinence     7/30/18 Cystocele repair, Cystoscopy with OBTRYX Ureteral Sling     Nuclear senile cataract     CKD (chronic kidney disease), stage II     Scleroderma ([address]     Vitamin D deficiency     Trigger middle finger of right hand     Trigger ring finger of right hand     Osteoarthritis of spine with radiculopathy, cervical region     Degenerative disc disease, cervical     Left carpal tunnel syndrome     Spinal stenosis, cervical region     Neural foraminal stenosis of cervical spine     Closed fracture of lower end of femur ([address]     Closed supracondylar fracture of femur, left, initial encounter [facility]     Numbness     Age-related osteoporosis without current pathological fracture     Acquired hypothyroidism     Age-related nuclear cataract of both eyes     Alcoholism in recovery ([address]     Bipolar affective disorder, currently depressed, mild (HCC)     Generalized anxiety disorder     Iron deficiency anemia     Iron malabsorption

## 2020-11-27 ENCOUNTER — TELEPHONE (OUTPATIENT)
Dept: PHARMACY | Facility: CLINIC | Age: 55
End: 2020-11-27

## 2020-11-27 NOTE — TELEPHONE ENCOUNTER
CLINICAL PHARMACY: ADHERENCE REVIEW  Identified care gap per [employer]; fills at [facility]: Diabetes adherence    Last Office Visit: unknown    Patient also appears to be taking: passed 2020 adherence  PRAVASTATIN  TAB 40MG  LOSARTAN POT TAB 100MG    DIABETES ADHERENCE  PDC: 85%  Needs 22 days to be adherent. Potential fail date: 12/9/20  Blanca Parra MD,JEFRY [id number]    Per Insurance Records   METFORMIN    TAB 500MG last filled on 7/2/20 for a 90 day supply;   Due: 9/30/20    Per [facility]:  Staff will refill today for 90 day supply. Lab Results   Component Value Date    LABA1C 5.5 12/11/2019    LABA1C 5.6 06/10/2019    LABA1C 5.2 04/12/2019       PLAN  Left msg on home TAD to  refill at [facility].

## 2020-12-01 NOTE — TELEPHONE ENCOUNTER
2nd Attempt Documentation:  2nd attempt to contact this patient regarding the previous message  CLINICAL PHARMACY: ADHERENCE REVIEW  Patient unavailable at the time of call. Left following message on home TAD: please call back at toll-free [phone] option 7 to retrieve previous message.     My chart message sent

## 2020-12-01 NOTE — TELEPHONE ENCOUNTER
CLINICAL PHARMACY CONSULT: MED RECONCILIATION/REVIEW ADDENDUM    For Pharmacy Admin Tracking Only    PHSO: Yes  Total # of Interventions Recommended: 1  - New Order #: 0 New Medication Order Reason(s): Adherence  - Maintenance Safety Lab Monitoring #: 1  - New Therapy Lab Monitoring #: 0  Recommended intervention potential cost savings: 1  Total Interventions Accepted: 0  Time Spent (min): 15    Trinity Scott CPhT.   [address]

## 2020-12-16 RX ORDER — OMEPRAZOLE 20 MG/1
CAPSULE, DELAYED RELEASE ORAL
Qty: 180 CAPSULE | Refills: 1 | Status: SHIPPED | OUTPATIENT
Start: 2020-12-16 | End: 2021-06-16

## 2020-12-16 NOTE — TELEPHONE ENCOUNTER
Last visit: 7/20/20  Last Med refill: 5/26/20  Does patient have enough medication for 72 hours: No:     Next Visit Date:  Future Appointments   Date Time Provider Sylvester Collins   1/18/2021  9:30 AM [facility]   2/5/2021 11:30 AM SCHEDULE, MHP [facility]   2/12/2021 10:00 AM Elaine Bucio MD [facility]   2/25/2021 10:10 AM Indu Khanna MD AFL Neph Malia None   3/15/2021  9:40 AM COLTON Conley - CNP Neuro Spec [facility]       Health Maintenance   Topic Date Due    Hepatitis B vaccine (1 of 3 - Risk 3-dose series) 07/29/1984    Shingles Vaccine (1 of 2) 07/29/2015    Annual Wellness Visit (AWV)  11/01/2019    Diabetic retinal exam  05/09/2020    Lipid screen  06/10/2020    TSH testing  06/10/2020    Flu vaccine (1) 09/01/2020    Diabetic foot exam  04/13/2021    A1C test (Diabetic or Prediabetic)  07/27/2021    Potassium monitoring  08/05/2021    Creatinine monitoring  08/05/2021    Breast cancer screen  10/09/2021    DTaP/Tdap/Td vaccine (3 - Td) 09/14/2027    Colon cancer screen colonoscopy  07/02/2029    Pneumococcal 0-64 years Vaccine  Completed    Hepatitis C screen  Completed    HIV screen  Completed    Hepatitis A vaccine  Aged Out    Hib vaccine  Aged Out    Meningococcal (ACWY) vaccine  Aged Out       Hemoglobin A1C (%)   Date Value   12/11/2019 5.5   06/10/2019 5.6   04/12/2019 5.2             ( goal A1C is < 7)   Microalb/Crt.  Ratio (mcg/mg creat)   Date Value   12/11/2019 CANNOT BE CALCULATED     LDL Cholesterol (mg/dL)   Date Value   06/10/2019 74   09/21/2018 86       (goal LDL is <100)   AST (U/L)   Date Value   06/08/2019 15     ALT (U/L)   Date Value   06/08/2019 9     BUN (mg/dL)   Date Value   08/05/2020 15     BP Readings from Last 3 Encounters:   09/24/20 130/87   09/17/20 (!) 154/94   09/14/20 (!) 136/91          (goal 120/80)    All Future Testing planned in [facility]  Lab Frequency Next Occurrence   Basic Metabolic Panel Every 16 weeks    CBC Auto Differential Every 16 weeks    Creatinine, Random Urine Every 16 weeks    Protein / creatinine ratio, urine Every 16 weeks    Protein, urine, random Every 16 weeks    CBC Auto Differential     Ferritin                 Patient Active Problem List:     Transient insomnia     Sleep walking disorder     Raynaud's disease     Pulmonary nodules     Neuropathy     Hypertension     GERD (gastroesophageal reflux disease)     Fibromyalgia     Depression     Degenerative disc disease, thoracic     CAD (coronary artery disease)     Bipolar disorder (HCC)     Asthma     Anxiety     Antinuclear antibody (IQRA) titer greater than 1:80     Anemia     Hx of Stroke (HCC)     Type 2 diabetes mellitus with stage 2 chronic kidney disease, without long-term current use of insulin (HCC)     Degenerative arthritis of right knee     S/P knee surgery     Chronic fatigue syndrome     Spondylosis of cervical region without myelopathy or radiculopathy     H/O hysterectomy with BSO at [facility] for benign disease 2014     Hx of total bilateral knee replacement     H/O: hysterectomy     Atrial fibrillation with slow ventricular response ([address]     H/O: stroke     Hypovolemia     Hypotension     Bradycardia     Cystocele, midline     JOYCE (stress urinary incontinence, female)     Overflow incontinence     7/30/18 Cystocele repair, Cystoscopy with OBTRYX Ureteral Sling     Nuclear senile cataract     CKD (chronic kidney disease), stage II     Scleroderma ([address]     Vitamin D deficiency     Trigger middle finger of right hand     Trigger ring finger of right hand     Osteoarthritis of spine with radiculopathy, cervical region     Degenerative disc disease, cervical     Left carpal tunnel syndrome     Spinal stenosis, cervical region     Neural foraminal stenosis of cervical spine     Closed fracture of lower end of femur ([address]     Closed supracondylar fracture of femur, left, initial encounter ([facility]     Numbness     Age-related osteoporosis without current pathological fracture     Acquired hypothyroidism     Age-related nuclear cataract of both eyes     Alcoholism in recovery ([address]     Bipolar affective disorder, currently depressed, mild (HCC)     Generalized anxiety disorder     Iron deficiency anemia     Iron malabsorption

## 2020-12-28 NOTE — TELEPHONE ENCOUNTER
Last visit: 7/20/20  Last Med refill: 5/26/20, 7/20/20  Does patient have enough medication for 72 hours: Yes    Next Visit Date:  Future Appointments   Date Time Provider Sylvester Collins   1/18/2021  9:30 AM Lianna Roe APRN - CNP [facility]   2/5/2021 11:30 AM SCHEDULE, M[facility]   2/12/2021 10:00 AM Romina Bucio MD [facility]   2/25/2021 10:10 AM Bhupinder Lamb MD AFL Neph [facility] None   3/15/2021  9:40 AM COLTON Mendoza - CNP Neuro Spec [facility]       Health Maintenance   Topic Date Due    Hepatitis B vaccine (1 of 3 - Risk 3-dose series) 07/29/1984    Shingles Vaccine (1 of 2) 07/29/2015    Annual Wellness Visit (AWV)  11/01/2019    Diabetic retinal exam  05/09/2020    Lipid screen  06/10/2020    TSH testing  06/10/2020    Flu vaccine (1) 09/01/2020    Diabetic foot exam  04/13/2021    A1C test (Diabetic or Prediabetic)  07/27/2021    Potassium monitoring  08/05/2021    Creatinine monitoring  08/05/2021    Breast cancer screen  10/09/2021    DTaP/Tdap/Td vaccine (3 - Td) 09/14/2027    Colon cancer screen colonoscopy  07/02/2029    Pneumococcal 0-64 years Vaccine  Completed    Hepatitis C screen  Completed    HIV screen  Completed    Hepatitis A vaccine  Aged Out    Hib vaccine  Aged Out    Meningococcal (ACWY) vaccine  Aged Out       Hemoglobin A1C (%)   Date Value   12/11/2019 5.5   06/10/2019 5.6   04/12/2019 5.2             ( goal A1C is < 7)   Microalb/Crt.  Ratio (mcg/mg creat)   Date Value   12/11/2019 CANNOT BE CALCULATED     LDL Cholesterol (mg/dL)   Date Value   06/10/2019 74   09/21/2018 86       (goal LDL is <100)   AST (U/L)   Date Value   06/08/2019 15     ALT (U/L)   Date Value   06/08/2019 9     BUN (mg/dL)   Date Value   08/05/2020 15     BP Readings from Last 3 Encounters:   09/24/20 130/87   09/17/20 (!) 154/94   09/14/20 (!) 136/91          (goal 120/80)    [facility]  Lab Frequency Next Occurrence   Basic Metabolic Panel Every 16 weeks    CBC Auto Differential Every 16 weeks    Creatinine, Random Urine Every 16 weeks    Protein / creatinine ratio, urine Every 16 weeks    Protein, urine, random Every 16 weeks    CBC Auto Differential     Ferritin                 Patient Active Problem List:     Transient insomnia     Sleep walking disorder     Raynaud's disease     Pulmonary nodules     Neuropathy     Hypertension     GERD (gastroesophageal reflux disease)     Fibromyalgia     Depression     Degenerative disc disease, thoracic     CAD (coronary artery disease)     Bipolar disorder (HCC)     Asthma     Anxiety     Antinuclear antibody (IQRA) titer greater than 1:80     Anemia     Hx of Stroke ([facility])     Type 2 diabetes mellitus with stage 2 chronic kidney disease, without long-term current use of insulin ([facility])     Degenerative arthritis of right knee     S/P knee surgery     Chronic fatigue syndrome     Spondylosis of cervical region without myelopathy or radiculopathy     H/O hysterectomy with BSO at [facility] for benign disease 2014     Hx of total bilateral knee replacement     H/O: hysterectomy     Atrial fibrillation with slow ventricular response ([address]     H/O: stroke     Hypovolemia     Hypotension     Bradycardia     Cystocele, midline     JOYCE (stress urinary incontinence, female)     Overflow incontinence     7/30/18 Cystocele repair, Cystoscopy with OBTRYX Ureteral Sling     Nuclear senile cataract     CKD (chronic kidney disease), stage II     Scleroderma ([address]     Vitamin D deficiency     Trigger middle finger of right hand     Trigger ring finger of right hand     Osteoarthritis of spine with radiculopathy, cervical region     Degenerative disc disease, cervical     Left carpal tunnel syndrome     Spinal stenosis, cervical region     Neural foraminal stenosis of cervical spine     Closed fracture of lower end of femur ([address]     Closed supracondylar fracture of femur, left, initial encounter ([facility]     Numbness     Age-related osteoporosis without current pathological fracture     Acquired hypothyroidism     Age-related nuclear cataract of both eyes     Alcoholism in recovery ([facility]     Bipolar affective disorder, currently depressed, mild (HCC)     Generalized anxiety disorder     Iron deficiency anemia     Iron malabsorption

## 2020-12-29 RX ORDER — ASPIRIN 81 MG/1
TABLET, DELAYED RELEASE ORAL
Qty: 180 TABLET | Refills: 1 | Status: SHIPPED | OUTPATIENT
Start: 2020-12-29

## 2020-12-29 RX ORDER — FAMOTIDINE 40 MG/1
TABLET, FILM COATED ORAL
Qty: 90 TABLET | Refills: 1 | Status: SHIPPED | OUTPATIENT
Start: 2020-12-29 | End: 2021-06-14

## 2021-01-18 ENCOUNTER — HOSPITAL ENCOUNTER (OUTPATIENT)
Age: 56
Setting detail: SPECIMEN
Discharge: HOME | End: 2021-01-18
Payer: MEDICARE

## 2021-01-18 ENCOUNTER — OFFICE VISIT (OUTPATIENT)
Dept: FAMILY MEDICINE CLINIC | Age: 56
End: 2021-01-18
Payer: MEDICARE

## 2021-01-18 VITALS
DIASTOLIC BLOOD PRESSURE: 90 MMHG | SYSTOLIC BLOOD PRESSURE: 140 MMHG | OXYGEN SATURATION: 97 % | BODY MASS INDEX: 29.77 KG/M2 | HEART RATE: 65 BPM | WEIGHT: 195.8 LBS

## 2021-01-18 DIAGNOSIS — F10.21 ALCOHOLISM IN RECOVERY (HCC): ICD-10-CM

## 2021-01-18 DIAGNOSIS — E53.8 B12 DEFICIENCY: ICD-10-CM

## 2021-01-18 DIAGNOSIS — Z98.84 H/O GASTRIC BYPASS: ICD-10-CM

## 2021-01-18 DIAGNOSIS — N18.2 TYPE 2 DIABETES MELLITUS WITH STAGE 2 CHRONIC KIDNEY DISEASE, WITHOUT LONG-TERM CURRENT USE OF INSULIN (HCC): ICD-10-CM

## 2021-01-18 DIAGNOSIS — M34.9 SCLERODERMA (HCC): ICD-10-CM

## 2021-01-18 DIAGNOSIS — I48.91 ATRIAL FIBRILLATION WITH SLOW VENTRICULAR RESPONSE (HCC): ICD-10-CM

## 2021-01-18 DIAGNOSIS — E11.22 TYPE 2 DIABETES MELLITUS WITH STAGE 2 CHRONIC KIDNEY DISEASE, WITHOUT LONG-TERM CURRENT USE OF INSULIN (HCC): ICD-10-CM

## 2021-01-18 DIAGNOSIS — D50.8 OTHER IRON DEFICIENCY ANEMIA: ICD-10-CM

## 2021-01-18 DIAGNOSIS — I25.119 ATHEROSCLEROSIS OF NATIVE CORONARY ARTERY OF NATIVE HEART WITH ANGINA PECTORIS (HCC): ICD-10-CM

## 2021-01-18 DIAGNOSIS — K90.9 IRON MALABSORPTION: ICD-10-CM

## 2021-01-18 DIAGNOSIS — N18.30 STAGE 3 CHRONIC KIDNEY DISEASE, UNSPECIFIED WHETHER STAGE 3A OR 3B CKD (HCC): ICD-10-CM

## 2021-01-18 DIAGNOSIS — F31.9 BIPOLAR AFFECTIVE DISORDER, REMISSION STATUS UNSPECIFIED (HCC): ICD-10-CM

## 2021-01-18 DIAGNOSIS — I10 ESSENTIAL HYPERTENSION: ICD-10-CM

## 2021-01-18 LAB
ABSOLUTE EOS #: 0.12 K/UL (ref 0–0.44)
ABSOLUTE IMMATURE GRANULOCYTE: <0.03 K/UL (ref 0–0.3)
ABSOLUTE LYMPH #: 1.22 K/UL (ref 1.1–3.7)
ABSOLUTE MONO #: 0.4 K/UL (ref 0.1–1.2)
ALBUMIN SERPL-MCNC: 4.1 G/DL (ref 3.5–5.2)
ALBUMIN/GLOBULIN RATIO: 1.3 (ref 1–2.5)
ALP BLD-CCNC: 170 U/L (ref 35–104)
ALT SERPL-CCNC: 11 U/L (ref 5–33)
ANION GAP SERPL CALCULATED.3IONS-SCNC: 13 MMOL/L (ref 9–17)
AST SERPL-CCNC: 16 U/L
BASOPHILS # BLD: 2 % (ref 0–2)
BASOPHILS ABSOLUTE: 0.06 K/UL (ref 0–0.2)
BILIRUB SERPL-MCNC: 0.61 MG/DL (ref 0.3–1.2)
BUN BLDV-MCNC: 14 MG/DL (ref 6–20)
BUN/CREAT BLD: ABNORMAL (ref 9–20)
CALCIUM SERPL-MCNC: 9.7 MG/DL (ref 8.6–10.4)
CHLORIDE BLD-SCNC: 103 MMOL/L (ref 98–107)
CHOLESTEROL/HDL RATIO: 2
CHOLESTEROL: 229 MG/DL
CO2: 24 MMOL/L (ref 20–31)
CREAT SERPL-MCNC: 0.97 MG/DL (ref 0.5–0.9)
CREATININE URINE: 89.9 MG/DL (ref 28–217)
DIFFERENTIAL TYPE: NORMAL
EOSINOPHILS RELATIVE PERCENT: 3 % (ref 1–4)
ESTIMATED AVERAGE GLUCOSE: 103 MG/DL
GFR AFRICAN AMERICAN: >60 ML/MIN
GFR NON-AFRICAN AMERICAN: 60 ML/MIN
GFR SERPL CREATININE-BSD FRML MDRD: ABNORMAL ML/MIN/{1.73_M2}
GFR SERPL CREATININE-BSD FRML MDRD: ABNORMAL ML/MIN/{1.73_M2}
GLUCOSE BLD-MCNC: 84 MG/DL (ref 70–99)
HBA1C MFR BLD: 5.2 % (ref 4–6)
HCT VFR BLD CALC: 40.1 % (ref 36.3–47.1)
HDLC SERPL-MCNC: 116 MG/DL
HEMOGLOBIN: 13.2 G/DL (ref 11.9–15.1)
IMMATURE GRANULOCYTES: 0 %
LDL CHOLESTEROL: 93 MG/DL (ref 0–130)
LYMPHOCYTES # BLD: 34 % (ref 24–43)
MCH RBC QN AUTO: 31.2 PG (ref 25.2–33.5)
MCHC RBC AUTO-ENTMCNC: 32.9 G/DL (ref 28.4–34.8)
MCV RBC AUTO: 94.8 FL (ref 82.6–102.9)
MICROALBUMIN/CREAT 24H UR: <12 MG/L
MICROALBUMIN/CREAT UR-RTO: NORMAL MCG/MG CREAT
MONOCYTES # BLD: 11 % (ref 3–12)
NRBC AUTOMATED: 0 PER 100 WBC
PDW BLD-RTO: 13.1 % (ref 11.8–14.4)
PLATELET # BLD: 224 K/UL (ref 138–453)
PLATELET ESTIMATE: NORMAL
PMV BLD AUTO: 10.8 FL (ref 8.1–13.5)
POTASSIUM SERPL-SCNC: 4.1 MMOL/L (ref 3.7–5.3)
PTH INTACT: 134.1 PG/ML (ref 15–65)
RBC # BLD: 4.23 M/UL (ref 3.95–5.11)
RBC # BLD: NORMAL 10*6/UL
SEG NEUTROPHILS: 50 % (ref 36–65)
SEGMENTED NEUTROPHILS ABSOLUTE COUNT: 1.83 K/UL (ref 1.5–8.1)
SODIUM BLD-SCNC: 140 MMOL/L (ref 135–144)
THYROXINE, FREE: 0.97 NG/DL (ref 0.93–1.7)
TOTAL PROTEIN: 7.2 G/DL (ref 6.4–8.3)
TRIGL SERPL-MCNC: 101 MG/DL
VITAMIN D 25-HYDROXY: 19.1 NG/ML (ref 30–100)
VLDLC SERPL CALC-MCNC: ABNORMAL MG/DL (ref 1–30)
WBC # BLD: 3.6 K/UL (ref 3.5–11.3)
WBC # BLD: NORMAL 10*3/UL

## 2021-01-18 PROCEDURE — 99214 OFFICE O/P EST MOD 30 MIN: CPT | Performed by: NURSE PRACTITIONER

## 2021-01-18 RX ORDER — CETIRIZINE HYDROCHLORIDE 10 MG/1
TABLET ORAL
Qty: 90 TABLET | Refills: 1 | Status: SHIPPED | OUTPATIENT
Start: 2021-01-18 | End: 2021-08-09 | Stop reason: SDUPTHER

## 2021-01-18 RX ORDER — LOSARTAN POTASSIUM 100 MG/1
TABLET ORAL
Qty: 90 TABLET | Refills: 1 | Status: SHIPPED | OUTPATIENT
Start: 2021-01-18 | End: 2021-03-29 | Stop reason: SDUPTHER

## 2021-01-18 ASSESSMENT — ENCOUNTER SYMPTOMS
COUGH: 0
SHORTNESS OF BREATH: 0

## 2021-01-18 NOTE — PROGRESS NOTES
[address] [place]  [phone]    Allison Blackwood is a 54 y.o. female who presents today for her  medical conditions/complaints as noted below. Mia Harrison is c/o of 6 Month Follow-Up    HPI:     HPI   Htn, a fib, atherosclerosis- does f/u regularly with cardio. bp elevated today because she is fasting and did not take meds. Scleroderma- does see rheumatology at [facility].     ckd- doesn't appear to be a current problem but does f/u with nephrology    alcoholism- in recovery    Bipolar- stable, does follow up with psych. Type 2 dm- does take metformin from [facility]. Just had lab work done this am.     She is seeing hematology for low iron. States she was covid positive jan 1. Nursing note reviewed and discussed with patient. Patient's medications, allergies, past medical, surgical, social and family histories were reviewed and updated asappropriate.     Current Outpatient Medications   Medication Sig Dispense Refill    losartan (COZAAR) 100 MG tablet TAKE 1 TABLET BY MOUTH ONE TIME A DAY 90 tablet 1    cetirizine (ZYRTEC) 10 MG tablet TAKE 1 TABLET BY MOUTH ONE TIME A DAY 90 tablet 1    famotidine (PEPCID) 40 MG tablet TAKE 1 TABLET BY MOUTH IN THE EVENING  90 tablet 1    SM ASPIRIN ADULT LOW STRENGTH 81 MG EC tablet TAKE 1 TABLET BY MOUTH TWO TIMES A DAY  180 tablet 1    omeprazole (PRILOSEC) 20 MG delayed release capsule TAKE 1 CAPSULE BY MOUTH TWO TIMES A  capsule 1    PROAIR  (90 Base) MCG/ACT inhaler INHALE 2 PUFFS BY MOUTH EVERY SIX HOURS AS NEEDED FOR WHEEZING 1 Inhaler 5    hydroxychloroquine (PLAQUENIL) 200 MG tablet TAKE 1 TABLET BY MOUTH ONE TIME A DAY  90 tablet 1    pravastatin (PRAVACHOL) 40 MG tablet TAKE 1 TABLET BY MOUTH ONE TIME A DAY  90 tablet 1    gabapentin (NEURONTIN) 800 MG tablet TAKE 1 TABLET BY MOUTH FOUR TIMES A  tablet 5    ARIPiprazole (ABILIFY) 2 MG tablet TAKE 1 TABLET BY MOUTH IN THE MORNING      NIFEdipine (ADALAT CC) 60 MG extended release tablet TAKE 1 TABLET BY MOUTH ONE TIME A DAY      EPIPEN 2-JIGAR 0.3 MG/0.3ML SOAJ injection INJECT 1 PEN INTO THE MUSCLE ONCE AS NEEDED FOR UP TO 1 DOSE FOR ANGIOEDEMA. 2 each 5    cyclobenzaprine (FLEXERIL) 10 MG tablet TAKE 1 TABLET BY MOUTH ONE TIME A DAY AT NIGHT AS NEEDED FOR MUSCLE SPASM 90 tablet 1    blood glucose monitor strips Test 4 times a day & as needed for symptoms of irregular blood glucose. 100 strip 5    HYDROcodone-acetaminophen (NORCO) 5-325 MG per tablet Take 1 tablet by mouth as needed.  Post op from surgeon after right CTR sx      ibuprofen (ADVIL;MOTRIN) 800 MG tablet Take 1 tablet by mouth as needed For post op pain prescribed by surgeon after right CTR sx      VORTIoxetine (TRINTELLIX) 10 MG TABS tablet Take 20 mg by mouth daily       spironolactone (ALDACTONE) 25 MG tablet Take 25 mg by mouth daily      alendronate (FOSAMAX) 70 MG tablet Take 70 mg by mouth every 7 days      albuterol sulfate HFA (VENTOLIN HFA) 108 (90 Base) MCG/ACT inhaler Inhale 2 puffs into the lungs every 6 hours as needed for Wheezing 1 Inhaler 3    ferrous sulfate 325 (65 Fe) MG tablet TAKE 1 TABLET BY MOUTH TWO TIMES A DAY  60 tablet 2    disulfiram (ANTABUSE) 250 MG tablet Take 1 tablet by mouth daily 30 tablet 0    metFORMIN (GLUCOPHAGE) 500 MG tablet Take 500 mg by mouth daily       lamoTRIgine (LAMICTAL) 25 MG tablet Take 25 mg by mouth 2 times daily       felodipine (PLENDIL) 10 MG extended release tablet Take 10 mg by mouth daily Prescribed by Dr. Samantha Gooden ergocalciferol (ERGOCALCIFEROL) 38144 units capsule Take 50,000 Units by mouth See Admin Instructions Takes twice a week      QUEtiapine (SEROQUEL) 300 MG tablet Take 0.5 tablets by mouth nightly (Patient taking differently: Take 300 mg by mouth nightly ) 60 tablet 3    lamoTRIgine (LAMICTAL) 100 MG tablet Take 100 mg by mouth every evening       levothyroxine (SYNTHROID) 25 MCG tablet Take 25 mcg by mouth Daily      Multiple Vitamins-Minerals (THERAPEUTIC MULTIVITAMIN-MINERALS) tablet Take 1 tablet by mouth daily       No current facility-administered medications for this visit. Past Medical History:   Diagnosis Date    Acute kidney injury ([address]     Anemia     Angioedema     Antinuclear antibody (IQRA) titer greater than 1:80     Anxiety     Asthma     Ataxia     Atrial fibrillation (HCC)     Borderline personality disorder (HCC)     Bradycardia     CAD (coronary artery disease)     Chronic kidney disease     CKD (chronic kidney disease), stage II 8/23/2018    Degenerative disc disease, thoracic     Depression     Diabetes mellitus (HCC)     Diastolic dysfunction     DJD (degenerative joint disease)     Fibromyalgia     Foot pain, right     GERD (gastroesophageal reflux disease)     H/O: hysterectomy     Headache     Hernia of abdominal wall     History of blood transfusion     no problem    Hyperlipidemia     Hypertension     Lupus (HCC)     Mood disorder (HCC)     Neuropathy     Osteoarthritis     PTSD (post-traumatic stress disorder)     Pulmonary nodules     Raynaud's disease     Scleroderma ([address]     Scleroderma ([facility] 8/23/2018    Seizures ([facility])     Sleep walking disorder     Thyroid disease     TIA (transient ischemic attack)     Transient insomnia     Tricuspid regurgitation     Vasospasm ([address] 01/2016    bilat. legs. resulting in near complete absence of profusion to arteries of feet. ([facility]     Vitamin D deficiency 8/23/2018    Wears glasses       Past Surgical History:   Procedure Laterality Date    ABDOMINOPLASTY  2013    BLADDER SUSPENSION N/A 7/30/2018    CYSTOSCOPY WITH OBTRYX MID URETHRAL SLING performed by Chandrika Pineda MD at [address] Right 2016    CARPAL TUNNEL RELEASE Right 01/02/2020    CHOLECYSTECTOMY  1989    COLONOSCOPY N/A 6/24/2019    COLORECTAL CANCER SCREENING, NOT HIGH RISK performed by Tunde Gomes MD at [address] 7/2/2019    COLORECTAL CANCER SCREENING, NOT HIGH RISK performed by Tunde Gomes MD at [address] Right     EYE SURGERY      khushboo. lasik     FEMUR FRACTURE SURGERY  11/09/2018    FINGER SURGERY Right 08/28/2018    RING CARTER MASS EXCISION, TRIGGER RELEASE    FRACTURE SURGERY      left femur    GASTRECTOMY      GASTRIC BYPASS SURGERY  2012    HYSTERECTOMY      JOINT REPLACEMENT Bilateral     knees    KNEE SURGERY Right 05/02/2017    (femoral) patella replacement    NERVE BLOCK Right 05/04/2018     cervical facet #1 no steroid    NERVE BLOCK Right 06/29/2018    rt cervical diagnostic #2 decadron 10mg    NERVE BLOCK Right 08/10/2018    right cervical rfa decadron 10mg    DE ANTERIOR COLPORRAPHY RPR CYSTOCELE W/CYSTO N/A 7/30/2018    CYSTOCELE REPAIR performed by Marcia Farris DO at [address] OFFICE/OUTPT [address] Right 8/28/2018    RING CARTER MASS EXCISION, TRIGGER RELEASE, 3080 TABLE performed by Mi Manuel DO at [address] OFFICE/OUTPT VISIT,PROCEDURE ONLY Left 11/9/2018    FEMUR RETROGRADE IM NAILING PERIPROSTHETIC FRACTURE performed by Mi Manuel DO at [address]/CARPAL TUNNEL SURG Left 10/23/2018    CARPAL TUNNEL RELEASE performed by Mi Manuel DO at [address]  2011    left knee    TOTAL KNEE ARTHROPLASTY Right 5/2/2017    PATELLOFEMORAL REPLACEMENT KNEE - JAXSON, 3080 TABLE, FEMORAL POPLITEAL BLOCK, NSA= SPINAL VS GENERAL performed by Mi Manuel DO at [facility] [facility]  2004     Family History   Adopted: Yes   Problem Relation Age of Onset    Depression Mother     Asthma Mother     Depression Father     High Blood Pressure Father     Diabetes Father     Asthma Father     Depression Sister     Asthma Sister     Depression Brother    Miriam Child Asthma Brother     Asthma Maternal Aunt     Asthma Maternal Uncle     Asthma Paternal Aunt     Asthma Paternal Uncle     Asthma Maternal Grandmother     Cancer Maternal Grandmother     Asthma Maternal Grandfather     Asthma Paternal Grandmother     Asthma Paternal Grandfather     Asthma Maternal Cousin     Asthma Paternal Cousin      Social History     Tobacco Use    Smoking status: Never Smoker    Smokeless tobacco: Never Used   Substance Use Topics    Alcohol use: Not Currently      Allergies   Allergen Reactions    Morphine Nausea And Vomiting     Pt states it changes her mental status    Amlodipine Other (See Comments)     diarrhea and stress    Dilaudid [Hydromorphone Hcl] Hives and Itching    Sulfa Antibiotics Hives and Rash       Subjective:      Review of Systems   Constitutional: Negative for chills and fever. Respiratory: Negative for cough and shortness of breath. Cardiovascular: Negative for chest pain, palpitations and leg swelling. Other pertinent ROS in HPI  Objective:     BP (!) 144/90 (Site: Right Upper Arm, Position: Sitting, Cuff Size: Large Adult)   Pulse 65   Wt 195 lb 12.8 oz (88.8 kg)   SpO2 97%   BMI 29.77 kg/m²    Physical Exam  Constitutional:       Appearance: She is well-developed. HENT:      Right Ear: External ear normal.      Left Ear: External ear normal.      Nose: Nose normal.   Neck:      Musculoskeletal: Full passive range of motion without pain and normal range of motion. Cardiovascular:      Rate and Rhythm: Normal rate and regular rhythm. Heart sounds: Normal heart sounds, S1 normal and S2 normal.   Pulmonary:      Effort: Pulmonary effort is normal. No respiratory distress. Breath sounds: Normal breath sounds. Musculoskeletal: Normal range of motion. General: No deformity. Skin:     General: Skin is warm and dry. Neurological:      Mental Status: She is alert and oriented to person, place, and time. Assessment/PLAN   1. Essential hypertension    - losartan (COZAAR) 100 MG tablet; TAKE 1 TABLET BY MOUTH ONE TIME A DAY  Dispense: 90 tablet; Refill: 1    2. Scleroderma ([facility]      3. Alcoholism in recovery ([facility]      4. Bipolar affective disorder, remission status unspecified ([facility]      5. Type 2 diabetes mellitus with stage 2 chronic kidney disease, without long-term current use of insulin ([facility]      6. Atrial fibrillation with slow ventricular response (HCC)      7. Atherosclerosis of native coronary artery of native heart with angina pectoris ([facility]      8. Stage 3 chronic kidney disease, unspecified whether stage 3a or 3b CKD        RTO if symptoms worsen or fail to improve  Pt agreeable with plan      Patient given educational materials - see patientinstructions. Discussed use, benefit, and side effects of prescribed medications. All patient questions answered. Pt voiced understanding. Reviewed health maintenance. Instructed to continue current medications, diet andexercise. 1.  Mia received counseling on the following healthy behaviors: nutrition, exercise and medication adherence  2. Patient given educationalmaterials when available - see patient instructions when applicable  3. Discussed use, benefit, and side effects of prescribed medications. Barriersto medication compliance addressed. All patient questions answered. Pt voiced understanding. 4.  Reviewed prior labs and health maintenance when applicable. 5.  Continue current medications, diet and exercise. CompletedRefills   Requested Prescriptions     Signed Prescriptions Disp Refills    losartan (COZAAR) 100 MG tablet 90 tablet 1     Sig: TAKE 1 TABLET BY MOUTH ONE TIME A DAY    cetirizine (ZYRTEC) 10 MG tablet 90 tablet 1     Sig: TAKE 1 TABLET BY MOUTH ONE TIME A DAY       This note was transcribed using dictation with Dragon services.  Efforts were made to correct any errors but some words may be misinterpreted.     Electronically signed by Jimenez Trevino CNP on 1/18/2021 at 9:48 AM

## 2021-02-05 ENCOUNTER — HOSPITAL ENCOUNTER (OUTPATIENT)
Age: 56
Discharge: HOME | End: 2021-02-05
Payer: MEDICARE

## 2021-02-05 DIAGNOSIS — N18.2 CKD (CHRONIC KIDNEY DISEASE), STAGE II: ICD-10-CM

## 2021-02-05 LAB
ABSOLUTE EOS #: 0.24 K/UL (ref 0–0.4)
ABSOLUTE IMMATURE GRANULOCYTE: ABNORMAL K/UL (ref 0–0.3)
ABSOLUTE LYMPH #: 1.02 K/UL (ref 1–4.8)
ABSOLUTE MONO #: 0.37 K/UL (ref 0.1–1.3)
ANION GAP SERPL CALCULATED.3IONS-SCNC: 13 MMOL/L (ref 9–17)
BASOPHILS # BLD: 3 % (ref 0–2)
BASOPHILS ABSOLUTE: 0.1 K/UL (ref 0–0.2)
BUN BLDV-MCNC: 14 MG/DL (ref 6–20)
BUN/CREAT BLD: ABNORMAL (ref 9–20)
CALCIUM SERPL-MCNC: 9.4 MG/DL (ref 8.6–10.4)
CHLORIDE BLD-SCNC: 104 MMOL/L (ref 98–107)
CO2: 23 MMOL/L (ref 20–31)
CREAT SERPL-MCNC: 1.22 MG/DL (ref 0.5–0.9)
CREATININE URINE: 115.2 MG/DL (ref 28–217)
DIFFERENTIAL TYPE: ABNORMAL
EOSINOPHILS RELATIVE PERCENT: 7 % (ref 0–4)
GFR AFRICAN AMERICAN: 55 ML/MIN
GFR NON-AFRICAN AMERICAN: 46 ML/MIN
GFR SERPL CREATININE-BSD FRML MDRD: ABNORMAL ML/MIN/{1.73_M2}
GFR SERPL CREATININE-BSD FRML MDRD: ABNORMAL ML/MIN/{1.73_M2}
GLUCOSE BLD-MCNC: 101 MG/DL (ref 70–99)
HCT VFR BLD CALC: 39.3 % (ref 36–46)
HEMOGLOBIN: 13.4 G/DL (ref 12–16)
IMMATURE GRANULOCYTES: ABNORMAL %
LYMPHOCYTES # BLD: 30 % (ref 24–44)
MCH RBC QN AUTO: 32.3 PG (ref 26–34)
MCHC RBC AUTO-ENTMCNC: 34 G/DL (ref 31–37)
MCV RBC AUTO: 95.1 FL (ref 80–100)
MONOCYTES # BLD: 11 % (ref 1–7)
MORPHOLOGY: NORMAL
NRBC AUTOMATED: ABNORMAL PER 100 WBC
PDW BLD-RTO: 13.8 % (ref 11.5–14.9)
PLATELET # BLD: 196 K/UL (ref 150–450)
PLATELET ESTIMATE: ABNORMAL
PMV BLD AUTO: 8.6 FL (ref 6–12)
POTASSIUM SERPL-SCNC: 4.3 MMOL/L (ref 3.7–5.3)
RBC # BLD: 4.14 M/UL (ref 4–5.2)
RBC # BLD: ABNORMAL 10*6/UL
SEG NEUTROPHILS: 49 % (ref 36–66)
SEGMENTED NEUTROPHILS ABSOLUTE COUNT: 1.67 K/UL (ref 1.3–9.1)
SODIUM BLD-SCNC: 140 MMOL/L (ref 135–144)
TOTAL PROTEIN, URINE: 12 MG/DL
URINE TOTAL PROTEIN CREATININE RATIO: 0.1 (ref 0–0.2)
WBC # BLD: 3.4 K/UL (ref 3.5–11)
WBC # BLD: ABNORMAL 10*3/UL

## 2021-02-05 PROCEDURE — 82570 ASSAY OF URINE CREATININE: CPT

## 2021-02-05 PROCEDURE — 84156 ASSAY OF PROTEIN URINE: CPT

## 2021-02-05 PROCEDURE — 85025 COMPLETE CBC W/AUTO DIFF WBC: CPT

## 2021-02-05 PROCEDURE — 80048 BASIC METABOLIC PNL TOTAL CA: CPT

## 2021-02-05 PROCEDURE — 36415 COLL VENOUS BLD VENIPUNCTURE: CPT

## 2021-02-08 ENCOUNTER — TELEPHONE (OUTPATIENT)
Dept: ONCOLOGY | Age: 56
End: 2021-02-08

## 2021-02-08 ENCOUNTER — HOSPITAL ENCOUNTER (OUTPATIENT)
Facility: MEDICAL CENTER | Age: 56
End: 2021-02-08
Payer: MEDICARE

## 2021-02-08 NOTE — TELEPHONE ENCOUNTER
Left message for patient to remind of lab work needed prior to upcoming appointment. Advised that if the labs are done a at [facility] the orders are in the chart, or if the labs were already done or orders needed faxed to the lab of their choice to call our office.

## 2021-02-11 ENCOUNTER — TELEPHONE (OUTPATIENT)
Dept: PHARMACY | Facility: CLINIC | Age: 56
End: 2021-02-11

## 2021-02-11 NOTE — TELEPHONE ENCOUNTER
[facility] CLINICAL PHARMACY REVIEW: ADHERENCE REVIEW  Identified care gap per [employer]; fills at [facility]: ACE/ARB, Diabetes and Statin adherence    Last Office Visit: 1/18/21      Patient identified as LIS = 2, therefore patient may be able to receive a 90-day supply for the same cost as a 30-day supply. ASSESSMENT  ACE/ARB ADHERENCE    Per Insurance Records   LOSARTAN POT 100mg  last filled on 11/29/20 for a 30 day supply;     Per Reconciled Dispense Report last filled 12/22/20 #30    [facility]: 90DS filled on 1/19/21 1 RF    BP Readings from Last 3 Encounters:   01/18/21 (!) 140/90   09/24/20 130/87   09/17/20 (!) 154/94     Estimated Creatinine Clearance: 61 mL/min (A) (based on SCr of 1.22 mg/dL (H)). DIABETES ADHERENCE    Per Reconciled Dispense Report & [facility];    metformin 500mg last filled on 11/27/20 for a 90 day supply; 1RF    Lab Results   Component Value Date    LABA1C 5.2 01/18/2021    LABA1C 5.5 12/11/2019    LABA1C 5.6 06/10/2019       STATIN ADHERENCE    Per Reconciled Dispense Report & [facility]:  pravastatin 40mg last filled on 12/30/20 for a 90 day supply; NR    Lab Results   Component Value Date    CHOL 229 (H) 01/18/2021    TRIG 101 01/18/2021     01/18/2021    LDLCHOLESTEROL 93 01/18/2021     ALT   Date Value Ref Range Status   01/18/2021 11 5 - 33 U/L Final     AST   Date Value Ref Range Status   01/18/2021 16 <32 U/L Final     The ASCVD Risk score (Nichol Olivera., et al., 2013) failed to calculate for the following reasons: The patient has a prior MI or stroke diagnosis     PLAN  No patient out reach planned at this time. Trinity Scott CPhT.   Clinical Pharmacy

## 2021-02-12 ENCOUNTER — OFFICE VISIT (OUTPATIENT)
Dept: ONCOLOGY | Age: 56
End: 2021-02-12
Payer: MEDICARE

## 2021-02-12 ENCOUNTER — TELEPHONE (OUTPATIENT)
Dept: ONCOLOGY | Age: 56
End: 2021-02-12

## 2021-02-12 VITALS
SYSTOLIC BLOOD PRESSURE: 103 MMHG | WEIGHT: 202.2 LBS | RESPIRATION RATE: 18 BRPM | TEMPERATURE: 97.5 F | HEART RATE: 53 BPM | DIASTOLIC BLOOD PRESSURE: 69 MMHG | BODY MASS INDEX: 30.74 KG/M2

## 2021-02-12 DIAGNOSIS — K90.9 IRON MALABSORPTION: Primary | ICD-10-CM

## 2021-02-12 DIAGNOSIS — Z98.84 H/O GASTRIC BYPASS: ICD-10-CM

## 2021-02-12 DIAGNOSIS — D50.9 IRON DEFICIENCY ANEMIA, UNSPECIFIED IRON DEFICIENCY ANEMIA TYPE: ICD-10-CM

## 2021-02-12 PROCEDURE — 99214 OFFICE O/P EST MOD 30 MIN: CPT | Performed by: INTERNAL MEDICINE

## 2021-02-12 PROCEDURE — 99211 OFF/OP EST MAY X REQ PHY/QHP: CPT | Performed by: INTERNAL MEDICINE

## 2021-02-12 NOTE — PROGRESS NOTES
DIAGNOSIS:   1. Anemia, malabsorption component  2. History of gastric bypass surgery  3. History of iron deficiency and B12 likely due to malabsorption    CURRENT THERAPY:  Plan for lab work and IV iron    BRIEF CASE HISTORY:   Tia Osborne is a very pleasant 54 y.o. female who is referred to us for anemia. She reports she has long history of anemia and has received IV iron in the past and been on oral iron for the last 10 years. Her last iron infusion was 2014 at [facility]. She had gastric by-pass in 2012, she did have anemia prior but worsened following surgery. She does have fatigue, PICA, shortness of breath. She had colonoscopy 2019 which was negative, she does have chronic colonoscopy. She has history of B12 deficiency and takes oral supplementation. She has parathyroid issue and takes synthroid. She has multiple complications and is seen by several specialists including rheumatology, cardiology, ortho surg. She has scleroderma, osteoporosis, and Raynaud syndrome. Her scleroderma is diffused but currently well controlled. She has had both knees replacements twice. She was adopted and family history is unknown. INTERIM HISTORY: The patient presents today for follow up to review lab work for anemia. She received IV iron but hasn't noticed much improvement in her fatigue. She did have COVID 01/01/2021, her sympotms were primarily upper respiratory with some fever and feels her fatigue may be more related to recovery. She denies any bleeding.         PAST MEDICAL HISTORY: has a past medical history of Acute kidney injury ([address] Anemia, Angioedema, Antinuclear antibody (IQRA) titer greater than 1:80, Anxiety, Asthma, Ataxia, Atrial fibrillation ([address] Borderline personality disorder ([address] Bradycardia, CAD (coronary artery disease), Chronic kidney disease, CKD (chronic kidney disease), stage II, COVID-19, Degenerative disc disease, thoracic, Depression, Diabetes mellitus ([address] Diastolic dysfunction, DJD (degenerative joint disease), Fibromyalgia, Foot pain, right, GERD (gastroesophageal reflux disease), H/O: hysterectomy, Headache, Hernia of abdominal wall, History of blood transfusion, Hyperlipidemia, Hypertension, Lupus ([address] Mood disorder ([address] Neuropathy, Osteoarthritis, PTSD (post-traumatic stress disorder), Pulmonary nodules, Raynaud's disease, Scleroderma ([address] Scleroderma ([address] Seizures ([address] Sleep walking disorder, Thyroid disease, TIA (transient ischemic attack), Transient insomnia, Tricuspid regurgitation, Vasospasm ([address] Vitamin D deficiency, and Wears glasses. PAST SURGICAL HISTORY: has a past surgical history that includes Hysterectomy; Cholecystectomy (1989); Tonsillectomy (1986); Carpal tunnel release (Right, 2016); Wrist surgery (2004); Gastric bypass surgery (2012); Abdominoplasty (2013); Elbow surgery (Right); Total knee arthroplasty (2011); knee surgery (Right, 05/02/2017); Total knee arthroplasty (Right, 5/2/2017); gastrectomy; Nerve Block (Right, 05/04/2018); Nerve Block (Right, 06/29/2018); eye surgery; pr anterior colporraphy rpr cystocele w/cysto (N/A, 7/30/2018); bladder suspension (N/A, 7/30/2018); Nerve Block (Right, 08/10/2018); joint replacement (Bilateral); Finger surgery (Right, 08/28/2018); pr office/outpt visit,procedure only (Right, 8/28/2018); pr revise median n/carpal tunnel surg (Left, 10/23/2018); Femur fracture surgery (11/09/2018); pr office/outpt visit,procedure only (Left, 11/9/2018); fracture surgery; Colonoscopy (N/A, 6/24/2019); Colonoscopy (N/A, 7/2/2019); and Carpal tunnel release (Right, 01/02/2020).      CURRENT MEDICATIONS:  has a current medication list which includes the following prescription(s): losartan, cetirizine, famotidine, sm aspirin adult low strength, omeprazole, proair hfa, hydroxychloroquine, pravastatin, gabapentin, aripiprazole, nifedipine, cyclobenzaprine, blood glucose test strips, ibuprofen, vortioxetine, spironolactone, alendronate, albuterol sulfate hfa, ferrous sulfate, metformin, lamotrigine, felodipine, ergocalciferol, quetiapine, lamotrigine, levothyroxine, therapeutic multivitamin-minerals, epipen 2-patty, and disulfiram.    ALLERGIES:  is allergic to morphine; amlodipine; dilaudid [hydromorphone hcl]; and sulfa antibiotics. FAMILY HISTORY: Negative for any hematological or oncological conditions. SOCIAL HISTORY:  reports that she has never smoked. She has never used smokeless tobacco. She reports previous alcohol use. She reports that she does not use drugs. REVIEW OF SYSTEMS:   General: no fever or night sweats, Weight is stable. +fatigue   ENT: No double or blurred vision, no tinnitus or hearing problem, no dysphagia or sore throat   Respiratory: No chest pain, no cough or hemoptysis, or shortness of breath  Cardiovascular: Denies chest pain, PND or orthopnea. No L E swelling or palpitations. Gastrointestinal:    No nausea or vomiting, abdominal pain, diarrhea or constipation. Genitourinary: Denies dysuria, hematuria, frequency, urgency or incontinence. Neurological: Denies headaches, decreased LOC, no sensory or motor focal deficits. Musculoskeletal:  No arthralgia no back pain or joint swelling. Skin: There are no rashes or bleeding. Psychiatric:  No anxiety, no depression. Endocrine: no diabetes or thyroid disease. Hematologic: no bleeding , no adenopathy. PHYSICAL EXAM: Shows a well appearing 54y.o.-year-old female who is not in pain or distress. Vital Signs: Blood pressure 103/69, pulse 53, temperature 97.5 °F (36.4 °C), temperature source Temporal, resp. rate 18, weight 202 lb 3.2 oz (91.7 kg), not currently breastfeeding. HEENT: Normocephalic and atraumatic. Pupils are equal, round, reactive to light and accommodation. Extraocular muscles are intact. Neck: Showed no JVD, no carotid bruit . Lungs: Clear to auscultation bilaterally. Heart: Regular without any murmur. Abdomen: Soft, nontender.  No hepatosplenomegaly. Extremities: Multiple scars on both legs from previous surgery. Lower extremities show no edema, clubbing, or cyanosis. Breasts: Examination not done today. Neuro exam: intact cranial nerves bilaterally no motor or sensory deficit, gait is normal. Lymphatic: no adenopathy appreciated in the supraclavicular, axillary, cervical or inguinal area  Sclerodactyly on fingers      REVIEW OF LABORATORY DATA:   Lab Results   Component Value Date    WBC 3.4 (L) 02/05/2021    HGB 13.4 02/05/2021    HCT 39.3 02/05/2021    MCV 95.1 02/05/2021     02/05/2021       Chemistry        Component Value Date/Time     02/05/2021 0852    K 4.3 02/05/2021 0852     02/05/2021 0852    CO2 23 02/05/2021 0852    BUN 14 02/05/2021 0852    CREATININE 1.22 (H) 02/05/2021 0852        Component Value Date/Time    CALCIUM 9.4 02/05/2021 0852    ALKPHOS 170 (H) 01/18/2021 0915    AST 16 01/18/2021 0915    ALT 11 01/18/2021 0915    BILITOT 0.61 01/18/2021 0915        Lab Results   Component Value Date    IRON 32 (L) 08/14/2020    TIBC 453 (H) 08/14/2020    FERRITIN 7 (L) 08/14/2020         REVIEW OF RADIOLOGICAL RESULTS:     IMPRESSION:   1. Anemia, malabsorption component   2. Symptomatic   3. HX gastric bypass     PLAN:   1. Her lab work was reviewed, her HGB has normalized with IV iron. 2. I discussed plan for IV iron as needed based on tolerance and response. 3. She had COVID infection and I discussed recovery with lingering fatigue, I recommend she continue with multi-vitamin. 4. We will repeat lab work in 6 months. 5. Return for virtual visit after lab work to review, sooner if clinically indicated.

## 2021-02-12 NOTE — TELEPHONE ENCOUNTER
Elvis Mcclain MD VISIT   RV IN 6 MTHS W/ VV IS OK  NEED CDP IRON STUDIES PRIOR TO RV  LABS CDP FE TIBC FERRITIN DONE @ [facility] 8/6/21  VIRTUAL VISIT 8/13/21 @ 3PM  AVS PRINTED W/ INSTRUCTIONS

## 2021-02-12 NOTE — PROGRESS NOTES
DIAGNOSIS:   1. Anemia, malabsorption component  2. History of gastric bypass surgery  3. History of iron deficiency and B12 likely due to malabsorption    CURRENT THERAPY:  Plan for lab work and IV iron    BRIEF CASE HISTORY:   Zachery Hyman is a very pleasant 54 y.o. female who is referred to us for anemia. She reports she has long history of anemia and has received IV iron in the past and been on oral iron for the last 10 years. Her last iron infusion was 2014 at [facility]. She had gastric by-pass in 2012, she did have anemia prior but worsened following surgery. She does have fatigue, PICA, shortness of breath. She had colonoscopy 2019 which was negative, she does have chronic colonoscopy. She has history of B12 deficiency and takes oral supplementation. She has parathyroid issue and takes synthroid. She has multiple complications and is seen by several specialists including rheumatology, cardiology, ortho surg. She has scleroderma, osteoporosis, and Raynaud syndrome. Her scleroderma is diffused but currently well controlled. She has had both knees replacements twice. She was adopted and family history is unknown.         PAST MEDICAL HISTORY: has a past medical history of Acute kidney injury ([address] Anemia, Angioedema, Antinuclear antibody (IQRA) titer greater than 1:80, Anxiety, Asthma, Ataxia, Atrial fibrillation ([address] Borderline personality disorder ([address] Bradycardia, CAD (coronary artery disease), Chronic kidney disease, CKD (chronic kidney disease), stage II, COVID-19, Degenerative disc disease, thoracic, Depression, Diabetes mellitus ([address] Diastolic dysfunction, DJD (degenerative joint disease), Fibromyalgia, Foot pain, right, GERD (gastroesophageal reflux disease), H/O: hysterectomy, Headache, Hernia of abdominal wall, History of blood transfusion, Hyperlipidemia, Hypertension, Lupus ([address] Mood disorder ([address] Neuropathy, Osteoarthritis, PTSD (post-traumatic stress disorder), Pulmonary nodules, Raynaud's disease, Scleroderma ([address] Scleroderma ([address] Seizures ([address] Sleep walking disorder, Thyroid disease, TIA (transient ischemic attack), Transient insomnia, Tricuspid regurgitation, Vasospasm ([address] Vitamin D deficiency, and Wears glasses. PAST SURGICAL HISTORY: has a past surgical history that includes Hysterectomy; Cholecystectomy (1989); Tonsillectomy (1986); Carpal tunnel release (Right, 2016); Wrist surgery (2004); Gastric bypass surgery (2012); Abdominoplasty (2013); Elbow surgery (Right); Total knee arthroplasty (2011); knee surgery (Right, 05/02/2017); Total knee arthroplasty (Right, 5/2/2017); gastrectomy; Nerve Block (Right, 05/04/2018); Nerve Block (Right, 06/29/2018); eye surgery; pr anterior colporraphy rpr cystocele w/cysto (N/A, 7/30/2018); bladder suspension (N/A, 7/30/2018); Nerve Block (Right, 08/10/2018); joint replacement (Bilateral); Finger surgery (Right, 08/28/2018); pr office/outpt visit,procedure only (Right, 8/28/2018); pr revise median n/carpal tunnel surg (Left, 10/23/2018); Femur fracture surgery (11/09/2018); pr office/outpt visit,procedure only (Left, 11/9/2018); fracture surgery; Colonoscopy (N/A, 6/24/2019); Colonoscopy (N/A, 7/2/2019); and Carpal tunnel release (Right, 01/02/2020). CURRENT MEDICATIONS:  has a current medication list which includes the following prescription(s): losartan, cetirizine, famotidine, sm aspirin adult low strength, omeprazole, proair hfa, hydroxychloroquine, pravastatin, gabapentin, aripiprazole, nifedipine, cyclobenzaprine, blood glucose test strips, ibuprofen, vortioxetine, spironolactone, alendronate, albuterol sulfate hfa, ferrous sulfate, metformin, lamotrigine, felodipine, ergocalciferol, quetiapine, lamotrigine, levothyroxine, therapeutic multivitamin-minerals, epipen 2-patty, and disulfiram.    ALLERGIES:  is allergic to morphine; amlodipine; dilaudid [hydromorphone hcl]; and sulfa antibiotics.     FAMILY HISTORY: Negative for any hematological or oncological conditions. SOCIAL HISTORY:  reports that she has never smoked. She has never used smokeless tobacco. She reports previous alcohol use. She reports that she does not use drugs. REVIEW OF SYSTEMS:   General: no fever or night sweats, Weight is stable. +fatigue and PICA  ENT: No double or blurred vision, no tinnitus or hearing problem, no dysphagia or sore throat   Respiratory: No chest pain, no cough or hemoptysis. +shortness of breath  Cardiovascular: Denies chest pain, PND or orthopnea. No L E swelling or palpitations. Gastrointestinal:    No nausea or vomiting, abdominal pain, diarrhea or constipation. Genitourinary: Denies dysuria, hematuria, frequency, urgency or incontinence. Neurological: Denies headaches, decreased LOC, no sensory or motor focal deficits. Musculoskeletal:  No arthralgia no back pain or joint swelling. Skin: There are no rashes or bleeding. Psychiatric:  No anxiety, no depression. Endocrine: no diabetes or thyroid disease. Hematologic: no bleeding , no adenopathy. PHYSICAL EXAM: Shows a well appearing 54y.o.-year-old female who is not in pain or distress. Vital Signs: Blood pressure 103/69, pulse 53, temperature 97.5 °F (36.4 °C), temperature source Temporal, resp. rate 18, weight 202 lb 3.2 oz (91.7 kg), not currently breastfeeding. HEENT: Normocephalic and atraumatic. Pupils are equal, round, reactive to light and accommodation. Extraocular muscles are intact. Neck: Showed no JVD, no carotid bruit . Lungs: Clear to auscultation bilaterally. Heart: Regular without any murmur. Abdomen: Soft, nontender. No hepatosplenomegaly. Extremities: Multiple scars on both legs from previous surgery. Lower extremities show no edema, clubbing, or cyanosis. Breasts: Examination not done today.  Neuro exam: intact cranial nerves bilaterally no motor or sensory deficit, gait is normal. Lymphatic: no adenopathy appreciated in the supraclavicular, axillary, cervical or inguinal area  Sclerodactyly on fingers      REVIEW OF LABORATORY DATA:   Lab Results   Component Value Date    WBC 3.4 (L) 02/05/2021    HGB 13.4 02/05/2021    HCT 39.3 02/05/2021    MCV 95.1 02/05/2021     02/05/2021       Chemistry        Component Value Date/Time     02/05/2021 0852    K 4.3 02/05/2021 0852     02/05/2021 0852    CO2 23 02/05/2021 0852    BUN 14 02/05/2021 0852    CREATININE 1.22 (H) 02/05/2021 0852        Component Value Date/Time    CALCIUM 9.4 02/05/2021 0852    ALKPHOS 170 (H) 01/18/2021 0915    AST 16 01/18/2021 0915    ALT 11 01/18/2021 0915    BILITOT 0.61 01/18/2021 0915        Lab Results   Component Value Date    IRON 32 (L) 08/14/2020    TIBC 453 (H) 08/14/2020    FERRITIN 7 (L) 08/14/2020         REVIEW OF RADIOLOGICAL RESULTS:     IMPRESSION:   1. Anemia, malabsorption component   2. Symptomatic   3. HX gastric bypass   4. Plan for iron studies and IV iron    PLAN:   1. I had a long discussion with the patient. I believe her anemia is multifactorial but her major component of it is due to iron and B12 malabsorption due to her gastric bypass surgery and extensive use of antacids because of her scleroderma. 2. There is no clear evidence of GI bleeding and she had a colonoscopy that was negative. I am convinced that nutritional and absorptive issue as a major component of her anemia. However work-up will be done to complete the evaluation  3. Because of her gastric bypass and malabsorption, the patient required IV iron previously. And if she is iron deficient, I do not anticipate that she would benefit from oral iron and she will need IV iron. 4. Patient scleroderma seems to be under good control. I do not see any evidence of extensive telangiectasia on my evaluation. But will try to obtain GI work-up and records. 5. The patient leukopenia is also concerning. This could be due to the B12 deficiency.   We will check her levels and will also check her folic acid levels. 6. Return visit in 6 months, if her testing suggest iron or B12 deficiency, will suggest parenteral replacement and will arrange for that to be done sooner.

## 2021-03-04 ENCOUNTER — HOSPITAL ENCOUNTER (OUTPATIENT)
Age: 56
Discharge: HOME | End: 2021-03-04
Payer: MEDICARE

## 2021-03-04 LAB
ANION GAP SERPL CALCULATED.3IONS-SCNC: 13 MMOL/L (ref 9–17)
CALCIUM SERPL-MCNC: 9.5 MG/DL (ref 8.6–10.4)
CHLORIDE BLD-SCNC: 99 MMOL/L (ref 98–107)
CO2: 25 MMOL/L (ref 20–31)
MAGNESIUM: 2 MG/DL (ref 1.6–2.6)
PHOSPHORUS: 3.6 MG/DL (ref 2.6–4.5)
POTASSIUM SERPL-SCNC: 3.7 MMOL/L (ref 3.7–5.3)
SODIUM BLD-SCNC: 137 MMOL/L (ref 135–144)

## 2021-03-04 PROCEDURE — 83735 ASSAY OF MAGNESIUM: CPT

## 2021-03-04 PROCEDURE — 80051 ELECTROLYTE PANEL: CPT

## 2021-03-04 PROCEDURE — 82310 ASSAY OF CALCIUM: CPT

## 2021-03-04 PROCEDURE — 84100 ASSAY OF PHOSPHORUS: CPT

## 2021-03-04 PROCEDURE — 36415 COLL VENOUS BLD VENIPUNCTURE: CPT

## 2021-03-08 NOTE — FLOWSHEET NOTE
[x] [address]  [address]  [phone]  F: [phone]        Physical Therapy Daily Treatment Note    Date:  2019  Patient Name:  Dayanna Brito    :  1965  MRN: [id number]  Physician: Stormy Sunshine DO/ Fredrick Vanegas MD/ Jenny Santillan MD     Insurance: Count includes the [facility] Medicaid 30 v  Medical Diagnosis: Spinal Stenosis, cervical region M48.02, Closed fracture of distal end of left femur S72.402D R Foot Pain M79.671                                Rehab Codes: M54.2, M25.662, M25.562, M25.552, R26.2, M25.571  Onset Date: 2016                                 Next 's appt: 2019- for R foot/ankle  Visit# / total visits:    Cancels/No Shows: 2/0    Subjective:    Pain:  [x] Yes  [] No Location: Neck/shoulders, L knee, R foot Pain Rating: (0-10 scale) 2/10- neck/shoulders; 2/10-R ankle  Pain altered Tx:  [x] No  [] Yes  Action: NA  Comments: Patient doingwell only reports achilles tightness on R foot. Objective:  Modalities:  Vaso compression R ankle x15 mins -omitted in error   Precautions: WBAT  With transitioning out of boot per 19 orders. Stretching gastro, ROM, light strengthening and gait training.  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   Exercises:  Exercise Reps/ Time Weight/ Level  Comments   SciFit 6 min L3.0 x  Boot off          Standing       Gastroc stretch 3x20\"  x Wedge, boot off   Soleus mobility 15x 12 inch x Leaning into soleus stretch   Calf raises 20x   x    SLS 3x30\"  x khushboo no boot no R shoe, pt forgot shoe    3 way hip 20x  x Bilateral limit UE support  10x when standing on R   Marching 20x 3 lb  x    HS curls  20x 3 lb x R only   BAPS       Rockerboard              Tandem Walk 50 ft  x WBAT with boot                  Supine       SLR    20x 2 lb x R   Hip abd 30x 2 lb      SAQ   20x 2 lb x    Calf stretch with inversion 2x1 mins    belt                        Seated       4 way ankle tband  15x medium x Added, issued HEP   Other: Completed exercises marked with \"x\"     Specific Instructions for next treatment: progress standing wt bearing ex in shoe, add balance activities,  progress UE tband/periscapular strengthening. Treatment Charges: Mins Units   []  Modalities     [x]  Ther Exercise  40 3   []  Manual Therapy     []  Ther Activities     []  Aquatics     [x]  Vasocompression       [x]  Other: Gait        Total Treatment time  40 3     Assessment: [x] Progressing toward goals  Modified exercises to add functional ankle strengthening and ROM. Likely to complete neck and L knee POC. Assess all goals next session and extend POC per new order. [] No change. [] Other:     STG: (to be met in 8 treatments)  1. ? Pain: 3/10 neck pain with looking down. - MET  3/10 Left hip/knee pain with ambulating.- MET  2. ? ROM: Neck R rotation to 60° to improve head turns.- MET  Left knee extension to lacking 15° to improve gait mechanics.- MET  3. ? Strength: Left hip extension and abduction to 4/5 to improve joint stability and balance. - MET  4. ? Function: LEFS and NDI score to 30% impaired or better to improve ADLs.-  MET [NDI- 26%; LEFS- 58/80]  5. Independent with Home Exercise Programs- MET    R foot  2. ? Pain: R foot 6/10 average pain- NOT MET- constant pain  3. ? Strength: R hip flex, extension to 4-/5, and abduction to 4+/5, R knee flex, ext to 4+/5 to improve joint stability and balance.- NOT MET- R hip: 4/5 ABD; 4-/5 EXT; R knee: 4+/5 quad, HS- no pain  3. ? Function: LEFS score to 45% impaired or better to improve mobility and ADLs.- MET [58/80]  4. Pt indep w/ bed mobility keeping R LE NWB- minimal difficulty maintaining NWB status- PARTIAL MET  5. Pt indep amb household distances w/crutches- MET     LTG: (to be met in 12 treatments)  1. Patient will be able to ambulate community distances without AD for L knee.   2. Patient will be able to read with improved posture and decreased neck pain. R foot  7. ? Pain: R foot 6/10 average pain   8. Patient will be able to ambulate community distances indep w/knee scooter for R  9. Pt indep w/ascending, descending steps NWB R w/crutches and rail    Pt. Education:  [x] Yes  [] No  [] Reviewed Prior HEP/Ed , New exercises listed above. Method of Education: [x] Verbal  [] Demo  [x] Written  Comprehension of Education:  [x] Verbalizes understanding-purpose, benefits of game ready  [x] Demonstrates understanding. [] Needs review. [x] Demonstrates/verbalizes HEP/Ed previously given. 4/12/19   4 way ankle tband and medium band. Plan: [x] Continue per plan of care.    [x] Other:  PT to write add'l plan of care with add'l orders in Epic dated 4/9/19      Time In:  1400  Time Out:  [address]    Electronically signed by:   Jacklyn Mosqueda, PT Detail Level: Simple Price (Do Not Change): 0.00 Instructions: This plan will send the code FBSE to the PM system.  DO NOT or CHANGE the price.

## 2021-03-15 ENCOUNTER — OFFICE VISIT (OUTPATIENT)
Dept: NEUROLOGY | Age: 56
End: 2021-03-15
Payer: MEDICARE

## 2021-03-15 VITALS
DIASTOLIC BLOOD PRESSURE: 106 MMHG | HEART RATE: 72 BPM | TEMPERATURE: 97.5 F | WEIGHT: 191 LBS | HEIGHT: 68 IN | BODY MASS INDEX: 28.95 KG/M2 | SYSTOLIC BLOOD PRESSURE: 158 MMHG

## 2021-03-15 DIAGNOSIS — M34.9 SCLERODERMA (HCC): ICD-10-CM

## 2021-03-15 DIAGNOSIS — G62.9 NEUROPATHY: Primary | ICD-10-CM

## 2021-03-15 DIAGNOSIS — I73.00 RAYNAUD'S DISEASE WITHOUT GANGRENE: ICD-10-CM

## 2021-03-15 DIAGNOSIS — R20.0 NUMBNESS: ICD-10-CM

## 2021-03-15 PROCEDURE — 99214 OFFICE O/P EST MOD 30 MIN: CPT | Performed by: NURSE PRACTITIONER

## 2021-03-15 RX ORDER — GABAPENTIN 600 MG/1
1200 TABLET ORAL 3 TIMES DAILY
Qty: 180 TABLET | Refills: 3 | Status: SHIPPED | OUTPATIENT
Start: 2021-03-15 | End: 2021-07-09

## 2021-03-15 NOTE — PROGRESS NOTES
[facility]            [place], [address]          [facility], [address] [facility]          Dept: [phone]          Dept Fax: [phone]        MD Jose Villalobos MD Ahmed B. Edrie Blanc, MD Ardith Alcon, MD Gene Beer, MD Ronn Balm, CNP            3/15/2021      HISTORY OF PRESENT ILLNESS:       I had the pleasure of seeing Freada Klinefelter, who returns for continuing neurologic care. The patient was seen last on September 14, 2020 for treatment of cervical radiculopathy and neuropathy. Cervical Radiculopathy: The patient suffers from chronic cervical radiculopathy with neck pain radiating into the right shoulder. The patient is currently taking Gabapentin 800 mg four times daily. Neuropathy: The patient has diminished sensations in her feet bilaterally which is likely associated with a peripheral neuropathy. She also has gait instability associated with her symptoms. An EMG was ordered but not completed. Today, the patient reports she saw Dr. Reza Coleman at [facility] because her lower extremities are numb and this is making it difficult to walk. She was told her vessels were closing from her mid-calf down to her feet. She was given Nitroglycerin which did help. The patient also saw Dr. Gely De León, rheumatology. The patient continues to take Vitamin B-12 daily and her last level was 310 on 8/14/2020. Testing reviewed:  -MRI cervical spine 4/2/18  Impression   1. Mild degenerative disc disease at C5-C6 with mild spinal canal stenosis. Severe right neural foraminal stenosis at this level. 2. Minimal spinal canal stenosis at C3-C4 and C4-C5.                    - MRI brain 7/2/18  pression   1. No ictal focus is identified. 2. Trace chronic microvascular white matter ischemic disease.    3. There is a partially empty sella.  Correlate with clinical signs/symptoms   of endocrine dysfunction as well as serum laboratory values.      -EMG lower extremities 10/2/18  This is a normal electrophysiological study. Abimael Weaver is no evidence of large fiber sensory neuropathy, lumbosacral plexopathy or radiculopathy.  If clinically indicated, a skin punch biopsy may be obtained to rule out small fiber neuropathy.  Clinical correlation is recommended. PAST MEDICAL HISTORY:         Diagnosis Date    Acute kidney injury ([address]     Anemia     Angioedema     Antinuclear antibody (IQRA) titer greater than 1:80     Anxiety     Asthma     Ataxia     Atrial fibrillation (HCC)     Borderline personality disorder (HCC)     Bradycardia     CAD (coronary artery disease)     Chronic kidney disease     CKD (chronic kidney disease), stage II 8/23/2018    COVID-19 01/01/2021    Degenerative disc disease, thoracic     Depression     Diabetes mellitus (HCC)     Diastolic dysfunction     DJD (degenerative joint disease)     Fibromyalgia     Foot pain, right     GERD (gastroesophageal reflux disease)     H/O: hysterectomy     Headache     Hernia of abdominal wall     History of blood transfusion     no problem    Hyperlipidemia     Hypertension     Lupus (HCC)     Mood disorder (HCC)     Neuropathy     Osteoarthritis     PTSD (post-traumatic stress disorder)     Pulmonary nodules     Raynaud's disease     Scleroderma ([address]     Scleroderma ([address] 8/23/2018    Seizures (HCC)     Sleep walking disorder     Thyroid disease     TIA (transient ischemic attack)     Transient insomnia     Tricuspid regurgitation     Vasospasm ([address] 01/2016    bilat. legs. resulting in near complete absence of profusion to arteries of feet. ([facility]     Vitamin D deficiency 8/23/2018    Wears glasses         PAST SURGICAL HISTORY:         Procedure Laterality Date    ABDOMINOPLASTY  2013    BLADDER SUSPENSION N/A 7/30/2018    CYSTOSCOPY WITH OBTRYX MID URETHRAL SLING performed by Edenilson Reed Libby Jolly MD at [address] Right 2016    CARPAL TUNNEL RELEASE Right 01/02/2020    CHOLECYSTECTOMY  1989    COLONOSCOPY N/A 6/24/2019    COLORECTAL CANCER SCREENING, NOT HIGH RISK performed by Rosita Danielle MD at [address] 7/2/2019    COLORECTAL CANCER SCREENING, NOT HIGH RISK performed by Rosita Danielle MD at [address] Right     EYE SURGERY      [email or web address]. lasik     FEMUR FRACTURE SURGERY  11/09/2018    FINGER SURGERY Right 08/28/2018    RING CARTER MASS EXCISION, TRIGGER RELEASE    FRACTURE SURGERY      left femur    GASTRECTOMY      GASTRIC BYPASS SURGERY  2012    HYSTERECTOMY      JOINT REPLACEMENT Bilateral     knees    KNEE SURGERY Right 05/02/2017    (femoral) patella replacement    NERVE BLOCK Right 05/04/2018     cervical facet #1 no steroid    NERVE BLOCK Right 06/29/2018    rt cervical diagnostic #2 decadron 10mg    NERVE BLOCK Right 08/10/2018    right cervical rfa decadron 10mg    GA ANTERIOR COLPORRAPHY RPR CYSTOCELE W/CYSTO N/A 7/30/2018    CYSTOCELE REPAIR performed by Drew Fernandez DO at [address] OFFICE[address]PT [address] Right 8/28/2018    RING CARTER MASS EXCISION, TRIGGER RELEASE, [address] performed by Warden Layla DO at [address] OFFICE/OUTPT [address] Left 11/9/2018    FEMUR RETROGRADE IM NAILING PERIPROSTHETIC FRACTURE performed by Warden Layla DO at [address]/CARPAL TUNNEL SURG Left 10/23/2018    CARPAL TUNNEL RELEASE performed by Warden Layla DO at [address]  2011    left knee    TOTAL KNEE ARTHROPLASTY Right 5/2/2017    PATELLOFEMORAL REPLACEMENT KNEE - [facility], [address] TABLE, FEMORAL POPLITEAL BLOCK, NSA= SPINAL VS GENERAL performed by Warden Layla DO at [facility]   2004        SOCIAL HISTORY:     Social History     Socioeconomic History    Marital status:      Spouse name: Not on file    Number of children: Not on file    Years of education: Not on file    Highest education level: Not on file   Occupational History    Not on file   Social Needs    Financial resource strain: Not very hard    Food insecurity     Worry: Never true     Inability: Never true    Transportation needs     Medical: No     Non-medical: No   Tobacco Use    Smoking status: Never Smoker    Smokeless tobacco: Never Used   Substance and Sexual Activity    Alcohol use: Not Currently    Drug use: No    Sexual activity: Not Currently     Birth control/protection: Surgical     Comment: pt has hysterectomy   Lifestyle    Physical activity     Days per week: 0 days     Minutes per session: 0 min    Stress: To some extent   Relationships    Social connections     Talks on phone: Three times a week     Gets together: Once a week     Attends Cheondoism service: Never     Active member of club or organization: No     Attends meetings of clubs or organizations: Never     Relationship status:     Intimate partner violence     Fear of current or ex partner: Not on file     Emotionally abused: Not on file     Physically abused: Not on file     Forced sexual activity: Not on file   Other Topics Concern    Not on file   Social History Narrative    Not on file       CURRENT MEDICATIONS:     Current Outpatient Medications   Medication Sig Dispense Refill    gabapentin (NEURONTIN) 600 MG tablet Take 2 tablets by mouth 3 times daily for 30 days.  180 tablet 3    losartan (COZAAR) 100 MG tablet TAKE 1 TABLET BY MOUTH ONE TIME A DAY 90 tablet 1    cetirizine (ZYRTEC) 10 MG tablet TAKE 1 TABLET BY MOUTH ONE TIME A DAY 90 tablet 1    famotidine (PEPCID) 40 MG tablet TAKE 1 TABLET BY MOUTH IN THE EVENING  90 tablet 1    SM ASPIRIN ADULT LOW STRENGTH 81 MG EC tablet TAKE 1 TABLET BY MOUTH TWO TIMES A DAY  180 tablet 1    omeprazole (PRILOSEC) 20 MG delayed release capsule TAKE 1 CAPSULE BY MOUTH TWO TIMES A  capsule 1    PROAIR  (90 Base) MCG/ACT inhaler INHALE 2 PUFFS BY MOUTH EVERY SIX HOURS AS NEEDED FOR WHEEZING 1 Inhaler 5    hydroxychloroquine (PLAQUENIL) 200 MG tablet TAKE 1 TABLET BY MOUTH ONE TIME A DAY  90 tablet 1    pravastatin (PRAVACHOL) 40 MG tablet TAKE 1 TABLET BY MOUTH ONE TIME A DAY  90 tablet 1    gabapentin (NEURONTIN) 800 MG tablet TAKE 1 TABLET BY MOUTH FOUR TIMES A  tablet 5    ARIPiprazole (ABILIFY) 2 MG tablet TAKE 1 TABLET BY MOUTH IN THE MORNING      NIFEdipine (ADALAT CC) 60 MG extended release tablet Take 90 mg by mouth daily       EPIPEN 2-JIGAR 0.3 MG/0.3ML SOAJ injection INJECT 1 PEN INTO THE MUSCLE ONCE AS NEEDED FOR UP TO 1 DOSE FOR ANGIOEDEMA. 2 each 5    cyclobenzaprine (FLEXERIL) 10 MG tablet TAKE 1 TABLET BY MOUTH ONE TIME A DAY AT NIGHT AS NEEDED FOR MUSCLE SPASM 90 tablet 1    blood glucose monitor strips Test 4 times a day & as needed for symptoms of irregular blood glucose.  100 strip 5    VORTIoxetine (TRINTELLIX) 10 MG TABS tablet Take 20 mg by mouth daily       spironolactone (ALDACTONE) 25 MG tablet Take 25 mg by mouth daily      alendronate (FOSAMAX) 70 MG tablet Take 70 mg by mouth every 7 days      albuterol sulfate HFA (VENTOLIN HFA) 108 (90 Base) MCG/ACT inhaler Inhale 2 puffs into the lungs every 6 hours as needed for Wheezing 1 Inhaler 3    ferrous sulfate 325 (65 Fe) MG tablet TAKE 1 TABLET BY MOUTH TWO TIMES A DAY  60 tablet 2    metFORMIN (GLUCOPHAGE) 500 MG tablet Take 500 mg by mouth daily       lamoTRIgine (LAMICTAL) 25 MG tablet Take 25 mg by mouth 2 times daily       felodipine (PLENDIL) 10 MG extended release tablet Take 10 mg by mouth daily Prescribed by Dr. Be Garcia ergocalciferol (ERGOCALCIFEROL) 79305 units capsule Take 50,000 Units by mouth See Admin Instructions Takes twice a week      QUEtiapine (SEROQUEL) 300 MG tablet Take 0.5 tablets by mouth nightly (Patient taking differently: Take 300 mg by mouth nightly Take 2 tablets at bedtime. 600 mg) 60 tablet 3    lamoTRIgine (LAMICTAL) 100 MG tablet Take 100 mg by mouth every evening       levothyroxine (SYNTHROID) 25 MCG tablet Take 25 mcg by mouth Daily      Multiple Vitamins-Minerals (THERAPEUTIC MULTIVITAMIN-MINERALS) tablet Take 1 tablet by mouth daily      ibuprofen (ADVIL;MOTRIN) 800 MG tablet Take 1 tablet by mouth as needed For post op pain prescribed by surgeon after right CTR sx       No current facility-administered medications for this visit. ALLERGIES:     Allergies   Allergen Reactions    Morphine Nausea And Vomiting     Pt states it changes her mental status    Amlodipine Other (See Comments)     diarrhea and stress    Dilaudid [Hydromorphone Hcl] Hives and Itching    Sulfa Antibiotics Hives and Rash                                 REVIEW OF SYSTEMS        All items selected indicate a positive finding. Those items not selected are negative.   Constitutional [] Weight loss/gain   [] Fatigue  [] Fever/Chills   HEENT [] Hearing Loss  [] Visual Disturbance  [] Tinnitus  [] Eye pain   Respiratory [] Shortness of Breath  [] Cough  [] Snoring   Cardiovascular [] Chest Pain  [] Palpitations  [] Lightheaded   GI [] Constipation  [] Diarrhea  [] Swallowing change  [] Nausea/vomiting    [] Urinary Frequency  [] Urinary Urgency   Musculoskeletal [] Neck pain  [] Back pain  [] Muscle pain  [] Restless legs   Dermatologic [] Skin changes   Neurologic [] Memory loss/confusion  [] Seizures  [x] Trouble walking or imbalance  [] Dizziness  [] Sleep disturbance  [] Weakness  [x] Numbness  [] Tremors  [] Speech Difficulty  [] Headaches  [] Light Sensitivity  [] Sound Sensitivity   Endocrinology []Excessive thirst  []Excessive hunger   Psychiatric [] Anxiety/Depression  [] Hallucination   Allergy/immunology []Hives/environmental allergies   Hematologic/lymph [] Abnormal bleeding  [] Abnormal bruising         PHYSICAL EXAMINATION: Vitals:    03/15/21 0943   BP: (!) 158/106   Pulse: 72   Temp: 97.5 °F (36.4 °C)                                              .                                                                                                     General Appearance:  Alert, cooperative, no signs of distress, appears stated age   Head:  Normocephalic, no signs of trauma   Eyes:  Conjunctiva/corneas clear;  eyelids intact   Ears:  Normal external ear and canals   Nose: Nares normal, mucosa normal, no drainage    Throat: Lips and tongue normal; teeth normal;  gums normal   Neck: Supple, intact flexion, extension and rotation;   trachea midline;  no adenopathy;   thyroid: not enlarged;   no carotid pulse abnormality   Back:   Symmetric, no curvature, ROM adequate   Lungs:   Respirations unlabored   Heart:  Regular rate and rhythm           Extremities: Extremities normal, no cyanosis, no edema   Pulses: Symmetric over head and neck   Skin: Skin color, texture normal, no rashes, no lesions                                     NEUROLOGIC EXAMINATION    Neurologic Exam  Mental status    Alert and oriented x 3; intact memory with no confusion, speech or language problems; no hallucinations or delusions  Fund of information appropriate for level of education    Cranial nerves    II - visual fields intact to confrontation bilaterally  III, IV, VI  extra-ocular muscles full: no pupillary defect; no LYNSEY, no nystagmus, no ptosis   V - normal facial sensation                                                               VII - normal facial symmetry                                                             VIII - intact hearing                                                                             IX, X - symmetrical palate                                                                  XI - symmetrical shoulder shrug                                                       XII - tongue midline without atrophy or fasciculation      Motor function  Normal muscle bulk and tone; strength 5/5 on all 4 extremities, no pronator drift      Sensory function Intact to light touch, pinprick, vibration, proprioception on upper extremities. 60 % Decreased sensations in the lower extremities bilaterally. Cerebellar Intact fine motor movement. No involuntary movements or tremors. No ataxia or dysmetria on finger to nose or heel to shin testing      Reflex function DTR 2+ on bilateral UE and LE, symmetric. Negative Babinski      Gait                   normal base and arm swing                  Medical Decision Making: In summary, your patient, Za Marie exhibits the following, with associated plan:    1. Chronic cervical radiculopathy with neck pain radiating into the right shoulder  1. Increase gabapentin to 1200 mg three times daily  2. Diminished sensation in her feet bilaterally which is likely associated with a peripheral neuropathy. She also has gait instability associated with her symptoms. 1. Obtain previously ordered EMG/NCV studies of bilateral lower extremities  2. Consider Cymbalta if the patient is able to stop some psych medications. 3. Return in 6 months for reevaluation      Signed: Alisa Holstein, CNP      *Please note that portions of this note were completed with a voice recognition program.  Although every effort was made to insure the accuracy of this automated transcription, some errors in transcription may have occurred, occasionally words and are mis-transcribed    Provider Attestation: The documentation recorded by the scribe accurately reflects the service I personally performed and the decisions made by myself. Portions of this note were transcribed by a scribe. I personally performed the history, physical exam, and medical decision-making and confirm the accuracy of the information in the transcribed note.      Scribe Attestation:   By signing my name below, Richardson Judge, attest that this documentation has been prepared under the direction and in the presence of Alexsandra Urbina CNP.

## 2021-03-29 DIAGNOSIS — I10 ESSENTIAL HYPERTENSION: ICD-10-CM

## 2021-03-29 NOTE — LETTER
REFILL REQUEST  Prescriber:  Dr. Zo Villavicencio. [address], [address] [facility]  Phone: [phone] Fax: [phone]     Patient:  Lilly Arguello 1965  [address]  [phone] (home)        Patient is out of refills of metformin. Please approve/deny below request and send to the pharmacy. Patient prefers 90-day supplies to improve medication adherence. Medication: metformin 500 mg                 Sig: Take 1 tab po daily           #: 90     R: 2         Prescriber Response:    Prescription(s) approved (please [place] one):  YES  NO    Other response: ________________________________________      ______________________________________   __________________  Authorized By       Date     Pharmacy:   [facility] [id number]  [place] [phone] - F [phone]     The information transmitted is intended only for the person or entity to which it is addressed and may contain confidential and/or privileged material. Any review, retransmission, dissemination or other use of, or taking of any action in reliance upon, this information by persons or entities other than the intended recipient is prohibited. This document contains information covered under the Privacy Act, 5 (a), and/or the [employer] and Accountability Act ([address]) and its various implementing regulations and must be protected in accordance with those provisions. If you received this in error, please contact the sender and delete the material from any computer.

## 2021-03-29 NOTE — TELEPHONE ENCOUNTER
[facility] CLINICAL PHARMACY: STATIN THERAPY REVIEW  Identified statin use in persons with cardiovascular disease care gap per [employer]. Records dated 2/12/21. Last Office Visit: 1/18/21    Patient also appears to be taking: losartan (LF 3/18/21 30-ds) and metformin (LF 3/18/21 30-ds)     Patient found in Outcomes [facility] and is currently eligible for CMR and TIP    ASSESSMENT:  Patient has been identified as having a diagnosis for clinical ASCVD or event (e.g., inpatient hospitalization for MI, CABG, PCI or other revascularization procedures) in the measurement year and not currently filling a moderate or high intensity statin. Patients included in this care gap are males age 18-72 and females age 43-69. Per chart review, patient is prescribed pravastatin 40 mg daily. Per Reconciled Dispense Report:   Pravastatin last filled on 12/30/20 for a 90 day supply. 0 refills remaining. Lab Results   Component Value Date    CHOL 229 (H) 01/18/2021    TRIG 101 01/18/2021     01/18/2021    LDLCHOLESTEROL 93 01/18/2021     ALT   Date Value Ref Range Status   01/18/2021 11 5 - 33 U/L Final     AST   Date Value Ref Range Status   01/18/2021 16 <32 U/L Final       The ASCVD Risk score (Johana Maza, et al., 2013) failed to calculate for the following reasons: The patient has a prior MI or stroke diagnosis     Hyperlipidemia Goal: Patient is prescribed moderate-intensity statin therapy. PLAN:  Patient is due to refill pravastatin and is out of refills. Patient has been filling losartan and metformin for 30-ds and will need to clarify if she prefers 30 or 90-ds? Additionally, patient is eligible for CMR. Attempting to reach patient to review.  Left message asking for return call. Will attempt to contact the patient again.      Myla Pittman, PharmD, [facility]  Direct: [phone]  Department, toll free [phone], option 7

## 2021-03-30 RX ORDER — LOSARTAN POTASSIUM 100 MG/1
TABLET ORAL
Qty: 90 TABLET | Refills: 1 | Status: SHIPPED | OUTPATIENT
Start: 2021-03-30 | End: 2021-08-09 | Stop reason: SDUPTHER

## 2021-03-30 RX ORDER — PRAVASTATIN SODIUM 40 MG
TABLET ORAL
Qty: 90 TABLET | Refills: 1 | Status: SHIPPED | OUTPATIENT
Start: 2021-03-30 | End: 2021-08-09 | Stop reason: SDUPTHER

## 2021-04-13 ENCOUNTER — HOSPITAL ENCOUNTER (OUTPATIENT)
Age: 56
Discharge: HOME | End: 2021-04-13
Payer: MEDICARE

## 2021-04-13 LAB
CALCIUM SERPL-MCNC: 9.9 MG/DL (ref 8.6–10.4)
ESTIMATED AVERAGE GLUCOSE: 100 MG/DL
HBA1C MFR BLD: 5.1 % (ref 4–6)
PTH INTACT: 110.5 PG/ML (ref 15–65)
VITAMIN D 25-HYDROXY: 31.4 NG/ML (ref 30–100)

## 2021-04-13 PROCEDURE — 82306 VITAMIN D 25 HYDROXY: CPT

## 2021-04-13 PROCEDURE — 36415 COLL VENOUS BLD VENIPUNCTURE: CPT

## 2021-04-13 PROCEDURE — 83036 HEMOGLOBIN GLYCOSYLATED A1C: CPT

## 2021-04-13 PROCEDURE — 82310 ASSAY OF CALCIUM: CPT

## 2021-04-13 PROCEDURE — 83970 ASSAY OF PARATHORMONE: CPT

## 2021-04-17 DIAGNOSIS — R76.0 ANTINUCLEAR ANTIBODY (ANA) TITER GREATER THAN 1:80: ICD-10-CM

## 2021-04-17 DIAGNOSIS — M34.9 SCLERODERMA (HCC): ICD-10-CM

## 2021-04-19 NOTE — TELEPHONE ENCOUNTER
Last visit: 1/18/21  Last Med refill: 10/28/20  Does patient have enough medication for 72 hours: Yes    Next Visit Date:  Future Appointments   Date Time Provider Sylvester Collins   7/19/2021  9:30 AM COLTON Gross CNP [facility] FP [facility]   7/22/2021 10:10 AM Heath Hanks MD AFL Neph Malia None   8/6/2021  8:30 AM SCHEDULE, [facility] SV CANCER [facility]   8/13/2021  3:00 PM Say Muse MD SV Cancer Ct [facility]   9/13/2021  9:40 AM COLTON Tompkins CNP Neuro [facility]   Topic Date Due    COVID-19 Vaccine (1) Never done    Hepatitis B vaccine (1 of 3 - Risk 3-dose series) Never done    Annual Wellness Visit (AWV)  Never done    Diabetic retinal exam  05/09/2020    TSH testing  06/10/2020    Diabetic foot exam  04/13/2021    Shingles Vaccine (1 of 2) 01/18/2022 (Originally 7/29/2015)    Breast cancer screen  10/09/2021    Lipid screen  01/18/2022    Creatinine monitoring  02/05/2022    Potassium monitoring  03/04/2022    A1C test (Diabetic or Prediabetic)  04/13/2022    DTaP/Tdap/Td vaccine (3 - Td) 09/14/2027    Colon cancer screen colonoscopy  07/02/2029    Flu vaccine  Completed    Pneumococcal 0-64 years Vaccine  Completed    Hepatitis C screen  Completed    HIV screen  Completed    Hepatitis A vaccine  Aged Out    Hib vaccine  Aged Out    Meningococcal (ACWY) vaccine  Aged Out       Hemoglobin A1C (%)   Date Value   04/13/2021 5.1   01/18/2021 5.2   12/11/2019 5.5             ( goal A1C is < 7)   Microalb/Crt.  Ratio (mcg/mg creat)   Date Value   01/18/2021 CANNOT BE CALCULATED     LDL Cholesterol (mg/dL)   Date Value   01/18/2021 93   06/10/2019 74       (goal LDL is <100)   AST (U/L)   Date Value   01/18/2021 16     ALT (U/L)   Date Value   01/18/2021 11     BUN (mg/dL)   Date Value   02/05/2021 14     BP Readings from Last 3 Encounters:   03/15/21 (!) 158/106   02/25/21 128/88   02/12/21 103/69          (goal 120/80)    All Trigger middle finger of right hand     Trigger ring finger of right hand     Osteoarthritis of spine with radiculopathy, cervical region     Degenerative disc disease, cervical     Left carpal tunnel syndrome     Spinal stenosis, cervical region     Neural foraminal stenosis of cervical spine     Closed fracture of lower end of femur ([address]     Closed supracondylar fracture of femur, left, initial encounter ([address]     Numbness     Age-related osteoporosis without current pathological fracture     Acquired hypothyroidism     Age-related nuclear cataract of both eyes     Alcoholism in recovery ([address]     Bipolar affective disorder, currently depressed, mild (HCC)     Generalized anxiety disorder     Iron deficiency anemia     Iron malabsorption     Stage 3 chronic kidney disease

## 2021-04-20 ENCOUNTER — TELEPHONE (OUTPATIENT)
Dept: PHARMACY | Facility: CLINIC | Age: 56
End: 2021-04-20

## 2021-04-20 RX ORDER — HYDROXYCHLOROQUINE SULFATE 200 MG/1
TABLET, FILM COATED ORAL
Qty: 90 TABLET | Refills: 1 | Status: SHIPPED | OUTPATIENT
Start: 2021-04-20 | End: 2022-09-26

## 2021-04-20 NOTE — TELEPHONE ENCOUNTER
[facility] HEALTH CLINICAL PHARMACY REVIEW: ADHERENCE REVIEW  Identified care gap per [employer]; fills at [facility]: Diabetes adherence    Last Visit:unknown    Patient found in Outcomes MTM and is currently eligible for CMR    ASSESSMENT  ACE/ARB ADHERENCE    Per Insurance Records through March 2021 (2020 [place] = ?%; YTD PDC = 100%; Potential Fail Date: 5/27/21):   LOSARTAN POT TAB 100MG last filled on 2/16/21 for 30 day supply. Next refill due: 3/20/21    Per Outcomes [facility]TM Records:   last filled on 3/18/21 for 30 day supply. BP Readings from Last 3 Encounters:   03/15/21 (!) 158/106   02/25/21 128/88   02/12/21 103/69     Estimated Creatinine Clearance: 60 mL/min (A) (based on SCr of 1.22 mg/dL (H)). DIABETES ADHERENCE    Per Insurance Records through March 2021 (2020 [place] = ?%; YTD PDC = filled only once):   Metformin 500mg last filled on 2/16/21 for 30 day supply. Next refill due: 3/18/21    Per Outcomes MTM Records:   last filled on 3/18/21 for 30 day supply. Lab Results   Component Value Date    LABA1C 5.1 04/13/2021    LABA1C 5.2 01/18/2021    LABA1C 5.5 12/11/2019     NOTE A1c >9%    STATIN ADHERENCE    SUPD previously addressed    (filled 90DS 3/30/21)    PLAN  The following are interventions that have been identified:   - Patient eligible for 90 day supply of both of the above  - Patient eligible for CMR in Outcomes [place]  Both of the above refilled timely    Left message to call back to schedule CMR      Future Appointments   Date Time Provider Sylvester Collins   7/19/2021  9:30 AM COLTON Watkins CNP [place] FP [facility]   7/22/2021 10:10 AM Kathi Santo MD AFL Neph Malia None   8/6/2021  8:30 AM SCHEDULE, [facility] SV CANCER [facility]   8/13/2021  3:00 PM Alexandra Arias MD SV Cancer Ct Cele [place]   9/13/2021  9:40 AM COLTON Maynard CNP Neuro Spec [facility] Scott CPhT.    [facility]  Department, toll free: [phone], option 7

## 2021-04-20 NOTE — LETTER
[place] [address] [place] [address]           04/30/21     Dear Clif Hurt are eligible for a complete medication therapy review performed by a [address] [place] [address],[address] licensed clinical pharmacist. This review helps ensure that you are getting the most benefit from the medications you receive and includes the following:  - Review of your medications, including over-the-counter and herbal medications. - Answering questions about your medications and how to get the most benefit from them. - Identifying and helping to prevent potential drug interactions or side effects.  - Possibly identifying less costly alternatives that are equally effective. Under this program, [employer] will work with you and your doctor to manage your drug therapy. Please contact the [address] [place] [address] office to set up a time for your medication review with one of our clinical pharmacists. To contact us call [phone] or [phone], and select Option 7. This will be a phone consult and therefore will not require a trip to the medical office. Please note: This is an optional program.  It is a free service provided to help ensure that your medicines are safe, necessary, and effective. Your participation is encouraged, but not required.     Sincerely,  Rafael Carey   Department, toll free: [phone], option 7

## 2021-04-30 NOTE — TELEPHONE ENCOUNTER
For [place] in place: No    Recommendation Provided To: Patient/Caregiver: 3 via Telephone   Intervention Detail: Adherence Monitoring: 3   Gap Closed?  No    Total # of Interventions Recommended: 0   Total # of Interventions Accepted: 0   Intervention Accepted By: Patient/Caregiver: 0   Time Spent (min): 10

## 2021-05-10 ENCOUNTER — TELEPHONE (OUTPATIENT)
Dept: FAMILY MEDICINE CLINIC | Age: 56
End: 2021-05-10

## 2021-06-01 ENCOUNTER — TELEPHONE (OUTPATIENT)
Dept: PHARMACY | Facility: CLINIC | Age: 56
End: 2021-06-01

## 2021-06-01 NOTE — TELEPHONE ENCOUNTER
[facility] CLINICAL PHARMACY REVIEW: ADHERENCE REVIEW  Identified care gap per [employer]; [facility]ARB and Statin adherence    Last Visit: unknown    Patient found in Outcomes MTM and is currently eligible for CMR    ASSESSMENT  ACE/ARB ADHERENCE    Per Insurance Records through May (2020 [place] = ?%; YTD PDC = 100%; Potential Fail Date: 6/26/2021):   LOSARTAN POT TAB 100MG last filled on 3/18/2021 for 30 day supply. Next refill due: 4/19/2021    Per Outcomes MTM Records:   last filled on 5/17/2021 for 30 day supply. Written for 90DS  Refill available    BP Readings from Last 3 Encounters:   03/15/21 (!) 158/106   02/25/21 128/88   02/12/21 103/69     Estimated Creatinine Clearance: 60 mL/min (A) (based on SCr of 1.22 mg/dL (H)). STATIN ADHERENCE    Per Insurance Records through May  filled only once):   PRAVASTATIN  TAB 40MG last filled on 3/30/2021 for 90 day supply. Next refill due: 6/28/2021  Has 90DS refill available    Lab Results   Component Value Date    CHOL 229 (H) 01/18/2021    TRIG 101 01/18/2021     01/18/2021    LDLCHOLESTEROL 93 01/18/2021     ALT   Date Value Ref Range Status   01/18/2021 11 5 - 33 U/L Final     AST   Date Value Ref Range Status   01/18/2021 16 <32 U/L Final     The ASCVD Risk score (Jun Win., et al., 2013) failed to calculate for the following reasons: The patient has a prior MI or stroke diagnosis     PLAN  The following are interventions that have been identified:   Spoke with staff at [facility]. Appears some meds are sync'd for 30DS and some are not. Staff will try to sync 90DS to increase adherence. Attempting to reach patient to review.  Left message asking for return call.  to schedule CMR      Future Appointments   Date Time Provider Sylvester Collins   7/19/2021  9:30 AM Maribel Eric, APRN - CNP [facility]   7/22/2021 10:10 AM Warren Mendes MD AFL Neph Malia None   8/6/2021  8:30 AM SCHEDULE, [facility] TOP 8/13/2021  3:00  Phillip Arias MD [facility]   9/13/2021  9:40 AM All[facility]ajjar[facility]N - [facility]       [facility].    [address] [facility], toll free: [phone], option 7

## 2021-06-01 NOTE — LETTER
Rosita Isbell [address] [address]           06/07/21     Dear Nena Boone are eligible for a complete medication therapy review performed by a [address] [place] [address],[address] licensed clinical pharmacist. This review helps ensure that you are getting the most benefit from the medications you receive and includes the following:  - Review of your medications, including over-the-counter and herbal medications. - Answering questions about your medications and how to get the most benefit from them. - Identifying and helping to prevent potential drug interactions or side effects.  - Possibly identifying less costly alternatives that are equally effective. Under this program, [employer] will work with you and your doctor to manage your drug therapy. Please contact the [address] [place] [address] office to set up a time for your medication review with one of our clinical pharmacists. To contact us call [phone] or [phone], and select Option 7. This will be a phone consult and therefore will not require a trip to the medical office. Please note: This is an optional program.  It is a free service provided to help ensure that your medicines are safe, necessary, and effective. Your participation is encouraged, but not required.     Sincerely,  Rafael Carey   Department, toll free: [phone], option 7

## 2021-06-07 NOTE — TELEPHONE ENCOUNTER
2nd Attempt Documentation:  2nd attempt to contact this patient regarding the previous message  CLINICAL PHARMACY: CMR  Patient unavailable at the time of call. Left following message on home TAD: please call back at toll-free [phone] option 7 to retrieve previous message. Letter mailed to patient.

## 2021-06-12 DIAGNOSIS — K21.9 GASTROESOPHAGEAL REFLUX DISEASE: ICD-10-CM

## 2021-06-14 RX ORDER — FAMOTIDINE 40 MG/1
TABLET, FILM COATED ORAL
Qty: 90 TABLET | Refills: 1 | Status: SHIPPED | OUTPATIENT
Start: 2021-06-14 | End: 2021-12-14

## 2021-06-14 NOTE — TELEPHONE ENCOUNTER
Last visit: 1/18/21  Last Med refill: 12/29/20  Does patient have enough medication for 72 hours: Yes    Next Visit Date:  Future Appointments   Date Time Provider Sylvester Collins   7/19/2021  9:30 AM COLTON Cali CNP [facility]   7/22/2021 10:10 AM German Davis MD AFL Neph Malia None   8/6/2021  8:30 AM SCHEDULE, [facility]   8/13/2021  3:00 PM Vladislav Pompa MD SV Cancer Ct [facility]   9/13/2021  9:40 AM COLTON Espitia CNP Neuro [facility] Maintenance   Topic Date Due    COVID-19 Vaccine (1) Never done    Hepatitis B vaccine (1 of 3 - Risk 3-dose series) Never done    Annual Wellness Visit (AWV)  Never done    Diabetic retinal exam  05/09/2020    TSH testing  06/10/2020    Diabetic foot exam  04/13/2021    Shingles Vaccine (1 of 2) 01/18/2022 (Originally 7/29/2015)    Breast cancer screen  10/09/2021    Lipid screen  01/18/2022    Creatinine monitoring  02/05/2022    Potassium monitoring  03/04/2022    A1C test (Diabetic or Prediabetic)  04/13/2022    DTaP/Tdap/Td vaccine (3 - Td or Tdap) 09/14/2027    Colon cancer screen colonoscopy  07/02/2029    Pneumococcal 0-64 years Vaccine (2 of 2) 07/29/2030    Flu vaccine  Completed    Hepatitis C screen  Completed    HIV screen  Completed    Hepatitis A vaccine  Aged Out    Hib vaccine  Aged Out    Meningococcal (ACWY) vaccine  Aged Out       Hemoglobin A1C (%)   Date Value   04/13/2021 5.1   01/18/2021 5.2   12/11/2019 5.5             ( goal A1C is < 7)   Microalb/Crt.  Ratio (mcg/mg creat)   Date Value   01/18/2021 CANNOT BE CALCULATED     LDL Cholesterol (mg/dL)   Date Value   01/18/2021 93   06/10/2019 74       (goal LDL is <100)   AST (U/L)   Date Value   01/18/2021 16     ALT (U/L)   Date Value   01/18/2021 11     BUN (mg/dL)   Date Value   02/05/2021 14     BP Readings from Last 3 Encounters:   03/15/21 (!) 158/106   02/25/21 128/88   02/12/21 103/69          (goal

## 2021-06-16 NOTE — TELEPHONE ENCOUNTER
120/80)    All Future Testing planned in [facility]  Lab Frequency Next Occurrence   Basic Metabolic Panel Every 16 weeks    CBC Auto Differential Every 16 weeks    Creatinine, Random Urine Every 16 weeks    Protein / creatinine ratio, urine Every 16 weeks    Protein, urine, random Every 16 weeks    CBC Auto Differential     Ferritin     CBC Auto Differential     Basic Metabolic Panel Every 16 weeks    CBC Auto Differential Every 16 weeks    Creatinine, Random Urine Every 16 weeks    Protein / creatinine ratio, urine Every 16 weeks    Protein, urine, random Every 16 weeks                Patient Active Problem List:     Transient insomnia     Sleep walking disorder     Raynaud's disease     Pulmonary nodules     Neuropathy     Hypertension     GERD (gastroesophageal reflux disease)     Fibromyalgia     Depression     Degenerative disc disease, thoracic     Atherosclerosis of native coronary artery of native heart with angina pectoris (HCC)     Bipolar disorder (HCC)     Asthma     Anxiety     Antinuclear antibody (IQRA) titer greater than 1:80     Anemia     Hx of Stroke ([facility])     Type 2 diabetes mellitus with stage 2 chronic kidney disease, without long-term current use of insulin ([facility])     Degenerative arthritis of right knee     S/P knee surgery     Chronic fatigue syndrome     Spondylosis of cervical region without myelopathy or radiculopathy     H/O hysterectomy with BSO at [facility] for benign disease 2014     Hx of total bilateral knee replacement     H/O: hysterectomy     Atrial fibrillation with slow ventricular response ([facility])     H/O: stroke     Hypovolemia     Hypotension     Bradycardia     Cystocele, midline     JOYCE (stress urinary incontinence, female)     Overflow incontinence     7/30/18 Cystocele repair, Cystoscopy with OBTRYX Ureteral Sling     Nuclear senile cataract     CKD (chronic kidney disease), stage II     Scleroderma ([facility] [address]     Vitamin D deficiency     Trigger middle finger of right hand Trigger ring finger of right hand     Osteoarthritis of spine with radiculopathy, cervical region     Degenerative disc disease, cervical     Left carpal tunnel syndrome     Spinal stenosis, cervical region     Neural foraminal stenosis of cervical spine     Closed fracture of lower end of femur ([address]     Closed supracondylar fracture of femur, left, initial encounter ([address]     Numbness     Age-related osteoporosis without current pathological fracture     Acquired hypothyroidism     Age-related nuclear cataract of both eyes     Alcoholism in recovery ([address]     Bipolar affective disorder, currently depressed, mild ([address]     Generalized anxiety disorder     Iron deficiency anemia     Iron malabsorption     Stage 3 chronic kidney disease ([address]

## 2021-06-18 RX ORDER — OMEPRAZOLE 20 MG/1
CAPSULE, DELAYED RELEASE ORAL
Qty: 180 CAPSULE | Refills: 1 | Status: SHIPPED | OUTPATIENT
Start: 2021-06-18 | End: 2022-09-26

## 2021-07-06 ENCOUNTER — TELEPHONE (OUTPATIENT)
Dept: FAMILY MEDICINE CLINIC | Age: 56
End: 2021-07-06

## 2021-07-09 RX ORDER — GABAPENTIN 600 MG/1
TABLET ORAL
Qty: 180 TABLET | Refills: 0 | Status: SHIPPED | OUTPATIENT
Start: 2021-07-09 | End: 2021-08-10

## 2021-07-09 NOTE — TELEPHONE ENCOUNTER
Pharmacy and pt.  requesting refill of gabapentin. Pt. called in and said that Reina Fabian forgot to send us the refill and she is out. .      Medication active on med list yes      Date of last fill: 3/15/21  with 3 refills verified on 7/9/21  verified by EDUARD RN      Date of last appointment 3/15/21    Next Visit Date:  9/13/2021

## 2021-07-13 ENCOUNTER — TELEPHONE (OUTPATIENT)
Dept: FAMILY MEDICINE CLINIC | Age: 56
End: 2021-07-13

## 2021-07-13 ENCOUNTER — HOSPITAL ENCOUNTER (OUTPATIENT)
Age: 56
Discharge: HOME | End: 2021-07-13
Payer: MEDICARE

## 2021-07-13 DIAGNOSIS — N18.31 STAGE 3A CHRONIC KIDNEY DISEASE (HCC): ICD-10-CM

## 2021-07-13 DIAGNOSIS — M47.22 OSTEOARTHRITIS OF SPINE WITH RADICULOPATHY, CERVICAL REGION: Primary | ICD-10-CM

## 2021-07-13 LAB
ABSOLUTE EOS #: 0.1 K/UL (ref 0–0.4)
ABSOLUTE IMMATURE GRANULOCYTE: ABNORMAL K/UL (ref 0–0.3)
ABSOLUTE LYMPH #: 1.1 K/UL (ref 1–4.8)
ABSOLUTE MONO #: 0.3 K/UL (ref 0.1–1.3)
ANION GAP SERPL CALCULATED.3IONS-SCNC: 12 MMOL/L (ref 9–17)
BASOPHILS # BLD: 2 % (ref 0–2)
BASOPHILS ABSOLUTE: 0.1 K/UL (ref 0–0.2)
BUN BLDV-MCNC: 12 MG/DL (ref 6–20)
BUN/CREAT BLD: ABNORMAL (ref 9–20)
CALCIUM SERPL-MCNC: 9.4 MG/DL (ref 8.6–10.4)
CALCIUM SERPL-MCNC: 9.5 MG/DL (ref 8.6–10.4)
CHLORIDE BLD-SCNC: 104 MMOL/L (ref 98–107)
CO2: 26 MMOL/L (ref 20–31)
CREAT SERPL-MCNC: 1.12 MG/DL (ref 0.5–0.9)
CREATININE URINE: 150.3 MG/DL (ref 28–217)
CREATININE URINE: 153 MG/DL (ref 28–217)
DIFFERENTIAL TYPE: ABNORMAL
EOSINOPHILS RELATIVE PERCENT: 4 % (ref 0–4)
GFR AFRICAN AMERICAN: >60 ML/MIN
GFR NON-AFRICAN AMERICAN: 51 ML/MIN
GFR SERPL CREATININE-BSD FRML MDRD: ABNORMAL ML/MIN/{1.73_M2}
GFR SERPL CREATININE-BSD FRML MDRD: ABNORMAL ML/MIN/{1.73_M2}
GLUCOSE BLD-MCNC: 90 MG/DL (ref 70–99)
HCT VFR BLD CALC: 41 % (ref 36–46)
HEMOGLOBIN: 14.2 G/DL (ref 12–16)
IMMATURE GRANULOCYTES: ABNORMAL %
LYMPHOCYTES # BLD: 29 % (ref 24–44)
MCH RBC QN AUTO: 33.2 PG (ref 26–34)
MCHC RBC AUTO-ENTMCNC: 34.6 G/DL (ref 31–37)
MCV RBC AUTO: 95.9 FL (ref 80–100)
MONOCYTES # BLD: 9 % (ref 1–7)
NRBC AUTOMATED: ABNORMAL PER 100 WBC
PDW BLD-RTO: 13.2 % (ref 11.5–14.9)
PLATELET # BLD: 194 K/UL (ref 150–450)
PLATELET ESTIMATE: ABNORMAL
PMV BLD AUTO: 8.3 FL (ref 6–12)
POTASSIUM SERPL-SCNC: 3.5 MMOL/L (ref 3.7–5.3)
PTH INTACT: 117.4 PG/ML (ref 15–65)
RBC # BLD: 4.27 M/UL (ref 4–5.2)
RBC # BLD: ABNORMAL 10*6/UL
SEG NEUTROPHILS: 56 % (ref 36–66)
SEGMENTED NEUTROPHILS ABSOLUTE COUNT: 2.1 K/UL (ref 1.3–9.1)
SODIUM BLD-SCNC: 142 MMOL/L (ref 135–144)
TOTAL PROTEIN, URINE: 21 MG/DL
TOTAL PROTEIN, URINE: 22 MG/DL
URINE TOTAL PROTEIN CREATININE RATIO: 0.14 (ref 0–0.2)
VITAMIN D 25-HYDROXY: 32.1 NG/ML (ref 30–100)
WBC # BLD: 3.6 K/UL (ref 3.5–11)
WBC # BLD: ABNORMAL 10*3/UL

## 2021-07-13 PROCEDURE — 83970 ASSAY OF PARATHORMONE: CPT

## 2021-07-13 PROCEDURE — 82310 ASSAY OF CALCIUM: CPT

## 2021-07-13 PROCEDURE — 84156 ASSAY OF PROTEIN URINE: CPT

## 2021-07-13 PROCEDURE — 85025 COMPLETE CBC W/AUTO DIFF WBC: CPT

## 2021-07-13 PROCEDURE — 82570 ASSAY OF URINE CREATININE: CPT

## 2021-07-13 PROCEDURE — 82306 VITAMIN D 25 HYDROXY: CPT

## 2021-07-13 PROCEDURE — 36415 COLL VENOUS BLD VENIPUNCTURE: CPT

## 2021-07-13 PROCEDURE — 80048 BASIC METABOLIC PNL TOTAL CA: CPT

## 2021-07-30 ENCOUNTER — TELEPHONE (OUTPATIENT)
Dept: FAMILY MEDICINE CLINIC | Age: 56
End: 2021-07-30

## 2021-07-30 NOTE — TELEPHONE ENCOUNTER
----- Message from Nahed Fox sent at 7/30/2021  9:54 AM EDT -----  Subject: Message to Provider    QUESTIONS  Information for Provider? Patient wants to  the prescription for   the handicapp placard instead of it being sent to a pharmacy. Can she come   in today to pick it up? Please call Mia at [phone] asap.  ---------------------------------------------------------------------------  --------------  CALL BACK INFO  What is the best way for the office to contact you? OK to leave message on   voicemail  Preferred Call Back Phone Number? [phone]  ---------------------------------------------------------------------------  --------------  SCRIPT ANSWERS  Relationship to Patient?  Self

## 2021-08-03 ENCOUNTER — HOSPITAL ENCOUNTER (OUTPATIENT)
Facility: MEDICAL CENTER | Age: 56
End: 2021-08-03
Payer: MEDICARE

## 2021-08-06 ENCOUNTER — HOSPITAL ENCOUNTER (OUTPATIENT)
Age: 56
Discharge: HOME | End: 2021-08-06
Payer: MEDICARE

## 2021-08-06 DIAGNOSIS — K90.9 IRON MALABSORPTION: ICD-10-CM

## 2021-08-06 DIAGNOSIS — D50.8 OTHER IRON DEFICIENCY ANEMIA: ICD-10-CM

## 2021-08-06 DIAGNOSIS — Z98.84 H/O GASTRIC BYPASS: ICD-10-CM

## 2021-08-06 DIAGNOSIS — E53.8 B12 DEFICIENCY: ICD-10-CM

## 2021-08-06 LAB
ABSOLUTE BANDS #: 0.06 K/UL (ref 0–1)
ABSOLUTE EOS #: 0.06 K/UL (ref 0–0.4)
ABSOLUTE IMMATURE GRANULOCYTE: ABNORMAL K/UL (ref 0–0.3)
ABSOLUTE LYMPH #: 1.15 K/UL (ref 1–4.8)
ABSOLUTE MONO #: 0.29 K/UL (ref 0.1–1.3)
BANDS: 2 % (ref 0–10)
BASOPHILS # BLD: 1 % (ref 0–2)
BASOPHILS ABSOLUTE: 0.03 K/UL (ref 0–0.2)
DIFFERENTIAL TYPE: ABNORMAL
EOSINOPHILS RELATIVE PERCENT: 2 % (ref 0–4)
FERRITIN: 100 UG/L (ref 13–150)
HCT VFR BLD CALC: 40.6 % (ref 36–46)
HEMOGLOBIN: 13.9 G/DL (ref 12–16)
IMMATURE GRANULOCYTES: ABNORMAL %
IRON SATURATION: 38 % (ref 20–55)
IRON: 99 UG/DL (ref 37–145)
LYMPHOCYTES # BLD: 36 % (ref 24–44)
MCH RBC QN AUTO: 32.7 PG (ref 26–34)
MCHC RBC AUTO-ENTMCNC: 34.4 G/DL (ref 31–37)
MCV RBC AUTO: 95.1 FL (ref 80–100)
MONOCYTES # BLD: 9 % (ref 1–7)
MORPHOLOGY: NORMAL
NRBC AUTOMATED: ABNORMAL PER 100 WBC
PDW BLD-RTO: 12.9 % (ref 11.5–14.9)
PLATELET # BLD: 203 K/UL (ref 150–450)
PLATELET ESTIMATE: ABNORMAL
PMV BLD AUTO: 7.8 FL (ref 6–12)
RBC # BLD: 4.26 M/UL (ref 4–5.2)
RBC # BLD: ABNORMAL 10*6/UL
SEG NEUTROPHILS: 50 % (ref 36–66)
SEGMENTED NEUTROPHILS ABSOLUTE COUNT: 1.61 K/UL (ref 1.3–9.1)
TOTAL IRON BINDING CAPACITY: 261 UG/DL (ref 250–450)
UNSATURATED IRON BINDING CAPACITY: 162 UG/DL (ref 112–347)
WBC # BLD: 3.2 K/UL (ref 3.5–11)
WBC # BLD: ABNORMAL 10*3/UL

## 2021-08-06 PROCEDURE — 82728 ASSAY OF FERRITIN: CPT

## 2021-08-06 PROCEDURE — 83550 IRON BINDING TEST: CPT

## 2021-08-06 PROCEDURE — 85025 COMPLETE CBC W/AUTO DIFF WBC: CPT

## 2021-08-06 PROCEDURE — 83540 ASSAY OF IRON: CPT

## 2021-08-06 PROCEDURE — 36415 COLL VENOUS BLD VENIPUNCTURE: CPT

## 2021-08-09 ENCOUNTER — OFFICE VISIT (OUTPATIENT)
Dept: FAMILY MEDICINE CLINIC | Age: 56
End: 2021-08-09
Payer: MEDICARE

## 2021-08-09 VITALS
BODY MASS INDEX: 29.89 KG/M2 | HEIGHT: 68 IN | HEART RATE: 81 BPM | DIASTOLIC BLOOD PRESSURE: 80 MMHG | OXYGEN SATURATION: 99 % | SYSTOLIC BLOOD PRESSURE: 130 MMHG | WEIGHT: 197.2 LBS

## 2021-08-09 DIAGNOSIS — Z00.00 ROUTINE GENERAL MEDICAL EXAMINATION AT A HEALTH CARE FACILITY: Primary | ICD-10-CM

## 2021-08-09 DIAGNOSIS — I10 ESSENTIAL HYPERTENSION: ICD-10-CM

## 2021-08-09 PROBLEM — M72.0 DUPUYTREN'S DISEASE OF PALM: Status: ACTIVE | Noted: 2019-10-02

## 2021-08-09 PROBLEM — J45.20 MILD INTERMITTENT ASTHMA: Status: ACTIVE | Noted: 2019-08-01

## 2021-08-09 PROCEDURE — G0438 PPPS, INITIAL VISIT: HCPCS | Performed by: NURSE PRACTITIONER

## 2021-08-09 RX ORDER — PRAVASTATIN SODIUM 40 MG
TABLET ORAL
Qty: 90 TABLET | Refills: 1 | Status: SHIPPED | OUTPATIENT
Start: 2021-08-09 | End: 2022-01-31

## 2021-08-09 RX ORDER — LOSARTAN POTASSIUM 100 MG/1
TABLET ORAL
Qty: 90 TABLET | Refills: 1 | Status: SHIPPED | OUTPATIENT
Start: 2021-08-09 | End: 2022-04-28

## 2021-08-09 RX ORDER — CETIRIZINE HYDROCHLORIDE 10 MG/1
TABLET ORAL
Qty: 90 TABLET | Refills: 1 | Status: SHIPPED | OUTPATIENT
Start: 2021-08-09 | End: 2022-08-22 | Stop reason: SDUPTHER

## 2021-08-09 RX ORDER — CLONAZEPAM 1 MG/1
1 TABLET ORAL 3 TIMES DAILY PRN
COMMUNITY
Start: 2021-08-02

## 2021-08-09 SDOH — ECONOMIC STABILITY: FOOD INSECURITY: WITHIN THE PAST 12 MONTHS, YOU WORRIED THAT YOUR FOOD WOULD RUN OUT BEFORE YOU GOT MONEY TO BUY MORE.: NEVER TRUE

## 2021-08-09 SDOH — ECONOMIC STABILITY: FOOD INSECURITY: WITHIN THE PAST 12 MONTHS, THE FOOD YOU BOUGHT JUST DIDN'T LAST AND YOU DIDN'T HAVE MONEY TO GET MORE.: NEVER TRUE

## 2021-08-09 ASSESSMENT — PATIENT HEALTH QUESTIONNAIRE - PHQ9
3. TROUBLE FALLING OR STAYING ASLEEP: 0
SUM OF ALL RESPONSES TO PHQ QUESTIONS 1-9: 9
2. FEELING DOWN, DEPRESSED OR HOPELESS: 2
8. MOVING OR SPEAKING SO SLOWLY THAT OTHER PEOPLE COULD HAVE NOTICED. OR THE OPPOSITE, BEING SO FIGETY OR RESTLESS THAT YOU HAVE BEEN MOVING AROUND A LOT MORE THAN USUAL: 0
6. FEELING BAD ABOUT YOURSELF - OR THAT YOU ARE A FAILURE OR HAVE LET YOURSELF OR YOUR FAMILY DOWN: 1
9. THOUGHTS THAT YOU WOULD BE BETTER OFF DEAD, OR OF HURTING YOURSELF: 0
SUM OF ALL RESPONSES TO PHQ9 QUESTIONS 1 & 2: 5
7. TROUBLE CONCENTRATING ON THINGS, SUCH AS READING THE NEWSPAPER OR WATCHING TELEVISION: 3
1. LITTLE INTEREST OR PLEASURE IN DOING THINGS: 3
SUM OF ALL RESPONSES TO PHQ QUESTIONS 1-9: 9
5. POOR APPETITE OR OVEREATING: 0
SUM OF ALL RESPONSES TO PHQ QUESTIONS 1-9: 9
10. IF YOU CHECKED OFF ANY PROBLEMS, HOW DIFFICULT HAVE THESE PROBLEMS MADE IT FOR YOU TO DO YOUR WORK, TAKE CARE OF THINGS AT HOME, OR GET ALONG WITH OTHER PEOPLE: 1
4. FEELING TIRED OR HAVING LITTLE ENERGY: 0

## 2021-08-09 ASSESSMENT — SOCIAL DETERMINANTS OF HEALTH (SDOH): HOW HARD IS IT FOR YOU TO PAY FOR THE VERY BASICS LIKE FOOD, HOUSING, MEDICAL CARE, AND HEATING?: NOT HARD AT ALL

## 2021-08-09 ASSESSMENT — LIFESTYLE VARIABLES
HOW OFTEN DO YOU HAVE SIX OR MORE DRINKS ON ONE OCCASION: 0
HOW OFTEN DO YOU HAVE A DRINK CONTAINING ALCOHOL: 1
AUDIT-C TOTAL SCORE: 1
HOW MANY STANDARD DRINKS CONTAINING ALCOHOL DO YOU HAVE ON A TYPICAL DAY: 0

## 2021-08-09 NOTE — PROGRESS NOTES
Medicare Annual Wellness Visit  Name: Becky Calvo Date: 2021   MRN: [id number] Sex: Female   Age: 64 y.o. Ethnicity: Non- / Non    : 1965 Race: White (non-)      Mia Schmitt is here for Medicare AWV    Screenings for behavioral, psychosocial and functional/safety risks, and cognitive dysfunction are all negative except as indicated below. These results, as well as other patient data from the [address] form, are documented in Flowsheets linked to this Encounter. Sees psych for depression  Not going to get covid vaccine. educated    Allergies   Allergen Reactions    Morphine Nausea And Vomiting     Pt states it changes her mental status    Amlodipine Other (See Comments)     diarrhea and stress    Dilaudid [Hydromorphone Hcl] Hives and Itching    Sulfa Antibiotics Hives and Rash       Prior to Visit Medications    Medication Sig Taking?  Authorizing Provider   clonazePAM (KLONOPIN) 1 MG tablet TAKE 1 TABLET BY MOUTH THREE TIMES A DAY AS NEEDED Yes Historical Provider, MD   Handicap Placard MISC by Does not apply route Exp: 2026 Yes COLTON Alvarez CNP   gabapentin (NEURONTIN) 600 MG tablet TAKE 2 TABLETS BY MOUTH THREE TIMES A DAY  Yes COLTON Farley CNP   omeprazole (PRILOSEC) 20 MG delayed release capsule TAKE 1 CAPSULE BY MOUTH TWO TIMES A DAY  Yes COLTON Alvarez CNP   famotidine (PEPCID) 40 MG tablet TAKE 1 TABLET BY MOUTH IN THE EVENING  Yes COLTON Alvarez CNP   hydroxychloroquine (PLAQUENIL) 200 MG tablet TAKE 1 TABLET BY MOUTH EVERY DAY  Yes COLTON Alvarez CNP   losartan (COZAAR) 100 MG tablet TAKE 1 TABLET BY MOUTH ONE TIME A DAY Yes COLTON Alvarez CNP   pravastatin (PRAVACHOL) 40 MG tablet TAKE 1 TABLET BY MOUTH ONE TIME A DAY Yes COLTON Alvarez CNP   cetirizine (ZYRTEC) 10 MG tablet TAKE 1 TABLET BY MOUTH ONE TIME A DAY Yes Ludin Jaquez APRN - CNP    ASPIRIN ADULT LOW STRENGTH 81 MG EC tablet TAKE 1 TABLET BY MOUTH TWO TIMES A DAY  Yes COLTON Haney CNP   ARIPiprazole (ABILIFY) 2 MG tablet TAKE 1 TABLET BY MOUTH IN THE MORNING Yes Historical Provider, MD   NIFEdipine (ADALAT CC) 60 MG extended release tablet Take 90 mg by mouth daily  Yes Historical Provider, MD   EPIPEN 2-JIGAR 0.3 MG/0.3ML SOAJ injection INJECT 1 PEN INTO THE MUSCLE ONCE AS NEEDED FOR UP TO 1 DOSE FOR ANGIOEDEMA. Yes COLTON Haney CNP   cyclobenzaprine (FLEXERIL) 10 MG tablet TAKE 1 TABLET BY MOUTH ONE TIME A DAY AT NIGHT AS NEEDED FOR MUSCLE SPASM Yes COLTON Haney CNP   blood glucose monitor strips Test 4 times a day & as needed for symptoms of irregular blood glucose.  Yes COLTON Haney CNP   ibuprofen (ADVIL;MOTRIN) 800 MG tablet Take 1 tablet by mouth as needed For post op pain prescribed by surgeon after right CTR sx Yes Historical Provider, MD   VORTIoxetine (TRINTELLIX) 10 MG TABS tablet Take 20 mg by mouth daily  Yes Historical Provider, MD   spironolactone (ALDACTONE) 25 MG tablet Take 25 mg by mouth daily Yes Historical Provider, MD   alendronate (FOSAMAX) 70 MG tablet Take 70 mg by mouth every 7 days Yes Historical Provider, MD   albuterol sulfate HFA (VENTOLIN HFA) 108 (90 Base) MCG/ACT inhaler Inhale 2 puffs into the lungs every 6 hours as needed for Wheezing Yes COLTON Haney CNP   ferrous sulfate 325 (65 Fe) MG tablet TAKE 1 TABLET BY MOUTH TWO TIMES A DAY  Yes Stephanie Copeland,    metFORMIN (GLUCOPHAGE) 500 MG tablet Take 500 mg by mouth daily  Yes Historical Provider, MD   lamoTRIgine (LAMICTAL) 25 MG tablet Take 25 mg by mouth 2 times daily  Yes Historical Provider, MD   felodipine (PLENDIL) 10 MG extended release tablet Take 10 mg by mouth daily Prescribed by Dr. Yaritza Pa Yes Historical Provider, MD   ergocalciferol (ERGOCALCIFEROL) 04852 units capsule Take 50,000 Units by mouth See Admin Instructions Takes twice a week Yes Historical Provider, MD   QUEtiapine (SEROQUEL) 300 MG tablet Take 0.5 tablets by mouth nightly  Patient taking differently: Take 300 mg by mouth nightly Take 2 tablets at bedtime. 600 mg Yes Keo Calvillo MD   lamoTRIgine (LAMICTAL) 100 MG tablet Take 100 mg by mouth every evening  Yes Historical Provider, MD   levothyroxine (SYNTHROID) 25 MCG tablet Take 25 mcg by mouth Daily Yes Historical Provider, MD   Multiple Vitamins-Minerals (THERAPEUTIC MULTIVITAMIN-MINERALS) tablet Take 1 tablet by mouth daily Yes Historical Provider, MD   [id number]  [facility] [place], [place] [address] (80 Base) MCG/ACT inhaler INHALE 2 PUFFS BY MOUTH EVERY SIX HOURS AS NEEDED FOR WHEEZING  Elizabeth Ernandez MD       Past Medical History:   Diagnosis Date    Acute kidney injury ([address]     Anemia     Angioedema     Antinuclear antibody (IQRA) titer greater than 1:80     Anxiety     Asthma     Ataxia     Atrial fibrillation (HCC)     Borderline personality disorder ([address]     Bradycardia     CAD (coronary artery disease)     Chronic kidney disease     CKD (chronic kidney disease), stage II 8/23/2018    COVID-19 01/01/2021    Degenerative disc disease, thoracic     Depression     Diabetes mellitus ([address]     Diastolic dysfunction     DJD (degenerative joint disease)     Fibromyalgia     Foot pain, right     GERD (gastroesophageal reflux disease)     H/O: hysterectomy     Headache     Hernia of abdominal wall     History of blood transfusion     no problem    Hyperlipidemia     Hypertension     Lupus ([address]     Mood disorder (HCC)     Neuropathy     Osteoarthritis     PTSD (post-traumatic stress disorder)     Pulmonary nodules     Raynaud's disease     Scleroderma ([address]     Scleroderma ([address] 8/23/2018    Seizures ([address]     Sleep walking disorder     Thyroid disease     TIA (transient ischemic attack)     Transient insomnia     Tricuspid regurgitation     Vasospasm ([address] 01/2016    bilat. legs. resulting in near complete absence of profusion to arteries of feet. ([facility]     Vitamin D deficiency 8/23/2018    Wears glasses        Past Surgical History:   Procedure Laterality Date    ABDOMINOPLASTY  2013    BLADDER SUSPENSION N/A 7/30/2018    CYSTOSCOPY WITH OBTRYX MID URETHRAL SLING performed by Mónica Biggs MD at [facility] Right 2016    CARPAL TUNNEL RELEASE Right 01/02/2020    CHOLECYSTECTOMY  1989    COLONOSCOPY N/A 6/24/2019    COLORECTAL CANCER SCREENING, NOT HIGH RISK performed by Javier Connolly MD at [address] 7/2/2019    COLORECTAL CANCER SCREENING, NOT HIGH RISK performed by Javier Connolly MD at [address] Right     EYE SURGERY      khushboo. lasik     FEMUR FRACTURE SURGERY  11/09/2018    FINGER SURGERY Right 08/28/2018    RING CARTER MASS EXCISION, TRIGGER RELEASE    FRACTURE SURGERY      left femur    GASTRECTOMY      GASTRIC BYPASS SURGERY  2012    HYSTERECTOMY      JOINT REPLACEMENT Bilateral     knees    KNEE SURGERY Right 05/02/2017    (femoral) patella replacement    NERVE BLOCK Right 05/04/2018     cervical facet #1 no steroid    NERVE BLOCK Right 06/29/2018    rt cervical diagnostic #2 decadron 10mg    NERVE BLOCK Right 08/10/2018    right cervical rfa decadron 10mg    KS ANTERIOR COLPORRAPHY RPR CYSTOCELE W/CYSTO N/A 7/30/2018    CYSTOCELE REPAIR performed by Mike Escalante DO at [address] OFFICE/OUTPT [address] Right 8/28/2018    RING CARTER MASS EXCISION, TRIGGER RELEASE, 3080 TABLE performed by Venkat Escobar DO at [address] OFFICE/OUTPT [address] Left 11/9/2018    FEMUR RETROGRADE IM NAILING PERIPROSTHETIC FRACTURE performed by Venkat Escobar DO at [address]/CARPAL TUNNEL SURG Left 10/23/2018    CARPAL TUNNEL RELEASE performed by Venkat Escobar DO at [address]  2011 left knee    TOTAL KNEE ARTHROPLASTY Right 5/2/2017    PATELLOFEMORAL REPLACEMENT KNEE - JAXSON, 3080 TABLE, FEMORAL POPLITEAL BLOCK, NSA= SPINAL VS GENERAL performed by Jodie Perez DO at [address] [place] [address]       Family History   Adopted: Yes   Problem Relation Age of Onset    Depression Mother     Asthma Mother     Depression Father     High Blood Pressure Father     Diabetes Father     Asthma Father     Depression Sister     Asthma Sister     Depression Brother     Asthma Brother     Asthma Maternal Aunt     Asthma Maternal Uncle     Asthma Paternal Aunt     Asthma Paternal Uncle     Asthma Maternal Grandmother     Cancer Maternal Grandmother     Asthma Maternal Grandfather     Asthma Paternal Grandmother     Asthma Paternal Grandfather     Asthma Maternal Cousin     Asthma Paternal Cousin        CareTeam (Including outside providers/suppliers regularly involved in providing care):   Patient Care Team:  COLTON Greenwood CNP as PCP - General (Certified Nurse Practitioner)  COLTON Greenwood CNP as PCP - [facility]  COLTON Greenwood CNP as Referring Physician (Certified Nurse Practitioner)  Alton Johnson MD (Psychiatry)  Carol Fair MD as Consulting Physician (Gastroenterology)  Zain Edmonds DO as Surgeon (Neurosurgery)  Maged Crespo MD as Consulting Physician (Dermatology)  Jodie Perez DO as Consulting Physician (Orthopedic Surgery)  Nancy Kuhn MD as Consulting Physician (Internal Medicine)  Britany Arevalo MD as Anesthesiologist (Anesthesiology)  Seble Dickson MD as Consulting Physician (Endocrinology)  Aric Saenz MD as Consulting Physician (Cardiology)    Wt Readings from Last 3 Encounters:   08/09/21 197 lb 3.2 oz (89.4 kg)   03/15/21 191 lb (86.6 kg)   02/25/21 191 lb 8 oz (86.9 kg)     Vitals:    08/09/21 0827   BP: 130/80   Site: Left Upper Arm   Position: Sitting   Cuff Size: Large Adult   Pulse: 81   SpO2: 99%   Weight: 197 lb 3.2 oz (89.4 kg)   Height: 5' 8\" (1.727 m)     Body mass index is 29.98 kg/m². Based upon direct observation of the patient, evaluation of cognition reveals recent and remote memory intact. Patient's complete Health Risk Assessment and screening values have been reviewed and are found in Flowsheets. The following problems were reviewed today and where indicated follow up appointments were made and/or referrals ordered. Positive Risk Factor Screenings with Interventions:     Fall Risk:  Timed Up and Go Test > 12 seconds? (Complete if either Fall Risk answers are Yes): no  2 or more falls in past year?: (!) yes  Fall with injury in past year?: (!) yes  Fall Risk Interventions:    · Home safety tips provided     Depression:  PHQ-2 Score: 5  PHQ-9 Total Score: 9    Severity:1-4 = minimal depression, 5-9 = mild depression, 10-14 = moderate depression, 15-19 = moderately severe depression, 20-27 = severe depression  Depression Interventions:  · Patient declines any further evaluation/treatment for this issue      General Health and ACP:  General  In general, how would you say your health is?: Fair  In the past 7 days, have you experienced any of the following?  New or Increased Pain, New or Increased Fatigue, Loneliness, Social Isolation, Stress or Anger?: (!) New or Increased Pain, Loneliness  Do you get the social and emotional support that you need?: Yes  Do you have a Living Will?: (!) No  Advance Directives     Power of  Living Will ACP-Advance Directive ACP-Power of     Not on File Not on File Not on File Not on File      General Health Risk Interventions:  · Pain issues: patient advised to follow-up in this office for further evaluation and treatment within 1 week(s)    Health Habits/Nutrition:  Health Habits/Nutrition  Do you exercise for at least 20 minutes 2-3 times per week?: Yes  Have you lost any weight without trying in the past 3 months?: No  Do you eat only one meal per day?: (!) Yes  Have you seen the dentist within the past year?: (!) No  Body mass index: (!) 29.98  Health Habits/Nutrition Interventions:  · Nutritional issues:  patient is not ready to address his/her nutritional/weight issues at this time     Wt Readings from Last 3 Encounters:   08/09/21 197 lb 3.2 oz (89.4 kg)   03/15/21 191 lb (86.6 kg)   02/25/21 191 lb 8 oz (86.9 kg)         Hearing/Vision:  No exam data present  Hearing/Vision  Do you or your family notice any trouble with your hearing that hasn't been managed with hearing aids?: (!) Yes  Do you have difficulty driving, watching TV, or doing any of your daily activities because of your eyesight?: No  Have you had an eye exam within the past year?: Yes  Hearing/Vision Interventions:  · Hearing concerns:  patient declines any further evaluation/treatment for hearing issues    Safety:  Safety  Do you have working smoke detectors?: Yes  Have all throw rugs been removed or fastened?: Yes  Do you have non-slip mats or surfaces in all bathtubs/showers?: (!) No  Do all of your stairways have a railing or banister?: Yes  Are your doorways, halls and stairs free of clutter?: Yes  Do you always fasten your seatbelt when you are in a car?: Yes  Safety Interventions:  · Home safety tips provided    ADL:  ADLs  In the past 7 days, did you need help from others to perform any of the following everyday activities? Eating, dressing, grooming, bathing, toileting, or walking/balance?: None  In the past 7 days, did you need help from others to take care of any of the following?  Laundry, housekeeping, banking/finances, shopping, telephone use, food preparation, transportation, or taking medications?: [employer], Transportation  ADL Interventions:  · Patient declines any further evaluation/treatment for this issue    Personalized Preventive Plan   Current Health Maintenance Status  Immunization History   Administered Date(s)

## 2021-08-10 ENCOUNTER — HOSPITAL ENCOUNTER (OUTPATIENT)
Facility: MEDICAL CENTER | Age: 56
End: 2021-08-10
Payer: MEDICARE

## 2021-08-10 RX ORDER — GABAPENTIN 600 MG/1
TABLET ORAL
Qty: 180 TABLET | Refills: 1 | Status: SHIPPED | OUTPATIENT
Start: 2021-08-10 | End: 2021-09-13 | Stop reason: SDUPTHER

## 2021-08-10 NOTE — TELEPHONE ENCOUNTER
Pharmacy sent a request for a new Gabapentin Rx.        Medication active on med list: yes      Date of last fill: 7/9/21 for #`[id number] NR  verified on 8/10/2021    verified by Palma Ching LPN      Date of last appointment: 3/15/2021    Next Visit Date:  9/13/2021

## 2021-08-13 ENCOUNTER — TELEPHONE (OUTPATIENT)
Dept: ONCOLOGY | Age: 56
End: 2021-08-13

## 2021-08-13 ENCOUNTER — VIRTUAL VISIT (OUTPATIENT)
Dept: ONCOLOGY | Age: 56
End: 2021-08-13
Payer: MEDICARE

## 2021-08-13 DIAGNOSIS — D50.9 IRON DEFICIENCY ANEMIA, UNSPECIFIED IRON DEFICIENCY ANEMIA TYPE: ICD-10-CM

## 2021-08-13 PROCEDURE — 99211 OFF/OP EST MAY X REQ PHY/QHP: CPT | Performed by: INTERNAL MEDICINE

## 2021-08-13 PROCEDURE — 99213 OFFICE O/P EST LOW 20 MIN: CPT | Performed by: INTERNAL MEDICINE

## 2021-08-13 NOTE — PROGRESS NOTES
DIAGNOSIS:   1. Anemia, malabsorption component  2. History of gastric bypass surgery  3. History of iron deficiency and B12 likely due to malabsorption    CURRENT THERAPY:  Plan for lab work and IV iron    BRIEF CASE HISTORY:   Simon Alejandro is a very pleasant 64 y.o. female who is referred to us for anemia. She reports she has long history of anemia and has received IV iron in the past and been on oral iron for the last 10 years. Her last iron infusion was 2014 at [facility]. She had gastric by-pass in 2012, she did have anemia prior but worsened following surgery. She does have fatigue, PICA, shortness of breath. She had colonoscopy 2019 which was negative, she does have chronic colonoscopy. She has history of B12 deficiency and takes oral supplementation. She has parathyroid issue and takes synthroid. She has multiple complications and is seen by several specialists including rheumatology, cardiology, ortho surg. She has scleroderma, osteoporosis, and Raynaud syndrome. Her scleroderma is diffused but currently well controlled. She has had both knees replacements twice. She was adopted and family history is unknown. Interim history  CBC and tthis is a virtual visit. The patient is doing very well. She received iron more than a year ago. Ferritin remains stable. She denies any fatigue or bleeding.     PAST MEDICAL HISTORY: has a past medical history of Acute kidney injury ([address] Anemia, Angioedema, Antinuclear antibody (IQRA) titer greater than 1:80, Anxiety, Asthma, Ataxia, Atrial fibrillation ([address] Borderline personality disorder ([address] Bradycardia, CAD (coronary artery disease), Chronic kidney disease, CKD (chronic kidney disease), stage II, COVID-19, Degenerative disc disease, thoracic, Depression, Diabetes mellitus ([address] Diastolic dysfunction, DJD (degenerative joint disease), Fibromyalgia, Foot pain, right, GERD (gastroesophageal reflux disease), H/O: hysterectomy, Headache, Hernia of abdominal wall, History of blood transfusion, Hyperlipidemia, Hypertension, Lupus ([address] Mood disorder ([address] Neuropathy, Osteoarthritis, PTSD (post-traumatic stress disorder), Pulmonary nodules, Raynaud's disease, Scleroderma ([address] Scleroderma ([address] Seizures ([address] Sleep walking disorder, Thyroid disease, TIA (transient ischemic attack), Transient insomnia, Tricuspid regurgitation, Vasospasm ([address] Vitamin D deficiency, and Wears glasses. PAST SURGICAL HISTORY: has a past surgical history that includes Hysterectomy; Cholecystectomy (1989); Tonsillectomy (1986); Carpal tunnel release (Right, 2016); Wrist surgery (2004); Gastric bypass surgery (2012); Abdominoplasty (2013); Elbow surgery (Right); Total knee arthroplasty (2011); knee surgery (Right, 05/02/2017); Total knee arthroplasty (Right, 5/2/2017); gastrectomy; Nerve Block (Right, 05/04/2018); Nerve Block (Right, 06/29/2018); eye surgery; pr anterior colporraphy rpr cystocele w/cysto (N/A, 7/30/2018); bladder suspension (N/A, 7/30/2018); Nerve Block (Right, 08/10/2018); joint replacement (Bilateral); Finger surgery (Right, 08/28/2018); pr office/outpt visit,procedure only (Right, 8/28/2018); pr revise median n/carpal tunnel surg (Left, 10/23/2018); Femur fracture surgery (11/09/2018); pr office/outpt visit,procedure only (Left, 11/9/2018); fracture surgery; Colonoscopy (N/A, 6/24/2019); Colonoscopy (N/A, 7/2/2019); and Carpal tunnel release (Right, 01/02/2020).      CURRENT MEDICATIONS:  has a current medication list which includes the following prescription(s): gabapentin, clonazepam, cetirizine, pravastatin, losartan, handicap placard, omeprazole, famotidine, hydroxychloroquine, sm aspirin adult low strength, proair hfa, aripiprazole, nifedipine, epipen 2-patty, cyclobenzaprine, blood glucose test strips, ibuprofen, vortioxetine, spironolactone, alendronate, albuterol sulfate hfa, ferrous sulfate, metformin, lamotrigine, felodipine, ergocalciferol, quetiapine, lamotrigine, levothyroxine, and therapeutic multivitamin-minerals. ALLERGIES:  is allergic to morphine, amlodipine, dilaudid [hydromorphone hcl], and sulfa antibiotics. FAMILY HISTORY: Negative for any hematological or oncological conditions. SOCIAL HISTORY:  reports that she has never smoked. She has never used smokeless tobacco. She reports previous alcohol use. She reports that she does not use drugs. REVIEW OF SYSTEMS:   General: no fever or night sweats, Weight is stable. +fatigue and PICA  ENT: No double or blurred vision, no tinnitus or hearing problem, no dysphagia or sore throat   Respiratory: No chest pain, no cough or hemoptysis. +shortness of breath  Cardiovascular: Denies chest pain, PND or orthopnea. No L E swelling or palpitations. Gastrointestinal:    No nausea or vomiting, abdominal pain, diarrhea or constipation. Genitourinary: Denies dysuria, hematuria, frequency, urgency or incontinence. Neurological: Denies headaches, decreased LOC, no sensory or motor focal deficits. Musculoskeletal:  No arthralgia no back pain or joint swelling. Skin: There are no rashes or bleeding. Psychiatric:  No anxiety, no depression. Endocrine: no diabetes or thyroid disease. Hematologic: no bleeding , no adenopathy. PHYSICAL EXAM: Shows a well appearing 64y.o.-year-old female who is not in pain or distress    PHYSICAL EXAMINATION:    Vital Signs: (As obtained by patient/caregiver or practitioner observation)    Blood pressure-  Heart rate-    Respiratory rate-    Temperature-  Pulse oximetry-     Constitutional: [x] Appears well-developed and well-nourished [x] No apparent distress      [] Abnormal-   Mental status  [x] Alert and awake  [x] Oriented to person/place/time [x]Able to follow commands      Eyes:  EOM    [x]  Normal  [] Abnormal-  Sclera  [x]  Normal  [] Abnormal -         Discharge [x]  None visible  [] Abnormal -    HENT:   [x] Normocephalic, atraumatic.   [] Abnormal   [x] Mouth/Throat: Mucous membranes are moist.     External Ears [x] Normal  [] Abnormal-     Neck: [x] No visualized mass     Pulmonary/Chest: [x] Respiratory effort normal.  [x] No visualized signs of difficulty breathing or respiratory distress        [] Abnormal-      Musculoskeletal:   [x] Normal gait with no signs of ataxia         [x] Normal range of motion of neck        [] Abnormal-       Neurological:        [x] No Facial Asymmetry (Cranial nerve 7 motor function) (limited exam to video visit)          [x] No gaze palsy        [] Abnormal-         Skin:        [x] No significant exanthematous lesions or discoloration noted on facial skin         [] Abnormal-            Psychiatric:       [x] Normal Affect [x] No Hallucinations        [] Abnormal-     Other pertinent observable physical exam findings-                            REVIEW OF LABORATORY DATA:   Lab Results   Component Value Date    WBC 3.2 (L) 08/06/2021    HGB 13.9 08/06/2021    HCT 40.6 08/06/2021    MCV 95.1 08/06/2021     08/06/2021       Chemistry        Component Value Date/Time     07/13/2021 0904    K 3.5 (L) 07/13/2021 0904     07/13/2021 0904    CO2 26 07/13/2021 0904    BUN 12 07/13/2021 0904    CREATININE 1.12 (H) 07/13/2021 0904        Component Value Date/Time    CALCIUM 9.4 07/13/2021 0905    ALKPHOS 170 (H) 01/18/2021 0915    AST 16 01/18/2021 0915    ALT 11 01/18/2021 0915    BILITOT 0.61 01/18/2021 0915        Lab Results   Component Value Date    IRON 99 08/06/2021    TIBC 261 08/06/2021    FERRITIN 100 08/06/2021         REVIEW OF RADIOLOGICAL RESULTS:     IMPRESSION:   1. Anemia, malabsorption component   2. Symptomatic   3. HX gastric bypass   4. Yanna received IV iron with excellent response    PLAN:   1.  The patient hemoglobin and ferritin remain normal.  We will continue observation at this point every 6 months and intervene with IV iron if she has further drop in hemoglobin or ferritin as the patient has

## 2021-08-13 NOTE — TELEPHONE ENCOUNTER
ROBIN LOGGED IN VIRTUAL VISIT  RV 6 MTHS W/ CDP FERRITIN PRIOR TO RV  LABS CDP FERRITIN ORDERS MAILED TO PT   MD VISIT 2/11/22 @ 3PM  AVS PRINTED W/ INSTRUCTIONS AND MAILED TO PT

## 2021-08-16 ENCOUNTER — OFFICE VISIT (OUTPATIENT)
Dept: FAMILY MEDICINE CLINIC | Age: 56
End: 2021-08-16
Payer: MEDICARE

## 2021-08-16 VITALS
DIASTOLIC BLOOD PRESSURE: 84 MMHG | OXYGEN SATURATION: 98 % | SYSTOLIC BLOOD PRESSURE: 130 MMHG | HEART RATE: 90 BPM | WEIGHT: 193 LBS | BODY MASS INDEX: 29.35 KG/M2

## 2021-08-16 DIAGNOSIS — E11.22 TYPE 2 DIABETES MELLITUS WITH STAGE 2 CHRONIC KIDNEY DISEASE, WITHOUT LONG-TERM CURRENT USE OF INSULIN (HCC): Primary | ICD-10-CM

## 2021-08-16 DIAGNOSIS — N18.2 TYPE 2 DIABETES MELLITUS WITH STAGE 2 CHRONIC KIDNEY DISEASE, WITHOUT LONG-TERM CURRENT USE OF INSULIN (HCC): Primary | ICD-10-CM

## 2021-08-16 PROCEDURE — 99213 OFFICE O/P EST LOW 20 MIN: CPT | Performed by: NURSE PRACTITIONER

## 2021-08-16 ASSESSMENT — ENCOUNTER SYMPTOMS
COUGH: 0
SHORTNESS OF BREATH: 0

## 2021-08-16 NOTE — PROGRESS NOTES
[address] [place]  [phone]    Stef Montero is a 64 y.o. female who presents today for her  medical conditions/complaints as noted below. Mia Martinez is c/o of Follow-up  . HPI:     HPI   Had eye exam in jan.   tsh per endo. Needs foot exam.   Most recent a1c is 5.1  Not getting covid vaccine. Nursing note reviewed and discussed with patient. Patient's medications, allergies, past medical, surgical, social and family histories were reviewed and updated asappropriate.     Current Outpatient Medications   Medication Sig Dispense Refill    gabapentin (NEURONTIN) 600 MG tablet TAKE 2 TABLETS BY MOUTH THREE TIMES A DAY  180 tablet 1    clonazePAM (KLONOPIN) 1 MG tablet TAKE 1 TABLET BY MOUTH THREE TIMES A DAY AS NEEDED      cetirizine (ZYRTEC) 10 MG tablet TAKE 1 TABLET BY MOUTH ONE TIME A DAY 90 tablet 1    pravastatin (PRAVACHOL) 40 MG tablet TAKE 1 TABLET BY MOUTH ONE TIME A DAY 90 tablet 1    losartan (COZAAR) 100 MG tablet TAKE 1 TABLET BY MOUTH ONE TIME A DAY 90 tablet 1    Handicap Placard MISC by Does not apply route Exp: 7/13/2026 1 each 0    omeprazole (PRILOSEC) 20 MG delayed release capsule TAKE 1 CAPSULE BY MOUTH TWO TIMES A DAY  180 capsule 1    famotidine (PEPCID) 40 MG tablet TAKE 1 TABLET BY MOUTH IN THE EVENING  90 tablet 1    hydroxychloroquine (PLAQUENIL) 200 MG tablet TAKE 1 TABLET BY MOUTH EVERY DAY  90 tablet 1    SM ASPIRIN ADULT LOW STRENGTH 81 MG EC tablet TAKE 1 TABLET BY MOUTH TWO TIMES A DAY  180 tablet 1    PROAIR  (90 Base) MCG/ACT inhaler INHALE 2 PUFFS BY MOUTH EVERY SIX HOURS AS NEEDED FOR WHEEZING 1 Inhaler 5    ARIPiprazole (ABILIFY) 2 MG tablet TAKE 1 TABLET BY MOUTH IN THE MORNING      NIFEdipine (ADALAT CC) 60 MG extended release tablet Take 90 mg by mouth daily       EPIPEN 2-JIGAR 0.3 MG/0.3ML SOAJ injection INJECT 1 PEN INTO THE MUSCLE ONCE AS NEEDED FOR UP TO 1 DOSE FOR ANGIOEDEMA. 2 each 5    cyclobenzaprine (FLEXERIL) 10 MG tablet TAKE 1 TABLET BY MOUTH ONE TIME A DAY AT NIGHT AS NEEDED FOR MUSCLE SPASM 90 tablet 1    blood glucose monitor strips Test 4 times a day & as needed for symptoms of irregular blood glucose. 100 strip 5    ibuprofen (ADVIL;MOTRIN) 800 MG tablet Take 1 tablet by mouth as needed For post op pain prescribed by surgeon after right CTR sx      VORTIoxetine (TRINTELLIX) 10 MG TABS tablet Take 20 mg by mouth daily       spironolactone (ALDACTONE) 25 MG tablet Take 25 mg by mouth daily      alendronate (FOSAMAX) 70 MG tablet Take 70 mg by mouth every 7 days      albuterol sulfate HFA (VENTOLIN HFA) 108 (90 Base) MCG/ACT inhaler Inhale 2 puffs into the lungs every 6 hours as needed for Wheezing 1 Inhaler 3    ferrous sulfate 325 (65 Fe) MG tablet TAKE 1 TABLET BY MOUTH TWO TIMES A DAY  60 tablet 2    metFORMIN (GLUCOPHAGE) 500 MG tablet Take 500 mg by mouth daily       lamoTRIgine (LAMICTAL) 25 MG tablet Take 25 mg by mouth 2 times daily       felodipine (PLENDIL) 10 MG extended release tablet Take 10 mg by mouth daily Prescribed by Dr. Haider Page ergocalciferol (ERGOCALCIFEROL) 17739 units capsule Take 50,000 Units by mouth See Admin Instructions Takes twice a week      QUEtiapine (SEROQUEL) 300 MG tablet Take 0.5 tablets by mouth nightly (Patient taking differently: Take 300 mg by mouth nightly Take 2 tablets at bedtime. 600 mg) 60 tablet 3    lamoTRIgine (LAMICTAL) 100 MG tablet Take 100 mg by mouth every evening       levothyroxine (SYNTHROID) 25 MCG tablet Take 25 mcg by mouth Daily      Multiple Vitamins-Minerals (THERAPEUTIC MULTIVITAMIN-MINERALS) tablet Take 1 tablet by mouth daily       No current facility-administered medications for this visit.      Past Medical History:   Diagnosis Date    Acute kidney injury ([address]     Anemia     Angioedema     Antinuclear antibody (IQRA) titer greater than 1:80     Anxiety     Asthma     Ataxia [address]  GASTRIC BYPASS SURGERY  2012    HYSTERECTOMY      JOINT REPLACEMENT Bilateral     knees    KNEE SURGERY Right 05/02/2017    (femoral) patella replacement    NERVE BLOCK Right 05/04/2018     cervical facet #1 no steroid    NERVE BLOCK Right 06/29/2018    rt cervical diagnostic #2 decadron 10mg    NERVE BLOCK Right 08/10/2018    right cervical rfa decadron 10mg    VA ANTERIOR COLPORRAPHY RPR CYSTOCELE W/CYSTO N/A 7/30/2018    CYSTOCELE REPAIR performed by Chencho Garcia DO at [facility] OFFICE/OUTPT [facility] Right 8/28/2018    RING CARTER MASS EXCISION, TRIGGER RELEASE, 3080 TABLE performed by Mary Chappell DO at [facility] OFFICE/OUTPT VISIT,PROCEDURE ONLY Left 11/9/2018    FEMUR RETROGRADE IM NAILING PERIPROSTHETIC FRACTURE performed by Mary Chappell DO at [address] N/CARPAL TUNNEL SURG Left 10/23/2018    CARPAL TUNNEL RELEASE performed by Mary Chappell DO at [address]  2011    left knee    TOTAL KNEE ARTHROPLASTY Right 5/2/2017    PATELLOFEMORAL REPLACEMENT KNEE - JAXSON, 3080 TABLE, FEMORAL POPLITEAL BLOCK, NSA= SPINAL VS GENERAL performed by Mary Chappell DO at [facility]  2004     Family History   Adopted: Yes   Problem Relation Age of Onset    Depression Mother     Asthma Mother     Depression Father     High Blood Pressure Father     Diabetes Father     Asthma Father     Depression Sister     Asthma Sister     Depression Brother     Asthma Brother     Asthma Maternal Aunt     Asthma Maternal Uncle     Asthma Paternal Aunt     Asthma Paternal Uncle     Asthma Maternal Grandmother     Cancer Maternal Grandmother     Asthma Maternal Grandfather     Asthma Paternal Grandmother     Asthma Paternal Grandfather     Asthma Maternal Cousin     Asthma Paternal Cousin      Social History     Tobacco Use    Smoking status: Never Smoker    Smokeless tobacco: Never Used   Substance Use Topics    Alcohol use: Not Currently      Allergies   Allergen Reactions    Morphine Nausea And Vomiting     Pt states it changes her mental status    Amlodipine Other (See Comments)     diarrhea and stress    Dilaudid [Hydromorphone Hcl] Hives and Itching    Sulfa Antibiotics Hives and Rash       Subjective:      Review of Systems   Constitutional: Negative for chills and fever. Respiratory: Negative for cough and shortness of breath. Cardiovascular: Negative for chest pain, palpitations and leg swelling. Other pertinent ROS in HPI  Objective:     /84 (Site: Left Upper Arm, Position: Sitting, Cuff Size: Large Adult)   Pulse 90   Wt 193 lb (87.5 kg)   SpO2 98%   BMI 29.35 kg/m²    Physical Exam  Constitutional:       Appearance: She is well-developed. HENT:      Right Ear: External ear normal.      Left Ear: External ear normal.      Nose: Nose normal.   Cardiovascular:      Rate and Rhythm: Normal rate and regular rhythm. Heart sounds: Normal heart sounds, S1 normal and S2 normal.   Pulmonary:      Effort: Pulmonary effort is normal. No respiratory distress. Breath sounds: Normal breath sounds. Musculoskeletal:         General: No deformity. Normal range of motion. Cervical back: Full passive range of motion without pain and normal range of motion. Skin:     General: Skin is warm and dry. Neurological:      Mental Status: She is alert and oriented to person, place, and time.           Visual inspection:  Deformity/amputation: absent  Skin lesions/pre-ulcerative calluses: absent  Edema: right- negative, left- negative    Sensory exam:  Monofilament sensation: normal  (minimum of 5 random plantar locations tested, avoiding callused areas - > 1 area with absence of sensation is + for neuropathy)    Plus at least one of the following:  Pulses: normal,   Pinprick: Intact  Has an emg [facility]    Assessment/PLAN   1. Type 2 diabetes mellitus with stage 2 chronic kidney disease, without long-term current use of insulin ([facility] [address]    -  DIABETES FOOT EXAM      RTO if symptoms worsen or fail to improve  Pt agreeable with plan      Patient given educational materials - see patientinstructions. Discussed use, benefit, and side effects of prescribed medications. All patient questions answered. Pt voiced understanding. Reviewed health maintenance. Instructed to continue current medications, diet andexercise. 1.  Mia received counseling on the following healthy behaviors: nutrition, exercise and medication adherence  2. Patient given educationalmaterials when available - see patient instructions when applicable  3. Discussed use, benefit, and side effects of prescribed medications. Barriersto medication compliance addressed. All patient questions answered. Pt voiced understanding. 4.  Reviewed prior labs and health maintenance when applicable. 5.  Continue current medications, diet and exercise. CompletedRefills   Requested Prescriptions      No prescriptions requested or ordered in this encounter       This note was transcribed using dictation with Dragon services. Efforts were made to correct any errors but some words may be misinterpreted.     Electronically signed by COLTON Orozco CNP, CNP on 8/16/2021 at 9:44 AM

## 2021-08-18 ENCOUNTER — TELEPHONE (OUTPATIENT)
Dept: PHARMACY | Facility: CLINIC | Age: 56
End: 2021-08-18

## 2021-08-18 NOTE — LETTER
REFILL REQUEST  Prescriber:  Wendy Stiles MD  [id number],[address] [place] [address]  Phone: [phone] Fax: [phone]     Patient:  Kelly Fay 1965  [address]  [phone] (home)        Patient is out of refills for metformin. Please approve/deny below request and send to the pharmacy. Patient prefers 90-day supplies to improve medication adherence. Medication: metformin 500 mg                 Sig: Take 1 tab po daily          #: 90 R: 1         Prescriber Response:    Prescription(s) approved (please [place] one):  YES  NO    Other response: ________________________________________      ______________________________________   __________________  Authorized By       Date     Pharmacy:   [id number]   [address] [facility]   Phone: [phone] Fax: [phone]     The information transmitted is intended only for the person or entity to which it is addressed and may contain confidential and/or privileged material. Any review, retransmission, dissemination or other use of, or taking of any action in reliance upon, this information by persons or entities other than the intended recipient is prohibited. This document contains information covered under the Privacy Act, 5 (a), and/or the [employer] and Accountability Act ([address]) and its various implementing regulations and must be protected in accordance with those provisions. If you received this in error, please contact the sender and delete the material from any computer.

## 2021-08-18 NOTE — TELEPHONE ENCOUNTER
For Pharmacy Admin Tracking Only     CPA in place:  No   Recommendation Provided To: Provider: 1 via Note to Provider   Intervention Detail: Adherence Monitorin and New Rx: 1, reason: Improve Adherence   Gap Closed?: Yes    Intervention Accepted By: Provider: 1   Time Spent (min): 20
Will prepare and send fax refill request for metformin to Dr. Tianna Fischer today. Will sign off.      Michelle Do, PharmD, [place] // Department, toll free [phone], option 1
through [employer]   Component Value Date    CHOL 229 (H) 01/18/2021    TRIG 101 01/18/2021     01/18/2021    LDLCHOLESTEROL 93 01/18/2021     ALT   Date Value Ref Range Status   01/18/2021 11 5 - 33 U/L Final     AST   Date Value Ref Range Status   01/18/2021 16 <32 U/L Final     The ASCVD Risk score (Bette Glaser, et al., 2013) failed to calculate for the following reasons: The patient has a prior MI or stroke diagnosis     PLAN  The following are interventions that have been identified:   - Patient needs refills for Metformin     No patient out reach planned at this time. Patient appears to be adherent and filling a 90ds  But will need a new rx on file for Metformin.  Sending this encounter to a pharmacist to request a new 90 day rx    Future Appointments   Date Time Provider Sylvester Collins   8/31/2021 10:10 AM Reanna Burnett MD [facility] None   9/7/2021  9:00 AM [facility] Radiolog   9/13/2021  9:40 AM COLTON Otoole CNP Neuro Spec [facility]   10/11/2021  4:00 PM Isabella Lazo MD P[facility] Rehab [facility]   11/17/2021  9:45 AM Perico [place], APRN - CNP [facility]   2/11/2022  3:00 PM Sekou Rankin MD [facility]   8/10/2022  8:30 AM Perico [place], APRN - CNP [facility] [place] [address] Specialist   [address] [place] [address]  // Department, toll free [phone], Option 2  Direct: [phone]  Department, toll free [phone], option 7

## 2021-08-31 ENCOUNTER — HOSPITAL ENCOUNTER (OUTPATIENT)
Age: 56
Discharge: HOME | End: 2021-08-31
Payer: MEDICARE

## 2021-08-31 DIAGNOSIS — E87.6 HYPOKALEMIA: ICD-10-CM

## 2021-08-31 DIAGNOSIS — N18.31 STAGE 3A CHRONIC KIDNEY DISEASE (HCC): ICD-10-CM

## 2021-08-31 LAB
ANION GAP SERPL CALCULATED.3IONS-SCNC: 16 MMOL/L (ref 9–17)
BUN BLDV-MCNC: 12 MG/DL (ref 6–20)
BUN/CREAT BLD: ABNORMAL (ref 9–20)
CALCIUM SERPL-MCNC: 9.5 MG/DL (ref 8.6–10.4)
CHLORIDE BLD-SCNC: 103 MMOL/L (ref 98–107)
CO2: 22 MMOL/L (ref 20–31)
CREAT SERPL-MCNC: 1.18 MG/DL (ref 0.5–0.9)
GFR AFRICAN AMERICAN: 57 ML/MIN
GFR NON-AFRICAN AMERICAN: 47 ML/MIN
GFR SERPL CREATININE-BSD FRML MDRD: ABNORMAL ML/MIN/{1.73_M2}
GFR SERPL CREATININE-BSD FRML MDRD: ABNORMAL ML/MIN/{1.73_M2}
GLUCOSE BLD-MCNC: 79 MG/DL (ref 70–99)
POTASSIUM SERPL-SCNC: 3.7 MMOL/L (ref 3.7–5.3)
SODIUM BLD-SCNC: 141 MMOL/L (ref 135–144)

## 2021-08-31 PROCEDURE — 80048 BASIC METABOLIC PNL TOTAL CA: CPT

## 2021-09-07 ENCOUNTER — HOSPITAL ENCOUNTER (OUTPATIENT)
Dept: WOMENS IMAGING | Age: 56
Discharge: HOME | End: 2021-09-09
Payer: MEDICARE

## 2021-09-07 DIAGNOSIS — M81.0 OSTEOPOROSIS, SENILE: ICD-10-CM

## 2021-09-07 PROCEDURE — 77080 DXA BONE DENSITY AXIAL: CPT

## 2021-09-13 ENCOUNTER — OFFICE VISIT (OUTPATIENT)
Dept: NEUROLOGY | Age: 56
End: 2021-09-13
Payer: MEDICARE

## 2021-09-13 VITALS
HEART RATE: 82 BPM | WEIGHT: 198 LBS | HEIGHT: 68 IN | SYSTOLIC BLOOD PRESSURE: 163 MMHG | BODY MASS INDEX: 30.01 KG/M2 | DIASTOLIC BLOOD PRESSURE: 108 MMHG

## 2021-09-13 DIAGNOSIS — M48.02 NEURAL FORAMINAL STENOSIS OF CERVICAL SPINE: ICD-10-CM

## 2021-09-13 DIAGNOSIS — G62.9 NEUROPATHY: ICD-10-CM

## 2021-09-13 DIAGNOSIS — M25.551 HIP PAIN, BILATERAL: Primary | ICD-10-CM

## 2021-09-13 DIAGNOSIS — M25.552 HIP PAIN, BILATERAL: Primary | ICD-10-CM

## 2021-09-13 PROCEDURE — 99214 OFFICE O/P EST MOD 30 MIN: CPT | Performed by: NURSE PRACTITIONER

## 2021-09-13 RX ORDER — GABAPENTIN 600 MG/1
TABLET ORAL
Qty: 180 TABLET | Refills: 11 | Status: SHIPPED | OUTPATIENT
Start: 2021-09-13 | End: 2022-08-30 | Stop reason: SDUPTHER

## 2021-09-13 NOTE — PROGRESS NOTES
[facility]            [address]          [place], [address] [facility]          Dept: [phone]          Dept Fax: [phone]        MD Demond Aaron MD Hudson Nipple, MD Caretha Prior, CNP            9/13/2021      HISTORY OF PRESENT ILLNESS:       I had the pleasure of seeing Nirmala Avila, who returns for continuing neurologic care. The patient was seen last on March 15, 2021 for treatment of a chronic cervical radiculopathy and diminished sensation in her bilateral feet. For management of her cervical radiculopathy she is prescribed gabapentin 1200 mg three times daily. She has remained compliant with gabapentin and has noticed minimal improvement. She notes she sees a chiropractor for management of her neck pain which is effective. She denies any paresthesias in her bilateral upper extremities. For management of the diminished sensation in her bilateral lower extremities an EMG was ordered at her last visit however it was not completed prior to today's visit. She is here today reporting she has her EMG scheduled for October 11, 2021. Testing reviewed:    -MRI cervical spine 4/2/18  Impression   1. Mild degenerative disc disease at C5-C6 with mild spinal canal stenosis. Severe right neural foraminal stenosis at this level. 2. Minimal spinal canal stenosis at C3-C4 and C4-C5.                    - MRI brain 7/2/18  pression   1. No ictal focus is identified. 2. Trace chronic microvascular white matter ischemic disease.    3. There is a partially empty sella.  Correlate with clinical signs/symptoms   of endocrine dysfunction as well as serum laboratory values.      -EMG lower extremities 10/2/18  This is a normal electrophysiological study. Edmond Vladimir is no evidence of large fiber sensory neuropathy, lumbosacral plexopathy or radiculopathy.  If clinically indicated, a skin punch biopsy may be obtained to rule out small fiber neuropathy.  Clinical correlation is recommended.           PAST MEDICAL HISTORY:         Diagnosis Date    Acute kidney injury ([address]     Anemia     Angioedema     Antinuclear antibody (IQRA) titer greater than 1:80     Anxiety     Asthma     Ataxia     Atrial fibrillation (HCC)     Borderline personality disorder (HCC)     Bradycardia     CAD (coronary artery disease)     Chronic kidney disease     CKD (chronic kidney disease), stage II 8/23/2018    COVID-19 01/01/2021    Degenerative disc disease, thoracic     Depression     Diabetes mellitus (HCC)     Diastolic dysfunction     DJD (degenerative joint disease)     Fibromyalgia     Foot pain, right     GERD (gastroesophageal reflux disease)     H/O: hysterectomy     Headache     Hernia of abdominal wall     History of blood transfusion     no problem    Hyperlipidemia     Hypertension     Lupus (HCC)     Mood disorder (HCC)     Neuropathy     Osteoarthritis     PTSD (post-traumatic stress disorder)     Pulmonary nodules     Raynaud's disease     Scleroderma ([address]     Scleroderma ([address] 8/23/2018    Seizures ([facility])     Sleep walking disorder     Thyroid disease     TIA (transient ischemic attack)     Transient insomnia     Tricuspid regurgitation     Vasospasm ([address] 01/2016    bilat. legs. resulting in near complete absence of profusion to arteries of feet. ([facility]     Vitamin D deficiency 8/23/2018    Wears glasses         PAST SURGICAL HISTORY:         Procedure Laterality Date    ABDOMINOPLASTY  2013    BLADDER SUSPENSION N/A 7/30/2018    CYSTOSCOPY WITH OBTRYX MID URETHRAL SLING performed by Sandip Willingham MD at [facility] Right 2016    CARPAL TUNNEL RELEASE Right 01/02/2020    CHOLECYSTECTOMY  1989    COLONOSCOPY N/A 6/24/2019    COLORECTAL CANCER SCREENING, NOT HIGH RISK performed by Nelson Hamilton MD at [address] [place] [address] N/A 7/2/2019    COLORECTAL CANCER SCREENING, NOT HIGH RISK performed by Jonnie Brody MD at [address] [employer] Right     EYE SURGERY      khushboo. lasik     FEMUR FRACTURE SURGERY  11/09/2018    FINGER SURGERY Right 08/28/2018    RING CARTER MASS EXCISION, TRIGGER RELEASE    FRACTURE SURGERY      left femur    GASTRECTOMY      GASTRIC BYPASS SURGERY  2012    HYSTERECTOMY      JOINT REPLACEMENT Bilateral     knees    KNEE SURGERY Right 05/02/2017    (femoral) patella replacement    NERVE BLOCK Right 05/04/2018     cervical facet #1 no steroid    NERVE BLOCK Right 06/29/2018    rt cervical diagnostic #2 decadron 10mg    NERVE BLOCK Right 08/10/2018    right cervical rfa decadron 10mg    LA ANTERIOR COLPORRAPHY RPR CYSTOCELE W/CYSTO N/A 7/30/2018    CYSTOCELE REPAIR performed by Graeme Mark DO at [address] [facility] OFFICE/OUTPT [facility] Right 8/28/2018    RING CARTER MASS EXCISION, TRIGGER RELEASE, [address] TABLE performed by Aren Mason DO at [address] [facility] OFFICE/OUTPT VISIT,PROCEDURE ONLY Left 11/9/2018    FEMUR RETROGRADE IM NAILING PERIPROSTHETIC FRACTURE performed by Aren Mason DO at [address] N/CARPAL TUNNEL SURG Left 10/23/2018    CARPAL TUNNEL RELEASE [facility]  [facility]    left knee    TOTAL KNEE ARTHROPLASTY Right 5/2/2017    PATELLOFEMORAL REPLACEMENT KNEE - Trevin Anderson, [address] TABLE, FEMORAL POPLITEAL BLOCK, NSA= SPINAL VS GENERAL performed by Aren Mason DO at [facility]  2004        SOCIAL HISTORY:     Social History     Socioeconomic History    Marital status:       Spouse name: Not on file    Number of children: Not on file    Years of education: Not on file    Highest education level: Not on file   Occupational History    Not on file   Tobacco Use    Smoking status: Never Smoker    Smokeless tobacco: Never Used   Vaping Use    Vaping Use: Never used   Substance and Sexual Activity    Alcohol use: Not Currently    Drug use: No    Sexual activity: Not Currently     Birth control/protection: Surgical     Comment: pt has hysterectomy   Other Topics Concern    Not on file   Social History Narrative    Not on file     Social Determinants of Health     Financial Resource Strain: Low Risk     Difficulty of Paying Living Expenses: Not hard at all   Food Insecurity: No Food Insecurity    Worried About Running Out of Food in the Last Year: Never true    [address] in the Last Year: Never true   Transportation Needs:     Lack of Transportation (Medical):      Lack of Transportation (Non-Medical):    Physical Activity:     Days of Exercise per Week:     Minutes of Exercise per Session:    Stress:     Feeling of Stress :    Social Connections:     Frequency of Communication with Friends and Family:     Frequency of Social Gatherings with Friends and Family:     Attends Orthodoxy Services:     Active Member of Clubs or Organizations:     Attends Club or Organization Meetings:     Marital Status:    Intimate Partner Violence:     Fear of Current or Ex-Partner:     Emotionally Abused:     Physically Abused:     Sexually Abused:        CURRENT MEDICATIONS:     Current Outpatient Medications   Medication Sig Dispense Refill    gabapentin (NEURONTIN) 600 MG tablet TAKE 2 TABLETS BY MOUTH THREE TIMES A  tablet 11    clonazePAM (KLONOPIN) 1 MG tablet TAKE 1 TABLET BY MOUTH THREE TIMES A DAY AS NEEDED      cetirizine (ZYRTEC) 10 MG tablet TAKE 1 TABLET BY MOUTH ONE TIME A DAY 90 tablet 1    pravastatin (PRAVACHOL) 40 MG tablet TAKE 1 TABLET BY MOUTH ONE TIME A DAY 90 tablet 1    losartan (COZAAR) 100 MG tablet TAKE 1 TABLET BY MOUTH ONE TIME A DAY 90 tablet 1    Handicap Placard MISC by Does not apply route Exp: 7/13/2026 1 each 0    omeprazole (PRILOSEC) 20 MG delayed release capsule TAKE 1 CAPSULE BY MOUTH TWO TIMES A DAY  180 capsule 1    famotidine (PEPCID) 40 MG tablet TAKE 1 TABLET BY MOUTH IN THE EVENING  90 tablet 1    hydroxychloroquine (PLAQUENIL) 200 MG tablet TAKE 1 TABLET BY MOUTH EVERY DAY  90 tablet 1    SM ASPIRIN ADULT LOW STRENGTH 81 MG EC tablet TAKE 1 TABLET BY MOUTH TWO TIMES A DAY  180 tablet 1    PROAIR  (90 Base) MCG/ACT inhaler INHALE 2 PUFFS BY MOUTH EVERY SIX HOURS AS NEEDED FOR WHEEZING 1 Inhaler 5    ARIPiprazole (ABILIFY) 2 MG tablet Take 5 mg by mouth daily       NIFEdipine (ADALAT CC) 60 MG extended release tablet Take 90 mg by mouth daily       EPIPEN 2-JIGAR 0.3 MG/0.3ML SOAJ injection INJECT 1 PEN INTO THE MUSCLE ONCE AS NEEDED FOR UP TO 1 DOSE FOR ANGIOEDEMA. 2 each 5    cyclobenzaprine (FLEXERIL) 10 MG tablet TAKE 1 TABLET BY MOUTH ONE TIME A DAY AT NIGHT AS NEEDED FOR MUSCLE SPASM 90 tablet 1    blood glucose monitor strips Test 4 times a day & as needed for symptoms of irregular blood glucose.  100 strip 5    VORTIoxetine (TRINTELLIX) 10 MG TABS tablet Take 20 mg by mouth daily       spironolactone (ALDACTONE) 25 MG tablet Take 25 mg by mouth daily      alendronate (FOSAMAX) 70 MG tablet Take 70 mg by mouth every 7 days      albuterol sulfate HFA (VENTOLIN HFA) 108 (90 Base) MCG/ACT inhaler Inhale 2 puffs into the lungs every 6 hours as needed for Wheezing 1 Inhaler 3    ferrous sulfate 325 (65 Fe) MG tablet TAKE 1 TABLET BY MOUTH TWO TIMES A DAY  60 tablet 2    metFORMIN (GLUCOPHAGE) 500 MG tablet Take 500 mg by mouth daily       lamoTRIgine (LAMICTAL) 25 MG tablet Take 25 mg by mouth 2 times daily       felodipine (PLENDIL) 10 MG extended release tablet Take 10 mg by mouth daily Prescribed by Dr. Carolyn Reyes ergocalciferol (ERGOCALCIFEROL) 71835 units capsule Take 50,000 Units by mouth See Admin Instructions Takes twice a week      QUEtiapine (SEROQUEL) 300 MG tablet Take 0.5 tablets by mouth nightly (Patient taking differently: Take 300 mg by mouth nightly Take 2 tablets at bedtime. 600 mg) 60 tablet 3    lamoTRIgine (LAMICTAL) 100 MG tablet Take 100 mg by mouth every evening       levothyroxine (SYNTHROID) 25 MCG tablet Take 25 mcg by mouth Daily      Multiple Vitamins-Minerals (THERAPEUTIC MULTIVITAMIN-MINERALS) tablet Take 1 tablet by mouth daily      ibuprofen (ADVIL;MOTRIN) 800 MG tablet Take 1 tablet by mouth as needed For post op pain prescribed by surgeon after right CTR sx (Patient not taking: Reported on 8/31/2021)       No current facility-administered medications for this visit. ALLERGIES:     Allergies   Allergen Reactions    Morphine Nausea And Vomiting     Pt states it changes her mental status    Amlodipine Other (See Comments)     diarrhea and stress    Dilaudid [Hydromorphone Hcl] Hives and Itching    Sulfa Antibiotics Hives and Rash                                 REVIEW OF SYSTEMS        All items selected indicate a positive finding. Those items not selected are negative.   Constitutional [] Weight loss/gain   [] Fatigue  [] Fever/Chills   HEENT [] Hearing Loss  [] Visual Disturbance  [] Tinnitus  [] Eye pain   Respiratory [] Shortness of Breath  [] Cough  [] Snoring   Cardiovascular [] Chest Pain  [] Palpitations  [] Lightheaded   GI [] Constipation  [] Diarrhea  [] Swallowing change  [] Nausea/vomiting    [] Urinary Frequency  [] Urinary Urgency   Musculoskeletal [] Neck pain  [] Back pain  [] Muscle pain  [] Restless legs   Dermatologic [] Skin changes   Neurologic [] Memory loss/confusion  [] Seizures  [x] Trouble walking or imbalance  [] Dizziness  [] Sleep disturbance  [] Weakness  [x] Numbness  [] Tremors  [] Speech Difficulty  [] Headaches  [] Light Sensitivity  [] Sound Sensitivity   Endocrinology []Excessive thirst  []Excessive hunger   Psychiatric [] Anxiety/Depression  [] Hallucination   Allergy/immunology []Hives/environmental allergies   Hematologic/lymph [] Abnormal bleeding  [] Abnormal bruising PHYSICAL EXAMINATION:       Vitals:    09/13/21 0945   BP: (!) 163/108   Pulse: 82                                              .                                                                                                    General Appearance:  Alert, cooperative, no signs of distress, appears stated age   Head:  Normocephalic, no signs of trauma   Eyes:  Conjunctiva/corneas clear;  eyelids intact   Ears:  Normal external ear and canals   Nose: Nares normal, mucosa normal, no drainage    Throat: Lips and tongue normal; teeth normal;  gums normal   Neck: Supple, intact flexion, extension and rotation;   trachea midline;  no adenopathy;   thyroid: not enlarged;   no carotid pulse abnormality   Back:   Symmetric, no curvature, ROM adequate   Lungs:   Respirations unlabored   Heart:  Regular rate and rhythm           Extremities: Extremities normal, no cyanosis, no edema   Pulses: Symmetric over head and neck   Skin: Skin color, texture normal, no rashes, no lesions                                     NEUROLOGIC EXAMINATION    Neurologic Exam  Mental status    Alert and oriented x 3; intact memory with no confusion, speech or language problems; no hallucinations or delusions  Fund of information appropriate for level of education    Cranial nerves    II - visual fields intact to confrontation bilaterally  III, IV, VI - extra-ocular muscles full: no pupillary defect; no LYNSEY, no nystagmus, no ptosis   V - normal facial sensation                                                               VII - normal facial symmetry                                                             VIII - intact hearing                                                                             IX, X - symmetrical palate                                                                  XI - symmetrical shoulder shrug                                                       XII - tongue midline without atrophy or fasciculation Motor function  Normal muscle bulk and tone; strength 5/5 on all 4 extremities, no pronator drift      Sensory function Mildly diminished temperature and pinprick sensation to the bilateral ankles. Cerebellar Intact fine motor movement. No involuntary movements or tremors. No ataxia or dysmetria on finger to nose or heel to shin testing      Reflex function DTR 2+ on bilateral UE and LE, symmetric. Negative Babinski      Gait                   normal base and arm swing                  Medical Decision Making: In summary, your patient, Yolis Ji exhibits the following, with associated plan:    Chronic cervical radiculopathy with neck pain radiating into the right shoulder. The patient displays mildly diminished temperature and pinprick sensation to the bilateral ankles. Continue gabapentin 1200 mg three times daily  New onset pain when walking radiating into her bilateral hips  Referral made to Dr. Nabil Garcia, orthopedics, at today's visit  Diminished sensation in her feet bilaterally which is likely associated with a peripheral neuropathy.  She also has gait instability associated with her symptoms. Obtain previously ordered EMG/NCV studies of bilateral lower extremities, patient has an appointment scheduled for October 2021  Consider Cymbalta if the patient is able to stop some psych medications. Return in 6 months for reevaluation                  Signed: Roberto Rojas CNP      *Please note that portions of this note were completed with a voice recognition program.  Although every effort was made to insure the accuracy of this automated transcription, some errors in transcription may have occurred, occasionally words and are mis-transcribed    Provider Attestation: The documentation recorded by the scribe accurately reflects the service I personally performed and the decisions made by myself. Portions of this note were transcribed by a scribe.  I personally performed the history, physical exam, and medical decision-making and confirm the accuracy of the information in the transcribed note. Scribe Attestation:   By signing my name below, Carl Savers, attest that this documentation has been prepared under the direction and in the presence of Magen Tiwari CNP.

## 2021-09-28 ENCOUNTER — NURSE ONLY (OUTPATIENT)
Dept: FAMILY MEDICINE CLINIC | Age: 56
End: 2021-09-28
Payer: MEDICARE

## 2021-09-28 ENCOUNTER — TELEPHONE (OUTPATIENT)
Dept: FAMILY MEDICINE CLINIC | Age: 56
End: 2021-09-28

## 2021-09-28 DIAGNOSIS — Z23 NEED FOR INFLUENZA VACCINATION: Primary | ICD-10-CM

## 2021-09-28 DIAGNOSIS — Z12.31 ENCOUNTER FOR SCREENING MAMMOGRAM FOR BREAST CANCER: Primary | ICD-10-CM

## 2021-09-28 PROCEDURE — 90674 CCIIV4 VAC NO PRSV 0.5 ML IM: CPT | Performed by: INTERNAL MEDICINE

## 2021-09-28 PROCEDURE — G0008 ADMIN INFLUENZA VIRUS VAC: HCPCS | Performed by: INTERNAL MEDICINE

## 2021-09-28 NOTE — PROGRESS NOTES
Vaccine Information Sheet, \"Influenza - Inactivated\"  given to Namita Cristobal, or parent/legal guardian of  Namita Cristobal and verbalized understanding. Patient responses:    Have you ever had a reaction to a flu vaccine? No  Do you have any current illness? No  Have you ever had [place] Syndrome? No  Do you have a serious allergy to any of the following: Neomycin, Polymyxin, Thimerosal, eggs or egg products? No    Flu vaccine given per order. Please see immunization tab. Risks and benefits explained. Current VIS given.

## 2021-10-01 DIAGNOSIS — M25.551 BILATERAL HIP PAIN: Primary | ICD-10-CM

## 2021-10-01 DIAGNOSIS — M25.552 BILATERAL HIP PAIN: Primary | ICD-10-CM

## 2021-10-04 ENCOUNTER — OFFICE VISIT (OUTPATIENT)
Dept: ORTHOPEDIC SURGERY | Age: 56
End: 2021-10-04
Payer: MEDICARE

## 2021-10-04 VITALS — BODY MASS INDEX: 30.19 KG/M2 | HEIGHT: 68 IN | WEIGHT: 199.2 LBS

## 2021-10-04 DIAGNOSIS — M70.62 TROCHANTERIC BURSITIS OF LEFT HIP: Primary | ICD-10-CM

## 2021-10-04 DIAGNOSIS — M70.61 TROCHANTERIC BURSITIS OF RIGHT HIP: ICD-10-CM

## 2021-10-04 PROCEDURE — 99213 OFFICE O/P EST LOW 20 MIN: CPT | Performed by: ORTHOPAEDIC SURGERY

## 2021-10-04 RX ORDER — IBUPROFEN 800 MG/1
800 TABLET ORAL EVERY 8 HOURS PRN
Qty: 90 TABLET | Refills: 2 | Status: SHIPPED | OUTPATIENT
Start: 2021-10-04 | End: 2022-03-11 | Stop reason: SDUPTHER

## 2021-10-04 ASSESSMENT — ENCOUNTER SYMPTOMS
CHEST TIGHTNESS: 0
VOMITING: 0
APNEA: 0
COUGH: 0
ABDOMINAL PAIN: 0

## 2021-10-04 NOTE — PROGRESS NOTES
[facility]  [facility]  [address] [facility] [address]  Dept: [phone]  Dept Fax: [phone]        Ambulatory Follow Up/ New Issue      Subjective:   Lito Tsang is a 64y.o. year old female who presents to our office today for routine followup regarding her   1. Trochanteric bursitis of left hip    2. Trochanteric bursitis of right hip    . Chief Complaint   Patient presents with    Hip Pain     bilateral       HPI- Mia Camarena presents with a 1 months history of pain in the bilateral hip. The pain does not radiate into the thigh and into the groin. The pain is   made worse with weightbearing. The pain is  worse at night. The pain is  worse when laying on the affected side. The pain is made better with resting and activity modification. The patient notes that her hips are tender to touch. She cannot walk long distances. She has not yet had treatment to the hips. The patient is very active. Review of Systems   Constitutional: Negative for chills and fever. Respiratory: Negative for apnea, cough and chest tightness. Cardiovascular: Negative for chest pain and palpitations. Gastrointestinal: Negative for abdominal pain and vomiting. Genitourinary: Negative for difficulty urinating. Musculoskeletal: Positive for arthralgias (bilateral hips). Negative for gait problem, joint swelling and myalgias. Neurological: Negative for dizziness, weakness and numbness. I have reviewed the CC, HPI, ROS, PMH, FHX, Social History, and if not present in this note, I have reviewed in the patient's chart. I agree with the documentation provided by other staff and have reviewed their documentation prior to providing my signature indicating agreement. Objective :   Ht 5' 8\" (1.727 m)   Wt 199 lb 3.2 oz (90.4 kg)   BMI 30.29 kg/m²  Body mass index is 30.29 kg/m².   General: Lito Tsang is a 64 y.o. female who is alert and oriented and sitting comfortably in our office. Ortho Exam  MS:  Evaluation of the Bilateral  hip reveals no outward deformity. Patient ambulates with minimal shortening weightbearing phase to the  Bilateral hip. Negative hip log roll and Stinchfield test is appreciated on the affected side. Increased pain with palpation of the greater trochanteric region of the Bilateral  hip and posterior to the greater trochanteric region is appreciated. Increased pain with piriformis stretch of the hip is noted. Motor, sensory, vascular examination to the affected lower extremity is grossly intact without focal deficits. Patient has full range of motion of the lumbosacral spine and the knee on the affected side. Neuro: alert and oriented to person and place. Eyes: Extra-ocular muscles intact  Mouth: Oral mucosa moist. No perioral lesions  Pulm: Respirations unlabored and regular. Symmetric chest excursion without outward deformity is noted. Skin: warm, well perfused  Psych:   Patient has good fund of knowledge and displays understanging of exam, diagnosis, and plan. Radiology:     XR HIP LEFT (2-3 VIEWS)    Result Date: 10/4/2021  History: Left hip pain Findings: AP left hip, low AP pelvis, frog-leg lateral x-ray of the left hip done the office today reveals minimal joint space narrowing. No evidence of fracture, subluxation, dislocation, radiopaque foreign body radiopaque tumors noted. Hardware from intramedullary fixation of distal femur fracture is appreciated in good position without complications. Impression: Left hip minimal degenerative changes as described above. XR HIP RIGHT (2-3 VIEWS)    Result Date: 10/4/2021  History: Right hip pain Findings: Low AP pelvis, AP of the right hip, frog-leg lateral x-ray of the right hip done in the office today reveals minimal joint space narrowing. No evidence of fracture, subluxation, dislocation, radiopaque foreign body, radiopaque tumors noted.  Impression: Minimal right hip degenerative changes as described above. DEXA BONE DENSITY AXIAL SKELETON    Result Date: 9/7/2021  EXAMINATION: BONE DENSITOMETRY 9/7/2021 7:44 am TECHNIQUE: A bone density dual x-ray absorptiometry (DEXA) scan was performed of the lumbar spine and left hip  on a [employer] system. COMPARISON: April 22, 2019 and November 17, 2017. HISTORY: ORDERING SYSTEM PROVIDED HISTORY: Osteoporosis, senile FINDINGS: LUMBAR SPINE: The bone mineral density in the lumbar spine including the L1-L4 (L2) levels is measured at 1.095 g/cm2, which corresponds to a T-score of -0.7 and a Z-score of -0.7. This is within the normal range by WHO criteria. This suggests statistically significant change since previous of -8.8%. RIGHT HIP: The bone mineral density in the total hip is measured at 0.763 g/cm2 corresponding to a T-score of -1.9 and a Z-score of -1.8. This is within the osteopenia range by WHO criteria. The bone mineral density of the femoral neck is measured at 0.698 g/cm2 corresponding to a T-score of -2.4 and a Z-score of -1.9. This is within the osteopenia range by WHO criteria. FRAX score for estimated 10 year risk of osteoporotic fracture is 13.5%, and in the hip 2.9%. By [facility] criteria the patient's bone mineral density is classified as osteopenia. Assessment:      1. Trochanteric bursitis of left hip    2. Trochanteric bursitis of right hip       Plan:      Reviewed x-rays with the patient today in the office. Discussed etiology and natural history of bilateral hip bursitis. The treatment options may include oral anti-inflammatories, bracing, injections, advanced imaging, activity modification, physical therapy and/or surgical intervention. The patient would like to proceed with formal therapy and ibuprofen. The patient will follow up in 6-8 weeks.  If not better at next visit can discuss corticosteroid injections to the bursas  We discussed that the patient should call us with any concerns or

## 2021-10-11 ENCOUNTER — HOSPITAL ENCOUNTER (OUTPATIENT)
Dept: PHYSICAL THERAPY | Facility: CLINIC | Age: 56
Setting detail: THERAPIES SERIES
Discharge: HOME | End: 2021-10-11
Payer: MEDICARE

## 2021-10-11 NOTE — FLOWSHEET NOTE
[] [facility]) [facility]  [address]    [phone]  F: [phone]   [x] [address]  [facility] [address]   [address]  P: [phone]  F: [phone]  [] [address]  [address]  P: [phone]  F: [phone] [] [address]  P: [phone]  F: [phone]  [] [address]  [address] [facility] [address]   [address]   [place]: [phone]  F: [phone]   [] [place] [address] [facility] [address]  [place]: [phone]   F: [phone]     Physical Therapy Cancel/No Show note    Date: 10/11/2021  Patient: Gus Powell  : 1965  MRN: [id number]    Cancels/No Shows to date:     For today's appointment patient:    [x]  Cancelled    [] Rescheduled appointment    [] No-show     Reason given by patient:    []  Patient ill    []  Conflicting appointment    [] No transportation      [] Conflict with work    [] No reason given    [] Weather related    [x] COVID-19- exposure    [] Other:      Comments:      Patient will call to reschedule when able.     Electronically signed by: Blossom Hall PT

## 2021-10-14 ENCOUNTER — HOSPITAL ENCOUNTER (OUTPATIENT)
Age: 56
Setting detail: SPECIMEN
Discharge: HOME | End: 2021-10-14
Payer: MEDICARE

## 2021-10-14 LAB
ALBUMIN SERPL-MCNC: 4.3 G/DL (ref 3.5–5.2)
ALBUMIN/GLOBULIN RATIO: ABNORMAL (ref 1–2.5)
ALP BLD-CCNC: 147 U/L (ref 35–104)
ALT SERPL-CCNC: 24 U/L (ref 5–33)
ANION GAP SERPL CALCULATED.3IONS-SCNC: 11 MMOL/L (ref 9–17)
AST SERPL-CCNC: 20 U/L
BILIRUB SERPL-MCNC: 0.58 MG/DL (ref 0.3–1.2)
BUN BLDV-MCNC: 16 MG/DL (ref 6–20)
BUN/CREAT BLD: ABNORMAL (ref 9–20)
CALCIUM SERPL-MCNC: 9.4 MG/DL (ref 8.6–10.4)
CALCIUM TIMED UR: NORMAL MG/X H
CALCIUM URINE: 7 MG/DL
CALCIUM, URINE: 147 MG/24 H (ref 20–275)
CHLORIDE BLD-SCNC: 102 MMOL/L (ref 98–107)
CHOLESTEROL/HDL RATIO: 1.9
CHOLESTEROL: 261 MG/DL
CO2: 26 MMOL/L (ref 20–31)
CREAT SERPL-MCNC: 1.05 MG/DL (ref 0.5–0.9)
CREATININE TIMED UR: ABNORMAL MG/ X H
CREATININE URINE: 81.8 MG/DL
CREATININE, 24H UR: 1718 MG/24 H (ref 740–1570)
ESTIMATED AVERAGE GLUCOSE: 103 MG/DL
GFR AFRICAN AMERICAN: >60 ML/MIN
GFR NON-AFRICAN AMERICAN: 54 ML/MIN
GFR SERPL CREATININE-BSD FRML MDRD: ABNORMAL ML/MIN/{1.73_M2}
GFR SERPL CREATININE-BSD FRML MDRD: ABNORMAL ML/MIN/{1.73_M2}
GLUCOSE BLD-MCNC: 91 MG/DL (ref 70–99)
HBA1C MFR BLD: 5.2 % (ref 4–6)
HDLC SERPL-MCNC: 141 MG/DL
HOURS COLLECTED: 24 H
HOURS COLLECTED: 24 H
LDL CHOLESTEROL: 108 MG/DL (ref 0–130)
POTASSIUM SERPL-SCNC: 4.2 MMOL/L (ref 3.7–5.3)
PTH INTACT: 140.3 PG/ML (ref 15–65)
SODIUM BLD-SCNC: 139 MMOL/L (ref 135–144)
THYROXINE, FREE: 0.72 NG/DL (ref 0.93–1.7)
TOTAL PROTEIN: 7.2 G/DL (ref 6.4–8.3)
TRIGL SERPL-MCNC: 58 MG/DL
TSH SERPL DL<=0.05 MIU/L-ACNC: 1.29 MIU/L (ref 0.3–5)
VITAMIN D 25-HYDROXY: 27.2 NG/ML (ref 30–100)
VLDLC SERPL CALC-MCNC: ABNORMAL MG/DL (ref 1–30)
VOLUME: 2100 ML
VOLUME: 2100 ML

## 2021-10-14 PROCEDURE — 36415 COLL VENOUS BLD VENIPUNCTURE: CPT

## 2021-10-14 PROCEDURE — 84439 ASSAY OF FREE THYROXINE: CPT

## 2021-10-14 PROCEDURE — 82570 ASSAY OF URINE CREATININE: CPT

## 2021-10-14 PROCEDURE — 80061 LIPID PANEL: CPT

## 2021-10-14 PROCEDURE — 82340 ASSAY OF CALCIUM IN URINE: CPT

## 2021-10-14 PROCEDURE — 83970 ASSAY OF PARATHORMONE: CPT

## 2021-10-14 PROCEDURE — 82306 VITAMIN D 25 HYDROXY: CPT

## 2021-10-14 PROCEDURE — 83036 HEMOGLOBIN GLYCOSYLATED A1C: CPT

## 2021-10-14 PROCEDURE — 84443 ASSAY THYROID STIM HORMONE: CPT

## 2021-10-14 PROCEDURE — 81050 URINALYSIS VOLUME MEASURE: CPT

## 2021-10-14 PROCEDURE — 80053 COMPREHEN METABOLIC PANEL: CPT

## 2021-10-15 ENCOUNTER — TELEPHONE (OUTPATIENT)
Dept: PHARMACY | Facility: CLINIC | Age: 56
End: 2021-10-15

## 2021-10-15 NOTE — TELEPHONE ENCOUNTER
[facility] CLINICAL PHARMACY REVIEW: ADHERENCE REVIEW  Identified care gap per [employer]; fills at [place]: Diabetes adherence    Last Visit: 21    Patient also appears to be prescribed: METFORMIN  TAB 500MG     Patient found in Outcomes MTM and is currently eligible for TIP    ASSESSMENT    DIABETES ADHERENCE    Per Insurance Records through [employer] (2020 [place] = 0%;  [place] = 86%; Potential Fail Date: 10/16/21): METFORMIN    TAB 500MG last filled on 21 for 90 day supply. Next refill due: 21    Per Reconciled Dispense Report:  METFORMIN    TAB 500MG last filled on 09/15/21 for 90 day supply. Per [facility]:   METFORMIN  TAB 500MG last picked up on 09/15/210 for 90 day supply. 0 refills remaining. Billed through [employer] Value Date    LABA1C 5.2 10/14/2021    LABA1C 5.1 2021    LABA1C 5.2 2021     NOTE A1c <9%    PLAN  The following are interventions that have been identified:   - Patient needs refills for metformin   Patient have an appointment on 21 with her pcp    No patient out reach planned at this time.      Future Appointments   Date Time Provider ySlvester Collins   11/10/2021  4:00 PM Elbert Palencia MD [facility] Rehab [facility]   2021  9:45 AM COLTON Sams CNP [facility]   2021  8:40 AM Yolande Soulier Oneil Browns, DO ORTHO [facility]   2022  8:30 AM Africa Watson MD [facility]   2022  3:00  Phillip Arias  [facility]   3/15/2022  9:00 AM COLTON Perez CNP Neuro Spec [facility]   8/10/2022  8:30 AM COLTON Sams CNP [facility] [address]  // Department, toll free [phone], Option 2     For Pharmacy Admin Tracking Only     CPA in place:  No   Intervention Detail: Adherence Monitorin   Time Spent (min): 15

## 2021-11-08 ENCOUNTER — HOSPITAL ENCOUNTER (OUTPATIENT)
Dept: PHYSICAL THERAPY | Facility: CLINIC | Age: 56
Setting detail: THERAPIES SERIES
Discharge: HOME | End: 2021-11-08
Payer: MEDICARE

## 2021-11-08 PROCEDURE — 97161 PT EVAL LOW COMPLEX 20 MIN: CPT

## 2021-11-08 PROCEDURE — 97110 THERAPEUTIC EXERCISES: CPT

## 2021-11-08 NOTE — CONSULTS
difficulty. PMHx: [x] Diabetes [x] HTN     [x]  CAD [x] Arthritis   [x] Other:previous L femur fracture               [x] Refer to full medical chart  In EPIC     PSHx: bilateral TKA, R knee scope    Tests: [x] X-Ray: minimal degenerative changes     [] MRI:    [] Other:     Comorbidities:   [] Obesity [] Dialysis  [] N/A   [x] Asthma/COPD [] Dementia [] Other:   [] Stroke [] Sleep apnea [] Other:   [] Vascular disease [] Rheumatic disease [] Other:       Medications:  [x] Refer to full medical record [] None [] Other:  Allergies:       [x] Refer to full medical record [] None [] Other:    ADL/IADL Previous level of function Current level of function Who currently assists the patient with task   Bathing  [x] Independent  [] Assist [x] Independent  [] Assist    Dress/grooming [x] Independent  [] Assist [x] Independent  [] Assist    Transfer/mobility [x] Independent  [] Assist [x] Independent  [] Assist    Feeding [x] Independent  [] Assist [x] Independent  [] Assist    Toileting [x] Independent  [] Assist [x] Independent  [] Assist    Driving [x] Independent  [] Assist [x] Independent  [] Assist    Housekeeping [x] Independent  [] Assist [x] Independent  [] Assist    Grocery shop/meal prep [x] Independent  [] Assist [x] Independent  [] Assist      Gait Prior level of function Current level of function    [x] Independent  [] Assist [x] Independent  [] Assist   Device: [x] Independent [x] Independent    [] Straight Cane [] Quad cane [] Straight Cane [] Quad cane    [] Standard walker [] Rolling walker   [] 4 wheeled walker [] Standard walker [] Rolling walker   [] 4 wheeled walker    [] Wheelchair [] Wheelchair       Home type Two story    Stairs from outside 3 steps with railing    Stairs inside Full flight with railing    Employement N/A   Job status N/A   Work Activities/duties  N/A   Recreational Activities Walking, playing with grandchildren        Pain present?  None present    Location Bilateral lateral hips Pain Rating currently 0/10   Pain at worse 10/10   Pain at best 0/10   Description of pain Burning, sharp pain    Altered Sensation Lateral aspect bilateral hips    What makes it worse Walking prolonged sitting    What makes it better Resting, activity modification, ibuprofen    Symptom progression Static    Sleep Not Disturbed              Objective:    STRENGTH    Left Right   Hip Flex 4+ 4   Ext 4- 4-   ABD 4- 4-   ADD 4+ 4+   Knee Flex 5 5   Ext 4+ 4+   Ankle DF 5 5   PF 5 5   INV     EVER              ROM  ° A/P    Left Right   Hip Flex 115  76 knee straight  116  79 knee straight    Ext 5 10   ER 20 30   IR 12 28   ABD     ADD     Knee Flex     Ext     Ankle DF     PF     INV     EVER            TESTS (+/-) Left Right Not Tested   Hip Scouring + GT +  []   TARANs + + []   Piriformis + + []   Trevers - - []   Talor Tilt   []   Pat-Fem Grind   []       OBSERVATION No Deficit Deficit Not Tested Comments   Posture       Forward Head [] [x] []    Rounded Shoulders [] [x] []    Kyphosis [x] [] []    Lordosis [x] [] []    Lateral Shift [x] [] []    Scoliosis [] [] []    Iliac Crest [x] [] []    PSIS [x] [] []    ASIS [x] [] []    Genu Valgus [] [x] [] Bilateral    Genu Varus [x] [] []    Genu Recurvatum [x] [] []    Pronation [x] [] []    Supination [x] [] []    Leg Length Discrp [x] [] []    Slumped Sitting [] [x] []    Palpation [] [x] [] Increased tenderness through bilateral piriformis, L ITB and TFL, bilateral greater trochanter    Sensation [x] [] []    Edema [x] [] []    Neurological [x] [] []    Patellar Mobility [] [] [x]    Patellar Orientation [] [] [x]    Gait [] [x] [] Analysis: Antalgic gait pattern, decreased bilateral step length, decreased gait speed         FUNCTION Normal Difficult Unable   Sitting [x] [] []   Standing [] [x] []   Ambulation [] [x] []   Groom/Dress [x] [] []   Lift/Carry [x] [] []   Stairs [] [x] []   Bending [x] [] []   Squat [x] [] []   Kneel [x] [] [] BALANCE/PROPRIOCEPTION              [] Not tested   Single leg stance       R                     L                                PAIN   Eyes open                             Sec. Sec                  . []    Eyes closed                          Sec. Sec                  . []       Pain and difficulty maintaining WB during SLS due to bilateral knee impairments , with knee pain    FUNCTIONAL TESTS PAIN NO PAIN COMMENTS   Step Test 4 [] []    6 [] []    8 [] []    Squat [x] [] Decreased range of motion, tendency towards excessive anterior tibial translation, slight increase pain knees and hips bilaterally          Flexibility Normal Left tight Right tight   Hip flexor [] [] []   quad [] [] []   HS [] [x] [x]   piriformis [] [x] [x]   ITB [] [x] [x]   gastroc [] [] []   Soleus  [] [] []    [] [] []    [] [] []        Somatic Dysfunctions Normal Deficit Details   Cervical   [x] []    Thoracic   [x] []    Rib   [x] []    Pelvis   [x] []    Lumbar [x] []    SI   [x] []        Functional Test: LEFI Score: 51/70 or 36.25% functionally impaired       Comments:      Assessment:    64 y.o. female presents to physical therapy with bilateral hip pain, limited hip mobility, reduced bilateral hip strength, and impaired gait. These signs and symptoms are consistent with medical diagnosis of trochanteric bursitis. Patient presents with positive piriformis and TARAN testing as well indicating potential piriformis syndrome as well. These impairments are limiting the patients ability to perform prolonged standing and walking as well as prolonged sitting. These impairments are limiting patient's participation in recreational exercises and playing with her grandchildren. Patient would benefit from skilled physical therapy services in order to: reduce bilateral hip pain, restore hip strength and mobility, and improve gait to return patient to prior level of function. Problems:    [x] ?  Pain: up to 10/10 pain in bilateral hips  [x] ? ROM: reduced bilateral hip IR/ER, L hip extension, BLE flexibility   [x] ? Strength: impaired hip strength globally, knee extension strength bilaterally   [x] ? Function: reduced function indicated by LEFI score of 36.25% impaired   [x] Other: impaired gait     STG: (to be met in 8 treatments)  1. ? Pain: Decrease bilateral hip pain levels to 4/10 or less to improve tolerance to therapeutic exercise progressions. 2. ? ROM: Increase bilateral hip IR/ER ROM to at least 40 degrees, L hip extension to 15 degrees to reduce difficulty with ADLs. a. Pt to demonstrate improved BLE flexibility with SLR to at least 75 degrees bilaterally to improve gait mechanics. 3. ? Strength: Increase bilateral hip strength to 5/5 globally to improve tolerance to prolonged standing and ambulation. 4. ? Function: Pt to be able to stand/walk for at least 30 minutes without an increase in hip pain. 5. Pt to be independent and compliant with Home Exercise Programs      LTG: (to be met in 12 treatments)  1. Improve score on assessment tool LEFI from 36.25% impaired to 25% impaired or less. 2. Reduce bilateral hip pain levels to 0-1/10 to reduce difficulty with recreational walking. 3. Pt to be able to ambulate at least 150 feet with improved gait mechanics and without an increase in bilateral hip pain. Patient goals: to be able to walk 2-3 miles without pain. Rehab Potential:  [x] Good  [] Fair  [] Poor   Suggested Professional Referral:  [x] No  [] Yes:  Barriers to Goal Achievement[de-identified]  [x] No  [] Yes:  Domestic Concerns:  [x] No  [] Yes:    Pt. Education:  [x] Plans/Goals, Risks/Benefits discussed  [x] Home exercise program    Method of Education: [x] Verbal  [x] Demo  [x] Written  11/8- HEP provided of charted exercises, [employer] #[id number]  Comprehension of Education:  [x] Verbalizes understanding. [x] Demonstrates understanding. [x] Needs Review.   [] Demonstrates/verbalizes understanding of HEP/Ed previously given. Treatment Plan:  [x] Therapeutic Exercise   04070  [] Iontophoresis: 4 mg/mL Dexamethasone Sodium Phosphate  mAmin  92934   [] Therapeutic Activity  34172 [x] Vasopneumatic cold with compression  68255    [x] Gait Training   93452 [] Ultrasound   35849   [x] Neuromuscular Re-education  64999 [] Electrical Stimulation Unattended  30848   [x] Manual Therapy  54358 [] Electrical Stimulation Attended  49192   [x] Instruction in HEP  [] Lumbar/Cervical Traction  18267   [] Aquatic Therapy   31114 [x] Cold/hotpack    [] Massage   30715      [] Dry Needling, 1 or 2 muscles  11561   [] Biofeedback, first 15 minutes   49082  [] Biofeedback, additional 15 minutes   76862 [] Dry Needling, 3 or more muscles  92644            []  Medication allergies reviewed for use of    Dexamethasone Sodium Phosphate 4mg/ml     with iontophoresis treatments. Pt is not allergic.     Frequency:  2 x/week for 12 visits    Todays Treatment:    Exercises:  Exercise     Reps/ Time Weight/ Level Comments         Supine HS stretch 3x20\"     Supine piriformis stretch 3x20\"  Knee to opp shoulder   SL hip abduction 10x orange    SL clamshells  2x10 orange                                                                Other:    Specific Instructions for next treatment: progress hip girdle strengthening globally, hip mobility, and gait, manual to bilateral piriformis and L ITB as needed     Evaluation Complexity:  History (Personal factors, comorbidities) [] 0 [x] 1-2 [] 3+   Exam (limitations, restrictions) [] 1-2 [] 3 [x] 4+   Clinical presentation (progression) [x] Stable [] Evolving  [] Unstable   Decision Making [x] Low [] Moderate [] High    [x] Low Complexity [] Moderate Complexity [] High Complexity       Treatment Charges: Mins Units   [x] Evaluation       [x]  Low       []  Moderate       []  High 30 1   []  Modalities     [x]  Ther Exercise 10 1   []  Manual Therapy

## 2021-11-08 NOTE — PLAN OF CARE
[] [facility]) - [facility] &  Therapy  [address]  [phone]  F: [phone] [x] [address]  [facility]   [address]  P: [phone]  F: [phone] [] [address] &  Therapy  [address]  P: [phone]  F: [phone] [] [address]  P: [phone]  F: [phone] [] [address]  [facility]   [address]   [place]: [phone]  F: [phone]        Physical Therapy Plan of Care    Date:  2021  Patient: Daan Brenner  : 1965  MRN: [id number]  Physician: Kennedy Rodriguez DO                                Insurance: [employer] (v based on med necessity)  Medical Diagnosis: M70.62 - Trochanteric bursitis of left hip, M70.61- Trochanteric bursitis of right hip  Rehab Codes: M70.62, M70.61, R26.89, M62.81  Onset date: referral 10/4/21                         Next 's appt.: 21     Subjective:   CC/HPI:    64 y.o. female presents to physical therapy with bilateral hip pain. Hip pain began approximately 6 months prior with insidious onset. Pain has remained static following onset. Patient notes that she is only able to walk one mile prior to getting hip pain, previously able to walk longer distances without any discomfort. Pain is relieved when sitting and resting. Patient notes that pain will increase if she sits too long, then when she stands up from seated position. Pain remains in bilateral hips but will travel laterally down L thigh. No complaint of popping, clicking, or buckling. Occasional numbness to L hip that remains localized to hip region. Pt has been managing pain with ibuprofen with temporary relief of symptoms.  Has not tried ice, heat, massage creams,etc. Patient is able to complete all ADLs independently without difficulty. Assessment:    64 y.o. female presents to physical therapy with bilateral hip pain, limited hip mobility, reduced bilateral hip strength, and impaired gait. These signs and symptoms are consistent with medical diagnosis of trochanteric bursitis. Patient presents with positive piriformis and TARAN testing as well indicating potential piriformis syndrome as well. These impairments are limiting the patients ability to perform prolonged standing and walking as well as prolonged sitting. These impairments are limiting patient's participation in recreational exercises and playing with her grandchildren. Patient would benefit from skilled physical therapy services in order to: reduce bilateral hip pain, restore hip strength and mobility, and improve gait to return patient to prior level of function.      Problems:    [x]? ? Pain: up to 10/10 pain in bilateral hips  [x]? ? ROM: reduced bilateral hip IR/ER, L hip extension, BLE flexibility   [x]? ? Strength: impaired hip strength globally, knee extension strength bilaterally   [x]? ? Function: reduced function indicated by LEFI score of 36.25% impaired   [x]? Other: impaired gait      STG: (to be met in 8 treatments)  1. ? Pain: Decrease bilateral hip pain levels to 4/10 or less to improve tolerance to therapeutic exercise progressions. 2. ? ROM: Increase bilateral hip IR/ER ROM to at least 40 degrees, L hip extension to 15 degrees to reduce difficulty with ADLs. a. Pt to demonstrate improved BLE flexibility with SLR to at least 75 degrees bilaterally to improve gait mechanics. 3. ? Strength: Increase bilateral hip strength to 5/5 globally to improve tolerance to prolonged standing and ambulation. 4. ? Function: Pt to be able to stand/walk for at least 30 minutes without an increase in hip pain. 5. Pt to be independent and compliant with Home Exercise Programs        LTG: (to be met in 12 treatments)  1.  Improve score

## 2021-11-10 ENCOUNTER — PROCEDURE VISIT (OUTPATIENT)
Dept: PHYSICAL MEDICINE AND REHAB | Age: 56
End: 2021-11-10
Payer: MEDICARE

## 2021-11-10 ENCOUNTER — HOSPITAL ENCOUNTER (OUTPATIENT)
Dept: PHYSICAL THERAPY | Facility: CLINIC | Age: 56
Setting detail: THERAPIES SERIES
Discharge: HOME | End: 2021-11-10
Payer: MEDICARE

## 2021-11-10 ENCOUNTER — TELEPHONE (OUTPATIENT)
Dept: PHARMACY | Facility: CLINIC | Age: 56
End: 2021-11-10

## 2021-11-10 DIAGNOSIS — R20.2 PARESTHESIA OF BOTH FEET: Primary | ICD-10-CM

## 2021-11-10 PROCEDURE — 97110 THERAPEUTIC EXERCISES: CPT

## 2021-11-10 PROCEDURE — 95909 NRV CNDJ TST 5-6 STUDIES: CPT | Performed by: STUDENT IN AN ORGANIZED HEALTH CARE EDUCATION/TRAINING PROGRAM

## 2021-11-10 PROCEDURE — 97016 VASOPNEUMATIC DEVICE THERAPY: CPT

## 2021-11-10 PROCEDURE — 95886 MUSC TEST DONE W/N TEST COMP: CPT | Performed by: STUDENT IN AN ORGANIZED HEALTH CARE EDUCATION/TRAINING PROGRAM

## 2021-11-10 NOTE — TELEPHONE ENCOUNTER
[facility] CLINICAL PHARMACY REVIEW: ADHERENCE REVIEW  Identified care gap per [employer]; fills at [facility]: ACE/ARB, Diabetes and Statin adherence    Last Visit: 08/16/21    Patient also appears to be prescribed: PRAVASTATIN  TAB 40MG,  LOSARTAN POT TAB 100MG and METFORMIN    TAB 500MG    Patient found in Outcomes MTM and is currently eligible for TIP    ASSESSMENT  ACE/ARB ADHERENCE    Per Insurance Records through [employer] (2020 [place] = 0%; YTD [place] = 92%; Potential Fail Date: 12/23/21):   LOSARTAN POT TAB 100MG last filled on 08/09/21 for 90 day supply. Next refill due: 11/07/21    Per Reconciled Dispense Report:  LOSARTAN POT TAB 100MG last filled on 08/09/21 for 90 day supply. Per [facility]:   LOSARTAN POT TAB 100MG last picked up on 08/09/21 for 90 day supply. 1 refills remaining. Billed through [employer] will process a refill and have it ready for p/u tomorrow after 2pm     BP Readings from Last 3 Encounters:   09/13/21 (!) 163/108   08/31/21 (!) 152/88   08/16/21 130/84     Estimated Creatinine Clearance: 70 mL/min (A) (based on SCr of 1.05 mg/dL (H)). DIABETES ADHERENCE    Per Insurance Records through [employer] (2020 [place] = 0%; YTD [place] = 88%; Potential Fail Date: 12/31/21): METFORMIN    TAB 500MG last filled on 09/15/21 for 90 day supply. Next refill due: 12/14/21    Per Reconciled Dispense Report:  METFORMIN    TAB 500MG last filled on 09/15/21 for 90 day supply. Per [facility]:   METFORMIN    TAB 500MG last picked up on 09/15/21 for 90 day supply. 0 refills remaining. Billed through [employer]   Component Value Date    LABA1C 5.2 10/14/2021    LABA1C 5.1 04/13/2021    LABA1C 5.2 01/18/2021     NOTE A1c <9%    STATIN ADHERENCE    Per Insurance Records through [employer] (2020 [place] = 0%; YTD [place] = 80%; Potential Fail Date: 11/19/21):   PRAVASTATIN  TAB 40MG last filled on 08/09/21 for 90 day supply.  Next refill due: 11/07/21    Per Reconciled Dispense Report:  PRAVASTATIN  TAB

## 2021-11-10 NOTE — FLOWSHEET NOTE
[] [facility]) - [facility] &  Therapy  [address]  [phone]  F: [phone] [x] [address]  [employer] [address]   [address]  P: [phone]  F: [phone] [] [address] &  Therapy  [address]  P: [phone]  F: [phone] [] [address]  P: [phone]  F: [phone] [] [address]  [facility]   [address]   [place]: [phone]  F: [phone]      Physical Therapy Daily Treatment Note    Date:  11/10/2021  Patient Name:  Fide Pelayo    :  1965  MRN: [id number]  Physician: Asim Mcintosh DO                                Insurance: [employer] (v based on med necessity)  Medical Diagnosis: M70.62 - Trochanteric bursitis of left hip, M70.61- Trochanteric bursitis of right hip  Rehab Codes: M70.62, M70.61, R26.89, M62.81  Onset date: referral 10/4/21                         Next Dr's appt.: 21  Visit# / total visits: ; Progress note for Medicare patient due at visit 10     Cancels/No Shows: 0/0    Subjective:    Pain:  [x] Yes  [] No Location: bilateral  Pain Rating: (0-10 scale) 2/10  Pain altered Tx:  [] No  [] Yes  Action:  Comments: Pt arrives to therapy reporting mild bilateral hip pain, with pain L>R this morning.      Objective:  Modalities:   Precautions:  Exercises:  Exercise       Reps/ Time Weight/ Level Comments   Nustep 5'           mat      Supine HS stretch 3x20\"       Supine piriformis stretch 3x20\"   Knee to opp shoulder   SL hip abduction 10x orange     SL clamshells  2x10 orange     SLR 15x                 standing         gastroc stretch 3x30\"       Hamstring stretch 3x30\" ea       Hip ext 20x ea       Hip abd 20x ea                                     Other:     Specific Instructions for next treatment: progress hip girdle strengthening globally, hip mobility, and gait, manual to bilateral piriformis and L ITB as needed         Treatment Charges: Mins Units   []  Modalities     [x]  Ther Exercise 29 2   [x]  Manual Therapy 15 1   []  Ther Activities     []  Aquatics     []  Vasocompression     []  Other     Total Treatment time 44 3     Total estimated medicare costs as of 11/08/21: $182.11    Assessment: [x] Progressing toward goals. Initiated treatment with nustep to warm up followed by supine stretches. Reviewed HEP exercises as these are new to pt with good recall of exercises. Initiated with manual by direct inhibition and hypervolt to bilateral piriformis, ITB, and glute med with high tenderness noted bilaterally but overall fair tolerance. Mild relief noted after manual- will assess at new appointment response to manual. Added standing hip ext and abd with fair tolerance and mild discomfort noted with single leg weightbearing during these exercises. Pt denies any increase in pain at end of treatment just mild soreness. [] No change. [] Other:  [x] Patient would continue to benefit from skilled physical therapy services in order to: reduce bilateral hip pain, restore hip strength and mobility, and improve gait to return patient to prior level of function. STG/LTG   STG: (to be met in 8 treatments)  1. ? Pain: Decrease bilateral hip pain levels to 4/10 or less to improve tolerance to therapeutic exercise progressions. 2. ? ROM: Increase bilateral hip IR/ER ROM to at least 40 degrees, L hip extension to 15 degrees to reduce difficulty with ADLs. a. Pt to demonstrate improved BLE flexibility with SLR to at least 75 degrees bilaterally to improve gait mechanics. 3. ? Strength: Increase bilateral hip strength to 5/5 globally to improve tolerance to prolonged standing and ambulation. 4. ?  Function: Pt to be able to stand/walk for at least 30 minutes without an increase in hip pain. 5. Pt to be independent and compliant with Home Exercise Programs        LTG: (to be met in 12 treatments)  1. Improve score on assessment tool LEFI from 36.25% impaired to 25% impaired or less. 2. Reduce bilateral hip pain levels to 0-1/10 to reduce difficulty with recreational walking. 3. Pt to be able to ambulate at least 150 feet with improved gait mechanics and without an increase in bilateral hip pain.                     Patient goals: to be able to walk 2-3 miles without pain    Pt. Education:  [x] Yes  [] No  [x] Reviewed Prior HEP/Ed  Method of Education: [x] Verbal  [x] Demo  [] Written  Comprehension of Education:  [x] Verbalizes understanding. [] Demonstrates understanding. [x] Needs review. [] Demonstrates/verbalizes HEP/Ed previously given. Plan: [x] Continue current frequency toward long and short term goals.     [x] Specific Instructions for subsequent treatments: progress hip girdle strengthening globally, hip mobility, and gait, manual to bilateral piriformis and L ITB as needed       Time In: 10:00am           Time Out: 10:49am    Electronically signed by:  Qi Al PTA

## 2021-11-10 NOTE — PROGRESS NOTES
[facility]  [facility] PHYSICAL MEDICINE AND REHABILITATION  [address] [place] [id number]  Dept: [phone]  Dept Fax: [phone]    EMG/NCS Bilateral Lower Limbs    Subjective:     Mia Sommer is a 64y.o.-year-old female presenting with constant numbness/tingling of the bilateral feet for about 6 years. She localizes paresthesias to the sole of the foot at the mid-foot bilaterally and states that left is worse than right. She also notes intermittent muscle spasms in the calf bilaterally. She denies any weakness in the bilateral lower limbs. She also denies any back pain and any shooting pain in the bilateral lower limbs. PMH:  +type 2 diabetes, +hypothyroidism    PSH:  no back surgery    Objective:     Physical Exam    Constitutional: She appears well-developed and well-nourished. In no distress. Pulmonary/Chest: Respirations WNL and unlabored. MSK:  No atrophy of the lower limb musculature noted. Neurological: She is alert. Sensation to light touch intact in bilateral lower limbs. Strength 5/5 in bilateral lower limbs. No clonus with passive ankle dorsiflexion bilaterally. Skin: Skin is dry with good turgor. Cool temperature of the feet bilaterally, left greater than right. Imaging  XR lumbar spine, 3/5/19:  Impression   No acute radiographic findings.       Overall mild degenerative changes. Findings:     The procedure was explained to the patient prior to beginning the test.  Risks and benefits of the procedure were discussed. Patient gave verbal consent to proceed. The left sural sensory study shows prolonged latency with normal amplitude. The left peroneal/fibular motor study to EDB is normal.  The left tibial motor study to AHB is normal.    The right sural sensory study shows prolonged latency with normal amplitude. The right peroneal/fibular motor study to EDB shows slightly prolonged latency with normal amplitude and conduction velocity.   The right tibial motor study to AHB is normal.    EMG of selected muscles of the bilateral lower limbs is normal.    Impression:     1. Abnormal electrodiagnostic study. 2. 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 However, patient's feet were cool to the touch and cold temperature of the feet could also result in prolonged latency of the sensory nerves noted on today's test.  3. There is no evidence of bilateral lumbosacral radiculopathy, plexopathy, or myopathy on today's test.      Please see separate document with nerve conduction and EMG tables.       Electronically signed by Stewart Tee MD on 11/10/2021 at 5:07 PM

## 2021-11-11 ENCOUNTER — HOSPITAL ENCOUNTER (OUTPATIENT)
Dept: WOMENS IMAGING | Age: 56
Discharge: HOME | End: 2021-11-13
Payer: MEDICARE

## 2021-11-11 DIAGNOSIS — Z12.31 ENCOUNTER FOR SCREENING MAMMOGRAM FOR BREAST CANCER: ICD-10-CM

## 2021-11-11 PROCEDURE — 77063 BREAST TOMOSYNTHESIS BI: CPT

## 2021-11-12 NOTE — TELEPHONE ENCOUNTER
[facility] last p/u on 21 for 90ds 0rr billed through [address] in place:  No   Recommendation Provided To: Pharmacy: 1   Intervention Detail: Adherence Monitorin   Gap Closed?: Yes    Intervention Accepted By: Pharmacy: 1   Time Spent (min): 15

## 2021-11-15 ENCOUNTER — HOSPITAL ENCOUNTER (OUTPATIENT)
Dept: PHYSICAL THERAPY | Facility: CLINIC | Age: 56
Setting detail: THERAPIES SERIES
Discharge: HOME | End: 2021-11-15
Payer: MEDICARE

## 2021-11-15 PROCEDURE — 97110 THERAPEUTIC EXERCISES: CPT

## 2021-11-15 PROCEDURE — 97140 MANUAL THERAPY 1/> REGIONS: CPT

## 2021-11-15 NOTE — FLOWSHEET NOTE
[] [facility]) - [facility] &  Therapy  [address]  [phone]  F: [phone] [x] [address]  [address]   [address]  P: [phone]  F: [phone] [] [address] &  Therapy  [address]  P: [phone]  F: [phone] [] [address]  P: [phone]  F: [phone] [] [address]  [facility]   [address]   [place]: [phone]  F: [phone]      Physical Therapy Daily Treatment Note    Date:  11/15/2021  Patient Name:  Edwina Cardenas    :  1965  MRN: [id number]  Physician: Leonidas Hyatt DO                                Insurance: [facility] (v based on med [facility])  Medical Diagnosis: M70.62 - Trochanteric bursitis of left hip, M70.61- Trochanteric bursitis of right hip  Rehab Codes: M70.62, M70.61, R26.89, M62.81  Onset date: referral 10/4/21                         Next Dr's appt.: 21  Visit# / total visits: 3/12; Progress note for Medicare patient due at visit 10     Cancels/No Shows: 0/0    Subjective:    Pain:  [x] Yes  [] No Location: bilateral  Pain Rating: (0-10 scale) 2/10  Pain altered Tx:  [x] No  [] Yes  Action:  Comments: Pt arrives to PT reporting no new changes at this time and her pain is \"unchanged\".       Objective:  Modalities:   Precautions:  Exercises:  Exercise       Reps/ Time Weight/ Level Comments   Nustep 5'           mat      Supine HS stretch 3x20\"       Supine piriformis stretch 3x20\"   Knee to opp shoulder   SLR 15x     Bridges   15x  Added /15         Sidelying      SL hip abduction 15x lime  added reps  11/15   SL clamshells  2x10 Lime  Increased band 11/15         Prone      Hip flexor stretch 20\"x3 ea   Added 11/15                         standing gastroc stretch 3x30\"       Hamstring stretch 3x30\" ea  not today     Hip ext 20x ea       Hip abd 20x ea                                     Other:      Manual: bilateral piriformis and L ITB as needed with hypervolt 11'    Specific Instructions for next treatment: progress hip girdle strengthening globally, hip mobility, and gait,         Treatment Charges: Mins Units   []  Modalities     [x]  Ther Exercise 29 2   [x]  Manual Therapy 11 1   []  Ther Activities     []  Aquatics     []  Vasocompression     []  Other     Total Treatment time 40 3     Total estimated medicare costs as of 11/15/21: $255.22    Assessment: [x] Progressing toward goals. Initiated treatment with nustep to warm up followed by supine stretches. Reviewed HEP exercises as these are new to pt with good recall of exercises. Initiated with manual by direct inhibition and hypervolt to bilateral piriformis, ITB, and glute med with high tenderness noted bilaterally but overall fair tolerance. Mild relief noted after manual- will assess at new appointment response to manual. Added standing hip ext and abd with fair tolerance and mild discomfort noted with single leg weightbearing during these exercises. Pt denies any increase in pain at end of treatment just mild soreness. [] No change. [] Other:  [x] Patient would continue to benefit from skilled physical therapy services in order to: reduce bilateral hip pain, restore hip strength and mobility, and improve gait to return patient to prior level of function. STG/LTG   STG: (to be met in 8 treatments)  ? Pain: Decrease bilateral hip pain levels to 4/10 or less to improve tolerance to therapeutic exercise progressions. ? ROM: Increase bilateral hip IR/ER ROM to at least 40 degrees, L hip extension to 15 degrees to reduce difficulty with ADLs. Pt to demonstrate improved BLE flexibility with SLR to at least 75 degrees bilaterally to improve gait mechanics. ? Strength:  Increase bilateral hip strength to 5/5 globally to improve tolerance to prolonged standing and ambulation. ? Function: Pt to be able to stand/walk for at least 30 minutes without an increase in hip pain. Pt to be independent and compliant with Home Exercise Programs        LTG: (to be met in 12 treatments)  Improve score on assessment tool LEFI from 36.25% impaired to 25% impaired or less. Reduce bilateral hip pain levels to 0-1/10 to reduce difficulty with recreational walking. Pt to be able to ambulate at least 150 feet with improved gait mechanics and without an increase in bilateral hip pain. Patient goals: to be able to walk 2-3 miles without pain    Pt. Education:  [] Yes  [] No  [] Reviewed Prior HEP/Ed  Method of Education: [] Verbal  [] Demo  [] Written  Comprehension of Education:  [x] Verbalizes understanding. [] Demonstrates understanding. [x] Needs review. [] Demonstrates/verbalizes HEP/Ed previously given. Plan: [x] Continue current frequency toward long and short term goals.     [x] Specific Instructions for subsequent treatments: progress hip girdle strengthening globally, hip mobility, and gait, manual to bilateral piriformis and L ITB as needed       Time In: 10:00am           Time Out: 10:50 am    Electronically signed by:  Jeaneth [place] PTA

## 2021-11-17 ENCOUNTER — OFFICE VISIT (OUTPATIENT)
Dept: FAMILY MEDICINE CLINIC | Age: 56
End: 2021-11-17
Payer: MEDICARE

## 2021-11-17 ENCOUNTER — HOSPITAL ENCOUNTER (OUTPATIENT)
Dept: PHYSICAL THERAPY | Facility: CLINIC | Age: 56
Setting detail: THERAPIES SERIES
Discharge: HOME | End: 2021-11-17
Payer: MEDICARE

## 2021-11-17 VITALS
BODY MASS INDEX: 30.56 KG/M2 | DIASTOLIC BLOOD PRESSURE: 84 MMHG | HEART RATE: 83 BPM | WEIGHT: 201 LBS | SYSTOLIC BLOOD PRESSURE: 130 MMHG | OXYGEN SATURATION: 100 %

## 2021-11-17 DIAGNOSIS — R21 RASH AND NONSPECIFIC SKIN ERUPTION: Primary | ICD-10-CM

## 2021-11-17 PROCEDURE — 97140 MANUAL THERAPY 1/> REGIONS: CPT

## 2021-11-17 PROCEDURE — 99213 OFFICE O/P EST LOW 20 MIN: CPT | Performed by: NURSE PRACTITIONER

## 2021-11-17 PROCEDURE — 97110 THERAPEUTIC EXERCISES: CPT

## 2021-11-17 NOTE — FLOWSHEET NOTE
[] [facility]) - [facility] &  Therapy  [address]  [phone]  F: [phone] [x] [address]  [address] [address]   [address]  P: [phone]  F: [phone] [] [address] &  Therapy  [address]  P: [phone]  F: [phone] [] [address]  P: [phone]  F: [phone] [] [address]  [facility]   [address]   [place]: [phone]  F: [phone]      Physical Therapy Daily Treatment Note    Date:  2021  Patient Name:  Tahira Nation    :  1965  MRN: [id number]  Physician: Hubert Nunez DO                                Insurance: [facility] (v based on med necessity)  Medical Diagnosis: M70.62 - Trochanteric bursitis of left hip, M70.61- Trochanteric bursitis of right hip  Rehab Codes: M70.62, M70.61, R26.89, M62.81  Onset date: referral 10/4/21                         Next 's appt.: 21  Visit# / total visits: ; Progress note for Medicare patient due at visit 10     Cancels/No Shows: 0/0    Subjective:    Pain:  [x] Yes  [] No Location: bilateral  Pain Rating: (0-10 scale) 0/10  Pain altered Tx:  [x] No  [] Yes  Action:  Comments: Pt arrives without any pain this date noting she felt good after previous session.      Objective:  Modalities:   Precautions:  Exercises:  Exercise       Reps/ Time Weight/ Level Comments   Nustep 5'           mat      Supine HS stretch 3x20\"       Supine piriformis stretch 3x20\"   Knee to opp shoulder   SLR 2x10 A    Bridges   2x10 A Added 11/15   [place] 10x5\"           Sidelying      SL hip abduction 2x10 lime  added reps  11/15   SL clamshells  2x10 Lime  Increased band 11/15         Prone      Hip flexor stretch 20\"x3 ea   Added 11/15   Prone hip extension  10xea A Added 11/17                   standing         gastroc stretch 3x30\"       Hamstring stretch 3x30\" ea  not today     Hip ext 20x ea       Hip abd 20x ea       Side Stepping          Mini Squats   10x                 Other:      Manual: bilateral piriformis and glute musculature and L ITB as needed with hypervolt 10'    Specific Instructions for next treatment: progress hip girdle strengthening globally, hip mobility, and gait,         Treatment Charges: Mins Units   []  Modalities     [x]  Ther Exercise 30 2   [x]  Manual Therapy 10 1   []  Ther Activities     []  Aquatics     []  Vasocompression     []  Other     Total Treatment time 40 3     Total estimated medicare costs as of 11/15/21: $328.61    Assessment: [x] Progressing toward goals. Initiated treatment with nustep to warm up followed by charted exercises. Progressed repetition of mat table exercises per pt tolerance and added prone hip extension. Progressed standing exercises with side stepping and mini squats. Pt notes some discomfort in L knee with side stepping and is limited in ROM with mini squats due to history of knee pain/surgeries. Overall good tolerance to all progressions with some soreness in bilateral hips at end of session but no increase in pain. Ended with manual using hypervolt to bilateral piriformis/glutes to reduce soreness with fair relief of symptoms post manual.     [] No change. [] Other:  [x] Patient would continue to benefit from skilled physical therapy services in order to: reduce bilateral hip pain, restore hip strength and mobility, and improve gait to return patient to prior level of function. STG/LTG   STG: (to be met in 8 treatments)  1. ? Pain: Decrease bilateral hip pain levels to 4/10 or less to improve tolerance to therapeutic exercise progressions. 2. ? ROM: Increase bilateral hip IR/ER ROM to at least 40 degrees, L hip extension to 15 degrees to reduce difficulty with ADLs. a.  Pt to demonstrate improved BLE flexibility with SLR to at least 75 degrees bilaterally to improve gait mechanics. 3. ? Strength: Increase bilateral hip strength to 5/5 globally to improve tolerance to prolonged standing and ambulation. 4. ? Function: Pt to be able to stand/walk for at least 30 minutes without an increase in hip pain. 5. Pt to be independent and compliant with Home Exercise Programs        LTG: (to be met in 12 treatments)  1. Improve score on assessment tool LEFI from 36.25% impaired to 25% impaired or less. 2. Reduce bilateral hip pain levels to 0-1/10 to reduce difficulty with recreational walking. 3. Pt to be able to ambulate at least 150 feet with improved gait mechanics and without an increase in bilateral hip pain. Patient goals: to be able to walk 2-3 miles without pain    Pt. Education:  [x] Yes  [] No  [x] Reviewed Prior HEP/Ed  Method of Education: [x] Verbal  [x] Demo  [] Written  11/8- HEP provided of charted exercises, Shanique Hipper #[id number]  Comprehension of Education:  [] Verbalizes understanding. [] Demonstrates understanding. [] Needs review. [x] Demonstrates/verbalizes HEP/Ed previously given. Plan: [x] Continue current frequency toward long and short term goals. [x] Specific Instructions for subsequent treatments: progress hip girdle strengthening globally, hip mobility, and gait, manual to bilateral piriformis and L ITB as needed       Time In: 11:00 am           Time Out: 11:45 am    Electronically signed by:   Toni Ramos PT

## 2021-11-17 NOTE — PROGRESS NOTES
Patient is present for 3 month f/u    Patient states she has spots on her face and right arm that she would like checked  Patient states they are dry patches  States she has tried lotions and peel off the dry skin but states it does not go away    Patient had DM eye exam in [place]-

## 2021-11-17 NOTE — PROGRESS NOTES
Subjective:       Janette Leon is a 64 y.o. female who presents for evaluation of rash. Rash started several years ago. Initial distribution: right arm and right forehead and cheek face. Lesions are face is rough in nature like a small linear patch of dry skin, right arm appears like a closed cyst, looks like a black head, pt denies any drainage in color, are of raised texture. Rash has not changed over time. Rash is painful. Associated symptoms: none. Patient denies: congestion, sore throat, headache, nausea, vomiting. Patient has not had previous evaluation of rash. Patient has not had previous treatment. Response to treatment: regular lotions. Patient has not had contacts with similar rash. Patient has not had new exposures. Patient's medications, allergies, past medical, surgical, social and family histories were reviewed and updated as appropriate. Review of Systems  Pertinent items are noted in HPI.       Objective:      /84 (Site: Left Upper Arm, Position: Sitting, Cuff Size: Large Adult)   Pulse 83   Wt 201 lb (91.2 kg)   SpO2 100%   BMI 30.56 kg/m²     General Appearance:    Alert, cooperative, no distress, appears stated age   Head:    Normocephalic, without obvious abnormality, atraumatic   Eyes:    PERRL, conjunctiva/corneas clear, EOM's intact, fundi     benign, both eyes   Back:     Symmetric, no curvature, ROM normal   Lungs:     Clear to auscultation bilaterally, respirations unlabored   Chest Wall:    No tenderness or deformity    Heart:    Regular rate and rhythm, S1 and S2 normal, no murmur, rub   or gallop   Extremities:   Extremities normal, atraumatic, no cyanosis or edema   Skin:   Skin color, texture, turgor normal, no rashes or lesions   Lymph nodes:   Cervical, supraclavicular, and axillary nodes normal   Neurologic:   CNII-XII intact, normal strength, sensation and reflexes     throughout         Assessment:     Vitals:    11/17/21 0940   BP: 130/84   Pulse: 83   SpO2: 100%        Non-specific rash      Plan:      Referral to Dermatology.

## 2021-11-22 ENCOUNTER — HOSPITAL ENCOUNTER (OUTPATIENT)
Dept: PHYSICAL THERAPY | Facility: CLINIC | Age: 56
Setting detail: THERAPIES SERIES
Discharge: HOME | End: 2021-11-22
Payer: MEDICARE

## 2021-11-22 PROCEDURE — 97140 MANUAL THERAPY 1/> REGIONS: CPT

## 2021-11-22 PROCEDURE — 97110 THERAPEUTIC EXERCISES: CPT

## 2021-11-22 NOTE — FLOWSHEET NOTE
[] [facility]) - [facility] &  Therapy  [address]  [phone]  F: [phone] [x] [address]  [facility] [place] [address]   [address]  P: [phone]  F: [phone] [] [address] &  Therapy  [address]  P: [phone]  F: [phone] [] [address]  P: [phone]  F: [phone] [] [address]  [facility]   [address]   [place]: [phone]  F: [phone]      Physical Therapy Daily Treatment Note    Date:  2021  Patient Name:  Robert Westfall    :  1965  MRN: [id number]  Physician: Nathanael Meigs, DO                                Insurance: [facility] (v based on med necessity)  Medical Diagnosis: M70.62 - Trochanteric bursitis of left hip, M70.61- Trochanteric bursitis of right hip  Rehab Codes: M70.62, M70.61, R26.89, M62.81  Onset date: referral 10/4/21                         Next 's appt.: 21  Visit# / total visits: ; Progress note for Medicare patient due at visit 10     Cancels/No Shows: 0/0    Subjective:    Pain:  [x] Yes  [] No Location: bilateral  Pain Rating: (0-10 scale) 0/10  Pain altered Tx:  [x] No  [] Yes  Action:  Comments: Pt arrives feeling \"okay\" and reporting no pain.       Objective:  Modalities:   Precautions:  Exercises:  Exercise       Reps/ Time Weight/ Level Comments   Nustep 5'           mat      Supine HS stretch 3x20\"       Supine piriformis stretch 3x20\"   Knee to opp shoulder   SLR 2x10 A    Bridges   2x10 lime Added 11/15 added band    Ball Squeezes 20x5\"  Added reps          Sidelying      SL hip abduction 2x10 lime added reps  11/15   SL clamshells  2x10 Lime  Increased band 11/15         Prone      Hip flexor stretch 20\"x3 ea   Added 11/15   Prone hip extension  10xea A Added 11/17         Seated       piriformis stretch 20\"x3 ea  Added 11/22                    standing         gastroc stretch 3x30\"       Hamstring stretch 3x30\" ea  not today     Hip ext 10x ea  lime Added band 11/22    Hip abd 10x ea  lime Added band 11/22    Side Stepping          Mini Squats   10x                 Other:      Manual: bilateral piriformis and glute musculature and L/R ITB as needed with hypervolt 12'    Specific Instructions for next treatment: progress hip girdle strengthening globally, hip mobility, and gait,         Treatment Charges: Mins Units   []  Modalities     [x]  Ther Exercise 32 2   [x]  Manual Therapy 12 1   []  Ther Activities     []  Aquatics     []  Vasocompression     []  Other     Total Treatment time 44 3     Total estimated medicare costs as of 11/22/21: $403.50    Assessment: [x] Progressing toward goals. Continued with exercises as charted with addition to manual on the right side as well this date. Improved flexibility noted in bilateral piriformis and able to progress to more aggressive stretch. Added resistance band with hip abduction, ext, and bridges. Pt reports fatigue at end of treatment but denies pain. [] No change. [] Other:  [x] Patient would continue to benefit from skilled physical therapy services in order to: reduce bilateral hip pain, restore hip strength and mobility, and improve gait to return patient to prior level of function. STG/LTG   STG: (to be met in 8 treatments)  1. ? Pain: Decrease bilateral hip pain levels to 4/10 or less to improve tolerance to therapeutic exercise progressions. 2. ? ROM: Increase bilateral hip IR/ER ROM to at least 40 degrees, L hip extension to 15 degrees to reduce difficulty with ADLs. a. Pt to demonstrate improved BLE flexibility with SLR to at least 75 degrees bilaterally to improve gait mechanics. 3. ? Strength:  Increase bilateral hip strength to 5/5 globally to improve tolerance to prolonged standing and ambulation. 4. ? Function: Pt to be able to stand/walk for at least 30 minutes without an increase in hip pain. 5. Pt to be independent and compliant with Home Exercise Programs        LTG: (to be met in 12 treatments)  1. Improve score on assessment tool LEFI from 36.25% impaired to 25% impaired or less. 2. Reduce bilateral hip pain levels to 0-1/10 to reduce difficulty with recreational walking. 3. Pt to be able to ambulate at least 150 feet with improved gait mechanics and without an increase in bilateral hip pain. Patient goals: to be able to walk 2-3 miles without pain    Pt. Education:  [x] Yes  [] No  [x] Reviewed Prior HEP/Ed  Method of Education: [x] Verbal  [x] Demo  [] Written  11/8- HEP provided of charted exercises, Jodi Median #[id number]  Comprehension of Education:  [] Verbalizes understanding. [] Demonstrates understanding. [] Needs review. [x] Demonstrates/verbalizes HEP/Ed previously given. Plan: [x] Continue current frequency toward long and short term goals.     [x] Specific Instructions for subsequent treatments: progress hip girdle strengthening globally, hip mobility, and gait, manual to bilateral piriformis and L ITB as needed       Time In: 957 am           Time Out: 10:50 am    Electronically signed by:  Kimberly Wilks PTA

## 2021-11-24 ENCOUNTER — HOSPITAL ENCOUNTER (OUTPATIENT)
Dept: PHYSICAL THERAPY | Facility: CLINIC | Age: 56
Setting detail: THERAPIES SERIES
Discharge: HOME | End: 2021-11-24
Payer: MEDICARE

## 2021-11-24 PROCEDURE — 97110 THERAPEUTIC EXERCISES: CPT

## 2021-11-24 PROCEDURE — 97140 MANUAL THERAPY 1/> REGIONS: CPT

## 2021-11-24 NOTE — FLOWSHEET NOTE
[] [facility]) [facility] &  Therapy  [address]  [phone]  F: [phone] [x] [address]  [facility]   [address]  P: [phone]  F: [phone] [] [address] &  Therapy  [address]  P: [phone]  F: [phone] [] [address]  P: [phone]  F: [phone] [] [address]  [facility]   [address]   [place]: [phone]  F: [phone]      Physical Therapy Daily Treatment Note    Date:  2021  Patient Name:  Gus Powell    :  1965  MRN: [id number]  Physician: Peter Morel DO                                Insurance: [facility] (v based on med necessity)  Medical Diagnosis: M70.62 - Trochanteric bursitis of left hip, M70.61- Trochanteric bursitis of right hip  Rehab Codes: M70.62, M70.61, R26.89, M62.81  Onset date: referral 10/4/21                         Next 's appt.: 21  Visit# / total visits: ; Progress note for Medicare patient due at visit 10     Cancels/No Shows: 0/0    Subjective:    Pain:  [x] Yes  [] No Location: bilateral  Pain Rating: (0-10 scale) 0/10  Pain altered Tx:  [x] No  [] Yes  Action:  Comments: Pt arrives stating she feels she may have slept wrong but \"feels okay\" overall.      Objective:  Modalities:   Precautions:  Exercises:  Exercise       Reps/ Time Weight/ Level Comments   Nustep 5' L3          mat      Supine HS stretch 3x20\"       Supine piriformis stretch 3x20\"  held  Knee to opp shoulder   SLR 2x10 A    Bridges   2x10 lime Added 11/15 added band    Ball Squeezes 20x5\"  Added reps          Sidelying      SL hip abduction 2x10 lime added reps  11/15   SL clamshells  2x10 Lime  Increased band 11/15   SL reverse clam 15x orange Added  Prone      Hip flexor stretch 20\"x3 ea   Added 11/15   Prone hip extension  10xea A Added 11/17         Seated       piriformis stretch 20\"x3 ea  Added 11/22                    standing         gastroc stretch 3x30\"       Hamstring stretch 3x30\" ea  not today     Hip ext 10x ea  lime Added band 11/22    Hip abd 10x ea  lime Added band 11/22    hipflexion 10x  lime Added 11/24   Side Stepping   2 laps  orange Added 11/24   Mini Squats   10x                 Other:      Manual: bilateral piriformis and glute musculature and L/R ITB as needed with hypervolt 14'    Specific Instructions for next treatment: progress hip girdle strengthening globally, hip mobility, and gait,         Treatment Charges: Mins Units   []  Modalities     [x]  Ther Exercise 40 3   [x]  Manual Therapy 14 1   []  Ther Activities     []  Aquatics     []  Vasocompression     []  Other     Total Treatment time 54 3     Total estimated medicare costs as of 11/24/21: $489.55    Assessment: [x] Progressing toward goals. Increased level for warm up this date and added side stepping with resistance this date. Overall good tolerance to all with relief noted from manual treatment. Also added reverse clamshells this date. [] No change. [] Other:  [x] Patient would continue to benefit from skilled physical therapy services in order to: reduce bilateral hip pain, restore hip strength and mobility, and improve gait to return patient to prior level of function. STG/LTG   STG: (to be met in 8 treatments)  1. ? Pain: Decrease bilateral hip pain levels to 4/10 or less to improve tolerance to therapeutic exercise progressions. 2. ? ROM: Increase bilateral hip IR/ER ROM to at least 40 degrees, L hip extension to 15 degrees to reduce difficulty with ADLs. a. Pt to demonstrate improved BLE flexibility with SLR to at least 75 degrees bilaterally to improve gait mechanics. 3. ? Strength:  Increase bilateral hip strength to 5/5 globally to improve tolerance to prolonged standing and ambulation. 4. ? Function: Pt to be able to stand/walk for at least 30 minutes without an increase in hip pain. 5. Pt to be independent and compliant with Home Exercise Programs        LTG: (to be met in 12 treatments)  1. Improve score on assessment tool LEFI from 36.25% impaired to 25% impaired or less. 2. Reduce bilateral hip pain levels to 0-1/10 to reduce difficulty with recreational walking. 3. Pt to be able to ambulate at least 150 feet with improved gait mechanics and without an increase in bilateral hip pain. Patient goals: to be able to walk 2-3 miles without pain    Pt. Education:  [x] Yes  [] No  [x] Reviewed Prior HEP/Ed  Method of Education: [x] Verbal  [x] Demo  [] Written  11/8- HEP provided of charted exercises, Nemesio De Santiago #[id number]  Comprehension of Education:  [] Verbalizes understanding. [] Demonstrates understanding. [] Needs review. [x] Demonstrates/verbalizes HEP/Ed previously given. Plan: [x] Continue current frequency toward long and short term goals.     [x] Specific Instructions for subsequent treatments: progress hip girdle strengthening globally, hip mobility, and gait, manual to bilateral piriformis and L ITB as needed       Time In: 955 am           Time Out: 10:50 am    Electronically signed by:  Esperanza Gambino PTA

## 2021-11-29 ENCOUNTER — HOSPITAL ENCOUNTER (OUTPATIENT)
Dept: PHYSICAL THERAPY | Facility: CLINIC | Age: 56
Setting detail: THERAPIES SERIES
Discharge: HOME | End: 2021-11-29
Payer: MEDICARE

## 2021-11-29 PROCEDURE — 97110 THERAPEUTIC EXERCISES: CPT

## 2021-11-29 NOTE — FLOWSHEET NOTE
[] [facility]) - [facility] &  Therapy  [address]  [phone]  F: [phone] [x] [address]  [facility]   [address]  P: [phone]  F: [phone] [] [address] &  Therapy  [address] [facility]  P: [phone]  F: [phone] [] [address]  P: [phone]  F: [phone] [] [address]  [facility]   [address]   [place]: [phone]  F: [phone]      Physical Therapy Daily Treatment Note    Date:  2021  Patient Name:  Adonis Pimentel    :  1965  MRN: [id number]  Physician: Jim Funk DO                                Insurance: [facility] (v based on med [facility])  Medical Diagnosis: M70.62 - Trochanteric bursitis of left hip, M70.61- Trochanteric bursitis of right hip  Rehab Codes: M70.62, M70.61, R26.89, M62.81  Onset date: referral 10/4/21                         Next Dr's appt.: 21  Visit# / total visits: ; Progress note for Medicare patient due at visit 10     Cancels/No Shows: 0/0    Subjective:    Pain:  [x] Yes  [] No Location: bilateral  Pain Rating: (0-10 scale) 0/10  Pain altered Tx:  [x] No  [] Yes  Action:  Comments:  Pt arrives denying changes this date.      Objective:  Modalities:   Precautions:  Exercises:  Exercise       Reps/ Time Weight/ Level Comments   Nustep 5' L3          mat      Supine HS stretch 3x20\"       Supine piriformis stretch 3x20\"  held  Knee to opp shoulder   SLR 2x10 A    Bridges   2x10 lime Added 11/15 added band    Ball Squeezes 20x5\"  Added reps          Sidelying      SL hip abduction 2x10 lime added reps  11/15   SL clamshells  2x10 Lime  Increased band 11/15   SL reverse clam 15x orange Added    Prone      Hip flexor stretch 20\"x3 ea Added 11/15   Prone hip extension  10xea A Added 11/17         Seated       piriformis stretch 20\"x3 ea  Added 11/22                    standing         gastroc stretch 3x30\"       Hamstring stretch 3x30\" ea  not today     Hip ext 10x ea  lime Added band 11/22    Hip abd 10x ea  lime Added band 11/22    hipflexion 10x  lime Added 11/24   Side Stepping   2 laps  orange Added 11/24   Mini Squats   10x  not today                Other:      Manual: bilateral piriformis and glute musculature and L/R ITB as needed with hypervolt 10'    Specific Instructions for next treatment: progress hip girdle strengthening globally, hip mobility, and gait,         Treatment Charges: Mins Units   []  Modalities     [x]  Ther Exercise 40 3   [x]  Manual Therapy 10 -   []  Ther Activities     []  Aquatics     []  Vasocompression     []  Other     Total Treatment time 50 3     Total estimated medicare costs as of 11/29/21: $567.75    Assessment: [x] Progressing toward goals. Pt with overall good tolerance to treatment and denies pain through out. Pt does however note muscle soreness. Pt required intermittent cueing to ensure proper technique of exercises. Some progressions made this date with increased resistance. Continued to complete manual to global hip musculature, will continue in future sessions as needed. [] No change. [] Other:  [x] Patient would continue to benefit from skilled physical therapy services in order to: reduce bilateral hip pain, restore hip strength and mobility, and improve gait to return patient to prior level of function. STG/LTG   STG: (to be met in 8 treatments)  1. ? Pain: Decrease bilateral hip pain levels to 4/10 or less to improve tolerance to therapeutic exercise progressions. 2. ? ROM: Increase bilateral hip IR/ER ROM to at least 40 degrees, L hip extension to 15 degrees to reduce difficulty with ADLs. a.  Pt to demonstrate improved BLE flexibility with SLR to at least 75 degrees bilaterally to improve gait mechanics. 3. ? Strength: Increase bilateral hip strength to 5/5 globally to improve tolerance to prolonged standing and ambulation. 4. ? Function: Pt to be able to stand/walk for at least 30 minutes without an increase in hip pain. 5. Pt to be independent and compliant with Home Exercise Programs        LTG: (to be met in 12 treatments)  1. Improve score on assessment tool LEFI from 36.25% impaired to 25% impaired or less. 2. Reduce bilateral hip pain levels to 0-1/10 to reduce difficulty with recreational walking. 3. Pt to be able to ambulate at least 150 feet with improved gait mechanics and without an increase in bilateral hip pain. Patient goals: to be able to walk 2-3 miles without pain    Pt. Education:  [x] Yes  [] No  [x] Reviewed Prior HEP/Ed  Method of Education: [x] Verbal  [x] Demo  [] Written  11/8- HEP provided of charted exercisesMarti Collins #[id number]  Comprehension of Education:  [] Verbalizes understanding. [] Demonstrates understanding. [] Needs review. [x] Demonstrates/verbalizes HEP/Ed previously given. Plan: [x] Continue current frequency toward long and short term goals.     [x] Specific Instructions for subsequent treatments: progress hip girdle strengthening globally, hip mobility, and gait, manual to bilateral piriformis and L ITB as needed       Time In: 1001am           Time Out: 10:56 am    Electronically signed by:  Citlali Tong PTA

## 2021-12-01 ENCOUNTER — HOSPITAL ENCOUNTER (OUTPATIENT)
Dept: PHYSICAL THERAPY | Facility: CLINIC | Age: 56
Setting detail: THERAPIES SERIES
Discharge: HOME | End: 2021-12-01

## 2021-12-06 ENCOUNTER — HOSPITAL ENCOUNTER (OUTPATIENT)
Dept: PHYSICAL THERAPY | Facility: CLINIC | Age: 56
Setting detail: THERAPIES SERIES
Discharge: HOME | End: 2021-12-06

## 2021-12-14 DIAGNOSIS — K21.9 GASTROESOPHAGEAL REFLUX DISEASE: ICD-10-CM

## 2021-12-14 NOTE — TELEPHONE ENCOUNTER
Last visit: 11/17/21  Last Med refill: 6/14/21  Does patient have enough medication for 72 hours: No    Next Visit Date:  Future Appointments   Date Time Provider Sylvester Collins   12/27/2021  9:20 AM DO JOHNNA Henderson   1/27/2022  8:30 AM Daisy Mendoza MD [facility] Neph [facility] None   2/11/2022  3:00  Phillip Arias MD [facility]   2/16/2022  9:30 AM COLTON Brown CNP [facility]   3/15/2022  9:00 AM Lazarus Householder, APRN - CNP Neuro Spec [facility]   8/10/2022  8:30 [facility]       Health Maintenance   Topic Date Due    COVID-19 Vaccine (1) Never done    Hepatitis B vaccine (1 of 3 - Risk 3-dose series) Never done    Shingles Vaccine (1 of 2) 01/18/2022 (Originally 7/29/2015)    Diabetic retinal exam  01/12/2022    Annual Wellness Visit (AWV)  08/10/2022    Diabetic foot exam  08/16/2022    A1C test (Diabetic or Prediabetic)  10/14/2022    Lipid screen  10/14/2022    TSH testing  10/14/2022    Potassium monitoring  10/14/2022    Creatinine monitoring  10/14/2022    Breast cancer screen  11/11/2022    DTaP/Tdap/Td vaccine (3 - Td or Tdap) 09/14/2027    Colon cancer screen colonoscopy  07/02/2029    Pneumococcal 0-64 years Vaccine (2 of 2 - PPSV23) 07/29/2030    Flu vaccine  Completed    Hepatitis C screen  Completed    HIV screen  Completed    Hepatitis A vaccine  Aged Out    Hib vaccine  Aged Out    Meningococcal (ACWY) vaccine  Aged Out       Hemoglobin A1C (%)   Date Value   10/14/2021 5.2   04/13/2021 5.1   01/18/2021 5.2             ( goal A1C is < 7)   Microalb/Crt.  Ratio (mcg/mg creat)   Date Value   01/18/2021 CANNOT BE CALCULATED     LDL Cholesterol (mg/dL)   Date Value   10/14/2021 108   01/18/2021 93       (goal LDL is <100)   AST (U/L)   Date Value   10/14/2021 20     ALT (U/L)   Date Value   10/14/2021 24     BUN (mg/dL)   Date Value   10/14/2021 16     BP Readings from Last 3 Encounters:   11/17/21 130/84   09/13/21 (!) 163/108   08/31/21 (!) 152/88          (goal 120/80)    All Future Testing planned in [facility]  Lab Frequency Next Occurrence   CBC Auto Differential     Basic Metabolic Panel Every 16 weeks    CBC Auto Differential Every 16 weeks    Creatinine, Random Urine Every 16 weeks    Protein / creatinine ratio, urine Every 16 weeks    Protein, urine, random Every 16 weeks    CBC Auto Differential     Basic Metabolic Panel Every 16 weeks    CBC Auto Differential Every 16 weeks    Creatinine, Random Urine Every 16 weeks    Phosphorus Every 16 weeks    Protein / creatinine ratio, urine Every 16 weeks    Protein, urine, random Every 16 weeks    PTH, INTACT WITH IONIZED CALCIUM Every 16 weeks    Vitamin D 25 Hydroxy Every 16 weeks                Patient Active Problem List:     Transient insomnia     Sleep walking disorder     Raynaud's disease     Pulmonary nodules     Neuropathy     Hypertension     GERD (gastroesophageal reflux disease)     Fibromyalgia     Depression     Degenerative disc disease, thoracic     Atherosclerosis of native coronary artery of native heart with angina pectoris (HCC)     Bipolar disorder (HCC)     Asthma     Anxiety     Antinuclear antibody (IQRA) titer greater than 1:80     Anemia     Hx of Stroke (HCC)     Type 2 diabetes mellitus with stage 2 chronic kidney disease, without long-term current use of insulin (HCC)     Degenerative arthritis of right knee     S/P knee surgery     Chronic fatigue syndrome     Spondylosis of cervical region without myelopathy or radiculopathy     H/O hysterectomy with BSO at [facility] for benign disease 2014     Hx of total bilateral knee replacement     H/O: hysterectomy     Atrial fibrillation with slow ventricular response (HCC)     H/O: stroke     Hypovolemia     Hypotension     Bradycardia     Cystocele, midline     JOYCE (stress urinary incontinence, female)     Overflow incontinence     7/30/18 Cystocele repair, Cystoscopy with OBTRYX Ureteral Sling     Nuclear senile cataract     CKD (chronic kidney disease), stage II     Scleroderma ([facility])     Vitamin D deficiency     Trigger middle finger of right hand     Trigger ring finger of right hand     Osteoarthritis of spine with radiculopathy, cervical region     Degenerative disc disease, cervical     Left carpal tunnel syndrome     Spinal stenosis, cervical region     Neural foraminal stenosis of cervical spine     Closed fracture of lower end of femur ([address]     Closed supracondylar fracture of femur, left, initial encounter ([address]     Numbness     Senile osteoporosis     Acquired hypothyroidism     Age-related nuclear cataract of both eyes     Alcoholism in recovery ([address]     Bipolar affective disorder, currently depressed, mild ([facility])     Generalized anxiety disorder     Iron deficiency anemia     Iron malabsorption     Stage 3 chronic kidney disease ([address]     Dupuytren's disease of palm     Mild intermittent asthma

## 2021-12-15 RX ORDER — FAMOTIDINE 40 MG/1
TABLET, FILM COATED ORAL
Qty: 90 TABLET | Refills: 1 | Status: SHIPPED | OUTPATIENT
Start: 2021-12-15 | End: 2022-05-20 | Stop reason: SDUPTHER

## 2022-01-18 ENCOUNTER — HOSPITAL ENCOUNTER (OUTPATIENT)
Age: 57
Discharge: HOME | End: 2022-01-18
Payer: MEDICARE

## 2022-01-18 DIAGNOSIS — N18.31 STAGE 3A CHRONIC KIDNEY DISEASE (HCC): ICD-10-CM

## 2022-01-18 LAB
ABSOLUTE EOS #: 0.1 K/UL (ref 0–0.4)
ABSOLUTE IMMATURE GRANULOCYTE: ABNORMAL K/UL (ref 0–0.3)
ABSOLUTE LYMPH #: 1.1 K/UL (ref 1–4.8)
ABSOLUTE MONO #: 0.3 K/UL (ref 0.1–1.3)
ANION GAP SERPL CALCULATED.3IONS-SCNC: 14 MMOL/L (ref 9–17)
BASOPHILS # BLD: 2 % (ref 0–2)
BASOPHILS ABSOLUTE: 0.1 K/UL (ref 0–0.2)
BUN BLDV-MCNC: 13 MG/DL (ref 6–20)
BUN/CREAT BLD: ABNORMAL (ref 9–20)
CALCIUM IONIZED: 1.22 MMOL/L (ref 1.13–1.33)
CALCIUM SERPL-MCNC: 9.6 MG/DL (ref 8.6–10.4)
CALCIUM SERPL-MCNC: 9.6 MG/DL (ref 8.6–10.4)
CHLORIDE BLD-SCNC: 107 MMOL/L (ref 98–107)
CO2: 25 MMOL/L (ref 20–31)
CREAT SERPL-MCNC: 1.02 MG/DL (ref 0.5–0.9)
CREATININE URINE: 175.5 MG/DL (ref 28–217)
CREATININE URINE: 184.5 MG/DL (ref 28–217)
DIFFERENTIAL TYPE: ABNORMAL
EOSINOPHILS RELATIVE PERCENT: 3 % (ref 0–4)
ESTIMATED AVERAGE GLUCOSE: 103 MG/DL
GFR AFRICAN AMERICAN: >60 ML/MIN
GFR NON-AFRICAN AMERICAN: 56 ML/MIN
GFR SERPL CREATININE-BSD FRML MDRD: ABNORMAL ML/MIN/{1.73_M2}
GFR SERPL CREATININE-BSD FRML MDRD: ABNORMAL ML/MIN/{1.73_M2}
GLUCOSE BLD-MCNC: 100 MG/DL (ref 70–99)
HBA1C MFR BLD: 5.2 % (ref 4–6)
HCT VFR BLD CALC: 39.8 % (ref 36–46)
HEMOGLOBIN: 13.8 G/DL (ref 12–16)
IMMATURE GRANULOCYTES: ABNORMAL %
LYMPHOCYTES # BLD: 30 % (ref 24–44)
MCH RBC QN AUTO: 32.5 PG (ref 26–34)
MCHC RBC AUTO-ENTMCNC: 34.7 G/DL (ref 31–37)
MCV RBC AUTO: 93.7 FL (ref 80–100)
MONOCYTES # BLD: 9 % (ref 1–7)
NRBC AUTOMATED: ABNORMAL PER 100 WBC
PDW BLD-RTO: 13.1 % (ref 11.5–14.9)
PHOSPHORUS: 3.7 MG/DL (ref 2.6–4.5)
PLATELET # BLD: 194 K/UL (ref 150–450)
PLATELET ESTIMATE: ABNORMAL
PMV BLD AUTO: 7.9 FL (ref 6–12)
POTASSIUM SERPL-SCNC: 4.3 MMOL/L (ref 3.7–5.3)
PTH INTACT: 155.2 PG/ML (ref 15–65)
PTH INTACT: 155.2 PG/ML (ref 15–65)
RBC # BLD: 4.25 M/UL (ref 4–5.2)
RBC # BLD: ABNORMAL 10*6/UL
SEG NEUTROPHILS: 56 % (ref 36–66)
SEGMENTED NEUTROPHILS ABSOLUTE COUNT: 2.1 K/UL (ref 1.3–9.1)
SODIUM BLD-SCNC: 146 MMOL/L (ref 135–144)
TOTAL PROTEIN, URINE: 21 MG/DL
URINE TOTAL PROTEIN CREATININE RATIO: 0.12 (ref 0–0.2)
VITAMIN D 25-HYDROXY: 24.3 NG/ML (ref 30–100)
WBC # BLD: 3.8 K/UL (ref 3.5–11)
WBC # BLD: ABNORMAL 10*3/UL

## 2022-01-18 PROCEDURE — 36415 COLL VENOUS BLD VENIPUNCTURE: CPT

## 2022-01-18 PROCEDURE — 83970 ASSAY OF PARATHORMONE: CPT

## 2022-01-18 PROCEDURE — 82523 COLLAGEN CROSSLINKS: CPT

## 2022-01-18 PROCEDURE — 82330 ASSAY OF CALCIUM: CPT

## 2022-01-18 PROCEDURE — 82310 ASSAY OF CALCIUM: CPT

## 2022-01-18 PROCEDURE — 82043 UR ALBUMIN QUANTITATIVE: CPT

## 2022-01-18 PROCEDURE — 82306 VITAMIN D 25 HYDROXY: CPT

## 2022-01-18 PROCEDURE — 82570 ASSAY OF URINE CREATININE: CPT

## 2022-01-18 PROCEDURE — 84156 ASSAY OF PROTEIN URINE: CPT

## 2022-01-18 PROCEDURE — 82728 ASSAY OF FERRITIN: CPT

## 2022-01-18 PROCEDURE — 83036 HEMOGLOBIN GLYCOSYLATED A1C: CPT

## 2022-01-18 PROCEDURE — 84100 ASSAY OF PHOSPHORUS: CPT

## 2022-01-18 PROCEDURE — 80048 BASIC METABOLIC PNL TOTAL CA: CPT

## 2022-01-18 PROCEDURE — 85025 COMPLETE CBC W/AUTO DIFF WBC: CPT

## 2022-01-19 LAB — FERRITIN: NORMAL UG/L (ref 13–150)

## 2022-01-20 LAB
CREATININE URINE: 185.2 MG/DL (ref 28–217)
MICROALBUMIN/CREAT 24H UR: 28 MG/L
MICROALBUMIN/CREAT UR-RTO: 15 MCG/MG CREAT

## 2022-01-21 LAB
SEND OUT REPORT: NORMAL
TEST NAME: NORMAL

## 2022-01-22 LAB — VITAMIN D 25-HYDROXY: 27.5 NG/ML (ref 30–100)

## 2022-01-31 DIAGNOSIS — D50.9 IRON DEFICIENCY ANEMIA, UNSPECIFIED IRON DEFICIENCY ANEMIA TYPE: Primary | ICD-10-CM

## 2022-01-31 DIAGNOSIS — K90.9 IRON MALABSORPTION: ICD-10-CM

## 2022-01-31 DIAGNOSIS — Z98.84 H/O GASTRIC BYPASS: ICD-10-CM

## 2022-02-02 ENCOUNTER — HOSPITAL ENCOUNTER (OUTPATIENT)
Age: 57
Discharge: HOME | End: 2022-02-02
Payer: MEDICARE

## 2022-02-02 DIAGNOSIS — Z98.84 H/O GASTRIC BYPASS: ICD-10-CM

## 2022-02-02 DIAGNOSIS — K90.9 IRON MALABSORPTION: ICD-10-CM

## 2022-02-02 DIAGNOSIS — D50.9 IRON DEFICIENCY ANEMIA, UNSPECIFIED IRON DEFICIENCY ANEMIA TYPE: ICD-10-CM

## 2022-02-02 LAB
FERRITIN: 77 UG/L (ref 13–150)
IRON SATURATION: 27 % (ref 20–55)
IRON: 85 UG/DL (ref 37–145)
TOTAL IRON BINDING CAPACITY: 310 UG/DL (ref 250–450)
UNSATURATED IRON BINDING CAPACITY: 225 UG/DL (ref 112–347)

## 2022-02-02 PROCEDURE — 83550 IRON BINDING TEST: CPT

## 2022-02-02 PROCEDURE — 36415 COLL VENOUS BLD VENIPUNCTURE: CPT

## 2022-02-02 PROCEDURE — 82728 ASSAY OF FERRITIN: CPT

## 2022-02-02 PROCEDURE — 83540 ASSAY OF IRON: CPT

## 2022-02-08 NOTE — DISCHARGE SUMMARY
[] [facility]        Outpatient Physical                Therapy       [address]       Phone: [phone]       Fax: [phone] [x] [facility]       Promotion at [address] [facility]       Phone: [phone]       Fax: [phone] [] [facility]      for Health Promotion     [address] [facility]     Phone: [phone]     Fax:  [phone]     Physical Therapy Discharge Note    Date: 2022      Patient: Fide Pelayo  : 1965  MRN: [id number]    Physician: Sukumar Liu DO                                Insurance: [employer] Medicare (v based on med necessity)  Medical Diagnosis: M70.62 - Trochanteric bursitis of left hip, M70.61- Trochanteric bursitis of right hip  Rehab Codes: M70.62, M70.61, R26.89, M62.81  Onset date: referral 10/4/21                         Next 's appt.: 21  Visit# / total visits: ; Progress note for Medicare patient due at visit 10                                    Cancels/No Shows:   Date of initial visit: 21               Date of final visit: 21       Discharge Status:     Pt failed to make additional appointments for therapy. Pt. Is now discharged. Electronically signed by: Mally Maki PT    If you have any questions or concerns, please don't hesitate to call.   Thank you for your referral.

## 2022-02-14 ENCOUNTER — HOSPITAL ENCOUNTER (OUTPATIENT)
Facility: MEDICAL CENTER | Age: 57
End: 2022-02-14

## 2022-03-04 ENCOUNTER — HOSPITAL ENCOUNTER (OUTPATIENT)
Facility: MEDICAL CENTER | Age: 57
End: 2022-03-04

## 2022-03-11 ENCOUNTER — OFFICE VISIT (OUTPATIENT)
Dept: ONCOLOGY | Age: 57
End: 2022-03-11
Payer: MEDICARE

## 2022-03-11 ENCOUNTER — TELEPHONE (OUTPATIENT)
Dept: ONCOLOGY | Age: 57
End: 2022-03-11

## 2022-03-11 VITALS
HEART RATE: 79 BPM | SYSTOLIC BLOOD PRESSURE: 177 MMHG | TEMPERATURE: 97.5 F | BODY MASS INDEX: 31.5 KG/M2 | RESPIRATION RATE: 16 BRPM | DIASTOLIC BLOOD PRESSURE: 106 MMHG | WEIGHT: 207.2 LBS

## 2022-03-11 DIAGNOSIS — D50.9 IRON DEFICIENCY ANEMIA, UNSPECIFIED IRON DEFICIENCY ANEMIA TYPE: ICD-10-CM

## 2022-03-11 DIAGNOSIS — D50.9 IRON DEFICIENCY ANEMIA, UNSPECIFIED IRON DEFICIENCY ANEMIA TYPE: Primary | ICD-10-CM

## 2022-03-11 PROCEDURE — 99211 OFF/OP EST MAY X REQ PHY/QHP: CPT | Performed by: INTERNAL MEDICINE

## 2022-03-11 PROCEDURE — 99214 OFFICE O/P EST MOD 30 MIN: CPT | Performed by: INTERNAL MEDICINE

## 2022-03-11 NOTE — PROGRESS NOTES
DIAGNOSIS:   1. Anemia, malabsorption component  2. History of gastric bypass surgery  3. History of iron deficiency and B12 likely due to malabsorption    CURRENT THERAPY:  IV iron as needed  Observation     BRIEF CASE HISTORY:   Rossy Peña is a very pleasant 64 y.o. female who is referred to us for anemia. She reports she has long history of anemia and has received IV iron in the past and been on oral iron for the last 10 years. Her last iron infusion was 2014 at [facility]. She had gastric by-pass in 2012, she did have anemia prior but worsened following surgery. She does have fatigue, PICA, shortness of breath. She had colonoscopy 2019 which was negative, she does have chronic colonoscopy. She has history of B12 deficiency and takes oral supplementation. She has parathyroid issue and takes synthroid. She has multiple complications and is seen by several specialists including rheumatology, cardiology, ortho surg. She has scleroderma, osteoporosis, and Raynaud syndrome. Her scleroderma is diffused but currently well controlled. She has had both knees replacements twice. She was adopted and family history is unknown. INTERIM HISTORY: The patient presents today to discuss her lab work. She received IV iron with no complications and is feeling improved overall. She denies any swelling, bleeding, nausea, vomiting, or diarrhea. She has gained weight and feels it's related to starting new medication, she is planning to make adjustments to eating to lose weight.      PAST MEDICAL HISTORY: has a past medical history of Acute kidney injury ([address] Anemia, Angioedema, Antinuclear antibody (IQRA) titer greater than 1:80, Anxiety, Asthma, Ataxia, Atrial fibrillation ([address] Borderline personality disorder ([address] Bradycardia, CAD (coronary artery disease), Chronic kidney disease, CKD (chronic kidney disease), stage II, COVID-19, Degenerative disc disease, thoracic, Depression, Diabetes mellitus ([address] Diastolic dysfunction, DJD (degenerative joint disease), Fibromyalgia, Foot pain, right, GERD (gastroesophageal reflux disease), H/O: hysterectomy, Headache, Hernia of abdominal wall, History of blood transfusion, Hyperlipidemia, Hypertension, Lupus ([address] Mood disorder ([address] Neuropathy, Osteoarthritis, PTSD (post-traumatic stress disorder), Pulmonary nodules, Raynaud's disease, Scleroderma ([address] Scleroderma ([address] Seizures ([address] Sleep walking disorder, Thyroid disease, TIA (transient ischemic attack), Transient insomnia, Tricuspid regurgitation, Vasospasm ([address] Vitamin D deficiency, and Wears glasses. PAST SURGICAL HISTORY: has a past surgical history that includes Hysterectomy; Cholecystectomy (1989); Tonsillectomy (1986); Carpal tunnel release (Right, 2016); Wrist surgery (2004); Gastric bypass surgery (2012); Abdominoplasty (2013); Elbow surgery (Right); Total knee arthroplasty (2011); knee surgery (Right, 05/02/2017); Total knee arthroplasty (Right, 5/2/2017); gastrectomy; Nerve Block (Right, 05/04/2018); Nerve Block (Right, 06/29/2018); eye surgery; pr anterior colporraphy rpr cystocele w/cysto (N/A, 7/30/2018); bladder suspension (N/A, 7/30/2018); Nerve Block (Right, 08/10/2018); joint replacement (Bilateral); Finger surgery (Right, 08/28/2018); pr office/outpt visit,procedure only (Right, 8/28/2018); pr revise median n/carpal tunnel surg (Left, 10/23/2018); Femur fracture surgery (11/09/2018); pr office/outpt visit,procedure only (Left, 11/9/2018); fracture surgery; Colonoscopy (N/A, 6/24/2019); Colonoscopy (N/A, 7/2/2019); and Carpal tunnel release (Right, 01/02/2020).      CURRENT MEDICATIONS:  has a current medication list which includes the following prescription(s): pravastatin, famotidine, gabapentin, clonazepam, cetirizine, losartan, handicap placard, omeprazole, hydroxychloroquine, sm aspirin adult low strength, proair hfa, aripiprazole, nifedipine, cyclobenzaprine, blood glucose test strips, ibuprofen, spironolactone, albuterol sulfate hfa, ferrous sulfate, metformin, lamotrigine, felodipine, ergocalciferol, quetiapine, lamotrigine, levothyroxine, therapeutic multivitamin-minerals, epipen 2-patty, and alendronate. ALLERGIES:  is allergic to morphine, amlodipine, dilaudid [hydromorphone hcl], and sulfa antibiotics. FAMILY HISTORY: Negative for any hematological or oncological conditions. SOCIAL HISTORY:  reports that she has never smoked. She has never used smokeless tobacco. She reports previous alcohol use. She reports that she does not use drugs. REVIEW OF SYSTEMS:   General: no fever or night sweats, Weight gain. +fatigue and PICA - resolved   ENT: No double or blurred vision, no tinnitus or hearing problem, no dysphagia or sore throat   Respiratory: No chest pain, no cough or hemoptysis. +shortness of breath - resolved   Cardiovascular: Denies chest pain, PND or orthopnea. No L E swelling or palpitations. Gastrointestinal: No nausea or vomiting, abdominal pain, diarrhea or constipation. Genitourinary: Denies dysuria, hematuria, frequency, urgency or incontinence. Neurological: Denies headaches, decreased LOC, no sensory or motor focal deficits. Musculoskeletal:  No arthralgia no back pain or joint swelling. Skin: There are no rashes or bleeding. Psychiatric:  No anxiety, no depression. Endocrine: no diabetes or thyroid disease. Hematologic: no bleeding , no adenopathy. PHYSICAL EXAM: Shows a well appearing 64y.o.-year-old female who is not in pain or distress. Vital Signs: Blood pressure (!) 177/106, pulse 79, temperature 97.5 °F (36.4 °C), temperature source Temporal, resp. rate 16, weight 207 lb 3.2 oz (94 kg), not currently breastfeeding. HEENT: Normocephalic and atraumatic. Pupils are equal, round, reactive to light and accommodation. Extraocular muscles are intact. Neck: Showed no JVD, no carotid bruit . Lungs: Clear to auscultation bilaterally.  Heart: Regular without any murmur. Abdomen: Soft, nontender. No hepatosplenomegaly. Extremities: Multiple scars on both legs from previous surgery. Lower extremities show no edema, clubbing, or cyanosis. Breasts: Examination not done today. Neuro exam: intact cranial nerves bilaterally no motor or sensory deficit, gait is normal. Lymphatic: no adenopathy appreciated in the supraclavicular, axillary, cervical or inguinal area  Sclerodactyly on fingers      REVIEW OF LABORATORY DATA:   Lab Results   Component Value Date    WBC 3.8 01/18/2022    HGB 13.8 01/18/2022    HCT 39.8 01/18/2022    MCV 93.7 01/18/2022     01/18/2022       Chemistry        Component Value Date/Time     (H) 01/18/2022 1025    K 4.3 01/18/2022 1025     01/18/2022 1025    CO2 25 01/18/2022 1025    BUN 13 01/18/2022 1025    CREATININE 1.02 (H) 01/18/2022 1025        Component Value Date/Time    CALCIUM 9.6 01/18/2022 1025    ALKPHOS 147 (H) 10/14/2021 1008    AST 20 10/14/2021 1008    ALT 24 10/14/2021 1008    BILITOT 0.58 10/14/2021 1008        Lab Results   Component Value Date    IRON 85 02/02/2022    TIBC 310 02/02/2022    FERRITIN 77 02/02/2022         REVIEW OF RADIOLOGICAL RESULTS:     IMPRESSION:   1. Anemia, malabsorption component   2. Symptomatic   3. HX gastric bypass   4. Iron studies and IV iron as needed    PLAN:   1. Her lab work was reviewed, counts and ferritin are in range. 2. She received IV iron with no complications and clinical symptoms have improved. 3. We discussed weight loss strategy. 4. We will continue to monitor and plan for IV iron as needed. 5. Plan for lab work at 6 months and 1 year. 6. Return in 1 year. Scribe Attestation   This note was created by Pari Garcia acting as scribe for the physician signing this note  Electronically Signed  Pari Garcia, 3/11/2022  Scribe, [address] [employer]. Attending Attestation   Note was reviewed and edited.   I am in agreement with the note as entered    Quinten Skiff MD [place]  Hematologist/Medical Oncologist  [id number] [facility] hematology oncology physicians

## 2022-03-11 NOTE — TELEPHONE ENCOUNTER
Bienvenido Cruz MD VISIT  DR Denise Boland IN TO SEE PATIENT  ORDERS RECEIVED  NEED CBC FERRITIN EVERY 6 MONTHS  RV IN 1 YEAR  LABS CDP FERRITIN, 2 SETS OF ORDERS MAILED TO PT  MD VISIT DUE MARCH 2023, AVS PRINTED AND PLACED INTO CALL FOLDER TO SCHEDULE PT  AVS PRINTED AND GIVEN TO PATIENT WITH INSTRUCTIONS  PATIENT DISCHARGED AMBULATORY

## 2022-03-15 ENCOUNTER — OFFICE VISIT (OUTPATIENT)
Dept: NEUROLOGY | Age: 57
End: 2022-03-15
Payer: MEDICARE

## 2022-03-15 VITALS
SYSTOLIC BLOOD PRESSURE: 162 MMHG | HEIGHT: 68 IN | BODY MASS INDEX: 31.61 KG/M2 | DIASTOLIC BLOOD PRESSURE: 104 MMHG | WEIGHT: 208.6 LBS | HEART RATE: 74 BPM

## 2022-03-15 DIAGNOSIS — R20.0 NUMBNESS: Primary | ICD-10-CM

## 2022-03-15 DIAGNOSIS — G62.9 SENSORY NEUROPATHY: ICD-10-CM

## 2022-03-15 DIAGNOSIS — M47.812 SPONDYLOSIS OF CERVICAL REGION WITHOUT MYELOPATHY OR RADICULOPATHY: ICD-10-CM

## 2022-03-15 PROCEDURE — 99214 OFFICE O/P EST MOD 30 MIN: CPT | Performed by: NURSE PRACTITIONER

## 2022-03-15 NOTE — PROGRESS NOTES
[facility]            [address]          [place], [address] [facility]          Dept: [phone]          Dept Fax: [phone]    MD Kuldip Wilson MD Ahmed B. Marinell Alvine, MD Vermell Exon, MD Avon Fallow, CNP            3/15/2022      HISTORY OF PRESENT ILLNESS:       I had the pleasure of seeing Lito Ruiz, who returns for continuing neurologic care. The patient was seen last on September 13, 2021 for treatment of chronic cervical radiculopathy, new onset pain when walking radiating into her bilateral hips, and diminished sensation in her feet bilaterally. The patient has chronic cervical radiculopathy with neck pain radiating into the right shoulder. The patient displays  diminished temperature and pinprick sensation to the bilateral ankles. The patient is prescribed gabapentin 1200 mg three times daily. The patient has new onset pain when walking radiating into her bilateral hips. The patient was referred to Dr. Harman Martin, orthopedics, at her last visit. The patient was diagnosed with trochanteric bursitis of the hips bilaterally. The patient followed with physical therapy. The patient has diminished sensation in her feet bilaterally which is likely associated with peripheral neuropathy. She also has gait instability associated with her symptoms. The patient obtained EMG/NCV studies of bilateral lower extremities which showed presence of neuropathy. The patient is here today reporting increased stumbling associated with her neuropathy. The patient reports increased trouble walking in movie theater and balancing on dock.      Testing reviewed:  Hemoglobin A1C 1/18/2022  Hemoglobin A1C 4.0 - 6.0 % 5.2  5.2  5.1  5.2  5.5  5.6  5.2    Estimated Avg Glucose mg/dL 103  103 CM  100 CM  103 CM  111 CM  114 CM     PTH 1/18/2022  Pth Intact 15.0 - 65.0 pg/mL 155.2 High   140.3 High           EMG/NCV studies of bilateral lower extremities 11/11/2021  Abnormal electrodiagnostic study. 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 However, patient's feet were cool to the touch and cold temperature of the feet could also result in prolonged latency of the sensory nerves noted on today's test.  There is no evidence of bilateral lumbosacral radiculopathy, plexopathy, or myopathy on today's test.     MRI cervical spine 4/2/18  Impression   1. Mild degenerative disc disease at C5-C6 with mild spinal canal stenosis. Severe right neural foraminal stenosis at this level. 2. Minimal spinal canal stenosis at C3-C4 and C4-C5.                    - MRI brain 7/2/18  pression   1. No ictal focus is identified. 2. Trace chronic microvascular white matter ischemic disease.    3. There is a partially empty sella.  Correlate with clinical signs/symptoms   of endocrine dysfunction as well as serum laboratory values.      -EMG lower extremities 10/2/18  This is a normal electrophysiological study. Tab Spatz is no evidence of large fiber sensory neuropathy, lumbosacral plexopathy or radiculopathy.  If clinically indicated, a skin punch biopsy may be obtained to rule out small fiber neuropathy.  Clinical correlation is recommended.         PAST MEDICAL HISTORY:         Diagnosis Date    Acute kidney injury ([facility]     Anemia     Angioedema     Antinuclear antibody (IQRA) titer greater than 1:80     Anxiety     Asthma     Ataxia     Atrial fibrillation (HCC)     Borderline personality disorder (HCC)     Bradycardia     CAD (coronary artery disease)     Chronic kidney disease     CKD (chronic kidney disease), stage II 8/23/2018    COVID-19 01/01/2021    Degenerative disc disease, thoracic     Depression     Diabetes mellitus (HCC)     Diastolic dysfunction     DJD (degenerative joint disease)     Fibromyalgia     Foot pain, right     GERD (gastroesophageal reflux disease)  H/O: hysterectomy     Headache     Hernia of abdominal wall     History of blood transfusion     no problem    Hyperlipidemia     Hypertension     Lupus (HCC)     Mood disorder (HCC)     Neuropathy     Osteoarthritis     PTSD (post-traumatic stress disorder)     Pulmonary nodules     Raynaud's disease     Scleroderma ([address]     Scleroderma ([address] 8/23/2018    Seizures (HCC)     Sleep walking disorder     Thyroid disease     TIA (transient ischemic attack)     Transient insomnia     Tricuspid regurgitation     Vasospasm ([address] 01/2016    bilat. legs. resulting in near complete absence of profusion to arteries of feet. [facility]     Vitamin D deficiency 8/23/2018    Wears glasses         PAST SURGICAL HISTORY:         Procedure Laterality Date    ABDOMINOPLASTY  2013    BLADDER SUSPENSION N/A 7/30/2018    CYSTOSCOPY WITH OBTRYX MID URETHRAL SLING performed by Ketty Liu MD at [address] Right 2016    CARPAL TUNNEL RELEASE Right 01/02/2020    CHOLECYSTECTOMY  1989    COLONOSCOPY N/A 6/24/2019    COLORECTAL CANCER SCREENING, NOT HIGH RISK performed by Bam Metzger MD at [address] 7/2/2019    COLORECTAL CANCER SCREENING, NOT HIGH RISK performed by Bam Metzger MD at [address] Right     EYE SURGERY      [email or web address]. lasik     FEMUR FRACTURE SURGERY  11/09/2018    FINGER SURGERY Right 08/28/2018    RING CARTER MASS EXCISION, TRIGGER RELEASE    FRACTURE SURGERY      left femur    GASTRECTOMY      GASTRIC BYPASS SURGERY  2012    HYSTERECTOMY      JOINT REPLACEMENT Bilateral     knees    KNEE SURGERY Right 05/02/2017    (femoral) patella replacement    NERVE BLOCK Right 05/04/2018     cervical facet #1 no steroid    NERVE BLOCK Right 06/29/2018    rt cervical diagnostic #2 decadron 10mg    NERVE BLOCK Right 08/10/2018    right cervical rfa decadron 10mg    MI ANTERIOR COLPORRAPHY RPR CYSTOCELE W/CYSTO N/A 7/30/2018 CYSTOCELE REPAIR performed by Mora Peralta DO at [address] [facility] OFFICE/OUTPT [address] Right 8/28/2018    RING CARTER MASS EXCISION, TRIGGER RELEASE, [address] performed by Matthew Koehler DO at [address] OFFICE/OUTPT VISIT,PROCEDURE ONLY Left 11/9/2018    FEMUR RETROGRADE IM NAILING PERIPROSTHETIC FRACTURE performed by Matthew Koehler DO at [address] N/CARPAL TUNNEL SURG Left 10/23/2018    CARPAL TUNNEL RELEASE performed by Matthew Koehler DO at [address]  2011    left knee    TOTAL KNEE ARTHROPLASTY Right 5/2/2017    PATELLOFEMORAL REPLACEMENT KNEE - [facility], [address] TABLE, FEMORAL POPLITEAL BLOCK, NSA= SPINAL VS GENERAL performed by Matthew Koehler DO at [address]  2004        SOCIAL HISTORY:     Social History     Socioeconomic History    Marital status:      Spouse name: Not on file    Number of children: Not on file    Years of education: Not on file    Highest education level: Not on file   Occupational History    Not on file   Tobacco Use    Smoking status: Never Smoker    Smokeless tobacco: Never Used   Vaping Use    Vaping Use: Never used   Substance and Sexual Activity    Alcohol use: Not Currently    Drug use: No    Sexual activity: Not Currently     Birth control/protection: Surgical     Comment: pt has hysterectomy   Other Topics Concern    Not on file   Social History Narrative    Not on file     Social Determinants of Health     Financial Resource Strain: Low Risk     Difficulty of Paying Living Expenses: Not hard at all   Food Insecurity: No Food Insecurity    Worried About [facility] in the Last Year: Never true    [address] [facility] in the Last Year: Never true   Transportation Needs:     Lack of Transportation (Medical): Not on file    Lack of Transportation (Non-Medical):  Not on file   Physical Activity:     Days of Exercise per Week: Not on file    Minutes of Exercise per Session: Not on file   Stress:     Feeling of Stress : Not on file   Social Connections:     Frequency of Communication with Friends and Family: Not on file    Frequency of Social Gatherings with Friends and Family: Not on file    Attends Jew Services: Not on file    Active Member of Clubs or Organizations: Not on file    Attends Club or Organization Meetings: Not on file    Marital Status: Not on file   Intimate Partner Violence:     Fear of Current or Ex-Partner: Not on file    Emotionally Abused: Not on file    Physically Abused: Not on file    Sexually Abused: Not on file   Housing Stability:     Unable to Pay for Housing in the Last Year: Not on file    Number of Places Lived in the Last Year: Not on file    Unstable Housing in the Last Year: Not on file       CURRENT MEDICATIONS:     Current Outpatient Medications   Medication Sig Dispense Refill    pravastatin (PRAVACHOL) 40 MG tablet TAKE 1 TABLET BY MOUTH EVERY DAY 90 tablet 1    famotidine (PEPCID) 40 MG tablet TAKE 1 TABLET BY MOUTH IN THE EVENING 90 tablet 1    gabapentin (NEURONTIN) 600 MG tablet TAKE 2 TABLETS BY MOUTH THREE TIMES A  tablet 11    clonazePAM (KLONOPIN) 1 MG tablet TAKE 1 TABLET BY MOUTH THREE TIMES A DAY AS NEEDED      cetirizine (ZYRTEC) 10 MG tablet TAKE 1 TABLET BY MOUTH ONE TIME A DAY 90 tablet 1    losartan (COZAAR) 100 MG tablet TAKE 1 TABLET BY MOUTH ONE TIME A DAY 90 tablet 1    Handicap Placard MISC by Does not apply route Exp: 7/13/2026 1 each 0    omeprazole (PRILOSEC) 20 MG delayed release capsule TAKE 1 CAPSULE BY MOUTH TWO TIMES A DAY  180 capsule 1    hydroxychloroquine (PLAQUENIL) 200 MG tablet TAKE 1 TABLET BY MOUTH EVERY DAY  90 tablet 1    SM ASPIRIN ADULT LOW STRENGTH 81 MG EC tablet TAKE 1 TABLET BY MOUTH TWO TIMES A DAY  180 tablet 1    PROAIR  (90 Base) MCG/ACT inhaler INHALE 2 PUFFS BY MOUTH EVERY SIX HOURS AS NEEDED FOR WHEEZING 1 Inhaler 5    ARIPiprazole (ABILIFY) 2 MG tablet Take 5 mg by mouth daily       NIFEdipine (ADALAT CC) 60 MG extended release tablet Take 90 mg by mouth daily       cyclobenzaprine (FLEXERIL) 10 MG tablet TAKE 1 TABLET BY MOUTH ONE TIME A DAY AT NIGHT AS NEEDED FOR MUSCLE SPASM 90 tablet 1    blood glucose monitor strips Test 4 times a day & as needed for symptoms of irregular blood glucose. 100 strip 5    ibuprofen (ADVIL;MOTRIN) 800 MG tablet Take 1 tablet by mouth as needed For post op pain prescribed by surgeon after right CTR sx      spironolactone (ALDACTONE) 25 MG tablet Take 25 mg by mouth daily      albuterol sulfate HFA (VENTOLIN HFA) 108 (90 Base) MCG/ACT inhaler Inhale 2 puffs into the lungs every 6 hours as needed for Wheezing 1 Inhaler 3    ferrous sulfate 325 (65 Fe) MG tablet TAKE 1 TABLET BY MOUTH TWO TIMES A DAY  60 tablet 2    metFORMIN (GLUCOPHAGE) 500 MG tablet Take 500 mg by mouth daily       lamoTRIgine (LAMICTAL) 25 MG tablet Take 25 mg by mouth 2 times daily       felodipine (PLENDIL) 10 MG extended release tablet Take 10 mg by mouth daily Prescribed by Dr. Murray Kumar ergocalciferol (ERGOCALCIFEROL) 86115 units capsule Take 50,000 Units by mouth See Admin Instructions Takes twice a week / taking 3 times weekly      QUEtiapine (SEROQUEL) 300 MG tablet Take 0.5 tablets by mouth nightly (Patient taking differently: Take 300 mg by mouth nightly Take 2 tablets at bedtime.  600 mg) 60 tablet 3    lamoTRIgine (LAMICTAL) 100 MG tablet Take 100 mg by mouth every evening       levothyroxine (SYNTHROID) 25 MCG tablet Take 25 mcg by mouth Daily      Multiple Vitamins-Minerals (THERAPEUTIC MULTIVITAMIN-MINERALS) tablet Take 1 tablet by mouth daily      EPIPEN 2-JIGAR 0.3 MG/0.3ML SOAJ injection INJECT 1 PEN INTO THE MUSCLE ONCE AS NEEDED FOR UP TO 1 DOSE FOR ANGIOEDEMA. (Patient not taking: Reported on 3/11/2022) 2 each 5    alendronate (FOSAMAX) 70 MG tablet Take 70 mg by mouth every 7 days (Patient not taking: Reported on 3/11/2022)       No current facility-administered medications for this visit. ALLERGIES:     Allergies   Allergen Reactions    Morphine Nausea And Vomiting     Pt states it changes her mental status    Amlodipine Other (See Comments)     diarrhea and stress    Dilaudid [Hydromorphone Hcl] Hives and Itching    Sulfa Antibiotics Hives and Rash                                 REVIEW OF SYSTEMS        All items selected indicate a positive finding. Those items not selected are negative.   Constitutional [] Weight loss/gain   [] Fatigue  [] Fever/Chills   HEENT [] Hearing Loss  [] Visual Disturbance  [] Tinnitus  [] Eye pain   Respiratory [] Shortness of Breath  [] Cough  [] Snoring   Cardiovascular [] Chest Pain  [] Palpitations  [] Lightheaded   GI [] Constipation  [] Diarrhea  [] Swallowing change  [] Nausea/vomiting    [] Urinary Frequency  [] Urinary Urgency   Musculoskeletal [] Neck pain  [] Back pain  [] Muscle pain  [] Restless legs   Dermatologic [] Skin changes   Neurologic [] Memory loss/confusion  [] Seizures  [x] Trouble walking or imbalance  [] Dizziness  [] Sleep disturbance  [] Weakness  [x] Numbness  [] Tremors  [] Speech Difficulty  [] Headaches  [] Light Sensitivity  [] Sound Sensitivity   Endocrinology []Excessive thirst  []Excessive hunger   Psychiatric [] Anxiety/Depression  [] Hallucination   Allergy/immunology []Hives/environmental allergies   Hematologic/lymph [] Abnormal bleeding  [] Abnormal bruising         PHYSICAL EXAMINATION:       Vitals:    03/15/22 0916   BP: (!) 162/104   Pulse: 74                                              .                                                                                                    General Appearance:  Alert, cooperative, no signs of distress, appears stated age   Head:  Normocephalic, no signs of trauma   Eyes:  Conjunctiva/corneas clear;  eyelids intact   Ears:  Normal external ear and canals   Nose: Nares normal, mucosa normal, no drainage    Throat: Lips and tongue normal; teeth normal;  gums normal   Neck: Supple, intact flexion, extension and rotation;   trachea midline;  no adenopathy;   thyroid: not enlarged;   no carotid pulse abnormality   Back:   Symmetric, no curvature, ROM adequate   Lungs:   Respirations unlabored   Heart:  Regular rate and rhythm           Extremities: Extremities normal, no cyanosis, no edema   Pulses: Symmetric over head and neck   Skin: Skin color, texture normal, no rashes, no lesions                                     NEUROLOGIC EXAMINATION    Neurologic Exam  Mental status    Alert and oriented x 3; intact memory with no confusion, speech or language problems; no hallucinations or delusions  Fund of information appropriate for level of education    Cranial nerves    II - visual fields intact to confrontation bilaterally  III, IV, VI - extra-ocular muscles full: no pupillary defect; no LYNSEY, no nystagmus, no ptosis   V - normal facial sensation                                                               VII - normal facial symmetry                                                             VIII - intact hearing                                                                             IX, X - symmetrical palate                                                                  XI - symmetrical shoulder shrug                                                       XII - tongue midline without atrophy or fasciculation      Motor function  Normal muscle bulk and tone; strength 5/5 on all 4 extremities, no pronator drift      Sensory function Intact to light touch, pinprick, vibration, proprioception on all 4 extremities      Cerebellar Intact fine motor movement. No involuntary movements or tremors. No ataxia or dysmetria on finger to nose or heel to shin testing      Reflex function DTR 2+ on bilateral UE and LE, symmetric.  Down going toes bilaterally      Gait normal base and arm swing                  Medical Decision Making: In summary, your patient, Brandon Fang exhibits the following, with associated plan:    Chronic cervical radiculopathy with neck pain radiating into the right shoulder. The patient displays mildly diminished temperature and pinprick sensation to the bilateral ankles. Continue gabapentin 1200 mg three times daily. New onset pain when walking radiating into her bilateral hips. The patient followed with Dr. Kiarra Arana, orthopedics who diagnosed her with  trochanteric bursitis of the hips bilaterally. The patient was referred to physical therapy. Diminished sensation in her feet bilaterally which is likely associated with a peripheral neuropathy.  She also has gait instability associated with her symptoms. Obtain Vitamin B12 lab testing. The patient previously had a low vitamin B12 level. Return in 6 months for reevaluation     Signed: Leslie Medina CNP      *Please note that portions of this note were completed with a voice recognition program.  Although every effort was made to insure the accuracy of this automated transcription, some errors in transcription may have occurred, occasionally words and are mis-transcribed    Provider Attestation: The documentation recorded by the scribe accurately reflects the service I personally performed and the decisions made by myself. Portions of this note were transcribed by a scribe. I personally performed the history, physical exam, and medical decision-making and confirm the accuracy of the information in the transcribed note. Scribe Attestation:   By signing my name below, Sheeba Ulloa, attest that this documentation has been prepared under the direction and in the presence of Leslie Medina CNP.

## 2022-03-16 ENCOUNTER — HOSPITAL ENCOUNTER (OUTPATIENT)
Age: 57
Setting detail: SPECIMEN
Discharge: HOME | End: 2022-03-16

## 2022-03-16 DIAGNOSIS — R20.0 NUMBNESS: ICD-10-CM

## 2022-03-16 LAB
FOLATE: 3.3 NG/ML
VITAMIN B-12: 277 PG/ML (ref 232–1245)

## 2022-03-18 NOTE — TELEPHONE ENCOUNTER
Mia called in and would like the Folic acid 1 mg. sent through as RX to [facility] on [address].

## 2022-03-20 RX ORDER — FOLIC ACID 1 MG/1
1 TABLET ORAL DAILY
Qty: 90 TABLET | Refills: 1 | Status: SHIPPED | OUTPATIENT
Start: 2022-03-20 | End: 2022-09-25

## 2022-04-18 ENCOUNTER — HOSPITAL ENCOUNTER (OUTPATIENT)
Age: 57
Discharge: HOME | End: 2022-04-18
Payer: MEDICARE

## 2022-04-18 LAB
CALCIUM SERPL-MCNC: 9.9 MG/DL (ref 8.6–10.4)
ESTIMATED AVERAGE GLUCOSE: 103 MG/DL
HBA1C MFR BLD: 5.2 % (ref 4–6)
PTH INTACT: 118.5 PG/ML (ref 15–65)
VITAMIN D 25-HYDROXY: 44.7 NG/ML

## 2022-04-18 PROCEDURE — 82310 ASSAY OF CALCIUM: CPT

## 2022-04-18 PROCEDURE — 82306 VITAMIN D 25 HYDROXY: CPT

## 2022-04-18 PROCEDURE — 36415 COLL VENOUS BLD VENIPUNCTURE: CPT

## 2022-04-18 PROCEDURE — 83036 HEMOGLOBIN GLYCOSYLATED A1C: CPT

## 2022-04-18 PROCEDURE — 83970 ASSAY OF PARATHORMONE: CPT

## 2022-04-25 DIAGNOSIS — I10 ESSENTIAL HYPERTENSION: ICD-10-CM

## 2022-04-25 NOTE — TELEPHONE ENCOUNTER
Last visit: 11/17/2021  Last Med refill: 08/09/2021  Does patient have enough medication for 72 hours: No:     Next Visit Date:  Future Appointments   Date Time Provider Sylvester Karina   4/28/2022  8:50 AM Reanna Burnett MD [facility] Neph [facility] None   8/30/2022  9:00 AM Maryellen Bernardo APRN - CNP Neuro [facility] Maintenance   Topic Date Due    Hepatitis B vaccine (1 of 3 - Risk 3-dose series) Never done    Pneumococcal 0-64 years Vaccine (2 - PCV) 11/08/2008    Shingles Vaccine (1 of 2) Never done    Diabetic retinal exam  01/12/2022    COVID-19 Vaccine (2 - Booster for Anushka series) 04/11/2022    Depression Monitoring  08/09/2022    Annual Wellness Visit (AWV)  08/10/2022    Diabetic foot exam  08/16/2022    Lipids  10/14/2022    TSH  10/14/2022    Breast cancer screen  11/11/2022    Potassium  01/18/2023    Creatinine  01/18/2023    A1C test (Diabetic or Prediabetic)  04/18/2023    DTaP/Tdap/Td vaccine (3 - Td or Tdap) 09/14/2027    Colorectal Cancer Screen  07/02/2029    Flu vaccine  Completed    Hepatitis C screen  Completed    HIV screen  Completed    Hepatitis A vaccine  Aged Out    Hib vaccine  Aged Out    Meningococcal (ACWY) vaccine  Aged Out       Hemoglobin A1C (%)   Date Value   04/18/2022 5.2   01/18/2022 5.2   10/14/2021 5.2             ( goal A1C is < 7)   Microalb/Crt.  Ratio (mcg/mg creat)   Date Value   01/18/2022 15     LDL Cholesterol (mg/dL)   Date Value   10/14/2021 108   01/18/2021 93       (goal LDL is <100)   AST (U/L)   Date Value   10/14/2021 20     ALT (U/L)   Date Value   10/14/2021 24     BUN (mg/dL)   Date Value   01/18/2022 13     BP Readings from Last 3 Encounters:   03/15/22 (!) 162/104   03/11/22 (!) 177/106   11/17/21 130/84          (goal 120/80)    All Future Testing planned in [facility]  Lab Frequency Next Occurrence   CBC with Auto Differential Once 09/11/2022   Ferritin Once 09/11/2022   CBC Auto Differential     Basic Metabolic Panel Every 16 weeks    CBC Auto Differential Every 16 weeks    Creatinine, Random Urine Every 16 weeks    Phosphorus Every 16 weeks    Protein / creatinine ratio, urine Every 16 weeks    Protein, urine, random Every 16 weeks    PTH, INTACT WITH IONIZED CALCIUM Every 16 weeks    Vitamin D 25 Hydroxy Every 16 weeks    Iron And TIBC                 Patient Active Problem List:     Transient insomnia     Sleep walking disorder     Raynaud's disease     Pulmonary nodules     Sensory neuropathy     Hypertension     GERD (gastroesophageal reflux disease)     Fibromyalgia     Depression     Degenerative disc disease, thoracic     Atherosclerosis of native coronary artery of native heart with angina pectoris ([facility])     Bipolar disorder (HCC)     Asthma     Anxiety     Antinuclear antibody (IQRA) titer greater than 1:80     Anemia     Hx of Stroke ([facility])     Type 2 diabetes mellitus with stage 2 chronic kidney disease, without long-term current use of insulin ([facility])     Degenerative arthritis of right knee     S/P knee surgery     Chronic fatigue syndrome     Spondylosis of cervical region without myelopathy or radiculopathy     H/O hysterectomy with BSO at [facility] for benign disease 2014     Hx of total bilateral knee replacement     H/O: hysterectomy     Atrial fibrillation with slow ventricular response ([address]     H/O: stroke     Hypovolemia     Hypotension     Bradycardia     Cystocele, midline     JOYCE (stress urinary incontinence, female)     Overflow incontinence     7/30/18 Cystocele repair, Cystoscopy with OBTRYX Ureteral Sling     Nuclear senile cataract     CKD (chronic kidney disease), stage II     Scleroderma ([address]     Vitamin D deficiency     Trigger middle finger of right hand     Trigger ring finger of right hand     Osteoarthritis of spine with radiculopathy, cervical region     Degenerative disc disease, cervical     Left carpal tunnel syndrome     Spinal stenosis, cervical region     Neural foraminal stenosis of cervical spine     Closed fracture of lower end of femur ([address]     Closed supracondylar fracture of femur, left, initial encounter ([address]     Numbness     Senile osteoporosis     Acquired hypothyroidism     Age-related nuclear cataract of both eyes     Alcoholism in recovery ([address]     Bipolar affective disorder, currently depressed, mild (HCC)     Generalized anxiety disorder     Iron deficiency anemia     Iron malabsorption     Stage 3 chronic kidney disease ([address]     Dupuytren's disease of palm     Mild intermittent asthma

## 2022-04-28 RX ORDER — LOSARTAN POTASSIUM 100 MG/1
TABLET ORAL
Qty: 90 TABLET | Refills: 1 | Status: SHIPPED | OUTPATIENT
Start: 2022-04-28

## 2022-05-16 RX ORDER — ALBUTEROL SULFATE 90 UG/1
2 AEROSOL, METERED RESPIRATORY (INHALATION) EVERY 6 HOURS PRN
Qty: 1 EACH | Refills: 5 | Status: SHIPPED | OUTPATIENT
Start: 2022-05-16

## 2022-05-16 NOTE — TELEPHONE ENCOUNTER
Patient stopped in office to schedule appt. She stated she also needs a refill of Albuterol inhaler.      She stated albuterol inhaler is cheaper than leon[facility] on [address]

## 2022-05-20 ENCOUNTER — OFFICE VISIT (OUTPATIENT)
Dept: FAMILY MEDICINE CLINIC | Age: 57
End: 2022-05-20
Payer: MEDICARE

## 2022-05-20 VITALS
SYSTOLIC BLOOD PRESSURE: 134 MMHG | OXYGEN SATURATION: 97 % | HEART RATE: 73 BPM | WEIGHT: 215 LBS | DIASTOLIC BLOOD PRESSURE: 84 MMHG | BODY MASS INDEX: 32.69 KG/M2

## 2022-05-20 DIAGNOSIS — M25.512 ACUTE PAIN OF LEFT SHOULDER: Primary | ICD-10-CM

## 2022-05-20 DIAGNOSIS — Z23 ENCOUNTER FOR IMMUNIZATION: ICD-10-CM

## 2022-05-20 DIAGNOSIS — K21.9 GASTROESOPHAGEAL REFLUX DISEASE, UNSPECIFIED WHETHER ESOPHAGITIS PRESENT: ICD-10-CM

## 2022-05-20 PROCEDURE — 99214 OFFICE O/P EST MOD 30 MIN: CPT | Performed by: NURSE PRACTITIONER

## 2022-05-20 PROCEDURE — 90677 PCV20 VACCINE IM: CPT | Performed by: NURSE PRACTITIONER

## 2022-05-20 RX ORDER — FAMOTIDINE 40 MG/1
TABLET, FILM COATED ORAL
Qty: 90 TABLET | Refills: 1 | Status: SHIPPED | OUTPATIENT
Start: 2022-05-20

## 2022-05-20 RX ORDER — PREDNISONE 20 MG/1
20 TABLET ORAL 2 TIMES DAILY
Qty: 10 TABLET | Refills: 0 | Status: SHIPPED | OUTPATIENT
Start: 2022-05-20 | End: 2022-05-25

## 2022-05-20 ASSESSMENT — PATIENT HEALTH QUESTIONNAIRE - PHQ9
SUM OF ALL RESPONSES TO PHQ QUESTIONS 1-9: 0
5. POOR APPETITE OR OVEREATING: 0
1. LITTLE INTEREST OR PLEASURE IN DOING THINGS: 0
6. FEELING BAD ABOUT YOURSELF - OR THAT YOU ARE A FAILURE OR HAVE LET YOURSELF OR YOUR FAMILY DOWN: 0
SUM OF ALL RESPONSES TO PHQ QUESTIONS 1-9: 0
SUM OF ALL RESPONSES TO PHQ QUESTIONS 1-9: 0
4. FEELING TIRED OR HAVING LITTLE ENERGY: 0
2. FEELING DOWN, DEPRESSED OR HOPELESS: 0
8. MOVING OR SPEAKING SO SLOWLY THAT OTHER PEOPLE COULD HAVE NOTICED. OR THE OPPOSITE, BEING SO FIGETY OR RESTLESS THAT YOU HAVE BEEN MOVING AROUND A LOT MORE THAN USUAL: 0
9. THOUGHTS THAT YOU WOULD BE BETTER OFF DEAD, OR OF HURTING YOURSELF: 0
3. TROUBLE FALLING OR STAYING ASLEEP: 0
10. IF YOU CHECKED OFF ANY PROBLEMS, HOW DIFFICULT HAVE THESE PROBLEMS MADE IT FOR YOU TO DO YOUR WORK, TAKE CARE OF THINGS AT HOME, OR GET ALONG WITH OTHER PEOPLE: 0
SUM OF ALL RESPONSES TO PHQ9 QUESTIONS 1 & 2: 0
7. TROUBLE CONCENTRATING ON THINGS, SUCH AS READING THE NEWSPAPER OR WATCHING TELEVISION: 0
SUM OF ALL RESPONSES TO PHQ QUESTIONS 1-9: 0

## 2022-05-20 NOTE — PROGRESS NOTES
Patient is present for check up    Patient states she has been having left arm pain x2 months  Patient states it starts in her shoulder and goes down her arm  Patient states she has pain lifting her arm out to the side

## 2022-05-20 NOTE — PROGRESS NOTES
Mia Huggins (:  1965) is a 64 y.o. female,Established patient, here for evaluation of the following chief complaint(s):  Check-Up         ASSESSMENT/PLAN:  1. Acute pain of left shoulder  -     predniSONE (DELTASONE) 20 MG tablet; Take 1 tablet by mouth 2 times daily for 5 days, Disp-10 tablet, R-0Normal  2. Gastroesophageal reflux disease  -     famotidine (PEPCID) 40 MG tablet; TAKE 1 TABLET BY MOUTH IN THE EVENING, Disp-90 tablet, R-1Normal  3. Encounter for immunization  -     Pneumococcal Conjugate PCV20, PF (Prevnar 20)      No follow-ups on file. Subjective   SUBJECTIVE/OBJECTIVE:  Shoulder Pain   The pain is present in the left shoulder. This is a new problem. The current episode started more than 1 month ago. The problem occurs intermittently. The problem has been waxing and waning. Associated symptoms include joint locking and a limited range of motion (d/t pain with abduction). Pertinent negatives include no fever, inability to bear weight, itching, joint swelling, numbness, stiffness or tingling. She has tried NSAIDS for the symptoms. The treatment provided moderate relief. Review of Systems   Constitutional: Negative for fever. Musculoskeletal: Positive for arthralgias. Negative for stiffness. Skin: Negative for itching. Neurological: Negative for tingling and numbness. Objective      Vitals:    22 0859   BP: 134/84   Pulse: 73   SpO2: 97%     Wt Readings from Last 3 Encounters:   22 215 lb (97.5 kg)   22 208 lb (94.3 kg)   03/15/22 208 lb 9.6 oz (94.6 kg)       Physical Exam  Constitutional:       Appearance: She is well-developed. HENT:      Right Ear: External ear normal.      Left Ear: External ear normal.      Nose: Nose normal.   Cardiovascular:      Rate and Rhythm: Normal rate and regular rhythm.       Heart sounds: Normal heart sounds, S1 normal and S2 normal.   Pulmonary:      Effort: Pulmonary effort is normal. No respiratory distress. Breath sounds: Normal breath sounds. Musculoskeletal:         General: No deformity. Left shoulder: Tenderness present. No swelling or bony tenderness. Decreased range of motion (abduction with pain). Cervical back: Full passive range of motion without pain and normal range of motion. Skin:     General: Skin is warm and dry. Neurological:      Mental Status: She is alert and oriented to person, place, and time. An electronic signature was used to authenticate this note.     --Keron Machado, COLTON - CNP

## 2022-06-10 ENCOUNTER — PATIENT MESSAGE (OUTPATIENT)
Dept: NEUROLOGY | Age: 57
End: 2022-06-10

## 2022-06-10 NOTE — LETTER
[facility] Neurology Specialist  [address] [place] [address]  Phone: [phone]  Fax: [phone]    COLTON Sands CNP        June 21, 2022    [id number]  [id number]      Dear Lionel Slider: This letter is being generated at your request.  Jenifer Vega are currently an established patient that I am treating for cervical radiculopathy and sensorimotor peripheral neuropathy. I do not feel at this time that you are able to work.       Sincerely,        COLTON Sands CNP

## 2022-06-21 NOTE — TELEPHONE ENCOUNTER
Can we write a letter stating that I am treating her for cervical radiculopathy and a sensorimotor peripheral neuropathy. I do not feel that she is able to work at this time.

## 2022-06-21 NOTE — TELEPHONE ENCOUNTER
From: Elmo Collado  Sent: 6/15/2022 4:07 PM EDT  To: COLTON Singleton CNP  Subject: Disability Paperwork    ----- Message from Ollie Ann sent at 6/15/2022 4:07 PM EDT -----       ----- Message from Kadeem COULTER to COLTON Singleton CNP sent at 6/10/2022 8:35 PM -----   They are not asking for the names of my doctors just when I saw a dr and what was the reason. They have one question about being able or not able to work. 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      ----- Message -----   From:Mia Sullivan   Sent:6/10/2022 2:27 PM EDT   To:Nabila Rios   Subject:Disability Paperwork    Im filling out a paper for my disability and I need something from you. They are asking in your opinion can I work or not work. I havent worked since October 2016. Also can you provide a short accurate description of what exactly I see you for.     Thanks for your help  HUGO Yancey

## 2022-06-22 NOTE — TELEPHONE ENCOUNTER
Call placed to the patient this afternoon to inform her the letter was ready. Patient will pick this up from the office. Patient informed that letter will be in the front office.

## 2022-07-12 NOTE — TELEPHONE ENCOUNTER
Appears metformin 500mg daily rx'd by Dr. Carlin Lima (external endocrinology) - see fax/letter. Will pend pravastatin to PCP - appears was ordered as 90-ds, unclear how/why changed to 30-ds.

## 2022-07-12 NOTE — LETTER
[place]  [address], [address]        Janette Leon   [address] [place] [address]           07/21/22     Dear Janette Leon,    We tried to reach you recently regarding your pravastatin 40mg daily and metformin 500mg daily, but were unable to reach you on the telephone. If you are no longer taking or taking differently, please call us at the number below so that we can discuss this and update your medication profile. It appears that this medication has not been filled at proper times. We are worried you might be missing doses or not taking it as directed. It is important that you take your medications regularly and try not to miss a single dose.     Some ways to help you remember to take and refill your medications are to:  · Use a pill box, set an alarm, and/or keep your medication near something that you do every day  · Fill a 3-month supply of your prescriptions at a time to save you time and trips to the pharmacy - if you would like assistance in switching your prescriptions to a 3-month supply, please contact us  · Ask your pharmacy if they participate in [facility]", a program where you can  all of your medications on the same day  · Ask your pharmacy if you can be set up with automatic refill, where they will automatically refill your prescription when it is due and let you know it's ready to     Sincerely,   Zak Mohan, PharmD, [facility]  Department, toll free: [phone], option 1

## 2022-07-12 NOTE — TELEPHONE ENCOUNTER
[facility] CLINICAL PHARMACY: ADHERENCE REVIEW  Identified care gap per [employer]: fills at [facility]: ACE/ARB, Diabetes and Statin adherence    Last Visit: 5/20/22    ASSESSMENT  ACE/ARB ADHERENCE    Insurance Records claims through 6.12.22 (Prior Year PDC = 100%; YTD [place] = Filled only once; Potential Fail Date: 8/7/22):   LOSARTAN POT TAB 100MG last filled on 3/11/22 for 90 day supply. Next refill due: 6/9/22    Per [employer] Portal:   last filled on 6/24/22 for 90 day supply. Will need refill-has appt    BP Readings from Last 3 Encounters:   05/20/22 134/84   04/28/22 (!) 142/90   03/15/22 (!) 162/104     Estimated Creatinine Clearance: 75 mL/min (A) (based on SCr of 1.02 mg/dL (H)). DIABETES ADHERENCE    Insurance Records claims through 6.12.22 (Prior Year [place] = 100%; [place] = Filled only once; Potential Fail Date: 7/22/22): METFORMIN    TAB 500MG last filled on 5/5/22 for 30 day supply. Next refill due: 6/4/22    Per [employer] Portal:   last filled on 6/24/22 for 30 day supply. Lab Results   Component Value Date    LABA1C 5.2 04/18/2022    LABA1C 5.2 01/18/2022    LABA1C 5.2 10/14/2021     NOTE A1c <9%    STATIN ADHERENCE    Insurance Records claims through 6.12.22 (Prior Year PDC = 84%; YTD [place] = 100%; Potential Fail Date: 9/5/22):   PRAVASTATIN  TAB 40MG last filled on 5/25/22 for 30 day supply. Next refill due: 6/30/22    Per [employer] Portal:   last filled on 6/24/22 for 30 day supply. Per [address]:       Lab Results   Component Value Date    CHOL 261 (H) 10/14/2021    TRIG 58 10/14/2021     10/14/2021    LDLCHOLESTEROL 108 10/14/2021     ALT   Date Value Ref Range Status   10/14/2021 24 5 - 33 U/L Final     AST   Date Value Ref Range Status   10/14/2021 20 <32 U/L Final     The ASCVD Risk score (Get Solis, et al., 2013) failed to calculate for the following reasons:     The patient has a prior MI or stroke diagnosis     PLAN  The following are interventions that have been identified: - Patient overdue refilling PRAVASTATIN  TAB 40MG (due 7/24/22) and active on home medication list.   - Patient eligible for 90 day supply of PRAVASTATIN  TAB 40MG  & METFORMIN    TAB 500MG    Perhaps privacy setting on phone-unable to reach patient. Meds no longer sync'd. Would benefit 90DS to go along with others. Future Appointments   Date Time Provider Sylvester Collins   8/22/2022  8:45 AM COLTON Guadarrama - CNP [facility] FP [facility]   8/30/2022  9:00 AM COLTON Sainz CNP Neuro Spec [facility]   9/22/2022 10:10 AM Karolina Martinez MD AFL Neph [place] None       [facility].    [place] [address] [address] free: [phone]    Will route to PharmD to facilitate 90 day refill authorizations on both pravastatin & metformin

## 2022-07-12 NOTE — TELEPHONE ENCOUNTER
Shasha Romero, APRN - CNP, patient out of refills.  Rx pended for your signature/modification as appropriate    LOV: 5/20/22  Next: 8/22/22    Thank you,  Flakito Mohan, PharmD, [facility] [address] [place] [address], toll free: [phone], option 1

## 2022-07-13 RX ORDER — PRAVASTATIN SODIUM 40 MG
40 TABLET ORAL DAILY
Qty: 90 TABLET | Refills: 1 | Status: SHIPPED | OUTPATIENT
Start: 2022-07-13

## 2022-07-14 NOTE — TELEPHONE ENCOUNTER
Noted pravastatin 90-ds reordered, thank you! Again unable to reach patient.  Will send Mychart.    =======================================================   For Pharmacy [address] in place:  No   Recommendation Provided To: Provider: 2 via Note to Provider and Fax sent to office   Intervention Detail: Refill(s) Provided   Gap Closed?: No    Intervention Accepted By: Provider: 1   Time Spent (min): 15

## 2022-07-18 ENCOUNTER — HOSPITAL ENCOUNTER (OUTPATIENT)
Age: 57
Setting detail: SPECIMEN
Discharge: HOME | End: 2022-07-18

## 2022-07-18 LAB
CALCIUM SERPL-MCNC: 9.7 MG/DL (ref 8.6–10.4)
ESTIMATED AVERAGE GLUCOSE: 108 MG/DL
HBA1C MFR BLD: 5.4 % (ref 4–6)
PTH INTACT: 116.5 PG/ML (ref 15–65)
THYROXINE, FREE: 0.75 NG/DL (ref 0.93–1.7)
TSH SERPL DL<=0.05 MIU/L-ACNC: 2.37 UIU/ML (ref 0.3–5)
VITAMIN D 25-HYDROXY: 52.3 NG/ML

## 2022-07-25 ENCOUNTER — TELEPHONE (OUTPATIENT)
Dept: PHARMACY | Facility: CLINIC | Age: 57
End: 2022-07-25

## 2022-07-25 NOTE — TELEPHONE ENCOUNTER
[facility] CLINICAL PHARMACY: ADHERENCE REVIEW  Identified care gap per [employer]: fills at [facility]: Diabetes adherence    Last Visit: 4/25/22 with endocrinology/prescriber, per [facility] portal    Patient also appears to be prescribed: ARB, statin     Patient found in Outcomes MTM and is currently eligible for TIP - metformin refill and metformin & losartan adherence monitoring    ASSESSMENT  ACE/ARB ADHERENCE    Insurance Records claims through 7/2/22 (Prior Year [place] = 92%; YTD [place] = 87%; Potential Fail Date: 11/5/22): Losartan 100mg last filled on 6/24/22 for 90 day supply. Next refill due: 9/22/22    Per Reconciled Dispense Report: 1 refill remains    BP Readings from Last 3 Encounters:   05/20/22 134/84   04/28/22 (!) 142/90   03/15/22 (!) 162/104     CrCl cannot be calculated (Patient's most recent lab result is older than the maximum 180 days allowed. ). DIABETES ADHERENCE    Insurance Records claims through 7/2/22 (Prior Year [place] = 84%; [facility] Potential Fail Date: 8/21/22): Metformin 500mg last filled on 6/24/22 for 30 day supply. Next refill due: 7/24/22    Per Reconciled Dispense Report: last filled on 7/24/22 for #30/30 day supply, 0 refills remain. Per [address] last refilled on 7/24/22 for 30 day supply, NOT yet picked up. 5 refills remaining. Billed through [facility]. Can combine to 90-ds if patient requests. Lab Results   Component Value Date    LABA1C 5.4 07/18/2022    LABA1C 5.2 04/18/2022    LABA1C 5.2 01/18/2022     STATIN ADHERENCE    Insurance Records claims through 7/2/22 (Prior Year PDC =  84%; YTD PDC =  100%; Potential Fail Date: 10/5/22 ):   Pravastatin 40mg last filled on 6/24/22 for 30 day supply. Next refill due: 7/30/22    Per Reconciled Dispense Report: last filled on 7/24/22 for #30/30 day supply. Per [address] last refilled on 7/24/22 for 30 day supply, NOT yet picked up. Refills remaining. Billed through [facility]. Can fill 90-ds if patient requests. (Unclear if patient had prev requested only 30ds?)    Lab Results   Component Value Date    CHOL 261 (H) 10/14/2021    TRIG 58 10/14/2021     10/14/2021    LDLCHOLESTEROL 108 10/14/2021     ALT   Date Value Ref Range Status   10/14/2021 24 5 - 33 U/L Final     AST   Date Value Ref Range Status   10/14/2021 20 <32 U/L Final     The ASCVD Risk score (Jeralene Brunner., et al., 2013) failed to calculate for the following reasons: The patient has a prior MI or stroke diagnosis     PLAN  The following are interventions that have been identified:   - Patient eligible for 90 day supply of pravastatin, metformin (pharmacy can fill for 90ds if patient requests - both currently ready to  at [place])    Unable to leave message Giselerin Garciaz confirms same phone # is only one they have on record for patient too). Tapomat message sent to patient.     Future Appointments   Date Time Provider Sylvester Collins   8/22/2022  8:45 AM COLTON Guevara - CNP [facility] FP [facility]   8/30/2022  9:00 AM COLTON Zhu CNP Neuro Spec [facility]   9/22/2022 10:10 AM Sarthak Alonso MD AFL Neph Malia None   10/17/2022  1:30 PM Andree Hansen MD [facility] Leodan Mohan, PharmD, [facility] [address] [place] [address], toll free: [phone], option 1

## 2022-07-25 NOTE — LETTER
[place]  [address], [address]        Haile Diaz   [address] [place] [id number]           07/27/22     Dear Haile Diaz,    We tried to reach you recently regarding your metformin 500mg daily, but were unable to reach you on the telephone. If you are no longer taking or taking differently, please reply to this message, or call us at the number below so that we can discuss this and update your medication profile. Metformin and pravastatin refills are ready to  at your [facility], if you haven't already. It appears that this medication has not been filled at proper times. We are worried you might be missing doses or not taking it as directed. It is important that you take your medications regularly and try not to miss a single dose.      Some ways to help you remember to take and refill your medications are to:  - Use a pill box, set an alarm, and/or keep your medication near something that you do every day  - Fill a 3-month supply of your prescription at a time to save you time and trips to the pharmacy - your pravastatin and metformin can both be filled for 3-month supplies, if you ask your pharmacy  - Ask your pharmacy if they participate in [employer]", a program where you can  all of your medications on the same day  - Ask your pharmacy if you can be set up with automatic refill, where they will automatically refill your prescription when it is due and let you know it's ready to       Sincerely,   Winter Mohan, PharmD, [facility]  Department, toll free: [phone], option 1

## 2022-07-27 NOTE — TELEPHONE ENCOUNTER
Unable to reach patient on primary phone #. Left message on secondary phone # in Are[facility] Cost report.  Mychart message not yet read - will send letter.    =======================================================   For Pharmacy Admin Tracking Only    Gap Closed?: No   Time Spent (min): 15

## 2022-08-08 NOTE — TELEPHONE ENCOUNTER
Noted patient's return call, thank you! Fax was sent to Dr. Vanessa Hearn office @22 asking for 90-ds metformin rx. Called and left message at Dr Eren Peña office, asking if appropriate, to please update patient's metformin prescription quantity to 90-day supply to her [facility] on [place], [place] #[phone].  Left my contact information if questions.    =======================================================   For Pharmacy Admin Tracking Only - update to previous    CPA in place:  No  Recommendation Provided To: Provider: 1 via Called provider office and Patient/Caregiver: 2 via Telephone  Intervention Detail: Adherence Monitorin and New Rx: 1, reason: Improve Adherence  Gap Closed?: Yes   Intervention Accepted By: Provider: 0 and Patient/Caregiver: 2  Time Spent (min):  25 [address] [place], [place]

## 2022-08-08 NOTE — TELEPHONE ENCOUNTER
Patient called regarding letter she received, patient states she is taking both Metformin & Pravastatin as prescribed without issues/concerns. She is okay with getting a 30 day supply, but would prefer a 90 day supply. Patient verified on her bottle that she recently picked up that Pravastatin was disp # 30 no refills. I let her know that [facility] is able to dispense 90 ds, but they are requiring her to tell them that. Patient verbalized understanding, and asks if we would also be able to  have her doctors send over a new script so she wouldn't need to remember to ask [facility] to do it every time. I let her know that I would double check with [facility] (Epic shows #90 prava was ordered) and ask our PharmD about requesting new RX from endo, who prescribes Metformin (Dr. Shen molina). Patient appreciative. I called [facility], and verified that they did receive RX for Pravastatin # 90 + 1 on 7/13/22, but that it was too soon to fill. They do not have a 90 day supply of Metformin. Will route to PharmD for possible follow up.

## 2022-08-22 ENCOUNTER — OFFICE VISIT (OUTPATIENT)
Dept: FAMILY MEDICINE CLINIC | Age: 57
End: 2022-08-22
Payer: MEDICARE

## 2022-08-22 VITALS
HEART RATE: 74 BPM | HEIGHT: 68 IN | BODY MASS INDEX: 32.43 KG/M2 | WEIGHT: 214 LBS | DIASTOLIC BLOOD PRESSURE: 80 MMHG | SYSTOLIC BLOOD PRESSURE: 124 MMHG | OXYGEN SATURATION: 96 %

## 2022-08-22 DIAGNOSIS — Z00.00 MEDICARE ANNUAL WELLNESS VISIT, SUBSEQUENT: Primary | ICD-10-CM

## 2022-08-22 PROCEDURE — G0439 PPPS, SUBSEQ VISIT: HCPCS | Performed by: NURSE PRACTITIONER

## 2022-08-22 RX ORDER — CETIRIZINE HYDROCHLORIDE 10 MG/1
TABLET ORAL
Qty: 90 TABLET | Refills: 1 | Status: SHIPPED | OUTPATIENT
Start: 2022-08-22

## 2022-08-22 SDOH — ECONOMIC STABILITY: FOOD INSECURITY: WITHIN THE PAST 12 MONTHS, YOU WORRIED THAT YOUR FOOD WOULD RUN OUT BEFORE YOU GOT MONEY TO BUY MORE.: NEVER TRUE

## 2022-08-22 SDOH — ECONOMIC STABILITY: FOOD INSECURITY: WITHIN THE PAST 12 MONTHS, THE FOOD YOU BOUGHT JUST DIDN'T LAST AND YOU DIDN'T HAVE MONEY TO GET MORE.: NEVER TRUE

## 2022-08-22 ASSESSMENT — LIFESTYLE VARIABLES
HOW MANY STANDARD DRINKS CONTAINING ALCOHOL DO YOU HAVE ON A TYPICAL DAY: 1 OR 2
HOW OFTEN DO YOU HAVE A DRINK CONTAINING ALCOHOL: MONTHLY OR LESS

## 2022-08-22 ASSESSMENT — PATIENT HEALTH QUESTIONNAIRE - PHQ9
SUM OF ALL RESPONSES TO PHQ QUESTIONS 1-9: 11
3. TROUBLE FALLING OR STAYING ASLEEP: 0
8. MOVING OR SPEAKING SO SLOWLY THAT OTHER PEOPLE COULD HAVE NOTICED. OR THE OPPOSITE, BEING SO FIGETY OR RESTLESS THAT YOU HAVE BEEN MOVING AROUND A LOT MORE THAN USUAL: 0
9. THOUGHTS THAT YOU WOULD BE BETTER OFF DEAD, OR OF HURTING YOURSELF: 0
7. TROUBLE CONCENTRATING ON THINGS, SUCH AS READING THE NEWSPAPER OR WATCHING TELEVISION: 0
SUM OF ALL RESPONSES TO PHQ QUESTIONS 1-9: 11
6. FEELING BAD ABOUT YOURSELF - OR THAT YOU ARE A FAILURE OR HAVE LET YOURSELF OR YOUR FAMILY DOWN: 2
4. FEELING TIRED OR HAVING LITTLE ENERGY: 3
SUM OF ALL RESPONSES TO PHQ9 QUESTIONS 1 & 2: 6
10. IF YOU CHECKED OFF ANY PROBLEMS, HOW DIFFICULT HAVE THESE PROBLEMS MADE IT FOR YOU TO DO YOUR WORK, TAKE CARE OF THINGS AT HOME, OR GET ALONG WITH OTHER PEOPLE: 2
2. FEELING DOWN, DEPRESSED OR HOPELESS: 3
5. POOR APPETITE OR OVEREATING: 0
1. LITTLE INTEREST OR PLEASURE IN DOING THINGS: 3

## 2022-08-22 ASSESSMENT — SOCIAL DETERMINANTS OF HEALTH (SDOH): HOW HARD IS IT FOR YOU TO PAY FOR THE VERY BASICS LIKE FOOD, HOUSING, MEDICAL CARE, AND HEATING?: NOT HARD AT ALL

## 2022-08-22 NOTE — PATIENT INSTRUCTIONS
Personalized Preventive Plan for Jun Storm - 8/22/2022  Medicare offers a range of preventive health benefits. Some of the tests and screenings are paid in full while other may be subject to a deductible, co-insurance, and/or copay. Some of these benefits include a comprehensive review of your medical history including lifestyle, illnesses that may run in your family, and various assessments and screenings as appropriate. After reviewing your medical record and screening and assessments performed today your provider may have ordered immunizations, labs, imaging, and/or referrals for you. A list of these orders (if applicable) as well as your Preventive Care list are included within your After Visit Summary for your review. Other Preventive Recommendations:    A preventive eye exam performed by an eye specialist is recommended every 1-2 years to screen for glaucoma; cataracts, macular degeneration, and other eye disorders. A preventive dental visit is recommended every 6 months. Try to get at least 150 minutes of exercise per week or 10,000 steps per day on a pedometer . Order or download the FREE \"Exercise & Physical Activity: Your Everyday Guide\" from [employer]. Call 3-968.629.3297 or search [employer] online. You need 3541-8416 mg of calcium and 9600-3399 IU of vitamin D per day. It is possible to meet your calcium requirement with diet alone, but a vitamin D supplement is usually necessary to meet this goal.  When exposed to the sun, use a sunscreen that protects against both UVA and UVB radiation with an SPF of 30 or greater. Reapply every 2 to 3 hours or after sweating, drying off with a towel, or swimming. Always wear a seat belt when traveling in a car. Always wear a helmet when riding a bicycle or motorcycle.

## 2022-08-22 NOTE — PROGRESS NOTES
Medicare Annual Wellness Visit    Mia Reyes is here for Medicare AWV    Assessment & Plan   Medicare annual wellness visit, subsequent    Recommendations for Preventive Services Due: see orders and patient instructions/AVS.  Recommended screening schedule for the next 5-10 years is provided to the patient in written form: see Patient Instructions/AVS.     Return for Medicare Annual Wellness Visit in 1 year. Subjective     Patient's complete Health Risk Assessment and screening values have been reviewed and are found in Flowsheets. The following problems were reviewed today and where indicated follow up appointments were made and/or referrals ordered.     Positive Risk Factor Screenings with Interventions:    Fall Risk:  Do you feel unsteady or are you worried about falling? : (!) yes  2 or more falls in past year?: (!) yes  Fall with injury in past year?: no   Fall Risk Interventions:    Home safety tips provided  Thinks it is related to neuropathy- working with neuro     Depression:  PHQ-2 Score: 6  PHQ-9 Total Score: 11    Severity:1-4 = minimal depression, 5-9 = mild depression, 10-14 = moderate depression, 15-19 = moderately severe depression, 20-27 = severe depression  Depression Interventions:  Pt states she is not drinking          General Health and ACP:  General  In general, how would you say your health is?: Fair  In the past 7 days, have you experienced any of the following: New or Increased Pain, New or Increased Fatigue, Loneliness, Social Isolation, Stress or Anger?: (!) Yes  Select all that apply: (!) Loneliness, Stress, Anger  Do you get the social and emotional support that you need?: Yes  Do you have a Living Will?: (!) No    Advance Directives       Power of  Living Will ACP-Advance Directive ACP-Power of     Not on File Not on File Not on File Not on File        General Health Risk Interventions:  Loneliness, stress, anger not new- seeing psych      Health Habits/Nutrition:  Physical Activity: Insufficiently Active    Days of Exercise per Week: 1 day    Minutes of Exercise per Session: 20 min     Have you lost any weight without trying in the past 3 months?: No  Body mass index: (!) 32.54  Have you seen the dentist within the past year?: (!) No  Health Habits/Nutrition Interventions:  Dental exam overdue:  patient declines dental evaluation   Safety Interventions:  Home safety tips provided    ADLs:  In the past 7 days, did you need help from others to perform any of the following everyday activities: Eating, dressing, grooming, bathing, toileting, or walking/balance?: (!) Yes  Select all that apply: (!) Walking/Balance  In the past 7 days, did you need help from others to take care of any of the following: Laundry, housekeeping, banking/finances, shopping, telephone use, food preparation, transportation, or taking medications?: No  ADL Interventions:  Patient declines any further evaluation/treatment for this issue          Objective   Vitals:    08/22/22 0848   BP: 124/80   Site: Left Upper Arm   Position: Sitting   Cuff Size: Large Adult   Pulse: 74   SpO2: 96%   Weight: 214 lb (97.1 kg)   Height: 5' 8\" (1.727 m)      Body mass index is 32.54 kg/m². Allergies   Allergen Reactions    Morphine Nausea And Vomiting     Pt states it changes her mental status    Amlodipine Other (See Comments)     diarrhea and stress    Dilaudid [Hydromorphone Hcl] Hives and Itching    Sulfa Antibiotics Hives and Rash     Prior to Visit Medications    Medication Sig Taking?  Authorizing Provider   cetirizine (ZYRTEC) 10 MG tablet TAKE 1 TABLET BY MOUTH ONE TIME A DAY Yes COLTON Paul CNP   pravastatin (PRAVACHOL) 40 MG tablet Take 1 tablet by mouth daily Yes COLTON Paul CNP   famotidine (PEPCID) 40 MG tablet TAKE 1 TABLET BY MOUTH IN THE EVENING Yes COLTON Paul CNP   albuterol sulfate HFA (VENTOLIN HFA) 108 (90 Base) MCG/ACT inhaler Inhale 2 puffs into the lungs every 6 hours as needed for Wheezing Yes COLTON Guadarrama CNP   losartan (COZAAR) 100 MG tablet TAKE 1 TABLET BY MOUTH EVERY DAY Yes COLTON Guadarrama CNP   folic acid (FOLVITE) 1 MG tablet Take 1 tablet by mouth daily Yes COLTON Sainz CNP   gabapentin (NEURONTIN) 600 MG tablet TAKE 2 TABLETS BY MOUTH THREE TIMES A DAY Yes COLTON Sainz CNP   clonazePAM (KLONOPIN) 1 MG tablet TAKE 1 TABLET BY MOUTH THREE TIMES A DAY AS NEEDED Yes Historical Provider, MD   Handicap Placard [address] [facility] by Does not apply route Exp: 7/13/2026 Yes COLTON Guadarrama CNP   omeprazole (PRILOSEC) 20 MG delayed release capsule TAKE 1 CAPSULE BY MOUTH TWO TIMES A DAY  Yes COLTON Guadarrama CNP   hydroxychloroquine (PLAQUENIL) 200 MG tablet TAKE 1 TABLET BY MOUTH EVERY DAY  Yes COLTON Guadarrama CNP    ASPIRIN ADULT LOW STRENGTH 81 MG EC tablet TAKE 1 TABLET BY MOUTH TWO TIMES A DAY  Yes COLTON Guadarrama CNP   ARIPiprazole (ABILIFY) 2 MG tablet Take 5 mg by mouth daily  Yes Historical Provider, MD   NIFEdipine (ADALAT CC) 60 MG extended release tablet Take 90 mg by mouth daily  Yes Historical Provider, MD   EPIPEN 2-JIGAR 0.3 MG/0.3ML SOAJ injection INJECT 1 PEN INTO THE MUSCLE ONCE AS NEEDED FOR UP TO 1 DOSE FOR ANGIOEDEMA. Yes COLTON Guadarrama CNP   cyclobenzaprine (FLEXERIL) 10 MG tablet TAKE 1 TABLET BY MOUTH ONE TIME A DAY AT NIGHT AS NEEDED FOR MUSCLE SPASM Yes COLTON Guadarrama CNP   blood glucose monitor strips Test 4 times a day & as needed for symptoms of irregular blood glucose.  Yes COLTON Guadarrama CNP   ibuprofen (ADVIL;MOTRIN) 800 MG tablet Take 1 tablet by mouth as needed For post op pain prescribed by surgeon after right CTR sx Yes Historical Provider, MD   spironolactone (ALDACTONE) 25 MG tablet Take 25 mg by mouth daily Yes Historical Provider, MD   ferrous sulfate 325 (65 Fe) MG tablet TAKE 1 TABLET BY MOUTH TWO TIMES A DAY  Yes Stephanie Copeland DO   metFORMIN (GLUCOPHAGE) 500 MG tablet Take 500 mg by mouth daily  Yes Historical Provider, MD   lamoTRIgine (LAMICTAL) 25 MG tablet Take 25 mg by mouth 2 times daily  Yes Historical Provider, MD   felodipine (PLENDIL) 10 MG extended release tablet Take 10 mg by mouth daily Prescribed by Dr. Britany Gallo Yes Historical Provider, MD   ergocalciferol (ERGOCALCIFEROL) 00455 units capsule Take 50,000 Units by mouth See Admin Instructions Takes twice a week / taking 3 times weekly Yes Historical Provider, MD   QUEtiapine (SEROQUEL) 300 MG tablet Take 0.5 tablets by mouth nightly  Patient taking differently: Take 300 mg by mouth nightly Take 2 tablets at bedtime.  600 mg Yes Edna Hill MD   lamoTRIgine (LAMICTAL) 100 MG tablet Take 200 mg by mouth every evening  Yes Historical Provider, MD   levothyroxine (SYNTHROID) 25 MCG tablet Take 25 mcg by mouth Daily Yes Historical Provider, MD   Multiple Vitamins-Minerals (THERAPEUTIC MULTIVITAMIN-MINERALS) tablet Take 1 tablet by mouth daily Yes Historical Provider, MD   PROAIR  (90 Base) MCG/ACT inhaler INHALE 2 PUFFS BY MOUTH EVERY SIX HOURS AS NEEDED FOR WHEEZING  Elizabeth Anika Salter MD       [facility] (Including outside providers/suppliers regularly involved in providing care):   Patient Care Team:  COLTON Brown CNP as PCP - General (Family Nurse Practitioner)  COLTON Brown CNP as PCP - [facility] Empaneled Provider  COLTON Brown CNP as Referring Physician (Certified Nurse Practitioner)  Shakira Alejandro MD (Psychiatry)  Roscoe Connell MD as Consulting Physician (Gastroenterology)  Bessy Nicole DO as Surgeon (Neurosurgery)  Dyana Mason MD as Consulting Physician (Dermatology)  Rosalio Murphy DO as Consulting Physician (Orthopedic Surgery)  Daisy Mendoza MD as Consulting Physician (Internal Medicine)  Itz Egan MD as Anesthesiologist (Anesthesiology)  Arjun Bassett MD as Consulting Physician (Endocrinology)  Refugio De Leon MD as Consulting Physician (Cardiology)     Reviewed and updated this visit:  Allergies  Meds

## 2022-08-30 ENCOUNTER — OFFICE VISIT (OUTPATIENT)
Dept: NEUROLOGY | Age: 57
End: 2022-08-30
Payer: MEDICARE

## 2022-08-30 VITALS
WEIGHT: 216 LBS | SYSTOLIC BLOOD PRESSURE: 161 MMHG | HEIGHT: 68 IN | HEART RATE: 79 BPM | DIASTOLIC BLOOD PRESSURE: 99 MMHG | BODY MASS INDEX: 32.74 KG/M2

## 2022-08-30 DIAGNOSIS — Z86.73 H/O: STROKE: Primary | ICD-10-CM

## 2022-08-30 DIAGNOSIS — G62.9 SENSORY NEUROPATHY: ICD-10-CM

## 2022-08-30 DIAGNOSIS — M48.02 NEURAL FORAMINAL STENOSIS OF CERVICAL SPINE: ICD-10-CM

## 2022-08-30 PROCEDURE — 99214 OFFICE O/P EST MOD 30 MIN: CPT | Performed by: NURSE PRACTITIONER

## 2022-08-30 RX ORDER — GABAPENTIN 600 MG/1
TABLET ORAL
Qty: 180 TABLET | Refills: 11 | Status: SHIPPED | OUTPATIENT
Start: 2022-08-30 | End: 2023-08-30

## 2022-08-30 NOTE — PROGRESS NOTES
[facility]            [address]          [place], [address] [facility]          Dept: [phone]          Dept Fax: [phone]    MD Demond Kathleen MD Beuford Church, MD Garrison Bidding, LESLEY            8/30/2022      HISTORY OF PRESENT ILLNESS:       I had the pleasure of seeing Cindy Barboza, who returns for continuing neurologic care. The patient was seen last on March 15, 2022 for treatment of a chronic cervical radiculopathy, trochanteric bursitis of the bilateral hips and a peripheral neuropathy. She has a chronic cervical radiculopathy with neck pain radiating into her right shoulder and was prescribed gabapentin 1200 mg three times daily. She also has a peripheral neuropathy and was prescribed gabapentin 1200 mg three times daily. A vitamin B12 and folate was completed in March 2022 showing a vitamin B12 level on the low end of normal at 277 and a low folate at 3.3. She was recommended to start cyanocobalamin 1000 mcg daily and folic acid 1 mg daily at that time. She has recently been noticing an episodic swelling of the tops of her feet and toes. She has continued having significant numbness in her bilateral lower extremities however notes the pain is well controlled with use of gabapentin. She has recently noticed worsening difficulties with her memory and notes difficulties with remembering names of her family members. She has a history of anxiety and depression and follows with psychiatry.        Testing reviewed:    Vitamin B12 & Folate 3/16/2022    Vitamin B-12 232 - 1245 pg/mL 277    Folate >4.8 ng/mL 3.3 Low      Hemoglobin A1C 1/18/2022  Hemoglobin A1C 4.0 - 6.0 % 5.2  5.2  5.1  5.2  5.5  5.6  5.2    Estimated Avg Glucose mg/dL 103  103 CM  100 CM  103 CM  111 CM  114 CM      PTH 1/18/2022  Pth Intact 15.0 - 65.0 pg/mL 155.2 High   140.3 High              EMG/NCV studies of bilateral lower extremities 11/11/2021  Abnormal electrodiagnostic study. 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 However, patient's feet were cool to the touch and cold temperature of the feet could also result in prolonged latency of the sensory nerves noted on today's test.  There is no evidence of bilateral lumbosacral radiculopathy, plexopathy, or myopathy on today's test.     MRI cervical spine 4/2/18  Impression   1. Mild degenerative disc disease at C5-C6 with mild spinal canal stenosis. Severe right neural foraminal stenosis at this level. 2. Minimal spinal canal stenosis at C3-C4 and C4-C5.                    - MRI brain 7/2/18  pression   1. No ictal focus is identified. 2. Trace chronic microvascular white matter ischemic disease. 3. There is a partially empty sella. Correlate with clinical signs/symptoms   of endocrine dysfunction as well as serum laboratory values. -EMG lower extremities 10/2/18  This is a normal electrophysiological study. There is no evidence of large fiber sensory neuropathy, lumbosacral plexopathy or radiculopathy. If clinically indicated, a skin punch biopsy may be obtained to rule out small fiber neuropathy. Clinical correlation is recommended.       PAST MEDICAL HISTORY:         Diagnosis Date    Acute kidney injury ([facility] [address]     Anemia     Angioedema     Antinuclear antibody (IQRA) titer greater than 1:80     Anxiety     Asthma     Ataxia     Atrial fibrillation (HCC)     Borderline personality disorder (HCC)     Bradycardia     CAD (coronary artery disease)     Chronic kidney disease     CKD (chronic kidney disease), stage II 8/23/2018    COVID-19 01/01/2021    Degenerative disc disease, thoracic     Depression     Diabetes mellitus (HCC)     Diastolic dysfunction     DJD (degenerative joint disease)     Fibromyalgia     Foot pain, right     GERD (gastroesophageal reflux disease)     H/O: hysterectomy     Headache Hernia of abdominal wall     History of blood transfusion     no problem    Hyperlipidemia     Hypertension     Lupus (HCC)     Mood disorder (HCC)     Neuropathy     Osteoarthritis     PTSD (post-traumatic stress disorder)     Pulmonary nodules     Raynaud's disease     Scleroderma ([address]     Scleroderma ([address] 8/23/2018    Seizures (HCC)     Sleep walking disorder     Thyroid disease     TIA (transient ischemic attack)     Transient insomnia     Tricuspid regurgitation     Vasospasm ([address] 01/2016    bilat. legs. resulting in near complete absence of profusion to arteries of feet. ([facility]).     Vitamin D deficiency 8/23/2018    Wears glasses         PAST SURGICAL HISTORY:         Procedure Laterality Date    ABDOMINOPLASTY  2013    BLADDER SUSPENSION N/A 7/30/2018    CYSTOSCOPY WITH OBTRYX MID URETHRAL SLING performed by Donnie Pitts MD at [address] [place] [address] Right 2016    [address] [place] [address] Road Right 01/02/2020    CHOLECYSTECTOMY  1989    COLONOSCOPY N/A 6/24/2019    COLORECTAL CANCER SCREENING, NOT HIGH RISK performed by Elizabeth Knapp MD [address] 7/2/2019    COLORECTAL CANCER SCREENING, NOT HIGH RISK performed by Elizabeth Knapp MD at [address] [place] [address] SURGERY  11/09/2018    FINGER SURGERY Right 08/28/2018    RING CARTER MASS EXCISION, TRIGGER RELEASE    FRACTURE SURGERY      left femur    GASTRECTOMY      GASTRIC BYPASS SURGERY  2012    HYSTERECTOMY (CERVIX STATUS UNKNOWN)      JOINT REPLACEMENT Bilateral     knees    KNEE SURGERY Right 05/02/2017    (femoral) patella replacement    NERVE BLOCK Right 05/04/2018     cervical facet #1 no steroid    NERVE BLOCK Right 06/29/2018    rt cervical diagnostic #2 decadron 10mg    NERVE BLOCK Right 08/10/2018    right cervical rfa decadron 10mg    [place] ANTERIOR COLPORRAPHY RPR CYSTOCELE W/CYSTO N/A 7/30/2018    CYSTOCELE REPAIR performed by Amanda Up DO at [facility]    [place] OFFICE/OUTPT VISIT,PROCEDURE ONLY Right 8/28/2018    RING CARTER MASS EXCISION, TRIGGER RELEASE, [address] TABLE performed by Rogelio Oneal DO at [address] OFFICE/OUTPT VISIT,PROCEDURE ONLY Left 11/9/2018    FEMUR RETROGRADE IM NAILING PERIPROSTHETIC FRACTURE performed by Rogelio Oneal DO at [address]/CARPAL TUNNEL SURG Left 10/23/2018    CARPAL TUNNEL RELEASE performed by Rogelio Oneal DO at [address]  2011    left knee    TOTAL KNEE ARTHROPLASTY Right 5/2/2017    [facility] [place], [address] TABLE, FEMORAL POPLITEAL BLOCK, NSA= SPINAL VS GENERAL performed by Rogelio Oneal DO at [address]   2004        SOCIAL HISTORY:     Social History     Socioeconomic History    Marital status:       Spouse name: Not on file    Number of children: Not on file    Years of education: Not on file    Highest education level: Not on file   Occupational History    Not on file   Tobacco Use    Smoking status: Never    Smokeless tobacco: Never   Vaping Use    Vaping Use: Never used   Substance and Sexual Activity    Alcohol use: Not Currently    Drug use: No    Sexual activity: Not Currently     Birth control/protection: Surgical     Comment: pt has hysterectomy   Other Topics Concern    Not on file   Social History Narrative    Not on file     Social Determinants of Health     Financial Resource Strain: Low Risk     Difficulty of Paying Living Expenses: Not hard at all   Food Insecurity: No Food Insecurity    Worried About Running Out of Food in the Last Year: Never true    Ran Out of Food in the Last Year: Never true   Transportation Needs: Not on file   Physical Activity: Insufficiently Active    Days of Exercise per Week: 1 day    Minutes of Exercise per Session: 20 min   Stress: Not on file   Social Connections: Not on file   Intimate Partner Violence: Not on file   Housing Stability: Not on file       CURRENT MEDICATIONS:     Current Outpatient Medications   Medication Sig Dispense Refill    gabapentin (NEURONTIN) 600 MG tablet TAKE 2 TABLETS BY MOUTH THREE TIMES A  tablet 11    cetirizine (ZYRTEC) 10 MG tablet TAKE 1 TABLET BY MOUTH ONE TIME A DAY 90 tablet 1    pravastatin (PRAVACHOL) 40 MG tablet Take 1 tablet by mouth daily 90 tablet 1    famotidine (PEPCID) 40 MG tablet TAKE 1 TABLET BY MOUTH IN THE EVENING 90 tablet 1    albuterol sulfate HFA (VENTOLIN HFA) 108 (90 Base) MCG/ACT inhaler Inhale 2 puffs into the lungs every 6 hours as needed for Wheezing 1 each 5    losartan (COZAAR) 100 MG tablet TAKE 1 TABLET BY MOUTH EVERY DAY 90 tablet 1    folic acid (FOLVITE) 1 MG tablet Take 1 tablet by mouth daily 90 tablet 1    clonazePAM (KLONOPIN) 1 MG tablet TAKE 1 TABLET BY MOUTH THREE TIMES A DAY AS NEEDED      Handicap Placard MISC by Does not apply route Exp: 7/13/2026 1 each 0    omeprazole (PRILOSEC) 20 MG delayed release capsule TAKE 1 CAPSULE BY MOUTH TWO TIMES A DAY  180 capsule 1    hydroxychloroquine (PLAQUENIL) 200 MG tablet TAKE 1 TABLET BY MOUTH EVERY DAY  90 tablet 1    SM ASPIRIN ADULT LOW STRENGTH 81 MG EC tablet TAKE 1 TABLET BY MOUTH TWO TIMES A DAY  180 tablet 1    PROAIR  (90 Base) MCG/ACT inhaler INHALE 2 PUFFS BY MOUTH EVERY SIX HOURS AS NEEDED FOR WHEEZING 1 Inhaler 5    ARIPiprazole (ABILIFY) 2 MG tablet Take 5 mg by mouth daily       NIFEdipine (ADALAT CC) 60 MG extended release tablet Take 90 mg by mouth daily       EPIPEN 2-JIGAR 0.3 MG/0.3ML SOAJ injection INJECT 1 PEN INTO THE MUSCLE ONCE AS NEEDED FOR UP TO 1 DOSE FOR ANGIOEDEMA. 2 each 5    cyclobenzaprine (FLEXERIL) 10 MG tablet TAKE 1 TABLET BY MOUTH ONE TIME A DAY AT NIGHT AS NEEDED FOR MUSCLE SPASM 90 tablet 1    blood glucose monitor strips Test 4 times a day & as needed for symptoms of irregular blood glucose.  100 strip 5    ibuprofen (ADVIL;MOTRIN) 800 MG tablet Take 1 tablet by mouth as needed For post op pain prescribed by surgeon after right CTR sx      spironolactone (ALDACTONE) 25 MG tablet Take 25 mg by mouth daily      ferrous sulfate 325 (65 Fe) MG tablet TAKE 1 TABLET BY MOUTH TWO TIMES A DAY  60 tablet 2    metFORMIN (GLUCOPHAGE) 500 MG tablet Take 500 mg by mouth daily       lamoTRIgine (LAMICTAL) 25 MG tablet Take 25 mg by mouth 2 times daily       felodipine (PLENDIL) 10 MG extended release tablet Take 10 mg by mouth daily Prescribed by Dr. Ann Brady      ergocalciferol (ERGOCALCIFEROL) 12179 units capsule Take 50,000 Units by mouth See Admin Instructions Takes twice a week / taking 3 times weekly      QUEtiapine (SEROQUEL) 300 MG tablet Take 0.5 tablets by mouth nightly (Patient taking differently: Take 300 mg by mouth nightly Take 2 tablets at bedtime. 600 mg) 60 tablet 3    lamoTRIgine (LAMICTAL) 100 MG tablet Take 200 mg by mouth every evening       levothyroxine (SYNTHROID) 25 MCG tablet Take 25 mcg by mouth Daily      Multiple Vitamins-Minerals (THERAPEUTIC MULTIVITAMIN-MINERALS) tablet Take 1 tablet by mouth daily       No current facility-administered medications for this visit. ALLERGIES:     Allergies   Allergen Reactions    Morphine Nausea And Vomiting     Pt states it changes her mental status    Amlodipine Other (See Comments)     diarrhea and stress    Dilaudid [Hydromorphone Hcl] Hives and Itching    Sulfa Antibiotics Hives and Rash                                 REVIEW OF SYSTEMS        All items selected indicate a positive finding. Those items not selected are negative.   Constitutional [] Weight loss/gain   [] Fatigue  [] Fever/Chills   HEENT [] Hearing Loss  [] Visual Disturbance  [] Tinnitus  [] Eye pain   Respiratory [] Shortness of Breath  [] Cough  [] Snoring   Cardiovascular [] Chest Pain  [] Palpitations  [] Lightheaded   GI [] Constipation  [] Diarrhea  [] Swallowing change  [] Nausea/vomiting    [] Urinary Frequency  [] Urinary Urgency Musculoskeletal [x] Neck pain  [] Back pain  [] Muscle pain  [] Restless legs   Dermatologic [] Skin changes   Neurologic [] Memory loss/confusion  [] Seizures  [x] Trouble walking or imbalance  [] Dizziness  [] Sleep disturbance  [] Weakness  [x] Numbness  [] Tremors  [] Speech Difficulty  [] Headaches  [] Light Sensitivity  [] Sound Sensitivity   Endocrinology []Excessive thirst  []Excessive hunger   Psychiatric [] Anxiety/Depression  [] Hallucination   Allergy/immunology []Hives/environmental allergies   Hematologic/lymph [] Abnormal bleeding  [] Abnormal bruising         PHYSICAL EXAMINATION:       Vitals:    08/30/22 0859   BP: (!) 161/99   Pulse: 79                                              .                                                                                                    General Appearance:  Alert, cooperative, no signs of distress, appears stated age   Head:  Normocephalic, no signs of trauma   Eyes:  Conjunctiva/corneas clear;  eyelids intact   Ears:  Normal external ear and canals   Nose: Nares normal, mucosa normal, no drainage    Throat: Lips and tongue normal; teeth normal;  gums normal   Neck: Supple, intact flexion, extension and rotation;   trachea midline;  no adenopathy;   thyroid: not enlarged;   no carotid pulse abnormality   Back:   Symmetric, no curvature, ROM adequate   Lungs:   Respirations unlabored   Heart:  Regular rate and rhythm           Extremities: Extremities normal, no cyanosis, no edema   Pulses: Symmetric over head and neck   Skin: Skin color, texture normal, no rashes, no lesions                                     NEUROLOGIC EXAMINATION    Neurologic Exam  Mental status    Alert and oriented x 3; intact memory with no confusion, speech or language problems; no hallucinations or delusions  Fund of information appropriate for level of education    Cranial nerves    II - visual fields intact to confrontation bilaterally  III, IV, VI - extra-ocular muscles full: no pupillary defect; no LYNSEY, no nystagmus, no ptosis   V - normal facial sensation                                                               VII - normal facial symmetry                                                             VIII - intact hearing                                                                             IX, X - symmetrical palate                                                                  XI - symmetrical shoulder shrug                                                       XII - tongue midline without atrophy or fasciculation      Motor function  Normal muscle bulk and tone; strength 5/5 on all 4 extremities, no pronator drift      Sensory function 50% diminished vibration, temperature and pinprick sensation bilaterally to the mid calf      Cerebellar Intact fine motor movement. No involuntary movements or tremors. No ataxia or dysmetria on finger to nose or heel to shin testing      Reflex function DTR 2+ on bilateral UE and LE, symmetric. Down going toes bilaterally      Gait                   normal base and arm swing                  Medical Decision Making: In summary, your patient, Mita Bolton exhibits the following, with associated plan:    Chronic cervical radiculopathy with neck pain radiating into the right shoulder. Continue gabapentin 1200 mg three times daily. Tronchateric bursitis of the hips bilaterally  Continue to follow with Dr. Jasen Maradiaga, orthopedics   Sensory greater than motor peripheral polyneuropathy in the bilateral lower extremities. She also has gait instability associated with her neuropathy.  A vitamin B12 and folate was completed in March 2022 showing a vitamin B12 level on the low end of normal at 277 and a low folate at 3.3  Continue vitamin B12 replacement at 1000 mcg daily  Continue folate 1 mg daily  Continue gabapentin 1200 mg three times daily   Return in 6 months for reevaluation            Signed: Bernarda Hyman CNP      *Please note that portions of this note were completed with a voice recognition program.  Although every effort was made to insure the accuracy of this automated transcription, some errors in transcription may have occurred, occasionally words and are mis-transcribed    Provider Attestation: The documentation recorded by the scribe accurately reflects the service I personally performed and the decisions made by myself. Portions of this note were transcribed by a scribe. I personally performed the history, physical exam, and medical decision-making and confirm the accuracy of the information in the transcribed note. Scribe Attestation:   By signing my name below, Alycia Bence, attest that this documentation has been prepared under the direction and in the presence of Reena Herrera CNP.

## 2022-09-06 ENCOUNTER — TELEPHONE (OUTPATIENT)
Dept: PHARMACY | Facility: CLINIC | Age: 57
End: 2022-09-06

## 2022-09-07 NOTE — TELEPHONE ENCOUNTER
2nd attempt to contact this patient regarding the previous message**    CLINICAL PHARMACY: [facility]  Patient unavailable at the time of call. Left following message on home TAD: please call back at toll-free [phone] option 1 to retrieve previous message. Mychart letter sent.     Sincerely,   [address]  // Department, toll free [phone], Option 1      For Pharmacy Admin Tracking Only    CPA in place:  No  Intervention Detail: Adherence Monitorin  Gap Closed?: No   Time Spent (min): 20
through [employer]   Component Value Date    CHOL 261 (H) 10/14/2021    TRIG 58 10/14/2021     10/14/2021    LDLCHOLESTEROL 108 10/14/2021     ALT   Date Value Ref Range Status   10/14/2021 24 5 - 33 U/L Final     AST   Date Value Ref Range Status   10/14/2021 20 <32 U/L Final     The ASCVD Risk score (Pasha Moore., et al., 2013) failed to calculate for the following reasons: The patient has a prior MI or stroke diagnosis     PLAN  Patient appears to be adherent at this time  Pharmacy confirmed there is a 90ds rx on file for metformin and pravastatin and will try to process a 90ds for the nest fill. I will f/u closer to the refill date to process a 90ds for both medications     Unable to leave message  sorry, your call can not be completed at this time.  Please try this call again later    Called patient to offer a 100ds for Metfromin And complete TIP for Pravastatin and Losartan     Future Appointments   Date Time Provider Sylvester Collins   9/22/2022 10:10 AM Africa Watson MD [facility] None   10/17/2022  1:30 PM Nathan Santos MD [id number]   11/22/2022  9:30 AM COLTON Sams - CNP [facility]   [address] [place] [address]  // Department, toll free [phone], Option 2

## 2022-09-22 ENCOUNTER — HOSPITAL ENCOUNTER (OUTPATIENT)
Age: 57
Discharge: HOME | End: 2022-09-22
Payer: MEDICARE

## 2022-09-22 DIAGNOSIS — N18.31 STAGE 3A CHRONIC KIDNEY DISEASE (HCC): ICD-10-CM

## 2022-09-22 LAB
ANION GAP SERPL CALCULATED.3IONS-SCNC: 9 MMOL/L (ref 9–17)
BUN BLDV-MCNC: 14 MG/DL (ref 6–20)
CALCIUM SERPL-MCNC: 9.4 MG/DL (ref 8.6–10.4)
CHLORIDE BLD-SCNC: 102 MMOL/L (ref 98–107)
CO2: 28 MMOL/L (ref 20–31)
CREAT SERPL-MCNC: 0.99 MG/DL (ref 0.5–0.9)
GFR AFRICAN AMERICAN: >60 ML/MIN
GFR NON-AFRICAN AMERICAN: 58 ML/MIN
GFR SERPL CREATININE-BSD FRML MDRD: ABNORMAL ML/MIN/{1.73_M2}
GLUCOSE BLD-MCNC: 91 MG/DL (ref 70–99)
POTASSIUM SERPL-SCNC: 3.7 MMOL/L (ref 3.7–5.3)
SODIUM BLD-SCNC: 139 MMOL/L (ref 135–144)

## 2022-09-22 PROCEDURE — 80048 BASIC METABOLIC PNL TOTAL CA: CPT

## 2022-09-22 PROCEDURE — 36415 COLL VENOUS BLD VENIPUNCTURE: CPT

## 2022-09-22 NOTE — TELEPHONE ENCOUNTER
Pharmacy requesting refill of Folic Acid 1 mg.       Medication active on med list: yes      Date of last fill: 3/20/22 for #90 and 1 refill  verified on 9/22/2022    verified by Pedro Cranker., LPN      Date of last appointment: 8/30/2022    Next Visit Date:  Visit date not found

## 2022-09-25 RX ORDER — FOLIC ACID 1 MG/1
1 TABLET ORAL DAILY
Qty: 90 TABLET | Refills: 0 | Status: SHIPPED | OUTPATIENT
Start: 2022-09-25

## 2022-09-26 ENCOUNTER — APPOINTMENT (OUTPATIENT)
Dept: GENERAL RADIOLOGY | Age: 57
End: 2022-09-26
Payer: MEDICARE

## 2022-09-26 ENCOUNTER — HOSPITAL ENCOUNTER (OUTPATIENT)
Age: 57
Setting detail: OBSERVATION
Discharge: HOME | End: 2022-09-27
Attending: EMERGENCY MEDICINE | Admitting: INTERNAL MEDICINE
Payer: MEDICARE

## 2022-09-26 DIAGNOSIS — R07.9 CHEST PAIN, UNSPECIFIED TYPE: Primary | ICD-10-CM

## 2022-09-26 PROBLEM — I20.9 ANGINA PECTORIS (HCC): Status: ACTIVE | Noted: 2022-09-26

## 2022-09-26 LAB
ABSOLUTE EOS #: 0.2 K/UL (ref 0–0.4)
ABSOLUTE LYMPH #: 1.6 K/UL (ref 1–4.8)
ABSOLUTE MONO #: 0.4 K/UL (ref 0.1–1.3)
ANION GAP SERPL CALCULATED.3IONS-SCNC: 17 MMOL/L (ref 9–17)
BASOPHILS # BLD: 1 % (ref 0–2)
BASOPHILS ABSOLUTE: 0.1 K/UL (ref 0–0.2)
BUN BLDV-MCNC: 14 MG/DL (ref 6–20)
CALCIUM SERPL-MCNC: 9.6 MG/DL (ref 8.6–10.4)
CHLORIDE BLD-SCNC: 101 MMOL/L (ref 98–107)
CO2: 21 MMOL/L (ref 20–31)
CREAT SERPL-MCNC: 1.13 MG/DL (ref 0.5–0.9)
D-DIMER QUANTITATIVE: 0.33 MG/L FEU (ref 0–0.59)
EOSINOPHILS RELATIVE PERCENT: 3 % (ref 0–4)
GFR AFRICAN AMERICAN: >60 ML/MIN
GFR NON-AFRICAN AMERICAN: 50 ML/MIN
GFR SERPL CREATININE-BSD FRML MDRD: ABNORMAL ML/MIN/{1.73_M2}
GLUCOSE BLD-MCNC: 109 MG/DL (ref 70–99)
HCT VFR BLD CALC: 40 % (ref 36–46)
HEMOGLOBIN: 13.9 G/DL (ref 12–16)
INR BLD: 1
LYMPHOCYTES # BLD: 26 % (ref 24–44)
MCH RBC QN AUTO: 32.1 PG (ref 26–34)
MCHC RBC AUTO-ENTMCNC: 34.7 G/DL (ref 31–37)
MCV RBC AUTO: 92.4 FL (ref 80–100)
MONOCYTES # BLD: 8 % (ref 1–7)
MYOGLOBIN: 33 NG/ML (ref 25–58)
MYOGLOBIN: 39 NG/ML (ref 25–58)
PARTIAL THROMBOPLASTIN TIME: 25 SEC (ref 24–36)
PDW BLD-RTO: 13.4 % (ref 11.5–14.9)
PLATELET # BLD: 241 K/UL (ref 150–450)
PMV BLD AUTO: 8.2 FL (ref 6–12)
POTASSIUM SERPL-SCNC: 3.5 MMOL/L (ref 3.7–5.3)
PRO-BNP: <20 PG/ML
PROTHROMBIN TIME: 12.8 SEC (ref 11.8–14.6)
RBC # BLD: 4.33 M/UL (ref 4–5.2)
SEG NEUTROPHILS: 62 % (ref 36–66)
SEGMENTED NEUTROPHILS ABSOLUTE COUNT: 3.7 K/UL (ref 1.3–9.1)
SODIUM BLD-SCNC: 139 MMOL/L (ref 135–144)
THYROXINE, FREE: 0.9 NG/DL (ref 0.93–1.7)
TROPONIN, HIGH SENSITIVITY: 10 NG/L (ref 0–14)
TROPONIN, HIGH SENSITIVITY: 9 NG/L (ref 0–14)
TSH SERPL DL<=0.05 MIU/L-ACNC: 1.5 UIU/ML (ref 0.3–5)
WBC # BLD: 5.9 K/UL (ref 3.5–11)

## 2022-09-26 PROCEDURE — 85025 COMPLETE CBC W/AUTO DIFF WBC: CPT

## 2022-09-26 PROCEDURE — 6370000000 HC RX 637 (ALT 250 FOR IP): Performed by: NURSE PRACTITIONER

## 2022-09-26 PROCEDURE — G0378 HOSPITAL OBSERVATION PER HR: HCPCS

## 2022-09-26 PROCEDURE — 85730 THROMBOPLASTIN TIME PARTIAL: CPT

## 2022-09-26 PROCEDURE — 6370000000 HC RX 637 (ALT 250 FOR IP): Performed by: EMERGENCY MEDICINE

## 2022-09-26 PROCEDURE — 93005 ELECTROCARDIOGRAM TRACING: CPT | Performed by: EMERGENCY MEDICINE

## 2022-09-26 PROCEDURE — 85379 FIBRIN DEGRADATION QUANT: CPT

## 2022-09-26 PROCEDURE — 2580000003 HC RX 258: Performed by: INTERNAL MEDICINE

## 2022-09-26 PROCEDURE — 84484 ASSAY OF TROPONIN QUANT: CPT

## 2022-09-26 PROCEDURE — 71045 X-RAY EXAM CHEST 1 VIEW: CPT

## 2022-09-26 PROCEDURE — 83874 ASSAY OF MYOGLOBIN: CPT

## 2022-09-26 PROCEDURE — 84443 ASSAY THYROID STIM HORMONE: CPT

## 2022-09-26 PROCEDURE — 80048 BASIC METABOLIC PNL TOTAL CA: CPT

## 2022-09-26 PROCEDURE — 85610 PROTHROMBIN TIME: CPT

## 2022-09-26 PROCEDURE — 84439 ASSAY OF FREE THYROXINE: CPT

## 2022-09-26 PROCEDURE — 99223 1ST HOSP IP/OBS HIGH 75: CPT | Performed by: INTERNAL MEDICINE

## 2022-09-26 PROCEDURE — 99285 EMERGENCY DEPT VISIT HI MDM: CPT

## 2022-09-26 PROCEDURE — 6370000000 HC RX 637 (ALT 250 FOR IP): Performed by: INTERNAL MEDICINE

## 2022-09-26 PROCEDURE — 83880 ASSAY OF NATRIURETIC PEPTIDE: CPT

## 2022-09-26 PROCEDURE — 96372 THER/PROPH/DIAG INJ SC/IM: CPT

## 2022-09-26 PROCEDURE — 2060000000 HC ICU INTERMEDIATE R&B

## 2022-09-26 PROCEDURE — 36415 COLL VENOUS BLD VENIPUNCTURE: CPT

## 2022-09-26 PROCEDURE — 6360000002 HC RX W HCPCS: Performed by: INTERNAL MEDICINE

## 2022-09-26 RX ORDER — ONDANSETRON 4 MG/1
4 TABLET, ORALLY DISINTEGRATING ORAL EVERY 8 HOURS PRN
Status: DISCONTINUED | OUTPATIENT
Start: 2022-09-26 | End: 2022-09-27 | Stop reason: HOSPADM

## 2022-09-26 RX ORDER — GABAPENTIN 600 MG/1
300 TABLET ORAL 3 TIMES DAILY
Status: DISCONTINUED | OUTPATIENT
Start: 2022-09-26 | End: 2022-09-26

## 2022-09-26 RX ORDER — ONDANSETRON 2 MG/ML
4 INJECTION INTRAMUSCULAR; INTRAVENOUS EVERY 6 HOURS PRN
Status: DISCONTINUED | OUTPATIENT
Start: 2022-09-26 | End: 2022-09-27 | Stop reason: HOSPADM

## 2022-09-26 RX ORDER — GABAPENTIN 600 MG/1
600 TABLET ORAL 3 TIMES DAILY
Status: DISCONTINUED | OUTPATIENT
Start: 2022-09-26 | End: 2022-09-27 | Stop reason: HOSPADM

## 2022-09-26 RX ORDER — LAMOTRIGINE 100 MG/1
200 TABLET ORAL EVERY EVENING
Status: DISCONTINUED | OUTPATIENT
Start: 2022-09-26 | End: 2022-09-27 | Stop reason: HOSPADM

## 2022-09-26 RX ORDER — CLONAZEPAM 0.5 MG/1
0.5 TABLET ORAL EVERY 12 HOURS PRN
Status: DISCONTINUED | OUTPATIENT
Start: 2022-09-26 | End: 2022-09-27 | Stop reason: HOSPADM

## 2022-09-26 RX ORDER — FAMOTIDINE 20 MG/1
40 TABLET, FILM COATED ORAL DAILY
Status: DISCONTINUED | OUTPATIENT
Start: 2022-09-26 | End: 2022-09-27 | Stop reason: HOSPADM

## 2022-09-26 RX ORDER — NITROGLYCERIN 0.4 MG/1
0.4 TABLET SUBLINGUAL EVERY 5 MIN PRN
Status: DISCONTINUED | OUTPATIENT
Start: 2022-09-26 | End: 2022-09-27 | Stop reason: HOSPADM

## 2022-09-26 RX ORDER — PRAVASTATIN SODIUM 40 MG
40 TABLET ORAL DAILY
Status: DISCONTINUED | OUTPATIENT
Start: 2022-09-27 | End: 2022-09-26

## 2022-09-26 RX ORDER — ENOXAPARIN SODIUM 100 MG/ML
40 INJECTION SUBCUTANEOUS DAILY
Status: DISCONTINUED | OUTPATIENT
Start: 2022-09-26 | End: 2022-09-27

## 2022-09-26 RX ORDER — ARIPIPRAZOLE 5 MG/1
5 TABLET ORAL DAILY
Status: DISCONTINUED | OUTPATIENT
Start: 2022-09-27 | End: 2022-09-27 | Stop reason: HOSPADM

## 2022-09-26 RX ORDER — LEVOTHYROXINE SODIUM 0.1 MG/1
100 TABLET ORAL DAILY
Status: DISCONTINUED | OUTPATIENT
Start: 2022-09-27 | End: 2022-09-27 | Stop reason: HOSPADM

## 2022-09-26 RX ORDER — NIFEDIPINE 60 MG/1
120 TABLET, FILM COATED, EXTENDED RELEASE ORAL DAILY
Status: DISCONTINUED | OUTPATIENT
Start: 2022-09-27 | End: 2022-09-27 | Stop reason: HOSPADM

## 2022-09-26 RX ORDER — HYDRALAZINE HYDROCHLORIDE 50 MG/1
50 TABLET, FILM COATED ORAL 3 TIMES DAILY
Status: DISCONTINUED | OUTPATIENT
Start: 2022-09-26 | End: 2022-09-27 | Stop reason: HOSPADM

## 2022-09-26 RX ORDER — LAMOTRIGINE 25 MG/1
25 TABLET ORAL 2 TIMES DAILY
Status: DISCONTINUED | OUTPATIENT
Start: 2022-09-26 | End: 2022-09-27 | Stop reason: HOSPADM

## 2022-09-26 RX ORDER — SODIUM CHLORIDE 9 MG/ML
INJECTION, SOLUTION INTRAVENOUS PRN
Status: DISCONTINUED | OUTPATIENT
Start: 2022-09-26 | End: 2022-09-27 | Stop reason: HOSPADM

## 2022-09-26 RX ORDER — SODIUM CHLORIDE 0.9 % (FLUSH) 0.9 %
5-40 SYRINGE (ML) INJECTION PRN
Status: DISCONTINUED | OUTPATIENT
Start: 2022-09-26 | End: 2022-09-27 | Stop reason: HOSPADM

## 2022-09-26 RX ORDER — ALBUTEROL SULFATE 90 UG/1
2 AEROSOL, METERED RESPIRATORY (INHALATION) EVERY 6 HOURS PRN
Status: DISCONTINUED | OUTPATIENT
Start: 2022-09-26 | End: 2022-09-27 | Stop reason: HOSPADM

## 2022-09-26 RX ORDER — QUETIAPINE FUMARATE 200 MG/1
600 TABLET, FILM COATED ORAL NIGHTLY
Status: DISCONTINUED | OUTPATIENT
Start: 2022-09-26 | End: 2022-09-27 | Stop reason: HOSPADM

## 2022-09-26 RX ORDER — QUETIAPINE FUMARATE 300 MG/1
600 TABLET, FILM COATED ORAL NIGHTLY
COMMUNITY

## 2022-09-26 RX ORDER — LOSARTAN POTASSIUM 100 MG/1
100 TABLET ORAL DAILY
Status: DISCONTINUED | OUTPATIENT
Start: 2022-09-27 | End: 2022-09-27 | Stop reason: HOSPADM

## 2022-09-26 RX ORDER — ASPIRIN 81 MG/1
324 TABLET, CHEWABLE ORAL ONCE
Status: COMPLETED | OUTPATIENT
Start: 2022-09-26 | End: 2022-09-26

## 2022-09-26 RX ORDER — ACETAMINOPHEN 325 MG/1
650 TABLET ORAL EVERY 4 HOURS PRN
Status: DISCONTINUED | OUTPATIENT
Start: 2022-09-26 | End: 2022-09-27 | Stop reason: HOSPADM

## 2022-09-26 RX ORDER — PRAVASTATIN SODIUM 40 MG
40 TABLET ORAL DAILY
Status: DISCONTINUED | OUTPATIENT
Start: 2022-09-27 | End: 2022-09-27 | Stop reason: HOSPADM

## 2022-09-26 RX ORDER — ASPIRIN 81 MG/1
81 TABLET ORAL DAILY
Status: DISCONTINUED | OUTPATIENT
Start: 2022-09-27 | End: 2022-09-27 | Stop reason: HOSPADM

## 2022-09-26 RX ORDER — PRAVASTATIN SODIUM 40 MG
40 TABLET ORAL NIGHTLY
Status: DISCONTINUED | OUTPATIENT
Start: 2022-09-26 | End: 2022-09-26

## 2022-09-26 RX ORDER — SODIUM CHLORIDE 0.9 % (FLUSH) 0.9 %
5-40 SYRINGE (ML) INJECTION EVERY 12 HOURS SCHEDULED
Status: DISCONTINUED | OUTPATIENT
Start: 2022-09-26 | End: 2022-09-27 | Stop reason: HOSPADM

## 2022-09-26 RX ORDER — ERGOCALCIFEROL 1.25 MG/1
50000 CAPSULE ORAL
Status: DISCONTINUED | OUTPATIENT
Start: 2022-09-29 | End: 2022-09-27 | Stop reason: HOSPADM

## 2022-09-26 RX ADMIN — ACETAMINOPHEN 650 MG: 325 TABLET, FILM COATED ORAL at 18:52

## 2022-09-26 RX ADMIN — ASPIRIN 81 MG 324 MG: 81 TABLET ORAL at 13:45

## 2022-09-26 RX ADMIN — GABAPENTIN 600 MG: 600 TABLET, FILM COATED ORAL at 21:53

## 2022-09-26 RX ADMIN — QUETIAPINE FUMARATE 600 MG: 200 TABLET ORAL at 21:53

## 2022-09-26 RX ADMIN — SODIUM CHLORIDE, PRESERVATIVE FREE 10 ML: 5 INJECTION INTRAVENOUS at 21:54

## 2022-09-26 RX ADMIN — ENOXAPARIN SODIUM 40 MG: 100 INJECTION SUBCUTANEOUS at 17:26

## 2022-09-26 RX ADMIN — HYDRALAZINE HYDROCHLORIDE 50 MG: 50 TABLET, FILM COATED ORAL at 21:52

## 2022-09-26 RX ADMIN — LAMOTRIGINE 200 MG: 100 TABLET ORAL at 21:53

## 2022-09-26 ASSESSMENT — ENCOUNTER SYMPTOMS
ABDOMINAL PAIN: 0
RHINORRHEA: 0
CHEST TIGHTNESS: 1
CONSTIPATION: 0
COLOR CHANGE: 0
TROUBLE SWALLOWING: 0
SINUS PRESSURE: 0
WHEEZING: 0
EYE REDNESS: 0
BLOOD IN STOOL: 0
COUGH: 0
FACIAL SWELLING: 0
EYE PAIN: 0
NAUSEA: 0
SORE THROAT: 0
SHORTNESS OF BREATH: 1
BACK PAIN: 0
VOMITING: 0
DIARRHEA: 0
EYE DISCHARGE: 0

## 2022-09-26 ASSESSMENT — PAIN DESCRIPTION - FREQUENCY: FREQUENCY: CONTINUOUS

## 2022-09-26 ASSESSMENT — PAIN DESCRIPTION - LOCATION
LOCATION: HEAD
LOCATION: CHEST

## 2022-09-26 ASSESSMENT — HEART SCORE: ECG: 1

## 2022-09-26 ASSESSMENT — PAIN DESCRIPTION - PAIN TYPE: TYPE: ACUTE PAIN

## 2022-09-26 ASSESSMENT — PAIN DESCRIPTION - ONSET: ONSET: ON-GOING

## 2022-09-26 ASSESSMENT — PAIN DESCRIPTION - ORIENTATION: ORIENTATION: LEFT

## 2022-09-26 ASSESSMENT — PAIN SCALES - GENERAL
PAINLEVEL_OUTOF10: 3
PAINLEVEL_OUTOF10: 7

## 2022-09-26 ASSESSMENT — PAIN DESCRIPTION - DESCRIPTORS: DESCRIPTORS: TIGHTNESS

## 2022-09-26 NOTE — PROGRESS NOTES
Medication History completed    New medications:None    Medications discontinued: Plaquenil, Omeprazole, Proair, Spironolactone    Changes to dosing:  Levothyroxine - increased from 25 mg to 100 mg    Stated allergies: As listed    Other pertinent information: Spoke to patient and Stephanie Mccormick to confirm medications.     Thank you,  Al Corbett  PharmD Candidate [id number]

## 2022-09-26 NOTE — ED PROVIDER NOTES
[address]  eMERGENCY dEPARTMENT eNCOUnter      Pt Name: Vitaly Lagunas  MRN: [id number]  [place] 1965  Date of evaluation: 9/26/22      CHIEF COMPLAINT       Chief Complaint   Patient presents with    Palpitations    Shortness of Breath         HISTORY OF PRESENT ILLNESS    Mia Lisa is a 62 y.o. female who presents complaining of chest tightness. Patient states about an hour and a half before arrival she started having significant amount of chest tightness across the upper portion of her chest and she states she can kind of feel it in her left shoulder also. Patient states with this she does feel short of breath and has palpitations. Patient denies any recent illnesses. Patient states she did have something similar to this back when she had a TIA. Patient has had no cath has not had a stress test in many years. Patient states that she does have high blood pressure cholesterol diabetes and significant family history of heart problems. Patient has no pain or swelling in the legs. REVIEW OF SYSTEMS       Review of Systems   Constitutional:  Positive for fatigue. Negative for activity change, appetite change, chills, diaphoresis and fever. HENT:  Negative for congestion, ear pain, facial swelling, nosebleeds, rhinorrhea, sinus pressure, sore throat and trouble swallowing. Eyes:  Negative for pain, discharge and redness. Respiratory:  Positive for chest tightness and shortness of breath. Negative for cough and wheezing. Cardiovascular:  Positive for palpitations. Negative for chest pain and leg swelling. Gastrointestinal:  Negative for abdominal pain, blood in stool, constipation, diarrhea, nausea and vomiting. Genitourinary:  Negative for difficulty urinating, dysuria, flank pain, frequency, genital sores and hematuria. Musculoskeletal:  Negative for arthralgias, back pain, gait problem, joint swelling, myalgias and neck pain.    Skin:  Negative for color change, pallor, rash and wound. Neurological:  Negative for dizziness, tremors, seizures, syncope, speech difficulty, weakness, numbness and headaches. Psychiatric/Behavioral:  Negative for confusion, decreased concentration, hallucinations, self-injury, sleep disturbance and suicidal ideas. PAST MEDICAL HISTORY     Past Medical History:   Diagnosis Date    Acute kidney injury (HCC)     Anemia     Angioedema     Antinuclear antibody (IQRA) titer greater than 1:80     Anxiety     Asthma     Ataxia     Atrial fibrillation (HCC)     Borderline personality disorder (HCC)     Bradycardia     CAD (coronary artery disease)     Chronic kidney disease     CKD (chronic kidney disease), stage II 8/23/2018    COVID-19 01/01/2021    Degenerative disc disease, thoracic     Depression     Diabetes mellitus (HCC)     Diastolic dysfunction     DJD (degenerative joint disease)     Fibromyalgia     Foot pain, right     GERD (gastroesophageal reflux disease)     H/O: hysterectomy     Headache     Hernia of abdominal wall     History of blood transfusion     no problem    Hyperlipidemia     Hypertension     Lupus (HCC)     Mood disorder (HCC)     Neuropathy     Osteoarthritis     PTSD (post-traumatic stress disorder)     Pulmonary nodules     Raynaud's disease     Scleroderma ([address]     Scleroderma ([address] 8/23/2018    Seizures (HCC)     Sleep walking disorder     Thyroid disease     TIA (transient ischemic attack)     Transient insomnia     Tricuspid regurgitation     Vasospasm ([address] 01/2016    bilat. legs. resulting in near complete absence of profusion to arteries of feet. ([facility]).     Vitamin D deficiency 8/23/2018    Wears glasses        SURGICAL HISTORY       Past Surgical History:   Procedure Laterality Date    ABDOMINOPLASTY  2013    BLADDER SUSPENSION N/A 7/30/2018    CYSTOSCOPY WITH OBTRYX MID URETHRAL SLING performed by Reanna Yung MD at [address] [facility] Right 2016    CARPAL TUNNEL RELEASE Right 01/02/2020 CHOLECYSTECTOMY  1989    COLONOSCOPY N/A 6/24/2019    COLORECTAL CANCER SCREENING, NOT HIGH RISK performed by Ranjit Deal MD at [address] 7/2/2019    COLORECTAL CANCER SCREENING, NOT HIGH RISK performed by Ranjit Deal MD at [address] [facility] SURGERY  11/09/2018    FINGER SURGERY Right 08/28/2018    RING CARTER MASS EXCISION, TRIGGER RELEASE    FRACTURE SURGERY      left femur    GASTRECTOMY      GASTRIC BYPASS SURGERY  2012    HYSTERECTOMY (CERVIX STATUS UNKNOWN)      JOINT REPLACEMENT Bilateral     knees    KNEE SURGERY Right 05/02/2017    (femoral) patella replacement    NERVE BLOCK Right 05/04/2018     cervical facet #1 no steroid    NERVE BLOCK Right 06/29/2018    rt cervical diagnostic #2 decadron 10mg    NERVE BLOCK Right 08/10/2018    right cervical rfa decadron 10mg    RI ANTERIOR COLPORRAPHY RPR CYSTOCELE W/CYSTO N/A 7/30/2018    CYSTOCELE REPAIR performed by Oleg Dewitt DO at [address] OFFICE/OUTPT [address] Right 8/28/2018    RING CARTER MASS EXCISION, TRIGGER RELEASE, 3080 TABLE performed by Leonidas Hyatt DO at [address] OFFICE/OUTPT VISIT,PROCEDURE ONLY Left 11/9/2018    FEMUR RETROGRADE IM NAILING PERIPROSTHETIC FRACTURE performed by Leonidas Hyatt DO at [address]/CARPAL TUNNEL SURG Left 10/23/2018    CARPAL TUNNEL RELEASE performed by Leonidas Hyatt DO at [address]  2011    left knee    TOTAL KNEE ARTHROPLASTY Right 5/2/2017    PATELLOFEMORAL REPLACEMENT KNEE - JAXSON, 3080 TABLE, FEMORAL POPLITEAL BLOCK, NSA= SPINAL VS GENERAL performed by Leonidas Hyatt DO at [address]  2004       CURRENT MEDICATIONS       Previous Medications    ALBUTEROL SULFATE HFA (VENTOLIN HFA) 108 (90 BASE) MCG/ACT INHALER    Inhale 2 puffs into the lungs every 6 hours as needed for Wheezing ARIPIPRAZOLE (ABILIFY) 2 MG TABLET    Take 5 mg by mouth daily     BLOOD GLUCOSE MONITOR STRIPS    Test 4 times a day & as needed for symptoms of irregular blood glucose. CETIRIZINE (ZYRTEC) 10 MG TABLET    TAKE 1 TABLET BY MOUTH ONE TIME A DAY    CLONAZEPAM (KLONOPIN) 1 MG TABLET    TAKE 1 TABLET BY MOUTH THREE TIMES A DAY AS NEEDED    CYCLOBENZAPRINE (FLEXERIL) 10 MG TABLET    TAKE 1 TABLET BY MOUTH ONE TIME A DAY AT NIGHT AS NEEDED FOR MUSCLE SPASM    EPIPEN 2-JIGAR 0.3 MG/0.3ML SOAJ INJECTION    INJECT 1 PEN INTO THE MUSCLE ONCE AS NEEDED FOR UP TO 1 DOSE FOR ANGIOEDEMA.     ERGOCALCIFEROL (ERGOCALCIFEROL) 61126 UNITS CAPSULE    Take 50,000 Units by mouth See Admin Instructions Takes twice a week / taking 3 times weekly    FAMOTIDINE (PEPCID) 40 MG TABLET    TAKE 1 TABLET BY MOUTH IN THE EVENING    FERROUS SULFATE 325 (65 FE) MG TABLET    TAKE 1 TABLET BY MOUTH TWO TIMES A DAY     FOLIC ACID (FOLVITE) 1 MG TABLET    Take 1 tablet by mouth daily    GABAPENTIN (NEURONTIN) 600 MG TABLET    TAKE 2 TABLETS BY MOUTH THREE TIMES A DAY    HANDICAP PLACARD MISC    by Does not apply route Exp: 7/13/2026    HYDRALAZINE (APRESOLINE) 50 MG TABLET    Take 1 tablet by mouth 3 times daily    HYDROXYCHLOROQUINE (PLAQUENIL) 200 MG TABLET    TAKE 1 TABLET BY MOUTH EVERY DAY     IBUPROFEN (ADVIL;MOTRIN) 800 MG TABLET    Take 1 tablet by mouth as needed For post op pain prescribed by surgeon after right CTR sx    LAMOTRIGINE (LAMICTAL) 100 MG TABLET    Take 200 mg by mouth every evening     LAMOTRIGINE (LAMICTAL) 25 MG TABLET    Take 25 mg by mouth 2 times daily     LEVOTHYROXINE (SYNTHROID) 25 MCG TABLET    Take 25 mcg by mouth Daily    LOSARTAN (COZAAR) 100 MG TABLET    TAKE 1 TABLET BY MOUTH EVERY DAY    METFORMIN (GLUCOPHAGE) 500 MG TABLET    Take 500 mg by mouth daily     MULTIPLE VITAMINS-MINERALS (THERAPEUTIC MULTIVITAMIN-MINERALS) TABLET    Take 1 tablet by mouth daily    NIFEDIPINE (ADALAT CC) 60 MG EXTENDED RELEASE TABLET    Take 2 tablets by mouth daily    OMEPRAZOLE (PRILOSEC) 20 MG DELAYED RELEASE CAPSULE    TAKE 1 CAPSULE BY MOUTH TWO TIMES A DAY     PRAVASTATIN (PRAVACHOL) 40 MG TABLET    Take 1 tablet by mouth daily    PROAIR  (90 BASE) MCG/ACT INHALER    INHALE 2 PUFFS BY MOUTH EVERY SIX HOURS AS NEEDED FOR WHEEZING    QUETIAPINE (SEROQUEL) 300 MG TABLET    Take 0.5 tablets by mouth nightly    SM ASPIRIN ADULT LOW STRENGTH 81 MG EC TABLET    TAKE 1 TABLET BY MOUTH TWO TIMES A DAY     SPIRONOLACTONE (ALDACTONE) 25 MG TABLET    Take 25 mg by mouth daily       ALLERGIES     is allergic to morphine, amlodipine, dilaudid [hydromorphone hcl], and sulfa antibiotics. FAMILY HISTORY     [unfilled]     SOCIAL HISTORY      reports that she has never smoked. She has never used smokeless tobacco. She reports that she does not currently use alcohol. She reports that she does not use drugs. PHYSICAL EXAM     INITIAL VITALS: /62   Pulse (!) 114   Temp 98.3 °F (36.8 °C)   Resp 16   SpO2 100%      Physical Exam  Vitals and nursing note reviewed. Constitutional:       General: She is not in acute distress. Appearance: She is well-developed. She is not diaphoretic. HENT:      Head: Normocephalic and atraumatic. Eyes:      General: No scleral icterus. Right eye: No discharge. Left eye: No discharge. Conjunctiva/sclera: Conjunctivae normal.      Pupils: Pupils are equal, round, and reactive to light. Cardiovascular:      Rate and Rhythm: Normal rate and regular rhythm. Heart sounds: Normal heart sounds. No murmur heard. No friction rub. No gallop. Pulmonary:      Effort: Pulmonary effort is normal. No respiratory distress. Breath sounds: Normal breath sounds. No wheezing or rales. Chest:      Chest wall: No tenderness. Abdominal:      General: Bowel sounds are normal. There is no distension. Palpations: Abdomen is soft. There is no mass.       Tenderness: no abdominal tenderness There is no guarding or rebound. Musculoskeletal:         General: No tenderness. Normal range of motion. Skin:     General: Skin is warm and dry. Coloration: Skin is not pale. Findings: No erythema or rash. Neurological:      Mental Status: She is alert and oriented to person, place, and time. Cranial Nerves: No cranial nerve deficit. Sensory: No sensory deficit. Motor: No abnormal muscle tone. Coordination: Coordination normal.      Deep Tendon Reflexes: Reflexes normal.   Psychiatric:         Behavior: Behavior normal.         Thought Content: Thought content normal.         Judgment: Judgment normal.       MEDICAL DECISION MAKING:     Patient symptoms are concerning for cardiac disease and she has significant risk factors we will do a full cardiac work-up. We will also get a D-dimer as she is low risk for PE but she does have an elevated heart rate so I cannot rule PERC her out. DIAGNOSTIC RESULTS     EKG: All EKG's are interpreted by the Emergency Department Physician who either signs or Co-signs this chart in the absence of a cardiologist.    EKG Interpretation    Interpreted by emergency department physician    Rhythm: sinus tachycardia  Rate: tachycardia  Axis: normal  Ectopy: none  Conduction: normal  ST Segments: nonspecific changes  T Waves: non specific changes  Q Waves: none    Clinical Impression: EKG: nonspecific ST and T waves changes, sinus tachycardia. Virginie Kaur MD      RADIOLOGY:All plain film, CT, MRI, and formal ultrasound images (except ED bedside ultrasound)are read by the radiologist and interpretations are directly viewed by the emergency physician. XR CHEST PORTABLE    Result Date: 9/26/2022  EXAMINATION: ONE XRAY VIEW OF THE CHEST 9/26/2022 1:33 pm COMPARISON: 12/14/2018.  HISTORY: ORDERING SYSTEM PROVIDED HISTORY: Chest Pain TECHNOLOGIST PROVIDED HISTORY: Chest Pain Reason for Exam: Chest Pain FINDINGS: Lungs are clear.  Cardiac and mediastinal silhouettes are within normal limits. No pneumothoraces. Bony structures appear intact. No acute abnormality. LABS: All lab results were reviewed bymyself, and all abnormals are listed below. Labs Reviewed   BASIC METABOLIC PANEL - Abnormal; Notable for the following components:       Result Value    Glucose 109 (*)     Creatinine 1.13 (*)     Potassium 3.5 (*)     GFR Non- 50 (*)     All other components within normal limits   CBC WITH AUTO DIFFERENTIAL - Abnormal; Notable for the following components:    Monocytes 8 (*)     All other components within normal limits   T4, FREE - Abnormal; Notable for the following components:    Thyroxine, Free 0.90 (*)     All other components within normal limits   D-DIMER, QUANTITATIVE   TROP/MYOGLOBIN   TSH   APTT   PROTIME-INR   BRAIN NATRIURETIC PEPTIDE   TROP/MYOGLOBIN         EMERGENCY DEPARTMENT COURSE:   Vitals:    Vitals:    09/26/22 1317   BP: 129/62   Pulse: (!) 114   Resp: 16   Temp: 98.3 °F (36.8 °C)   SpO2: 100%       The patient was given the following medications while in the emergency department:     Orders Placed This Encounter   Medications    aspirin chewable tablet 324 mg    nitroGLYCERIN (NITROSTAT) SL tablet 0.4 mg       -------------------------  3:09 PM EDT  Patient was updated on her results and the fact that her heart score is a 5 and I recommended admission to her which she accepts. I spoke with Dr. Shaun Cross who agrees to accept the admission. He is recommending that her cardiologist be consulted. I spoke with Dr. Derald Shone and he will see the patient in consultation. CRITICAL CARE:   None    CONSULTS:  IP CONSULT TO PRIMARY CARE PROVIDER  IP CONSULT TO CARDIOLOGY    PROCEDURES:  None    FINAL IMPRESSION      1.  Chest pain, unspecified type          DISPOSITION/PLAN   DISPOSITION Decision To Admit 09/26/2022 02:51:54 PM      PATIENT REFERRED TO:  No follow-up provider specified.     DISCHARGE MEDICATIONS:  New Prescriptions    No medications on file       (Please note that portions of this note were completed with a voice recognition program.  Efforts were made to edit the dictations but occasionally words are mis-transcribed.)    Chase Badillo MD  Attending Pradeep Simmons MD  09/26/22 [id number]

## 2022-09-26 NOTE — ED TRIAGE NOTES
Patient to emergency department with complaints of palpitations, and sob which began 1 hour pta. Pt denies n/v. States she has not experienced this before. Pt states she began taking a new medication today, Hydralazine 50mg

## 2022-09-26 NOTE — H&P
History and Physical Service  [facility] Internal Medicine    HISTORY AND PHYSICAL EXAMINATION            Date:   9/26/2022  Patient name:  Rebeka Patel  MRN:   [id number]  Account:  [de-identified]  YOB: 1965  PCP:    COLTON Vo CNP  Code Status:    Prior    Chief Complaint:   Chest pain    History Obtained From:     Patient ,medical record ,nursing staff    History of Present [facility] Problems             Last Modified POA    * (Principal) Chest pain 9/26/2022 Yes    Hypertension 9/26/2022 Yes    GERD (gastroesophageal reflux disease) 9/26/2022 Yes    Atherosclerosis of native coronary artery of native heart with angina pectoris ([facility] [address] 9/26/2022 Yes    Anxiety 9/26/2022 Yes    Type 2 diabetes mellitus with stage 2 chronic kidney disease, without long-term current use of insulin ([facility] [address] 9/26/2022 Yes        The patient is a 62 y.o. Non- / non  female who presents   Rebeka Patel is a 62 y.o. female who presents complaining of chest tightness. Patient states about an hour and a half before arrival she started having significant amount of chest tightness across the upper portion of her chest and she states she can kind of feel it in her left shoulder also. Patient states with this she does feel short of breath and has palpitations. Patient denies any recent illnesses. Patient states she did have something similar to this back when she had a TIA. Patient has had no cath has not had a stress test in many years. Patient states that she does have high blood pressure cholesterol diabetes and significant family history of heart problems. Patient has no pain or swelling in the legs. Heart score 5   Patient sees sonya RIDER positives; Respiratory:  Positive for chest tightness and shortness of breath. Negative for cough and wheezing. Cardiovascular:  Positive for palpitations. Negative for chest pain and leg swelling.       Past Medical History:   Diagnosis Date    Acute kidney injury (HCC)     Anemia     Angioedema     Antinuclear antibody (IQAR) titer greater than 1:80     Anxiety     Asthma     Ataxia     Atrial fibrillation (HCC)     Borderline personality disorder (HCC)     Bradycardia     CAD (coronary artery disease)     Chronic kidney disease     CKD (chronic kidney disease), stage II 8/23/2018    COVID-19 01/01/2021    Degenerative disc disease, thoracic     Depression     Diabetes mellitus (HCC)     Diastolic dysfunction     DJD (degenerative joint disease)     Fibromyalgia     Foot pain, right     GERD (gastroesophageal reflux disease)     H/O: hysterectomy     Headache     Hernia of abdominal wall     History of blood transfusion     no problem    Hyperlipidemia     Hypertension     Lupus (HCC)     Mood disorder (HCC)     Neuropathy     Osteoarthritis     PTSD (post-traumatic stress disorder)     Pulmonary nodules     Raynaud's disease     Scleroderma ([address]     Scleroderma ([address] 8/23/2018    Seizures (HCC)     Sleep walking disorder     Thyroid disease     TIA (transient ischemic attack)     Transient insomnia     Tricuspid regurgitation     Vasospasm ([address] 01/2016    bilat. legs. resulting in near complete absence of profusion to arteries of feet. ([facility]).     Vitamin D deficiency 8/23/2018    Wears glasses         Past Surgical History:     Past Surgical History:   Procedure Laterality Date    ABDOMINOPLASTY  2013    BLADDER SUSPENSION N/A 7/30/2018    CYSTOSCOPY WITH OBTRYX MID URETHRAL SLING performed by Velvet Barrera MD at [address] Right 2016    CARPAL TUNNEL RELEASE Right 01/02/2020    CHOLECYSTECTOMY  1989    COLONOSCOPY N/A 6/24/2019    COLORECTAL CANCER SCREENING, NOT HIGH RISK performed by Ranjit Deal MD at [place] [address] 7/2/2019    COLORECTAL CANCER SCREENING, NOT HIGH RISK performed by Ranjit Deal MD at [facility] FEMUR FRACTURE SURGERY  11/09/2018    FINGER SURGERY Right 08/28/2018    RING CARTER MASS EXCISION, TRIGGER RELEASE    FRACTURE SURGERY      left femur    GASTRECTOMY      GASTRIC BYPASS SURGERY  2012    HYSTERECTOMY (CERVIX STATUS UNKNOWN)      JOINT REPLACEMENT Bilateral     knees    KNEE SURGERY Right 05/02/2017    (femoral) patella replacement    NERVE BLOCK Right 05/04/2018     cervical facet #1 no steroid    NERVE BLOCK Right 06/29/2018    rt cervical diagnostic #2 decadron 10mg    NERVE BLOCK Right 08/10/2018    right cervical rfa decadron 10mg    GA ANTERIOR COLPORRAPHY RPR CYSTOCELE W/CYSTO N/A 7/30/2018    CYSTOCELE REPAIR performed by Kaiser Schrader DO at [facility] OFFICE/OUTPT [address] Right 8/28/2018    RING CARTER MASS EXCISION, TRIGGER RELEASE, [address] TABLE performed by Iqra Felton DO at [facility]/OUTPT [address] Left 11/9/2018    FEMUR RETROGRADE IM NAILING PERIPROSTHETIC FRACTURE performed by Iqra Felton DO at [address]/CARPAL TUNNEL SURG Left 10/23/2018    CARPAL TUNNEL RELEASE performed by Iqra Felton DO at [address]  2011    left knee    TOTAL KNEE ARTHROPLASTY Right 5/2/2017    PATELLOFEMORAL REPLACEMENT KNEE - Fredrica Cheese, 3080 TABLE, FEMORAL POPLITEAL BLOCK, NSA= SPINAL VS GENERAL performed by Iqra Felton DO at [address]  2004        Medications Prior to Admission:     Prior to Admission medications    Medication Sig Start Date End Date Taking?  Authorizing Provider   folic acid (FOLVITE) 1 MG tablet Take 1 tablet by mouth daily 9/25/22   COLTON Ibarra CNP   NIFEdipine (ADALAT CC) 60 MG extended release tablet Take 2 tablets by mouth daily 9/22/22   Cesar Watson MD   hydrALAZINE (APRESOLINE) 50 MG tablet Take 1 tablet by mouth 3 times daily 9/22/22   Cesar Watson MD   gabapentin (NEURONTIN) 600 MG tablet TAKE 2 TABLETS BY MOUTH THREE TIMES A DAY 8/30/22 8/30/23  COLTON Zhu CNP   cetirizine (ZYRTEC) 10 MG tablet TAKE 1 TABLET BY MOUTH ONE TIME A DAY 8/22/22   COLTON Guevara CNP   pravastatin (PRAVACHOL) 40 MG tablet Take 1 tablet by mouth daily 7/13/22   COLTON Guevara CNP   famotidine (PEPCID) 40 MG tablet TAKE 1 TABLET BY MOUTH IN THE EVENING 5/20/22   COLTON Guevara CNP   albuterol sulfate HFA (VENTOLIN HFA) 108 (90 Base) MCG/ACT inhaler Inhale 2 puffs into the lungs every 6 hours as needed for Wheezing 5/16/22   COLTON Guevara CNP   losartan (COZAAR) 100 MG tablet TAKE 1 TABLET BY MOUTH EVERY DAY 4/28/22   COLTON Guevara CNP   clonazePAM (KLONOPIN) 1 MG tablet TAKE 1 TABLET BY MOUTH THREE TIMES A DAY AS NEEDED 8/2/21   Historical Provider, MD   Handicap Placard MISC by Does not apply route Exp: 7/13/2026 7/23/21   COLTON Guevara CNP   hydroxychloroquine (PLAQUENIL) 200 MG tablet TAKE 1 TABLET BY MOUTH EVERY DAY  4/20/21   COLTON Guevara CNP   SM ASPIRIN ADULT LOW STRENGTH 81 MG EC tablet TAKE 1 TABLET BY MOUTH TWO TIMES A DAY  12/29/20   COLTON Guevara CNP   ARIPiprazole (ABILIFY) 5 MG tablet Take 5 mg by mouth daily  8/5/20   Historical Provider, MD   EPIPEN 2-JIGAR 0.3 MG/0.3ML SOAJ injection INJECT 1 PEN INTO THE MUSCLE ONCE AS NEEDED FOR UP TO 1 DOSE FOR ANGIOEDEMA. 8/10/20   COLTON Guevara CNP   cyclobenzaprine (FLEXERIL) 10 MG tablet TAKE 1 TABLET BY MOUTH ONE TIME A DAY AT NIGHT AS NEEDED FOR MUSCLE SPASM  Patient not taking: Reported on 9/26/2022 7/6/20   COLTON Guevara CNP   blood glucose monitor strips Test 4 times a day & as needed for symptoms of irregular blood glucose.  1/23/20   COLTON Guevara CNP   ibuprofen (ADVIL;MOTRIN) 800 MG tablet Take 1 tablet by mouth as needed For post op pain prescribed by surgeon after right CTR sx 1/2/20   Historical Provider, MD   spironolactone (ALDACTONE) 25 MG tablet Take 25 mg by mouth daily    Historical Provider, MD   ferrous sulfate 325 (65 Fe) MG tablet TAKE 1 TABLET BY MOUTH TWO TIMES A DAY  9/23/19   Anne Copeland DO   metFORMIN (GLUCOPHAGE) 500 MG tablet Take 500 mg by mouth daily  4/26/19   Historical Provider, MD   lamoTRIgine (LAMICTAL) 25 MG tablet Take 25 mg by mouth 2 times daily     Historical Provider, MD   ergocalciferol (ERGOCALCIFEROL) 96693 units capsule Take 50,000 Units by mouth See Admin Instructions Takes twice a week / taking 2 times weekly    Historical Provider, MD   QUEtiapine (SEROQUEL) 300 MG tablet Take 0.5 tablets by mouth nightly  Patient taking differently: Take 300 mg by mouth nightly Take 2 tablets at bedtime. 600 mg 12/15/18   Ghassan Delacruz MD   lamoTRIgine (LAMICTAL) 100 MG tablet Take 200 mg by mouth every evening  6/6/18   Historical Provider, MD   levothyroxine (SYNTHROID) 100 MCG tablet Take 100 mcg by mouth Daily    Historical Provider, MD   Multiple Vitamins-Minerals (THERAPEUTIC MULTIVITAMIN-MINERALS) tablet Take 1 tablet by mouth daily    Historical Provider, MD        Allergies:     Morphine, Amlodipine, Dilaudid [hydromorphone hcl], and Sulfa antibiotics    Social History:     Tobacco:    reports that she has never smoked. She has never used smokeless tobacco.  Alcohol:      reports that she does not currently use alcohol. Drug Use:  reports no history of drug use.     Family History:     Family History   Adopted: Yes   Problem Relation Age of Onset    Depression Mother     Asthma Mother     Depression Father     High Blood Pressure Father     Diabetes Father     Asthma Father     Depression Sister     Asthma Sister     Depression Brother     Asthma Brother     Asthma Maternal Aunt     Asthma Maternal Uncle     Asthma Paternal Aunt     Asthma Paternal Uncle     Asthma Maternal Grandmother     Cancer Maternal Grandmother     Asthma Maternal Grandfather     Asthma Paternal Grandmother     Asthma Paternal Grandfather     Asthma Maternal Cousin     Asthma Paternal Cousin        Review of Systems:     Mia Huggins ,  ROS     SEE HPI          Respiratory ROS:            Negative for cough,                                              Negative for shortness of breath . Negative for wheezing ,     Cardiovascular ROS:     Negative for chest pain,                                             Negative for palpitations . Negative for worsening or new leg edema . Gastrointestinal ROS:   Negative for abdominal pain . Negative for change in bowel habits . Cloteal Garner Dermatological ROS:      Negative for rash,                                            .  ==============                                                       Physical Exam:         Physical Exam   Vitals:    Vitals:    09/26/22 1317 09/26/22 1615   BP: 129/62    Pulse: (!) 114 (!) 105   Resp: 16 16   Temp: 98.3 °F (36.8 °C)    SpO2: 100% 100%                    There is no height or weight on file to calculate BMI. General Appearance:   Alert , CO-OPERATIVE ,                 Skin:                             No rash or erythema  HEENT ;                           Head                        Symmetrical , within normal limits                                           Eye                           Conjunctiva normal ,, sclera non-icteric . Ear                           External ear ok . Neck:                            No mass , no thyroid enlargement                                           Pulmonary/Chest:        Clear to auscultation bilaterally . No wheezes, rales or rhonchi .                                            No abnormality on percussion Cardiovascular:            Normal rate, regular rhythm,                                          No murmur or  Gallop . Abdomen:                       Soft, non-tender                                           Normal bowels sounds,                                             Extremities:                    No  Edema .                                            Neurological ;                 No focal motor deficit ,                 No focal sensory deficit ,    Musculo-skeletal ;                  No  gait abnormality                  No significant joint abnormality,                   Psych:   see Psych notect ,                                                                                           Investigations:      Laboratory Testing:  Recent Results (from the past 24 hour(s))   Basic Metabolic Panel    Collection Time: 09/26/22  1:42 PM   Result Value Ref Range    Glucose 109 (H) 70 - 99 mg/dL    BUN 14 6 - 20 mg/dL    Creatinine 1.13 (H) 0.50 - 0.90 mg/dL    Calcium 9.6 8.6 - 10.4 mg/dL    Sodium 139 135 - 144 mmol/L    Potassium 3.5 (L) 3.7 - 5.3 mmol/L    Chloride 101 98 - 107 mmol/L    CO2 21 20 - 31 mmol/L    Anion Gap 17 9 - 17 mmol/L    GFR Non-African American 50 (L) >60 mL/min    GFR African American >60 >60 mL/min    GFR Comment         Brain Natriuretic Peptide    Collection Time: 09/26/22  1:42 PM   Result Value Ref Range    Pro-BNP <20 <300 pg/mL   CBC with Auto Differential    Collection Time: 09/26/22  1:42 PM   Result Value Ref Range    WBC 5.9 3.5 - 11.0 k/uL    RBC 4.33 4.0 - 5.2 m/uL    Hemoglobin 13.9 12.0 - 16.0 g/dL    Hematocrit 40.0 36 - 46 %    MCV 92.4 80 - 100 fL    MCH 32.1 26 - 34 pg    MCHC 34.7 31 - 37 g/dL    RDW 13.4 11.5 - 14.9 %    Platelets 213 499 - 751 k/uL    MPV 8.2 6.0 - 12.0 fL    Seg Neutrophils 62 36 - 66 %    Lymphocytes 26 24 - 44 %    Monocytes 8 (H) 1 - 7 %    Eosinophils % 3 0 - 4 %    Basophils 1 0 - 2 %    Segs Absolute 3.70 1.3 - 9.1 k/uL    Absolute Lymph # 1.60 1.0 - 4.8 k/uL    Absolute Mono # 0.40 0.1 - 1.3 k/uL    Absolute Eos # 0.20 0.0 - 0.4 k/uL    Basophils Absolute 0.10 0.0 - 0.2 k/uL   D-Dimer, Quantitative    Collection Time: 09/26/22  1:42 PM   Result Value Ref Range    D-Dimer, Quant 0.33 0.00 - 0.59 mg/L FEU   TROP/MYOGLOBIN    Collection Time: 09/26/22  1:42 PM   Result Value Ref Range    Troponin, High Sensitivity 9 0 - 14 ng/L    Myoglobin 39 25 - 58 ng/mL   TSH    Collection Time: 09/26/22  1:42 PM   Result Value Ref Range    TSH 1.50 0.30 - 5.00 uIU/mL   T4, Free    Collection Time: 09/26/22  1:42 PM   Result Value Ref Range    Thyroxine, Free 0.90 (L) 0.93 - 1.70 ng/dL   APTT    Collection Time: 09/26/22  1:42 PM   Result Value Ref Range    PTT 25.0 24.0 - 36.0 sec   Protime-INR    Collection Time: 09/26/22  1:42 PM   Result Value Ref Range    Protime 12.8 11.8 - 14.6 sec    INR 1.0    EKG 12 Lead    Collection Time: 09/26/22  4:20 PM   Result Value Ref Range    Ventricular Rate 107 BPM    Atrial Rate 107 BPM    P-R Interval 184 ms    QRS Duration 82 ms    Q-T Interval 334 ms    QTc Calculation (Bazett) 445 ms    P Axis 35 degrees    R Axis 2 degrees    T Axis 28 degrees       Recent Labs     09/26/22  1342   HGB 13.9   HCT 40.0   WBC 5.9   MCV 92.4      K 3.5*      CO2 21   BUN 14   CREATININE 1.13*   GLUCOSE 109*   INR 1.0   PROTIME 12.8   APTT 25.0       Hematology:  Recent Labs     09/26/22  1342   WBC 5.9   RBC 4.33   HGB 13.9   HCT 40.0   MCV 92.4   MCH 32.1   MCHC 34.7   RDW 13.4      MPV 8.2   INR 1.0   DDIMER 0.33     Chemistry:  Recent Labs     09/26/22  1342      K 3.5*      CO2 21   GLUCOSE 109*   BUN 14   CREATININE 1.13*   ANIONGAP 17   LABGLOM 50*   GFRAA >60   CALCIUM 9.6   PROBNP <20   MYOGLOBIN 39     Recent Labs     09/26/22  1342   TSH 1.50       Imaging/Diagnostics:       XR CHEST PORTABLE    Result Date: 9/26/2022  EXAMINATION: ONE XRAY VIEW OF THE CHEST 9/26/2022 1:33 pm COMPARISON: 12/14/2018. HISTORY: ORDERING SYSTEM PROVIDED HISTORY: Chest Pain TECHNOLOGIST PROVIDED HISTORY: Chest Pain Reason for Exam: Chest Pain FINDINGS: Lungs are clear. Cardiac and mediastinal silhouettes are within normal limits. No pneumothoraces. Bony structures appear intact. No acute abnormality.           Current Facility-Administered Medications   Medication Dose Route Frequency Provider Last Rate Last Admin    nitroGLYCERIN (NITROSTAT) SL tablet 0.4 mg  0.4 mg SubLINGual Q5 Min PRN Kasie Horne MD         Current Outpatient Medications   Medication Sig Dispense Refill    folic acid (FOLVITE) 1 MG tablet Take 1 tablet by mouth daily 90 tablet 0    NIFEdipine (ADALAT CC) 60 MG extended release tablet Take 2 tablets by mouth daily 180 tablet 3    hydrALAZINE (APRESOLINE) 50 MG tablet Take 1 tablet by mouth 3 times daily 270 tablet 3    gabapentin (NEURONTIN) 600 MG tablet TAKE 2 TABLETS BY MOUTH THREE TIMES A  tablet 11    cetirizine (ZYRTEC) 10 MG tablet TAKE 1 TABLET BY MOUTH ONE TIME A DAY 90 tablet 1    pravastatin (PRAVACHOL) 40 MG tablet Take 1 tablet by mouth daily 90 tablet 1    famotidine (PEPCID) 40 MG tablet TAKE 1 TABLET BY MOUTH IN THE EVENING 90 tablet 1    albuterol sulfate HFA (VENTOLIN HFA) 108 (90 Base) MCG/ACT inhaler Inhale 2 puffs into the lungs every 6 hours as needed for Wheezing 1 each 5    losartan (COZAAR) 100 MG tablet TAKE 1 TABLET BY MOUTH EVERY DAY 90 tablet 1    clonazePAM (KLONOPIN) 1 MG tablet TAKE 1 TABLET BY MOUTH THREE TIMES A DAY AS NEEDED      Handicap Placard MISC by Does not apply route Exp: 7/13/2026 1 each 0    hydroxychloroquine (PLAQUENIL) 200 MG tablet TAKE 1 TABLET BY MOUTH EVERY DAY  90 tablet 1    SM ASPIRIN ADULT LOW STRENGTH 81 MG EC tablet TAKE 1 TABLET BY MOUTH TWO TIMES A DAY  180 tablet 1    ARIPiprazole (ABILIFY) 5 MG tablet Take 5 mg by mouth daily       EPIPEN 2-JIGAR 0.3 MG/0.3ML SOAJ injection INJECT 1 PEN INTO THE MUSCLE ONCE AS NEEDED FOR UP TO 1 DOSE FOR ANGIOEDEMA. 2 each 5    cyclobenzaprine (FLEXERIL) 10 MG tablet TAKE 1 TABLET BY MOUTH ONE TIME A DAY AT NIGHT AS NEEDED FOR MUSCLE SPASM (Patient not taking: Reported on 9/26/2022) 90 tablet 1    blood glucose monitor strips Test 4 times a day & as needed for symptoms of irregular blood glucose. 100 strip 5    ibuprofen (ADVIL;MOTRIN) 800 MG tablet Take 1 tablet by mouth as needed For post op pain prescribed by surgeon after right CTR sx      spironolactone (ALDACTONE) 25 MG tablet Take 25 mg by mouth daily      ferrous sulfate 325 (65 Fe) MG tablet TAKE 1 TABLET BY MOUTH TWO TIMES A DAY  60 tablet 2    metFORMIN (GLUCOPHAGE) 500 MG tablet Take 500 mg by mouth daily       lamoTRIgine (LAMICTAL) 25 MG tablet Take 25 mg by mouth 2 times daily       ergocalciferol (ERGOCALCIFEROL) 76452 units capsule Take 50,000 Units by mouth See Admin Instructions Takes twice a week / taking 2 times weekly      QUEtiapine (SEROQUEL) 300 MG tablet Take 0.5 tablets by mouth nightly (Patient taking differently: Take 300 mg by mouth nightly Take 2 tablets at bedtime. 600 mg) 60 tablet 3    lamoTRIgine (LAMICTAL) 100 MG tablet Take 200 mg by mouth every evening       levothyroxine (SYNTHROID) 100 MCG tablet Take 100 mcg by mouth Daily      Multiple Vitamins-Minerals (THERAPEUTIC MULTIVITAMIN-MINERALS) tablet Take 1 tablet by mouth daily       Impressions :      1. Hospital Problems             Last Modified POA    * (Principal) Chest pain 9/26/2022 Yes    Hypertension 9/26/2022 Yes    GERD (gastroesophageal reflux disease) 9/26/2022 Yes    Atherosclerosis of native coronary artery of native heart with angina pectoris ([facility] [address] 9/26/2022 Yes    Anxiety 9/26/2022 Yes    Type 2 diabetes mellitus with stage 2 chronic kidney disease, without long-term current use of insulin ([facility] [address] 9/26/2022 Yes        Plans:     1   Admitted with chest pain .   Heart sci ore 5 .  Known cad , dm , htn , hld   Hx stroke      Strass test    Dr Bethany Bhatt to see    Home meds reconciled .            Riddhi Sorto MD  9/26/2022  4:23 PM

## 2022-09-27 ENCOUNTER — APPOINTMENT (OUTPATIENT)
Dept: NUCLEAR MEDICINE | Age: 57
End: 2022-09-27
Payer: MEDICARE

## 2022-09-27 VITALS
DIASTOLIC BLOOD PRESSURE: 71 MMHG | SYSTOLIC BLOOD PRESSURE: 115 MMHG | BODY MASS INDEX: 33.88 KG/M2 | HEART RATE: 96 BPM | WEIGHT: 223.55 LBS | HEIGHT: 68 IN | OXYGEN SATURATION: 98 % | TEMPERATURE: 98 F | RESPIRATION RATE: 16 BRPM

## 2022-09-27 PROBLEM — R07.9 CHEST PAIN: Status: ACTIVE | Noted: 2022-09-27

## 2022-09-27 LAB
EKG ATRIAL RATE: 107 BPM
EKG ATRIAL RATE: 111 BPM
EKG P AXIS: 35 DEGREES
EKG P AXIS: 60 DEGREES
EKG P-R INTERVAL: 182 MS
EKG P-R INTERVAL: 184 MS
EKG Q-T INTERVAL: 334 MS
EKG Q-T INTERVAL: 340 MS
EKG QRS DURATION: 82 MS
EKG QRS DURATION: 86 MS
EKG QTC CALCULATION (BAZETT): 445 MS
EKG QTC CALCULATION (BAZETT): 462 MS
EKG R AXIS: 2 DEGREES
EKG R AXIS: 25 DEGREES
EKG T AXIS: 28 DEGREES
EKG T AXIS: 46 DEGREES
EKG VENTRICULAR RATE: 107 BPM
EKG VENTRICULAR RATE: 111 BPM
LV EF: 58 %
LVEF MODALITY: NORMAL

## 2022-09-27 PROCEDURE — 2580000003 HC RX 258: Performed by: INTERNAL MEDICINE

## 2022-09-27 PROCEDURE — 6370000000 HC RX 637 (ALT 250 FOR IP): Performed by: INTERNAL MEDICINE

## 2022-09-27 PROCEDURE — 78452 HT MUSCLE IMAGE SPECT MULT: CPT

## 2022-09-27 PROCEDURE — 6370000000 HC RX 637 (ALT 250 FOR IP): Performed by: NURSE PRACTITIONER

## 2022-09-27 PROCEDURE — G0378 HOSPITAL OBSERVATION PER HR: HCPCS

## 2022-09-27 PROCEDURE — 99238 HOSP IP/OBS DSCHRG MGMT 30/<: CPT | Performed by: INTERNAL MEDICINE

## 2022-09-27 PROCEDURE — 3430000000 HC RX DIAGNOSTIC RADIOPHARMACEUTICAL: Performed by: INTERNAL MEDICINE

## 2022-09-27 PROCEDURE — A9500 TC99M SESTAMIBI: HCPCS | Performed by: INTERNAL MEDICINE

## 2022-09-27 PROCEDURE — 93017 CV STRESS TEST TRACING ONLY: CPT

## 2022-09-27 PROCEDURE — 6360000002 HC RX W HCPCS: Performed by: INTERNAL MEDICINE

## 2022-09-27 PROCEDURE — 93010 ELECTROCARDIOGRAM REPORT: CPT | Performed by: INTERNAL MEDICINE

## 2022-09-27 RX ORDER — ENOXAPARIN SODIUM 100 MG/ML
30 INJECTION SUBCUTANEOUS 2 TIMES DAILY
Status: DISCONTINUED | OUTPATIENT
Start: 2022-09-27 | End: 2022-09-27 | Stop reason: HOSPADM

## 2022-09-27 RX ORDER — SODIUM CHLORIDE 9 MG/ML
500 INJECTION, SOLUTION INTRAVENOUS CONTINUOUS PRN
Status: ACTIVE | OUTPATIENT
Start: 2022-09-27 | End: 2022-09-27

## 2022-09-27 RX ORDER — ALBUTEROL SULFATE 90 UG/1
2 AEROSOL, METERED RESPIRATORY (INHALATION) PRN
Status: ACTIVE | OUTPATIENT
Start: 2022-09-27 | End: 2022-09-27

## 2022-09-27 RX ORDER — SODIUM CHLORIDE 0.9 % (FLUSH) 0.9 %
5-40 SYRINGE (ML) INJECTION PRN
Status: ACTIVE | OUTPATIENT
Start: 2022-09-27 | End: 2022-09-27

## 2022-09-27 RX ORDER — SODIUM CHLORIDE 0.9 % (FLUSH) 0.9 %
10 SYRINGE (ML) INJECTION PRN
Status: DISCONTINUED | OUTPATIENT
Start: 2022-09-27 | End: 2022-09-27 | Stop reason: HOSPADM

## 2022-09-27 RX ORDER — AMINOPHYLLINE DIHYDRATE 25 MG/ML
50 INJECTION, SOLUTION INTRAVENOUS PRN
Status: ACTIVE | OUTPATIENT
Start: 2022-09-27 | End: 2022-09-27

## 2022-09-27 RX ORDER — ATROPINE SULFATE 0.1 MG/ML
0.5 INJECTION INTRAVENOUS EVERY 5 MIN PRN
Status: ACTIVE | OUTPATIENT
Start: 2022-09-27 | End: 2022-09-27

## 2022-09-27 RX ORDER — METOPROLOL TARTRATE 5 MG/5ML
5 INJECTION INTRAVENOUS EVERY 5 MIN PRN
Status: ACTIVE | OUTPATIENT
Start: 2022-09-27 | End: 2022-09-27

## 2022-09-27 RX ORDER — NITROGLYCERIN 0.4 MG/1
0.4 TABLET SUBLINGUAL EVERY 5 MIN PRN
Status: ACTIVE | OUTPATIENT
Start: 2022-09-27 | End: 2022-09-27

## 2022-09-27 RX ADMIN — ASPIRIN 81 MG: 81 TABLET, COATED ORAL at 08:13

## 2022-09-27 RX ADMIN — HYDRALAZINE HYDROCHLORIDE 50 MG: 50 TABLET, FILM COATED ORAL at 08:14

## 2022-09-27 RX ADMIN — FAMOTIDINE 40 MG: 20 TABLET ORAL at 08:14

## 2022-09-27 RX ADMIN — ARIPIPRAZOLE 5 MG: 5 TABLET ORAL at 08:23

## 2022-09-27 RX ADMIN — TETRAKIS(2-METHOXYISOBUTYLISOCYANIDE)COPPER(I) TETRAFLUOROBORATE 14 MILLICURIE: 1 INJECTION, POWDER, LYOPHILIZED, FOR SOLUTION INTRAVENOUS at 10:01

## 2022-09-27 RX ADMIN — GABAPENTIN 600 MG: 600 TABLET, FILM COATED ORAL at 08:14

## 2022-09-27 RX ADMIN — SODIUM CHLORIDE, PRESERVATIVE FREE 10 ML: 5 INJECTION INTRAVENOUS at 13:12

## 2022-09-27 RX ADMIN — TETRAKIS(2-METHOXYISOBUTYLISOCYANIDE)COPPER(I) TETRAFLUOROBORATE 34.6 MILLICURIE: 1 INJECTION, POWDER, LYOPHILIZED, FOR SOLUTION INTRAVENOUS at 13:12

## 2022-09-27 RX ADMIN — LAMOTRIGINE 25 MG: 25 TABLET ORAL at 08:19

## 2022-09-27 RX ADMIN — SODIUM CHLORIDE, PRESERVATIVE FREE 10 ML: 5 INJECTION INTRAVENOUS at 08:15

## 2022-09-27 RX ADMIN — PRAVASTATIN SODIUM 40 MG: 40 TABLET ORAL at 08:14

## 2022-09-27 RX ADMIN — LAMOTRIGINE 25 MG: 25 TABLET ORAL at 15:26

## 2022-09-27 RX ADMIN — GABAPENTIN 600 MG: 600 TABLET, FILM COATED ORAL at 15:25

## 2022-09-27 RX ADMIN — HYDRALAZINE HYDROCHLORIDE 50 MG: 50 TABLET, FILM COATED ORAL at 15:25

## 2022-09-27 RX ADMIN — LOSARTAN POTASSIUM 100 MG: 100 TABLET, FILM COATED ORAL at 12:11

## 2022-09-27 RX ADMIN — REGADENOSON 0.4 MG: 0.08 INJECTION, SOLUTION INTRAVENOUS at 09:58

## 2022-09-27 RX ADMIN — NIFEDIPINE 120 MG: 60 TABLET, EXTENDED RELEASE ORAL at 08:13

## 2022-09-27 NOTE — PROGRESS NOTES
Patient educated on discharge instructions which include: medication schedule, reasons for new medications and some side effects and need to follow-up. Patient given new prescriptions during teaching. Patient verbalizes understanding of discharge and states readiness for discharge. All belongings were gathered by patient and in patient's possession. No distress noted at this time.      Current vital signs:  /71   Pulse 96   Temp 98 °F (36.7 °C) (Oral)   Resp 16   Ht 5' 8\" (1.727 m)   Wt 223 lb 8.7 oz (101.4 kg)   SpO2 98%   BMI 33.99 kg/m²                MEWS Score: 1

## 2022-09-27 NOTE — PROCEDURES
[address] [facility]                    [address] [facility]                              CARDIAC STRESS TEST    PATIENT NAME: Tobias COULTER                     :        1965  MED REC NO:   [id number]                              ROOM:       2114  ACCOUNT NO:   [de-identified]                           ADMIT DATE: 2022  PROVIDER:     Tangela Gan DO    DATE OF STUDY:  2022    ORDERING PROVIDER:  Renée Vazquez MD    PRIMARY CARE PROVIDER:  ABI Faye    INTERPRETING PHYSICIAN:  STACIE SHEIKH DO    _____ STRESS TESTING    TEST TYPE: LEXISCAN CARDIOLYTE STRESS TEST  INDICATION: ANGINA  REFERRING PHYSICIAN: Renée Vazquez MD    RESTING HEART RATE: 109/62  RESTING BLOOD PRESSURE: 88 BEATS PER MINUTE    MEDICATION(S) GIVEN: 0.4MG IV LEXISCAN  REASON FOR TERMINATION: MEDICATION INFUSION COMPLETE    RESTING EKG: ABNORMAL, NORMAL SINUS RHYTHM, NON SPECIFIC T WAVE CHANGES  STRESS HEART RESPONSE: NORMAL RESPONSE  BLOOD PRESSURE RESPONSE:  STRESS EKGs: NORMAL  CHEST DISCOMFORT: NO PAIN DURING STRESS  ISCHEMIC EKG CHANGES: NONE    EKG IMPRESSION: ELECTROCARDIOGRAPHICALLY NEGATIVE LEXISCAN STRESS TEST. RADIOISOTOPE RESULTS TO FOLLOW FROM THE DEPARTMENT OF NUCLEAR MEDICINE.     COMMENTS:        Dandre Monaco DO    D: 2022 10:20:26       T: 2022 10:22:05     /HENNY  Job#: [id number]     Doc#: Unknown    CC:    (Retain this field even if not dictated or not decipherable)

## 2022-09-27 NOTE — CARE COORDINATION
DISCHARGE PLANNING NOTE:    Attempted to speak with patient x3 today, however she has been out of the room for testing. Electronically signed by Kam Daniels RN on 9/27/2022 at 2:22 PM    Spoke with patient regarding discharge planning needs. She states she is independent, drives, and declined VNS. Pt will be discharged home today.     Electronically signed by Kam Daniels RN on 9/27/2022 at 3:46 PM

## 2022-09-27 NOTE — PLAN OF CARE
Problem: Pain  Goal: Verbalizes/displays adequate comfort level or baseline comfort level  Outcome: Progressing  No pain reported this shift. Problem: ABCDS Injury Assessment  Goal: Absence of physical injury  Outcome: Progressing  No falls noted this shift. Patient ambulates independently. Bed kept in low position. Safe environment maintained. Bedside table & call light in reach. Uses call light appropriately when needing assistance.       Problem: Discharge Planning  Goal: Discharge to home or other facility with appropriate resources  Outcome: Progressing

## 2022-09-27 NOTE — PROGRESS NOTES
[facility].    Date:   9/27/2022  Patient name:  Tahira Nation  Date of admission:  9/26/2022  1:19 PM  MRN:   [id number]  YOB: 1965      HPI        Chest pain persistent worse with walking but also reproducible by palpation  REVIEW OF SYSTEMS:    Cardiovascular: Positive for chest pain reproducible respiratory: Negative for cough or wheezing, sputum production, hemoptysis, pleuritic pain. Gastrointestinal: Negative for nausea/vomiting, change in bowel habits, abdominal pain, dysphagia/appetite loss, hematemesis, blood in stools. OBJECTIVE:    /76   Pulse 80   Temp 97.4 °F (36.3 °C) (Oral)   Resp 16   Ht 5' 8\" (1.727 m)   Wt 223 lb 8.7 oz (101.4 kg)   SpO2 94%   BMI 33.99 kg/m²      Lungs: clear to auscultation bilaterally,no wheeze. Heart: regular rate and rhythm, S1, S2 normal, no murmur, click, rub or gallop  Abdomen: soft, non-tender; bowel sounds normal; no masses,  no organomegaly  Extremities: extremities normal, atraumatic, no cyanosis or edema  Neurological:  Reflexes normal and symmetric. Sensation grossly normal  Skin - no rash, no lump   Eye- no icterus no redness  GI-oral mucosa moist,  Lymphatic system-no lymphadenopathy no splenomegaly  Mouth- mucous membrane moist  Head-normocephalic atraumatic  Neck- supple no carotid bruit,Thyroid not palpable.   Scleroderma noted in fingers    Past Medical History:   has a past medical history of Acute kidney injury ([address] Anemia, Angioedema, Antinuclear antibody (IQRA) titer greater than 1:80, Anxiety, Asthma, Ataxia, Atrial fibrillation ([address] Borderline personality disorder ([address] Bradycardia, CAD (coronary artery disease), Chronic kidney disease, CKD (chronic kidney disease), stage II, COVID-19, Degenerative disc disease, thoracic, Depression, Diabetes mellitus ([address] Diastolic dysfunction, DJD (degenerative joint disease), Fibromyalgia, Foot pain, right, GERD (gastroesophageal reflux disease), H/O: hysterectomy, Headache, Hernia of abdominal wall, History of blood transfusion, Hyperlipidemia, Hypertension, Lupus ([address] Mood disorder ([address] Neuropathy, Osteoarthritis, PTSD (post-traumatic stress disorder), Pulmonary nodules, Raynaud's disease, Scleroderma ([address] Scleroderma ([address] Seizures ([address] Sleep walking disorder, Thyroid disease, TIA (transient ischemic attack), Transient insomnia, Tricuspid regurgitation, Vasospasm ([address] Vitamin D deficiency, and Wears glasses. Past Surgical History:   has a past surgical history that includes Hysterectomy; Cholecystectomy (1989); Tonsillectomy (1986); Carpal tunnel release (Right, 2016); Wrist surgery (2004); Gastric bypass surgery (2012); Abdominoplasty (2013); Elbow surgery (Right); Total knee arthroplasty (2011); knee surgery (Right, 05/02/2017); Total knee arthroplasty (Right, 5/2/2017); gastrectomy; Nerve Block (Right, 05/04/2018); Nerve Block (Right, 06/29/2018); eye surgery; pr anterior colporraphy rpr cystocele w/cysto (N/A, 7/30/2018); bladder suspension (N/A, 7/30/2018); Nerve Block (Right, 08/10/2018); joint replacement (Bilateral); Finger surgery (Right, 08/28/2018); pr office/outpt visit,procedure only (Right, 8/28/2018); pr revise median n/carpal tunnel surg (Left, 10/23/2018); Femur fracture surgery (11/09/2018); pr office/outpt visit,procedure only (Left, 11/9/2018); fracture surgery; Colonoscopy (N/A, 6/24/2019); Colonoscopy (N/A, 7/2/2019); and Carpal tunnel release (Right, 01/02/2020). Home Medications:    Prior to Admission medications    Medication Sig Start Date End Date Taking?  Authorizing Provider   QUEtiapine (SEROQUEL) 300 MG tablet Take 600 mg by mouth at bedtime   Yes Historical Provider, MD   folic acid (FOLVITE) 1 MG tablet Take 1 tablet by mouth daily 9/25/22   COLTON Archibald - CNP   NIFEdipine (ADALAT CC) 60 MG extended release tablet Take 2 tablets by mouth daily 9/22/22   Pat De León MD   hydrALAZINE (APRESOLINE) 50 MG tablet Take 1 tablet by mouth 3 times daily 9/22/22   Pat De León MD   gabapentin (NEURONTIN) 600 MG tablet TAKE 2 TABLETS BY MOUTH THREE TIMES A DAY 8/30/22 8/30/23  COLTON Mckeon CNP   cetirizine (ZYRTEC) 10 MG tablet TAKE 1 TABLET BY MOUTH ONE TIME A DAY 8/22/22   COLTON De La Cruz CNP   pravastatin (PRAVACHOL) 40 MG tablet Take 1 tablet by mouth daily 7/13/22   COLTON De La Cruz CNP   famotidine (PEPCID) 40 MG tablet TAKE 1 TABLET BY MOUTH IN THE EVENING 5/20/22   COLTON De La Cruz CNP   albuterol sulfate HFA (VENTOLIN HFA) 108 (90 Base) MCG/ACT inhaler Inhale 2 puffs into the lungs every 6 hours as needed for Wheezing 5/16/22   COLTON De La Cruz CNP   losartan (COZAAR) 100 MG tablet TAKE 1 TABLET BY MOUTH EVERY DAY 4/28/22   COLTON De La Cruz CNP   clonazePAM (KLONOPIN) 1 MG tablet Take 1 mg by mouth 3 times daily as needed. Indications: last fill 8/25/22 for 30 days 8/2/21   Historical Provider, MD   Handicap Placard [address] [place] [address] by Does not apply route Exp: 7/13/2026 7/23/21   COLTON De La Cruz CNP   SM ASPIRIN ADULT LOW STRENGTH 81 MG EC tablet TAKE 1 TABLET BY MOUTH TWO TIMES A DAY  12/29/20   COLTON De La Cruz CNP   ARIPiprazole (ABILIFY) 5 MG tablet Take 5 mg by mouth daily  8/5/20   Historical Provider, MD Duwayne Collet 2-JIGAR 0.3 MG/0.3ML SOAJ injection INJECT 1 PEN INTO THE MUSCLE ONCE AS NEEDED FOR UP TO 1 DOSE FOR ANGIOEDEMA. 8/10/20   COLTON De La Cruz CNP   cyclobenzaprine (FLEXERIL) 10 MG tablet TAKE 1 TABLET BY MOUTH ONE TIME A DAY AT NIGHT AS NEEDED FOR MUSCLE SPASM  Patient not taking: Reported on 9/26/2022 7/6/20   COLTON De La Cruz CNP   blood glucose monitor strips Test 4 times a day & as needed for symptoms of irregular blood glucose.  1/23/20   COLTON De La Cruz CNP   ibuprofen (ADVIL;MOTRIN) 800 MG tablet Take 1 tablet by Calcium:No results for input(s): IONCA in the last 72 hours. S. Magnesium:No results for input(s): MG in the last 72 hours. S. Phosphorus:No results for input(s): PHOS in the last 72 hours. S. Glucose:No results for input(s): POCGLU in the last 72 hours. Glycosylated hemoglobin A1C: No results for input(s): LABA1C in the last 72 hours. INR:   Recent Labs     09/26/22  1342   INR 1.0     Hepatic functions: No results for input(s): ALKPHOS, ALT, AST, PROT, BILITOT, BILIDIR, LABALBU in the last 72 hours. Pancreatic functions:No results for input(s): LACTA, AMYLASE in the last 72 hours. S. Lactic Acid: No results for input(s): LACTA in the last 72 hours. Cardiac enzymes:No results for input(s): CKTOTAL, CKMB, CKMBINDEX, TROPONINI in the last 72 hours. BNP:No results for input(s): BNP in the last 72 hours. Lipid profile: No results for input(s): CHOL, TRIG, HDL, LDL, LDLCALC in the last 72 hours. Blood Gases: No results found for: PH, PCO2, PO2, HCO3, O2SAT  Thyroid functions:   Lab Results   Component Value Date/Time    TSH 1.50 09/26/2022 01:42 PM        Imaging/Diagonstics:  EKG: Normal sinus rhythm    CXR: No acute cardiopulmonary findings.           ASSESSMENT:    Patient Active Problem List   Diagnosis    Transient insomnia    Sleep walking disorder    Raynaud's disease    Pulmonary nodules    Sensory neuropathy    Hypertension    GERD (gastroesophageal reflux disease)    Fibromyalgia    Depression    Degenerative disc disease, thoracic    Atherosclerosis of native coronary artery of native heart with angina pectoris (HCC)    Bipolar disorder (HCC)    Asthma    Anxiety    Antinuclear antibody (IQRA) titer greater than 1:80    Anemia    Hx of Stroke (HCC)    Type 2 diabetes mellitus with stage 2 chronic kidney disease, without long-term current use of insulin (HCC)    Degenerative arthritis of right knee    S/P knee surgery    Chronic fatigue syndrome    Spondylosis of cervical region without myelopathy or radiculopathy    H/O hysterectomy with BSO at [facility] for benign [facility] 2014    Hx of total bilateral knee replacement    H/O: hysterectomy    Atrial fibrillation with slow ventricular response (HCC)    H/O: stroke    Hypovolemia    Hypotension    Bradycardia    Cystocele, midline    JOYCE (stress urinary incontinence, female)    Overflow incontinence    7/30/18 Cystocele repair, Cystoscopy with OBTRYX Ureteral Sling    Nuclear senile cataract    CKD (chronic kidney disease), stage II    Scleroderma ([address]    Vitamin D deficiency    Trigger middle finger of right hand    Trigger ring finger of right hand    Osteoarthritis of spine with radiculopathy, cervical region    Degenerative disc disease, cervical    Left carpal tunnel syndrome    Spinal stenosis, cervical region    Neural foraminal stenosis of cervical spine    Closed fracture of lower end of femur ([address]    Closed supracondylar fracture of femur, left, initial encounter ([address]    Numbness    Senile osteoporosis    Acquired hypothyroidism    Age-related nuclear cataract of both eyes    Alcoholism in recovery ([address]    Bipolar affective disorder, currently depressed, mild (HCC)    Generalized anxiety disorder    Iron deficiency anemia    Iron malabsorption    Stage 3 chronic kidney disease (HCC)    Dupuytren's disease of palm    Mild intermittent asthma    Angina pectoris (HCC)    Chest pain       PLAN:  51-year-old lady class I obese BMI 34 weighs 223 pounds  History of diabetes mellitus hypertension hyperlipidemia history of for TIA  History of scleroderma with chronic kidney disease stage III AAA  Admitted with a left-sided sudden onset chest pain radiating to left arm and left upper back no diaphoresis no nausea vomiting  Chest pain is somewhat reproducible  No pleuritic element  Stress test check for D-dimer        Melissa Escalante MD, MD JENN ABREU [address], [address] [place] [address].    Phone [phone]   Fax: [phone]  Answering

## 2022-09-27 NOTE — DISCHARGE INSTR - COC
Continuity of Care Form    Patient Name: Edwina Cardenas   :  1965  MRN:  [id number]    Admit date:  2022  Discharge date:  ***    Code Status Order: Full Code   Advance Directives:     Admitting Physician:  Essie Hargrove MD  PCP: COLTON Law - CNP    Discharging Nurse: [facility] Unit/Room#: 2114/2114-01  Discharging Unit Phone Number: ***    Emergency Contact:   Extended Emergency Contact Information  Primary Emergency Contact: [address] [place] [address] Phone: [phone]  Mobile Phone: [phone]  Relation: Child  Secondary Emergency Contact: [address] Phone: [phone]  Work Phone: [phone]  Mobile Phone: [phone]  Relation: Child    Past Surgical History:  Past Surgical History:   Procedure Laterality Date    ABDOMINOPLASTY  2013    BLADDER SUSPENSION N/A 2018    CYSTOSCOPY WITH OBTRYX MID URETHRAL SLING performed by Velvet Barrera MD at [address] [place] [address] Right 2016    CARPAL TUNNEL RELEASE Right 2020    CHOLECYSTECTOMY  1989    COLONOSCOPY N/A 2019    COLORECTAL CANCER SCREENING, NOT HIGH RISK performed by Ranjit Deal MD at [place] [address] 2019    COLORECTAL CANCER SCREENING, NOT HIGH RISK performed by Ranjit Deal MD at [place] [address]  2018    FINGER SURGERY Right 2018    RING CARTER MASS EXCISION, TRIGGER RELEASE    FRACTURE SURGERY      left femur    GASTRECTOMY      GASTRIC BYPASS SURGERY      HYSTERECTOMY (CERVIX STATUS UNKNOWN)      JOINT REPLACEMENT Bilateral     knees    KNEE SURGERY Right 2017    (femoral) patella replacement    NERVE BLOCK Right 2018     cervical facet #1 no steroid    NERVE BLOCK Right 2018    rt cervical diagnostic #2 decadron 10mg    NERVE BLOCK Right 08/10/2018    right cervical rfa decadron 10mg    WI ANTERIOR COLPORRAPHY RPR CYSTOCELE W/CYSTO N/A 7/30/2018    CYSTOCELE REPAIR performed by Yady Chow DO at [facility] OFFICE/OUTPT [address] Right 8/28/2018    RING CARTER MASS EXCISION, TRIGGER RELEASE, [address] TABLE performed by Asim Mcintosh DO at [facility]/OUTPT VISIT,PROCEDURE ONLY Left 11/9/2018    FEMUR RETROGRADE IM NAILING PERIPROSTHETIC FRACTURE performed by Asim Mcintosh DO at [address]/CARPAL TUNNEL SURG Left 10/23/2018    CARPAL TUNNEL RELEASE performed by Asim Mcintosh DO at [address]  2011    left knee    TOTAL KNEE ARTHROPLASTY Right 5/2/2017    PATELLOFEMORAL REPLACEMENT KNEE - Dominga Quezada, 30[address] TABLE, FEMORAL POPLITEAL BLOCK, NSA= SPINAL VS GENERAL performed by Asim Mcintosh DO at [facility]  2004       Immunization History:   Immunization History   Administered Date(s) Administered    COVID-19, J&J, (age 18y+), IM, 0.5 mL 02/14/2022    COVID-19, PFIZER GRAY top, DO NOT Dilute, (age 15 y+), IM, 30 mcg/0.3 mL 06/24/2022    Influenza 12/22/2008, 12/20/2010    Influenza Vaccine, unspecified formulation 10/06/2016    Influenza Virus Vaccine 10/18/2011, 09/18/2013, 10/22/2014, 11/24/2015, 10/21/2016    Influenza, AFLURIA (age 1 yrs+), FLUZONE, (age 10 mo+), MDV, 0.5mL 09/11/2017, 09/11/2019    Influenza, FLUCELVAX, (age 10 mo+), MDCK, PF, 0.5mL 12/23/2020, 09/28/2021    Influenza, Quadv, 6 mo and older, IM, PF (Flulaval, Fluarix) 11/10/2018    Pneumococcal Polysaccharide (Idaepanmv12) 11/08/2007    Pneumococcal conjugate PCV20, PF (Prevnar 20) 05/20/2022    Tdap (Boostrix, Adacel) 04/10/2014, 09/14/2017       Active Problems:  Patient Active Problem List   Diagnosis Code    Transient insomnia F51.02    Sleep walking disorder F51.3    Raynaud's disease I73.00    Pulmonary nodules R91.8    Sensory neuropathy G62.9    Hypertension I10    GERD (gastroesophageal reflux disease) K21.9    Fibromyalgia M79.7    Depression F32. A    Degenerative disc disease, thoracic M51.34    Atherosclerosis of native coronary artery of native heart with angina pectoris ([facility]) I25.119    Bipolar disorder ([facility]) F31.9    Asthma J45.909    Anxiety F41.9    Antinuclear antibody (IQRA) titer greater than 1:80 R76.0    Anemia D64.9    Hx of Stroke ([facility]) I63.9    Type 2 diabetes mellitus with stage 2 chronic kidney disease, without long-term current use of insulin ([facility]) E11.22, N18.2    Degenerative arthritis of right knee M17.11    S/P knee surgery Z98.890    Chronic fatigue syndrome R53.82    Spondylosis of cervical region without myelopathy or radiculopathy M47.812    H/O hysterectomy [facility]SO at [facility] for [facility] 2014 Z90.710    Hx of total bilateral knee replacement Z96.653    H/O: hysterectomy Z90.710    Atrial fibrillation with slow ventricular response ([address] I48.91    H/O: stroke Z86.73    Hypovolemia E86.1    Hypotension I95.9    Bradycardia R00.1    Cystocele, midline N81.11    JOYCE (stress urinary incontinence, female) N39.3    Overflow incontinence N39.490    7/30/18 Cystocele repair, Cystoscopy with OBTRYX Ureteral Sling Z98.890    Nuclear senile cataract H25.10    CKD (chronic kidney disease), stage II N18.2    Scleroderma ([address] M34.9    Vitamin D deficiency E55.9    Trigger middle finger of right hand M65.331    Trigger ring finger of right hand M65.341    Osteoarthritis of spine with radiculopathy, cervical region M47.22    Degenerative disc disease, cervical M50.30    Left carpal tunnel syndrome G56.02    Spinal stenosis, cervical region M48.02    Neural foraminal stenosis of cervical spine M48.02    Closed fracture of lower end of femur ([address] S72.409A    Closed supracondylar fracture of femur, left, initial encounter ([address] S72.452A    Numbness R20.0    Senile osteoporosis M81.0    Acquired hypothyroidism E03.9    Age-related nuclear cataract of both eyes H25.13    Alcoholism in recovery ([facility] F10.21    Bipolar affective disorder, currently depressed, mild ([facility]) F31.31    Generalized anxiety disorder F41.1    Iron deficiency anemia D50.9    Iron malabsorption K90.9    Stage 3 chronic kidney disease ([facility]) N18.30    Dupuytren's disease of palm M72.0    Mild intermittent asthma J45.20    Angina pectoris ([facility]) I20.9    Chest pain R07.9       Isolation/Infection:   Isolation            No Isolation          Patient Infection Status       None to display            Nurse Assessment:  Last Vital Signs: /71   Pulse 96   Temp 98 °F (36.7 °C) (Oral)   Resp 16   Ht 5' 8\" (1.727 m)   Wt 223 lb 8.7 oz (101.4 kg)   SpO2 98%   BMI 33.99 kg/m²     Last documented pain score (0-10 scale): Pain Level: 7  Last Weight:   Wt Readings from Last 1 Encounters:   22 223 lb 8.7 oz (101.4 kg)     Mental Status:  {IP PT MENTAL STATUS:10012}    IV Access:  { JENNY IV ACCESS:925642452}    Nursing Mobility/ADLs:  Walking   {P DME WAZQ:163012433}  Transfer  {P DME LACO:928074147}  Bathing  {P DME WOPJ:294301651}  Dressing  {CHP DME AUTS:826927323}  Toileting  {P DME VEVP:587028564}  Feeding  {[facility] DME PNK}  Med Admin  {[facility] DME BSVI:321880008}  Med Delivery   {[facility] [place] Delivery:127160190}    Wound Care Documentation and Therapy:        Elimination:  Continence: Bowel: {YES / AH:25571}  Bladder: {YES / VE:53726}  Urinary Catheter: {Urinary Catheter:854383065}   Colostomy/Ileostomy/Ileal Conduit: {YES / LS:42770}       Date of Last BM: ***    Intake/Output Summary (Last 24 hours) at 2022 1551  Last data filed at 2022 1218  Gross per 24 hour   Intake 240 ml   Output --   Net 240 ml     No intake/output data recorded.     Safety Concerns:     [address] [facility] Safety Concerns:303966563}    Impairments/Disabilities:      [address] ALVARADO Impairments/Disabilities:308304719}    Nutrition Therapy:  Current Nutrition Therapy:   [address] ALVARADO Diet List:423191414}    Routes of Feeding: {CHP JOYCELYN Other Feedings:412175037}  Liquids: {Slp liquid thickness:36131}  Daily Fluid Restriction: {CHP DME Yes amt example:014056792}  Last Modified Barium Swallow with Video (Video Swallowing Test): {Done Not Done XFEC:890237664}    Treatments at the Time of Hospital Discharge:   Respiratory Treatments: ***  Oxygen Therapy:  {Therapy; copd oxygen:36007}  Ventilator:    {[facility] Vent KIYZ:468023299}    Rehab Therapies: {THERAPEUTIC INTERVENTION:9739058433}  Weight Bearing Status/Restrictions: {[facility] Weight Bearin}  Other Medical Equipment (for information only, NOT a DME order):  {EQUIPMENT:636512376}  Other Treatments: ***    Patient's personal belongings (please select all that are sent with patient):  {[facility] DME Belongings:976315913}    RN SIGNATURE:  {Esignature:632132604}    CASE MANAGEMENT/SOCIAL WORK SECTION    Inpatient Status Date: ***    Readmission Risk Assessment Score:  Readmission Risk              Risk of Unplanned Readmission:  0           Discharging to Facility/ Agency   Name:   Address:  Phone:  Fax:    Dialysis Facility (if applicable)   Name:  Address:  Dialysis Schedule:  Phone:  Fax:    / signature: {Esignature:833176106}    PHYSICIAN SECTION    Prognosis: {Prognosis:9172731477}    Condition at Discharge: [address] [place] [address] Patient Condition:937405321}    Rehab Potential (if transferring to Rehab): {Prognosis:4136578899}    Recommended Labs or Other Treatments After Discharge: ***    Physician Certification: I certify the above information and transfer of Tahira Nation  is necessary for the continuing treatment of the diagnosis listed and that she requires {Admit to Appropriate Level of Care:97704} for {GREATER/LESS:450789289} 30 days.      Update Admission H&P: {CHP DME Changes in CCFMQ:269478237}    PHYSICIAN SIGNATURE:  {Esignature:177500702}

## 2022-09-27 NOTE — PROGRESS NOTES
CST Lexiscan  Stress Tech performs patient preparation of physical comfort, review test procedures, pre-stress EKG. Lung Sounds clear t/o. Consent verified by pt. .  Educated patient on test procedure and possible side effects of Lexiscan as well as s/s to report. Cardiologist reviewed pre-test EKG and is present for test.  Patient tolerated test well with minor SOB and CP which resolved to baseline after test with caffeine. Start BP / /62 HR 88 bpm  Stop BP / /59 HR 96 bpm  EKG portion of testing is completed and nuc. med. portion is still pending.

## 2022-09-28 ENCOUNTER — CARE COORDINATION (OUTPATIENT)
Dept: CASE MANAGEMENT | Age: 57
End: 2022-09-28

## 2022-09-30 ENCOUNTER — CARE COORDINATION (OUTPATIENT)
Dept: CASE MANAGEMENT | Age: 57
End: 2022-09-30

## 2022-09-30 NOTE — CARE COORDINATION
Care Transitions Outreach Attempt #2    Call within 2 business days of discharge: Yes   Attempt #2  to reach patient for transitions of care follow up. Unable to reach patient. CTN sign off if no return call received. Patient: Tamy Lam Patient : 1965 MRN: <[id number]>    Last Discharge [address]       Date Complaint Diagnosis Description Type Department Provider    22 Palpitations; Shortness of Breath Chest pain, unspecified type ED to Hosp-Admission (Discharged) (ADMITTED) Ankit Baltazar MD; Tra Stokes. .. Was this an external facility discharge?  No Discharge Facility: [facility]    Noted following upcoming appointments from discharge chart review:   [facility] follow up appointment(s):   Future Appointments   Date Time Provider Sylvester Collins   10/17/2022  1:30 PM Lan Nguyen MD  derm [facility]   2022  9:30 AM Oanh Pedraza APRN - CNP [facility]   2023 10:50 AM Karolina Martinez MD [facility] None   3/6/2023 10:00 AM Harmeet Sheehan MD [facility]     Bernarda Wellington, RN BSN   Care Transitions Nurse  [phone]

## 2022-10-17 ENCOUNTER — HOSPITAL ENCOUNTER (OUTPATIENT)
Age: 57
Discharge: HOME | End: 2022-10-17
Payer: MEDICARE

## 2022-10-17 ENCOUNTER — OFFICE VISIT (OUTPATIENT)
Dept: DERMATOLOGY | Age: 57
End: 2022-10-17
Payer: MEDICARE

## 2022-10-17 VITALS
OXYGEN SATURATION: 100 % | WEIGHT: 224 LBS | DIASTOLIC BLOOD PRESSURE: 71 MMHG | HEIGHT: 68 IN | SYSTOLIC BLOOD PRESSURE: 113 MMHG | TEMPERATURE: 97.2 F | HEART RATE: 105 BPM | BODY MASS INDEX: 33.95 KG/M2

## 2022-10-17 DIAGNOSIS — L57.0 ACTINIC KERATOSIS: Primary | ICD-10-CM

## 2022-10-17 DIAGNOSIS — L71.9 ROSACEA: ICD-10-CM

## 2022-10-17 DIAGNOSIS — L57.8 ACTINIC SKIN DAMAGE: ICD-10-CM

## 2022-10-17 DIAGNOSIS — L91.8 ACROCHORDON: ICD-10-CM

## 2022-10-17 DIAGNOSIS — B07.9 VERRUCA VULGARIS: ICD-10-CM

## 2022-10-17 LAB
ALBUMIN SERPL-MCNC: 4.5 G/DL (ref 3.5–5.2)
ALP BLD-CCNC: 149 U/L (ref 35–104)
ALT SERPL-CCNC: 13 U/L (ref 5–33)
ANION GAP SERPL CALCULATED.3IONS-SCNC: 12 MMOL/L (ref 9–17)
AST SERPL-CCNC: 16 U/L
BILIRUB SERPL-MCNC: 0.5 MG/DL (ref 0.3–1.2)
BUN BLDV-MCNC: 14 MG/DL (ref 6–20)
CALCIUM SERPL-MCNC: 9.4 MG/DL (ref 8.6–10.4)
CHLORIDE BLD-SCNC: 105 MMOL/L (ref 98–107)
CHOLESTEROL/HDL RATIO: 1.6
CHOLESTEROL: 226 MG/DL
CO2: 25 MMOL/L (ref 20–31)
CREAT SERPL-MCNC: 0.97 MG/DL (ref 0.5–0.9)
CREATININE URINE: 55.2 MG/DL (ref 28–217)
ESTIMATED AVERAGE GLUCOSE: 103 MG/DL
GFR SERPL CREATININE-BSD FRML MDRD: >60 ML/MIN/1.73M2
GLUCOSE BLD-MCNC: 103 MG/DL (ref 70–99)
HBA1C MFR BLD: 5.2 % (ref 4–6)
HDLC SERPL-MCNC: 140 MG/DL
LDL CHOLESTEROL: 75 MG/DL (ref 0–130)
MICROALBUMIN/CREAT 24H UR: <12 MG/L
MICROALBUMIN/CREAT UR-RTO: NORMAL MCG/MG CREAT
POTASSIUM SERPL-SCNC: 4.3 MMOL/L (ref 3.7–5.3)
SODIUM BLD-SCNC: 142 MMOL/L (ref 135–144)
THYROXINE, FREE: 0.84 NG/DL (ref 0.93–1.7)
TOTAL PROTEIN: 7.2 G/DL (ref 6.4–8.3)
TRIGL SERPL-MCNC: 53 MG/DL

## 2022-10-17 PROCEDURE — 83036 HEMOGLOBIN GLYCOSYLATED A1C: CPT

## 2022-10-17 PROCEDURE — 17000 DESTRUCT PREMALG LESION: CPT | Performed by: DERMATOLOGY

## 2022-10-17 PROCEDURE — 84439 ASSAY OF FREE THYROXINE: CPT

## 2022-10-17 PROCEDURE — 36415 COLL VENOUS BLD VENIPUNCTURE: CPT

## 2022-10-17 PROCEDURE — 99214 OFFICE O/P EST MOD 30 MIN: CPT | Performed by: DERMATOLOGY

## 2022-10-17 PROCEDURE — 82570 ASSAY OF URINE CREATININE: CPT

## 2022-10-17 PROCEDURE — 80061 LIPID PANEL: CPT

## 2022-10-17 PROCEDURE — 82043 UR ALBUMIN QUANTITATIVE: CPT

## 2022-10-17 PROCEDURE — 17003 DESTRUCT PREMALG LES 2-14: CPT | Performed by: DERMATOLOGY

## 2022-10-17 PROCEDURE — 17110 DESTRUCTION B9 LES UP TO 14: CPT | Performed by: DERMATOLOGY

## 2022-10-17 PROCEDURE — 80053 COMPREHEN METABOLIC PANEL: CPT

## 2022-10-17 RX ORDER — LANCETS 33 GAUGE
EACH MISCELLANEOUS
COMMUNITY
Start: 2022-07-28

## 2022-10-17 RX ORDER — METHOCARBAMOL 750 MG/1
TABLET ORAL
COMMUNITY
Start: 2022-09-16

## 2022-10-17 NOTE — PATIENT INSTRUCTIONS
Cryotherapy    Liquid Nitrogen - \"freeze\" (Cryotherapy)  Your doctor has treated your skin lesions with a very cold substance. The liquid nitrogen is so cold that it may feel like the skin is burning during application. A clear blister or blood blister may form after treatment and may later form a scab. Leave the area alone. Usually this scab will fall of within 1-2 weeks. The area should be kept clean and can be covered with Vaseline and a Band-Aid if needed. If a large blister develops it is ok to use a clean needle to gently pop the blister. Please call our office with any concerns at [phone]. Actinic Keratosis (AKs)   Actinic Keratosis are skin lesions caused by long-term exposure to the sun. They are scaly, rough, to the touch, irregularly shaped, and skin-colored, reddish brown, or yellowish in color. Recent Studies suggest that some AK lesions actually may be a very early form of a type of skin cancer, squamous cell carcinoma (SCC), that has not spread beyond a small, confined area. It is not yet possible to tell which AK lesions will go on to become skin cancer. Experts from the [employer], the [address] [place] [address], and the [facility] have recommended that all patients with AK lesions be evaluated and undergo some form of treatment. Your dermatologist can determine which type of treatment-either alone or in combination-is right for you. SUN PROTECTION AND OBSERVATION  Your dermatologist may recommend that you use a sun block, wear a hat and clothing to prevent sun exposure, and have regular skin examinations. Some AKs go away without further treatment, provided that the skin is not subjected to more sun damage. However, regular examinations will help catch the lesions that need to be treated. LESION-TARGETED THERAPIES   Liquid nitrogen (cryotherapy) destroys AKs by freezing them. This results in blistering and shedding of the AKs.  Cryotherapy is the most common treatment when a patient has a few, small AK lesions. Topical chemotherapy is a cream that targets sun-damaged and pre-cancerous cells and destroys them. Sun Protection     There are two types of sun rays that are harmful to the skin. UVA rays cause skin aging and skin cancer, such as melanoma. UVB rays cause sunburns, cataracts, and also contribute to skin cancer. The American-[employer]y of Dermatology recommends that children and adults wear a broad spectrum, waterproof sunscreen with a Sun Protection Factor (SPF) of 30 or higher. It is important to check the ingredient label to be sure the sunscreen will protect the skin from both UVA and UVB sunrays. Your sunscreen should contain at least one of the following ingredients: titanium dioxide, zinc oxide, or avobenzone. Sunscreen will not be effective unless it is applied to all exposed skin. Sunscreens work best if they are applied 30 minutes before sun exposure. They should be reapplied every 2 hours and after any water exposure. Sunscreen is not perfect. It is important to use other methods to protect the skin from sun exposure also. Wear hats, sunglasses and other sun protective clothing when outdoors.   Stay in the shade during the peak hours of sun exposure between 10 AM and 4 PM.

## 2022-10-17 NOTE — PROGRESS NOTES
Dermatology Patient Note  [address]  [facility]  Dept: [phone]  Dept Fax: [phone]      VISITDATE: 10/17/2022   REFERRING PROVIDER: No ref. provider found      Alistair Hurd is a 62 y.o. female  who presents today in the office for:    Other (Only wants face, and back looked at. Lesion on face has changed )      HISTORY OF PRESENT ILLNESS:  As above. Has skin tags on her back she would like examined, they do not bother her. Spots on her face are dry and scaly. She has been using Compound W on a wart on her right hand but it has not fully resolved. MEDICAL PROBLEMS:  Patient Active Problem List    Diagnosis Date Noted    Chest pain 09/27/2022     Priority: Medium    Angina pectoris ([facility] 09/26/2022     Priority: Medium    Hx of total bilateral knee replacement 11/29/2017     Priority: Medium    Stage 3 chronic kidney disease ([facility] 01/18/2021    Iron deficiency anemia 09/01/2020    Iron malabsorption 09/01/2020    Dupuytren's disease of palm 10/02/2019    Acquired hypothyroidism 08/04/2019     Last Assessment & Plan:   Condition: stable    Follow up in: three months      Alcoholism in recovery ([facility] 08/04/2019     Last Assessment & Plan:   Condition: stable    Health consequences of continued alcohol abuse discussed. Encouraged to seek assistance with quitting or maintaining sobriety through PCP or another cessation program.     Gave member [facility] number ([phone]). Follow up in: three months      Bipolar affective disorder, currently depressed, mild ([facility] 08/01/2019     Last Assessment & Plan:   Condition: stable    Pt seeing PCP for  mental health management regularly and last visit on July 2019.     Take medications as ordered by Provider; notify Provider if you cannot take medications as ordered or are having difficulties or side-effects from medications (do not stop medications without notifying Provider). If symptoms worsen or do not improve/stabilize, notify health care provider right away. If thoughts of harming self or others notify health care provider immediately &/or seek urgent/emergent care including calling Suicide Hotline ([phone]) or 911. Follow up in three months with PCP      Generalized anxiety disorder 08/01/2019     Last Assessment & Plan:   Condition: stable    Follow up in: three months      Mild intermittent asthma 08/01/2019     Last Assessment & Plan:   Formatting of this note might be different from the original.  Condition: stable    Reviewed trigger avoidance and reviewed proper use of inhalers and rescue medications. Reviewed concerning signs/symptoms and ER precautions.       Follow up in: three months      Senile osteoporosis 05/10/2019     On Calcium and Vit D  RX Fosamax through Rheumatologist      Age-related nuclear cataract of both eyes 05/01/2019     Last Assessment & Plan:   Condition: stable    Follow up in: three months      Numbness 12/14/2018    Closed fracture of lower end of femur ([address] 11/08/2018    Closed supracondylar fracture of femur, left, initial encounter ([address]     Spinal stenosis, cervical region 10/30/2018    Neural foraminal stenosis of cervical spine 10/30/2018    Left carpal tunnel syndrome     Osteoarthritis of spine with radiculopathy, cervical region     Degenerative disc disease, cervical     Trigger middle finger of right hand     Trigger ring finger of right hand     CKD (chronic kidney disease), stage II 08/23/2018    Scleroderma ([address] 08/23/2018    Vitamin D deficiency 08/23/2018 7/30/18 Cystocele repair, Cystoscopy with OBTRYX Ureteral Sling 07/30/2018     CYSTOCELE REPAIR  CYSTOSCOPY WITH OBTRYX URETERAL SLING      Overflow incontinence     Cystocele, midline 07/29/2018    JOYCE (stress urinary incontinence, female) 07/29/2018    Bradycardia 01/26/2018    Hypotension 01/24/2018    Nuclear senile cataract 01/24/2018    Hypovolemia 01/16/2018    Atrial fibrillation with slow ventricular response ([facility] 01/15/2018    H/O: stroke 01/15/2018    H/O: hysterectomy     [facility] 11/29/2017     Hysterectomy in 2014 for heavy menses at [facility] [facility]      Spondylosis of cervical region without myelopathy or radiculopathy      Suffers with neck pain. A MRI of the cervical spine revealed multilevel degenerative joint disease. Her neck pain is constant and radiates down both upper extremities. An EMG nerve conduction study in February 2016 found a bilateral carpal tunnel syndrome and possible ulnar neuropathy. A MRI of the cervical spine in August 2016 found multilevel degenerative joint disease and at C5-C6 a disc protrusion mildly indenting the thecal sac and closely abutting the ventral cord. Additionally she reports having low back pain and previously had a lumbar spine MRI in September 2014 done at the [facility] in [place] which revealed multilevel lumbar degenerative joint disease worse at L5-S1 and L4-L5. Pain management consultation pending.         Chronic fatigue syndrome 06/15/2017    S/P knee surgery     Type 2 diabetes mellitus with stage 2 chronic kidney disease, without long-term current use of insulin ([facility] 05/02/2017    Degenerative arthritis of right knee 05/02/2017    Hx of Stroke ([facility] 04/06/2017    Transient insomnia     Sleep walking disorder     Raynaud's disease     Pulmonary nodules     Sensory neuropathy     Hypertension     GERD (gastroesophageal reflux disease)     Fibromyalgia     Depression     Degenerative disc disease, thoracic     Atherosclerosis of native coronary artery of native heart with angina pectoris (HCC)     Bipolar disorder (HCC)     Asthma     Anxiety     Antinuclear antibody (IQRA) titer greater than 1:80     Anemia        CURRENT MEDICATIONS:   Current Outpatient Medications   Medication Sig Dispense Refill    cyanocobalamin 1000 MCG tablet every 24 hours      D3-50 1.25 MG (84736 UT) CAPS TAKE 1 CAPSULE BY MOUTH TWICE A WEEK      QUEtiapine (SEROQUEL) 300 MG tablet Take 600 mg by mouth at bedtime      folic acid (FOLVITE) 1 MG tablet Take 1 tablet by mouth daily 90 tablet 0    NIFEdipine (ADALAT CC) 60 MG extended release tablet Take 2 tablets by mouth daily 180 tablet 3    hydrALAZINE (APRESOLINE) 50 MG tablet Take 1 tablet by mouth 3 times daily 270 tablet 3    gabapentin (NEURONTIN) 600 MG tablet TAKE 2 TABLETS BY MOUTH THREE TIMES A  tablet 11    cetirizine (ZYRTEC) 10 MG tablet TAKE 1 TABLET BY MOUTH ONE TIME A DAY 90 tablet 1    pravastatin (PRAVACHOL) 40 MG tablet Take 1 tablet by mouth daily 90 tablet 1    famotidine (PEPCID) 40 MG tablet TAKE 1 TABLET BY MOUTH IN THE EVENING 90 tablet 1    albuterol sulfate HFA (VENTOLIN HFA) 108 (90 Base) MCG/ACT inhaler Inhale 2 puffs into the lungs every 6 hours as needed for Wheezing 1 each 5    losartan (COZAAR) 100 MG tablet TAKE 1 TABLET BY MOUTH EVERY DAY 90 tablet 1    Handicap Placard MISC by Does not apply route Exp: 7/13/2026 1 each 0    SM ASPIRIN ADULT LOW STRENGTH 81 MG EC tablet TAKE 1 TABLET BY MOUTH TWO TIMES A DAY  180 tablet 1    ARIPiprazole (ABILIFY) 5 MG tablet Take 5 mg by mouth daily       EPIPEN 2-JIGAR 0.3 MG/0.3ML SOAJ injection INJECT 1 PEN INTO THE MUSCLE ONCE AS NEEDED FOR UP TO 1 DOSE FOR ANGIOEDEMA. 2 each 5    blood glucose monitor strips Test 4 times a day & as needed for symptoms of irregular blood glucose.  100 strip 5    ibuprofen (ADVIL;MOTRIN) 800 MG tablet Take 1 tablet by mouth as needed For post op pain prescribed by surgeon after right CTR sx      ferrous sulfate 325 (65 Fe) MG tablet TAKE 1 TABLET BY MOUTH TWO TIMES A DAY  60 tablet 2    metFORMIN (GLUCOPHAGE) 500 MG tablet Take 500 mg by mouth daily       lamoTRIgine (LAMICTAL) 25 MG tablet Take 25 mg by mouth 2 times daily       ergocalciferol (ERGOCALCIFEROL) 09904 units capsule Take 50,000 Units by mouth See Admin Instructions Takes twice a week / taking 2 times weekly      lamoTRIgine (LAMICTAL) 200 MG tablet Take 200 mg by mouth every evening       levothyroxine (SYNTHROID) 100 MCG tablet Take 100 mcg by mouth Daily      Multiple Vitamins-Minerals (THERAPEUTIC MULTIVITAMIN-MINERALS) tablet Take 1 tablet by mouth daily      Lancets (ONETOUCH DELICA PLUS [id number]) MISC use to test blood sugar one time a day      clonazePAM (KLONOPIN) 1 MG tablet Take 1 mg by mouth 3 times daily as needed. Indications: last fill 8/25/22 for 30 days (Patient not taking: Reported on 10/17/2022)      cyclobenzaprine (FLEXERIL) 10 MG tablet TAKE 1 TABLET BY MOUTH ONE TIME A DAY AT NIGHT AS NEEDED FOR MUSCLE SPASM (Patient not taking: Reported on 10/17/2022) 90 tablet 1     No current facility-administered medications for this visit. ALLERGIES:   Allergies   Allergen Reactions    Morphine Nausea And Vomiting and Other (See Comments)     Pt states it changes her mental status  Pt states it changes her mental status  Pt states it changes her mental status  Pt states it changes her mental status      Amlodipine Other (See Comments) and Diarrhea     diarrhea and stress  Other reaction(s): Unknown  diarrhea and stress  diarrhea and stress  diarrhea and stress    Dilaudid [Hydromorphone Hcl] Hives and Itching    Hydromorphone Hives, Itching and Other (See Comments)    Sulfa Antibiotics Hives, Rash and Other (See Comments)       SOCIAL HISTORY:  Social History     Tobacco Use    Smoking status: Never    Smokeless tobacco: Never   Substance Use Topics    Alcohol use: Not Currently       Pertinent ROS:  Review of Systems  Skin: Denies any new changing, growing or bleeding lesions or rashes except as described in the HPI   Constitutional: Denies fevers, chills, and malaise.     PHYSICAL EXAM:   /71   Pulse (!) 105   Temp 97.2 °F (36.2 °C)   Ht 5' 8\" (1.727 m)   Wt 224 lb (101.6 kg) SpO2 100%   BMI 34.06 kg/m²     The patient is generally well appearing, well nourished, alert and conversational. Affect is normal.    Cutaneous Exam:  Physical Exam  Waist-up skin: the head/face,neck, both arms, chest, back, abdomen, digits and/or nails, was examined. Facial covering was removed during examination. Diagnoses/exam findings/medical history pertinent to this visit are listed below:    Assessment:   Diagnosis Orders   1. Actinic keratosis        2. Rosacea        3. Verruca vulgaris [B07.9 (ICD-10-CM)]        4. Acrochordon             Plan:  Actinic keratoses  Cryotherapy: After verbal consent was obtained including discussion of the risks (lesion persistence, lesion recurrence and hypo/hyperpigmentation) and benefits (resolution of the lesion), and alternative therapies, 4 total Actinic Keratosis on the face were treated with liquid nitrogen in a spray gun for a single 6 second freeze-thaw cycle for each lesion. The patient tolerated the procedure well and there were no immediate complications. Rosacea  - chronic illness with progression and/or exacerbation  - restart metrocream BID    Acrochordons on trunk  - do not bother patient  - reassurance and education    Verruca vulgaris on right second finger  - pared down with 15 blade  Cryotherapy: After verbal consent was obtained including discussion of the risks (lesion persistence, lesion recurrence and hypo/hyperpigmentation) and benefits (resolution of the lesion), and alternative therapies, 1 total Wart on the right second finger were treated with liquid nitrogen in a spray gun for a single 6 second freeze-thaw cycle for each lesion. The patient tolerated the procedure well and there were no immediate complications. Actinic skin damage  - patient was counseled that UV-damaged skin increases lifetime risk for skin cancer  - I recommended the patient apply broad spectrum spf 30+ sunscreen daily, reapplying every 2 hours.   - In additional to regular use of sunscreen, I recommended broad-rimmed hats, long sleeves, and seeking the shade. RTC for FBSE    Future Appointments   Date Time Provider Sylvester Collins   11/22/2022  9:30 AM COLTON Dow - CNP [place]  [facility]   2/23/2023 10:50 AM Marie Glynn MD [facility] None   3/6/2023 10:00 AM Jenny Bucio MD [facility]         Patient Instructions   Cryotherapy    Liquid Nitrogen - \"freeze\" (Cryotherapy)  Your doctor has treated your skin lesions with a very cold substance. The liquid nitrogen is so cold that it may feel like the skin is burning during application. A clear blister or blood blister may form after treatment and may later form a scab. Leave the area alone. Usually this scab will fall of within 1-2 weeks. The area should be kept clean and can be covered with Vaseline and a Band-Aid if needed. If a large blister develops it is ok to use a clean needle to gently pop the blister. Please call our office with any concerns at [phone]. Actinic Keratosis (AKs)   Actinic Keratosis are skin lesions caused by long-term exposure to the sun. They are scaly, rough, to the touch, irregularly shaped, and skin-colored, reddish brown, or yellowish in color. Recent Studies suggest that some AK lesions actually may be a very early form of a type of skin cancer, squamous cell carcinoma (SCC), that has not spread beyond a small, confined area. It is not yet possible to tell which AK lesions will go on to become skin cancer. Experts from the [employer], the [address] [place] [address], and the [facility] have recommended that all patients with AK lesions be evaluated and undergo some form of treatment. Your dermatologist can determine which type of treatment-either alone or in combination-is right for you.      SUN PROTECTION AND OBSERVATION  Your dermatologist may recommend that you use a sun block, wear a hat and clothing to prevent sun exposure, and have regular skin examinations. Some AKs go away without further treatment, provided that the skin is not subjected to more sun damage. However, regular examinations will help catch the lesions that need to be treated. LESION-TARGETED THERAPIES   Liquid nitrogen (cryotherapy) destroys AKs by freezing them. This results in blistering and shedding of the AKs. Cryotherapy is the most common treatment when a patient has a few, small AK lesions. Topical chemotherapy is a cream that targets sun-damaged and pre-cancerous cells and destroys them. Sun Protection     There are two types of sun rays that are harmful to the skin. UVA rays cause skin aging and skin cancer, such as melanoma. UVB rays cause sunburns, cataracts, and also contribute to skin cancer. The American-[employer]y of Dermat[employer] recommends that children and adults wear a broad spectrum, waterproof sunscreen with a Sun Protection Factor (SPF) of 30 or higher. It is important to check the ingredient label to be sure the sunscreen will protect the skin from both UVA and UVB sunrays. Your sunscreen should contain at least one of the following ingredients: titanium dioxide, zinc oxide, or avobenzone. Sunscreen will not be effective unless it is applied to all exposed skin. Sunscreens work best if they are applied 30 minutes before sun exposure. They should be reapplied every 2 hours and after any water exposure. Sunscreen is not perfect. It is important to use other methods to protect the skin from sun exposure also. Wear hats, sunglasses and other sun protective clothing when outdoors. Stay in the shade during the peak hours of sun exposure between 10 AM and 4 PM.        I, Imani Mcleod, personally scribed the services dictated to me by Dr. Lilian Collier in this documentation.      I, Dr. Lilian Collier, personally performed the services described in this documentation, as scribed by Terri Woody in my presence, and it is both accurate and complete.     Electronically signed by Cely Cota MD on 10/17/2022 at 8:23 PM

## 2022-11-12 DIAGNOSIS — K21.9 GASTROESOPHAGEAL REFLUX DISEASE, UNSPECIFIED WHETHER ESOPHAGITIS PRESENT: ICD-10-CM

## 2022-11-14 RX ORDER — FAMOTIDINE 40 MG/1
TABLET, FILM COATED ORAL
Qty: 90 TABLET | Refills: 1 | Status: SHIPPED | OUTPATIENT
Start: 2022-11-14

## 2022-11-14 NOTE — TELEPHONE ENCOUNTER
Last visit: 8/22/22  Last Med refill: 5/20/22  Does patient have enough medication for 72 hours: Yes    Next Visit Date:  Future Appointments   Date Time Provider Sylvester Karina   11/22/2022  9:30 AM COLTON Faulkner - CNP [facility]   1/18/2023  8:30 AM Levi Hernandez MD [facility]   2/23/2023 10:50 AM Dangelo Javier MD [facility] None   2/28/2023  8:00 AM COLTON Caldwell - CNP [facility] -   3/6/2023 10:00 AM Batsheva Bucio MD [id number] [facility] Maintenance   Topic Date Due    Hepatitis B vaccine (1 of 3 - Risk 3-dose series) Never done    Shingles vaccine (1 of 2) Never done    Diabetic retinal exam  01/12/2022    Flu vaccine (1) 08/01/2022    Diabetic foot exam  08/16/2022    COVID-19 Vaccine (3 - Booster for Anushka series) 08/19/2022    Breast cancer screen  11/11/2022    Depression Monitoring  08/22/2023    Annual Wellness Visit (AWV)  08/23/2023    A1C test (Diabetic or Prediabetic)  10/17/2023    Lipids  10/17/2023    DTaP/Tdap/Td vaccine (3 - Td or Tdap) 09/14/2027    Colorectal Cancer Screen  07/02/2029    Pneumococcal 0-64 years Vaccine  Completed    Hepatitis C screen  Completed    HIV screen  Completed    Hepatitis A vaccine  Aged Out    Hib vaccine  Aged Out    Meningococcal (ACWY) vaccine  Aged Out       Hemoglobin A1C (%)   Date Value   10/17/2022 5.2   07/18/2022 5.4   04/18/2022 5.2             ( goal A1C is < 7)   Microalb/Crt.  Ratio (mcg/mg creat)   Date Value   10/17/2022 Can not be calculated     LDL Cholesterol (mg/dL)   Date Value   10/17/2022 75   10/14/2021 108       (goal LDL is <100)   AST (U/L)   Date Value   10/17/2022 16     ALT (U/L)   Date Value   10/17/2022 13     BUN (mg/dL)   Date Value   10/17/2022 14     BP Readings from Last 3 Encounters:   10/17/22 113/71   09/27/22 115/71   09/22/22 (!) 162/100          (goal 120/80)    All Future Testing planned in [facility]  Lab Frequency Next Occurrence   CBC with Auto Differential Once 09/11/2022   Ferritin Once 09/11/2022   Iron And TIBC     Basic Metabolic Panel Every 16 weeks    CBC with Auto Differential Every 16 weeks    Creatinine, Random Urine Every 16 weeks    Phosphorus Every 16 weeks    Protein / creatinine ratio, urine Every 16 weeks    Protein, urine, random Every 16 weeks    PTH, Intact with Ionized Calcium Every 16 weeks    Vitamin D 25 Hydroxy Every 16 weeks    Basic Metabolic Panel Every 16 weeks    CBC with Auto Differential Every 16 weeks    Creatinine, Random Urine Every 16 weeks    Phosphorus Every 16 weeks    Protein / creatinine ratio, urine Every 16 weeks    Protein, urine, random Every 16 weeks    PTH, Intact with Ionized Calcium Every 16 weeks    Vitamin D 25 Hydroxy Every 16 weeks                Patient Active Problem List:     Transient insomnia     Sleep walking disorder     Raynaud's disease     Pulmonary nodules     Sensory neuropathy     Hypertension     GERD (gastroesophageal reflux disease)     Fibromyalgia     Depression     Degenerative disc disease, thoracic     Atherosclerosis of native coronary artery of native heart with angina pectoris ([facility])     Bipolar disorder ([facility])     Asthma     Anxiety     Antinuclear antibody (IQRA) titer greater than 1:80     Anemia     Hx of Stroke ([facility])     Type 2 diabetes mellitus with stage 2 chronic kidney disease, without long-term current use of insulin ([facility])     Degenerative arthritis of right knee     S/P knee surgery     Chronic fatigue syndrome     Spondylosis of cervical region without myelopathy or radiculopathy     H/O hysterectomy with BSO at [facility] for benign disease 2014     Hx of total bilateral knee replacement     H/O: hysterectomy     Atrial fibrillation with slow ventricular response ([facility])     H/O: stroke     Hypovolemia     Hypotension     Bradycardia     Cystocele, midline     JOYCE (stress urinary incontinence, female)     Overflow incontinence     7/30/18 Cystocele repair, Cystoscopy with OBTRYX Ureteral Sling     Nuclear senile cataract     CKD (chronic kidney disease), stage II     Scleroderma (HCC)     Vitamin D deficiency     Trigger middle finger of right hand     Trigger ring finger of right hand     Osteoarthritis of spine with radiculopathy, cervical region     Degenerative disc disease, cervical     Left carpal tunnel syndrome     Spinal stenosis, cervical region     Neural foraminal stenosis of cervical spine     Closed fracture of lower end of femur ([address]     Closed supracondylar fracture of femur, left, initial encounter ([address]     Numbness     Senile osteoporosis     Acquired hypothyroidism     Age-related nuclear cataract of both eyes     Alcoholism in recovery ([address]     Bipolar affective disorder, currently depressed, mild (HCC)     Generalized anxiety disorder     Iron deficiency anemia     Iron malabsorption     Stage 3 chronic kidney disease (HCC)     Dupuytren's disease of palm     Mild intermittent asthma     Angina pectoris ([facility])     Chest pain

## 2022-11-22 ENCOUNTER — HOSPITAL ENCOUNTER (OUTPATIENT)
Age: 57
Setting detail: SPECIMEN
Discharge: HOME | End: 2022-11-22

## 2022-11-22 ENCOUNTER — OFFICE VISIT (OUTPATIENT)
Dept: FAMILY MEDICINE CLINIC | Age: 57
End: 2022-11-22
Payer: MEDICARE

## 2022-11-22 VITALS
DIASTOLIC BLOOD PRESSURE: 80 MMHG | BODY MASS INDEX: 32.23 KG/M2 | HEART RATE: 86 BPM | SYSTOLIC BLOOD PRESSURE: 120 MMHG | OXYGEN SATURATION: 95 % | WEIGHT: 212 LBS

## 2022-11-22 DIAGNOSIS — Z23 NEED FOR INFLUENZA VACCINATION: ICD-10-CM

## 2022-11-22 DIAGNOSIS — I10 ESSENTIAL HYPERTENSION: Primary | ICD-10-CM

## 2022-11-22 DIAGNOSIS — N89.8 VAGINAL DISCHARGE: ICD-10-CM

## 2022-11-22 DIAGNOSIS — Z12.31 ENCOUNTER FOR SCREENING MAMMOGRAM FOR MALIGNANT NEOPLASM OF BREAST: ICD-10-CM

## 2022-11-22 DIAGNOSIS — F10.21 ALCOHOLISM IN RECOVERY (HCC): ICD-10-CM

## 2022-11-22 PROBLEM — I48.91 ATRIAL FIBRILLATION WITH SLOW VENTRICULAR RESPONSE (HCC): Status: RESOLVED | Noted: 2018-01-15 | Resolved: 2022-11-22

## 2022-11-22 LAB
CANDIDA SPECIES, DNA PROBE: NEGATIVE
GARDNERELLA VAGINALIS, DNA PROBE: NEGATIVE
SOURCE: NORMAL
TRICHOMONAS VAGINALIS DNA: NEGATIVE

## 2022-11-22 PROCEDURE — G0008 ADMIN INFLUENZA VIRUS VAC: HCPCS | Performed by: NURSE PRACTITIONER

## 2022-11-22 PROCEDURE — 3074F SYST BP LT 130 MM HG: CPT | Performed by: NURSE PRACTITIONER

## 2022-11-22 PROCEDURE — 90674 CCIIV4 VAC NO PRSV 0.5 ML IM: CPT | Performed by: NURSE PRACTITIONER

## 2022-11-22 PROCEDURE — 3078F DIAST BP <80 MM HG: CPT | Performed by: NURSE PRACTITIONER

## 2022-11-22 PROCEDURE — 99214 OFFICE O/P EST MOD 30 MIN: CPT | Performed by: NURSE PRACTITIONER

## 2022-11-22 RX ORDER — LOSARTAN POTASSIUM 100 MG/1
TABLET ORAL
Qty: 90 TABLET | Refills: 1 | Status: SHIPPED | OUTPATIENT
Start: 2022-11-22

## 2022-11-22 RX ORDER — OMEPRAZOLE 20 MG/1
20 CAPSULE, DELAYED RELEASE ORAL 2 TIMES DAILY
Qty: 180 CAPSULE | Refills: 1 | Status: SHIPPED | OUTPATIENT
Start: 2022-11-22

## 2022-11-22 RX ORDER — PRAVASTATIN SODIUM 40 MG
40 TABLET ORAL DAILY
Qty: 90 TABLET | Refills: 1 | Status: SHIPPED | OUTPATIENT
Start: 2022-11-22

## 2022-11-22 ASSESSMENT — ENCOUNTER SYMPTOMS
VOMITING: 0
NAUSEA: 0

## 2022-11-22 NOTE — PROGRESS NOTES
Mia Huggins (:  1965) is a 62 y.o. female,Established patient, here for evaluation of the following chief complaint(s):  3 Month Follow-Up      ASSESSMENT/PLAN:  1. Essential hypertension  . awast    -     losartan (COZAAR) 100 MG tablet; TAKE 1 TABLET BY MOUTH EVERY DAY, Disp-90 tablet, R-1Normal  2. Encounter for screening mammogram for malignant neoplasm of breast  -     TERESA DIGITAL SCREEN W OR WO CAD BILATERAL; Future  3. Need for influenza vaccination  -     Influenza, FLUCELVAX, (age 10 mo+), IM, Preservative Free, 0.5 mL  4. Vaginal discharge  -     Vaginitis DNA Probe; Future  -     Chlamydia Trachomatis & Neisseria gonorrhoeae (GC) by amplified detection; Future  5. Alcoholism in recovery [facility])      No follow-ups on file. Subjective   SUBJECTIVE/OBJECTIVE:  Vaginal Discharge  The patient's primary symptoms include vaginal discharge. The patient's pertinent negatives include no genital itching, genital odor or pelvic pain. This is a recurrent problem. The current episode started more than 1 month ago. The problem occurs daily. The problem has been unchanged. Pertinent negatives include no chills, fever, flank pain, hematuria, nausea, urgency or vomiting. The vaginal discharge was yellow and thin. There has been no bleeding. Low back pain. Worse when walking. Has to hang onto shopping cart to take pressure off. Not a new problem. She would like to work on some core exercises. Review of Systems   Constitutional:  Negative for chills and fever. Gastrointestinal:  Negative for nausea and vomiting. Genitourinary:  Positive for vaginal discharge. Negative for flank pain, hematuria, pelvic pain and urgency.         Objective   Vitals:    22 0926   BP: 120/80   Pulse: 86   SpO2: 95%     Wt Readings from Last 3 Encounters:   22 212 lb (96.2 kg)   10/17/22 224 lb (101.6 kg)   22 223 lb 8.7 oz (101.4 kg)       Physical Exam  Constitutional:       Appearance: She is well-developed. HENT:      Right Ear: External ear normal.      Left Ear: External ear normal.      Nose: Nose normal.   Cardiovascular:      Rate and Rhythm: Normal rate and regular rhythm. Heart sounds: Normal heart sounds, S1 normal and S2 normal.   Pulmonary:      Effort: Pulmonary effort is normal. No respiratory distress. Breath sounds: Normal breath sounds. Musculoskeletal:         General: No deformity. Normal range of motion. Cervical back: Full passive range of motion without pain and normal range of motion. Skin:     General: Skin is warm and dry. Neurological:      Mental Status: She is alert and oriented to person, place, and time. An electronic signature was used to authenticate this note.     --Sekou Callejas, APRMORE - CNP

## 2022-11-23 LAB
C TRACH DNA GENITAL QL NAA+PROBE: NEGATIVE
N. GONORRHOEAE DNA: NEGATIVE
SPECIMEN DESCRIPTION: NORMAL

## 2022-12-07 ENCOUNTER — HOSPITAL ENCOUNTER (OUTPATIENT)
Age: 57
Setting detail: SPECIMEN
Discharge: HOME | End: 2022-12-07

## 2022-12-07 ENCOUNTER — OFFICE VISIT (OUTPATIENT)
Dept: OBGYN CLINIC | Age: 57
End: 2022-12-07
Payer: MEDICARE

## 2022-12-07 VITALS
HEIGHT: 68 IN | WEIGHT: 214 LBS | SYSTOLIC BLOOD PRESSURE: 144 MMHG | DIASTOLIC BLOOD PRESSURE: 110 MMHG | BODY MASS INDEX: 32.43 KG/M2

## 2022-12-07 DIAGNOSIS — N89.8 VAGINAL DISCHARGE: ICD-10-CM

## 2022-12-07 DIAGNOSIS — Z01.419 VISIT FOR GYNECOLOGIC EXAMINATION: Primary | ICD-10-CM

## 2022-12-07 DIAGNOSIS — Z11.51 SCREENING FOR HPV (HUMAN PAPILLOMAVIRUS): ICD-10-CM

## 2022-12-07 DIAGNOSIS — Z12.31 ENCOUNTER FOR SCREENING MAMMOGRAM FOR MALIGNANT NEOPLASM OF BREAST: ICD-10-CM

## 2022-12-07 PROCEDURE — 3078F DIAST BP <80 MM HG: CPT | Performed by: NURSE PRACTITIONER

## 2022-12-07 PROCEDURE — 3074F SYST BP LT 130 MM HG: CPT | Performed by: NURSE PRACTITIONER

## 2022-12-07 PROCEDURE — 99386 PREV VISIT NEW AGE 40-64: CPT | Performed by: NURSE PRACTITIONER

## 2022-12-07 NOTE — PROGRESS NOTES
History and Physical  [address], [address] [facility] [address], [address] [facility] [phone]   Fax [phone]  Bryant Ellsworth  2022              62 y.o. Chief Complaint   Patient presents with    Annual Exam       No LMP recorded. Patient has had a hysterectomy. Primary Care Physician: Antonieta Munson, APRN - CNP    The patient was seen and examined. She is here for her annual exam. C/o vaginal discharge- cultures on - neg. Discharge is first thing in the morning or when she doesn't go to the bathroom. States she notices it on her underwear and crusty in her hair. Her bowels are regular. There are no voiding complaints. She denies any bloating. She denies vaginal discharge and was counseled on STD's and the need for barrier contraception.      HPI : Bryant Ellsworth is a 62 y.o. female [id number]    Annual exam  Vaginal discharge  Hx hyst    no Bloating  no Early Satiety  no Unexplained weight change of more than 15 lbs  no  PMB  no  PCB  ________________________________________________________________________  OB History    Para Term  AB Living   4 4 4 0 0 4   SAB IAB Ectopic Molar Multiple Live Births   0 0 0 0 0 4      # Outcome Date GA Lbr Flaquito/2nd Weight Sex Delivery Anes PTL Lv   4 Term      Vag-Spont  N JF   3 Term      Vag-Spont  N JF   2 Term      Vag-Spont  N JF   1 Term      Vag-Spont  N JF     Past Medical History:   Diagnosis Date    Acute kidney injury (HCC)     Anemia     Angioedema     Antinuclear antibody (IQRA) titer greater than 1:80     Anxiety     Asthma     Ataxia     Atrial fibrillation (HCC)     Borderline personality disorder (HCC)     Bradycardia     CAD (coronary artery disease)     Chronic kidney disease     CKD (chronic kidney disease), stage II 2018    COVID-19 2021    Degenerative disc disease, thoracic     Depression     Diabetes mellitus (HCC)     Diastolic dysfunction     DJD (degenerative joint disease)     Fibromyalgia     Foot pain, right     GERD (gastroesophageal reflux disease)     H/O: hysterectomy     Headache     Hernia of abdominal wall     History of blood transfusion     no problem    Hyperlipidemia     Hypertension     Lupus (HCC)     Mood disorder (HCC)     Neuropathy     Osteoarthritis     PTSD (post-traumatic stress disorder)     Pulmonary nodules     Raynaud's disease     Scleroderma ([facility]     Scleroderma ([facility] [facility] 08/23/2018    Seizures (HCC)     Sleep walking disorder     Thyroid disease     TIA (transient ischemic attack)     Transient insomnia     Tricuspid regurgitation     Vasospasm ([address] 01/2016    bilat. legs. resulting in near complete absence of profusion to arteries of feet. ([facility]     Vitamin D deficiency 08/23/2018    Wears glasses                                                                    Past Surgical History:   Procedure Laterality Date    ABDOMINOPLASTY  2013    BLADDER SUSPENSION N/A 7/30/2018    CYSTOSCOPY WITH OBTRYX MID URETHRAL SLING performed by Annalee Morales MD at [id number] [place] [address] Right 2016    CARPAL TUNNEL RELEASE Right 01/02/2020    CHOLECYSTECTOMY  1989    COLONOSCOPY N/A 6/24/2019    COLORECTAL CANCER SCREENING, NOT HIGH RISK performed by Jane Mckenna MD at [place] [address] 7/2/2019    COLORECTAL CANCER SCREENING, NOT HIGH RISK performed by Jane Mckenna MD at [address] [place] [address] SURGERY  11/09/2018    FINGER SURGERY Right 08/28/2018    RING CARTER MASS EXCISION, TRIGGER RELEASE    FRACTURE SURGERY      left femur    GASTRECTOMY      GASTRIC BYPASS SURGERY  2012    HYSTERECTOMY (CERVIX STATUS UNKNOWN)      JOINT REPLACEMENT Bilateral     knees    KNEE SURGERY Right 05/02/2017    (femoral) patella replacement    NERVE BLOCK Right 05/04/2018     cervical facet #1 no steroid    NERVE BLOCK Right 06/29/2018    rt cervical diagnostic #2 decadron 10mg NERVE BLOCK Right 08/10/2018    right cervical rfa decadron 10mg    SD ANTERIOR COLPORRAPHY RPR CYSTOCELE W/CYSTO N/A 7/30/2018    CYSTOCELE REPAIR performed by Johana Liu DO at [email or web address] OFFICE/OUTPT [address] Right 8/28/2018    RING CARTER MASS EXCISION, TRIGGER RELEASE, 3080 TABLE performed by Ada Spears DO at [email or web address] OFFICE/OUTPT VISIT,PROCEDURE ONLY Left 11/9/2018    FEMUR RETROGRADE IM NAILING PERIPROSTHETIC FRACTURE performed by Ada Spears DO at [facility] [address]/CARPAL TUNNEL SURG Left 10/23/2018    CARPAL TUNNEL RELEASE performed by Ada Spears DO at [address]  2011    left knee    TOTAL KNEE ARTHROPLASTY Right 5/2/2017    PATELLOFEMORAL REPLACEMENT KNEE - JAXSON, 3080 TABLE, FEMORAL POPLITEAL BLOCK, NSA= SPINAL VS GENERAL performed by Ada Spears DO at [address]   2004     Family History   Adopted: Yes   Problem Relation Age of Onset    Depression Mother     Asthma Mother     Depression Father     High Blood Pressure Father     Diabetes Father     Asthma Father     Depression Sister     Asthma Sister     Depression Brother     Asthma Brother     Asthma Maternal Aunt     Asthma Maternal Uncle     Asthma Paternal Aunt     Asthma Paternal Uncle     Asthma Maternal Grandmother     Cancer Maternal Grandmother     Asthma Maternal Grandfather     Asthma Paternal Grandmother     Asthma Paternal Grandfather     Asthma Maternal Cousin     Asthma Paternal Cousin      Social History     Socioeconomic History    Marital status:       Spouse name: Not on file    Number of children: Not on file    Years of education: Not on file    Highest education level: Not on file   Occupational History    Not on file   Tobacco Use    Smoking status: Never    Smokeless tobacco: Never   Vaping Use    Vaping Use: Never used   Substance and Sexual Activity    Alcohol use: Not Currently    Drug use: No    Sexual activity: Not Currently     Birth control/protection: Surgical     Comment: pt has hysterectomy   Other Topics Concern    Not on file   Social History Narrative    Not on file     Social Determinants of Health     Financial Resource Strain: Low Risk     Difficulty of Paying Living Expenses: Not hard at all   Food Insecurity: No Food Insecurity    Worried About Running Out of Food in the Last Year: Never true    Ran Out of Food in the Last Year: Never true   Transportation Needs: Not on file   Physical Activity: Insufficiently Active    Days of Exercise per Week: 1 day    Minutes of Exercise per Session: 20 min   Stress: Not on file   Social Connections: Not on file   Intimate Partner Violence: Not on file   Housing Stability: Not on file       MEDICATIONS:  Current Outpatient Medications   Medication Sig Dispense Refill    omeprazole (PRILOSEC) 20 MG delayed release capsule Take 1 capsule by mouth 2 times daily 180 capsule 1    losartan (COZAAR) 100 MG tablet TAKE 1 TABLET BY MOUTH EVERY DAY 90 tablet 1    pravastatin (PRAVACHOL) 40 MG tablet Take 1 tablet by mouth daily 90 tablet 1    famotidine (PEPCID) 40 MG tablet TAKE 1 TABLET BY MOUTH IN THE EVENING 90 tablet 1    phentermine 37.5 MG capsule Take 37.5 mg by mouth every morning.       carvedilol (COREG) 3.125 MG tablet Take 1 tablet by mouth 2 times daily 180 tablet 3    D3-50 1.25 MG (78650 UT) CAPS TAKE 1 CAPSULE BY MOUTH TWICE A WEEK      Lancets (ONETOUCH DELICA PLUS [id number]) MISC use to test blood sugar one time a day      QUEtiapine (SEROQUEL) 300 MG tablet Take 600 mg by mouth at bedtime      folic acid (FOLVITE) 1 MG tablet Take 1 tablet by mouth daily 90 tablet 0    NIFEdipine (ADALAT CC) 60 MG extended release tablet Take 2 tablets by mouth daily 180 tablet 3    gabapentin (NEURONTIN) 600 MG tablet TAKE 2 TABLETS BY MOUTH THREE TIMES A  tablet 11    cetirizine (ZYRTEC) 10 MG tablet TAKE 1 TABLET BY MOUTH ONE TIME A DAY 90 tablet 1    albuterol sulfate HFA (VENTOLIN HFA) 108 (90 Base) MCG/ACT inhaler Inhale 2 puffs into the lungs every 6 hours as needed for Wheezing 1 each 5    clonazePAM (KLONOPIN) 1 MG tablet Take 1 mg by mouth 3 times daily as needed. Indications: last fill 8/25/22 for 30 days      Handicap Placard MISC by Does not apply route Exp: 7/13/2026 1 each 0    SM ASPIRIN ADULT LOW STRENGTH 81 MG EC tablet TAKE 1 TABLET BY MOUTH TWO TIMES A DAY  180 tablet 1    ARIPiprazole (ABILIFY) 5 MG tablet Take 5 mg by mouth daily       EPIPEN 2-JIGAR 0.3 MG/0.3ML SOAJ injection INJECT 1 PEN INTO THE MUSCLE ONCE AS NEEDED FOR UP TO 1 DOSE FOR ANGIOEDEMA. 2 each 5    blood glucose monitor strips Test 4 times a day & as needed for symptoms of irregular blood glucose. 100 strip 5    ibuprofen (ADVIL;MOTRIN) 800 MG tablet Take 1 tablet by mouth as needed For post op pain prescribed by surgeon after right CTR sx      metFORMIN (GLUCOPHAGE) 500 MG tablet Take 500 mg by mouth daily       lamoTRIgine (LAMICTAL) 25 MG tablet Take 25 mg by mouth 2 times daily       lamoTRIgine (LAMICTAL) 200 MG tablet Take 200 mg by mouth every evening       levothyroxine (SYNTHROID) 100 MCG tablet Take 100 mcg by mouth Daily      Multiple Vitamins-Minerals (THERAPEUTIC MULTIVITAMIN-MINERALS) tablet Take 1 tablet by mouth daily       No current facility-administered medications for this visit.            ALLERGIES:  Allergies as of 12/07/2022 - Fully Reviewed 12/07/2022   Allergen Reaction Noted    Morphine Nausea And Vomiting and Other (See Comments) 02/24/2017    Amlodipine Other (See Comments) and Diarrhea 02/24/2017    Dilaudid [hydromorphone hcl] Hives and Itching 02/24/2017    Hydromorphone Hives, Itching, and Other (See Comments) 02/24/2017    Sulfa antibiotics Hives, Rash, and Other (See Comments) 11/13/2007       Symptoms of decreased mood absent  Symptoms of anhedonia absent    **If either question is answered in a  positive fashion then complete the PHQ9 Scoring Evaluation and make the appropriate referral**      Immunization status: stated as current, but no records available. Gynecologic History:  Menarche: 15 yo  Menopause at 52 with hyst yo     No LMP recorded. Patient has had a hysterectomy. Sexually Active: Yes    STD History: No     Permanent Sterilization: Yes hyst   Reversible Birth Control: No        Hormone Replacement Exposure: No      Genetic Qualified Family History of Breast, Ovarian , Colon or Uterine Cancer: adopted     If YES see scanned worksheet. Preventative Health Testing:    Health Maintenance:  Health Maintenance Due   Topic Date Due    Hepatitis B vaccine (1 of 3 - Risk 3-dose series) Never done    Shingles vaccine (1 of 2) Never done    Diabetic retinal exam  01/12/2022    Diabetic foot exam  08/16/2022    COVID-19 Vaccine (3 - Booster for Anushka series) 08/19/2022    Breast cancer screen  11/11/2022       Date of Last Pap Smear: unsure  Abnormal Pap Smear History: last one prior to hyst  Colposcopy History:   Date of Last Mammogram: 11/2021 neg  Date of Last Colonoscopy:   Date of Last Bone Density:      ________________________________________________________________________        REVIEW OF SYSTEMS:    see problem list    A minimum of an eleven point review of systems was completed. Review Of Systems (11 point):  Constitutional: No fever, chills or malaise;  No weight change or fatigue  Head and Eyes: No vision changes, Headache, Dizziness or trauma in last 12 months  ENT ROS: No hearing, Tinnitis, sinus or taste problems  Hematological and Lymphatic ROS:No Lymphoma, Von Willebrand's, Hemophillia or Bleeding History  Psych ROS: No Depression, Homicidal thoughts,suicidal thoughts, or anxiety  Breast ROS: No breast abnormalities or lumps  Respiratory ROS: No SOB, Pneumoniae,Cough, or Pulmonary Embolism   Cardiovascular ROS: No Chest Pain with Exertion, Palpitations, Syncope, Edema, Arrhythmia  Gastrointestinal ROS: No Indigestion, Heartburn, Nausea, vomiting, Diarrhea, Constipation,or Bowel Changes; No Bloody Stools or melena  Genito-Urinary ROS: No Dysuria, Hematuria or Nocturia. No Urinary Incontinence or Vaginal Discharge  Musculoskeletal ROS: No Arthralgia, Arthritis,Gout,Osteoporosis or Rheumatism  Neurological ROS: No CVA, Migraines, Epilepsy, Seizure Hx, or Limb Weakness  Dermatological ROS: No Rash, Itching, Hives, Mole Changes or Cancer                                                                                                                                                                                                                                  PHYSICAL Exam:     Constitutional:  Vitals:    12/07/22 0849   BP: (!) 144/110   Site: Left Upper Arm   Position: Sitting   Cuff Size: Medium Adult   Weight: 214 lb (97.1 kg)   Height: 5' 8\" (1.727 m)       Chaperone for Intimate Exam  Chaperone was offered and accepted as part of the rooming process. Chaperone: Syd Arnold MA          General Appearance: This  is a well Developed, well Nourished, well groomed female. Her BMI was reviewed. Nutritional decision making was discussed. Skin:  There was a Normal Inspection of the skin without rashes or lesions. There were no rashes. (Papular, Maculopapular, Hives, Pustular, Macular)     There were no lesions (Ulcers, Erythema, Abn. Appearing Nevi)            Lymphatic:  No Lymph Nodes were Palpable in the neck , axilla or groin.  0 # Of Lymph Nodes; Location ; Character [Normal]  [Shotty] [Tender] [Enlarged]     Neck and EENT:  The neck was supple. There were no masses   The thyroid was not enlarged and had no masses. Perrla, EOMI B/L, TMI B/L No Abnormalities. Throat inspected-No exudates or Masses, Nares Patent No Masses        Respiratory: The lungs were auscultated and found to be clear. There were no rales, rhonchi or wheezes.  There was a good respiratory effort. Cardiovascular: The heart was in a regular rate and rhythm. . No S3 or S4. There was no murmur appreciated. Location, grade, and radiation are not applicable. Extremities: The patients extremities were without calf tenderness, edema, or varicosities. There was full range of motion in all four extremities. Pulses in all four extremities were appreciated and are 2/4. Abdomen: The abdomen was soft and non-tender. There were good bowel sounds in all quadrants and there was no guarding, rebound or rigidity. On evaluation there was no evidence of hepatosplenomegaly and there was no costal vertebral abdiaziz tenderness bilaterally. No hernias were appreciated. Abdominal Scars: all intact    Psych: The patient had a normal Orientation to: Time, Place, Person, and Situation  There is no Mood / Affect changes    Breast:  (Chest)  normal appearance, no masses or tenderness, Inspection negative, No nipple retraction or dimpling, No nipple discharge or bleeding, No axillary or supraclavicular adenopathy, Normal to palpation without dominant masses  Self breast exams were reviewed in detail. Literature was given. Pelvic Exam:  External genitalia: normal general appearance, bilateral red area less than 5 mm  Urinary system: urethral meatus normal  Vaginal: normal mucosa without prolapse or lesions  Cervix: absent  Adnexa: removed surgically  Uterus: absent    Rectal Exam:  exam declined by patient          Musculosk:  Normal Gait and station was noted. Digits were evaluated without abnormal findings. Range of motion, stability and strength were evaluated and found to be appropriate for the patients age. ASSESSMENT:      62 y.o. Annual   Diagnosis Orders   1. Visit for gynecologic examination  PAP Smear      2. Encounter for screening mammogram for malignant neoplasm of breast  TERESA DIGITAL SCREEN W OR WO CAD BILATERAL      3. Vaginal discharge  Culture, Genital      4.  Screening for HPV (human papillomavirus)  PAP Smear             Chief Complaint   Patient presents with    Annual Exam          Past Medical History:   Diagnosis Date    Acute kidney injury ([facility] [address]     Anemia     Angioedema     Antinuclear antibody (IQRA) titer greater than 1:80     Anxiety     Asthma     Ataxia     Atrial fibrillation (HCC)     Borderline personality disorder (HCC)     Bradycardia     CAD (coronary artery disease)     Chronic kidney disease     CKD (chronic kidney disease), stage II 08/23/2018    COVID-19 01/01/2021    Degenerative disc disease, thoracic     Depression     Diabetes mellitus (HCC)     Diastolic dysfunction     DJD (degenerative joint disease)     Fibromyalgia     Foot pain, right     GERD (gastroesophageal reflux disease)     H/O: hysterectomy     Headache     Hernia of abdominal wall     History of blood transfusion     no problem    Hyperlipidemia     Hypertension     Lupus (HCC)     Mood disorder (HCC)     Neuropathy     Osteoarthritis     PTSD (post-traumatic stress disorder)     Pulmonary nodules     Raynaud's disease     Scleroderma ([facility] [address]     Scleroderma ([facility] [address] 08/23/2018    Seizures ([facility])     Sleep walking disorder     Thyroid disease     TIA (transient ischemic attack)     Transient insomnia     Tricuspid regurgitation     Vasospasm ([address] 01/2016    bilat. legs. resulting in near complete absence of profusion to arteries of feet. ([facility]     Vitamin D deficiency 08/23/2018    Wears glasses          Patient Active Problem List    Diagnosis Date Noted    Chest pain 09/27/2022     Priority: Medium    Angina pectoris ([address] 09/26/2022     Priority: Medium    Hx of total bilateral knee replacement 11/29/2017     Priority: Medium    Stage 3 chronic kidney disease ([address] 01/18/2021    Iron deficiency anemia 09/01/2020    Iron malabsorption 09/01/2020    Dupuytren's disease of palm 10/02/2019    Acquired hypothyroidism 08/04/2019     Last Assessment & Plan:   Condition: stable    Follow up in: three months Alcoholism in recovery ([facility] [address] 08/04/2019     Last Assessment & Plan:   Condition: stable    Health consequences of continued alcohol abuse discussed. Encouraged to seek assistance with quitting or maintaining sobriety through PCP or another cessation program.     Gave member [facility] number ([phone]). Follow up in: three months      Bipolar affective disorder, currently depressed, mild ([facility] [address] 08/01/2019     Last Assessment & Plan:   Condition: stable    Pt seeing PCP for  mental health management regularly and last visit on July 2019. Take medications as ordered by Provider; notify Provider if you cannot take medications as ordered or are having difficulties or side-effects from medications (do not stop medications without notifying Provider). If symptoms worsen or do not improve/stabilize, notify health care provider right away. If thoughts of harming self or others notify health care provider immediately &/or seek urgent/emergent care including calling Suicide Hotline ([phone]) or [place]. Follow up in three months with PCP      Generalized anxiety disorder 08/01/2019     Last Assessment & Plan:   Condition: stable    Follow up in: three months      Mild intermittent asthma 08/01/2019     Last Assessment & Plan:   Formatting of this note might be different from the original.  Condition: stable    Reviewed trigger avoidance and reviewed proper use of inhalers and rescue medications. Reviewed concerning signs/symptoms and ER precautions.       Follow up in: three months      Senile osteoporosis 05/10/2019     On Calcium and Vit D  RX Fosamax through Rheumatologist      Age-related nuclear cataract of both eyes 05/01/2019     Last Assessment & Plan:   Condition: stable    Follow up in: three months      Numbness 12/14/2018    Closed fracture of lower end of femur ([facility] [address] 11/08/2018    Closed supracondylar fracture of femur, left, initial encounter ([facility] [address]     Spinal stenosis, cervical region 10/30/2018    Neural foraminal stenosis of cervical spine 10/30/2018    Left carpal tunnel syndrome     Osteoarthritis of spine with radiculopathy, cervical region     Degenerative disc disease, cervical     Trigger middle finger of right hand     Trigger ring finger of right hand     CKD (chronic kidney disease), stage II 08/23/2018    Scleroderma ([facility] [address] 08/23/2018    Vitamin D deficiency 08/23/2018 7/30/18 Cystocele repair, Cystoscopy with OBTRYX Ureteral Sling 07/30/2018     CYSTOCELE REPAIR  CYSTOSCOPY WITH OBTRYX URETERAL SLING      Overflow incontinence     Cystocele, midline 07/29/2018    JOYCE (stress urinary incontinence, female) 07/29/2018    Bradycardia 01/26/2018    Hypotension 01/24/2018    Nuclear senile cataract 01/24/2018    Hypovolemia 01/16/2018    H/O: stroke 01/15/2018    H/O: hysterectomy     [facility] 11/29/2017     Hysterectomy in 2014 for heavy menses at [address] [place] [address]      Spondylosis of cervical region without myelopathy or radiculopathy      Suffers with neck pain. A MRI of the cervical spine revealed multilevel degenerative joint disease. Her neck pain is constant and radiates down both upper extremities. An EMG nerve conduction study in February 2016 found a bilateral carpal tunnel syndrome and possible ulnar neuropathy. A MRI of the cervical spine in August 2016 found multilevel degenerative joint disease and at C5-C6 a disc protrusion mildly indenting the thecal sac and closely abutting the ventral cord. Additionally she reports having low back pain and previously had a lumbar spine MRI in September 2014 done at the [address] [place] [address] in [place] which revealed multilevel lumbar degenerative joint disease worse at L5-S1 and L4-L5. Pain management consultation pending.         Chronic fatigue syndrome 06/15/2017    S/P knee surgery     Type 2 diabetes mellitus with stage 2 chronic kidney disease, without long-term current use of insulin ([facility] [address] 05/02/2017    Degenerative arthritis of right knee 05/02/2017    Hx of Stroke ([facility] [facility] 04/06/2017    Transient insomnia     Sleep walking disorder     Raynaud's disease     Pulmonary nodules     Sensory neuropathy     Hypertension     GERD (gastroesophageal reflux disease)     Fibromyalgia     Depression     Degenerative disc disease, thoracic     Atherosclerosis of native coronary artery of native heart with angina pectoris (HCC)     Bipolar disorder (HCC)     Asthma     Anxiety     Antinuclear antibody (IQRA) titer greater than 1:80     Anemia           Hereditary Breast, Ovarian, Colon and Uterine Cancer screening Done. Tobacco & Secondary smoke risks reviewed; instructed on cessation and avoidance      Counseling Completed:  Preventative Health Recommendations and Follow up. The patient was informed of the recommended preventative health recommendations. 1. Annuals every year; Cytology collections per prevailing guidelines. 2. Mammograms begin every year at 37 yo if no abnormalities are found and no family history. 3. Bone density studies every 2-3 years. Begin at 73 yo. If no fracture history or osteoporosis family history. (significant). 4. Colonoscopy begin at 40 yo. Repeat every ten years if negative and no family history. 5. Calcium of 6149-9183 mg/day in split dosing  6. Vitamin D 400-800 IU/day  7. All other preventative health recommendations will be managed by the patients Primary care physician. PLAN:  Return in about 1 year (around 12/7/2023) for annual.  Pap smear collected- states she had something \"clipped\"  Mammogram ordered  Vaginal cultures collected  Call office if red areas persist   Repeat Annual every 1 year  Cervical Cytology Evaluation begins at 24years old. If Negative Cytology, Follow-up screening per current guidelines. Mammograms every 1 year. If 37 yo and last mammogram was negative.   Calcium and Vitamin D dosing reviewed. Colonoscopy screening reviewed as well as onset for bone density testing. Birth control and barrier recommendations discussed. STD counseling and prevention reviewed. Gardisil counseling completed for all patients 10-35 yo. Routine health maintenance per patients PCP. Orders Placed This Encounter   Procedures    Culture, Genital     Standing Status:   Future     Standing Expiration Date:   12/7/2023    TERESA DIGITAL SCREEN W OR WO CAD BILATERAL     Standing Status:   Future     Standing Expiration Date:   2/7/2024     Order Specific Question:   Reason for exam:     Answer:   SCREENING    PAP Smear     Patient History:    No LMP recorded. Patient has had a hysterectomy. OBGYN Status: Hysterectomy  Past Surgical History:  2013: ABDOMINOPLASTY  7/30/2018: BLADDER SUSPENSION; N/A      Comment:  CYSTOSCOPY WITH OBTRYX MID URETHRAL SLING performed by                Oanh Loya MD at [address]   2016: [address]; Right  01/02/2020: CARPAL TUNNEL RELEASE; Right  1989: CHOLECYSTECTOMY  6/24/2019: COLONOSCOPY; N/A      Comment:  COLORECTAL CANCER SCREENING, NOT HIGH RISK performed by                Dillan Wahl MD at [address] ENDO  7/2/2019: COLONOSCOPY; N/A      Comment:  COLORECTAL CANCER SCREENING, NOT HIGH RISK performed by                Dillan Wahl MD at [address] ENDO  No date: ELBOW SURGERY; Right  No date: EYE SURGERY      Comment:  [email or web address]   11/09/2018: FEMUR FRACTURE SURGERY  08/28/2018: FINGER SURGERY; Right      Comment:  RING CARTER MASS EXCISION, TRIGGER RELEASE  No date: FRACTURE SURGERY      Comment:  left femur  No date: GASTRECTOMY  2012: GASTRIC BYPASS SURGERY  No date: HYSTERECTOMY (CERVIX STATUS UNKNOWN)  No date: JOINT REPLACEMENT; Bilateral      Comment:  knees  05/02/2017: KNEE SURGERY;  Right      Comment:  (femoral) patella replacement  05/04/2018: NERVE BLOCK; Right      Comment:   cervical facet #1 no steroid  06/29/2018: NERVE BLOCK; Right      Comment:  rt cervical diagnostic #2 decadron 10mg  08/10/2018: NERVE BLOCK; Right      Comment:  right cervical rfa decadron 10mg  7/30/2018: OR ANTERIOR COLPORRAPHY RPR CYSTOCELE W/CYSTO; N/A      Comment:  CYSTOCELE REPAIR performed by Олег Lama DO                at [address]  8/28/2018: OR OFFICE/OUTPT VISIT,PROCEDURE ONLY; Right      Comment:  RING CARTER MASS EXCISION, TRIGGER RELEASE, 3080 TABLE                performed by Oj Astudillo DO at [place] [address]  11/9/2018: OR OFFICE/OUTPT VISIT,PROCEDURE ONLY; Left      Comment:  FEMUR RETROGRADE IM NAILING PERIPROSTHETIC FRACTURE                performed by Oj Astudillo DO at [place] [address]  10/23/2018: OR REVISE MEDIAN N/CARPAL TUNNEL SURG; Left      Comment:  CARPAL TUNNEL RELEASE performed by Liang Hodges DO at [facility]: TONSILLECTOMY  2011: TOTAL KNEE ARTHROPLASTY      Comment:  left knee  5/2/2017: TOTAL KNEE ARTHROPLASTY; Right      Comment:  PATELLOFEMORAL REPLACEMENT KNEE - Jacob Hove, 3080 TABLE,                FEMORAL POPLITEAL BLOCK, NSA= SPINAL VS GENERAL performed               by Oj Astudillo DO at [place] [address]  2004: WRIST SURGERY    Problem List        Edg Problems Affecting Cytology    H/O hysterectomy for benign disease    Overview Signed 11/29/2017  3:01 PM by COLTON Simon - CNP     Hysterectomy in 2014 for heavy menses at [address] [place] [address],[address]         H/O: hysterectomy   Social History    Tobacco Use      Smoking status: Never      Smokeless tobacco: Never       Standing Status:   Future     Standing Expiration Date:   12/7/2023     Order Specific Question:   Collection Type     Answer: Thin Prep     Order Specific Question:   Prior Abnormal Pap Test     Answer:   No     Order Specific Question:   Screening or Diagnostic     Answer:   Screening     Order Specific Question:   HPV Requested?      Answer:   Yes     Order Specific Question:   High Risk Patient     Answer:   N/A           The Nury ochoa Rhonda Kuhn is a 62 y.o. female, was seen with a total time spent of 30 minutes for the visit on this date of service by the E/M provider. The time component had both face to face and non face to face time spent in determining the total time component. Counseling and education regarding her diagnosis listed below and her options regarding those diagnoses were also included in determining her time component. Diagnosis Orders   1. Visit for gynecologic examination  PAP Smear      2. Encounter for screening mammogram for malignant neoplasm of breast  TERESA DIGITAL SCREEN W OR WO CAD BILATERAL      3. Vaginal discharge  Culture, Genital      4. Screening for HPV (human papillomavirus)  PAP Smear           The patient had her preventative health maintenance recommendations and follow-up reviewed with her at the completion of her visit.

## 2022-12-09 LAB
HPV SAMPLE: NORMAL
HPV, GENOTYPE 16: NOT DETECTED
HPV, GENOTYPE 18: NOT DETECTED
HPV, HIGH RISK OTHER: NOT DETECTED
HPV, INTERPRETATION: NORMAL
SPECIMEN DESCRIPTION: NORMAL

## 2022-12-10 LAB
CULTURE: ABNORMAL
SPECIMEN DESCRIPTION: ABNORMAL

## 2022-12-12 ENCOUNTER — TELEPHONE (OUTPATIENT)
Dept: OBGYN CLINIC | Age: 57
End: 2022-12-12

## 2022-12-12 RX ORDER — AMPICILLIN 500 MG/1
500 CAPSULE ORAL 4 TIMES DAILY
Qty: 28 CAPSULE | Refills: 0 | Status: SHIPPED | OUTPATIENT
Start: 2022-12-12 | End: 2022-12-19

## 2022-12-12 NOTE — TELEPHONE ENCOUNTER
Per Elvira Arcos pt notified of +GBs- and pt to get ampicillin 500mg PO QID x 7 days sent to pt pharm.

## 2022-12-21 RX ORDER — FOLIC ACID 1 MG/1
TABLET ORAL
Qty: 90 TABLET | Refills: 0 | Status: SHIPPED | OUTPATIENT
Start: 2022-12-21

## 2022-12-21 NOTE — TELEPHONE ENCOUNTER
Pharmacy requesting refill of folic acid 1 mg.       Medication active on med list yes      Date of last Rx: 9/25/2022 with 0 refills          verified by ELENA DECKERA      Date of last appointment 8/30/2022    Next Visit Date:  2/28/2023

## 2023-01-09 ENCOUNTER — TELEPHONE (OUTPATIENT)
Dept: OBGYN CLINIC | Age: 58
End: 2023-01-09

## 2023-01-09 NOTE — TELEPHONE ENCOUNTER
Pt c/o vaginal discharge; was treated for +GBS last month. Call sent to [facility] to schedule appt for cultures. Pt verbalized understanding.

## 2023-01-09 NOTE — TELEPHONE ENCOUNTER
Patient states she was seen last week and was given medication she finished it and now her symptoms are back can she get refill?

## 2023-01-10 NOTE — PROGRESS NOTES
Dermatology Patient Note  [address]  [address] [facility] [address] [address]  Dept: [phone]  Dept Fax: [phone]      VISITDATE: 1/18/2023   REFERRING PROVIDER: No ref. provider found      Jose Schaeffer is a 62 y.o. female  who presents today in the office for:    Follow-up (FBSE/Dx: Actinic Keratosis/LV:10/17/2022)      HISTORY OF PRESENT ILLNESS:  Patient presents for FBSE. Last visit was on 10/17/2022 (waist up exam only). Patient has history of cryotherapy for AKs (face) and wart (right 2nd finger). Patient also treated for rosacea, given metrocream.     MEDICAL PROBLEMS:  Patient Active Problem List    Diagnosis Date Noted    Chest pain 09/27/2022     Priority: Medium    Angina pectoris ([facility] 09/26/2022     Priority: Medium    Hx of total bilateral knee replacement 11/29/2017     Priority: Medium    Stage 3 chronic kidney disease ([facility] 01/18/2021    Iron deficiency anemia 09/01/2020    Iron malabsorption 09/01/2020    Dupuytren's disease of palm 10/02/2019    Acquired hypothyroidism 08/04/2019     Last Assessment & Plan:   Condition: stable    Follow up in: three months      Alcoholism in recovery ([facility] 08/04/2019     Last Assessment & Plan:   Condition: stable    Health consequences of continued alcohol abuse discussed. Encouraged to seek assistance with quitting or maintaining sobriety through PCP or another cessation program.     Gave member [facility] number ([phone]). Follow up in: three months      Bipolar affective disorder, currently depressed, mild ([facility] 08/01/2019     Last Assessment & Plan:   Condition: stable    Pt seeing PCP for  mental health management regularly and last visit on July 2019.     Take medications as ordered by Provider; notify Provider if you cannot take medications as ordered or are having difficulties or side-effects from medications (do not stop medications without notifying Provider). If symptoms worsen or do not improve/stabilize, notify health care provider right away. If thoughts of harming self or others notify health care provider immediately &/or seek urgent/emergent care including calling Suicide Hotline ([phone]) or 911. Follow up in three months with PCP      Generalized anxiety disorder 08/01/2019     Last Assessment & Plan:   Condition: stable    Follow up in: three months      Mild intermittent asthma 08/01/2019     Last Assessment & Plan:   Formatting of this note might be different from the original.  Condition: stable    Reviewed trigger avoidance and reviewed proper use of inhalers and rescue medications. Reviewed concerning signs/symptoms and ER precautions.       Follow up in: three months      Senile osteoporosis 05/10/2019     On Calcium and Vit D  RX Fosamax through Rheumatologist      Age-related nuclear cataract of both eyes 05/01/2019     Last Assessment & Plan:   Condition: stable    Follow up in: three months      Numbness 12/14/2018    Closed fracture of lower end of femur ([address] 11/08/2018    Closed supracondylar fracture of femur, left, initial encounter ([address]     Spinal stenosis, cervical region 10/30/2018    Neural foraminal stenosis of cervical spine 10/30/2018    Left carpal tunnel syndrome     Osteoarthritis of spine with radiculopathy, cervical region     Degenerative disc disease, cervical     Trigger middle finger of right hand     Trigger ring finger of right hand     CKD (chronic kidney disease), stage II 08/23/2018    Scleroderma ([address] 08/23/2018    Vitamin D deficiency 08/23/2018 7/30/18 Cystocele repair, Cystoscopy with OBTRYX Ureteral Sling 07/30/2018     CYSTOCELE REPAIR  CYSTOSCOPY WITH OBTRYX URETERAL SLING      Overflow incontinence     Cystocele, midline 07/29/2018    JOYCE (stress urinary incontinence, female) 07/29/2018    Bradycardia 01/26/2018    Hypotension 01/24/2018    Nuclear senile cataract 01/24/2018    Hypovolemia 01/16/2018    H/O: stroke 01/15/2018    H/O: hysterectomy     H/O hysterectomy with BSO at [facility] for benign disease 2014 11/29/2017     Hysterectomy in 2014 for heavy menses at [address] [facility]      Spondylosis of cervical region without myelopathy or radiculopathy      Suffers with neck pain. A MRI of the cervical spine revealed multilevel degenerative joint disease. Her neck pain is constant and radiates down both upper extremities. An EMG nerve conduction study in February 2016 found a bilateral carpal tunnel syndrome and possible ulnar neuropathy. A MRI of the cervical spine in August 2016 found multilevel degenerative joint disease and at C5-C6 a disc protrusion mildly indenting the thecal sac and closely abutting the ventral cord. Additionally she reports having low back pain and previously had a lumbar spine MRI in September 2014 done at the [address] [facility] in [place] which revealed multilevel lumbar degenerative joint disease worse at L5-S1 and L4-L5. Pain management consultation pending.         Chronic fatigue syndrome 06/15/2017    S/P knee surgery     Type 2 diabetes mellitus with stage 2 chronic kidney disease, without long-term current use of insulin ([facility] 05/02/2017    Degenerative arthritis of right knee 05/02/2017    Hx of Stroke ([facility] 04/06/2017    Transient insomnia     Sleep walking disorder     Raynaud's disease     Pulmonary nodules     Sensory neuropathy     Hypertension     GERD (gastroesophageal reflux disease)     Fibromyalgia     Depression     Degenerative disc disease, thoracic     Atherosclerosis of native coronary artery of native heart with angina pectoris (HCC)     Bipolar disorder (HCC)     Asthma     Anxiety     Antinuclear antibody (IQRA) titer greater than 1:80     Anemia        CURRENT MEDICATIONS:   Current Outpatient Medications   Medication Sig Dispense Refill    levothyroxine (SYNTHROID) 112 MCG tablet TAKE 1 TABLET BY MOUTH EVERY DAY      metroNIDAZOLE (METROCREAM) 0.75 % cream Apply topically 2 times daily. 60 g 2    folic acid (FOLVITE) 1 MG tablet TAKE 1 TABLET BY MOUTH EVERY DAY 90 tablet 0    omeprazole (PRILOSEC) 20 MG delayed release capsule Take 1 capsule by mouth 2 times daily 180 capsule 1    losartan (COZAAR) 100 MG tablet TAKE 1 TABLET BY MOUTH EVERY DAY 90 tablet 1    pravastatin (PRAVACHOL) 40 MG tablet Take 1 tablet by mouth daily 90 tablet 1    famotidine (PEPCID) 40 MG tablet TAKE 1 TABLET BY MOUTH IN THE EVENING 90 tablet 1    phentermine 37.5 MG capsule Take 37.5 mg by mouth every morning. carvedilol (COREG) 3.125 MG tablet Take 1 tablet by mouth 2 times daily 180 tablet 3    D3-50 1.25 MG (93960 UT) CAPS TAKE 1 CAPSULE BY MOUTH TWICE A WEEK      Lancets (ONETOUCH DELICA PLUS NFOAIY32O) MISC use to test blood sugar one time a day      QUEtiapine (SEROQUEL) 300 MG tablet Take 600 mg by mouth at bedtime      NIFEdipine (ADALAT CC) 60 MG extended release tablet Take 2 tablets by mouth daily 180 tablet 3    gabapentin (NEURONTIN) 600 MG tablet TAKE 2 TABLETS BY MOUTH THREE TIMES A  tablet 11    cetirizine (ZYRTEC) 10 MG tablet TAKE 1 TABLET BY MOUTH ONE TIME A DAY 90 tablet 1    albuterol sulfate HFA (VENTOLIN HFA) 108 (90 Base) MCG/ACT inhaler Inhale 2 puffs into the lungs every 6 hours as needed for Wheezing 1 each 5    clonazePAM (KLONOPIN) 1 MG tablet Take 1 mg by mouth 3 times daily as needed. Indications: last fill 8/25/22 for 30 days      Handicap Placard MISC by Does not apply route Exp: 7/13/2026 1 each 0    SM ASPIRIN ADULT LOW STRENGTH 81 MG EC tablet TAKE 1 TABLET BY MOUTH TWO TIMES A DAY  180 tablet 1    ARIPiprazole (ABILIFY) 5 MG tablet Take 5 mg by mouth daily       EPIPEN 2-JIGAR 0.3 MG/0.3ML SOAJ injection INJECT 1 PEN INTO THE MUSCLE ONCE AS NEEDED FOR UP TO 1 DOSE FOR ANGIOEDEMA. 2 each 5    blood glucose monitor strips Test 4 times a day & as needed for symptoms of irregular blood glucose. 100 strip 5    ibuprofen (ADVIL;MOTRIN) 800 MG tablet Take 1 tablet by mouth as needed For post op pain prescribed by surgeon after right CTR sx      metFORMIN (GLUCOPHAGE) 500 MG tablet Take 500 mg by mouth daily       lamoTRIgine (LAMICTAL) 25 MG tablet Take 25 mg by mouth 2 times daily       lamoTRIgine (LAMICTAL) 200 MG tablet Take 200 mg by mouth every evening       Multiple Vitamins-Minerals (THERAPEUTIC MULTIVITAMIN-MINERALS) tablet Take 1 tablet by mouth daily      levothyroxine (SYNTHROID) 100 MCG tablet Take 100 mcg by mouth Daily       No current facility-administered medications for this visit. ALLERGIES:   Allergies   Allergen Reactions    Morphine Nausea And Vomiting and Other (See Comments)     Pt states it changes her mental status  Pt states it changes her mental status  Pt states it changes her mental status  Pt states it changes her mental status      Amlodipine Other (See Comments) and Diarrhea     diarrhea and stress  Other reaction(s): Unknown  diarrhea and stress  diarrhea and stress  diarrhea and stress    Dilaudid [Hydromorphone Hcl] Hives and Itching    Hydromorphone Hives, Itching and Other (See Comments)    Sulfa Antibiotics Hives, Rash and Other (See Comments)       SOCIAL HISTORY:  Social History     Tobacco Use    Smoking status: Never    Smokeless tobacco: Never   Substance Use Topics    Alcohol use: Not Currently       Pertinent ROS:  Review of Systems  Skin: Denies any new changing, growing or bleeding lesions or rashes except as described in the HPI   Constitutional: Denies fevers, chills, and malaise.     PHYSICAL EXAM:   BP (!) 148/95   Pulse 80   Temp 98 °F (36.7 °C) (Temporal)   Wt 219 lb 6.4 oz (99.5 kg)   SpO2 100%   BMI 33.36 kg/m²     The patient is generally well appearing, well nourished, alert and conversational. Affect is normal.    Cutaneous Exam:  Physical Exam  Total body skin exam excluding external genitalia: head/face, neck, both arms, chest, back, abdomen, both legs, buttocks, digits and/or nails, was examined. Genital exam was deferred as patient denied having any lesions in this area. Complete visualization of scalp may be limited by hair density, length, and/or style    Facial covering was removed during examination. Diagnoses/exam findings/medical history pertinent to this visit are listed below:    Assessment:   Diagnosis Orders   1. Multiple nevi        2. Rosacea  metroNIDAZOLE (METROCREAM) 0.75 % cream      3. Actinic skin damage             Plan:  Multiple nevi  - Clinically and dermatoscopically benign on exam today. - Common nevi have a low individual risk of developing into melanoma. Patients with >50 nevi have a greater risk of developing melanoma in their lifetime and should undergo skin checks at least annually. - I recommended the patient apply broad spectrum spf 30+ sunscreen daily, reapplying every 2 hours  - In additional to regular use of sunscreen, I recommended broad-rimmed hats, long sleeves, and seeking the shade. Rosacea  - stable chronic illness   - continue metrocream, refills sent    Actinic skin damage  - patient was counseled that UV-damaged skin increases lifetime risk for skin cancer  - I recommended the patient apply broad spectrum spf 30+ sunscreen daily, reapplying every 2 hours. - In additional to regular use of sunscreen, I recommended broad-rimmed hats, long sleeves, and seeking the shade.       RTC 1 year    Future Appointments   Date Time Provider Sylvester Collins   2/20/2023  9:30 AM [facility]   2/23/2023 10:50 AM Burgess Parvez MD [facility] Neph [facility] None   2/28/2023  8:00 AM COLTON Perez - CNP [facility] -   3/6/2023 10:00 AM Nikkie Disla MD [facility]   12/7/2023  9:00 AM Indiana Yung APRN -  [facility]   1/18/2024  8:30 AM Elvis Quiroga MD  derm [facility]         Patient Instructions   I sent a refill of metrocream to your pharmacy. Sun Protection     There are two types of sun rays that are harmful to the skin. UVA rays cause skin aging and skin cancer, such as melanoma. UVB rays cause sunburns, cataracts, and also contribute to skin cancer. The American-Academy of Dermatology recommends that children and adults wear a broad spectrum, waterproof sunscreen with a Sun Protection Factor (SPF) of 30 or higher. It is important to check the ingredient label to be sure the sunscreen will protect the skin from both UVA and UVB sunrays. Your sunscreen should contain at least one of the following ingredients: titanium dioxide, zinc oxide, or avobenzone. Sunscreen will not be effective unless it is applied to all exposed skin. Sunscreens work best if they are applied 30 minutes before sun exposure. They should be reapplied every 2 hours and after any water exposure. Sunscreen is not perfect. It is important to use other methods to protect the skin from sun exposure also. Wear hats, sunglasses and other sun protective clothing when outdoors. Stay in the shade during the peak hours of sun exposure between 10 AM and 4 PM.       I, Yumiko Swain, personally scribed the services dictated to me by Dr. Lorrie Michelle in this documentation. I, Dr. Lorrie Michelle, personally performed the services described in this documentation, as scribed by Yumiko Swain in my presence, and it is both accurate and complete.     Electronically signed by Erica Brennan MD on 1/18/2023 at 12:12 PM

## 2023-01-13 ENCOUNTER — HOSPITAL ENCOUNTER (OUTPATIENT)
Dept: WOMENS IMAGING | Age: 58
Discharge: HOME | End: 2023-01-13
Payer: MEDICARE

## 2023-01-13 DIAGNOSIS — Z12.31 ENCOUNTER FOR SCREENING MAMMOGRAM FOR MALIGNANT NEOPLASM OF BREAST: ICD-10-CM

## 2023-01-13 PROCEDURE — 77067 SCR MAMMO BI INCL CAD: CPT

## 2023-01-18 ENCOUNTER — OFFICE VISIT (OUTPATIENT)
Dept: DERMATOLOGY | Age: 58
End: 2023-01-18
Payer: MEDICARE

## 2023-01-18 ENCOUNTER — OFFICE VISIT (OUTPATIENT)
Dept: OBGYN CLINIC | Age: 58
End: 2023-01-18
Payer: MEDICARE

## 2023-01-18 ENCOUNTER — HOSPITAL ENCOUNTER (OUTPATIENT)
Age: 58
Setting detail: SPECIMEN
Discharge: HOME | End: 2023-01-18

## 2023-01-18 VITALS
SYSTOLIC BLOOD PRESSURE: 148 MMHG | OXYGEN SATURATION: 100 % | WEIGHT: 219.4 LBS | HEART RATE: 80 BPM | BODY MASS INDEX: 33.36 KG/M2 | TEMPERATURE: 98 F | DIASTOLIC BLOOD PRESSURE: 95 MMHG

## 2023-01-18 VITALS
DIASTOLIC BLOOD PRESSURE: 94 MMHG | WEIGHT: 220 LBS | HEIGHT: 68 IN | SYSTOLIC BLOOD PRESSURE: 148 MMHG | BODY MASS INDEX: 33.34 KG/M2

## 2023-01-18 DIAGNOSIS — L57.8 ACTINIC SKIN DAMAGE: ICD-10-CM

## 2023-01-18 DIAGNOSIS — N89.8 VAGINAL DISCHARGE: ICD-10-CM

## 2023-01-18 DIAGNOSIS — N89.8 VAGINAL DISCHARGE: Primary | ICD-10-CM

## 2023-01-18 DIAGNOSIS — L71.9 ROSACEA: ICD-10-CM

## 2023-01-18 DIAGNOSIS — D22.9 MULTIPLE NEVI: Primary | ICD-10-CM

## 2023-01-18 PROCEDURE — 99213 OFFICE O/P EST LOW 20 MIN: CPT | Performed by: NURSE PRACTITIONER

## 2023-01-18 PROCEDURE — 3080F DIAST BP >= 90 MM HG: CPT | Performed by: NURSE PRACTITIONER

## 2023-01-18 PROCEDURE — 3077F SYST BP >= 140 MM HG: CPT | Performed by: NURSE PRACTITIONER

## 2023-01-18 PROCEDURE — 3077F SYST BP >= 140 MM HG: CPT | Performed by: DERMATOLOGY

## 2023-01-18 PROCEDURE — 99213 OFFICE O/P EST LOW 20 MIN: CPT | Performed by: DERMATOLOGY

## 2023-01-18 PROCEDURE — 3080F DIAST BP >= 90 MM HG: CPT | Performed by: DERMATOLOGY

## 2023-01-18 RX ORDER — LEVOTHYROXINE SODIUM 112 UG/1
TABLET ORAL
COMMUNITY
Start: 2022-12-21

## 2023-01-18 NOTE — PROGRESS NOTES
Mia Huggins  2023    YOB: 1965          The patient was seen today. She is here regarding vaginal discharge. Txed last month for GBS in vagina. States she had relief but is now having discharge again . Her bowels are regular and she is voiding without difficulty. HPI:  Eduardo Diaz is a 62 y.o. female [id number] vaginal discharge       OB History    Para Term  AB Living   4 4 4 0 0 4   SAB IAB Ectopic Molar Multiple Live Births   0 0 0 0 0 4      # Outcome Date GA Lbr Flaquito/2nd Weight Sex Delivery Anes PTL Lv   4 Term      Vag-Spont  N JF   3 Term      Vag-Spont  N JF   2 Term      Vag-Spont  N JF   1 Term      Vag-Spont  N JF       Past Medical History:   Diagnosis Date    Acute kidney injury (HCC)     Anemia     Angioedema     Antinuclear antibody (IQRA) titer greater than 1:80     Anxiety     Asthma     Ataxia     Atrial fibrillation (HCC)     Borderline personality disorder (HCC)     Bradycardia     CAD (coronary artery disease)     Chronic kidney disease     CKD (chronic kidney disease), stage II 2018    COVID-19 2021    Degenerative disc disease, thoracic     Depression     Diabetes mellitus (HCC)     Diastolic dysfunction     DJD (degenerative joint disease)     Fibromyalgia     Foot pain, right     GERD (gastroesophageal reflux disease)     H/O: hysterectomy     Headache     Hernia of abdominal wall     History of blood transfusion     no problem    Hyperlipidemia     Hypertension     Lupus (HCC)     Mood disorder (HCC)     Neuropathy     Osteoarthritis     PTSD (post-traumatic stress disorder)     Pulmonary nodules     Raynaud's disease     Scleroderma (HCC)     Scleroderma (HCC) 2018    Seizures (HCC)     Sleep walking disorder     Thyroid disease     TIA (transient ischemic attack)     Transient insomnia     Tricuspid regurgitation     Vasospasm ([address] 2016    bilat. legs. resulting in near complete absence of profusion to arteries of feet. ([facility]).     Vitamin D deficiency 08/23/2018    Wears glasses        Past Surgical History:   Procedure Laterality Date    ABDOMINOPLASTY  2013    BLADDER SUSPENSION N/A 7/30/2018    CYSTOSCOPY WITH OBTRYX MID URETHRAL SLING performed by Patrice Granados MD at [address] [facility] Right 2016    [address] Right 01/02/2020    CHOLECYSTECTOMY  1989    COLONOSCOPY N/A 6/24/2019    COLORECTAL CANCER SCREENING, NOT HIGH RISK performed by Jamison Osuna MD [address] 7/2/2019    COLORECTAL CANCER SCREENING, NOT HIGH RISK performed by Jamison Osuna MD at [address] SURGERY  11/09/2018    FINGER SURGERY Right 08/28/2018    RING CARTER MASS EXCISION, TRIGGER RELEASE    FRACTURE SURGERY      left femur    GASTRECTOMY      GASTRIC BYPASS SURGERY  2012    HYSTERECTOMY (CERVIX STATUS UNKNOWN)      JOINT REPLACEMENT Bilateral     knees    KNEE SURGERY Right 05/02/2017    (femoral) patella replacement    NERVE BLOCK Right 05/04/2018     cervical facet #1 no steroid    NERVE BLOCK Right 06/29/2018    rt cervical diagnostic #2 decadron 10mg    NERVE BLOCK Right 08/10/2018    right cervical rfa decadron 10mg    MO ANTERIOR COLPORRAPHY RPR CYSTOCELE W/CYSTO N/A 7/30/2018    CYSTOCELE REPAIR performed by Virgilio Combs DO at [address] &/TRANSPOS MEDIAN NRV CARPAL TUNNE Left 10/23/2018    CARPAL TUNNEL RELEASE performed by Garcia Roth DO at [address] OFFICE/OUTPT [address] Right 8/28/2018    RING CARTER MASS EXCISION, TRIGGER RELEASE, 3080 TABLE performed by Garcia Roth DO at [address] OFFICE/OUTPT [address] Left 11/9/2018    FEMUR RETROGRADE IM NAILING PERIPROSTHETIC FRACTURE performed by Garcia Roth DO at [address]  2011    left knee    TOTAL KNEE ARTHROPLASTY Right 5/2/2017 PATELLOFEMORAL REPLACEMENT KNEE - JAXSON, 3080 TABLE, FEMORAL POPLITEAL BLOCK, NSA= SPINAL VS GENERAL performed by Nany Franco DO at [address]   2004       Family History   Adopted: Yes   Problem Relation Age of Onset    Depression Mother     Asthma Mother     Depression Father     High Blood Pressure Father     Diabetes Father     Asthma Father     Depression Sister     Asthma Sister     Depression Brother     Asthma Brother     Asthma Maternal Aunt     Asthma Maternal Uncle     Asthma Paternal Aunt     Asthma Paternal Uncle     Asthma Maternal Grandmother     Cancer Maternal Grandmother     Asthma Maternal Grandfather     Asthma Paternal Grandmother     Asthma Paternal Grandfather     Asthma Maternal Cousin     Asthma Paternal Cousin        Social History     Socioeconomic History    Marital status:       Spouse name: Not on file    Number of children: Not on file    Years of education: Not on file    Highest education level: Not on file   Occupational History    Not on file   Tobacco Use    Smoking status: Never    Smokeless tobacco: Never   Vaping Use    Vaping Use: Never used   Substance and Sexual Activity    Alcohol use: Not Currently    Drug use: No    Sexual activity: Not Currently     Birth control/protection: Surgical     Comment: pt has hysterectomy   Other Topics Concern    Not on file   Social History Narrative    Not on file     Social Determinants of Health     Financial Resource Strain: Low Risk     Difficulty of Paying Living Expenses: Not hard at all   Food Insecurity: No Food Insecurity    Worried About Running Out of Food in the Last Year: Never true    Ran Out of Food in the Last Year: Never true   Transportation Needs: Not on file   Physical Activity: Insufficiently Active    Days of Exercise per Week: 1 day    Minutes of Exercise per Session: 20 min   Stress: Not on file   Social Connections: Not on file   Intimate Partner Violence: Not on file   Housing Stability: Not on file         MEDICATIONS:  Current Outpatient Medications   Medication Sig Dispense Refill    levothyroxine (SYNTHROID) 112 MCG tablet TAKE 1 TABLET BY MOUTH EVERY DAY      metroNIDAZOLE (METROCREAM) 0.75 % cream Apply topically 2 times daily. 60 g 2    folic acid (FOLVITE) 1 MG tablet TAKE 1 TABLET BY MOUTH EVERY DAY 90 tablet 0    omeprazole (PRILOSEC) 20 MG delayed release capsule Take 1 capsule by mouth 2 times daily 180 capsule 1    losartan (COZAAR) 100 MG tablet TAKE 1 TABLET BY MOUTH EVERY DAY 90 tablet 1    pravastatin (PRAVACHOL) 40 MG tablet Take 1 tablet by mouth daily 90 tablet 1    famotidine (PEPCID) 40 MG tablet TAKE 1 TABLET BY MOUTH IN THE EVENING 90 tablet 1    phentermine 37.5 MG capsule Take 37.5 mg by mouth every morning. carvedilol (COREG) 3.125 MG tablet Take 1 tablet by mouth 2 times daily 180 tablet 3    D3-50 1.25 MG (34014 UT) CAPS TAKE 1 CAPSULE BY MOUTH TWICE A WEEK      Lancets (ONETOUCH DELICA PLUS [id number]) MISC use to test blood sugar one time a day      QUEtiapine (SEROQUEL) 300 MG tablet Take 600 mg by mouth at bedtime      NIFEdipine (ADALAT CC) 60 MG extended release tablet Take 2 tablets by mouth daily 180 tablet 3    gabapentin (NEURONTIN) 600 MG tablet TAKE 2 TABLETS BY MOUTH THREE TIMES A  tablet 11    cetirizine (ZYRTEC) 10 MG tablet TAKE 1 TABLET BY MOUTH ONE TIME A DAY 90 tablet 1    albuterol sulfate HFA (VENTOLIN HFA) 108 (90 Base) MCG/ACT inhaler Inhale 2 puffs into the lungs every 6 hours as needed for Wheezing 1 each 5    clonazePAM (KLONOPIN) 1 MG tablet Take 1 mg by mouth 3 times daily as needed.  Indications: last fill 8/25/22 for 30 days      Handicap Placard MISC by Does not apply route Exp: 7/13/2026 1 each 0    SM ASPIRIN ADULT LOW STRENGTH 81 MG EC tablet TAKE 1 TABLET BY MOUTH TWO TIMES A DAY  180 tablet 1    ARIPiprazole (ABILIFY) 5 MG tablet Take 5 mg by mouth daily       EPIPEN 2-JIGAR 0.3 MG/0.3ML SOAJ injection INJECT 1 PEN INTO THE MUSCLE ONCE AS NEEDED FOR UP TO 1 DOSE FOR ANGIOEDEMA. 2 each 5    blood glucose monitor strips Test 4 times a day & as needed for symptoms of irregular blood glucose. 100 strip 5    ibuprofen (ADVIL;MOTRIN) 800 MG tablet Take 1 tablet by mouth as needed For post op pain prescribed by surgeon after right CTR sx      metFORMIN (GLUCOPHAGE) 500 MG tablet Take 500 mg by mouth daily       lamoTRIgine (LAMICTAL) 25 MG tablet Take 25 mg by mouth 2 times daily       lamoTRIgine (LAMICTAL) 200 MG tablet Take 200 mg by mouth every evening       levothyroxine (SYNTHROID) 100 MCG tablet Take 100 mcg by mouth Daily      Multiple Vitamins-Minerals (THERAPEUTIC MULTIVITAMIN-MINERALS) tablet Take 1 tablet by mouth daily       No current facility-administered medications for this visit. ALLERGIES:  Allergies as of 01/18/2023 - Fully Reviewed 01/18/2023   Allergen Reaction Noted    Morphine Nausea And Vomiting and Other (See Comments) 02/24/2017    Amlodipine Other (See Comments) and Diarrhea 02/24/2017    Dilaudid [hydromorphone hcl] Hives and Itching 02/24/2017    Hydromorphone Hives, Itching, and Other (See Comments) 02/24/2017    Sulfa antibiotics Hives, Rash, and Other (See Comments) 11/13/2007         REVIEW OF SYSTEMS:    see problem list   A minimum of an eleven point review of systems was completed. Review Of Systems (11 point):  Constitutional: No fever, chills or malaise;  No weight change or fatigue  Head and Eyes: No vision, Headache, Dizziness or trauma in last 12 months  ENT ROS: No hearing, Tinnitis, sinus or taste problems  Hematological and Lymphatic ROS:No Lymphoma, Von Willebrand's, Hemophillia or Bleeding History  Psych ROS: No Depression, Homicidal thoughts,suicidal thoughts, or anxiety  Breast ROS: No prior breast abnormalities or lumps  Respiratory ROS: No SOB, Pneumoniae,Cough, or Pulmonary Embolism History  Cardiovascular ROS: No Chest Pain with Exertion, Palpitations, Syncope, Edema, Arrhythmia  Gastrointestinal ROS: No Indigestion, Heartburn, Nausea, vomiting, Diarrhea, Constipation,or Bowel Changes; No Bloody Stools or melena  Genito-Urinary ROS: No Dysuria, Hematuria or Nocturia. No Urinary Incontinence or Vaginal Discharge  Musculoskeletal ROS: No Arthralgia, Arthritis,Gout,Osteoporosis or Rheumatism  Neurological ROS: No CVA, Migraines, Epilepsy, Seizure Hx, or Limb Weakness  Dermatological ROS: No Rash, Itching, Hives, Mole Changes or Cancer          Blood pressure (!) 148/94, height 5' 8\" (1.727 m), weight 220 lb (99.8 kg), not currently breastfeeding. Chaperone for Intimate Exam  Chaperone was offered and accepted as part of the rooming process. Chaperone: Maria Fernanda MA          Abdomen: Soft non-tender; good bowel sounds. No guarding, rebound or rigidity. No CVA tenderness bilaterally. Extremities: No calf tenderness, DTR 2/4, and No edema bilaterally    Pelvic: Vulva and vagina appear normal. Bimanual exam reveals absent uterus and adnexa. Diagnostics:  TERESA JENSEN DIGITAL SCREEN BILATERAL    Result Date: 1/13/2023  EXAMINATION: SCREENING DIGITAL BILATERAL MAMMOGRAM WITH TOMOSYNTHESIS, 1/13/2023 TECHNIQUE: Screening mammography was performed with tomosynthesis including MLO and CC views of the bilateral breasts. Computer aided detection was used for the interpretation of this exam. COMPARISON: Multiple prior studies with the most recent bilateral mammogram dated 11/11/2021. HISTORY: Screening. Annual mammogram. FINDINGS: There are scattered fibroglandular breast density. Right breast: No evidence of suspicious clusters of microcalcifications, dominant mass or architectural distortion. Left breast: No evidence of suspicious clusters of microcalcifications, dominant mass or architectural distortion. No mammographic evidence of malignancy. BI-RADS 2 BIRADS: BIRADS - CATEGORY 2 Benign Findings. Normal interval follow-up is recommended in 12 months.  OVERALL ASSESSMENT - BENIGN A letter of notification will be sent to the patient regarding the results. The American College of Radiology recommends annual mammograms for women 40 years and older.      Lab Results:  Results for orders placed or performed during the hospital encounter of 12/07/22   Culture, Genital    Specimen: Vaginal; Genital   Result Value Ref Range    Specimen Description .VAGINA     Culture STREPTOCOCCI, BETA HEMOLYTIC GROUP B HEAVY GROWTH (A)     Culture NORMAL URO-GENITAL ARPIT SCANT GROWTH     Culture NEGATIVE FOR NEISSERIA GONORRHOEAE    GYN Cytology   Result Value Ref Range    Cytology Report       INTERPRETATION    Vaginal material, (ThinPrep vial, Imaging-assisted review):  Specimen Adequacy:       Satisfactory for evaluation.  Descriptive Diagnosis:       Atypical squamous cells of undetermined significance (ASC-US).          Cytotechnologist:   JUAN Alvarez  **Electronically Signed Out**         sf/12/14/2022        Procedure/Addendum  HPV Procedure Report       Date Ordered:     12/8/2022     Status: Signed Out       Date Complete:     12/9/2022     By: System Interface       Date Reported:     12/9/2022              Sample:  HPV Type 16      Result:   Not Detected      Ref Range:  (Not Detected)  Sample:  HPV Type 18      Result:   Not Detected      Ref Range: (Not  Detected)  Sample:  Other High Risk HPV      Result:   Not Detected      Ref  Range: (Not Detected)  Sample:  HPV Interp      Result:         Ref Range: (Not Detected)  This test amplifies and detects DNA of 14 high-risk HPV types  associated with cervical cancer and its precursor lesions   (HPV  types 16,18, 31, 33, 35, 39, 45, 51, 52, 56, 58, 59, 66, and  68).        Sensitivity may be affected by specimen collection methods, stage of  infection, and the presence of interfering   substances. Results should be interpreted in conjunction with other  available laboratory and clinical data. A negative   high-risk HPV result does not  exclude the possibility of future  cytologic HSIL or underlying CIN2-3 or cancer. This test is intended for medical purposes only and is not valid for  the evaluation of suspected sexual abuse or for   other forensic purposes. Source:  A: Vaginal material, (ThinPrep vial, Imaging-assisted review)    Clinical History  Hysterectomy  Z01.419 Routine gyn exam without abnormal findings  Z11.51 Encounter for screening for HPV  Co-Test:  ThinPrep Pap with high risk HPV testing    GYNECOLOGIC CYTOLOGY REPORT    Patient Name: Claudene Betters Med Rec: [id number]  Path Number: [id number]  Sharon [address] ON  ANATOMIC PATHOLOGY  [address] [place] [address],  [address]. [facility], [address]  [phone]  Fax: [phone]     Human papillomavirus (HPV) DNA probe thin prep high risk   Result Value Ref Range    Specimen Description . GENITAL - NOT SPECIFIED     HPV Sample . THIN PREP     HPV, Genotype 16 Not Detected Not Detected    HPV, Genotype 18 Not Detected Not Detected    HPV, High Risk Other Not Detected Not Detected    HPV, Interpretation               Assessment:   Diagnosis Orders   1. Vaginal discharge  C.trachomatis N.gonorrhoeae DNA    Vaginitis DNA Probe    Culture, Genital        Patient Active Problem List    Diagnosis Date Noted    Chest pain 09/27/2022     Priority: Medium    Angina pectoris ([facility] 09/26/2022     Priority: Medium    Hx of total bilateral knee replacement 11/29/2017     Priority: Medium    Stage 3 chronic kidney disease ([address] 01/18/2021    Iron deficiency anemia 09/01/2020    Iron malabsorption 09/01/2020    Dupuytren's disease of palm 10/02/2019    Acquired hypothyroidism 08/04/2019     Last Assessment & Plan:   Condition: stable    Follow up in: three months      Alcoholism in recovery ([facility] 08/04/2019     Last Assessment & Plan:   Condition: stable    Health consequences of continued alcohol abuse discussed.  Encouraged to seek assistance with quitting or maintaining sobriety through PCP or another cessation program.     Gave member [facility] number ([phone]). Follow up in: three months      Bipolar affective disorder, currently depressed, mild ([facility] 08/01/2019     Last Assessment & Plan:   Condition: stable    Pt seeing PCP for  mental health management regularly and last visit on July 2019. Take medications as ordered by Provider; notify Provider if you cannot take medications as ordered or are having difficulties or side-effects from medications (do not stop medications without notifying Provider). If symptoms worsen or do not improve/stabilize, notify health care provider right away. If thoughts of harming self or others notify health care provider immediately &/or seek urgent/emergent care including calling Suicide Hotline ([phone]) or 911. Follow up in three months with PCP      Generalized anxiety disorder 08/01/2019     Last Assessment & Plan:   Condition: stable    Follow up in: three months      Mild intermittent asthma 08/01/2019     Last Assessment & Plan:   Formatting of this note might be different from the original.  Condition: stable    Reviewed trigger avoidance and reviewed proper use of inhalers and rescue medications. Reviewed concerning signs/symptoms and ER precautions.       Follow up in: three months      Senile osteoporosis 05/10/2019     On Calcium and Vit D  RX Fosamax through Rheumatologist      Age-related nuclear cataract of both eyes 05/01/2019     Last Assessment & Plan:   Condition: stable    Follow up in: three months      Numbness 12/14/2018    Closed fracture of lower end of femur ([address] 11/08/2018    Closed supracondylar fracture of femur, left, initial encounter ([address]     Spinal stenosis, cervical region 10/30/2018    Neural foraminal stenosis of cervical spine 10/30/2018    Left carpal tunnel syndrome     Osteoarthritis of spine with radiculopathy, cervical region     Degenerative disc disease, cervical     Trigger middle finger of right hand     Trigger ring finger of right hand     CKD (chronic kidney disease), stage II 08/23/2018    Scleroderma ([address] 08/23/2018    Vitamin D deficiency 08/23/2018 7/30/18 Cystocele repair, Cystoscopy with OBTRYX Ureteral Sling 07/30/2018     CYSTOCELE REPAIR  CYSTOSCOPY WITH OBTRYX URETERAL SLING      Overflow incontinence     Cystocele, midline 07/29/2018    JOYCE (stress urinary incontinence, female) 07/29/2018    Bradycardia 01/26/2018    Hypotension 01/24/2018    Nuclear senile cataract 01/24/2018    Hypovolemia 01/16/2018    H/O: stroke 01/15/2018    H/O: hysterectomy     [facility] 11/29/2017     Hysterectomy in 2014 for heavy menses at [address] [place] [address]      Spondylosis of cervical region without myelopathy or radiculopathy      Suffers with neck pain. A MRI of the cervical spine revealed multilevel degenerative joint disease. Her neck pain is constant and radiates down both upper extremities. An EMG nerve conduction study in February 2016 found a bilateral carpal tunnel syndrome and possible ulnar neuropathy. A MRI of the cervical spine in August 2016 found multilevel degenerative joint disease and at C5-C6 a disc protrusion mildly indenting the thecal sac and closely abutting the ventral cord. Additionally she reports having low back pain and previously had a lumbar spine MRI in September 2014 done at the [address] [place] [address],[address] in [place] which revealed multilevel lumbar degenerative joint disease worse at L5-S1 and L4-L5. Pain management consultation pending.         Chronic fatigue syndrome 06/15/2017    S/P knee surgery     Type 2 diabetes mellitus with stage 2 chronic kidney disease, without long-term current use of insulin ([address] 05/02/2017    Degenerative arthritis of right knee 05/02/2017    Hx of Stroke ([address] 04/06/2017    Transient insomnia     Sleep walking disorder Raynaud's disease     Pulmonary nodules     Sensory neuropathy     Hypertension     GERD (gastroesophageal reflux disease)     Fibromyalgia     Depression     Degenerative disc disease, thoracic     Atherosclerosis of native coronary artery of native heart with angina pectoris (HCC)     Bipolar disorder (HCC)     Asthma     Anxiety     Antinuclear antibody (IQRA) titer greater than 1:80     Anemia            PLAN:  Return if symptoms worsen or fail to improve. Vaginal cultures collected  Repeat Annual every 1 year  Cervical Cytology Evaluation begins at 24years old. If Negative Cytology, Follow-up screening per current guidelines. Return to the office in prn weeks. Counseled on preventative health maintenance follow-up. Orders Placed This Encounter   Procedures    C.trachomatis N.gonorrhoeae DNA     Standing Status:   Future     Standing Expiration Date:   1/17/2024    Vaginitis DNA Probe     Standing Status:   Future     Standing Expiration Date:   1/17/2024    Culture, Genital     Standing Status:   Future     Standing Expiration Date:   1/18/2024           The patient, Martha Rodriguez is a 62 y.o. female, was seen with a total time spent of 20 minutes for the visit on this date of service by the E/M provider. The time component had both face to face and non face to face time spent in determining the total time component. Counseling and education regarding her diagnosis listed below and her options regarding those diagnoses were also included in determining her time component. Diagnosis Orders   1. Vaginal discharge  C.trachomatis N.gonorrhoeae DNA    Vaginitis DNA Probe    Culture, Genital           The patient had her preventative health maintenance recommendations and follow-up reviewed with her at the completion of her visit.

## 2023-01-18 NOTE — PATIENT INSTRUCTIONS
I sent a refill of metrocream to your pharmacy. Sun Protection     There are two types of sun rays that are harmful to the skin. UVA rays cause skin aging and skin cancer, such as melanoma. UVB rays cause sunburns, cataracts, and also contribute to skin cancer. The American-Academy of Dermatology recommends that children and adults wear a broad spectrum, waterproof sunscreen with a Sun Protection Factor (SPF) of 30 or higher. It is important to check the ingredient label to be sure the sunscreen will protect the skin from both UVA and UVB sunrays. Your sunscreen should contain at least one of the following ingredients: titanium dioxide, zinc oxide, or avobenzone. Sunscreen will not be effective unless it is applied to all exposed skin. Sunscreens work best if they are applied 30 minutes before sun exposure. They should be reapplied every 2 hours and after any water exposure. Sunscreen is not perfect. It is important to use other methods to protect the skin from sun exposure also. Wear hats, sunglasses and other sun protective clothing when outdoors.   Stay in the shade during the peak hours of sun exposure between 10 AM and 4 PM.

## 2023-01-19 LAB
C TRACH DNA GENITAL QL NAA+PROBE: NEGATIVE
CANDIDA SPECIES, DNA PROBE: NEGATIVE
GARDNERELLA VAGINALIS, DNA PROBE: NEGATIVE
N. GONORRHOEAE DNA: NEGATIVE
SOURCE: NORMAL
SPECIMEN DESCRIPTION: NORMAL
TRICHOMONAS VAGINALIS DNA: NEGATIVE

## 2023-01-20 RX ORDER — FOLIC ACID 1 MG/1
TABLET ORAL
Qty: 90 TABLET | Refills: 0 | OUTPATIENT
Start: 2023-01-20

## 2023-01-21 LAB
CULTURE: NORMAL
SPECIMEN DESCRIPTION: NORMAL

## 2023-01-22 NOTE — FLOWSHEET NOTE
15 ft x 2  x Discontinue next session. rebounder  10x2 ylw x R LE CKC and Toes touching floor, added                 Other: Completed exercises marked with \"x\"      4/26/19  L Hip flexion 5/5, abd 4//5, R hip flexion 5/5, hip abd 4+/5, ext 4+/5  khushboo HS/quads 5/5. R ankle 4+/5 all planes. Specific Instructions for next treatment: progress standing wt bearing ex in shoe, add balance activities,  progress UE tband/periscapular strengthening. Treatment Charges: Mins Units   []  Modalities     [x]  Ther Exercise 43   3   []  Manual Therapy     []  Ther Activities     []  Aquatics     []  Vasocompression       []  Other: Gait        Total Treatment time  43 3     Assessment: [x] Progressing toward goals continue to progress exercises for  R ankle stability. Pt demonstrates decreased neuromuscular control with SLS activities. Added rebounder this date and encouraged less UE assistance. [] No change. [] Other:     Continuing R foot goals to be met in 18 treatments  7. ? Pain: R foot 2/10 average pain with ambulation. 8. Patient will be able to ambulate community distances. 9. Pt indep w/ascending, descending steps. 10. Patient will be able demonstrate improved balance by performing R SLS x 30 seconds. Pt. Education:  [x] Yes  [] No  [] Reviewed Prior HEP/Ed , New exercises listed above. Method of Education: [x] Verbal  [] Demo  [x] Written   Comprehension of Education:  [x] Verbalizes understanding-purpose, benefits of game ready  [x] Demonstrates understanding. [] Needs review. [x] Demonstrates/verbalizes HEP/Ed previously given. 4/12/19   4 way ankle tband and medium band. Plan: [x] Continue per plan of care. [x] Other: PT HAS NEW PRESCRIPTION FOR DDD THORACIC, SCAPULAR DYSFUNTION. 2-3/WK, FOR 4-6 WK. PT SCHEDULED 5/7/19 FOR R ANKLE/L KNEE. WILL MAKE ARRANGEMENTS FOR REASSESSMENT OF THORACIC/SCAPULAR SAME DATE.       Time In:  0804    Time Out:  1582 Electronically signed by:   Keyona Terry PTA Yes

## 2023-02-16 ENCOUNTER — HOSPITAL ENCOUNTER (OUTPATIENT)
Age: 58
Discharge: HOME | End: 2023-02-16
Payer: MEDICARE

## 2023-02-16 DIAGNOSIS — D50.9 IRON DEFICIENCY ANEMIA, UNSPECIFIED IRON DEFICIENCY ANEMIA TYPE: ICD-10-CM

## 2023-02-16 DIAGNOSIS — N18.31 STAGE 3A CHRONIC KIDNEY DISEASE (HCC): ICD-10-CM

## 2023-02-16 LAB
25(OH)D3 SERPL-MCNC: 54.3 NG/ML
ABSOLUTE EOS #: 0.1 K/UL (ref 0–0.4)
ABSOLUTE EOS #: 0.1 K/UL (ref 0–0.4)
ABSOLUTE LYMPH #: 1.3 K/UL (ref 1–4.8)
ABSOLUTE LYMPH #: 1.3 K/UL (ref 1–4.8)
ABSOLUTE MONO #: 0.4 K/UL (ref 0.1–1.3)
ABSOLUTE MONO #: 0.4 K/UL (ref 0.1–1.3)
ANION GAP SERPL CALCULATED.3IONS-SCNC: 10 MMOL/L (ref 9–17)
BASOPHILS # BLD: 3 % (ref 0–2)
BASOPHILS # BLD: 3 % (ref 0–2)
BASOPHILS ABSOLUTE: 0.1 K/UL (ref 0–0.2)
BASOPHILS ABSOLUTE: 0.1 K/UL (ref 0–0.2)
BUN SERPL-MCNC: 10 MG/DL (ref 6–20)
CA-I BLD-SCNC: 1.28 MMOL/L (ref 1.13–1.33)
CALCIUM SERPL-MCNC: 9.2 MG/DL (ref 8.6–10.4)
CALCIUM SERPL-MCNC: 9.3 MG/DL (ref 8.6–10.4)
CHLORIDE SERPL-SCNC: 102 MMOL/L (ref 98–107)
CO2 SERPL-SCNC: 27 MMOL/L (ref 20–31)
CREAT SERPL-MCNC: 1.06 MG/DL (ref 0.5–0.9)
CREATININE URINE: 287.9 MG/DL (ref 28–217)
EOSINOPHILS RELATIVE PERCENT: 3 % (ref 0–4)
EOSINOPHILS RELATIVE PERCENT: 3 % (ref 0–4)
FERRITIN SERPL-MCNC: 29 NG/ML (ref 13–150)
GFR SERPL CREATININE-BSD FRML MDRD: >60 ML/MIN/1.73M2
GLUCOSE SERPL-MCNC: 94 MG/DL (ref 70–99)
HCT VFR BLD AUTO: 39.7 % (ref 36–46)
HCT VFR BLD AUTO: 39.7 % (ref 36–46)
HGB BLD-MCNC: 13.5 G/DL (ref 12–16)
HGB BLD-MCNC: 13.5 G/DL (ref 12–16)
LYMPHOCYTES # BLD: 31 % (ref 24–44)
LYMPHOCYTES # BLD: 31 % (ref 24–44)
MCH RBC QN AUTO: 32.2 PG (ref 26–34)
MCH RBC QN AUTO: 32.2 PG (ref 26–34)
MCHC RBC AUTO-ENTMCNC: 34 G/DL (ref 31–37)
MCHC RBC AUTO-ENTMCNC: 34 G/DL (ref 31–37)
MCV RBC AUTO: 94.7 FL (ref 80–100)
MCV RBC AUTO: 94.7 FL (ref 80–100)
MONOCYTES # BLD: 10 % (ref 1–7)
MONOCYTES # BLD: 10 % (ref 1–7)
PDW BLD-RTO: 13.7 % (ref 11.5–14.9)
PDW BLD-RTO: 13.7 % (ref 11.5–14.9)
PHOSPHATE SERPL-MCNC: 3.2 MG/DL (ref 2.6–4.5)
PLATELET # BLD AUTO: 189 K/UL (ref 150–450)
PLATELET # BLD AUTO: 189 K/UL (ref 150–450)
PMV BLD AUTO: 8.5 FL (ref 6–12)
PMV BLD AUTO: 8.5 FL (ref 6–12)
POTASSIUM SERPL-SCNC: 3.9 MMOL/L (ref 3.7–5.3)
PTH-INTACT SERPL-MCNC: 151 PG/ML (ref 14–72)
PTH-INTACT SERPL-MCNC: 153.4 PG/ML (ref 14–72)
RBC # BLD: 4.19 M/UL (ref 4–5.2)
RBC # BLD: 4.19 M/UL (ref 4–5.2)
SEG NEUTROPHILS: 53 % (ref 36–66)
SEG NEUTROPHILS: 53 % (ref 36–66)
SEGMENTED NEUTROPHILS ABSOLUTE COUNT: 2.3 K/UL (ref 1.3–9.1)
SEGMENTED NEUTROPHILS ABSOLUTE COUNT: 2.3 K/UL (ref 1.3–9.1)
SODIUM SERPL-SCNC: 139 MMOL/L (ref 135–144)
T4 FREE SERPL-MCNC: 0.75 NG/DL (ref 0.93–1.7)
TOTAL PROTEIN, URINE: 31 MG/DL
URINE TOTAL PROTEIN CREATININE RATIO: 0.11 (ref 0–0.2)
WBC # BLD AUTO: 4.3 K/UL (ref 3.5–11)
WBC # BLD AUTO: 4.3 K/UL (ref 3.5–11)

## 2023-02-16 PROCEDURE — 82310 ASSAY OF CALCIUM: CPT

## 2023-02-16 PROCEDURE — 84439 ASSAY OF FREE THYROXINE: CPT

## 2023-02-16 PROCEDURE — 80048 BASIC METABOLIC PNL TOTAL CA: CPT

## 2023-02-16 PROCEDURE — 82306 VITAMIN D 25 HYDROXY: CPT

## 2023-02-16 PROCEDURE — 82728 ASSAY OF FERRITIN: CPT

## 2023-02-16 PROCEDURE — 82570 ASSAY OF URINE CREATININE: CPT

## 2023-02-16 PROCEDURE — 84156 ASSAY OF PROTEIN URINE: CPT

## 2023-02-16 PROCEDURE — 84100 ASSAY OF PHOSPHORUS: CPT

## 2023-02-16 PROCEDURE — 85025 COMPLETE CBC W/AUTO DIFF WBC: CPT

## 2023-02-16 PROCEDURE — 82330 ASSAY OF CALCIUM: CPT

## 2023-02-16 PROCEDURE — 83970 ASSAY OF PARATHORMONE: CPT

## 2023-02-16 PROCEDURE — 36415 COLL VENOUS BLD VENIPUNCTURE: CPT

## 2023-02-17 LAB — 25(OH)D3 SERPL-MCNC: 53 NG/ML

## 2023-02-20 ENCOUNTER — OFFICE VISIT (OUTPATIENT)
Dept: FAMILY MEDICINE CLINIC | Age: 58
End: 2023-02-20
Payer: MEDICARE

## 2023-02-20 VITALS
SYSTOLIC BLOOD PRESSURE: 130 MMHG | HEART RATE: 105 BPM | BODY MASS INDEX: 33.3 KG/M2 | OXYGEN SATURATION: 96 % | WEIGHT: 219 LBS | DIASTOLIC BLOOD PRESSURE: 84 MMHG

## 2023-02-20 DIAGNOSIS — E11.22 TYPE 2 DIABETES MELLITUS WITH STAGE 2 CHRONIC KIDNEY DISEASE, WITHOUT LONG-TERM CURRENT USE OF INSULIN (HCC): ICD-10-CM

## 2023-02-20 DIAGNOSIS — F31.70 BIPOLAR DISORDER IN FULL REMISSION, MOST RECENT EPISODE UNSPECIFIED TYPE (HCC): ICD-10-CM

## 2023-02-20 DIAGNOSIS — N18.31 STAGE 3A CHRONIC KIDNEY DISEASE (HCC): ICD-10-CM

## 2023-02-20 DIAGNOSIS — F10.21 ALCOHOLISM IN RECOVERY (HCC): ICD-10-CM

## 2023-02-20 DIAGNOSIS — N25.81 SECONDARY HYPERPARATHYROIDISM (HCC): ICD-10-CM

## 2023-02-20 DIAGNOSIS — M34.9 SCLERODERMA (HCC): ICD-10-CM

## 2023-02-20 DIAGNOSIS — N18.2 TYPE 2 DIABETES MELLITUS WITH STAGE 2 CHRONIC KIDNEY DISEASE, WITHOUT LONG-TERM CURRENT USE OF INSULIN (HCC): ICD-10-CM

## 2023-02-20 DIAGNOSIS — I10 ESSENTIAL HYPERTENSION: Primary | ICD-10-CM

## 2023-02-20 PROBLEM — I20.9 ANGINA PECTORIS (HCC): Status: RESOLVED | Noted: 2022-09-26 | Resolved: 2023-02-20

## 2023-02-20 PROBLEM — R00.1 BRADYCARDIA: Status: RESOLVED | Noted: 2018-01-26 | Resolved: 2023-02-20

## 2023-02-20 PROBLEM — R07.9 CHEST PAIN: Status: RESOLVED | Noted: 2022-09-27 | Resolved: 2023-02-20

## 2023-02-20 PROCEDURE — 3079F DIAST BP 80-89 MM HG: CPT | Performed by: NURSE PRACTITIONER

## 2023-02-20 PROCEDURE — 99214 OFFICE O/P EST MOD 30 MIN: CPT | Performed by: NURSE PRACTITIONER

## 2023-02-20 PROCEDURE — 3075F SYST BP GE 130 - 139MM HG: CPT | Performed by: NURSE PRACTITIONER

## 2023-02-20 RX ORDER — CETIRIZINE HYDROCHLORIDE 10 MG/1
TABLET ORAL
Qty: 90 TABLET | Refills: 1 | Status: SHIPPED | OUTPATIENT
Start: 2023-02-20

## 2023-02-20 RX ORDER — ALBUTEROL SULFATE 90 UG/1
2 AEROSOL, METERED RESPIRATORY (INHALATION) EVERY 6 HOURS PRN
Qty: 1 EACH | Refills: 5 | Status: SHIPPED | OUTPATIENT
Start: 2023-02-20

## 2023-02-20 SDOH — ECONOMIC STABILITY: FOOD INSECURITY: WITHIN THE PAST 12 MONTHS, YOU WORRIED THAT YOUR FOOD WOULD RUN OUT BEFORE YOU GOT MONEY TO BUY MORE.: NEVER TRUE

## 2023-02-20 SDOH — ECONOMIC STABILITY: INCOME INSECURITY: HOW HARD IS IT FOR YOU TO PAY FOR THE VERY BASICS LIKE FOOD, HOUSING, MEDICAL CARE, AND HEATING?: NOT HARD AT ALL

## 2023-02-20 SDOH — ECONOMIC STABILITY: FOOD INSECURITY: WITHIN THE PAST 12 MONTHS, THE FOOD YOU BOUGHT JUST DIDN'T LAST AND YOU DIDN'T HAVE MONEY TO GET MORE.: NEVER TRUE

## 2023-02-20 SDOH — ECONOMIC STABILITY: HOUSING INSECURITY
IN THE LAST 12 MONTHS, WAS THERE A TIME WHEN YOU DID NOT HAVE A STEADY PLACE TO SLEEP OR SLEPT IN A SHELTER (INCLUDING NOW)?: NO

## 2023-02-20 ASSESSMENT — PATIENT HEALTH QUESTIONNAIRE - PHQ9
6. FEELING BAD ABOUT YOURSELF - OR THAT YOU ARE A FAILURE OR HAVE LET YOURSELF OR YOUR FAMILY DOWN: 0
SUM OF ALL RESPONSES TO PHQ QUESTIONS 1-9: 0
3. TROUBLE FALLING OR STAYING ASLEEP: 0
10. IF YOU CHECKED OFF ANY PROBLEMS, HOW DIFFICULT HAVE THESE PROBLEMS MADE IT FOR YOU TO DO YOUR WORK, TAKE CARE OF THINGS AT HOME, OR GET ALONG WITH OTHER PEOPLE: 0
8. MOVING OR SPEAKING SO SLOWLY THAT OTHER PEOPLE COULD HAVE NOTICED. OR THE OPPOSITE, BEING SO FIGETY OR RESTLESS THAT YOU HAVE BEEN MOVING AROUND A LOT MORE THAN USUAL: 0
5. POOR APPETITE OR OVEREATING: 0
4. FEELING TIRED OR HAVING LITTLE ENERGY: 0
2. FEELING DOWN, DEPRESSED OR HOPELESS: 0
7. TROUBLE CONCENTRATING ON THINGS, SUCH AS READING THE NEWSPAPER OR WATCHING TELEVISION: 0
9. THOUGHTS THAT YOU WOULD BE BETTER OFF DEAD, OR OF HURTING YOURSELF: 0
SUM OF ALL RESPONSES TO PHQ QUESTIONS 1-9: 0
1. LITTLE INTEREST OR PLEASURE IN DOING THINGS: 0
SUM OF ALL RESPONSES TO PHQ QUESTIONS 1-9: 0
SUM OF ALL RESPONSES TO PHQ QUESTIONS 1-9: 0
SUM OF ALL RESPONSES TO PHQ9 QUESTIONS 1 & 2: 0

## 2023-02-20 ASSESSMENT — ENCOUNTER SYMPTOMS
COUGH: 0
SHORTNESS OF BREATH: 0

## 2023-02-20 NOTE — PROGRESS NOTES
Mia Huggins (:  1965) is a 62 y.o. female,Established patient, here for evaluation of the following chief complaint(s):  Hypertension and 3 Month Follow-Up      ASSESSMENT/PLAN:  1. Essential hypertension  Stable continue medication    2. Secondary hyperparathyroidism ([facility] [address]  Followed by endo  3. Scleroderma ([facility] [address]  \followed by rheum  4. Alcoholism in recovery ([facility] [address]    5. Bipolar disorder in full remission, most recent episode unspecified type ([facility] [address]  Followed by psych   6. Type 2 diabetes mellitus with stage 2 chronic kidney disease, without long-term current use of insulin (HCC)  Followed by endo    7. Stage 3a chronic kidney disease ([facility] [address]  Followed by neph    No follow-ups on file. Subjective   SUBJECTIVE/OBJECTIVE:  Hypertension  Pertinent negatives include no chest pain, palpitations or shortness of breath. Review of Systems   Constitutional:  Negative for chills and fever. Respiratory:  Negative for cough and shortness of breath. Cardiovascular:  Negative for chest pain, palpitations and leg swelling. Objective   Vitals:    23 0932   BP: 130/84   Pulse: (!) 105   SpO2: 96%     Wt Readings from Last 3 Encounters:   23 219 lb (99.3 kg)   23 220 lb (99.8 kg)   23 219 lb 6.4 oz (99.5 kg)       Physical Exam  Constitutional:       Appearance: She is well-developed. HENT:      Right Ear: External ear normal.      Left Ear: External ear normal.      Nose: Nose normal.   Cardiovascular:      Rate and Rhythm: Normal rate and regular rhythm. Heart sounds: Normal heart sounds, S1 normal and S2 normal.   Pulmonary:      Effort: Pulmonary effort is normal. No respiratory distress. Breath sounds: Normal breath sounds. Musculoskeletal:         General: No deformity. Normal range of motion. Cervical back: Full passive range of motion without pain and normal range of motion. Skin:     General: Skin is warm and dry.    Neurological:      Mental Status: She is alert and oriented to person, place, and time. An electronic signature was used to authenticate this note.     --Osmin Zambrano, COLTON - CNP

## 2023-02-28 ENCOUNTER — OFFICE VISIT (OUTPATIENT)
Dept: NEUROLOGY | Age: 58
End: 2023-02-28
Payer: MEDICARE

## 2023-02-28 VITALS
BODY MASS INDEX: 32.89 KG/M2 | DIASTOLIC BLOOD PRESSURE: 100 MMHG | HEART RATE: 74 BPM | HEIGHT: 68 IN | SYSTOLIC BLOOD PRESSURE: 154 MMHG | WEIGHT: 217 LBS

## 2023-02-28 DIAGNOSIS — M50.30 DEGENERATIVE DISC DISEASE, CERVICAL: ICD-10-CM

## 2023-02-28 DIAGNOSIS — G62.9 SENSORY NEUROPATHY: ICD-10-CM

## 2023-02-28 DIAGNOSIS — E53.8 B12 DEFICIENCY: Primary | ICD-10-CM

## 2023-02-28 PROCEDURE — 3077F SYST BP >= 140 MM HG: CPT | Performed by: NURSE PRACTITIONER

## 2023-02-28 PROCEDURE — 3080F DIAST BP >= 90 MM HG: CPT | Performed by: NURSE PRACTITIONER

## 2023-02-28 PROCEDURE — 99214 OFFICE O/P EST MOD 30 MIN: CPT | Performed by: NURSE PRACTITIONER

## 2023-02-28 RX ORDER — FOLIC ACID 1 MG/1
TABLET ORAL
Qty: 90 TABLET | Refills: 3 | Status: SHIPPED | OUTPATIENT
Start: 2023-02-28

## 2023-02-28 NOTE — PROGRESS NOTES
[facility]            [address], [address]          [facility], [address] [facility]          Dept: [phone]          Dept Fax: [phone]    MD Demond Davis MD Jenel Acre, MD Walterine Kings, LESLEY            2/28/2023      HISTORY OF PRESENT ILLNESS:       I had the pleasure of seeing Asha Best, who returns for continuing neurologic care. The patient was seen last on August 30, 2022 for treatment of chronic cervical radiculopathy with neck pain, tronchateric bursitis of the hips bilaterally and a sensory greater than motor peripheral polyneuropathy in the bilateral lower extremities. For management of her chronic cervical radiculopathy with neck pain she was prescribed gabapentin 1200 mg three times daily. She has continued following with a chiropractor for management of her neck pain. She will begin to develop headaches when her neck pain worsens which resolves after manipulations. She follows with orthopedics for her tronchateric bursitis of the hips bilaterally. For management of her neuropathy she was prescribed gabapentin 1200 mg three times daily, vitamin B12 replacement at 1000 mcg daily and folic acid 1 mg daily. She reports today that her neuropathic pain has remained well controlled. She has continued noticing numbness of her bilateral feet when ambulating. She reports a recent A1C of 5.1%. Her diabetes was previously uncontrolled from approximately 8971-2012. She also notes curling of her toes bilaterally however displays normal reflexes in her bilateral lower extremities. She has not missed a dose of gabapentin.          Testing reviewed:    Vitamin B12 & Folate 3/16/2022     Vitamin B-12 232 - 1245 pg/mL 277    Folate >4.8 ng/mL 3.3 Low       Hemoglobin A1C 1/18/2022  Hemoglobin A1C 4.0 - 6.0 % 5.2  5.2  5.1  5.2  5.5  5.6  5.2    Estimated Avg Glucose mg/dL 103  103 CM  100 CM  103 CM  111 CM  114 CM      PTH 1/18/2022  Pth Intact 15.0 - 65.0 pg/mL 155.2 High   140.3 High              EMG/NCV studies of bilateral lower extremities 11/11/2021  Abnormal electrodiagnostic study. 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 However, patient's feet were cool to the touch and cold temperature of the feet could also result in prolonged latency of the sensory nerves noted on today's test.  There is no evidence of bilateral lumbosacral radiculopathy, plexopathy, or myopathy on today's test.     MRI cervical spine 4/2/18  Impression   1. Mild degenerative disc disease at C5-C6 with mild spinal canal stenosis. Severe right neural foraminal stenosis at this level. 2. Minimal spinal canal stenosis at C3-C4 and C4-C5.                    - MRI brain 7/2/18  pression   1. No ictal focus is identified. 2. Trace chronic microvascular white matter ischemic disease. 3. There is a partially empty sella. Correlate with clinical signs/symptoms   of endocrine dysfunction as well as serum laboratory values. -EMG lower extremities 10/2/18  This is a normal electrophysiological study. There is no evidence of large fiber sensory neuropathy, lumbosacral plexopathy or radiculopathy. If clinically indicated, a skin punch biopsy may be obtained to rule out small fiber neuropathy. Clinical correlation is recommended.         PAST MEDICAL HISTORY:         Diagnosis Date    Acute kidney injury ([facility] [address]     Anemia     Angioedema     Antinuclear antibody (IQRA) titer greater than 1:80     Anxiety     Asthma     Ataxia     Atrial fibrillation (HCC)     Borderline personality disorder (HCC)     Bradycardia     CAD (coronary artery disease)     Chronic kidney disease     CKD (chronic kidney disease), stage II 08/23/2018    COVID-19 01/01/2021    Degenerative disc disease, thoracic     Depression     Diabetes mellitus (HCC)     Diastolic dysfunction     DJD (degenerative joint disease)     Fibromyalgia     Foot pain, right     GERD (gastroesophageal reflux disease)     H/O: hysterectomy     Headache     Hernia of abdominal wall     History of blood transfusion     no problem    Hyperlipidemia     Hypertension     Lupus (HCC)     Mood disorder (HCC)     Neuropathy     Osteoarthritis     PTSD (post-traumatic stress disorder)     Pulmonary nodules     Raynaud's disease     Scleroderma ([facility] [address]     Scleroderma ([facility] [address] 08/23/2018    Seizures (HCC)     Sleep walking disorder     Thyroid disease     TIA (transient ischemic attack)     Transient insomnia     Tricuspid regurgitation     Vasospasm ([address] 01/2016    bilat. legs. resulting in near complete absence of profusion to arteries of feet. ([facility]).     Vitamin D deficiency 08/23/2018    Wears glasses         PAST SURGICAL HISTORY:         Procedure Laterality Date    ABDOMINOPLASTY  2013    BLADDER SUSPENSION N/A 7/30/2018    CYSTOSCOPY WITH OBTRYX MID URETHRAL SLING performed by Rossy Nelson MD at . [address] Right 2016    [address] Right 01/02/2020    CHOLECYSTECTOMY  1989    COLONOSCOPY N/A 6/24/2019    COLORECTAL CANCER SCREENING, NOT HIGH RISK performed by Vik Her MD at [address] 7/2/2019    COLORECTAL CANCER SCREENING, NOT HIGH RISK performed by Vik Her MD at [facility] SURGERY  11/09/2018    FINGER SURGERY Right 08/28/2018    RING CARTER MASS EXCISION, TRIGGER RELEASE    FRACTURE SURGERY      left femur    GASTRECTOMY      GASTRIC BYPASS SURGERY  2012    HYSTERECTOMY (CERVIX STATUS UNKNOWN)      JOINT REPLACEMENT Bilateral     knees    KNEE SURGERY Right 05/02/2017    (femoral) patella replacement    NERVE BLOCK Right 05/04/2018     cervical facet #1 no steroid    NERVE BLOCK Right 06/29/2018    rt cervical diagnostic #2 decadron 10mg    NERVE BLOCK Right 08/10/2018    right cervical rfa decadron 10mg    CA ANTERIOR COLPORRAPHY RPR CYSTOCELE W/CYSTO N/A 7/30/2018    CYSTOCELE REPAIR performed by Mame Morrison DO at [address] &/[employer] [facility] NRV CARPAL TUNNE Left 10/23/2018    CARPAL TUNNEL RELEASE performed by Rizwan Samayoa DO at [address] OFFICE/OUTPT VISIT,PROCEDURE ONLY Right 8/28/2018    RING CARTER MASS EXCISION, TRIGGER RELEASE, [address] TABLE performed by Rizwan Samayoa DO at [address] OFFICE/OUTPT VISIT,PROCEDURE ONLY Left 11/9/2018    FEMUR RETROGRADE IM NAILING PERIPROSTHETIC FRACTURE performed by Rizwan Samayoa DO at [address]  2011    left knee    TOTAL KNEE ARTHROPLASTY Right 5/2/2017    PATELLOFEMORAL REPLACEMENT KNEE - David Olmedo, [address] TABLE, FEMORAL POPLITEAL BLOCK, NSA= SPINAL VS GENERAL performed by Rizwan Samayoa DO at [facility]  2004        SOCIAL HISTORY:     Social History     Socioeconomic History    Marital status:      Spouse name: Not on file    Number of children: Not on file    Years of education: Not on file    Highest education level: Not on file   Occupational History    Not on file   Tobacco Use    Smoking status: Never    Smokeless tobacco: Never   Vaping Use    Vaping Use: Never used   Substance and Sexual Activity    Alcohol use: Not Currently    Drug use: No    Sexual activity: Not Currently     Birth control/protection: Surgical     Comment: pt has hysterectomy   Other Topics Concern    Not on file   Social History Narrative    Not on file     Social Determinants of Health     Financial Resource Strain: Low Risk     Difficulty of Paying Living Expenses: Not hard at all   Food Insecurity: No Food Insecurity    Worried About Running Out of Food in the Last Year: Never true    [address] in the Last Year: Never true   Transportation Needs: Unknown    Lack of Transportation (Medical): Not on file    Lack of Transportation (Non-Medical):  No Physical Activity: Insufficiently Active    Days of Exercise per Week: 1 day    Minutes of Exercise per Session: 20 min   Stress: Not on file   Social Connections: Not on file   Intimate Partner Violence: Not on file   Housing Stability: Unknown    Unable to Pay for Housing in the Last Year: Not on file    Number of Places Lived in the Last Year: Not on file    Unstable Housing in the Last Year: No       CURRENT MEDICATIONS:     Current Outpatient Medications   Medication Sig Dispense Refill    folic acid (FOLVITE) 1 MG tablet TAKE 1 TABLET BY MOUTH EVERY DAY 90 tablet 3    cetirizine (ZYRTEC) 10 MG tablet TAKE 1 TABLET BY MOUTH ONE TIME A DAY 90 tablet 1    albuterol sulfate HFA (VENTOLIN HFA) 108 (90 Base) MCG/ACT inhaler Inhale 2 puffs into the lungs every 6 hours as needed for Wheezing 1 each 5    levothyroxine (SYNTHROID) 112 MCG tablet TAKE 1 TABLET BY MOUTH EVERY DAY      metroNIDAZOLE (METROCREAM) 0.75 % cream Apply topically 2 times daily. 60 g 2    omeprazole (PRILOSEC) 20 MG delayed release capsule Take 1 capsule by mouth 2 times daily 180 capsule 1    losartan (COZAAR) 100 MG tablet TAKE 1 TABLET BY MOUTH EVERY DAY 90 tablet 1    pravastatin (PRAVACHOL) 40 MG tablet Take 1 tablet by mouth daily 90 tablet 1    famotidine (PEPCID) 40 MG tablet TAKE 1 TABLET BY MOUTH IN THE EVENING 90 tablet 1    phentermine 37.5 MG capsule Take 37.5 mg by mouth every morning.       carvedilol (COREG) 3.125 MG tablet Take 1 tablet by mouth 2 times daily 180 tablet 3    D3-50 1.25 MG (64248 UT) CAPS TAKE 1 CAPSULE BY MOUTH TWICE A WEEK      Lancets (ONETOUCH DELICA PLUS [id number]) MISC use to test blood sugar one time a day      QUEtiapine (SEROQUEL) 300 MG tablet Take 600 mg by mouth at bedtime      NIFEdipine (ADALAT CC) 60 MG extended release tablet Take 2 tablets by mouth daily 180 tablet 3    gabapentin (NEURONTIN) 600 MG tablet TAKE 2 TABLETS BY MOUTH THREE TIMES A  tablet 11    clonazePAM (KLONOPIN) 1 MG tablet Take 1 mg by mouth 3 times daily as needed. Indications: last fill 8/25/22 for 30 days      Handicap Placard MISC by Does not apply route Exp: 7/13/2026 1 each 0    ARIPiprazole (ABILIFY) 5 MG tablet Take 5 mg by mouth daily       EPIPEN 2-JIGAR 0.3 MG/0.3ML SOAJ injection INJECT 1 PEN INTO THE MUSCLE ONCE AS NEEDED FOR UP TO 1 DOSE FOR ANGIOEDEMA. 2 each 5    blood glucose monitor strips Test 4 times a day & as needed for symptoms of irregular blood glucose. 100 strip 5    metFORMIN (GLUCOPHAGE) 500 MG tablet Take 500 mg by mouth daily       lamoTRIgine (LAMICTAL) 25 MG tablet Take 25 mg by mouth 2 times daily       lamoTRIgine (LAMICTAL) 200 MG tablet Take 200 mg by mouth every evening       Multiple Vitamins-Minerals (THERAPEUTIC MULTIVITAMIN-MINERALS) tablet Take 1 tablet by mouth daily       No current facility-administered medications for this visit. ALLERGIES:     Allergies   Allergen Reactions    Morphine Nausea And Vomiting and Other (See Comments)     Pt states it changes her mental status  Pt states it changes her mental status  Pt states it changes her mental status  Pt states it changes her mental status      Amlodipine Other (See Comments) and Diarrhea     diarrhea and stress  Other reaction(s): Unknown  diarrhea and stress  diarrhea and stress  diarrhea and stress    Dilaudid [Hydromorphone Hcl] Hives and Itching    Hydromorphone Hives, Itching and Other (See Comments)    Sulfa Antibiotics Hives, Rash and Other (See Comments)                                 REVIEW OF SYSTEMS        All items selected indicate a positive finding. Those items not selected are negative.   Constitutional [] Weight loss/gain   [] Fatigue  [] Fever/Chills   HEENT [] Hearing Loss  [] Visual Disturbance  [] Tinnitus  [] Eye pain   Respiratory [] Shortness of Breath  [] Cough  [] Snoring   Cardiovascular [] Chest Pain  [] Palpitations  [] Lightheaded   GI [] Constipation  [] Diarrhea  [] Swallowing change  [] Nausea/vomiting    [] Urinary Frequency  [] Urinary Urgency   Musculoskeletal [x] Neck pain  [] Back pain  [] Muscle pain  [] Restless legs   Dermatologic [] Skin changes   Neurologic [] Memory loss/confusion  [] Seizures  [] Trouble walking or imbalance  [] Dizziness  [] Sleep disturbance  [] Weakness  [x] Numbness  [] Tremors  [] Speech Difficulty  [x] Headaches  [] Light Sensitivity  [] Sound Sensitivity   Endocrinology []Excessive thirst  []Excessive hunger   Psychiatric [] Anxiety/Depression  [] Hallucination   Allergy/immunology []Hives/environmental allergies   Hematologic/lymph [] Abnormal bleeding  [] Abnormal bruising         PHYSICAL EXAMINATION:       Vitals:    02/28/23 0753   BP: (!) 154/100   Pulse: 74                                              .                                                                                                    General Appearance:  Alert, cooperative, no signs of distress, appears stated age   Head:  Normocephalic, no signs of trauma   Eyes:  Conjunctiva/corneas clear;  eyelids intact   Ears:  Normal external ear and canals   Nose: Nares normal, mucosa normal, no drainage    Throat: Lips and tongue normal; teeth normal;  gums normal   Neck: Supple, intact flexion, extension and rotation;   trachea midline;  no adenopathy;   thyroid: not enlarged;   no carotid pulse abnormality   Back:   Symmetric, no curvature, ROM adequate   Lungs:   Respirations unlabored   Heart:  Regular rate and rhythm           Extremities: Extremities normal, no cyanosis, no edema   Pulses: Symmetric over head and neck   Skin: Skin color, texture normal, no rashes, no lesions                                     NEUROLOGIC EXAMINATION    Neurologic Exam  Mental status    Alert and oriented x 3; intact memory with no confusion, speech or language problems; no hallucinations or delusions  Fund of information appropriate for level of education    Cranial nerves    II - visual fields intact to confrontation bilaterally  III, IV, VI - extra-ocular muscles full: no pupillary defect; no LYNSEY, no nystagmus, no ptosis   V - normal facial sensation                                                               VII - normal facial symmetry                                                             VIII - intact hearing                                                                             IX, X - symmetrical palate                                                                  XI - symmetrical shoulder shrug                                                       XII - tongue midline without atrophy or fasciculation      Motor function  Normal muscle bulk and tone; strength 5/5 on all 4 extremities, no pronator drift      Sensory function Severely diminished vibration and temperature sensation of the bilateral lower extremities      Cerebellar Intact fine motor movement. No involuntary movements or tremors. No ataxia or dysmetria on finger to nose or heel to shin testing      Reflex function DTR 2+ on bilateral UE and LE, symmetric. Down going toes bilaterally      Gait                   normal base and arm swing                  Medical Decision Making: In summary, your patient, Erendira Up exhibits the following, with associated plan:    Chronic cervical radiculopathy with neck pain radiating into the right shoulder. Continue gabapentin 1200 mg three times daily. Tronchateric bursitis of the hips bilaterally  Continue to follow with Dr. Mary Prince, orthopedics   Sensory greater than motor peripheral polyneuropathy in the bilateral lower extremities. She also has gait instability associated with her neuropathy.  A vitamin B12 and folate was completed in March 2022 showing a vitamin B12 level on the low end of normal at 277 and a low folate at 3.3  Continue vitamin B12 replacement at 1000 mcg daily  Continue folic acid 1 mg daily  Continue gabapentin 1200 mg three times daily   Discussed importance of controlling her diabetes, vitamin B12 deficiency and scleroderma to prevent worsening of her neuropathy  Continue to follow with rheumatology for management of scleroderma   Obtain vitamin B12 & folate level   Return in 6 months for reevaluation with Dr. Adalberto Cuadra             Signed: Any Sheehan CNP      *Please note that portions of this note were completed with a voice recognition program.  Although every effort was made to insure the accuracy of this automated transcription, some errors in transcription may have occurred, occasionally words and are mis-transcribed    Provider Attestation: The documentation recorded by the scribe accurately reflects the service I personally performed and the decisions made by myself. Portions of this note were transcribed by a scribe. I personally performed the history, physical exam, and medical decision-making and confirm the accuracy of the information in the transcribed note. Scribe Attestation:   By signing my name below, Juan M Thang, attest that this documentation has been prepared under the direction and in the presence of Any Sheehan CNP.

## 2023-03-03 ENCOUNTER — TELEPHONE (OUTPATIENT)
Dept: PHARMACY | Facility: CLINIC | Age: 58
End: 2023-03-03

## 2023-03-03 NOTE — TELEPHONE ENCOUNTER
[facility] [place] [facility]: ADHERENCE REVIEW  Identified care gap per [employer]: fills at [facility]: Diabetes and Statin adherence    Patient also appears to be prescribed: ACE/ARB    ASSESSMENT  ACE/ARB ADHERENCE    Losartan 100 mg not on [employer] report yet    Prescribed si tablet/capsule daily    Per Reconcile Dispense History: last filled on 22 for 90 day supply. Per [facility]: last picked up on 22 for 90 day supply  Billed through [facility] . Has script on file from 2022 that was never picked up. BP Readings from Last 3 Encounters:   23 (!) 154/100   23 (!) 152/98   23 130/84     Estimated Creatinine Clearance: 72 mL/min (A) (based on SCr of 1.06 mg/dL (H)). Lab Results   Component Value Date    CREATININE 1.06 (H) 2023     Lab Results   Component Value Date    K 3.9 2023     DIABETES ADHERENCE    Insurance Records claims through 2/15/23 (Prior Year [place] = not reported; YTD Milton Kennedy = FIRST FILL; Potential Fail Date: 23): METFORMIN    TAB 500MG last filled on 23 for 30 day supply. Next refill due: 23    Prescribed si tablet/capsule daily    Per Reconcile Dispense History: last filled on 23 for 30 day supply. Has 1 refill    Per [facility]: last filled on 23 for 30 day supply and picked up. Lab Results   Component Value Date    LABA1C 5.2 10/17/2022    LABA1C 5.4 2022    LABA1C 5.2 2022     NOTE: A1c <9%    [id number] Records claims through 2/15/23 (Prior Year PDC = not reported; YTD Milton Kennedy = FIRST FILL; Potential Fail Date: 23):   PRAVASTATIN  TAB 40MG last filled on 23 for 30 day supply. Next refill due: 23    Prescribed si tablet/capsule daily    Per Reconcile Dispense History: last filled on 23 for 30 day supply. Has 1 refill    Per Pravastatin 40 mg Pharmacy: last filled on 23 for 30 day supply and picked up.      Lab Results   Component Value Date    CHOL 226 (H) 10/17/2022    TRIG 53 10/17/2022     10/17/2022    LDLCHOLESTEROL 75 10/17/2022     ALT   Date Value Ref Range Status   10/17/2022 13 5 - 33 U/L Final     AST   Date Value Ref Range Status   10/17/2022 16 <32 U/L Final     The ASCVD Risk score (Mary DK, et al., 2019) failed to calculate for the following reasons: The patient has a prior MI or stroke diagnosis     PLAN    The following are interventions that have been identified:   Patient overdue refilling losartan 100 mg and active on home medication list.   Patient eligible for 100 day supply of losartan, metformin, pravastatin    Attempting to reach patient to review. Left message asking for return call.       Last Visit: 2/20/23    Next Visit: 3/6 with endo, 5/22 with pcp      Pat Reeves, PharmD, [address] [place] Pharmacist  Department: [address] [place] [address] Only    Program: [address] Closed?: Yes   Time Spent (min): 15

## 2023-03-06 ENCOUNTER — TELEPHONE (OUTPATIENT)
Dept: ONCOLOGY | Age: 58
End: 2023-03-06

## 2023-03-06 ENCOUNTER — OFFICE VISIT (OUTPATIENT)
Dept: ONCOLOGY | Age: 58
End: 2023-03-06
Payer: MEDICARE

## 2023-03-06 VITALS
DIASTOLIC BLOOD PRESSURE: 106 MMHG | SYSTOLIC BLOOD PRESSURE: 170 MMHG | BODY MASS INDEX: 33.88 KG/M2 | TEMPERATURE: 97.3 F | WEIGHT: 222.8 LBS | RESPIRATION RATE: 18 BRPM | HEART RATE: 78 BPM

## 2023-03-06 DIAGNOSIS — E53.8 B12 DEFICIENCY: ICD-10-CM

## 2023-03-06 DIAGNOSIS — D50.9 IRON DEFICIENCY ANEMIA, UNSPECIFIED IRON DEFICIENCY ANEMIA TYPE: Primary | ICD-10-CM

## 2023-03-06 PROCEDURE — 3080F DIAST BP >= 90 MM HG: CPT | Performed by: INTERNAL MEDICINE

## 2023-03-06 PROCEDURE — 99211 OFF/OP EST MAY X REQ PHY/QHP: CPT | Performed by: INTERNAL MEDICINE

## 2023-03-06 PROCEDURE — 99214 OFFICE O/P EST MOD 30 MIN: CPT | Performed by: INTERNAL MEDICINE

## 2023-03-06 PROCEDURE — 3077F SYST BP >= 140 MM HG: CPT | Performed by: INTERNAL MEDICINE

## 2023-03-06 RX ORDER — CYANOCOBALAMIN 1000 UG/ML
1000 INJECTION, SOLUTION INTRAMUSCULAR; SUBCUTANEOUS
Qty: 3 ML | Refills: 3 | Status: SHIPPED | OUTPATIENT
Start: 2023-03-06

## 2023-03-06 RX ORDER — IBUPROFEN 200 MG
1 TABLET ORAL DAILY
Qty: 30 EACH | Refills: 0 | Status: SHIPPED | OUTPATIENT
Start: 2023-03-06

## 2023-03-06 NOTE — PROGRESS NOTES
DIAGNOSIS:   Anemia, malabsorption component  History of gastric bypass surgery  History of iron deficiency and B12 likely due to malabsorption    CURRENT THERAPY:  IV iron as needed    parenteral B12 supplementation  Observation     BRIEF CASE HISTORY:   Kizzy Palma is a very pleasant 62 y.o. female who is referred to us for anemia. She reports she has long history of anemia and has received IV iron in the past and been on oral iron for the last 10 years. Her last iron infusion was 2014 at [facility]. She had gastric by-pass in 2012, she did have anemia prior but worsened following surgery. She does have fatigue, PICA, shortness of breath. She had colonoscopy 2019 which was negative, she does have chronic colonoscopy. She has history of B12 deficiency and takes oral supplementation. She has parathyroid issue and takes synthroid. She has multiple complications and is seen by several specialists including rheumatology, cardiology, ortho surg. She has scleroderma, osteoporosis, and Raynaud syndrome. Her scleroderma is diffused but currently well controlled. She has had both knees replacements twice. She was adopted and family history is unknown. INTERIM HISTORY: The patient presents today to discuss her lab work. She received IV iron today and tolerated that well. Hemoglobin normalized but her ferritin is dropping slowly. Despite taking oral iron supplementation And D74 and folic acid supplementation,  Her levels are dropping. She denies any swelling, bleeding, nausea, vomiting, or diarrhea.   Denies any bleeding  PAST MEDICAL HISTORY: has a past medical history of Acute kidney injury ([address] Anemia, Angioedema, Antinuclear antibody (IQRA) titer greater than 1:80, Anxiety, Asthma, Ataxia, Atrial fibrillation ([address] Borderline personality disorder ([address] Bradycardia, CAD (coronary artery disease), Chronic kidney disease, CKD (chronic kidney disease), stage II, COVID-19, Degenerative disc disease, thoracic, Depression, Diabetes mellitus ([address] Diastolic dysfunction, DJD (degenerative joint disease), Fibromyalgia, Foot pain, right, GERD (gastroesophageal reflux disease), H/O: hysterectomy, Headache, Hernia of abdominal wall, History of blood transfusion, Hyperlipidemia, Hypertension, Lupus ([address] Mood disorder ([address] Neuropathy, Osteoarthritis, PTSD (post-traumatic stress disorder), Pulmonary nodules, Raynaud's disease, Scleroderma ([address] Scleroderma ([address] Seizures ([address] Sleep walking disorder, Thyroid disease, TIA (transient ischemic attack), Transient insomnia, Tricuspid regurgitation, Vasospasm ([address] Vitamin D deficiency, and Wears glasses. PAST SURGICAL HISTORY: has a past surgical history that includes Hysterectomy; Cholecystectomy (1989); Tonsillectomy (1986); Carpal tunnel release (Right, 2016); Wrist surgery (2004); Gastric bypass surgery (2012); Abdominoplasty (2013); Elbow surgery (Right); Total knee arthroplasty (2011); knee surgery (Right, 05/02/2017); Total knee arthroplasty (Right, 5/2/2017); gastrectomy; Nerve Block (Right, 05/04/2018); Nerve Block (Right, 06/29/2018); eye surgery; pr anterior colporraphy rpr cystocele w/cysto (N/A, 7/30/2018); bladder suspension (N/A, 7/30/2018); Nerve Block (Right, 08/10/2018); joint replacement (Bilateral); Finger surgery (Right, 08/28/2018); pr office/outpt visit,procedure only (Right, 8/28/2018); pr neuroplasty &/transpos median nrv carpal tunne (Left, 10/23/2018); Femur fracture surgery (11/09/2018); pr office/outpt visit,procedure only (Left, 11/9/2018); fracture surgery; Colonoscopy (N/A, 6/24/2019); Colonoscopy (N/A, 7/2/2019); and Carpal tunnel release (Right, 01/02/2020).      CURRENT MEDICATIONS:  has a current medication list which includes the following prescription(s): folic acid, cetirizine, albuterol sulfate hfa, levothyroxine, metronidazole, omeprazole, losartan, pravastatin, famotidine, phentermine, carvedilol, F5-32, onetouch delica plus [id number], quetiapine, nifedipine, gabapentin, clonazepam, handicap placard, aripiprazole, blood glucose test strips, metformin, lamotrigine, lamotrigine, therapeutic multivitamin-minerals, and epipen 2-patty. ALLERGIES:  is allergic to morphine, amlodipine, dilaudid [hydromorphone hcl], hydromorphone, and sulfa antibiotics. FAMILY HISTORY: Negative for any hematological or oncological conditions. SOCIAL HISTORY:  reports that she has never smoked. She has never used smokeless tobacco. She reports that she does not currently use alcohol. She reports that she does not use drugs. REVIEW OF SYSTEMS:   General: no fever or night sweats, Weight gain. +fatigue and PICA - resolved   ENT: No double or blurred vision, no tinnitus or hearing problem, no dysphagia or sore throat   Respiratory: No chest pain, no cough or hemoptysis. No Shortness of breath  Cardiovascular: Denies chest pain, PND or orthopnea. No L E swelling or palpitations. Gastrointestinal: No nausea or vomiting, abdominal pain, diarrhea or constipation. Genitourinary: Denies dysuria, hematuria, frequency, urgency or incontinence. Neurological: Denies headaches, decreased LOC, no sensory or motor focal deficits. Musculoskeletal:  No arthralgia no back pain or joint swelling. Skin: There are no rashes or bleeding. Psychiatric:  No anxiety, no depression. Endocrine: no diabetes or thyroid disease. Hematologic: no bleeding , no adenopathy. PHYSICAL EXAM: Shows a well appearing 62y.o.-year-old female who is not in pain or distress. Vital Signs: Blood pressure (!) 170/106, pulse 78, temperature 97.3 °F (36.3 °C), temperature source Temporal, resp. rate 18, weight 222 lb 12.8 oz (101.1 kg), not currently breastfeeding. HEENT: Normocephalic and atraumatic. Pupils are equal, round, reactive to light and accommodation. Extraocular muscles are intact. Neck: Showed no JVD, no carotid bruit . Lungs: Clear to auscultation bilaterally.  Heart: Regular without any murmur. Abdomen: Soft, nontender. No hepatosplenomegaly. Extremities: Multiple scars on both legs from previous surgery. Lower extremities show no edema, clubbing, or cyanosis. Breasts: Examination not done today. Neuro exam: intact cranial nerves bilaterally no motor or sensory deficit, gait is normal. Lymphatic: no adenopathy appreciated in the supraclavicular, axillary, cervical or inguinal area  Sclerodactyly on fingers      REVIEW OF LABORATORY DATA:   Lab Results   Component Value Date    WBC 4.3 02/16/2023    WBC 4.3 02/16/2023    HGB 13.5 02/16/2023    HGB 13.5 02/16/2023    HCT 39.7 02/16/2023    HCT 39.7 02/16/2023    MCV 94.7 02/16/2023    MCV 94.7 02/16/2023     02/16/2023     02/16/2023       Chemistry        Component Value Date/Time     02/16/2023 1230    K 3.9 02/16/2023 1230     02/16/2023 1230    CO2 27 02/16/2023 1230    BUN 10 02/16/2023 1230    CREATININE 1.06 (H) 02/16/2023 1230        Component Value Date/Time    CALCIUM 9.3 02/16/2023 1231    ALKPHOS 149 (H) 10/17/2022 1102    AST 16 10/17/2022 1102    ALT 13 10/17/2022 1102    BILITOT 0.5 10/17/2022 1102        Lab Results   Component Value Date    IRON 85 02/02/2022    TIBC 310 02/02/2022    FERRITIN 29 02/16/2023         REVIEW OF RADIOLOGICAL RESULTS:     IMPRESSION:   Anemia, malabsorption component   Symptomatic   HX gastric bypass   Iron studies and IV iron as needed  Parenteral B12    PLAN:   Her lab work was reviewed, counts in range but ferritin is dropping  B12 level are low despite oral supplementation. We will start B12 injections  Despite dropping ferritin, her hemoglobin remains normal so she cannot receive IV iron yet  We will continue with oral iron supplementation but I am anticipating that she will need IV iron in the future as she is not absorbing  Start monthly B12 injection  Plan for lab work at 6 months and 1 year. Return in 1 year.           [employer] MD Fortunato  Hematologist/Medical Oncologist  [facility] hematology oncology physicians

## 2023-03-06 NOTE — TELEPHONE ENCOUNTER
ROBIN ARRIVES AMBULATORY FOR MD VISIT  DR Franz Camera IN TO SEE PATIENT  ORDERS RECEIVED  RV  1 YEAR WITH LABS BEFORE RV  LABS CDP CMP FERRITIN VITAMIN B12 & FOLATE 02/23/24, ORDERS GIVEN TO PT  MD VISIT 03/01/24 @10AM  SCRIPT SENT TO PATIENT'S PHARMACY  AVS PRINTED AND GIVEN TO PATIENT WITH INSTRUCTIONS  PATIENT DISCHARGED AMBULATORY

## 2023-03-28 NOTE — TELEPHONE ENCOUNTER
Dorena Gilford, APRN - CNP    Patient is out of refills for pravastatin and losartan. I have pended a refill request for your convenience. Last OV on 1/18/21. Next OV on 7/19/21. Thank you,   Ozzy Santillan PharmD, Shel[place]  Direct: [phone]  Department, toll free [phone], option 7    =======================================================================    Incoming call received from the patient. Patient states that she prefers 90-ds of her medications. Will prepare and send a refill request to PCP today for pravastatin and losartan (0 refills remaining). Will prepare a fax refills request for metformin 90-ds conversion and send to Dr. Alvarado Seeds today.     Ozzy Santillan PharmD, Shelly  Direct: [phone]  Department, toll free [phone], option 7 done

## 2023-04-17 RX ORDER — EPINEPHRINE 0.3 MG/.3ML
INJECTION INTRAMUSCULAR
Qty: 2 EACH | Refills: 5 | Status: SHIPPED | OUTPATIENT
Start: 2023-04-17

## 2023-04-17 NOTE — TELEPHONE ENCOUNTER
Last visit: 23  Last Med refill: 20  Does patient have enough medication for 72 hours: No: pen     Next Visit Date:  Future Appointments   Date Time Provider Sylvester Delongi   2023  8:45 AM Micheline Mohs, APRN - CNP [facility] FP TOLPP   2023 10:10 AM Lukas Thompson MD AFL Neph [facility]ia None   2023  9:00 AM Micheline Mohs, APRN - CNP [facility] MHTOLPP   2023  9:00 AM COLTON Wheatley - LOR [facility] TOLPP   2024  8:30 AM Adriano Harvey MD  derm MHTOLPP   3/1/2024 10:00 AM Red Bucio MD [facility] [address] Maintenance   Topic Date Due    Hepatitis B vaccine (1 of 3 - Risk 3-dose series) Never done    Shingles vaccine (1 of 2) Never done    Diabetic retinal exam  2022    Diabetic foot exam  2022    COVID-19 Vaccine (3 - Booster for [employer] series) 2022    Annual Wellness Visit (AWV)  2023    A1C test (Diabetic or Prediabetic)  10/17/2023    Lipids  10/17/2023    Breast cancer screen  2024    Diabetic Alb to Cr ratio (uACR) test  2024    GFR test (Diabetes, CKD 3-4, OR last GFR 15-59)  2024    Depression Monitoring  2024    DTaP/Tdap/Td vaccine (3 - Td or Tdap) 2027    Colorectal Cancer Screen  2029    Flu vaccine  Completed    Pneumococcal 0-64 years Vaccine  Completed    Hepatitis C screen  Completed    HIV screen  Completed    Hepatitis A vaccine  Aged Out    Hib vaccine  Aged Out    Meningococcal (ACWY) vaccine  Aged Out    Cervical cancer screen  Discontinued       Hemoglobin A1C (%)   Date Value   10/17/2022 5.2   2022 5.4   2022 5.2             ( goal A1C is < 7)   Microalb/Crt.  Ratio (mcg/mg creat)   Date Value   10/17/2022 Can not be calculated     LDL Cholesterol (mg/dL)   Date Value   10/17/2022 75   10/14/2021 108       (goal LDL is <100)   AST (U/L)   Date Value   10/17/2022 16     ALT (U/L)   Date Value   10/17/2022 13     BUN (mg/dL)   Date

## 2023-05-13 DIAGNOSIS — K21.9 GASTROESOPHAGEAL REFLUX DISEASE, UNSPECIFIED WHETHER ESOPHAGITIS PRESENT: ICD-10-CM

## 2023-05-15 RX ORDER — FAMOTIDINE 40 MG/1
TABLET, FILM COATED ORAL
Qty: 90 TABLET | Refills: 1 | Status: SHIPPED | OUTPATIENT
Start: 2023-05-15

## 2023-05-18 ENCOUNTER — HOSPITAL ENCOUNTER (OUTPATIENT)
Age: 58
Discharge: HOME | End: 2023-05-18
Payer: MEDICARE

## 2023-05-18 DIAGNOSIS — E53.8 B12 DEFICIENCY: ICD-10-CM

## 2023-05-18 DIAGNOSIS — D50.9 IRON DEFICIENCY ANEMIA, UNSPECIFIED IRON DEFICIENCY ANEMIA TYPE: ICD-10-CM

## 2023-05-18 LAB
25(OH)D3 SERPL-MCNC: 39.6 NG/ML
FOLATE SERPL-MCNC: 6.1 NG/ML
T4 FREE SERPL-MCNC: 0.7 NG/DL (ref 0.9–1.7)
VIT B12 SERPL-MCNC: 418 PG/ML (ref 232–1245)

## 2023-05-18 PROCEDURE — 36415 COLL VENOUS BLD VENIPUNCTURE: CPT

## 2023-05-18 PROCEDURE — 82746 ASSAY OF FOLIC ACID SERUM: CPT

## 2023-05-18 PROCEDURE — 82607 VITAMIN B-12: CPT

## 2023-05-18 PROCEDURE — 82306 VITAMIN D 25 HYDROXY: CPT

## 2023-05-18 PROCEDURE — 84439 ASSAY OF FREE THYROXINE: CPT

## 2023-05-22 ENCOUNTER — OFFICE VISIT (OUTPATIENT)
Dept: FAMILY MEDICINE CLINIC | Age: 58
End: 2023-05-22
Payer: MEDICARE

## 2023-05-22 VITALS
WEIGHT: 225 LBS | SYSTOLIC BLOOD PRESSURE: 120 MMHG | DIASTOLIC BLOOD PRESSURE: 70 MMHG | BODY MASS INDEX: 34.21 KG/M2 | OXYGEN SATURATION: 96 % | HEART RATE: 83 BPM

## 2023-05-22 DIAGNOSIS — Z76.0 MEDICATION REFILL: ICD-10-CM

## 2023-05-22 DIAGNOSIS — I10 ESSENTIAL HYPERTENSION: Primary | ICD-10-CM

## 2023-05-22 PROCEDURE — 3078F DIAST BP <80 MM HG: CPT | Performed by: NURSE PRACTITIONER

## 2023-05-22 PROCEDURE — 99213 OFFICE O/P EST LOW 20 MIN: CPT | Performed by: NURSE PRACTITIONER

## 2023-05-22 PROCEDURE — 3074F SYST BP LT 130 MM HG: CPT | Performed by: NURSE PRACTITIONER

## 2023-05-22 RX ORDER — PRAVASTATIN SODIUM 40 MG
40 TABLET ORAL DAILY
Qty: 90 TABLET | Refills: 1 | Status: SHIPPED | OUTPATIENT
Start: 2023-05-22

## 2023-05-22 RX ORDER — IBUPROFEN 800 MG/1
800 TABLET ORAL EVERY 8 HOURS PRN
Qty: 90 TABLET | Refills: 2 | Status: SHIPPED | OUTPATIENT
Start: 2023-05-22

## 2023-05-22 RX ORDER — LOSARTAN POTASSIUM 100 MG/1
TABLET ORAL
Qty: 90 TABLET | Refills: 1 | Status: SHIPPED | OUTPATIENT
Start: 2023-05-22

## 2023-05-22 RX ORDER — LEVOTHYROXINE SODIUM 0.12 MG/1
125 TABLET ORAL DAILY
COMMUNITY
Start: 2023-05-02

## 2023-05-22 RX ORDER — OMEPRAZOLE 20 MG/1
20 CAPSULE, DELAYED RELEASE ORAL 2 TIMES DAILY
Qty: 180 CAPSULE | Refills: 1 | Status: SHIPPED | OUTPATIENT
Start: 2023-05-22

## 2023-05-22 ASSESSMENT — ENCOUNTER SYMPTOMS
COUGH: 0
SHORTNESS OF BREATH: 0

## 2023-05-22 NOTE — PROGRESS NOTES
Patient is present for 3 month f/u for HTN    Patient would like a script for ibuprofen 800  States she was getting this from Dr. Alfredito Garvey but is no longer seeing him

## 2023-06-06 NOTE — FLOWSHEET NOTE
[] [facility]) - [facility]  [facility]  [address]    [phone]  F: [phone]   [x] [address]   [address]  P: [phone]  F: [phone]  [] [address] &  Therapy  [address]  P: [phone]  F: [phone] [] [address]  P: [phone]  F: [phone]  [] [address]  [address] [facility] [address]   [address]   [place]: [phone]  F: [phone]   [] [place] [address] [facility] [address]  [place]: [phone]   F: [phone]     Physical Therapy Cancel/No Show note    Date: 2021  Patient: Emilie Manriquez  : 1965  MRN: [id number]    Cancels/No Shows to date:     For today's appointment patient:    [x]  Cancelled    [] Rescheduled appointment    [] No-show     Reason given by patient:    [x]  Patient ill    []  Conflicting appointment    [] No transportation      [] Conflict with work    [] No reason given    [] Weather related    [] [id number]    [] Other:      Comments:        [x] Next appointment was confirmed    Electronically signed by: Carlos Kuhn PTA Discharged

## 2023-07-19 ENCOUNTER — HOSPITAL ENCOUNTER (OUTPATIENT)
Age: 58
Discharge: HOME | End: 2023-07-19
Payer: MEDICARE

## 2023-07-19 DIAGNOSIS — N18.31 STAGE 3A CHRONIC KIDNEY DISEASE (HCC): ICD-10-CM

## 2023-07-19 LAB
25(OH)D3 SERPL-MCNC: 52.9 NG/ML
ANION GAP SERPL CALCULATED.3IONS-SCNC: 12 MMOL/L (ref 9–17)
BASOPHILS # BLD: 0.1 K/UL (ref 0–0.2)
BASOPHILS NFR BLD: 2 % (ref 0–2)
BUN SERPL-MCNC: 15 MG/DL (ref 6–20)
CA-I BLD-SCNC: 1.22 MMOL/L (ref 1.13–1.33)
CALCIUM SERPL-MCNC: 9.3 MG/DL (ref 8.6–10.4)
CHLORIDE SERPL-SCNC: 107 MMOL/L (ref 98–107)
CO2 SERPL-SCNC: 23 MMOL/L (ref 20–31)
CREAT SERPL-MCNC: 1.1 MG/DL (ref 0.5–0.9)
CREAT UR-MCNC: 101.2 MG/DL (ref 28–217)
CREAT UR-MCNC: 101.7 MG/DL (ref 28–217)
EOSINOPHIL # BLD: 0.2 K/UL (ref 0–0.4)
EOSINOPHILS RELATIVE PERCENT: 4 % (ref 0–4)
ERYTHROCYTE [DISTWIDTH] IN BLOOD BY AUTOMATED COUNT: 13.4 % (ref 11.5–14.9)
GFR SERPL CREATININE-BSD FRML MDRD: 59 ML/MIN/1.73M2
GLUCOSE SERPL-MCNC: 113 MG/DL (ref 70–99)
HCT VFR BLD AUTO: 36.7 % (ref 36–46)
HGB BLD-MCNC: 12.7 G/DL (ref 12–16)
LYMPHOCYTES NFR BLD: 1.3 K/UL (ref 1–4.8)
LYMPHOCYTES RELATIVE PERCENT: 30 % (ref 24–44)
MCH RBC QN AUTO: 32.4 PG (ref 26–34)
MCHC RBC AUTO-ENTMCNC: 34.5 G/DL (ref 31–37)
MCV RBC AUTO: 94.1 FL (ref 80–100)
MONOCYTES NFR BLD: 0.4 K/UL (ref 0.1–1.3)
MONOCYTES NFR BLD: 9 % (ref 1–7)
NEUTROPHILS NFR BLD: 55 % (ref 36–66)
NEUTS SEG NFR BLD: 2.5 K/UL (ref 1.3–9.1)
PHOSPHATE SERPL-MCNC: 3.8 MG/DL (ref 2.6–4.5)
PLATELET # BLD AUTO: 215 K/UL (ref 150–450)
PMV BLD AUTO: 8 FL (ref 6–12)
POTASSIUM SERPL-SCNC: 4 MMOL/L (ref 3.7–5.3)
PTH-INTACT SERPL-MCNC: 164.2 PG/ML (ref 14–72)
RBC # BLD AUTO: 3.9 M/UL (ref 4–5.2)
SODIUM SERPL-SCNC: 142 MMOL/L (ref 135–144)
TOTAL PROTEIN, URINE: 12 MG/DL
TOTAL PROTEIN, URINE: 13 MG/DL
URINE TOTAL PROTEIN CREATININE RATIO: 0.12 (ref 0–0.2)
WBC OTHER # BLD: 4.4 K/UL (ref 3.5–11)

## 2023-07-19 PROCEDURE — 85027 COMPLETE CBC AUTOMATED: CPT

## 2023-07-19 PROCEDURE — 82306 VITAMIN D 25 HYDROXY: CPT

## 2023-07-19 PROCEDURE — 80048 BASIC METABOLIC PNL TOTAL CA: CPT

## 2023-07-19 PROCEDURE — 83970 ASSAY OF PARATHORMONE: CPT

## 2023-07-19 PROCEDURE — 82570 ASSAY OF URINE CREATININE: CPT

## 2023-07-19 PROCEDURE — 36415 COLL VENOUS BLD VENIPUNCTURE: CPT

## 2023-07-19 PROCEDURE — 82330 ASSAY OF CALCIUM: CPT

## 2023-07-19 PROCEDURE — 84156 ASSAY OF PROTEIN URINE: CPT

## 2023-07-19 PROCEDURE — 84100 ASSAY OF PHOSPHORUS: CPT

## 2023-07-27 ENCOUNTER — TELEPHONE (OUTPATIENT)
Dept: FAMILY MEDICINE CLINIC | Age: 58
End: 2023-07-27

## 2023-07-27 NOTE — TELEPHONE ENCOUNTER
Patient contacted office. She wanted to inform that she saw her Nephrologist today and he prescribed her prednisone for Angioedema. He advised patient to contact our office to inform PCP in case we wanted to do any adjustments in medication.     Nephrology note is in [facility]

## 2023-08-22 ENCOUNTER — OFFICE VISIT (OUTPATIENT)
Dept: NEUROLOGY | Age: 58
End: 2023-08-22
Payer: MEDICARE

## 2023-08-22 ENCOUNTER — TELEPHONE (OUTPATIENT)
Dept: NEUROLOGY | Age: 58
End: 2023-08-22

## 2023-08-22 VITALS
HEART RATE: 76 BPM | SYSTOLIC BLOOD PRESSURE: 162 MMHG | HEIGHT: 68 IN | WEIGHT: 237 LBS | BODY MASS INDEX: 35.92 KG/M2 | DIASTOLIC BLOOD PRESSURE: 110 MMHG

## 2023-08-22 DIAGNOSIS — E66.01 SEVERE OBESITY (BMI 35.0-39.9) WITH COMORBIDITY (HCC): ICD-10-CM

## 2023-08-22 DIAGNOSIS — M54.16 LUMBAR RADICULOPATHY: Primary | ICD-10-CM

## 2023-08-22 PROCEDURE — 99214 OFFICE O/P EST MOD 30 MIN: CPT | Performed by: PSYCHIATRY & NEUROLOGY

## 2023-08-22 PROCEDURE — 3077F SYST BP >= 140 MM HG: CPT | Performed by: PSYCHIATRY & NEUROLOGY

## 2023-08-22 PROCEDURE — 3080F DIAST BP >= 90 MM HG: CPT | Performed by: PSYCHIATRY & NEUROLOGY

## 2023-08-22 RX ORDER — GABAPENTIN 600 MG/1
TABLET ORAL
Qty: 180 TABLET | Refills: 11 | Status: SHIPPED | OUTPATIENT
Start: 2023-08-22 | End: 2024-08-21

## 2023-08-22 NOTE — PROGRESS NOTES
IX, X - symmetrical palate                                                                  XI - symmetrical shoulder shrug                                                       XII - midline tongue without atrophy or fasciculation     Motor function  Normal muscle bulk and tone; normal power 5/5, including fine motor movements     Sensory function Intact to touch, pin, vibration, proprioception     Cerebellar Intact fine motor movement. No involuntary movements or tremors     Reflex function Intact 2+ DTR and symmetric. Negative Babinski     Gait                  Normal station and gait       Lab Results   Component Value Date    LDLCHOLESTEROL 75 10/17/2022     No components found for: CHLPL  Lab Results   Component Value Date    TRIG 53 10/17/2022    TRIG 58 10/14/2021    TRIG 101 01/18/2021     Lab Results   Component Value Date     10/17/2022     10/14/2021     01/18/2021     No results found for: LDLCALC  No results found for: LABVLDL  Lab Results   Component Value Date    LABA1C 5.2 10/17/2022     Lab Results   Component Value Date     10/17/2022     Lab Results   Component Value Date    [id number] 026 05/18/2023      Neurological work up:  CT head  CTA head and neck  MRI brain   2 D echo     All of patient's labs were personally reviewed. All the imaging studies were personally reviewed and discussed with the patient. Assessment Recommendations:  Chronic cervical radiculopathy with neck pain radiating into the right shoulder. The patient sees a chiropractor every six to eight weeks. Along with taking Gabapentin for pain control, and has been responding well. I recommend she continue gabapentin 1200 mg three times daily and seeing her chiropractor. All medication side effects were discussed and questions answered. Lumbar radiculopathy:  Patient reports having low back pain, radiating to both hips.   Previously she has

## 2023-08-23 ENCOUNTER — TELEPHONE (OUTPATIENT)
Dept: NEUROLOGY | Age: 58
End: 2023-08-23

## 2023-08-23 DIAGNOSIS — F41.9 ANXIETY: Primary | ICD-10-CM

## 2023-08-23 RX ORDER — DIAZEPAM 5 MG/1
5 TABLET ORAL ONCE
Qty: 1 TABLET | Refills: 0 | Status: SHIPPED | OUTPATIENT
Start: 2023-08-23 | End: 2023-08-23

## 2023-08-23 NOTE — TELEPHONE ENCOUNTER
Pt called in stating she is scheduled for her MRI on 9/1/23 and is asking if we can prescribe her something to help with her anxiety and claustrophobia? Please advise, thanks.

## 2023-08-25 ENCOUNTER — OFFICE VISIT (OUTPATIENT)
Dept: FAMILY MEDICINE CLINIC | Age: 58
End: 2023-08-25

## 2023-08-25 VITALS
DIASTOLIC BLOOD PRESSURE: 64 MMHG | SYSTOLIC BLOOD PRESSURE: 110 MMHG | HEART RATE: 81 BPM | WEIGHT: 231 LBS | OXYGEN SATURATION: 96 % | BODY MASS INDEX: 35.01 KG/M2 | HEIGHT: 68 IN

## 2023-08-25 DIAGNOSIS — Z00.00 MEDICARE ANNUAL WELLNESS VISIT, SUBSEQUENT: Primary | ICD-10-CM

## 2023-08-25 ASSESSMENT — PATIENT HEALTH QUESTIONNAIRE - PHQ9
SUM OF ALL RESPONSES TO PHQ QUESTIONS 1-9: 7
SUM OF ALL RESPONSES TO PHQ QUESTIONS 1-9: 7
3. TROUBLE FALLING OR STAYING ASLEEP: 0
SUM OF ALL RESPONSES TO PHQ9 QUESTIONS 1 & 2: 5
4. FEELING TIRED OR HAVING LITTLE ENERGY: 1
SUM OF ALL RESPONSES TO PHQ QUESTIONS 1-9: 7
2. FEELING DOWN, DEPRESSED OR HOPELESS: 2
SUM OF ALL RESPONSES TO PHQ QUESTIONS 1-9: 7
5. POOR APPETITE OR OVEREATING: 1
10. IF YOU CHECKED OFF ANY PROBLEMS, HOW DIFFICULT HAVE THESE PROBLEMS MADE IT FOR YOU TO DO YOUR WORK, TAKE CARE OF THINGS AT HOME, OR GET ALONG WITH OTHER PEOPLE: 0
7. TROUBLE CONCENTRATING ON THINGS, SUCH AS READING THE NEWSPAPER OR WATCHING TELEVISION: 0
1. LITTLE INTEREST OR PLEASURE IN DOING THINGS: 3
6. FEELING BAD ABOUT YOURSELF - OR THAT YOU ARE A FAILURE OR HAVE LET YOURSELF OR YOUR FAMILY DOWN: 0
8. MOVING OR SPEAKING SO SLOWLY THAT OTHER PEOPLE COULD HAVE NOTICED. OR THE OPPOSITE, BEING SO FIGETY OR RESTLESS THAT YOU HAVE BEEN MOVING AROUND A LOT MORE THAN USUAL: 0
9. THOUGHTS THAT YOU WOULD BE BETTER OFF DEAD, OR OF HURTING YOURSELF: 0

## 2023-08-25 ASSESSMENT — LIFESTYLE VARIABLES
HOW OFTEN DO YOU HAVE A DRINK CONTAINING ALCOHOL: MONTHLY OR LESS
HOW MANY STANDARD DRINKS CONTAINING ALCOHOL DO YOU HAVE ON A TYPICAL DAY: 1 OR 2

## 2023-08-25 NOTE — PATIENT INSTRUCTIONS
Incorporated. Care instructions adapted under license by [facility] [place] ([facility]). If you have questions about a medical condition or this instruction, always ask your healthcare professional. [address] any warranty or liability for your use of this information. Learning About Mindfulness for Stress  What are mindfulness and stress? Stress is your body's response to a hard situation. Your body can have a physical, emotional, or mental response. A lot of things can cause stress. You may feel stress when you go on a job interview, take a test, or run a race. This kind of short-term stress is normal and even useful. It can help you if you need to work hard or react quickly. Stress also can last a long time. Long-term stress is caused by stressful situations or events. Examples of long-term stress include long-term health problems, ongoing problems at work, and conflicts in your family. Long-term stress can harm your health. Mindfulness is a focus only on things happening in the present moment. It's a process of purposefully paying attention to and being aware of your surroundings, your emotions, your thoughts, and how your body feels. You are aware of these things, but you aren't judging these experiences as \"good\" or \"bad. \" Mindfulness can help you learn to calm your mind and body to help you cope with illness, pain, and stress. How does mindfulness help to relieve stress? Mindfulness can help quiet your mind and relax your body. Studies show that it can help some people sleep better, feel less anxious, and bring their blood pressure down. And it's been shown to help some people live and cope better with certain health problems like heart disease, depression, chronic pain, and cancer. How do you practice mindfulness? To be mindful is to pay attention, to be present, and to be accepting. Like any new skill or habit, being mindful can take practice.   When you're mindful, you do just

## 2023-08-25 NOTE — PROGRESS NOTES
Patient is present for AWV
extended release tablet Take 2 tablets by mouth daily Yes Ronald Wright MD   clonazePAM (KLONOPIN) 1 MG tablet Take 1 tablet by mouth 3 times daily as needed. Indications: last fill 8/25/22 for 30 days Yes Historical Provider, MD Gosia Hester Sylvester Roy by Does not apply route Exp: 7/13/2026 Yes COLTON Olson CNP   ARIPiprazole (ABILIFY) 5 MG tablet Take 1 tablet by mouth daily Yes Historical Provider, MD   blood glucose monitor strips Test 4 times a day & as needed for symptoms of irregular blood glucose.  Yes COLTON Olson CNP   metFORMIN (GLUCOPHAGE) 500 MG tablet Take 1 tablet by mouth daily Yes Historical Provider, MD   lamoTRIgine (LAMICTAL) 25 MG tablet Take 1 tablet by mouth 2 times daily Yes Historical Provider, MD   lamoTRIgine (LAMICTAL) 200 MG tablet Take 1 tablet by mouth every evening Yes Historical Provider, MD   Multiple Vitamins-Minerals (THERAPEUTIC MULTIVITAMIN-MINERALS) tablet Take 1 tablet by mouth daily Yes Historical Provider, MD Fernandez (Including outside providers/suppliers regularly involved in providing care):   Patient Care Team:  COLTON Olson CNP as PCP - General (Family Nurse Practitioner)  COLTON Olson CNP as PCP - Empaneled Provider  COLTON Olson CNP as Referring Physician (Certified Nurse Practitioner)  Ricky Barcenas MD (Psychiatry)  Roseanne Sherman MD as Consulting Physician (Gastroenterology)  Brenda Howard DO as Surgeon (Neurosurgery)  Katy Storm MD as Consulting Physician (Dermatology)  Aneta Hurtado DO as Consulting Physician (Orthopedic Surgery)  Ronald Wright MD as Consulting Physician (Internal Medicine)  Neelima aMrtin MD as Anesthesiologist (Anesthesiology)  Mt Tejeda MD as Consulting Physician (Endocrinology)  Darrian Valdovinos MD as Consulting Physician (Cardiology)     Reviewed and updated this visit:  Allergies  Meds

## 2023-08-28 ENCOUNTER — HOSPITAL ENCOUNTER (OUTPATIENT)
Age: 58
Setting detail: SPECIMEN
Discharge: HOME | End: 2023-08-28

## 2023-08-28 LAB
25(OH)D3 SERPL-MCNC: 53 NG/ML
ALBUMIN SERPL-MCNC: 4.2 G/DL (ref 3.5–5.2)
ALBUMIN/GLOB SERPL: 1.5 {RATIO} (ref 1–2.5)
ALP SERPL-CCNC: 133 U/L (ref 35–104)
ALT SERPL-CCNC: 15 U/L (ref 5–33)
ANION GAP SERPL CALCULATED.3IONS-SCNC: 11 MMOL/L (ref 9–17)
AST SERPL-CCNC: 19 U/L
BILIRUB SERPL-MCNC: 0.3 MG/DL (ref 0.3–1.2)
BUN SERPL-MCNC: 9 MG/DL (ref 6–20)
CALCIUM SERPL-MCNC: 9.4 MG/DL (ref 8.6–10.4)
CHLORIDE SERPL-SCNC: 101 MMOL/L (ref 98–107)
CHOLEST SERPL-MCNC: 264 MG/DL
CHOLESTEROL/HDL RATIO: 2.3
CO2 SERPL-SCNC: 24 MMOL/L (ref 20–31)
CREAT SERPL-MCNC: 1.2 MG/DL (ref 0.5–0.9)
GFR SERPL CREATININE-BSD FRML MDRD: 52 ML/MIN/1.73M2
GLUCOSE SERPL-MCNC: 96 MG/DL (ref 70–99)
HDLC SERPL-MCNC: 114 MG/DL
LDLC SERPL CALC-MCNC: 126 MG/DL (ref 0–130)
POTASSIUM SERPL-SCNC: 4.1 MMOL/L (ref 3.7–5.3)
PROT SERPL-MCNC: 7 G/DL (ref 6.4–8.3)
PTH-INTACT SERPL-MCNC: 104.6 PG/ML (ref 14–72)
SODIUM SERPL-SCNC: 136 MMOL/L (ref 135–144)
T4 FREE SERPL-MCNC: 0.9 NG/DL (ref 0.9–1.7)
TRIGL SERPL-MCNC: 118 MG/DL

## 2023-09-01 ENCOUNTER — HOSPITAL ENCOUNTER (OUTPATIENT)
Dept: MRI IMAGING | Age: 58
End: 2023-09-01
Attending: PSYCHIATRY & NEUROLOGY
Payer: MEDICARE

## 2023-09-01 DIAGNOSIS — M54.16 LUMBAR RADICULOPATHY: ICD-10-CM

## 2023-09-01 LAB
EGFR, POC: >60 ML/MIN/1.73M2
POC CREATININE: 0.9 MG/DL (ref 0.51–1.19)

## 2023-09-01 PROCEDURE — 2580000003 HC RX 258: Performed by: PSYCHIATRY & NEUROLOGY

## 2023-09-01 PROCEDURE — 72158 MRI LUMBAR SPINE W/O & W/DYE: CPT

## 2023-09-01 PROCEDURE — A9579 GAD-BASE MR CONTRAST NOS,1ML: HCPCS | Performed by: PSYCHIATRY & NEUROLOGY

## 2023-09-01 PROCEDURE — 6360000004 HC RX CONTRAST MEDICATION: Performed by: PSYCHIATRY & NEUROLOGY

## 2023-09-01 PROCEDURE — 82565 ASSAY OF CREATININE: CPT

## 2023-09-01 RX ORDER — SODIUM CHLORIDE 0.9 % (FLUSH) 0.9 %
10 SYRINGE (ML) INJECTION 2 TIMES DAILY
Status: DISCONTINUED | OUTPATIENT
Start: 2023-09-01 | End: 2023-09-04 | Stop reason: HOSPADM

## 2023-09-01 RX ADMIN — GADOTERIDOL 20 ML: 279.3 INJECTION, SOLUTION INTRAVENOUS at 13:39

## 2023-09-01 RX ADMIN — SODIUM CHLORIDE, PRESERVATIVE FREE 10 ML: 5 INJECTION INTRAVENOUS at 13:39

## 2023-09-10 ENCOUNTER — HOSPITAL ENCOUNTER (EMERGENCY)
Age: 58
Discharge: HOME | End: 2023-09-10
Attending: STUDENT IN AN ORGANIZED HEALTH CARE EDUCATION/TRAINING PROGRAM
Payer: MEDICARE

## 2023-09-10 VITALS
SYSTOLIC BLOOD PRESSURE: 117 MMHG | OXYGEN SATURATION: 95 % | HEIGHT: 68 IN | BODY MASS INDEX: 34.86 KG/M2 | DIASTOLIC BLOOD PRESSURE: 69 MMHG | TEMPERATURE: 98 F | HEART RATE: 93 BPM | RESPIRATION RATE: 18 BRPM | WEIGHT: 230 LBS

## 2023-09-10 DIAGNOSIS — T78.3XXA ANGIOEDEMA, INITIAL ENCOUNTER: Primary | ICD-10-CM

## 2023-09-10 PROCEDURE — 6360000002 HC RX W HCPCS: Performed by: STUDENT IN AN ORGANIZED HEALTH CARE EDUCATION/TRAINING PROGRAM

## 2023-09-10 PROCEDURE — 96375 TX/PRO/DX INJ NEW DRUG ADDON: CPT

## 2023-09-10 PROCEDURE — 2580000003 HC RX 258: Performed by: STUDENT IN AN ORGANIZED HEALTH CARE EDUCATION/TRAINING PROGRAM

## 2023-09-10 PROCEDURE — 6370000000 HC RX 637 (ALT 250 FOR IP): Performed by: STUDENT IN AN ORGANIZED HEALTH CARE EDUCATION/TRAINING PROGRAM

## 2023-09-10 PROCEDURE — 99284 EMERGENCY DEPT VISIT MOD MDM: CPT

## 2023-09-10 PROCEDURE — 96374 THER/PROPH/DIAG INJ IV PUSH: CPT

## 2023-09-10 RX ORDER — DIPHENHYDRAMINE HYDROCHLORIDE 50 MG/ML
25 INJECTION INTRAMUSCULAR; INTRAVENOUS ONCE
Status: COMPLETED | OUTPATIENT
Start: 2023-09-10 | End: 2023-09-10

## 2023-09-10 RX ORDER — PREDNISONE 20 MG/1
40 TABLET ORAL DAILY
Qty: 10 TABLET | Refills: 0 | Status: SHIPPED | OUTPATIENT
Start: 2023-09-11 | End: 2023-09-16

## 2023-09-10 RX ORDER — FAMOTIDINE 20 MG/1
20 TABLET, FILM COATED ORAL ONCE
Status: COMPLETED | OUTPATIENT
Start: 2023-09-10 | End: 2023-09-10

## 2023-09-10 RX ADMIN — WATER 125 MG: 1 INJECTION INTRAMUSCULAR; INTRAVENOUS; SUBCUTANEOUS at 16:48

## 2023-09-10 RX ADMIN — DIPHENHYDRAMINE HYDROCHLORIDE 25 MG: 50 INJECTION INTRAMUSCULAR; INTRAVENOUS at 16:46

## 2023-09-10 RX ADMIN — FAMOTIDINE 20 MG: 20 TABLET, FILM COATED ORAL at 16:51

## 2023-09-10 ASSESSMENT — PAIN - FUNCTIONAL ASSESSMENT: PAIN_FUNCTIONAL_ASSESSMENT: NONE - DENIES PAIN

## 2023-09-10 NOTE — DISCHARGE INSTRUCTIONS
Please take the prednisone once a day every day for the next 5 days  Please follow-up with your primary care provider and request a referral to an allergy specialist  Return to the emergency room any severe worsening symptoms especially if you are having any difficulty breathing

## 2023-09-10 NOTE — ED PROVIDER NOTES
[facility]  Emergency Department Encounter  Emergency Medicine Physician     Pt Name: Ramon Fay  MRN: [id number]  [address] [facility] [place] 1965  Date of evaluation: 9/10/23  PCP:  Johana Claros, APRN - [address] [facility]       Chief Complaint   Patient presents with    Angioedema       HISTORY OF PRESENT ILLNESS  (Location/Symptom, Timing/Onset, Context/Setting, Quality, Duration, Modifying Factors, Severity.)    Mia Spear is a 62 y.o. female who presents with angioedema. Patient states that approximately half an hour ago she began to feel some swelling in her lips, hands, throat, and the left side of her face. She states that she has a longstanding history of angioedema and has had nearly 50 episodes of angioedema in her life. She states that she has always been treated with steroids, usually prednisone, and that this resolves all of her symptoms rather quickly. States that she has not seen an allergy specialist for this in the past.  She states that she has never had to receive plasma or any other specialty medications for her angioedema. States that she is never had to be intubated. She states that she is not on any ACE inhibitors but is on losartan. She states that she takes NSAID medication sparingly. She states that over these past 50 episodes, she and her physicians have never been able to find a trigger for her angioedema.         PAST MEDICAL / SURGICAL / SOCIAL / FAMILY HISTORY    has a past medical history of Acute kidney injury ([address]), Anemia, Angioedema, Antinuclear antibody (IQRA) titer greater than 1:80, Anxiety, Asthma, Ataxia, Atrial fibrillation ([address]), Borderline personality disorder ([address]), Bradycardia, CAD (coronary artery disease), Chronic kidney disease, CKD (chronic kidney disease), stage II, COVID-19, Degenerative disc disease, thoracic, Depression, Diabetes mellitus ([address]), Diastolic dysfunction, DJD (degenerative joint disease), Fibromyalgia, Foot pain, right,

## 2023-09-10 NOTE — ED TRIAGE NOTES
Mode of arrival (squad #, walk in, police, etc) : Walk in        Chief complaint(s): Angioedema        Arrival Note (brief scenario, treatment PTA, etc). : Pt arrives to ED c/oa angioedema that she states started this morning. Patient states that she has had this before and been given steroids for it.

## 2023-09-11 ASSESSMENT — ENCOUNTER SYMPTOMS
ABDOMINAL PAIN: 0
CHEST TIGHTNESS: 0
RHINORRHEA: 0
STRIDOR: 0
SHORTNESS OF BREATH: 0
FACIAL SWELLING: 1
VOMITING: 0
NAUSEA: 0
COUGH: 0
WHEEZING: 0

## 2023-09-18 ENCOUNTER — TELEPHONE (OUTPATIENT)
Dept: NEUROLOGY | Age: 58
End: 2023-09-18

## 2023-09-18 DIAGNOSIS — M54.16 LUMBAR RADICULOPATHY: Primary | ICD-10-CM

## 2023-09-18 DIAGNOSIS — M50.30 DEGENERATIVE DISC DISEASE, CERVICAL: ICD-10-CM

## 2023-09-19 NOTE — TELEPHONE ENCOUNTER
MRI lumbar spine shows multilevel degeneration. I will recommend referral to pain management for back pain. If symptoms do not improve, we can refer to neurosurgery.

## 2023-09-20 NOTE — TELEPHONE ENCOUNTER
Mia called the office back and I relayed all previously stated information to her. Please place referral for pain management. Thank you.

## 2023-10-18 ENCOUNTER — HOSPITAL ENCOUNTER (OUTPATIENT)
Age: 58
Setting detail: SPECIMEN
Discharge: HOME | End: 2023-10-18

## 2023-10-18 DIAGNOSIS — N18.31 STAGE 3A CHRONIC KIDNEY DISEASE (HCC): ICD-10-CM

## 2023-10-18 LAB
25(OH)D3 SERPL-MCNC: 65.2 NG/ML (ref 30–100)
ANION GAP SERPL CALCULATED.3IONS-SCNC: 13 MMOL/L (ref 9–16)
BASOPHILS # BLD: 0.07 K/UL (ref 0–0.2)
BASOPHILS NFR BLD: 1 % (ref 0–2)
BUN SERPL-MCNC: 10 MG/DL (ref 6–20)
CA-I BLD-SCNC: 1.23 MMOL/L (ref 1.13–1.33)
CALCIUM SERPL-MCNC: 9.5 MG/DL (ref 8.6–10.4)
CHLORIDE SERPL-SCNC: 102 MMOL/L (ref 98–107)
CO2 SERPL-SCNC: 25 MMOL/L (ref 20–31)
CREAT SERPL-MCNC: 1 MG/DL (ref 0.5–0.9)
CREAT UR-MCNC: 157.2 MG/DL (ref 28–217)
EOSINOPHIL # BLD: 0.12 K/UL (ref 0–0.44)
EOSINOPHILS RELATIVE PERCENT: 3 % (ref 1–4)
ERYTHROCYTE [DISTWIDTH] IN BLOOD BY AUTOMATED COUNT: 12.7 % (ref 11.8–14.4)
GFR SERPL CREATININE-BSD FRML MDRD: >60 ML/MIN/1.73M2
GLUCOSE SERPL-MCNC: 102 MG/DL (ref 74–99)
HCT VFR BLD AUTO: 41.8 % (ref 36.3–47.1)
HGB BLD-MCNC: 13.8 G/DL (ref 11.9–15.1)
IMM GRANULOCYTES # BLD AUTO: <0.03 K/UL (ref 0–0.3)
IMM GRANULOCYTES NFR BLD: 0 %
LYMPHOCYTES NFR BLD: 1.55 K/UL (ref 1.1–3.7)
LYMPHOCYTES RELATIVE PERCENT: 32 % (ref 24–43)
MCH RBC QN AUTO: 31.7 PG (ref 25.2–33.5)
MCHC RBC AUTO-ENTMCNC: 33 G/DL (ref 28.4–34.8)
MCV RBC AUTO: 95.9 FL (ref 82.6–102.9)
MONOCYTES NFR BLD: 0.44 K/UL (ref 0.1–1.2)
MONOCYTES NFR BLD: 9 % (ref 3–12)
NEUTROPHILS NFR BLD: 55 % (ref 36–65)
NEUTS SEG NFR BLD: 2.64 K/UL (ref 1.5–8.1)
NRBC BLD-RTO: 0 PER 100 WBC
PHOSPHATE SERPL-MCNC: 3.9 MG/DL (ref 2.5–4.5)
PLATELET # BLD AUTO: 224 K/UL (ref 138–453)
PMV BLD AUTO: 10.7 FL (ref 8.1–13.5)
POTASSIUM SERPL-SCNC: 4.1 MMOL/L (ref 3.7–5.3)
PTH-INTACT SERPL-MCNC: 160.9 PG/ML (ref 14–72)
RBC # BLD AUTO: 4.36 M/UL (ref 3.95–5.11)
SODIUM SERPL-SCNC: 140 MMOL/L (ref 136–145)
TOTAL PROTEIN, URINE: 38 MG/DL
WBC OTHER # BLD: 4.8 K/UL (ref 3.5–11.3)

## 2023-10-23 LAB
ANA SER QL IA: POSITIVE
DSDNA IGG SER QL IA: 1.1 IU/ML
NUCLEAR IGG SER IA-RTO: 1.5 U/ML

## 2023-11-01 ENCOUNTER — TELEPHONE (OUTPATIENT)
Dept: PHARMACY | Facility: CLINIC | Age: 58
End: 2023-11-01

## 2023-11-01 NOTE — TELEPHONE ENCOUNTER
Clinical Pharmacy   Direct: [phone]  Phone: toll free [phone]       For Pharmacy Admin Tracking Only    Program: Ever in place:  No  Recommendation Provided To: Pharmacy: 1  Intervention Detail: Adherence Monitorin  Intervention Accepted By: Pharmacy: 1  Gap Closed?: No   Time Spent (min): 10

## 2023-11-10 DIAGNOSIS — K21.9 GASTROESOPHAGEAL REFLUX DISEASE, UNSPECIFIED WHETHER ESOPHAGITIS PRESENT: ICD-10-CM

## 2023-11-10 NOTE — TELEPHONE ENCOUNTER
Last Visit:  8/25/2023     Next Visit Date:  Future Appointments   Date Time Provider Department Center   12/7/2023  9:00 AM Bonnie Deluna APRN - NP [facility] OB/Gyn [facility]   1/18/2024  8:30 AM Rosalia Pan MD  derm [facility]   2/26/2024 10:00 AM Osvaldo Townsend APRN - CNP [facility]   3/1/2024 10:00 AM [facility]   4/25/2024 10:30 AM Ryan Anrdews MD AFL Neph [facility] None   8/26/2024  9:45 AM Osvaldo Townsend APRN - CNP [facility]       Health Maintenance   Topic Date Due    Hepatitis B vaccine (1 of 3 - 3-dose series) Never done    Shingles vaccine (1 of 2) Never done    Diabetic retinal exam  01/12/2022    Diabetic foot exam  08/16/2022    Flu vaccine (1) 08/01/2023    COVID-19 Vaccine (3 - 2023-24 season) 09/01/2023    A1C test (Diabetic or Prediabetic)  10/17/2023    Diabetic Alb to Cr ratio (uACR) test  10/17/2023    Breast cancer screen  01/13/2024    Depression Monitoring  08/25/2024    Annual Wellness Visit (AWV)  08/25/2024    Lipids  08/28/2024    GFR test (Diabetes, CKD 3-4, OR last GFR 15-59)  10/18/2024    DTaP/Tdap/Td vaccine (3 - Td or Tdap) 09/14/2027    Colorectal Cancer Screen  07/02/2029    Pneumococcal 0-64 years Vaccine  Completed    Hepatitis C screen  Completed    HIV screen  Completed    Hepatitis A vaccine  Aged Out    Hib vaccine  Aged Out    Meningococcal (ACWY) vaccine  Aged Out    Cervical cancer screen  Discontinued       Hemoglobin A1C (%)   Date Value   10/17/2022 5.2   07/18/2022 5.4   04/18/2022 5.2             ( goal A1C is < 7)   No components found for: \"LABMICR\"  LDL Cholesterol (mg/dL)   Date Value   08/28/2023 126   10/17/2022 75       (goal LDL is <100)   AST (U/L)   Date Value   08/28/2023 19     ALT (U/L)   Date Value   08/28/2023 15     BUN (mg/dL)   Date Value   10/18/2023 10     BP Readings from Last 3 Encounters:   10/26/23 118/70   09/10/23 117/69   08/25/23 110/64          (goal 120/80)    All

## 2023-11-13 RX ORDER — FAMOTIDINE 40 MG/1
TABLET, FILM COATED ORAL
Qty: 90 TABLET | Refills: 1 | Status: SHIPPED | OUTPATIENT
Start: 2023-11-13

## 2023-11-16 DIAGNOSIS — Z76.0 MEDICATION REFILL: ICD-10-CM

## 2023-11-16 NOTE — TELEPHONE ENCOUNTER
Mia Huggins is calling to request a refill on the following medication(s):    Medication Request:  Requested Prescriptions     Pending Prescriptions Disp Refills    omeprazole (PRILOSEC) 20 MG delayed release capsule [Pharmacy Med Name: Omeprazole Oral Capsule Delayed Release 20 MG] 180 capsule 0     Sig: TAKE 1 CAPSULE BY MOUTH 2 TIMES A DAY       Last Visit Date (If Applicable):  8/25/2023    Next Visit Date:    2/26/2024

## 2023-11-17 RX ORDER — OMEPRAZOLE 20 MG/1
20 CAPSULE, DELAYED RELEASE ORAL 2 TIMES DAILY
Qty: 180 CAPSULE | Refills: 1 | Status: SHIPPED | OUTPATIENT
Start: 2023-11-17

## 2023-11-30 NOTE — TELEPHONE ENCOUNTER
repair, Cystoscopy with OBTRYX Ureteral Sling     Nuclear senile cataract     CKD (chronic kidney disease), stage II     Scleroderma ([facility])     Vitamin D deficiency     Trigger middle finger of right hand     Trigger ring finger of right hand     Osteoarthritis of spine with radiculopathy, cervical region     Degenerative disc disease, cervical     Left carpal tunnel syndrome     Spinal stenosis, cervical region     Neural foraminal stenosis of cervical spine     Closed fracture of lower end of femur ([address])     Closed supracondylar fracture of femur, left, initial encounter ([address])     Numbness     Senile osteoporosis     Acquired hypothyroidism     Age-related nuclear cataract of both eyes     Alcoholism in recovery ([address])     Bipolar affective disorder, currently depressed, mild ([facility])     Generalized anxiety disorder     Iron deficiency anemia     Iron malabsorption     Stage 3 chronic kidney disease ([address])     Dupuytren's disease of palm     Mild intermittent asthma     Secondary hyperparathyroidism ([address])

## 2023-12-04 RX ORDER — PRAVASTATIN SODIUM 40 MG
40 TABLET ORAL DAILY
Qty: 90 TABLET | Refills: 1 | Status: SHIPPED | OUTPATIENT
Start: 2023-12-04

## 2024-01-03 ENCOUNTER — OFFICE VISIT (OUTPATIENT)
Dept: OBGYN CLINIC | Age: 59
End: 2024-01-03
Payer: MEDICARE

## 2024-01-03 ENCOUNTER — HOSPITAL ENCOUNTER (OUTPATIENT)
Age: 59
Setting detail: SPECIMEN
Discharge: HOME | End: 2024-01-03

## 2024-01-03 VITALS
HEIGHT: 68 IN | DIASTOLIC BLOOD PRESSURE: 110 MMHG | WEIGHT: 235 LBS | BODY MASS INDEX: 35.61 KG/M2 | SYSTOLIC BLOOD PRESSURE: 182 MMHG

## 2024-01-03 DIAGNOSIS — Z12.31 ENCOUNTER FOR SCREENING MAMMOGRAM FOR MALIGNANT NEOPLASM OF BREAST: ICD-10-CM

## 2024-01-03 DIAGNOSIS — E66.01 SEVERE OBESITY (BMI 35.0-39.9) WITH COMORBIDITY (HCC): ICD-10-CM

## 2024-01-03 DIAGNOSIS — Z01.419 WELL FEMALE EXAM WITH ROUTINE GYNECOLOGICAL EXAM: Primary | ICD-10-CM

## 2024-01-03 DIAGNOSIS — N89.8 VAGINAL DISCHARGE: ICD-10-CM

## 2024-01-03 DIAGNOSIS — Z11.51 SPECIAL SCREENING EXAMINATION FOR HUMAN PAPILLOMAVIRUS (HPV): ICD-10-CM

## 2024-01-03 PROCEDURE — 99396 PREV VISIT EST AGE 40-64: CPT | Performed by: NURSE PRACTITIONER

## 2024-01-03 PROCEDURE — 3077F SYST BP >= 140 MM HG: CPT | Performed by: NURSE PRACTITIONER

## 2024-01-03 PROCEDURE — 3080F DIAST BP >= 90 MM HG: CPT | Performed by: NURSE PRACTITIONER

## 2024-01-03 RX ORDER — BLOOD SUGAR DIAGNOSTIC
STRIP MISCELLANEOUS
COMMUNITY
Start: 2023-09-07

## 2024-01-03 RX ORDER — PHENTERMINE HYDROCHLORIDE 37.5 MG/1
37.5 TABLET ORAL DAILY
COMMUNITY
Start: 2023-10-03

## 2024-01-03 RX ORDER — ERGOCALCIFEROL 1.25 MG/1
50000 CAPSULE ORAL
COMMUNITY

## 2024-01-03 NOTE — PROGRESS NOTES
11/08/2018    Closed supracondylar fracture of femur, left, initial encounter ([facility])     Spinal stenosis, cervical region 10/30/2018    Neural foraminal stenosis of cervical spine 10/30/2018    Left carpal tunnel syndrome     Osteoarthritis of spine with radiculopathy, cervical region     Degenerative disc disease, cervical     Trigger middle finger of right hand     Trigger ring finger of right hand     CKD (chronic kidney disease), stage II 08/23/2018    Scleroderma ([facility]) 08/23/2018    Vitamin D deficiency 08/23/2018 7/30/18 Cystocele repair, Cystoscopy with OBTRYX Ureteral Sling 07/30/2018     CYSTOCELE REPAIR  CYSTOSCOPY WITH OBTRYX URETERAL SLING      Overflow incontinence     Cystocele, midline 07/29/2018    JOYCE (stress urinary incontinence, female) 07/29/2018    Hypotension 01/24/2018    Nuclear senile cataract 01/24/2018    Hypovolemia 01/16/2018    H/O: stroke 01/15/2018    H/O: hysterectomy     H/O hysterectomy with BSO at [facility] for benign disease 2014 11/29/2017     Hysterectomy in 2014 for heavy menses at [facility]      Spondylosis of cervical region without myelopathy or radiculopathy      Suffers with neck pain.  A MRI of the cervical spine revealed multilevel degenerative joint disease.  Her neck pain is constant and radiates down both upper extremities. An EMG nerve conduction study in February 2016 found a bilateral carpal tunnel syndrome and possible ulnar neuropathy.  A MRI of the cervical spine in August 2016 found multilevel degenerative joint disease and at C5-C6 a disc protrusion mildly indenting the thecal sac and closely abutting the ventral cord.    Additionally she reports having low back pain and previously had a lumbar spine MRI in September 2014 done at the [facility] in [place] which revealed multilevel lumbar degenerative joint disease worse at L5-S1 and L4-L5.    Pain management consultation pending.        Chronic fatigue syndrome 06/15/2017    S/P knee

## 2024-01-04 LAB
HPV I/H RISK 4 DNA CVX QL NAA+PROBE: NOT DETECTED
HPV SAMPLE: NORMAL
HPV, INTERPRETATION: NORMAL
HPV16 DNA CVX QL NAA+PROBE: NOT DETECTED
HPV18 DNA CVX QL NAA+PROBE: NOT DETECTED
SPECIMEN DESCRIPTION: NORMAL

## 2024-01-05 ENCOUNTER — TELEPHONE (OUTPATIENT)
Dept: OBGYN CLINIC | Age: 59
End: 2024-01-05

## 2024-01-05 RX ORDER — CLINDAMYCIN HYDROCHLORIDE 300 MG/1
300 CAPSULE ORAL 3 TIMES DAILY
Qty: 30 CAPSULE | Refills: 0 | Status: SHIPPED | OUTPATIENT
Start: 2024-01-05 | End: 2024-01-15

## 2024-01-05 NOTE — TELEPHONE ENCOUNTER
----- Message from COLTON Marvin - NP sent at 1/5/2024 11:38 AM EST -----  + GBS- having sx  Cleocin 300 mg TID x 10 days

## 2024-01-06 LAB
MICROORGANISM SPEC CULT: ABNORMAL
SPECIMEN DESCRIPTION: ABNORMAL

## 2024-01-11 LAB — CYTOLOGY REPORT: NORMAL

## 2024-01-22 ENCOUNTER — HOSPITAL ENCOUNTER (OUTPATIENT)
Dept: WOMENS IMAGING | Age: 59
Discharge: HOME | End: 2024-01-24
Payer: MEDICARE

## 2024-01-22 VITALS — HEIGHT: 68 IN | WEIGHT: 235 LBS | BODY MASS INDEX: 35.61 KG/M2

## 2024-01-22 DIAGNOSIS — Z12.31 ENCOUNTER FOR SCREENING MAMMOGRAM FOR MALIGNANT NEOPLASM OF BREAST: ICD-10-CM

## 2024-01-22 PROCEDURE — 77063 BREAST TOMOSYNTHESIS BI: CPT

## 2024-01-31 RX ORDER — CYANOCOBALAMIN 1000 UG/ML
INJECTION, SOLUTION INTRAMUSCULAR; SUBCUTANEOUS
Qty: 3 ML | Refills: 0 | Status: SHIPPED | OUTPATIENT
Start: 2024-01-31

## 2024-02-24 ENCOUNTER — HOSPITAL ENCOUNTER (OUTPATIENT)
Age: 59
Setting detail: SPECIMEN
Discharge: HOME | End: 2024-02-24
Payer: MEDICARE

## 2024-02-24 DIAGNOSIS — D50.9 IRON DEFICIENCY ANEMIA, UNSPECIFIED IRON DEFICIENCY ANEMIA TYPE: ICD-10-CM

## 2024-02-24 DIAGNOSIS — E53.8 B12 DEFICIENCY: ICD-10-CM

## 2024-02-24 LAB
ALBUMIN SERPL-MCNC: 4.3 G/DL (ref 3.5–5.2)
ALP SERPL-CCNC: 132 U/L (ref 35–104)
ALT SERPL-CCNC: 16 U/L (ref 5–33)
ANION GAP SERPL CALCULATED.3IONS-SCNC: 13 MMOL/L (ref 9–17)
AST SERPL-CCNC: 19 U/L
BASOPHILS # BLD: 0.1 K/UL (ref 0–0.2)
BASOPHILS NFR BLD: 1 % (ref 0–2)
BILIRUB SERPL-MCNC: 0.3 MG/DL (ref 0.3–1.2)
BUN SERPL-MCNC: 18 MG/DL (ref 6–20)
CALCIUM SERPL-MCNC: 9.7 MG/DL (ref 8.6–10.4)
CHLORIDE SERPL-SCNC: 100 MMOL/L (ref 98–107)
CO2 SERPL-SCNC: 25 MMOL/L (ref 20–31)
CREAT SERPL-MCNC: 1.1 MG/DL (ref 0.5–0.9)
EOSINOPHIL # BLD: 0.2 K/UL (ref 0–0.4)
EOSINOPHILS RELATIVE PERCENT: 2 % (ref 0–4)
ERYTHROCYTE [DISTWIDTH] IN BLOOD BY AUTOMATED COUNT: 14 % (ref 11.5–14.9)
FERRITIN SERPL-MCNC: 31 NG/ML (ref 13–150)
FOLATE SERPL-MCNC: 5.7 NG/ML
GFR SERPL CREATININE-BSD FRML MDRD: 58 ML/MIN/1.73M2
GLUCOSE SERPL-MCNC: 119 MG/DL (ref 70–99)
HCT VFR BLD AUTO: 37.9 % (ref 36–46)
HGB BLD-MCNC: 12.7 G/DL (ref 12–16)
LYMPHOCYTES NFR BLD: 1.5 K/UL (ref 1–4.8)
LYMPHOCYTES RELATIVE PERCENT: 22 % (ref 24–44)
MCH RBC QN AUTO: 31.1 PG (ref 26–34)
MCHC RBC AUTO-ENTMCNC: 33.7 G/DL (ref 31–37)
MCV RBC AUTO: 92.3 FL (ref 80–100)
MONOCYTES NFR BLD: 0.7 K/UL (ref 0.1–1.3)
MONOCYTES NFR BLD: 10 % (ref 1–7)
NEUTROPHILS NFR BLD: 65 % (ref 36–66)
NEUTS SEG NFR BLD: 4.4 K/UL (ref 1.3–9.1)
PLATELET # BLD AUTO: 254 K/UL (ref 150–450)
PMV BLD AUTO: 8.2 FL (ref 6–12)
POTASSIUM SERPL-SCNC: 4.1 MMOL/L (ref 3.7–5.3)
PROT SERPL-MCNC: 7.2 G/DL (ref 6.4–8.3)
RBC # BLD AUTO: 4.1 M/UL (ref 4–5.2)
SODIUM SERPL-SCNC: 138 MMOL/L (ref 135–144)
VIT B12 SERPL-MCNC: 456 PG/ML (ref 232–1245)
WBC OTHER # BLD: 6.8 K/UL (ref 3.5–11)

## 2024-02-24 PROCEDURE — 80053 COMPREHEN METABOLIC PANEL: CPT

## 2024-02-24 PROCEDURE — 82746 ASSAY OF FOLIC ACID SERUM: CPT

## 2024-02-24 PROCEDURE — 85025 COMPLETE CBC W/AUTO DIFF WBC: CPT

## 2024-02-24 PROCEDURE — 82728 ASSAY OF FERRITIN: CPT

## 2024-02-24 PROCEDURE — 82607 VITAMIN B-12: CPT

## 2024-02-24 PROCEDURE — 36415 COLL VENOUS BLD VENIPUNCTURE: CPT

## 2024-02-26 ENCOUNTER — OFFICE VISIT (OUTPATIENT)
Dept: FAMILY MEDICINE CLINIC | Age: 59
End: 2024-02-26
Payer: MEDICARE

## 2024-02-26 VITALS
BODY MASS INDEX: 35.18 KG/M2 | DIASTOLIC BLOOD PRESSURE: 72 MMHG | SYSTOLIC BLOOD PRESSURE: 111 MMHG | OXYGEN SATURATION: 98 % | WEIGHT: 231.4 LBS | HEART RATE: 80 BPM

## 2024-02-26 DIAGNOSIS — N18.31 STAGE 3A CHRONIC KIDNEY DISEASE (HCC): ICD-10-CM

## 2024-02-26 DIAGNOSIS — N18.31 TYPE 2 DIABETES MELLITUS WITH STAGE 3A CHRONIC KIDNEY DISEASE, WITHOUT LONG-TERM CURRENT USE OF INSULIN (HCC): ICD-10-CM

## 2024-02-26 DIAGNOSIS — N25.81 SECONDARY HYPERPARATHYROIDISM (HCC): ICD-10-CM

## 2024-02-26 DIAGNOSIS — F31.70 BIPOLAR DISORDER IN FULL REMISSION, MOST RECENT EPISODE UNSPECIFIED TYPE (HCC): ICD-10-CM

## 2024-02-26 DIAGNOSIS — M34.9 SCLERODERMA (HCC): ICD-10-CM

## 2024-02-26 DIAGNOSIS — E11.22 TYPE 2 DIABETES MELLITUS WITH STAGE 3A CHRONIC KIDNEY DISEASE, WITHOUT LONG-TERM CURRENT USE OF INSULIN (HCC): ICD-10-CM

## 2024-02-26 DIAGNOSIS — H61.21 EXCESSIVE CERUMEN IN RIGHT EAR CANAL: Primary | ICD-10-CM

## 2024-02-26 DIAGNOSIS — F10.21 ALCOHOLISM IN RECOVERY (HCC): ICD-10-CM

## 2024-02-26 PROCEDURE — 3074F SYST BP LT 130 MM HG: CPT | Performed by: NURSE PRACTITIONER

## 2024-02-26 PROCEDURE — 99214 OFFICE O/P EST MOD 30 MIN: CPT | Performed by: NURSE PRACTITIONER

## 2024-02-26 PROCEDURE — 3078F DIAST BP <80 MM HG: CPT | Performed by: NURSE PRACTITIONER

## 2024-02-26 SDOH — ECONOMIC STABILITY: FOOD INSECURITY: WITHIN THE PAST 12 MONTHS, THE FOOD YOU BOUGHT JUST DIDN'T LAST AND YOU DIDN'T HAVE MONEY TO GET MORE.: NEVER TRUE

## 2024-02-26 SDOH — ECONOMIC STABILITY: FOOD INSECURITY: WITHIN THE PAST 12 MONTHS, YOU WORRIED THAT YOUR FOOD WOULD RUN OUT BEFORE YOU GOT MONEY TO BUY MORE.: NEVER TRUE

## 2024-02-26 SDOH — ECONOMIC STABILITY: INCOME INSECURITY: HOW HARD IS IT FOR YOU TO PAY FOR THE VERY BASICS LIKE FOOD, HOUSING, MEDICAL CARE, AND HEATING?: NOT VERY HARD

## 2024-02-26 ASSESSMENT — PATIENT HEALTH QUESTIONNAIRE - PHQ9
2. FEELING DOWN, DEPRESSED OR HOPELESS: 0
3. TROUBLE FALLING OR STAYING ASLEEP: 0
4. FEELING TIRED OR HAVING LITTLE ENERGY: 0
1. LITTLE INTEREST OR PLEASURE IN DOING THINGS: 0
SUM OF ALL RESPONSES TO PHQ QUESTIONS 1-9: 0
9. THOUGHTS THAT YOU WOULD BE BETTER OFF DEAD, OR OF HURTING YOURSELF: 0
8. MOVING OR SPEAKING SO SLOWLY THAT OTHER PEOPLE COULD HAVE NOTICED. OR THE OPPOSITE, BEING SO FIGETY OR RESTLESS THAT YOU HAVE BEEN MOVING AROUND A LOT MORE THAN USUAL: 0
SUM OF ALL RESPONSES TO PHQ QUESTIONS 1-9: 0
6. FEELING BAD ABOUT YOURSELF - OR THAT YOU ARE A FAILURE OR HAVE LET YOURSELF OR YOUR FAMILY DOWN: 0
SUM OF ALL RESPONSES TO PHQ QUESTIONS 1-9: 0
7. TROUBLE CONCENTRATING ON THINGS, SUCH AS READING THE NEWSPAPER OR WATCHING TELEVISION: 0
SUM OF ALL RESPONSES TO PHQ QUESTIONS 1-9: 0
SUM OF ALL RESPONSES TO PHQ9 QUESTIONS 1 & 2: 0
10. IF YOU CHECKED OFF ANY PROBLEMS, HOW DIFFICULT HAVE THESE PROBLEMS MADE IT FOR YOU TO DO YOUR WORK, TAKE CARE OF THINGS AT HOME, OR GET ALONG WITH OTHER PEOPLE: 0
5. POOR APPETITE OR OVEREATING: 0

## 2024-02-26 NOTE — PROGRESS NOTES
Musculoskeletal:         General: No deformity. Normal range of motion.      Cervical back: Full passive range of motion without pain and normal range of motion.   Skin:     General: Skin is warm and dry.   Neurological:      Mental Status: She is alert and oriented to person, place, and time.            An electronic signature was used to authenticate this note.    --COLTON MATSON - CNP

## 2024-03-01 ENCOUNTER — OFFICE VISIT (OUTPATIENT)
Dept: ONCOLOGY | Age: 59
End: 2024-03-01
Payer: MEDICARE

## 2024-03-01 ENCOUNTER — TELEPHONE (OUTPATIENT)
Dept: ONCOLOGY | Age: 59
End: 2024-03-01

## 2024-03-01 VITALS
SYSTOLIC BLOOD PRESSURE: 129 MMHG | HEART RATE: 79 BPM | DIASTOLIC BLOOD PRESSURE: 81 MMHG | WEIGHT: 234.2 LBS | RESPIRATION RATE: 16 BRPM | TEMPERATURE: 97.5 F | BODY MASS INDEX: 35.61 KG/M2

## 2024-03-01 DIAGNOSIS — E53.8 B12 DEFICIENCY: ICD-10-CM

## 2024-03-01 DIAGNOSIS — D50.9 IRON DEFICIENCY ANEMIA, UNSPECIFIED IRON DEFICIENCY ANEMIA TYPE: Primary | ICD-10-CM

## 2024-03-01 PROCEDURE — 3079F DIAST BP 80-89 MM HG: CPT | Performed by: INTERNAL MEDICINE

## 2024-03-01 PROCEDURE — 3074F SYST BP LT 130 MM HG: CPT | Performed by: INTERNAL MEDICINE

## 2024-03-01 PROCEDURE — 99214 OFFICE O/P EST MOD 30 MIN: CPT | Performed by: INTERNAL MEDICINE

## 2024-03-01 PROCEDURE — 99211 OFF/OP EST MAY X REQ PHY/QHP: CPT | Performed by: INTERNAL MEDICINE

## 2024-03-01 RX ORDER — CYANOCOBALAMIN 1000 UG/ML
INJECTION, SOLUTION INTRAMUSCULAR; SUBCUTANEOUS
Qty: 3 ML | Refills: 3 | Status: SHIPPED | OUTPATIENT
Start: 2024-03-01

## 2024-03-01 NOTE — PROGRESS NOTES
DIAGNOSIS:   Anemia, malabsorption component  History of gastric bypass surgery  History of iron deficiency and B12 likely due to malabsorption    CURRENT THERAPY:  IV iron as needed    parenteral B12 supplementation  Observation     BRIEF CASE HISTORY:   Mia Huggins is a very pleasant 58 y.o. female who is referred to us for anemia. She reports she has long history of anemia and has received IV iron in the past and been on oral iron for the last 10 years. Her last iron infusion was 2014 at [facility]. She had gastric by-pass in 2012, she did have anemia prior but worsened following surgery. She does have fatigue, PICA, shortness of breath. She had colonoscopy 2019 which was negative, she does have chronic colonoscopy. She has history of B12 deficiency and takes oral supplementation. She has parathyroid issue and takes synthroid. She has multiple complications and is seen by several specialists including rheumatology, cardiology, ortho surg. She has scleroderma, osteoporosis, and Raynaud syndrome. Her scleroderma is diffused but currently well controlled. She has had both knees replacements twice.   She was adopted and family history is unknown.     INTERIM HISTORY: The patient presents today to discuss her lab work.  She has been maintaining with oral iron and monthly B12 injections.  She denies any active bleeding.  She is feeling well and has no complaints.   She denies any swelling, bleeding, nausea, vomiting, or diarrhea.  Denies any bleeding  PAST MEDICAL HISTORY: has a past medical history of Acute kidney injury (HCC), Anemia, Angioedema, Antinuclear antibody (IQRA) titer greater than 1:80, Anxiety, Asthma, Ataxia, Atrial fibrillation (HCC), Borderline personality disorder (HCC), Bradycardia, CAD (coronary artery disease), Chronic kidney disease, CKD (chronic kidney disease), stage II, COVID-19, Degenerative disc disease, thoracic, Depression, Diabetes mellitus (HCC), Diastolic dysfunction, DJD (degenerative

## 2024-03-04 RX ORDER — ALBUTEROL SULFATE 90 UG/1
2 AEROSOL, METERED RESPIRATORY (INHALATION) EVERY 6 HOURS PRN
Qty: 8.5 G | Refills: 2 | Status: SHIPPED | OUTPATIENT
Start: 2024-03-04

## 2024-03-04 NOTE — TELEPHONE ENCOUNTER
Left message for patient to contact office. Script is printed at office  
female)     Overflow incontinence     7/30/18 Cystocele repair, Cystoscopy with OBTRYX Ureteral Sling     Nuclear senile cataract     CKD (chronic kidney disease), stage II     Scleroderma ([facility])     Vitamin D deficiency     Trigger middle finger of right hand     Trigger ring finger of right hand     Osteoarthritis of spine with radiculopathy, cervical region     Degenerative disc disease, cervical     Left carpal tunnel syndrome     Spinal stenosis, cervical region     Neural foraminal stenosis of cervical spine     Closed fracture of lower end of femur ([facility])     Closed supracondylar fracture of femur, left, initial encounter ([facility])     Numbness     Senile osteoporosis     Acquired hypothyroidism     Age-related nuclear cataract of both eyes     Alcoholism in recovery ([facility])     Bipolar affective disorder, currently depressed, mild ([facility])     Generalized anxiety disorder     Iron deficiency anemia     Iron malabsorption     Stage 3 chronic kidney disease ([facility])     Dupuytren's disease of palm     Mild intermittent asthma     Secondary hyperparathyroidism ([facility])

## 2024-03-28 DIAGNOSIS — L71.9 ROSACEA: ICD-10-CM

## 2024-04-16 ENCOUNTER — HOSPITAL ENCOUNTER (OUTPATIENT)
Age: 59
Discharge: HOME | End: 2024-04-16
Payer: MEDICARE

## 2024-04-16 DIAGNOSIS — N18.31 STAGE 3A CHRONIC KIDNEY DISEASE (HCC): ICD-10-CM

## 2024-04-16 DIAGNOSIS — E87.6 HYPOKALEMIA: ICD-10-CM

## 2024-04-16 LAB
25(OH)D3 SERPL-MCNC: 56.8 NG/ML (ref 30–100)
ANION GAP SERPL CALCULATED.3IONS-SCNC: 11 MMOL/L (ref 9–17)
BASOPHILS # BLD: 0 K/UL (ref 0–0.2)
BASOPHILS NFR BLD: 0 % (ref 0–2)
BUN SERPL-MCNC: 13 MG/DL (ref 6–20)
CA-I BLD-SCNC: 1.27 MMOL/L (ref 1.13–1.33)
CALCIUM SERPL-MCNC: 9.6 MG/DL (ref 8.6–10.4)
CHLORIDE SERPL-SCNC: 104 MMOL/L (ref 98–107)
CO2 SERPL-SCNC: 26 MMOL/L (ref 20–31)
CREAT SERPL-MCNC: 0.9 MG/DL (ref 0.5–0.9)
CREAT UR-MCNC: 143 MG/DL (ref 28–217)
CREAT UR-MCNC: 145.5 MG/DL (ref 28–217)
EOSINOPHIL # BLD: 0.06 K/UL (ref 0–0.4)
EOSINOPHILS RELATIVE PERCENT: 1 % (ref 0–4)
ERYTHROCYTE [DISTWIDTH] IN BLOOD BY AUTOMATED COUNT: 16.1 % (ref 11.5–14.9)
GFR SERPL CREATININE-BSD FRML MDRD: 74 ML/MIN/1.73M2
GLUCOSE SERPL-MCNC: 100 MG/DL (ref 70–99)
HCT VFR BLD AUTO: 37.9 % (ref 36–46)
HGB BLD-MCNC: 12.5 G/DL (ref 12–16)
LYMPHOCYTES NFR BLD: 2.73 K/UL (ref 1–4.8)
LYMPHOCYTES RELATIVE PERCENT: 48 % (ref 24–44)
MAGNESIUM SERPL-MCNC: 1.8 MG/DL (ref 1.6–2.6)
MCH RBC QN AUTO: 30.4 PG (ref 26–34)
MCHC RBC AUTO-ENTMCNC: 33 G/DL (ref 31–37)
MCV RBC AUTO: 91.9 FL (ref 80–100)
MONOCYTES NFR BLD: 0.63 K/UL (ref 0.1–1.3)
MONOCYTES NFR BLD: 11 % (ref 1–7)
MORPHOLOGY: ABNORMAL
NEUTROPHILS NFR BLD: 40 % (ref 36–66)
NEUTS SEG NFR BLD: 2.28 K/UL (ref 1.3–9.1)
PHOSPHATE SERPL-MCNC: 3.5 MG/DL (ref 2.6–4.5)
PLATELET # BLD AUTO: 212 K/UL (ref 150–450)
PMV BLD AUTO: 8 FL (ref 6–12)
POTASSIUM SERPL-SCNC: 4.2 MMOL/L (ref 3.7–5.3)
PTH-INTACT SERPL-MCNC: 85 PG/ML (ref 15–65)
RBC # BLD AUTO: 4.12 M/UL (ref 4–5.2)
SODIUM SERPL-SCNC: 141 MMOL/L (ref 135–144)
TOTAL PROTEIN, URINE: 30 MG/DL
TOTAL PROTEIN, URINE: 31 MG/DL
URINE TOTAL PROTEIN CREATININE RATIO: 0.21 (ref 0–0.2)
WBC OTHER # BLD: 5.7 K/UL (ref 3.5–11)

## 2024-04-16 PROCEDURE — 85025 COMPLETE CBC W/AUTO DIFF WBC: CPT

## 2024-04-16 PROCEDURE — 83970 ASSAY OF PARATHORMONE: CPT

## 2024-04-16 PROCEDURE — 36415 COLL VENOUS BLD VENIPUNCTURE: CPT

## 2024-04-16 PROCEDURE — 80048 BASIC METABOLIC PNL TOTAL CA: CPT

## 2024-04-16 PROCEDURE — 83735 ASSAY OF MAGNESIUM: CPT

## 2024-04-16 PROCEDURE — 82330 ASSAY OF CALCIUM: CPT

## 2024-04-16 PROCEDURE — 84156 ASSAY OF PROTEIN URINE: CPT

## 2024-04-16 PROCEDURE — 82570 ASSAY OF URINE CREATININE: CPT

## 2024-04-16 PROCEDURE — 84100 ASSAY OF PHOSPHORUS: CPT

## 2024-04-16 PROCEDURE — 82306 VITAMIN D 25 HYDROXY: CPT

## 2024-05-04 DIAGNOSIS — K21.9 GASTROESOPHAGEAL REFLUX DISEASE, UNSPECIFIED WHETHER ESOPHAGITIS PRESENT: ICD-10-CM

## 2024-05-06 RX ORDER — FAMOTIDINE 40 MG/1
TABLET, FILM COATED ORAL
Qty: 90 TABLET | Refills: 3 | Status: SHIPPED | OUTPATIENT
Start: 2024-05-06

## 2024-05-06 NOTE — TELEPHONE ENCOUNTER
Last visit: 02/26/2024  Last Med refill: 02/08/2024  Does patient have enough medication for 72 hours: No:     Next Visit Date:  Future Appointments   Date Time Provider Department Center   5/15/2024 10:40 AM Demond Fermin MD Neuro Spec Neurology -   5/22/2024  9:15 AM [facility]   5/28/2024 10:15 AM Osvaldo Townsend APRN - CNP [facility]   8/21/2024  9:30 AM Nel Linares APRN - CNP [facility] Neph [facility] None   8/26/2024  9:45 AM [facility]   1/3/2025 10:30 AM Bonnie Deluna APRN - NP [facility] OB/Gyn [facility]       Health Maintenance   Topic Date Due    Hepatitis B vaccine (1 of 3 - 3-dose series) Never done    Shingles vaccine (1 of 2) Never done    Diabetic retinal exam  01/12/2022    Diabetic foot exam  08/16/2022    COVID-19 Vaccine (3 - 2023-24 season) 09/01/2023    Diabetic Alb to Cr ratio (uACR) test  10/17/2023    Annual Wellness Visit (Medicare Advantage)  01/01/2024    Flu vaccine (Season Ended) 08/01/2024    Lipids  08/28/2024    A1C test (Diabetic or Prediabetic)  01/10/2025    Breast cancer screen  01/22/2025    Depression Monitoring  02/26/2025    GFR test (Diabetes, CKD 3-4, OR last GFR 15-59)  04/16/2025    DTaP/Tdap/Td vaccine (3 - Td or Tdap) 09/14/2027    Colorectal Cancer Screen  07/02/2029    Pneumococcal 0-64 years Vaccine  Completed    Hepatitis C screen  Completed    HIV screen  Completed    Hepatitis A vaccine  Aged Out    Hib vaccine  Aged Out    Polio vaccine  Aged Out    Meningococcal (ACWY) vaccine  Aged Out    Cervical cancer screen  Discontinued       Hemoglobin A1C (%)   Date Value   01/10/2024 5.6   10/17/2022 5.2   07/18/2022 5.4             ( goal A1C is < 7)   No components found for: \"LABMICR\"  No components found for: \"LDLCHOLESTEROL\", \"LDLCALC\"    (goal LDL is <100)   AST (U/L)   Date Value   02/24/2024 19     ALT (U/L)   Date Value   02/24/2024 16     BUN (mg/dL)   Date Value   04/16/2024 13

## 2024-05-15 ENCOUNTER — OFFICE VISIT (OUTPATIENT)
Dept: NEUROLOGY | Age: 59
End: 2024-05-15
Payer: MEDICARE

## 2024-05-15 ENCOUNTER — TELEPHONE (OUTPATIENT)
Dept: NEUROLOGY | Age: 59
End: 2024-05-15

## 2024-05-15 VITALS
HEART RATE: 65 BPM | DIASTOLIC BLOOD PRESSURE: 95 MMHG | HEIGHT: 68 IN | SYSTOLIC BLOOD PRESSURE: 156 MMHG | BODY MASS INDEX: 35.31 KG/M2 | WEIGHT: 233 LBS

## 2024-05-15 DIAGNOSIS — M54.16 LUMBAR RADICULOPATHY: Primary | ICD-10-CM

## 2024-05-15 DIAGNOSIS — M48.02 NEURAL FORAMINAL STENOSIS OF CERVICAL SPINE: ICD-10-CM

## 2024-05-15 DIAGNOSIS — Z86.73 H/O: STROKE: ICD-10-CM

## 2024-05-15 DIAGNOSIS — M50.30 DEGENERATIVE DISC DISEASE, CERVICAL: ICD-10-CM

## 2024-05-15 DIAGNOSIS — E53.8 B12 DEFICIENCY: ICD-10-CM

## 2024-05-15 DIAGNOSIS — G62.9 SENSORY NEUROPATHY: ICD-10-CM

## 2024-05-15 PROCEDURE — 99214 OFFICE O/P EST MOD 30 MIN: CPT | Performed by: PSYCHIATRY & NEUROLOGY

## 2024-05-15 PROCEDURE — 3080F DIAST BP >= 90 MM HG: CPT | Performed by: PSYCHIATRY & NEUROLOGY

## 2024-05-15 PROCEDURE — 3077F SYST BP >= 140 MM HG: CPT | Performed by: PSYCHIATRY & NEUROLOGY

## 2024-05-15 RX ORDER — FLUTICASONE PROPIONATE AND SALMETEROL 250; 50 UG/1; UG/1
POWDER RESPIRATORY (INHALATION)
COMMUNITY
Start: 2024-03-20

## 2024-05-15 RX ORDER — PREDNISONE 20 MG/1
TABLET ORAL
COMMUNITY
Start: 2024-03-20

## 2024-05-15 NOTE — PROGRESS NOTES
mental status  Pt states it changes her mental status  Pt states it changes her mental status      Amlodipine Other (See Comments) and Diarrhea     diarrhea and stress  Other reaction(s): Unknown  diarrhea and stress  diarrhea and stress  diarrhea and stress    Dilaudid [Hydromorphone Hcl] Hives and Itching    Hydromorphone Hives, Itching and Other (See Comments)    Sulfa Antibiotics Hives, Rash and Other (See Comments)     Family History   Adopted: Yes   Problem Relation Age of Onset    Depression Mother     Asthma Mother     Depression Father     High Blood Pressure Father     Diabetes Father     Asthma Father     Depression Sister     Asthma Sister     Depression Brother     Asthma Brother     Asthma Maternal Aunt     Asthma Maternal Uncle     Asthma Paternal Aunt     Asthma Paternal Uncle     Asthma Maternal Grandmother     Cancer Maternal Grandmother     Asthma Maternal Grandfather     Asthma Paternal Grandmother     Asthma Paternal Grandfather     Asthma Maternal Cousin     Asthma Paternal Cousin       Social History     Socioeconomic History    Marital status:      Spouse name: Not on file    Number of children: Not on file    Years of education: Not on file    Highest education level: Not on file   Occupational History    Not on file   Tobacco Use    Smoking status: Never    Smokeless tobacco: Never    Tobacco comments:     Never smoked   Vaping Use    Vaping Use: Never used   Substance and Sexual Activity    Alcohol use: Not Currently    Drug use: No    Sexual activity: Not Currently     Birth control/protection: Surgical     Comment: pt has hysterectomy   Other Topics Concern    Not on file   Social History Narrative    Not on file     Social Determinants of Health     Financial Resource Strain: Low Risk  (2/26/2024)    Overall Financial Resource Strain (CARDIA)     Difficulty of Paying Living Expenses: Not very hard   Food Insecurity: No Food Insecurity (2/26/2024)    Hunger Vital Sign     Worried

## 2024-05-20 NOTE — PROGRESS NOTES
Dermatology Patient Note  [facility] DERMATOLOGY  [address]  [address]  [place] [address]  Dept: [phone]  Dept Fax: [phone]      VISITDATE: 5/22/2024   REFERRING PROVIDER: No ref. provider found      Mia Huggins is a 58 y.o. female  who presents today in the office for:    Other (Patient presents today for a fbse. She has a spot on her back under her bra strap she would like to be treated with ln2 and a spot on her forehead that itches at times. )      HISTORY OF PRESENT ILLNESS:  Patient presents for FBSE. At  on 1/18/23, she was continued on metrocream for rosacea.     She is using the metrocream and has requested a refill. She has a spot on her back that bothers her. She also has a spot on her forehead that itches intermittently.     MEDICAL PROBLEMS:  Patient Active Problem List    Diagnosis Date Noted    Secondary hyperparathyroidism (HCC) 02/20/2023     Priority: Medium    Hx of total bilateral knee replacement 11/29/2017     Priority: Medium    Stage 3 chronic kidney disease (HCC) 01/18/2021    Iron deficiency anemia 09/01/2020    Iron malabsorption 09/01/2020    Dupuytren's disease of palm 10/02/2019    Acquired hypothyroidism 08/04/2019     Last Assessment & Plan:   Condition: stable    Follow up in: three months      Alcoholism in recovery (HCC) 08/04/2019     Last Assessment & Plan:   Condition: stable    Health consequences of continued alcohol abuse discussed. Encouraged to seek assistance with quitting or maintaining sobriety through PCP or another cessation program.     Gave member [facility] National Helpline number ([phone]).    Follow up in: three months      Bipolar affective disorder, currently depressed, mild (HCC) 08/01/2019     Last Assessment & Plan:   Condition: stable    Pt seeing PCP for  mental health management regularly and last visit on July 2019.    Take medications as ordered by Provider;

## 2024-05-22 ENCOUNTER — OFFICE VISIT (OUTPATIENT)
Dept: DERMATOLOGY | Age: 59
End: 2024-05-22
Payer: MEDICARE

## 2024-05-22 VITALS
DIASTOLIC BLOOD PRESSURE: 79 MMHG | HEIGHT: 68 IN | WEIGHT: 221 LBS | HEART RATE: 78 BPM | BODY MASS INDEX: 33.49 KG/M2 | TEMPERATURE: 97.5 F | SYSTOLIC BLOOD PRESSURE: 118 MMHG

## 2024-05-22 DIAGNOSIS — L71.9 ROSACEA: ICD-10-CM

## 2024-05-22 DIAGNOSIS — D18.01 CHERRY ANGIOMA: ICD-10-CM

## 2024-05-22 DIAGNOSIS — D22.9 MULTIPLE NEVI: Primary | ICD-10-CM

## 2024-05-22 DIAGNOSIS — L57.8 ACTINIC SKIN DAMAGE: ICD-10-CM

## 2024-05-22 DIAGNOSIS — L57.0 ACTINIC KERATOSES: ICD-10-CM

## 2024-05-22 PROCEDURE — 3074F SYST BP LT 130 MM HG: CPT | Performed by: DERMATOLOGY

## 2024-05-22 PROCEDURE — 3078F DIAST BP <80 MM HG: CPT | Performed by: DERMATOLOGY

## 2024-05-22 PROCEDURE — 17003 DESTRUCT PREMALG LES 2-14: CPT | Performed by: DERMATOLOGY

## 2024-05-22 PROCEDURE — 99213 OFFICE O/P EST LOW 20 MIN: CPT | Performed by: DERMATOLOGY

## 2024-05-22 PROCEDURE — 17000 DESTRUCT PREMALG LESION: CPT | Performed by: DERMATOLOGY

## 2024-05-28 ENCOUNTER — OFFICE VISIT (OUTPATIENT)
Dept: FAMILY MEDICINE CLINIC | Age: 59
End: 2024-05-28

## 2024-05-28 VITALS
OXYGEN SATURATION: 98 % | HEART RATE: 81 BPM | SYSTOLIC BLOOD PRESSURE: 124 MMHG | BODY MASS INDEX: 33.6 KG/M2 | DIASTOLIC BLOOD PRESSURE: 80 MMHG | WEIGHT: 221 LBS

## 2024-05-28 DIAGNOSIS — E11.22 TYPE 2 DIABETES MELLITUS WITH STAGE 2 CHRONIC KIDNEY DISEASE, WITHOUT LONG-TERM CURRENT USE OF INSULIN (HCC): Primary | ICD-10-CM

## 2024-05-28 DIAGNOSIS — F31.70 BIPOLAR DISORDER IN FULL REMISSION, MOST RECENT EPISODE UNSPECIFIED TYPE (HCC): ICD-10-CM

## 2024-05-28 DIAGNOSIS — N18.2 TYPE 2 DIABETES MELLITUS WITH STAGE 2 CHRONIC KIDNEY DISEASE, WITHOUT LONG-TERM CURRENT USE OF INSULIN (HCC): Primary | ICD-10-CM

## 2024-05-28 DIAGNOSIS — Z86.73 HISTORY OF STROKE: ICD-10-CM

## 2024-05-28 DIAGNOSIS — M79.89 FOOT SWELLING: ICD-10-CM

## 2024-05-28 DIAGNOSIS — Z76.0 MEDICATION REFILL: ICD-10-CM

## 2024-05-28 RX ORDER — PRAVASTATIN SODIUM 40 MG
40 TABLET ORAL DAILY
Qty: 90 TABLET | Refills: 1 | Status: SHIPPED | OUTPATIENT
Start: 2024-05-28

## 2024-05-28 RX ORDER — OMEPRAZOLE 20 MG/1
20 CAPSULE, DELAYED RELEASE ORAL 2 TIMES DAILY
Qty: 180 CAPSULE | Refills: 1 | Status: SHIPPED | OUTPATIENT
Start: 2024-05-28

## 2024-05-28 ASSESSMENT — ENCOUNTER SYMPTOMS
COUGH: 0
SHORTNESS OF BREATH: 0

## 2024-05-28 NOTE — PROGRESS NOTES
Heart sounds: Normal heart sounds, S1 normal and S2 normal.   Pulmonary:      Effort: Pulmonary effort is normal. No respiratory distress.      Breath sounds: Normal breath sounds.   Musculoskeletal:         General: No deformity. Normal range of motion.      Cervical back: Full passive range of motion without pain and normal range of motion.   Skin:     General: Skin is warm and dry.   Neurological:      Mental Status: She is alert and oriented to person, place, and time.            An electronic signature was used to authenticate this note.    --COLTON MATSON - CNP

## 2024-06-20 ENCOUNTER — OFFICE VISIT (OUTPATIENT)
Age: 59
End: 2024-06-20
Payer: MEDICARE

## 2024-06-20 VITALS — HEIGHT: 68 IN | BODY MASS INDEX: 33.49 KG/M2 | WEIGHT: 221 LBS

## 2024-06-20 DIAGNOSIS — M79.671 PAIN IN RIGHT FOOT: ICD-10-CM

## 2024-06-20 DIAGNOSIS — M79.672 PAIN IN LEFT FOOT: ICD-10-CM

## 2024-06-20 DIAGNOSIS — R60.0 FLUID RETENTION IN LEGS: ICD-10-CM

## 2024-06-20 DIAGNOSIS — R60.9 CHRONIC EDEMA: Primary | ICD-10-CM

## 2024-06-20 PROCEDURE — 99203 OFFICE O/P NEW LOW 30 MIN: CPT | Performed by: PODIATRIST

## 2024-06-20 RX ORDER — FUROSEMIDE 20 MG/1
20 TABLET ORAL DAILY
Qty: 60 TABLET | Refills: 3 | Status: SHIPPED | OUTPATIENT
Start: 2024-06-20

## 2024-06-20 ASSESSMENT — ENCOUNTER SYMPTOMS
DIARRHEA: 0
BACK PAIN: 0
SHORTNESS OF BREATH: 0
COLOR CHANGE: 0
NAUSEA: 0

## 2024-06-20 NOTE — TELEPHONE ENCOUNTER
Last visit: 5/28/24  Last Med refill: 4/17/23  Does patient have enough medication for 72 hours: No:     Next Visit Date:  Future Appointments   Date Time Provider Department Center   8/21/2024  9:30 AM Nel Linares APRN - CNP [facility] Neph [facility] None   8/26/2024  9:45 AM [facility]   1/3/2025 10:30 AM Bonnie Deluna APRN - NP [facility]/G[facility]   5/22/2025  9:30 [facility]       Health Maintenance   Topic Date Due    Hepatitis B vaccine (1 of 3 - 3-dose series) Never done    Shingles vaccine (1 of 2) Never done    Diabetic retinal exam  01/12/2022    Diabetic foot exam  08/16/2022    COVID-19 Vaccine (3 - 2023-24 season) 09/01/2023    Diabetic Alb to Cr ratio (uACR) test  10/17/2023    Annual Wellness Visit (Medicare Advantage)  01/01/2024    Flu vaccine (Season Ended) 08/01/2024    Lipids  08/28/2024    A1C test (Diabetic or Prediabetic)  01/10/2025    Breast cancer screen  01/22/2025    Depression Monitoring  02/26/2025    GFR test (Diabetes, CKD 3-4, OR last GFR 15-59)  04/16/2025    DTaP/Tdap/Td vaccine (3 - Td or Tdap) 09/14/2027    Colorectal Cancer Screen  07/02/2029    Pneumococcal 0-64 years Vaccine  Completed    Hepatitis C screen  Completed    HIV screen  Completed    Hepatitis A vaccine  Aged Out    Hib vaccine  Aged Out    Polio vaccine  Aged Out    Meningococcal (ACWY) vaccine  Aged Out    Cervical cancer screen  Discontinued       Hemoglobin A1C (%)   Date Value   01/10/2024 5.6   10/17/2022 5.2   07/18/2022 5.4             ( goal A1C is < 7)   No components found for: \"LABMICR\"  No components found for: \"LDLCHOLESTEROL\", \"LDLCALC\"    (goal LDL is <100)   AST (U/L)   Date Value   02/24/2024 19     ALT (U/L)   Date Value   02/24/2024 16     BUN (mg/dL)   Date Value   04/16/2024 13     BP Readings from Last 3 Encounters:   05/28/24 124/80   05/22/24 118/79   05/15/24 (!) 156/95          (goal 120/80)    All Future Testing planned

## 2024-06-20 NOTE — PROGRESS NOTES
Mia Huggins is a 58 y.o. female who presents to the office today with chief complaint of swelling to both feet, left greater than right.  Chief Complaint   Patient presents with    Foot Swelling     B/l swelling, left is worse --- previously broken foot in 11/2018    Diabetes     Last A1c: 5.1    Symptoms began about 1 year(s) ago. Patient denies injury to the feet. Patient states that there is no pain, but it is very uncomfortable and feels like pressure. Pain is rated 0 out of 10 at it's worst and is described as none. Treatments prior to today's visit include: None. Patient complains of numbness and tingling to both feet and she takes gabapentin.     Allergies   Allergen Reactions    Morphine Nausea And Vomiting and Other (See Comments)     Pt states it changes her mental status  Pt states it changes her mental status  Pt states it changes her mental status  Pt states it changes her mental status      Amlodipine Other (See Comments) and Diarrhea     diarrhea and stress  Other reaction(s): Unknown  diarrhea and stress  diarrhea and stress  diarrhea and stress    Dilaudid [Hydromorphone Hcl] Hives and Itching    Hydromorphone Hives, Itching and Other (See Comments)    Sulfa Antibiotics Hives, Rash and Other (See Comments)       Past Medical History:   Diagnosis Date    Acute kidney injury (HCC)     Anemia     Angioedema     Antinuclear antibody (IQRA) titer greater than 1:80     Anxiety     Asthma     Ataxia     Atrial fibrillation (HCC)     Borderline personality disorder (HCC)     Bradycardia     CAD (coronary artery disease)     Chronic kidney disease     CKD (chronic kidney disease), stage II 08/23/2018    COVID-19 01/01/2021    Degenerative disc disease, thoracic     Depression     Diabetes mellitus (HCC)     Diastolic dysfunction     DJD (degenerative joint disease)     Fibromyalgia     Foot pain, right     GERD (gastroesophageal reflux disease)     H/O: hysterectomy     Headache     Hernia of abdominal

## 2024-06-21 RX ORDER — EPINEPHRINE 0.3 MG/.3ML
INJECTION SUBCUTANEOUS
Qty: 2 EACH | Refills: 5 | Status: SHIPPED | OUTPATIENT
Start: 2024-06-21

## 2024-07-08 ENCOUNTER — HOSPITAL ENCOUNTER (OUTPATIENT)
Age: 59
Discharge: HOME | End: 2024-07-08
Payer: MEDICARE

## 2024-07-08 LAB
25(OH)D3 SERPL-MCNC: 48.4 NG/ML (ref 30–100)
CHOLEST SERPL-MCNC: 223 MG/DL
CHOLESTEROL/HDL RATIO: 1.9
HDLC SERPL-MCNC: 115 MG/DL
LDLC SERPL CALC-MCNC: 88 MG/DL (ref 0–130)
T4 FREE SERPL-MCNC: 1.2 NG/DL (ref 0.9–1.7)
TRIGL SERPL-MCNC: 101 MG/DL

## 2024-07-08 PROCEDURE — 80061 LIPID PANEL: CPT

## 2024-07-08 PROCEDURE — 36415 COLL VENOUS BLD VENIPUNCTURE: CPT

## 2024-07-08 PROCEDURE — 82306 VITAMIN D 25 HYDROXY: CPT

## 2024-07-08 PROCEDURE — 84439 ASSAY OF FREE THYROXINE: CPT

## 2024-07-12 ENCOUNTER — HOSPITAL ENCOUNTER (OUTPATIENT)
Dept: ULTRASOUND IMAGING | Age: 59
End: 2024-07-12
Attending: INTERNAL MEDICINE
Payer: MEDICARE

## 2024-07-12 DIAGNOSIS — E04.2 NONTOXIC MULTINODULAR GOITER: ICD-10-CM

## 2024-07-12 PROCEDURE — 76536 US EXAM OF HEAD AND NECK: CPT

## 2024-07-18 ENCOUNTER — HOSPITAL ENCOUNTER (OUTPATIENT)
Age: 59
Discharge: HOME | End: 2024-07-18
Payer: MEDICARE

## 2024-07-18 DIAGNOSIS — N18.31 STAGE 3A CHRONIC KIDNEY DISEASE (HCC): ICD-10-CM

## 2024-07-18 LAB
25(OH)D3 SERPL-MCNC: 47.7 NG/ML (ref 30–100)
ANION GAP SERPL CALCULATED.3IONS-SCNC: 15 MMOL/L (ref 9–17)
BASOPHILS # BLD: 0.1 K/UL (ref 0–0.2)
BASOPHILS NFR BLD: 1 % (ref 0–2)
BUN SERPL-MCNC: 11 MG/DL (ref 6–20)
CA-I BLD-SCNC: 1.18 MMOL/L (ref 1.13–1.33)
CALCIUM SERPL-MCNC: 9.2 MG/DL (ref 8.6–10.4)
CHLORIDE SERPL-SCNC: 99 MMOL/L (ref 98–107)
CO2 SERPL-SCNC: 27 MMOL/L (ref 20–31)
CREAT SERPL-MCNC: 1.1 MG/DL (ref 0.5–0.9)
CREAT UR-MCNC: 68.9 MG/DL (ref 28–217)
CREAT UR-MCNC: 69.6 MG/DL (ref 28–217)
EOSINOPHIL # BLD: 0.1 K/UL (ref 0–0.4)
EOSINOPHILS RELATIVE PERCENT: 2 % (ref 0–4)
ERYTHROCYTE [DISTWIDTH] IN BLOOD BY AUTOMATED COUNT: 15.5 % (ref 11.5–14.9)
GFR, ESTIMATED: 58 ML/MIN/1.73M2
GLUCOSE SERPL-MCNC: 137 MG/DL (ref 70–99)
HCT VFR BLD AUTO: 38.6 % (ref 36–46)
HGB BLD-MCNC: 13.5 G/DL (ref 12–16)
LYMPHOCYTES NFR BLD: 2.2 K/UL (ref 1–4.8)
LYMPHOCYTES RELATIVE PERCENT: 42 % (ref 24–44)
MCH RBC QN AUTO: 31.5 PG (ref 26–34)
MCHC RBC AUTO-ENTMCNC: 35 G/DL (ref 31–37)
MCV RBC AUTO: 90.1 FL (ref 80–100)
MONOCYTES NFR BLD: 0.5 K/UL (ref 0.1–1.3)
MONOCYTES NFR BLD: 9 % (ref 1–7)
NEUTROPHILS NFR BLD: 46 % (ref 36–66)
NEUTS SEG NFR BLD: 2.3 K/UL (ref 1.3–9.1)
PHOSPHATE SERPL-MCNC: 3.6 MG/DL (ref 2.6–4.5)
PLATELET # BLD AUTO: 203 K/UL (ref 150–450)
PMV BLD AUTO: 7.8 FL (ref 6–12)
POTASSIUM SERPL-SCNC: 3.4 MMOL/L (ref 3.7–5.3)
PTH-INTACT SERPL-MCNC: 174 PG/ML (ref 15–65)
RBC # BLD AUTO: 4.28 M/UL (ref 4–5.2)
SODIUM SERPL-SCNC: 141 MMOL/L (ref 135–144)
TOTAL PROTEIN, URINE: 8 MG/DL
TOTAL PROTEIN, URINE: 9 MG/DL
URINE TOTAL PROTEIN CREATININE RATIO: 0.12 (ref 0–0.2)
WBC OTHER # BLD: 5.1 K/UL (ref 3.5–11)

## 2024-07-18 PROCEDURE — 83970 ASSAY OF PARATHORMONE: CPT

## 2024-07-18 PROCEDURE — 84100 ASSAY OF PHOSPHORUS: CPT

## 2024-07-18 PROCEDURE — 82570 ASSAY OF URINE CREATININE: CPT

## 2024-07-18 PROCEDURE — 80048 BASIC METABOLIC PNL TOTAL CA: CPT

## 2024-07-18 PROCEDURE — 36415 COLL VENOUS BLD VENIPUNCTURE: CPT

## 2024-07-18 PROCEDURE — 84156 ASSAY OF PROTEIN URINE: CPT

## 2024-07-18 PROCEDURE — 82330 ASSAY OF CALCIUM: CPT

## 2024-07-18 PROCEDURE — 82306 VITAMIN D 25 HYDROXY: CPT

## 2024-07-18 PROCEDURE — 85025 COMPLETE CBC W/AUTO DIFF WBC: CPT

## 2024-07-25 ENCOUNTER — HOSPITAL ENCOUNTER (OUTPATIENT)
Age: 59
Discharge: HOME | End: 2024-07-25
Payer: MEDICARE

## 2024-07-25 DIAGNOSIS — N18.31 STAGE 3A CHRONIC KIDNEY DISEASE (HCC): ICD-10-CM

## 2024-07-25 DIAGNOSIS — E87.6 HYPOKALEMIA: ICD-10-CM

## 2024-07-25 LAB
ANION GAP SERPL CALCULATED.3IONS-SCNC: 17 MMOL/L (ref 9–16)
BUN SERPL-MCNC: 10 MG/DL (ref 6–20)
CALCIUM SERPL-MCNC: 9.4 MG/DL (ref 8.6–10.4)
CHLORIDE SERPL-SCNC: 98 MMOL/L (ref 98–107)
CO2 SERPL-SCNC: 25 MMOL/L (ref 20–31)
CREAT SERPL-MCNC: 1 MG/DL (ref 0.5–0.9)
GFR, ESTIMATED: 62 ML/MIN/1.73M2
GLUCOSE SERPL-MCNC: 105 MG/DL (ref 74–99)
MAGNESIUM SERPL-MCNC: 1.9 MG/DL (ref 1.6–2.6)
POTASSIUM SERPL-SCNC: 3.2 MMOL/L (ref 3.7–5.3)
SODIUM SERPL-SCNC: 140 MMOL/L (ref 136–145)

## 2024-07-25 PROCEDURE — 80048 BASIC METABOLIC PNL TOTAL CA: CPT

## 2024-07-25 PROCEDURE — 36415 COLL VENOUS BLD VENIPUNCTURE: CPT

## 2024-07-25 PROCEDURE — 83735 ASSAY OF MAGNESIUM: CPT

## 2024-08-02 ENCOUNTER — HOSPITAL ENCOUNTER (OUTPATIENT)
Age: 59
Discharge: HOME | End: 2024-08-02
Payer: MEDICARE

## 2024-08-02 DIAGNOSIS — N18.31 STAGE 3A CHRONIC KIDNEY DISEASE (HCC): ICD-10-CM

## 2024-08-02 LAB
ANION GAP SERPL CALCULATED.3IONS-SCNC: 14 MMOL/L (ref 9–17)
BUN SERPL-MCNC: 10 MG/DL (ref 6–20)
CALCIUM SERPL-MCNC: 9.4 MG/DL (ref 8.6–10.4)
CHLORIDE SERPL-SCNC: 96 MMOL/L (ref 98–107)
CO2 SERPL-SCNC: 28 MMOL/L (ref 20–31)
CREAT SERPL-MCNC: 1.2 MG/DL (ref 0.5–0.9)
GFR, ESTIMATED: 52 ML/MIN/1.73M2
GLUCOSE SERPL-MCNC: 161 MG/DL (ref 70–99)
POTASSIUM SERPL-SCNC: 3.6 MMOL/L (ref 3.7–5.3)
SODIUM SERPL-SCNC: 138 MMOL/L (ref 135–144)

## 2024-08-02 PROCEDURE — 36415 COLL VENOUS BLD VENIPUNCTURE: CPT

## 2024-08-02 PROCEDURE — 80048 BASIC METABOLIC PNL TOTAL CA: CPT

## 2024-08-26 ENCOUNTER — OFFICE VISIT (OUTPATIENT)
Dept: FAMILY MEDICINE CLINIC | Age: 59
End: 2024-08-26

## 2024-08-26 ENCOUNTER — HOSPITAL ENCOUNTER (OUTPATIENT)
Age: 59
Setting detail: SPECIMEN
Discharge: HOME | End: 2024-08-26

## 2024-08-26 VITALS
BODY MASS INDEX: 33.04 KG/M2 | OXYGEN SATURATION: 96 % | SYSTOLIC BLOOD PRESSURE: 110 MMHG | DIASTOLIC BLOOD PRESSURE: 60 MMHG | WEIGHT: 218 LBS | HEART RATE: 68 BPM | HEIGHT: 68 IN

## 2024-08-26 DIAGNOSIS — Z71.89 ACP (ADVANCE CARE PLANNING): ICD-10-CM

## 2024-08-26 DIAGNOSIS — E11.22 TYPE 2 DIABETES MELLITUS WITH STAGE 2 CHRONIC KIDNEY DISEASE, WITHOUT LONG-TERM CURRENT USE OF INSULIN (HCC): ICD-10-CM

## 2024-08-26 DIAGNOSIS — F31.9 BIPOLAR AFFECTIVE DISORDER, REMISSION STATUS UNSPECIFIED (HCC): ICD-10-CM

## 2024-08-26 DIAGNOSIS — Z23 NEED FOR INFLUENZA VACCINATION: Primary | ICD-10-CM

## 2024-08-26 DIAGNOSIS — N18.2 TYPE 2 DIABETES MELLITUS WITH STAGE 2 CHRONIC KIDNEY DISEASE, WITHOUT LONG-TERM CURRENT USE OF INSULIN (HCC): ICD-10-CM

## 2024-08-26 DIAGNOSIS — Z00.00 MEDICARE ANNUAL WELLNESS VISIT, SUBSEQUENT: ICD-10-CM

## 2024-08-26 LAB
CREAT UR-MCNC: 93.8 MG/DL (ref 28–217)
MICROALBUMIN UR-MCNC: 14 MG/L (ref 0–20)
MICROALBUMIN/CREAT UR-RTO: 15 MCG/MG CREAT (ref 0–25)

## 2024-08-26 RX ORDER — ALBUTEROL SULFATE 90 UG/1
2 AEROSOL, METERED RESPIRATORY (INHALATION) EVERY 6 HOURS PRN
Qty: 8.5 G | Refills: 5 | Status: SHIPPED | OUTPATIENT
Start: 2024-08-26

## 2024-08-26 RX ORDER — TRAZODONE HYDROCHLORIDE 100 MG/1
100 TABLET ORAL NIGHTLY
COMMUNITY
Start: 2024-08-23

## 2024-08-26 ASSESSMENT — PATIENT HEALTH QUESTIONNAIRE - PHQ9
6. FEELING BAD ABOUT YOURSELF - OR THAT YOU ARE A FAILURE OR HAVE LET YOURSELF OR YOUR FAMILY DOWN: NOT AT ALL
SUM OF ALL RESPONSES TO PHQ QUESTIONS 1-9: 0
5. POOR APPETITE OR OVEREATING: NOT AT ALL
SUM OF ALL RESPONSES TO PHQ9 QUESTIONS 1 & 2: 0
7. TROUBLE CONCENTRATING ON THINGS, SUCH AS READING THE NEWSPAPER OR WATCHING TELEVISION: NOT AT ALL
10. IF YOU CHECKED OFF ANY PROBLEMS, HOW DIFFICULT HAVE THESE PROBLEMS MADE IT FOR YOU TO DO YOUR WORK, TAKE CARE OF THINGS AT HOME, OR GET ALONG WITH OTHER PEOPLE: NOT DIFFICULT AT ALL
1. LITTLE INTEREST OR PLEASURE IN DOING THINGS: NOT AT ALL
SUM OF ALL RESPONSES TO PHQ QUESTIONS 1-9: 0
4. FEELING TIRED OR HAVING LITTLE ENERGY: NOT AT ALL
SUM OF ALL RESPONSES TO PHQ QUESTIONS 1-9: 0
3. TROUBLE FALLING OR STAYING ASLEEP: NOT AT ALL
9. THOUGHTS THAT YOU WOULD BE BETTER OFF DEAD, OR OF HURTING YOURSELF: NOT AT ALL
SUM OF ALL RESPONSES TO PHQ QUESTIONS 1-9: 0
8. MOVING OR SPEAKING SO SLOWLY THAT OTHER PEOPLE COULD HAVE NOTICED. OR THE OPPOSITE, BEING SO FIGETY OR RESTLESS THAT YOU HAVE BEEN MOVING AROUND A LOT MORE THAN USUAL: NOT AT ALL
2. FEELING DOWN, DEPRESSED OR HOPELESS: NOT AT ALL

## 2024-08-26 ASSESSMENT — LIFESTYLE VARIABLES
HOW MANY STANDARD DRINKS CONTAINING ALCOHOL DO YOU HAVE ON A TYPICAL DAY: 3 OR 4
HOW OFTEN DO YOU HAVE A DRINK CONTAINING ALCOHOL: MONTHLY OR LESS

## 2024-08-26 NOTE — PATIENT INSTRUCTIONS
Preventing Falls: Care Instructions  Injuries and health problems such as trouble walking or poor eyesight can increase your risk of falling. So can some medicines. But there are things you can do to help prevent falls. You can exercise to get stronger. You can also arrange your home to make it safer.    Talk to your doctor about the medicines you take. Ask if any of them increase the risk of falls and whether they can be changed or stopped.   Try to exercise regularly. It can help improve your strength and balance. This can help lower your risk of falling.         Practice fall safety and prevention.   Wear low-heeled shoes that fit well and give your feet good support. Talk to your doctor if you have foot problems that make this hard.  Carry a cellphone or wear a medical alert device that you can use to call for help.  Use stepladders instead of chairs to reach high objects. Don't climb if you're at risk for falls. Ask for help, if needed.  Wear the correct eyeglasses, if you need them.        Make your home safer.   Remove rugs, cords, clutter, and furniture from walkways.  Keep your house well lit. Use night-lights in hallways and bathrooms.  Install and use sturdy handrails on stairways.  Wear nonskid footwear, even inside. Don't walk barefoot or in socks without shoes.        Be safe outside.   Use handrails, curb cuts, and ramps whenever possible.  Keep your hands free by using a shoulder bag or backpack.  Try to walk in well-lit areas. Watch out for uneven ground, changes in pavement, and debris.  Be careful in the winter. Walk on the grass or gravel when sidewalks are slippery. Use de-icer on steps and walkways. Add non-slip devices to shoes.    Put grab bars and nonskid mats in your shower or tub and near the toilet. Try to use a shower chair or bath bench when bathing.   Get into a tub or shower by putting in your weaker leg first. Get out with your strong side first. Have a phone or medical alert  especially good for you. Examples include spinach, carrots, peaches, and berries.     Foods high in fiber can reduce your cholesterol and provide important vitamins and minerals. High-fiber foods include whole-grain cereals and breads, oatmeal, beans, brown rice, citrus fruits, and apples.     Eat lean proteins. Heart-healthy proteins include seafood, lean meats and poultry, eggs, beans, peas, nuts, seeds, and soy products.     Limit drinks and foods with added sugar. These include candy, desserts, and soda pop.   Heart-healthy lifestyle    If your doctor recommends it, get more exercise. For many people, walking is a good choice. Or you may want to swim, bike, or do other activities. Bit by bit, increase the time you're active every day. Try for at least 30 minutes on most days of the week.     Try to quit or cut back on using tobacco and other nicotine products. This includes smoking and vaping. If you need help quitting, talk to your doctor about stop-smoking programs and medicines. These can increase your chances of quitting for good. Quitting is one of the most important things you can do to protect your heart. It is never too late to quit. Try to avoid secondhand smoke too.     Stay at a weight that's healthy for you. Talk to your doctor if you need help losing weight.     Try to get 7 to 9 hours of sleep each night.     Limit alcohol to 2 drinks a day for men and 1 drink a day for women. Too much alcohol can cause health problems.     Manage other health problems such as diabetes, high blood pressure, and high cholesterol. If you think you may have a problem with alcohol or drug use, talk to your doctor.   Medicines    Take your medicines exactly as prescribed. Call your doctor if you think you are having a problem with your medicine.     If your doctor recommends aspirin, take the amount directed each day. Make sure you take aspirin and not another kind of pain reliever, such as acetaminophen (Tylenol).

## 2024-08-26 NOTE — PROGRESS NOTES
stress  diarrhea and stress    Dilaudid [Hydromorphone Hcl] Hives and Itching    Hydromorphone Hives, Itching and Other (See Comments)    Sulfa Antibiotics Hives, Rash and Other (See Comments)     Prior to Visit Medications    Medication Sig Taking? Authorizing Provider   traZODone (DESYREL) 100 MG tablet Take 1 tablet by mouth nightly at bedtime. Yes Christie Ocampo MD   albuterol sulfate HFA (PROVENTIL;VENTOLIN;PROAIR) 108 (90 Base) MCG/ACT inhaler Inhale 2 puffs into the lungs every 6 hours as needed for Wheezing Yes Osvaldo Townsend APRN - CNP   potassium chloride (KLOR-CON M) 20 MEQ extended release tablet TAKE 1 TABLET BY MOUTH EVERY DAY Yes Ryan Andrews MD   carvedilol (COREG) 12.5 MG tablet TAKE 1 TABLET BY MOUTH 2 TIMES A DAY Yes Ryan Andrews MD   bumetanide (BUMEX) 1 MG tablet Take 1 tablet by mouth daily Yes Ryan Andrews MD   NIFEdipine (ADALAT CC) 60 MG extended release tablet TAKE 2 TABLETS BY MOUTH EVERY DAY Yes Ryan Andrews MD   hydrALAZINE (APRESOLINE) 25 MG tablet TAKE 1 TABLET BY MOUTH 3 TIMES A DAY Yes Ryan Andrews MD   EPINEPHrine (EPIPEN) 0.3 MG/0.3ML SOAJ injection INJECT SYRINGE ONCE AS NEEDED FOR ANGIOEDEMA Yes Osvaldo Townsend APRN - CNP   omeprazole (PRILOSEC) 20 MG delayed release capsule Take 1 capsule by mouth 2 times daily Yes Osvaldo Townsend APRN - CNP   pravastatin (PRAVACHOL) 40 MG tablet Take 1 tablet by mouth daily Yes Osvaldo Townsend APRN - CNP   metroNIDAZOLE (METROCREAM) 0.75 % cream Apply topically 2 times daily. Yes Rosalia Pan MD   fluticasone-salmeterol (ADVAIR) 250-50 MCG/ACT AEPB diskus inhaler INHALE 1 PUFF BY MOUTH 2 TIMES A DAY Yes ProviderChristie MD   famotidine (PEPCID) 40 MG tablet TAKE 1 TABLET BY MOUTH IN THE EVENING Yes Osvaldo Townsend APRN - CNP   cyanocobalamin 1000 MCG/ML injection Inject 1 mL into the APPROPRIATE muscle every 30 days Yes Peri Bucio MD   SYRINGE-NEEDLE, DISP, 3 ML 22G X  1-1/2\" 3 ML MISC 1 each by Does not apply route daily Yes Peri Bucio MD   vitamin D (ERGOCALCIFEROL) 1.25 MG (98757 UT) CAPS capsule Take 1 capsule by mouth Twice a Week Yes Christie Ocampo MD   blood glucose test strips (EXACTECH TEST) strip USE 1 TEST STRIP TO TEST BLOOD SUGARS ONCE DAILY Yes Christie Ocampo MD   levothyroxine (SYNTHROID) 125 MCG tablet Take 1 tablet by mouth daily Yes Christie Ocampo MD   ibuprofen (ADVIL;MOTRIN) 800 MG tablet Take 1 tablet by mouth every 8 hours as needed for Pain Yes Osvaldo Townsend APRN - CNP   INSULIN SYRINGE 1CC/29G ([employer] INS SYRINGE 1CC/29G) 29G X 1/2\" 1 ML MISC 1 each by Does not apply route daily Yes Peri Bucio MD   folic acid (FOLVITE) 1 MG tablet TAKE 1 TABLET BY MOUTH EVERY DAY Yes Nabila Rios APRN - CNP   cetirizine (ZYRTEC) 10 MG tablet TAKE 1 TABLET BY MOUTH ONE TIME A DAY  Patient taking differently: Take 1 tablet by mouth in the morning and at bedtime TAKE 1 TABLET BY MOUTH ONE TIME A DAY Yes Osvaldo Townsend APRN - CNP   Lancets (ONETOUCH DELICA PLUS [id number]) MISC use to test blood sugar one time a day Yes Christie Ocampo MD   clonazePAM (KLONOPIN) 1 MG tablet Take 1 tablet by mouth 3 times daily as needed. Indications: last fill 8/25/22 for 30 days Yes Christie Ocampo MD   Handicap Placard MISC by Does not apply route Exp: 7/13/2026 Yes Osvaldo Townsend APRN - CNP   ARIPiprazole (ABILIFY) 5 MG tablet Take 1 tablet by mouth daily Yes Christie Ocampo MD   blood glucose monitor strips Test 4 times a day & as needed for symptoms of irregular blood glucose. Yes Osvaldo Townsend APRN - CNP   metFORMIN (GLUCOPHAGE) 500 MG tablet Take 1 tablet by mouth daily Yes Christie Ocampo MD   lamoTRIgine (LAMICTAL) 25 MG tablet Take 1 tablet by mouth 2 times daily Yes Christie Ocampo MD   lamoTRIgine (LAMICTAL) 200 MG tablet Take 1 tablet by mouth every evening Yes Provider,

## 2024-09-09 RX ORDER — GABAPENTIN 600 MG/1
1200 TABLET ORAL 3 TIMES DAILY
Qty: 180 TABLET | Refills: 11 | Status: SHIPPED | OUTPATIENT
Start: 2024-09-09 | End: 2025-09-04

## 2024-10-01 ENCOUNTER — PROCEDURE VISIT (OUTPATIENT)
Dept: NEUROLOGY | Age: 59
End: 2024-10-01
Payer: MEDICARE

## 2024-10-01 DIAGNOSIS — R20.0 NUMBNESS: Primary | ICD-10-CM

## 2024-10-01 PROCEDURE — 11104 PUNCH BX SKIN SINGLE LESION: CPT | Performed by: PSYCHIATRY & NEUROLOGY

## 2024-10-01 PROCEDURE — 11105 PUNCH BX SKIN EA SEP/ADDL: CPT | Performed by: PSYCHIATRY & NEUROLOGY

## 2024-10-18 ENCOUNTER — TELEPHONE (OUTPATIENT)
Dept: NEUROLOGY | Age: 59
End: 2024-10-18

## 2024-11-19 RX ORDER — PRAVASTATIN SODIUM 40 MG
40 TABLET ORAL DAILY
Qty: 90 TABLET | Refills: 1 | Status: SHIPPED | OUTPATIENT
Start: 2024-11-19

## 2024-11-19 NOTE — TELEPHONE ENCOUNTER
Last visit: 08/26/2024  Last Med refill: 05/28/204  Does patient have enough medication for 72 hours: Yes    Next Visit Date:  Future Appointments   Date Time Provider Department Center   11/26/2024  9:30 AM Osvaldo Townsend APRN - CNP [facility] DEP   1/3/2025 10:30 AM Bonnie Deluna, COLTON - NP [facility]   1/23/2025  1:30 PM Ryan Andrews MD [facility] Neph [facility] None   2/5/2025 10:00 AM Demond Fermin MD Neuro Spec Neurology -   5/22/2025  9:30 AM Rosalia Pan MD  derm [facility]   8/29/2025  9:30 AM Osvaldo Townsend APRN - [facility] DEP       Health Maintenance   Topic Date Due    Hepatitis B vaccine (1 of 3 - 19+ 3-dose series) Never done    Shingles vaccine (1 of 2) Never done    Diabetic retinal exam  01/12/2022    Diabetic foot exam  08/16/2022    COVID-19 Vaccine (3 - 2023-24 season) 09/01/2024    Breast cancer screen  01/22/2025    Lipids  07/08/2025    A1C test (Diabetic or Prediabetic)  07/10/2025    GFR test (Diabetes, CKD 3-4, OR last GFR 15-59)  08/02/2025    Diabetic Alb to Cr ratio (uACR) test  08/26/2025    Depression Monitoring  08/26/2025    Annual Wellness Visit (Medicare)  08/27/2025    DTaP/Tdap/Td vaccine (3 - Td or Tdap) 09/14/2027    Colorectal Cancer Screen  07/02/2029    Flu vaccine  Completed    Pneumococcal 0-64 years Vaccine  Completed    Hepatitis C screen  Completed    HIV screen  Completed    Hepatitis A vaccine  Aged Out    Hib vaccine  Aged Out    Polio vaccine  Aged Out    Meningococcal (ACWY) vaccine  Aged Out    Cervical cancer screen  Discontinued       Hemoglobin A1C (%)   Date Value   07/10/2024 5.2   01/10/2024 5.6   10/17/2022 5.2             ( goal A1C is < 7)   No components found for: \"LABMICR\"  No components found for: \"LDLCHOLESTEROL\", \"LDLCALC\"    (goal LDL is <100)   AST (U/L)   Date Value   02/24/2024 19     ALT (U/L)   Date Value   02/24/2024 16     BUN (mg/dL)   Date Value   08/02/2024 10     BP Readings from Last 3

## 2024-11-26 ENCOUNTER — OFFICE VISIT (OUTPATIENT)
Dept: FAMILY MEDICINE CLINIC | Age: 59
End: 2024-11-26

## 2024-11-26 VITALS
WEIGHT: 218 LBS | HEART RATE: 89 BPM | SYSTOLIC BLOOD PRESSURE: 130 MMHG | DIASTOLIC BLOOD PRESSURE: 84 MMHG | BODY MASS INDEX: 33.15 KG/M2 | OXYGEN SATURATION: 98 %

## 2024-11-26 DIAGNOSIS — R20.0 BILATERAL LEG NUMBNESS: Primary | ICD-10-CM

## 2024-11-26 DIAGNOSIS — I10 PRIMARY HYPERTENSION: ICD-10-CM

## 2024-11-26 DIAGNOSIS — Z76.0 MEDICATION REFILL: ICD-10-CM

## 2024-11-26 ASSESSMENT — ENCOUNTER SYMPTOMS
COUGH: 0
SHORTNESS OF BREATH: 0

## 2024-11-26 NOTE — PROGRESS NOTES
Mia Huggins (:  1965) is a 59 y.o. female,Established patient, here for evaluation of the following chief complaint(s):  Hypertension and 3 Month Follow-Up         Assessment & Plan  Medication refill       Orders:    omeprazole (PRILOSEC) 20 MG delayed release capsule; Take 1 capsule by mouth 2 times daily    Bilateral leg numbness   Chronic, worsening (exacerbation), continue current plan pending work up below    Orders:    EMG; Future    Primary hypertension   Chronic, at goal (stable), continue current treatment plan           No follow-ups on file.       Subjective   Hypertension  Pertinent negatives include no chest pain, palpitations or shortness of breath.     Pt reports numbness to bilateral legs. It is spreading up. She has discussed this with her neurologist who she states did a biopsy. She states the biopsy was wnl. She states her feet feel like they have cinder blocks on them. States this has been a progressive problem.   She saw podiatry and vascular for this problem.     Review of Systems   Constitutional:  Negative for chills and fever.   Respiratory:  Negative for cough and shortness of breath.    Cardiovascular:  Negative for chest pain, palpitations and leg swelling.          Objective   Vitals:    24 0935   BP: 130/84   Pulse: 89   SpO2: 98%     Wt Readings from Last 3 Encounters:   24 98.9 kg (218 lb)   24 98.9 kg (218 lb)   24 98.9 kg (218 lb)       Physical Exam  Constitutional:       Appearance: She is well-developed.   HENT:      Right Ear: External ear normal.      Left Ear: External ear normal.      Nose: Nose normal.   Cardiovascular:      Rate and Rhythm: Normal rate and regular rhythm.      Heart sounds: Normal heart sounds, S1 normal and S2 normal.   Pulmonary:      Effort: Pulmonary effort is normal. No respiratory distress.      Breath sounds: Normal breath sounds.   Musculoskeletal:         General: No deformity. Normal range of motion.

## 2024-12-30 PROBLEM — Z78.0 POST-MENOPAUSAL: Status: ACTIVE | Noted: 2024-04-17

## 2024-12-30 PROBLEM — M81.0 OSTEOPOROSIS: Status: ACTIVE | Noted: 2024-10-16

## 2024-12-30 PROBLEM — G62.9 PERIPHERAL POLYNEUROPATHY: Status: ACTIVE | Noted: 2024-10-16

## 2024-12-30 PROBLEM — I73.00 RAYNAUD'S DISEASE WITHOUT GANGRENE: Status: ACTIVE | Noted: 2024-04-17

## 2024-12-30 PROBLEM — Z78.0 ASYMPTOMATIC MENOPAUSAL STATE: Status: ACTIVE | Noted: 2024-04-17

## 2024-12-30 PROBLEM — M35.9 SYSTEMIC INVOLVEMENT OF CONNECTIVE TISSUE, UNSPECIFIED (HCC): Status: ACTIVE | Noted: 2022-10-14

## 2025-01-03 ENCOUNTER — OFFICE VISIT (OUTPATIENT)
Dept: OBGYN CLINIC | Age: 60
End: 2025-01-03
Payer: MEDICARE

## 2025-01-03 ENCOUNTER — HOSPITAL ENCOUNTER (OUTPATIENT)
Age: 60
Setting detail: SPECIMEN
Discharge: HOME | End: 2025-01-03

## 2025-01-03 VITALS
BODY MASS INDEX: 32.89 KG/M2 | SYSTOLIC BLOOD PRESSURE: 124 MMHG | HEIGHT: 68 IN | WEIGHT: 217 LBS | DIASTOLIC BLOOD PRESSURE: 82 MMHG

## 2025-01-03 DIAGNOSIS — Z12.31 ENCOUNTER FOR SCREENING MAMMOGRAM FOR MALIGNANT NEOPLASM OF BREAST: ICD-10-CM

## 2025-01-03 DIAGNOSIS — N89.8 VAGINAL DISCHARGE: ICD-10-CM

## 2025-01-03 DIAGNOSIS — N76.0 ACUTE VAGINITIS: ICD-10-CM

## 2025-01-03 DIAGNOSIS — Z01.419 WELL FEMALE EXAM WITH ROUTINE GYNECOLOGICAL EXAM: Primary | ICD-10-CM

## 2025-01-03 DIAGNOSIS — Z11.51 SPECIAL SCREENING EXAMINATION FOR HUMAN PAPILLOMAVIRUS (HPV): ICD-10-CM

## 2025-01-03 DIAGNOSIS — R87.620 ATYPICAL SQUAMOUS CELL CHANGES OF UNDETERMINED SIGNIFICANCE (ASCUS) ON VAGINAL CYTOLOGY: ICD-10-CM

## 2025-01-03 PROCEDURE — 3079F DIAST BP 80-89 MM HG: CPT | Performed by: NURSE PRACTITIONER

## 2025-01-03 PROCEDURE — 3074F SYST BP LT 130 MM HG: CPT | Performed by: NURSE PRACTITIONER

## 2025-01-03 PROCEDURE — 99213 OFFICE O/P EST LOW 20 MIN: CPT | Performed by: NURSE PRACTITIONER

## 2025-01-03 PROCEDURE — 99396 PREV VISIT EST AGE 40-64: CPT | Performed by: NURSE PRACTITIONER

## 2025-01-03 RX ORDER — ZIPRASIDONE HYDROCHLORIDE 40 MG/1
CAPSULE ORAL
COMMUNITY
Start: 2024-12-14

## 2025-01-03 RX ORDER — BLOOD-GLUCOSE METER
KIT MISCELLANEOUS
COMMUNITY
Start: 2024-12-10

## 2025-01-03 NOTE — PROGRESS NOTES
History and Physical  [facility]  Formerly [facility] [place] [address]  [place], [place]  [address] [phone]   Fax [phone]  Mia Huggins  1/3/2025              59 y.o.  Chief Complaint   Patient presents with    Annual Exam       No LMP recorded. Patient has had a hysterectomy.             Primary Care Physician: Osvaldo Townsend, COLTON - CNP    The patient was seen and examined. She has no chief complaint today and is here for her annual exam.  Her bowels are regular. There are no voiding complaints. She denies any bloating.  She denies vaginal discharge and was counseled on STD's and the need for barrier contraception.     HPI : Mia Huggins is a 59 y.o. female     Annual exam  C/o intermittent vaginal discharge- has had intermittent GBS on culture the past 2 years  Hx hyst- states for abnormal pap smears     no Bloating  no Early Satiety  no Unexplained weight change of more than 15 lbs  no  PMB  no  PCB  ________________________________________________________________________  OB History    Para Term  AB Living   4 4 4 0 0 4   SAB IAB Ectopic Molar Multiple Live Births   0 0 0 0 0 4      # Outcome Date GA Lbr Flaquito/2nd Weight Sex Type Anes PTL Lv   4 Term      Vag-Spont  N JF   3 Term      Vag-Spont  N JF   2 Term      Vag-Spont  N JF   1 Term      Vag-Spont  N JF     Past Medical History:   Diagnosis Date    Acute kidney injury (HCC)     Anemia     Angioedema     Antinuclear antibody (IQRA) titer greater than 1:80     Anxiety     Asthma     Ataxia     Atrial fibrillation (HCC)     Borderline personality disorder (HCC)     Bradycardia     CAD (coronary artery disease)     Chronic kidney disease     CKD (chronic kidney disease), stage II 2018    COVID-19 2021    Degenerative disc disease, thoracic     Depression     Diabetes mellitus (HCC)     Diastolic dysfunction     DJD (degenerative joint disease)     Fibromyalgia     Foot pain, right

## 2025-01-05 LAB
MICROORGANISM SPEC CULT: ABNORMAL
MICROORGANISM SPEC CULT: ABNORMAL
SERVICE CMNT-IMP: ABNORMAL
SPECIMEN DESCRIPTION: ABNORMAL

## 2025-01-06 ENCOUNTER — TELEPHONE (OUTPATIENT)
Dept: OBGYN CLINIC | Age: 60
End: 2025-01-06

## 2025-01-06 LAB
MICROORGANISM SPEC CULT: ABNORMAL
SERVICE CMNT-IMP: ABNORMAL
SPECIMEN DESCRIPTION: ABNORMAL

## 2025-01-06 RX ORDER — AMPICILLIN 500 MG/1
500 CAPSULE ORAL 3 TIMES DAILY
Qty: 21 CAPSULE | Refills: 0 | Status: SHIPPED | OUTPATIENT
Start: 2025-01-06 | End: 2025-01-13

## 2025-01-06 NOTE — TELEPHONE ENCOUNTER
[facility] Result Note Patient Contact Communication   [address] [place] [address]      01/06/25   11:35 AM     Patients Name: Mia Huggins   Medical Record Number: [id number]   Contact Serial Number: [id number]  YOB: 1965       Patients Phone Number: [phone]     Description of Recommendations:  The patient was contacted and her identity was confirmed with two of the specific identifiers listed above.  The information regarding her recent testing results and provider recommendations were reviewed with her in detail and all questions were answered.   Recent labs/Cultures/ Imaging Studies/Pathology reports and Urinalysis results are included:      Recommendations given by provider:  ----- Message from COLTON Lennon CNP sent at 1/6/2025  8:11 AM EST -----  +GBS- ampicillin 500mg PO TID x 7 days       The patient verbalized understanding of the information above and the recommendation by the provider. She will contact her PCP if any other questions arise or contact our office for any other clarifications.        Electronically signed by Maria Fernanda Weems on 1/6/2025 at 11:35 AM

## 2025-01-06 NOTE — TELEPHONE ENCOUNTER
----- Message from COLTON Lennon - CNP sent at 1/6/2025  8:11 AM EST -----  +GBS- ampicillin 500mg PO TID x 7 days

## 2025-01-06 NOTE — TELEPHONE ENCOUNTER
LM for pt to contact office regarding results.     ----- Message from COLTON Lennon CNP sent at 1/6/2025  8:11 AM EST -----  +GBS- ampicillin 500mg PO TID x 7 days

## 2025-01-07 ENCOUNTER — HOSPITAL ENCOUNTER (OUTPATIENT)
Age: 60
Discharge: HOME | End: 2025-01-07
Payer: MEDICARE

## 2025-01-07 LAB
25(OH)D3 SERPL-MCNC: 42.2 NG/ML (ref 30–100)
ALBUMIN SERPL-MCNC: 4.2 G/DL (ref 3.5–5.2)
ALP SERPL-CCNC: 140 U/L (ref 35–104)
ALT SERPL-CCNC: 31 U/L (ref 10–35)
ANION GAP SERPL CALCULATED.3IONS-SCNC: 14 MMOL/L (ref 9–16)
AST SERPL-CCNC: 32 U/L (ref 10–35)
BILIRUB SERPL-MCNC: 0.6 MG/DL (ref 0–1.2)
BUN SERPL-MCNC: 9 MG/DL (ref 6–20)
CALCIUM SERPL-MCNC: 9.6 MG/DL (ref 8.6–10.4)
CHLORIDE SERPL-SCNC: 102 MMOL/L (ref 98–107)
CHOLEST SERPL-MCNC: 268 MG/DL (ref 0–199)
CHOLESTEROL/HDL RATIO: 2.6
CO2 SERPL-SCNC: 25 MMOL/L (ref 20–31)
CREAT SERPL-MCNC: 1.1 MG/DL (ref 0.7–1.2)
CREAT UR-MCNC: 117 MG/DL (ref 28–217)
GFR, ESTIMATED: 58 ML/MIN/1.73M2
GLUCOSE SERPL-MCNC: 109 MG/DL (ref 74–99)
HDLC SERPL-MCNC: 105 MG/DL
HPV I/H RISK 4 DNA CVX QL NAA+PROBE: NOT DETECTED
HPV SAMPLE: NORMAL
HPV, INTERPRETATION: NORMAL
HPV16 DNA CVX QL NAA+PROBE: NOT DETECTED
HPV18 DNA CVX QL NAA+PROBE: NOT DETECTED
LDLC SERPL CALC-MCNC: 137 MG/DL (ref 0–100)
MICROALBUMIN UR-MCNC: 22 MG/L (ref 0–20)
MICROALBUMIN/CREAT UR-RTO: 19 MCG/MG CREAT (ref 0–25)
POTASSIUM SERPL-SCNC: 3.6 MMOL/L (ref 3.7–5.3)
PROT SERPL-MCNC: 7.5 G/DL (ref 6.6–8.7)
SODIUM SERPL-SCNC: 141 MMOL/L (ref 136–145)
SPECIMEN DESCRIPTION: NORMAL
T4 FREE SERPL-MCNC: 0.8 NG/DL (ref 0.9–1.7)
TRIGL SERPL-MCNC: 129 MG/DL (ref 0–149)

## 2025-01-07 PROCEDURE — 82570 ASSAY OF URINE CREATININE: CPT

## 2025-01-07 PROCEDURE — 82306 VITAMIN D 25 HYDROXY: CPT

## 2025-01-07 PROCEDURE — 84439 ASSAY OF FREE THYROXINE: CPT

## 2025-01-07 PROCEDURE — 80061 LIPID PANEL: CPT

## 2025-01-07 PROCEDURE — 82043 UR ALBUMIN QUANTITATIVE: CPT

## 2025-01-07 PROCEDURE — 80053 COMPREHEN METABOLIC PANEL: CPT

## 2025-01-07 PROCEDURE — 36415 COLL VENOUS BLD VENIPUNCTURE: CPT

## 2025-01-12 LAB — CYTOLOGY REPORT: NORMAL

## 2025-01-18 ENCOUNTER — HOSPITAL ENCOUNTER (OUTPATIENT)
Age: 60
Setting detail: SPECIMEN
Discharge: HOME | End: 2025-01-18
Payer: MEDICARE

## 2025-01-18 DIAGNOSIS — Z86.39 HISTORY OF HYPOKALEMIA: ICD-10-CM

## 2025-01-18 DIAGNOSIS — N18.31 STAGE 3A CHRONIC KIDNEY DISEASE (HCC): ICD-10-CM

## 2025-01-18 LAB
25(OH)D3 SERPL-MCNC: 44 NG/ML (ref 30–100)
BASOPHILS # BLD: 0.1 K/UL (ref 0–0.2)
BASOPHILS NFR BLD: 1 % (ref 0–2)
CA-I BLD-SCNC: 1.12 MMOL/L (ref 1.13–1.33)
CREAT UR-MCNC: 128 MG/DL (ref 28–217)
EOSINOPHIL # BLD: 0.1 K/UL (ref 0–0.4)
EOSINOPHILS RELATIVE PERCENT: 2 % (ref 0–4)
ERYTHROCYTE [DISTWIDTH] IN BLOOD BY AUTOMATED COUNT: 13.6 % (ref 11.5–14.9)
HCT VFR BLD AUTO: 42.2 % (ref 36–46)
HGB BLD-MCNC: 14.5 G/DL (ref 12–16)
LYMPHOCYTES NFR BLD: 2.2 K/UL (ref 1–4.8)
LYMPHOCYTES RELATIVE PERCENT: 34 % (ref 24–44)
MAGNESIUM SERPL-MCNC: 1.6 MG/DL (ref 1.6–2.6)
MCH RBC QN AUTO: 33.1 PG (ref 26–34)
MCHC RBC AUTO-ENTMCNC: 34.4 G/DL (ref 31–37)
MCV RBC AUTO: 96.1 FL (ref 80–100)
MONOCYTES NFR BLD: 0.5 K/UL (ref 0.1–1.3)
MONOCYTES NFR BLD: 8 % (ref 1–7)
NEUTROPHILS NFR BLD: 55 % (ref 36–66)
NEUTS SEG NFR BLD: 3.6 K/UL (ref 1.3–9.1)
PHOSPHATE SERPL-MCNC: 2.7 MG/DL (ref 2.5–4.5)
PLATELET # BLD AUTO: 227 K/UL (ref 150–450)
PMV BLD AUTO: 7.4 FL (ref 6–12)
PTH-INTACT SERPL-MCNC: 135 PG/ML (ref 15–65)
RBC # BLD AUTO: 4.39 M/UL (ref 4–5.2)
TOTAL PROTEIN, URINE: 21 MG/DL
WBC OTHER # BLD: 6.5 K/UL (ref 3.5–11)

## 2025-01-18 PROCEDURE — 36415 COLL VENOUS BLD VENIPUNCTURE: CPT

## 2025-01-18 PROCEDURE — 82330 ASSAY OF CALCIUM: CPT

## 2025-01-18 PROCEDURE — 82570 ASSAY OF URINE CREATININE: CPT

## 2025-01-18 PROCEDURE — 84100 ASSAY OF PHOSPHORUS: CPT

## 2025-01-18 PROCEDURE — 82306 VITAMIN D 25 HYDROXY: CPT

## 2025-01-18 PROCEDURE — 83970 ASSAY OF PARATHORMONE: CPT

## 2025-01-18 PROCEDURE — 84156 ASSAY OF PROTEIN URINE: CPT

## 2025-01-18 PROCEDURE — 83735 ASSAY OF MAGNESIUM: CPT

## 2025-01-18 PROCEDURE — 85025 COMPLETE CBC W/AUTO DIFF WBC: CPT

## 2025-01-21 ENCOUNTER — HOSPITAL ENCOUNTER (OUTPATIENT)
Dept: WOMENS IMAGING | Age: 60
Discharge: HOME | End: 2025-01-23
Payer: MEDICARE

## 2025-01-21 DIAGNOSIS — Z12.31 ENCOUNTER FOR SCREENING MAMMOGRAM FOR MALIGNANT NEOPLASM OF BREAST: ICD-10-CM

## 2025-01-21 PROCEDURE — 77063 BREAST TOMOSYNTHESIS BI: CPT

## 2025-02-05 ENCOUNTER — OFFICE VISIT (OUTPATIENT)
Dept: NEUROLOGY | Age: 60
End: 2025-02-05
Payer: MEDICARE

## 2025-02-05 VITALS
BODY MASS INDEX: 32.28 KG/M2 | HEART RATE: 93 BPM | WEIGHT: 213 LBS | HEIGHT: 68 IN | DIASTOLIC BLOOD PRESSURE: 137 MMHG | SYSTOLIC BLOOD PRESSURE: 203 MMHG

## 2025-02-05 DIAGNOSIS — M54.10 RADICULOPATHY, UNSPECIFIED SPINAL REGION: Primary | ICD-10-CM

## 2025-02-05 PROCEDURE — 3077F SYST BP >= 140 MM HG: CPT | Performed by: PSYCHIATRY & NEUROLOGY

## 2025-02-05 PROCEDURE — 99214 OFFICE O/P EST MOD 30 MIN: CPT | Performed by: PSYCHIATRY & NEUROLOGY

## 2025-02-05 PROCEDURE — 3080F DIAST BP >= 90 MM HG: CPT | Performed by: PSYCHIATRY & NEUROLOGY

## 2025-02-05 RX ORDER — DIAZEPAM 5 MG/1
5 TABLET ORAL ONCE
Qty: 1 TABLET | Refills: 0 | Status: SHIPPED | OUTPATIENT
Start: 2025-02-05 | End: 2025-02-05

## 2025-02-05 NOTE — PROGRESS NOTES
[facility] [place]  [address], [address]  [place] [address]  Ph: [phone] or [phone]  FAX: [phone]      Referring Provider: Osvaldo Townsend APRN - CNP  Patient: Mia Huggins  Age/Sex: 59-year-old female  Date of Consultation: 02/05/2025  Reason for Consult: Peripheral Neuropathy    History of Present Illness:  I had the pleasure of seeing Ms. Mia Huggins in neurology consultation for evaluation of her symptoms. As you would recall, she has a history of chronic cervical radiculopathy with neck pain, bilateral trochanteric bursitis, and sensory greater than motor peripheral polyneuropathy affecting the bilateral lower extremities. She has also been diagnosed with hypertension, fibromyalgia, and lumbar radiculopathy.    At her last visit she reported continued neck and lower back pain, both of which have been managed with chiropractic care and gabapentin at 1200 mg three times daily. She noted that her neck discomfort is constant but manageable, with severe episodes occurring intermittently. Chiropractic adjustments provide temporary relief, particularly when her headaches coincide with worsening neck pain. She denies muscle weakness in her hands but continues to experience numbness in her feet, particularly when ambulating. She also reports occasional falls due to difficulty lifting her feet while walking.    On 08/22/2023, she endorsed progressive numbness and weakness in both upper and lower extremities. She attributed some of her symptoms to her Raynaud's disease and scleroderma. She described non-pitting edema and increasing difficulty with ambulation, necessitating the use of a walker. Despite compliance with gabapentin, she continued to experience numbness in her lower extremities. Her A1C was recently 5.1%, though she had a history of uncontrolled diabetes from 2008 to 2012.    Her most recent skin punch biopsy on 10/02/2024 demonstrated normal epidermal

## 2025-02-18 ENCOUNTER — TELEPHONE (OUTPATIENT)
Dept: NEUROLOGY | Age: 60
End: 2025-02-18

## 2025-02-20 ENCOUNTER — HOSPITAL ENCOUNTER (OUTPATIENT)
Dept: MRI IMAGING | Age: 60
Discharge: HOME | End: 2025-02-22
Attending: PSYCHIATRY & NEUROLOGY
Payer: MEDICARE

## 2025-02-20 DIAGNOSIS — M54.10 RADICULOPATHY, UNSPECIFIED SPINAL REGION: ICD-10-CM

## 2025-02-20 LAB
EGFR, POC: 74 ML/MIN/1.73M2
POC CREATININE: 0.9 MG/DL (ref 0.51–1.19)

## 2025-02-20 PROCEDURE — 72156 MRI NECK SPINE W/O & W/DYE: CPT

## 2025-02-20 PROCEDURE — 72148 MRI LUMBAR SPINE W/O DYE: CPT

## 2025-02-20 PROCEDURE — 6360000004 HC RX CONTRAST MEDICATION: Performed by: PSYCHIATRY & NEUROLOGY

## 2025-02-20 PROCEDURE — 2500000003 HC RX 250 WO HCPCS: Performed by: PSYCHIATRY & NEUROLOGY

## 2025-02-20 PROCEDURE — 82565 ASSAY OF CREATININE: CPT

## 2025-02-20 PROCEDURE — A9579 GAD-BASE MR CONTRAST NOS,1ML: HCPCS | Performed by: PSYCHIATRY & NEUROLOGY

## 2025-02-20 RX ORDER — SODIUM CHLORIDE 0.9 % (FLUSH) 0.9 %
10 SYRINGE (ML) INJECTION PRN
Status: DISCONTINUED | OUTPATIENT
Start: 2025-02-20 | End: 2025-02-23 | Stop reason: HOSPADM

## 2025-02-20 RX ADMIN — SODIUM CHLORIDE, PRESERVATIVE FREE 10 ML: 5 INJECTION INTRAVENOUS at 12:29

## 2025-02-20 RX ADMIN — GADOTERIDOL 20 ML: 279.3 INJECTION, SOLUTION INTRAVENOUS at 12:29

## 2025-02-28 ENCOUNTER — OFFICE VISIT (OUTPATIENT)
Dept: FAMILY MEDICINE CLINIC | Age: 60
End: 2025-02-28

## 2025-02-28 VITALS
DIASTOLIC BLOOD PRESSURE: 60 MMHG | WEIGHT: 214 LBS | BODY MASS INDEX: 32.54 KG/M2 | SYSTOLIC BLOOD PRESSURE: 102 MMHG | HEART RATE: 100 BPM | OXYGEN SATURATION: 98 %

## 2025-02-28 DIAGNOSIS — J45.20 MILD INTERMITTENT ASTHMA WITHOUT COMPLICATION: Primary | ICD-10-CM

## 2025-02-28 DIAGNOSIS — F31.9 BIPOLAR AFFECTIVE DISORDER, REMISSION STATUS UNSPECIFIED (HCC): ICD-10-CM

## 2025-02-28 DIAGNOSIS — F10.21 ALCOHOLISM IN RECOVERY (HCC): ICD-10-CM

## 2025-02-28 RX ORDER — AMPICILLIN 500 MG/1
500 CAPSULE ORAL 3 TIMES DAILY
Qty: 21 CAPSULE | Refills: 0 | Status: SHIPPED | OUTPATIENT
Start: 2025-02-28 | End: 2025-03-07

## 2025-02-28 RX ORDER — ALBUTEROL SULFATE 90 UG/1
2 INHALANT RESPIRATORY (INHALATION) EVERY 6 HOURS PRN
Qty: 8.5 G | Refills: 5 | Status: SHIPPED | OUTPATIENT
Start: 2025-02-28

## 2025-02-28 SDOH — ECONOMIC STABILITY: FOOD INSECURITY: WITHIN THE PAST 12 MONTHS, YOU WORRIED THAT YOUR FOOD WOULD RUN OUT BEFORE YOU GOT MONEY TO BUY MORE.: NEVER TRUE

## 2025-02-28 SDOH — ECONOMIC STABILITY: FOOD INSECURITY: WITHIN THE PAST 12 MONTHS, THE FOOD YOU BOUGHT JUST DIDN'T LAST AND YOU DIDN'T HAVE MONEY TO GET MORE.: NEVER TRUE

## 2025-02-28 ASSESSMENT — PATIENT HEALTH QUESTIONNAIRE - PHQ9
9. THOUGHTS THAT YOU WOULD BE BETTER OFF DEAD, OR OF HURTING YOURSELF: NOT AT ALL
4. FEELING TIRED OR HAVING LITTLE ENERGY: NOT AT ALL
SUM OF ALL RESPONSES TO PHQ9 QUESTIONS 1 & 2: 0
7. TROUBLE CONCENTRATING ON THINGS, SUCH AS READING THE NEWSPAPER OR WATCHING TELEVISION: NOT AT ALL
SUM OF ALL RESPONSES TO PHQ QUESTIONS 1-9: 0
8. MOVING OR SPEAKING SO SLOWLY THAT OTHER PEOPLE COULD HAVE NOTICED. OR THE OPPOSITE, BEING SO FIGETY OR RESTLESS THAT YOU HAVE BEEN MOVING AROUND A LOT MORE THAN USUAL: NOT AT ALL
SUM OF ALL RESPONSES TO PHQ QUESTIONS 1-9: 0
SUM OF ALL RESPONSES TO PHQ QUESTIONS 1-9: 0
6. FEELING BAD ABOUT YOURSELF - OR THAT YOU ARE A FAILURE OR HAVE LET YOURSELF OR YOUR FAMILY DOWN: NOT AT ALL
5. POOR APPETITE OR OVEREATING: NOT AT ALL
3. TROUBLE FALLING OR STAYING ASLEEP: NOT AT ALL
10. IF YOU CHECKED OFF ANY PROBLEMS, HOW DIFFICULT HAVE THESE PROBLEMS MADE IT FOR YOU TO DO YOUR WORK, TAKE CARE OF THINGS AT HOME, OR GET ALONG WITH OTHER PEOPLE: NOT DIFFICULT AT ALL
SUM OF ALL RESPONSES TO PHQ QUESTIONS 1-9: 0
1. LITTLE INTEREST OR PLEASURE IN DOING THINGS: NOT AT ALL
2. FEELING DOWN, DEPRESSED OR HOPELESS: NOT AT ALL

## 2025-02-28 ASSESSMENT — ENCOUNTER SYMPTOMS
SHORTNESS OF BREATH: 0
COUGH: 0

## 2025-02-28 NOTE — PROGRESS NOTES
is advised to consult with her gynecologist regarding the recurrent nature of these infections.    4. Mood disorder.  Her mood remains stable under the care of her psychiatrist.    Hemoglobin A1C   Date Value Ref Range Status   07/10/2024 5.2 % Final        No follow-ups on file.  No orders of the defined types were placed in this encounter.    Orders Placed This Encounter   Medications    albuterol sulfate HFA (PROVENTIL;VENTOLIN;PROAIR) 108 (90 Base) MCG/ACT inhaler     Sig: Inhale 2 puffs into the lungs every 6 hours as needed for Wheezing     Dispense:  8.5 g     Refill:  5    ampicillin (PRINCIPEN) 500 MG capsule     Sig: Take 1 capsule by mouth 3 times daily for 7 days     Dispense:  21 capsule     Refill:  0       The patient (or guardian, if applicable) and other individuals in attendance with the patient were advised that Artificial Intelligence will be utilized during this visit to record, process the conversation to generate a clinical note, and support improvement of the AI technology. The patient (or guardian, if applicable) and other individuals in attendance at the appointment consented to the use of AI, including the recording.      Reviewed health maintenance, prior labs and imaging.   Patient given educational materials - see patient instructions.    Discussed use, benefit, and side effects of prescribed medications. Barriers to medication compliance addressed.   All patient questions answered.  Pt voiced understanding to plan of care.   Instructed to continue medications as discussed, healthy diet and exercise.    Patient agreed with treatment plan. Follow up as directed below.     This note is created with the assistance of a speech-recognition program. While intending to generate a document that actually reflects the content of the visit, no guarantees can be provided that every mistake has been identified and corrected by editing.    Electronically signed by COLTON MATSON - LESLEY,

## 2025-03-03 ENCOUNTER — TELEPHONE (OUTPATIENT)
Dept: NEUROLOGY | Age: 60
End: 2025-03-03

## 2025-03-03 DIAGNOSIS — R93.7 ABNORMAL MRI, LUMBAR SPINE: Primary | ICD-10-CM

## 2025-03-03 NOTE — TELEPHONE ENCOUNTER
Patient called into office stating she got her MRI's done on 2/20/24. She is asking for those results. Please advise.

## 2025-03-03 NOTE — TELEPHONE ENCOUNTER
MRI cervical spine is stable.  MRI lumbar spine shows multilevel disc disease which makes pain her low back pain.  I would recommend patient to be seen by neurosurgery for the lumbar spine however if she would not want a surgical consult, continue gabapentin is an option.

## 2025-03-04 NOTE — TELEPHONE ENCOUNTER
Call placed to the patient and this information was given.  Patient verbally stated their understanding and was agreeable to see neurosurgery.  Writer advised that I would place the referral and neurosurgery would contact her to schedule.  Writer also gave neurosurgery number to patient and asked that she call if there were any problems.  Patient verbally stated her understanding.

## 2025-03-06 ENCOUNTER — OFFICE VISIT (OUTPATIENT)
Dept: NEUROSURGERY | Age: 60
End: 2025-03-06
Payer: MEDICARE

## 2025-03-06 VITALS
DIASTOLIC BLOOD PRESSURE: 86 MMHG | HEIGHT: 68 IN | WEIGHT: 215 LBS | BODY MASS INDEX: 32.58 KG/M2 | HEART RATE: 109 BPM | SYSTOLIC BLOOD PRESSURE: 144 MMHG

## 2025-03-06 DIAGNOSIS — M47.26 OTHER SPONDYLOSIS WITH RADICULOPATHY, LUMBAR REGION: ICD-10-CM

## 2025-03-06 DIAGNOSIS — M53.9 MULTILEVEL DEGENERATIVE DISC DISEASE: Primary | ICD-10-CM

## 2025-03-06 DIAGNOSIS — M47.22 CERVICAL SPONDYLOSIS WITH RADICULOPATHY: ICD-10-CM

## 2025-03-06 PROCEDURE — 3077F SYST BP >= 140 MM HG: CPT

## 2025-03-06 PROCEDURE — 99204 OFFICE O/P NEW MOD 45 MIN: CPT

## 2025-03-06 PROCEDURE — 3079F DIAST BP 80-89 MM HG: CPT

## 2025-03-06 NOTE — PROGRESS NOTES
[facility] [place]  [address]  [address]  [place] [address]  Dept: [phone]    Patient:  Mia Huggins  YOB: 1965  Date: 3/6/25    The patient is a 59 y.o. female who presents today for consult of the following problems:     Chief Complaint   Patient presents with    New Patient     Referral for Abnormal MRI of lumbar spine         HPI:     Mia Huggins is a 59 y.o. female on whom neurosurgical consultation was requested by Osvaldo Townsend APRN - CNP for management of neck and radiating back pain.    The patient reports constant axial neck pain described as aching, with an increasing sensation of shakiness. She experiences difficulty with dexterity, including trouble opening doors, and reports balance issues that have led her to use a walker for stability over the past few years. Six weeks ago, she fell due to her impaired balance. In addition to neck pain, she experiences chronic lower back pain that began years ago without a known injury. The pain is also aching and worsens with walking or riding in a car, limiting her to about 3-4 minutes of walking before needing to sit. Relief is found with a heating pad and sitting down. She also experiences pain in the lateral thigh, lower leg, feet, and toes, with the left side worse than the right. Her back pain is more severe than her leg pain, grocery cart leaning provides relief. She reports weakness in her legs and lower back, along with fatigue. Pain severity ranges from 5-10/10. She denies bladder or bowel incontinence and saddle anesthesia. For treatment, she has been seeing a chiropractor and using OTC NSAIDs for pain management.      History:     Past Medical History:   Diagnosis Date    Acute kidney injury     Anemia     Angioedema     Antinuclear antibody (IQRA) titer greater than 1:80     Anxiety     Asthma     Ataxia

## 2025-03-13 ENCOUNTER — HOSPITAL ENCOUNTER (OUTPATIENT)
Age: 60
Setting detail: THERAPIES SERIES
Discharge: HOME | End: 2025-03-13

## 2025-03-13 NOTE — FLOWSHEET NOTE
[x] [facility]  Outpatient Rehabilitation &  Therapy  [address]  [phone]  [phone]              Therapy Cancel/No Show note    Date: 3/13/2025  Patient: Mia Huggins  : 1965  MRN: [id number]    Cancels/No Shows to date:     For today's appointment patient:    [x]  Cancelled    [x] Rescheduled appointment    [] No-show     Reason given by patient:    [x]  Patient ill    []  Conflicting appointment    [] No transportation      [] Conflict with work    [] No reason given    [] Weather related    [] COVID-19    [] Other:      Comments:   Pt cancelled for initial evaluation for Physical Therapy for LB and neck.  Pt woke up not feeling well. Rescheduled for 3/20/25.       [] Next appointment was confirmed    Electronically signed by: NHUNG AYOUB PT

## 2025-03-19 ENCOUNTER — HOSPITAL ENCOUNTER (OUTPATIENT)
Dept: GENERAL RADIOLOGY | Age: 60
Discharge: HOME | End: 2025-03-21
Payer: MEDICARE

## 2025-03-19 ENCOUNTER — HOSPITAL ENCOUNTER (OUTPATIENT)
Age: 60
Discharge: HOME | End: 2025-03-21
Payer: MEDICARE

## 2025-03-19 DIAGNOSIS — M47.26 OTHER SPONDYLOSIS WITH RADICULOPATHY, LUMBAR REGION: ICD-10-CM

## 2025-03-19 DIAGNOSIS — M53.9 MULTILEVEL DEGENERATIVE DISC DISEASE: ICD-10-CM

## 2025-03-19 DIAGNOSIS — M47.22 CERVICAL SPONDYLOSIS WITH RADICULOPATHY: ICD-10-CM

## 2025-03-19 PROCEDURE — 72050 X-RAY EXAM NECK SPINE 4/5VWS: CPT

## 2025-03-19 PROCEDURE — 72082 X-RAY EXAM ENTIRE SPI 2/3 VW: CPT

## 2025-03-19 PROCEDURE — 72110 X-RAY EXAM L-2 SPINE 4/>VWS: CPT

## 2025-03-20 ENCOUNTER — HOSPITAL ENCOUNTER (OUTPATIENT)
Age: 60
Setting detail: THERAPIES SERIES
Discharge: HOME | End: 2025-03-20
Payer: MEDICARE

## 2025-03-20 PROCEDURE — 97162 PT EVAL MOD COMPLEX 30 MIN: CPT

## 2025-03-20 PROCEDURE — 97110 THERAPEUTIC EXERCISES: CPT

## 2025-03-20 NOTE — CONSULTS
[x] [facility]  Outpatient Rehabilitation &  Therapy  [address]  [phone]  [phone] [] [facility]  Outpatient Rehabilitation &  Therapy  [address]  P: [phone]  F: [phone] [] [facility]  Outpatient Rehabilitation &  Therapy  [address]  P: [phone]  F: [phone] [] [facility]  Outpatient Rehabilitation &  Therapy  [address]  [phone]  [phone] [] [facility]  Outpatient Rehabilitation &  Therapy  [address]   P: [phone]  F: [phone]  [] [facility]  Outpatient Rehabilitation &  Therapy  [address]  P: [phone]  F: [phone] [] [facility]  Outpatient Rehabilitation &  Therapy  [address]  P: [phone]  F: [phone] [] [facility]  Outpatient Rehabilitation &  Therapy  [address]  P: [phone]  F: [phone] [] [facility]  Outpatient Rehabilitation &  Therapy  [address]  P: [phone]  F: [phone]  [] [facility] Outpatient Rehabilitation &  Therapy  [address]  P: [phone]  F: [phone]     Physical Therapy Spine Evaluation    Date:  3/20/2025  Patient: Mia Huggins  : 1965  MRN: [id number]  Physician: Mary Mayer APRN - CNP      Insurance: [employer] MEDICARE PREMIER HMO-POS   Medical Diagnosis:  M53.9 - Multilevel degenerative disc disease; M47.26 - Other spondylosis with radiculopathy, lumbar region; M47.22 - Cervical spondylosis with radiculopathy     Rehab Codes: M54.16 M54.2 M62.81 R26.2   Onset Date: 2018  Next 's appt.: 2025 (Neuro)    Subjective:   CC/HPI: Pt arrives to clinic with ongoing neck and low back (LB) pain with KERI LE radiculopathy that has been progressively getting worse over

## 2025-03-26 ENCOUNTER — HOSPITAL ENCOUNTER (OUTPATIENT)
Age: 60
Setting detail: THERAPIES SERIES
Discharge: HOME | End: 2025-03-26
Payer: MEDICARE

## 2025-03-26 PROCEDURE — 97110 THERAPEUTIC EXERCISES: CPT

## 2025-03-26 NOTE — FLOWSHEET NOTE
[x] [facility]  Outpatient Rehabilitation &  Therapy  [address]  [phone]  [phone] [] [facility]  Outpatient Rehabilitation &  Therapy  [address]  P: [phone]  F: [phone] [] [facility]  Outpatient Rehabilitation &  Therapy  [address]  P: [phone]  F: [phone] [] [facility]  Outpatient Rehabilitation &  Therapy  [address]  [phone]  [phone] [] [facility]  Outpatient Rehabilitation &  Therapy  [address]   P: [phone]  F: [phone]  [] [facility]  Outpatient Rehabilitation &  Therapy  [address]  P: [phone]  F: [phone] [] [facility]  Outpatient Rehabilitation &  Therapy  [address]  P: [phone]  F: [phone] [] [facility]  Outpatient Rehabilitation &  Therapy  [address]  P: [phone]  F: [phone] [] [facility]  Outpatient Rehabilitation &  Therapy  [address]  P: [phone]  F: [phone]  [] [facility] Outpatient Rehabilitation &  Therapy  [address]  P: [phone]  F: [phone]     Physical Therapy Daily Treatment Note    Date:  3/26/2025  Patient Name:  Mia Huggins    :  1965  MRN: [id number]   Physician: Mary Mayer APRN - LESLEY                                     Insurance: [employer] MEDICARE PREMIER HMO-POS   Medical Diagnosis:  M53.9 - Multilevel degenerative disc disease; M47.26 - Other spondylosis with radiculopathy, lumbar region; M47.22 - Cervical spondylosis with radiculopathy     Rehab Codes: M54.16 M54.2 M62.81 R26.2   Onset Date: 2018             Next 's appt.: 2025 (Neuro)  Visit# / total visits: ; Progress note for Medicare patient due at visit #10     Cancels/No

## 2025-03-28 ENCOUNTER — HOSPITAL ENCOUNTER (OUTPATIENT)
Age: 60
Setting detail: THERAPIES SERIES
Discharge: HOME | End: 2025-03-28
Payer: MEDICARE

## 2025-03-28 PROCEDURE — 97110 THERAPEUTIC EXERCISES: CPT

## 2025-03-28 NOTE — FLOWSHEET NOTE
[x] [facility]  Outpatient Rehabilitation &  Therapy  [address]  [phone]  [phone] [] [facility]  Outpatient Rehabilitation &  Therapy  [address]  P: [phone]  F: [phone] [] [facility]  Outpatient Rehabilitation &  Therapy  [address]  P: [phone]  F: [phone] [] [facility]  Outpatient Rehabilitation &  Therapy  [address]  [phone]  [phone] [] [facility]  Outpatient Rehabilitation &  Therapy  [address]   P: [phone]  F: [phone]  [] [facility]  Outpatient Rehabilitation &  Therapy  [address]  P: [phone]  F: [phone] [] [facility]  Outpatient Rehabilitation &  Therapy  [address]  P: [phone]  F: [phone] [] [facility]  Outpatient Rehabilitation &  Therapy  [address]  P: [phone]  F: [phone] [] [facility]  Outpatient Rehabilitation &  Therapy  [address]  P: [phone]  F: [phone]  [] [facility] Outpatient Rehabilitation &  Therapy  [address]  P: [phone]  F: [phone]     Physical Therapy Daily Treatment Note    Date:  3/28/2025  Patient Name:  Mia Huggins    :  1965  MRN: [id number]   Physician: Mary Mayer APRN - LESLEY                                     Insurance: [employer] MEDICARE PREMIER HMO-POS   Medical Diagnosis:  M53.9 - Multilevel degenerative disc disease; M47.26 - Other spondylosis with radiculopathy, lumbar region; M47.22 - Cervical spondylosis with radiculopathy     Rehab Codes: M54.16 M54.2 M62.81 R26.2   Onset Date: 2018             Next 's appt.: 2025 (Neuro)  Visit# / total visits: ; Progress note for Medicare patient due at visit #10     Cancels/No

## 2025-04-02 ENCOUNTER — HOSPITAL ENCOUNTER (OUTPATIENT)
Age: 60
Setting detail: THERAPIES SERIES
Discharge: HOME | End: 2025-04-02
Payer: MEDICARE

## 2025-04-02 PROCEDURE — 97140 MANUAL THERAPY 1/> REGIONS: CPT

## 2025-04-02 PROCEDURE — 97110 THERAPEUTIC EXERCISES: CPT

## 2025-04-02 NOTE — FLOWSHEET NOTE
[x] [facility]  Outpatient Rehabilitation &  Therapy  [address]  [phone]  [phone] [] [facility]  Outpatient Rehabilitation &  Therapy  [address]  P: [phone]  F: [phone] [] [facility]  Outpatient Rehabilitation &  Therapy  [address]  P: [phone]  F: [phone] [] [facility]  Outpatient Rehabilitation &  Therapy  [address]  [phone]  [phone] [] [facility]  Outpatient Rehabilitation &  Therapy  [address]   P: [phone]  F: [phone]  [] [facility]  Outpatient Rehabilitation &  Therapy  [address]  P: [phone]  F: [phone] [] [facility]  Outpatient Rehabilitation &  Therapy  [address]  P: [phone]  F: [phone] [] [facility]  Outpatient Rehabilitation &  Therapy  [address]  P: [phone]  F: [phone] [] [facility]  Outpatient Rehabilitation &  Therapy  [address]  P: [phone]  F: [phone]  [] [facility] Outpatient Rehabilitation &  Therapy  [address]  P: [phone]  F: [phone]     Physical Therapy Daily Treatment Note    Date:  2025  Patient Name:  Mia Huggins    :  1965  MRN: [id number]   Physician: Mary Mayer APRN - LESLEY                                     Insurance: [employer] MEDICARE PREMIER HMO-POS   Medical Diagnosis:  M53.9 - Multilevel degenerative disc disease; M47.26 - Other spondylosis with radiculopathy, lumbar region; M47.22 - Cervical spondylosis with radiculopathy     Rehab Codes: M54.16 M54.2 M62.81 R26.2   Onset Date: 2018             Next 's appt.: 2025 (Neuro)  Visit# / total visits: ; Progress note for Medicare patient due at visit #10     Cancels/No Shows:

## 2025-04-02 NOTE — FLOWSHEET NOTE
[x] [facility]  Outpatient Rehabilitation &  Therapy  [address]  [phone]  [phone] [] [facility]  Outpatient Rehabilitation &  Therapy  [address]  P: [phone]  F: [phone] [] [facility]  Outpatient Rehabilitation &  Therapy  [address]  P: [phone]  F: [phone] [] [facility]  Outpatient Rehabilitation &  Therapy  [address]  [phone]  [phone] [] [facility]  Outpatient Rehabilitation &  Therapy  [address]   P: [phone]  F: [phone]  [] [facility]  Outpatient Rehabilitation &  Therapy  [address]  P: [phone]  F: [phone] [] [facility]  Outpatient Rehabilitation &  Therapy  [address]  P: [phone]  F: [phone] [] [facility]  Outpatient Rehabilitation &  Therapy  [address]  P: [phone]  F: [phone] [] [facility]  Outpatient Rehabilitation &  Therapy  [address]  P: [phone]  F: [phone]  [] [facility] Outpatient Rehabilitation &  Therapy  [address]  P: [phone]  F: [phone]     Physical Therapy Daily Treatment Note    Date:  2025  Patient Name:  Mia Huggins    :  1965  MRN: [id number]   Physician: Mary Mayer APRN - LESLEY                                     Insurance: [employer] MEDICARE PREMIER HMO-POS   Medical Diagnosis:  M53.9 - Multilevel degenerative disc disease; M47.26 - Other spondylosis with radiculopathy, lumbar region; M47.22 - Cervical spondylosis with radiculopathy     Rehab Codes: M54.16 M54.2 M62.81 R26.2   Onset Date: 2018             Next 's appt.: 2025 (Neuro)  Visit# / total visits: ; Progress note for Medicare patient due at visit #10     Cancels/No Shows:

## 2025-04-04 ENCOUNTER — HOSPITAL ENCOUNTER (OUTPATIENT)
Age: 60
Setting detail: THERAPIES SERIES
Discharge: HOME | End: 2025-04-04
Payer: MEDICARE

## 2025-04-04 PROCEDURE — G0283 ELEC STIM OTHER THAN WOUND: HCPCS

## 2025-04-04 PROCEDURE — 97140 MANUAL THERAPY 1/> REGIONS: CPT

## 2025-04-04 PROCEDURE — 97110 THERAPEUTIC EXERCISES: CPT

## 2025-04-04 NOTE — FLOWSHEET NOTE
Overemphasis protraction into retraction position   Cervical spine ROM    Add next    Retro shoulder rolls  15x     Added 3/28 for postural awareness          Supine          SKTC  5x10\"   X  Attempted KERI LE, much better than last tx   DKTC  1x15\"     does not feel stretch    Nerve flossing   1x10 ea        Neutral pelvis  1x10  x    Neutral pelvis w/ march 2x10  x    Neutal pelvis w/ alt UE 1x10 1 lbs x    Dead bug 1x10 1 lbs x    Iso hip abd w/neutal pelvis  1x10  x Belt around legs   Bridging w/ clam  1x10  No TB x  Combined 3/28; Able to complete with good tolerance    Piriformis stretch  2x20\"   x      Add set w/ ball 1x10  x Add next          Other:     Specific Instructions for next treatment: Gentle cervical/trunk ROM exercises with flexion preference for decreasing rad sx. General LE strengthening for improving activity tolerance. Implement periscapular strengthening for improving postural awareness. Balance/gait training as seen fit to challenge stability on uneven surfaces. ISSUE FALL PREVENTION HO.     Treatment Charges: Mins Units   [x]  Modalities UES 15  1   [x]  Ther Exercise 35 2   []  Neuromuscular Re-ed     []  Gait Training     [x]  Manual Therapy 8 1   []  Ther Activities Education      []  Aquatics     []  Vasocompression     []  Cervical Traction     []  Other     Total Billable time 58 4          Assessment: [x] Progressing toward goals: Added hooklying core strengthening with pt demonstrating good understanding and technique with neutral pelvis and added/trial UES to LB for pain relief.      [] No change.       [x] Other:  Held several prior addressed exercises and did not add intended exercise for this date due to pt  pain complaints.  Will check responses to altered session and UES to Low back.  Did educate pt on aquatic therapy as a possibility.  [x] Patient would continue to benefit from skilled physical therapy services in order to: Improve cervical ROM. Improve Lumbar ROM. Decrease

## 2025-04-09 ENCOUNTER — APPOINTMENT (OUTPATIENT)
Age: 60
End: 2025-04-09
Payer: MEDICARE

## 2025-04-17 DIAGNOSIS — D50.9 IRON DEFICIENCY ANEMIA, UNSPECIFIED IRON DEFICIENCY ANEMIA TYPE: Primary | ICD-10-CM

## 2025-04-18 RX ORDER — CYANOCOBALAMIN 1000 UG/ML
INJECTION, SOLUTION INTRAMUSCULAR; SUBCUTANEOUS
Qty: 3 ML | Refills: 0 | Status: SHIPPED | OUTPATIENT
Start: 2025-04-18

## 2025-04-24 ENCOUNTER — HOSPITAL ENCOUNTER (OUTPATIENT)
Age: 60
Discharge: HOME | End: 2025-04-24
Payer: MEDICARE

## 2025-04-24 DIAGNOSIS — D50.9 IRON DEFICIENCY ANEMIA, UNSPECIFIED IRON DEFICIENCY ANEMIA TYPE: ICD-10-CM

## 2025-04-24 PROBLEM — Z90.710 H/O HYSTERECTOMY FOR BENIGN DISEASE: Status: RESOLVED | Noted: 2017-11-29 | Resolved: 2025-04-24

## 2025-04-24 PROBLEM — Z98.890: Status: RESOLVED | Noted: 2018-07-30 | Resolved: 2025-04-24

## 2025-04-24 PROBLEM — F41.1 GENERALIZED ANXIETY DISORDER: Status: RESOLVED | Noted: 2019-08-01 | Resolved: 2025-04-24

## 2025-04-24 PROBLEM — K90.9 IRON MALABSORPTION: Status: RESOLVED | Noted: 2020-09-01 | Resolved: 2025-04-24

## 2025-04-24 PROBLEM — F31.31 BIPOLAR AFFECTIVE DISORDER, CURRENTLY DEPRESSED, MILD (HCC): Status: RESOLVED | Noted: 2019-08-01 | Resolved: 2025-04-24

## 2025-04-24 PROBLEM — F10.21 ALCOHOLISM IN RECOVERY (HCC): Status: RESOLVED | Noted: 2019-08-04 | Resolved: 2025-04-24

## 2025-04-24 PROBLEM — Z96.653 HISTORY OF TOTAL BILATERAL KNEE REPLACEMENT: Status: RESOLVED | Noted: 2017-11-29 | Resolved: 2025-04-24

## 2025-04-24 PROBLEM — R20.0 NUMBNESS: Status: RESOLVED | Noted: 2018-12-14 | Resolved: 2025-04-24

## 2025-04-24 PROBLEM — N25.81 SECONDARY HYPERPARATHYROIDISM: Status: RESOLVED | Noted: 2023-02-20 | Resolved: 2025-04-24

## 2025-04-24 PROBLEM — N81.11 CYSTOCELE, MIDLINE: Status: RESOLVED | Noted: 2018-07-29 | Resolved: 2025-04-24

## 2025-04-24 PROBLEM — N18.31 TYPE 2 DIABETES MELLITUS WITH STAGE 3A CHRONIC KIDNEY DISEASE, WITHOUT LONG-TERM CURRENT USE OF INSULIN (HCC): Status: ACTIVE | Noted: 2017-05-02

## 2025-04-24 PROBLEM — N18.31 CKD STAGE 3A, GFR 45-59 ML/MIN (HCC): Status: ACTIVE | Noted: 2021-01-18

## 2025-04-24 PROBLEM — E86.1 HYPOVOLEMIA: Status: RESOLVED | Noted: 2018-01-16 | Resolved: 2025-04-24

## 2025-04-24 PROBLEM — M17.11 DEGENERATIVE ARTHRITIS OF RIGHT KNEE: Status: RESOLVED | Noted: 2017-05-02 | Resolved: 2025-04-24

## 2025-04-24 PROBLEM — I95.9 HYPOTENSION: Status: RESOLVED | Noted: 2018-01-24 | Resolved: 2025-04-24

## 2025-04-24 PROBLEM — J45.20 MILD INTERMITTENT ASTHMA: Status: RESOLVED | Noted: 2019-08-01 | Resolved: 2025-04-24

## 2025-04-24 PROBLEM — S72.409A CLOSED FRACTURE OF LOWER END OF FEMUR (HCC): Status: RESOLVED | Noted: 2018-11-08 | Resolved: 2025-04-24

## 2025-04-24 PROBLEM — M35.9 SYSTEMIC INVOLVEMENT OF CONNECTIVE TISSUE, UNSPECIFIED: Status: RESOLVED | Noted: 2022-10-14 | Resolved: 2025-04-24

## 2025-04-24 PROBLEM — Z86.73 HISTORY OF STROKE: Status: RESOLVED | Noted: 2024-05-28 | Resolved: 2025-04-24

## 2025-04-24 PROBLEM — Z78.0 ASYMPTOMATIC MENOPAUSAL STATE: Status: RESOLVED | Noted: 2024-04-17 | Resolved: 2025-04-24

## 2025-04-24 PROBLEM — I63.9 STROKE (HCC): Status: RESOLVED | Noted: 2017-04-06 | Resolved: 2025-04-24

## 2025-04-24 PROBLEM — M48.02 SPINAL STENOSIS, CERVICAL REGION: Status: RESOLVED | Noted: 2018-10-30 | Resolved: 2025-04-24

## 2025-04-24 PROBLEM — Z78.0 POST-MENOPAUSAL: Status: RESOLVED | Noted: 2024-04-17 | Resolved: 2025-04-24

## 2025-04-24 PROBLEM — N39.3 SUI (STRESS URINARY INCONTINENCE, FEMALE): Status: RESOLVED | Noted: 2018-07-29 | Resolved: 2025-04-24

## 2025-04-24 PROBLEM — M81.0 SENILE OSTEOPOROSIS: Status: RESOLVED | Noted: 2019-05-10 | Resolved: 2025-04-24

## 2025-04-24 PROBLEM — Z86.73 H/O: STROKE: Status: RESOLVED | Noted: 2018-01-15 | Resolved: 2025-04-24

## 2025-04-24 LAB
BASOPHILS # BLD: 0.2 K/UL (ref 0–0.2)
BASOPHILS NFR BLD: 3 % (ref 0–2)
EOSINOPHIL # BLD: 0.1 K/UL (ref 0–0.4)
EOSINOPHILS RELATIVE PERCENT: 1 % (ref 0–4)
ERYTHROCYTE [DISTWIDTH] IN BLOOD BY AUTOMATED COUNT: 16.3 % (ref 11.5–14.9)
FERRITIN SERPL-MCNC: 34 NG/ML (ref 15–150)
FOLATE SERPL-MCNC: 20.3 NG/ML (ref 4.8–24.2)
HCT VFR BLD AUTO: 41.1 % (ref 36–46)
HGB BLD-MCNC: 14.2 G/DL (ref 12–16)
LYMPHOCYTES NFR BLD: 1.8 K/UL (ref 1–4.8)
LYMPHOCYTES RELATIVE PERCENT: 31 % (ref 24–44)
MCH RBC QN AUTO: 32.3 PG (ref 26–34)
MCHC RBC AUTO-ENTMCNC: 34.5 G/DL (ref 31–37)
MCV RBC AUTO: 93.7 FL (ref 80–100)
MONOCYTES NFR BLD: 0.4 K/UL (ref 0.1–1.3)
MONOCYTES NFR BLD: 8 % (ref 1–7)
NEUTROPHILS NFR BLD: 57 % (ref 36–66)
NEUTS SEG NFR BLD: 3.2 K/UL (ref 1.3–9.1)
PLATELET # BLD AUTO: 231 K/UL (ref 150–450)
PMV BLD AUTO: 7.2 FL (ref 6–12)
RBC # BLD AUTO: 4.38 M/UL (ref 4–5.2)
VIT B12 SERPL-MCNC: 566 PG/ML (ref 232–1245)
WBC OTHER # BLD: 5.6 K/UL (ref 3.5–11)

## 2025-04-24 PROCEDURE — 82607 VITAMIN B-12: CPT

## 2025-04-24 PROCEDURE — 36415 COLL VENOUS BLD VENIPUNCTURE: CPT

## 2025-04-24 PROCEDURE — 85025 COMPLETE CBC W/AUTO DIFF WBC: CPT

## 2025-04-24 PROCEDURE — 82728 ASSAY OF FERRITIN: CPT

## 2025-04-24 PROCEDURE — 82746 ASSAY OF FOLIC ACID SERUM: CPT

## 2025-05-05 ENCOUNTER — TELEPHONE (OUTPATIENT)
Age: 60
End: 2025-05-05

## 2025-05-05 ENCOUNTER — OFFICE VISIT (OUTPATIENT)
Age: 60
End: 2025-05-05
Payer: MEDICARE

## 2025-05-05 VITALS
WEIGHT: 214.7 LBS | BODY MASS INDEX: 32.65 KG/M2 | RESPIRATION RATE: 16 BRPM | SYSTOLIC BLOOD PRESSURE: 180 MMHG | DIASTOLIC BLOOD PRESSURE: 112 MMHG | TEMPERATURE: 98 F | HEART RATE: 87 BPM

## 2025-05-05 DIAGNOSIS — E53.8 B12 DEFICIENCY: Primary | ICD-10-CM

## 2025-05-05 DIAGNOSIS — G50.0 OROFACIAL NEUROPATHIC PAIN: ICD-10-CM

## 2025-05-05 DIAGNOSIS — D50.9 IRON DEFICIENCY ANEMIA, UNSPECIFIED IRON DEFICIENCY ANEMIA TYPE: ICD-10-CM

## 2025-05-05 PROCEDURE — 3077F SYST BP >= 140 MM HG: CPT | Performed by: INTERNAL MEDICINE

## 2025-05-05 PROCEDURE — 99214 OFFICE O/P EST MOD 30 MIN: CPT | Performed by: INTERNAL MEDICINE

## 2025-05-05 PROCEDURE — 3080F DIAST BP >= 90 MM HG: CPT | Performed by: INTERNAL MEDICINE

## 2025-05-05 RX ORDER — PILOCARPINE HYDROCHLORIDE 7.5 MG/1
7.5 TABLET, FILM COATED ORAL 3 TIMES DAILY
COMMUNITY

## 2025-05-05 RX ORDER — IBUPROFEN 200 MG
1 TABLET ORAL DAILY
Qty: 30 EACH | Refills: 0 | Status: SHIPPED | OUTPATIENT
Start: 2025-05-05

## 2025-05-05 RX ORDER — DULOXETIN HYDROCHLORIDE 30 MG/1
CAPSULE, DELAYED RELEASE ORAL DAILY
COMMUNITY

## 2025-05-05 NOTE — PROGRESS NOTES
DIAGNOSIS:   Anemia, malabsorption component  History of gastric bypass surgery  History of iron deficiency and B12 likely due to malabsorption    CURRENT THERAPY:  IV iron as needed    parenteral B12 supplementation  Observation     BRIEF CASE HISTORY:   Mia Huggins is a very pleasant 59 y.o. female who is referred to us for anemia. She reports she has long history of anemia and has received IV iron in the past and been on oral iron for the last 10 years. Her last iron infusion was 2014 at [facility]. She had gastric by-pass in 2012, she did have anemia prior but worsened following surgery. She does have fatigue, PICA, shortness of breath. She had colonoscopy 2019 which was negative, she does have chronic colonoscopy. She has history of B12 deficiency and takes oral supplementation. She has parathyroid issue and takes synthroid. She has multiple complications and is seen by several specialists including rheumatology, cardiology, ortho surg. She has scleroderma, osteoporosis, and Raynaud syndrome. Her scleroderma is diffused but currently well controlled. She has had both knees replacements twice.   She was adopted and family history is unknown.     INTERIM HISTORY: The patient presents today to discuss her lab work.  She has been maintaining with oral iron and monthly B12 injections.  She denies any active bleeding.  She is feeling well and has no complaints.   She denies any swelling, bleeding, nausea, vomiting, or diarrhea.  Denies any bleeding  She has a complaint of tingling sensation in her mouth that has been going on for at least a month.  It is associated with change in taste  PAST MEDICAL HISTORY: has a past medical history of 7/30/18 Cystocele repair, Cystoscopy with OBTRYX Ureteral Sling, Acute kidney injury, Alcoholism in recovery ([facility]), Anemia, Angioedema, Antinuclear antibody (IQRA) titer greater than 1:80, Anxiety, Asthma, Ataxia, Atrial fibrillation ([facility]), Bipolar affective disorder, currently

## 2025-05-05 NOTE — TELEPHONE ENCOUNTER
ROBIN HERE FOR FOLLOW UP   Rv in one year with cbc, b12 ferritin   MD VISIT: 5/4/26 @ 11AM   LABS PRINTED AND GIVEN ON EXIT   AVS PRINTED AND GIVEN ON EXIT

## 2025-05-06 DIAGNOSIS — Z86.73 H/O: STROKE: Primary | ICD-10-CM

## 2025-05-06 DIAGNOSIS — R20.0 NUMBNESS: ICD-10-CM

## 2025-05-06 DIAGNOSIS — E53.8 B12 DEFICIENCY: ICD-10-CM

## 2025-05-06 DIAGNOSIS — G62.9 SENSORY NEUROPATHY: ICD-10-CM

## 2025-05-06 NOTE — TELEPHONE ENCOUNTER
Patient called the office stating that she spoke with Dr. Read yesterday and informed him that for the last two months her entire tongue constantly tingles and she has lost some taste sensation \"things don't taste right\" .  Dr. Read told her that she shouldn't ignore this and advise that she let you know.  Writer advised that I would forward this information to Dr. Fermin.

## 2025-05-08 NOTE — TELEPHONE ENCOUNTER
We can check vit b12, vit d, copper, zinc level and get mri brain with and without contrast. Thanks

## 2025-05-08 NOTE — TELEPHONE ENCOUNTER
Patient agreeable to having labs/MRI done. Orders placed. Patient is wondering if she can have something prescribed to help with her anxiety with the MRI. Please advise.

## 2025-05-09 ENCOUNTER — HOSPITAL ENCOUNTER (OUTPATIENT)
Age: 60
Discharge: HOME | End: 2025-05-09
Payer: MEDICARE

## 2025-05-09 DIAGNOSIS — Z86.73 H/O: STROKE: ICD-10-CM

## 2025-05-09 DIAGNOSIS — G62.9 SENSORY NEUROPATHY: ICD-10-CM

## 2025-05-09 DIAGNOSIS — E53.8 B12 DEFICIENCY: ICD-10-CM

## 2025-05-09 DIAGNOSIS — R20.0 NUMBNESS: ICD-10-CM

## 2025-05-09 LAB
25(OH)D3 SERPL-MCNC: 42.4 NG/ML (ref 30–100)
VIT B12 SERPL-MCNC: 414 PG/ML (ref 232–1245)

## 2025-05-09 PROCEDURE — 82306 VITAMIN D 25 HYDROXY: CPT

## 2025-05-09 PROCEDURE — 82525 ASSAY OF COPPER: CPT

## 2025-05-09 PROCEDURE — 84630 ASSAY OF ZINC: CPT

## 2025-05-09 PROCEDURE — 82607 VITAMIN B-12: CPT

## 2025-05-09 PROCEDURE — 36415 COLL VENOUS BLD VENIPUNCTURE: CPT

## 2025-05-11 LAB
COPPER SERPL-MCNC: 107.5 UG/DL (ref 80–155)
ZINC SERPL-MCNC: 71 UG/DL (ref 60–120)

## 2025-05-13 RX ORDER — DIAZEPAM 5 MG/1
TABLET ORAL
Qty: 1 TABLET | Refills: 0 | Status: SHIPPED | OUTPATIENT
Start: 2025-05-13 | End: 2025-06-13

## 2025-05-13 NOTE — TELEPHONE ENCOUNTER
Rx generated and will be sent to Dr. Fermin for his approval.  Call placed to the patient and a message left on her VM  that Dr. Fermin would be sending a new Rx to here pharmacy.  Writer asked that she call if there were any questions.

## 2025-05-15 ENCOUNTER — HOSPITAL ENCOUNTER (OUTPATIENT)
Age: 60
Discharge: HOME | End: 2025-05-15
Payer: MEDICARE

## 2025-05-15 DIAGNOSIS — E83.42 HYPOMAGNESEMIA: ICD-10-CM

## 2025-05-15 DIAGNOSIS — E87.6 HYPOKALEMIA: ICD-10-CM

## 2025-05-15 DIAGNOSIS — N18.31 STAGE 3A CHRONIC KIDNEY DISEASE (HCC): ICD-10-CM

## 2025-05-15 LAB
25(OH)D3 SERPL-MCNC: 41.5 NG/ML (ref 30–100)
ANION GAP SERPL CALCULATED.3IONS-SCNC: 17 MMOL/L (ref 9–16)
BASOPHILS # BLD: 0.1 K/UL (ref 0–0.2)
BASOPHILS NFR BLD: 2 % (ref 0–2)
BUN SERPL-MCNC: 13 MG/DL (ref 6–20)
CA-I BLD-SCNC: ABNORMAL MMOL/L (ref 1.13–1.33)
CALCIUM SERPL-MCNC: 9.6 MG/DL (ref 8.6–10.4)
CHLORIDE SERPL-SCNC: 96 MMOL/L (ref 98–107)
CO2 SERPL-SCNC: 26 MMOL/L (ref 20–31)
CREAT SERPL-MCNC: 1.3 MG/DL (ref 0.7–1.2)
CREAT UR-MCNC: 115 MG/DL (ref 28–217)
CREAT UR-MCNC: 115 MG/DL (ref 28–217)
EOSINOPHIL # BLD: 0.1 K/UL (ref 0–0.4)
EOSINOPHILS RELATIVE PERCENT: 2 % (ref 0–4)
ERYTHROCYTE [DISTWIDTH] IN BLOOD BY AUTOMATED COUNT: 16.3 % (ref 11.5–14.9)
GFR, ESTIMATED: 47 ML/MIN/1.73M2
GLUCOSE SERPL-MCNC: 121 MG/DL (ref 74–99)
HCT VFR BLD AUTO: 44 % (ref 36–46)
HGB BLD-MCNC: 15 G/DL (ref 12–16)
LYMPHOCYTES NFR BLD: 1.6 K/UL (ref 1–4.8)
LYMPHOCYTES RELATIVE PERCENT: 30 % (ref 24–44)
MAGNESIUM SERPL-MCNC: 1.6 MG/DL (ref 1.6–2.6)
MCH RBC QN AUTO: 32.9 PG (ref 26–34)
MCHC RBC AUTO-ENTMCNC: 34.2 G/DL (ref 31–37)
MCV RBC AUTO: 96.1 FL (ref 80–100)
MONOCYTES NFR BLD: 0.4 K/UL (ref 0.1–1.3)
MONOCYTES NFR BLD: 8 % (ref 1–7)
NEUTROPHILS NFR BLD: 58 % (ref 36–66)
NEUTS SEG NFR BLD: 3.2 K/UL (ref 1.3–9.1)
PHOSPHATE SERPL-MCNC: 4.4 MG/DL (ref 2.5–4.5)
PLATELET # BLD AUTO: 224 K/UL (ref 150–450)
PMV BLD AUTO: 7.9 FL (ref 6–12)
POTASSIUM SERPL-SCNC: 3.3 MMOL/L (ref 3.7–5.3)
PTH-INTACT SERPL-MCNC: 141 PG/ML (ref 17.9–58.6)
RBC # BLD AUTO: 4.57 M/UL (ref 4–5.2)
SODIUM SERPL-SCNC: 139 MMOL/L (ref 136–145)
TOTAL PROTEIN, URINE: 12 MG/DL
URINE TOTAL PROTEIN CREATININE RATIO: 0.1
WBC OTHER # BLD: 5.4 K/UL (ref 3.5–11)

## 2025-05-15 PROCEDURE — 82330 ASSAY OF CALCIUM: CPT

## 2025-05-15 PROCEDURE — 85025 COMPLETE CBC W/AUTO DIFF WBC: CPT

## 2025-05-15 PROCEDURE — 84156 ASSAY OF PROTEIN URINE: CPT

## 2025-05-15 PROCEDURE — 36415 COLL VENOUS BLD VENIPUNCTURE: CPT

## 2025-05-15 PROCEDURE — 83735 ASSAY OF MAGNESIUM: CPT

## 2025-05-15 PROCEDURE — 83970 ASSAY OF PARATHORMONE: CPT

## 2025-05-15 PROCEDURE — 82306 VITAMIN D 25 HYDROXY: CPT

## 2025-05-15 PROCEDURE — 80048 BASIC METABOLIC PNL TOTAL CA: CPT

## 2025-05-15 PROCEDURE — 84100 ASSAY OF PHOSPHORUS: CPT

## 2025-05-15 PROCEDURE — 82570 ASSAY OF URINE CREATININE: CPT

## 2025-05-19 ENCOUNTER — OFFICE VISIT (OUTPATIENT)
Dept: NEUROSURGERY | Age: 60
End: 2025-05-19
Payer: MEDICARE

## 2025-05-19 VITALS
BODY MASS INDEX: 31.92 KG/M2 | HEIGHT: 68 IN | OXYGEN SATURATION: 99 % | WEIGHT: 210.6 LBS | HEART RATE: 78 BPM | DIASTOLIC BLOOD PRESSURE: 64 MMHG | SYSTOLIC BLOOD PRESSURE: 99 MMHG

## 2025-05-19 DIAGNOSIS — M79.18 MYOFASCIAL PAIN SYNDROME OF LUMBAR SPINE: Primary | ICD-10-CM

## 2025-05-19 DIAGNOSIS — M47.812 CERVICAL SPONDYLOSIS: ICD-10-CM

## 2025-05-19 PROCEDURE — 3074F SYST BP LT 130 MM HG: CPT | Performed by: NEUROLOGICAL SURGERY

## 2025-05-19 PROCEDURE — 99214 OFFICE O/P EST MOD 30 MIN: CPT | Performed by: NEUROLOGICAL SURGERY

## 2025-05-19 PROCEDURE — 3078F DIAST BP <80 MM HG: CPT | Performed by: NEUROLOGICAL SURGERY

## 2025-05-19 NOTE — PROGRESS NOTES
[facility] NEUROSURGERY [facility]  [address]  [address]  [address]OUND [address]LO[address], [address]  [facility] [address]  Dept: [phone]    Patient:  Mia Huggins  YOB: 1965  Date: 5/19/25    The patient is a 59 y.o. female who presents today for consult of the following problems:     Chief Complaint   Patient presents with    Multilevel degenerative disc disease     Cervical Xray   Lumbar Xray   Entire Spine Xray   Referral physical therapy   Follow up with surgeon              HPI:     Mia Huggins is a 59 y.o. female on whom neurosurgical consultation was requested by Osvaldo Townsend APRN - CNP for management of her axial bilateral paraspinal back pain radiating into the bilateral lower extremities approximately to the hamstring and the thighs terminating at approximately the knee.  Some numbness and tingling the feet as well.  Has been going on for a number of years and she has been in extensive discal therapy and states that she has to recover after extension where the pain is actually worse.  Denies any saddle anesthesia or bowel bladder incontinence but does have some urgency of the urine.  Has been through physical therapy with no injections overall.  Does have some morning stiffness but otherwise no other major issues. Has had stem, ice, heat, creams without relief. Pain mostly in lower back    Also with neck pain nonradiating with some dexterity issues & ataxia. Intemittent use of walker.  This appears to be better than the back.  No radiating numbness tingling of the upper extremities or other issues related to radiating pain..      History:     Past Medical History:   Diagnosis Date    7/30/18 Cystocele repair, Cystoscopy with OBTRYX Ureteral Sling 07/30/2018    CYSTOCELE REPAIR  CYSTOSCOPY WITH OBTRYX URETERAL SLING      Acute kidney injury     Alcoholism in recovery ([facility]) 08/04/2019    Last Assessment & Plan:

## 2025-05-21 ENCOUNTER — HOSPITAL ENCOUNTER (OUTPATIENT)
Age: 60
Setting detail: THERAPIES SERIES
Discharge: HOME | End: 2025-05-21

## 2025-05-21 NOTE — FLOWSHEET NOTE
[x] [facility]  Outpatient Rehabilitation &  Therapy  [address]  [phone]  [phone] [] [facility]  Outpatient Rehabilitation &  Therapy  [address]  P: [phone]  F: [phone] [] [facility]  Outpatient Rehabilitation &  Therapy  [address]  P: [phone]  F: [phone] [] [facility]  Outpatient Rehabilitation &  Therapy  [address]  [phone]  [phone] [] [facility]  Outpatient Rehabilitation &  Therapy  [address]   P: [phone]  F: [phone]  [] [facility]  Outpatient Rehabilitation &  Therapy  [address]  P: [phone]  F: [phone] [] [facility]  Outpatient Rehabilitation &  Therapy  [address]  P: [phone]  F: [phone] [] [facility]  Outpatient Rehabilitation &  Therapy  [address]  P: [phone]  F: [phone] [] [facility]  Outpatient Rehabilitation &  Therapy  [address]  P: [phone]  F: [phone]  [] [facility] Outpatient Rehabilitation &  Therapy  [address]  P: [phone]  F: [phone]     Therapy Cancel/No Show note    Date: 2025  Patient: Mia Huggins  : 1965  MRN: [id number]    Cancels/No Shows to date: 0    For today's appointment patient:    [x]  Cancelled    [] Rescheduled appointment    [] No-show     Reason given by patient:    []  Patient ill    []  Conflicting appointment    [] No transportation      [] Conflict with work    [] No reason given    [] Weather related    [] COVID-19    [x] Other:      Comments:  CX pt LM stating that her doctor told her she no longer need PT. They are going to go another direction       [] Next appointment was confirmed    Electronically signed by: Christofer Vega PT

## 2025-05-21 NOTE — THERAPY DISCHARGE
[x] [facility]  Outpatient Rehabilitation &  Therapy  [address]  [phone]  [phone] [] [place]  Outpatient Rehabilitation &  Therapy  [address]  P: [phone]  F: [phone] [] [facility]  Outpatient Rehabilitation &  Therapy  [address]  P: [phone]  F: [phone] [] [facility]  Outpatient Rehabilitation &  Therapy  [address]  [phone]  [phone] [] [facility]  Outpatient Rehabilitation &  Therapy  [address]   P: [phone]  F: [phone]  [] [facility]  Outpatient Rehabilitation &  Therapy  [address]  P: [phone]  F: [phone] [] [facility]  Outpatient Rehabilitation &  Therapy  [address]  P: [phone]  F: [phone] [] [facility]  Outpatient Rehabilitation &  Therapy  [address]  P: [phone]  F: [phone] [] [facility]  Outpatient Rehabilitation &  Therapy  [address]  P: [phone]  F: [phone]  [] [facility] Outpatient Rehabilitation &  Therapy  [address]  P: [phone]  F: [phone]     Physical Therapy Discharge Note    Date: 2025      Patient: Mia Huggins  : 1965  MRN: [id number]    Physician:  Mary Mayer APRN - CNP     Insurance: [employer] MEDICARE PREMIER HMO-POS    Diagnosis: M53.9 - Multilevel degenerative disc disease; M47.26 - Other spondylosis with radiculopathy, lumbar region; M47.22 - Cervical spondylosis with radiculopathy    Onset Date: 2018   Next  Appt: N/A  Total visits attended: 5  Cancels/No shows:   Date of initial visit: 3/20/25                Date of final visit: 25      Subjective:  See recent treatment note in EPIC.    Objective:  See recent treatment note in

## 2025-05-22 ENCOUNTER — OFFICE VISIT (OUTPATIENT)
Age: 60
End: 2025-05-22

## 2025-05-22 VITALS
HEART RATE: 86 BPM | SYSTOLIC BLOOD PRESSURE: 150 MMHG | DIASTOLIC BLOOD PRESSURE: 94 MMHG | OXYGEN SATURATION: 96 % | HEIGHT: 68 IN | TEMPERATURE: 98.3 F | BODY MASS INDEX: 32.43 KG/M2 | WEIGHT: 214 LBS

## 2025-05-22 DIAGNOSIS — L71.9 ROSACEA: ICD-10-CM

## 2025-05-22 DIAGNOSIS — L57.8 ACTINIC SKIN DAMAGE: ICD-10-CM

## 2025-05-22 DIAGNOSIS — L91.8 INFLAMED ACROCHORDON: ICD-10-CM

## 2025-05-22 DIAGNOSIS — D22.9 MULTIPLE NEVI: Primary | ICD-10-CM

## 2025-05-22 DIAGNOSIS — L57.0 ACTINIC KERATOSES: ICD-10-CM

## 2025-05-22 DIAGNOSIS — R20.8 PAIN OF SKIN: ICD-10-CM

## 2025-05-22 DIAGNOSIS — D18.01 CHERRY ANGIOMA: ICD-10-CM

## 2025-05-22 DIAGNOSIS — L21.9 SEBORRHEIC DERMATITIS: ICD-10-CM

## 2025-05-22 RX ORDER — KETOCONAZOLE 20 MG/G
CREAM TOPICAL
Qty: 30 G | Refills: 3 | Status: SHIPPED | OUTPATIENT
Start: 2025-05-22

## 2025-05-23 NOTE — PROGRESS NOTES
LVM FOR PATIENT TO CALL BACK REGARDING RESULTS   
Pt informed.   
and/or style    Diagnoses/exam findings/medical history pertinent to this visit are listed below:    Assessment:   Diagnosis Orders   1. Multiple nevi        2. Actinic skin damage        3. Cherry angioma        4. Rosacea        5. Actinic keratoses        6. Inflamed acrochordon             Plan:  Multiple nevi  - Clinically and dermatoscopically benign on exam today.  - Common nevi have a low individual risk of developing into melanoma. Patients with >50 nevi have a greater risk of developing melanoma in their lifetime and should undergo skin checks at least annually.  - I recommended the patient apply broad spectrum spf 30+ sunscreen daily, reapplying every 2 hours  - In additional to regular use of sunscreen, I recommended broad-rimmed hats, long sleeves, and seeking the shade.      Actinic skin damage  - patient was counseled that UV-damaged skin increases lifetime risk for skin cancer  - I recommended the patient apply broad spectrum spf 30+ sunscreen daily, reapplying every 2 hours.  - In additional to regular use of sunscreen, I recommended broad-rimmed hats, long sleeves, and seeking the shade.       Cherry angiomas of back  - reassurance and education    Rosacea/seb derm overlap  - chronic illness, responding to treatment but not yet at goal  - continue metrocream 0.75% cream twice daily, counseled patient on benefit of consistent application   - initially planned for avar wash but patient has sulfa allergy. Ketoconazole cream instead    Actinic keratoses  Cryotherapy: After verbal consent was obtained including discussion of the risks (lesion persistence, lesion recurrence and hypo/hyperpigmentation) and benefits (resolution of the lesion), and alternative therapies, 1 total Actinic Keratosis on the right cheek were treated with liquid nitrogen in a spray gun for a single 6 second freeze-thaw cycle for each lesion. The patient tolerated the procedure well and there were no immediate

## 2025-05-28 RX ORDER — PRAVASTATIN SODIUM 40 MG
40 TABLET ORAL DAILY
Qty: 90 TABLET | Refills: 1 | Status: SHIPPED | OUTPATIENT
Start: 2025-05-28

## 2025-05-28 NOTE — TELEPHONE ENCOUNTER
disease, without long-term current use of insulin ([facility])     Chronic fatigue syndrome     Nuclear senile cataract     CKD (chronic kidney disease), stage II     Scleroderma ([facility])     Vitamin D deficiency     Osteoarthritis of spine with radiculopathy, cervical region     Degenerative disc disease, cervical     Left carpal tunnel syndrome     Neural foraminal stenosis of cervical spine     Acquired hypothyroidism     Age-related nuclear cataract of both eyes     Iron deficiency anemia     CKD stage 3a, GFR 45-59 ml/min ([facility])     Dupuytren's disease of palm     Osteoporosis     Raynaud's disease without gangrene     Peripheral polyneuropathy

## 2025-06-25 ENCOUNTER — HOSPITAL ENCOUNTER (OUTPATIENT)
Age: 60
Discharge: HOME | End: 2025-06-25
Payer: MEDICARE

## 2025-06-25 DIAGNOSIS — Z86.39 HISTORY OF HYPOKALEMIA: ICD-10-CM

## 2025-06-25 DIAGNOSIS — E83.42 HYPOMAGNESEMIA: ICD-10-CM

## 2025-06-25 DIAGNOSIS — N18.31 STAGE 3A CHRONIC KIDNEY DISEASE (HCC): ICD-10-CM

## 2025-06-25 LAB
25(OH)D3 SERPL-MCNC: 41.5 NG/ML (ref 30–100)
ANION GAP SERPL CALCULATED.3IONS-SCNC: 13 MMOL/L (ref 9–16)
BASOPHILS # BLD: 0.06 K/UL (ref 0–0.2)
BASOPHILS NFR BLD: 2 % (ref 0–2)
BUN SERPL-MCNC: 7 MG/DL (ref 6–20)
CA-I BLD-SCNC: 1.17 MMOL/L (ref 1.13–1.33)
CALCIUM SERPL-MCNC: 9.6 MG/DL (ref 8.6–10.4)
CHLORIDE SERPL-SCNC: 102 MMOL/L (ref 98–107)
CO2 SERPL-SCNC: 23 MMOL/L (ref 20–31)
CREAT SERPL-MCNC: 1 MG/DL (ref 0.7–1.2)
CREAT UR-MCNC: 45 MG/DL (ref 28–217)
CREAT UR-MCNC: 45.2 MG/DL (ref 28–217)
EOSINOPHIL # BLD: 0.06 K/UL (ref 0–0.44)
EOSINOPHILS RELATIVE PERCENT: 2 % (ref 0–4)
ERYTHROCYTE [DISTWIDTH] IN BLOOD BY AUTOMATED COUNT: 14.2 % (ref 11.5–14.9)
GFR, ESTIMATED: 65 ML/MIN/1.73M2
GLUCOSE SERPL-MCNC: 106 MG/DL (ref 74–99)
HCT VFR BLD AUTO: 38.4 % (ref 36–46)
HGB BLD-MCNC: 13 G/DL (ref 12–16)
IMM GRANULOCYTES # BLD AUTO: <0.03 K/UL (ref 0–0.3)
IMM GRANULOCYTES NFR BLD: 0 %
LYMPHOCYTES NFR BLD: 1.69 K/UL (ref 1.1–3.7)
LYMPHOCYTES RELATIVE PERCENT: 47 % (ref 24–44)
MAGNESIUM SERPL-MCNC: 2 MG/DL (ref 1.6–2.6)
MCH RBC QN AUTO: 33.6 PG (ref 26–34)
MCHC RBC AUTO-ENTMCNC: 33.9 G/DL (ref 31–37)
MCV RBC AUTO: 99.2 FL (ref 80–100)
MONOCYTES NFR BLD: 0.41 K/UL (ref 0.1–1.2)
MONOCYTES NFR BLD: 12 % (ref 3–12)
NEUTROPHILS NFR BLD: 37 % (ref 36–66)
NEUTS SEG NFR BLD: 1.33 K/UL (ref 1.5–8.1)
NRBC BLD-RTO: 0 PER 100 WBC
PHOSPHATE SERPL-MCNC: 3.4 MG/DL (ref 2.5–4.5)
PLATELET # BLD AUTO: 202 K/UL (ref 150–450)
PMV BLD AUTO: 9.5 FL (ref 8–13.5)
POTASSIUM SERPL-SCNC: 4.2 MMOL/L (ref 3.7–5.3)
PTH-INTACT SERPL-MCNC: 108 PG/ML (ref 17.9–58.6)
RBC # BLD AUTO: 3.87 M/UL (ref 3.95–5.11)
SODIUM SERPL-SCNC: 138 MMOL/L (ref 136–145)
TOTAL PROTEIN, URINE: <4 MG/DL
TOTAL PROTEIN, URINE: <4 MG/DL
URINE TOTAL PROTEIN CREATININE RATIO: 0.11
WBC OTHER # BLD: 3.6 K/UL (ref 3.5–11)

## 2025-06-25 PROCEDURE — 84156 ASSAY OF PROTEIN URINE: CPT

## 2025-06-25 PROCEDURE — 84100 ASSAY OF PHOSPHORUS: CPT

## 2025-06-25 PROCEDURE — 85025 COMPLETE CBC W/AUTO DIFF WBC: CPT

## 2025-06-25 PROCEDURE — 80048 BASIC METABOLIC PNL TOTAL CA: CPT

## 2025-06-25 PROCEDURE — 82570 ASSAY OF URINE CREATININE: CPT

## 2025-06-25 PROCEDURE — 83735 ASSAY OF MAGNESIUM: CPT

## 2025-06-25 PROCEDURE — 82306 VITAMIN D 25 HYDROXY: CPT

## 2025-06-25 PROCEDURE — 82330 ASSAY OF CALCIUM: CPT

## 2025-06-25 PROCEDURE — 83970 ASSAY OF PARATHORMONE: CPT

## 2025-06-25 PROCEDURE — 36415 COLL VENOUS BLD VENIPUNCTURE: CPT

## 2025-07-11 ENCOUNTER — HOSPITAL ENCOUNTER (OUTPATIENT)
Age: 60
Discharge: HOME | End: 2025-07-11
Payer: MEDICARE

## 2025-07-11 LAB
EST. AVERAGE GLUCOSE BLD GHB EST-MCNC: 97 MG/DL
HBA1C MFR BLD: 5 % (ref 4–6)
T4 FREE SERPL-MCNC: 0.7 NG/DL (ref 0.9–1.7)

## 2025-07-11 PROCEDURE — 84439 ASSAY OF FREE THYROXINE: CPT

## 2025-07-11 PROCEDURE — 36415 COLL VENOUS BLD VENIPUNCTURE: CPT

## 2025-07-11 PROCEDURE — 83036 HEMOGLOBIN GLYCOSYLATED A1C: CPT

## 2025-07-15 RX ORDER — CYANOCOBALAMIN 1000 UG/ML
INJECTION, SOLUTION INTRAMUSCULAR; SUBCUTANEOUS
Qty: 3 ML | Refills: 11 | Status: SHIPPED | OUTPATIENT
Start: 2025-07-15 | End: 2025-07-15 | Stop reason: SDUPTHER

## 2025-07-15 RX ORDER — CYANOCOBALAMIN 1000 UG/ML
INJECTION, SOLUTION INTRAMUSCULAR; SUBCUTANEOUS
Qty: 3 ML | Refills: 11 | Status: SHIPPED | OUTPATIENT
Start: 2025-07-15

## 2025-08-29 ENCOUNTER — OFFICE VISIT (OUTPATIENT)
Dept: FAMILY MEDICINE CLINIC | Age: 60
End: 2025-08-29

## 2025-08-29 VITALS
SYSTOLIC BLOOD PRESSURE: 110 MMHG | BODY MASS INDEX: 32.58 KG/M2 | DIASTOLIC BLOOD PRESSURE: 70 MMHG | WEIGHT: 215 LBS | OXYGEN SATURATION: 97 % | HEART RATE: 84 BPM | HEIGHT: 68 IN

## 2025-08-29 DIAGNOSIS — Z00.00 MEDICARE ANNUAL WELLNESS VISIT, SUBSEQUENT: Primary | ICD-10-CM

## 2025-08-29 ASSESSMENT — PATIENT HEALTH QUESTIONNAIRE - PHQ9
8. MOVING OR SPEAKING SO SLOWLY THAT OTHER PEOPLE COULD HAVE NOTICED. OR THE OPPOSITE, BEING SO FIGETY OR RESTLESS THAT YOU HAVE BEEN MOVING AROUND A LOT MORE THAN USUAL: NOT AT ALL
2. FEELING DOWN, DEPRESSED OR HOPELESS: NOT AT ALL
SUM OF ALL RESPONSES TO PHQ QUESTIONS 1-9: 0
3. TROUBLE FALLING OR STAYING ASLEEP: NOT AT ALL
9. THOUGHTS THAT YOU WOULD BE BETTER OFF DEAD, OR OF HURTING YOURSELF: NOT AT ALL
7. TROUBLE CONCENTRATING ON THINGS, SUCH AS READING THE NEWSPAPER OR WATCHING TELEVISION: NOT AT ALL
6. FEELING BAD ABOUT YOURSELF - OR THAT YOU ARE A FAILURE OR HAVE LET YOURSELF OR YOUR FAMILY DOWN: NOT AT ALL
SUM OF ALL RESPONSES TO PHQ QUESTIONS 1-9: 0
SUM OF ALL RESPONSES TO PHQ QUESTIONS 1-9: 0
5. POOR APPETITE OR OVEREATING: NOT AT ALL
SUM OF ALL RESPONSES TO PHQ QUESTIONS 1-9: 0
4. FEELING TIRED OR HAVING LITTLE ENERGY: NOT AT ALL
1. LITTLE INTEREST OR PLEASURE IN DOING THINGS: NOT AT ALL
10. IF YOU CHECKED OFF ANY PROBLEMS, HOW DIFFICULT HAVE THESE PROBLEMS MADE IT FOR YOU TO DO YOUR WORK, TAKE CARE OF THINGS AT HOME, OR GET ALONG WITH OTHER PEOPLE: NOT DIFFICULT AT ALL

## 2025-09-03 RX ORDER — GABAPENTIN 600 MG/1
TABLET ORAL
Qty: 180 TABLET | Refills: 5 | Status: SHIPPED | OUTPATIENT
Start: 2025-09-03 | End: 2026-03-02

## (undated) DEVICE — PACK PROCEDURE SURG SVMMC TOT KNEE

## (undated) DEVICE — SUTURE VCRL + SZ 4-0 L27IN ABSRB WHT FS-2 3/8 CIR REV CUT VCP422H

## (undated) DEVICE — GLOVE ORANGE PI 8 1/2   MSG9085

## (undated) DEVICE — YANKAUER,BULB TIP,W/O VENT,RIGID,STERILE: Brand: MEDLINE

## (undated) DEVICE — SYRINGE IRRIG 60ML SFT PLIABLE BLB EZ TO GRP 1 HND USE W/

## (undated) DEVICE — TRAY NRV BLK 1 CATHETER CONN CLMP STYL

## (undated) DEVICE — Z DUPLICATE USE 2527422 TUBING O2 STD 7 FT EXTN NO CRUSH VISUAL CNTRST LF

## (undated) DEVICE — TUBING, SUCTION, 3/16" X 10', STRAIGHT: Brand: MEDLINE

## (undated) DEVICE — GLOVE ORANGE PI 8   MSG9080

## (undated) DEVICE — GOWN,AURORA,NONREINFORCED,LARGE: Brand: MEDLINE

## (undated) DEVICE — SUTURE VCRL SZ 1 L36IN ABSRB UD L48MM CTXB 1/2 CIR BLNT PNT JB977H

## (undated) DEVICE — SUTURE STRATAFIX SPRL SZ 1 L14IN ABSRB VLT L48CM CTX 1/2 SXPD2B405

## (undated) DEVICE — GLOVE SURG SZ 75 STD WHT LTX SYN POLYMER BEAD REINF ANTI RL

## (undated) DEVICE — DRESSING,GAUZE,XEROFORM,CURAD,1"X8",ST: Brand: CURAD

## (undated) DEVICE — BIT DRL L145MM DIA4.2MM NONSTERILE 3 FLUT NDL PNT QUIK CPL

## (undated) DEVICE — PENCIL ES L3M BTTN SWCH HOLSTER W/ BLDE ELECTRD EDGE

## (undated) DEVICE — JELLY,LUBE,STERILE,FLIP TOP,TUBE,2-OZ: Brand: MEDLINE

## (undated) DEVICE — DRAPE C ARM UNIV W41XL74IN CLR PLAS XR VELC CLSR POLY STRP

## (undated) DEVICE — GLOVE ORANGE PI 7 1/2   MSG9075

## (undated) DEVICE — SOLUTION IV IRRIG POUR BRL 0.9% SODIUM CHL 2F7124

## (undated) DEVICE — STOCKINETTE,IMPERVIOUS,12X48,STERILE: Brand: MEDLINE

## (undated) DEVICE — SUTURE STRATAFIX SPRL SZ 3-0 L5IN ABSRB UD FS L26MM 3/8 CIR SXMP2B411

## (undated) DEVICE — SUTURE NONABSORBABLE MONOFILAMENT 3-0 PS-1 18 IN BLK ETHILON 1663H

## (undated) DEVICE — DRAINBAG,ANTI-REFLUX TOWER,L/F,2000ML,LL: Brand: MEDLINE

## (undated) DEVICE — GOWN,POLY REINFORCED,LG: Brand: MEDLINE

## (undated) DEVICE — 12 ML SYRINGE,LUER-LOCK TIP: Brand: MONOJECT

## (undated) DEVICE — TRAY CATHETER 16FR F INCLUDE BARDX IC COMPLT CARE DRNGE BG

## (undated) DEVICE — SYRINGE MED 50ML LUERSLIP TIP

## (undated) DEVICE — Z DISCONTINUED BY MEDLINE USE 2711682 TRAY SKIN PREP DRY W/ PREM GLV

## (undated) DEVICE — GAUZE,SPONGE,4"X4",16PLY,XRAY,STRL,LF: Brand: MEDLINE

## (undated) DEVICE — Device

## (undated) DEVICE — SUTURE VCRL + SZ 2-0 L27IN ABSRB UD CT-2 L26MM 1/2 CIR TAPR VCP269H

## (undated) DEVICE — Z DISCONTINUED USE 2271144 DRAIN SURG W0.25XL18IN PRECUT UNIF WALL THICKNESS PENROSE

## (undated) DEVICE — CORD,CAUTERY,BIPOLAR,STERILE: Brand: MEDLINE

## (undated) DEVICE — CHLORAPREP 26ML ORANGE

## (undated) DEVICE — 3 BONE CEMENT MIXER: Brand: MIXEVAC

## (undated) DEVICE — LIMB HOLDER, WRIST/ANKLE: Brand: DEROYAL

## (undated) DEVICE — GLOVE SURG SZ 65 THK91MIL LTX FREE SYN POLYISOPRENE

## (undated) DEVICE — 3M™ WARMING BLANKET, UPPER BODY, 10 PER CASE, 42268: Brand: BAIR HUGGER™

## (undated) DEVICE — SUTURE ABSRB BRAID COAT UD CP NO 2 27IN VCRL J195H

## (undated) DEVICE — SUTURE STRATAFIX SPRL SZ 2-0 L9IN ABSRB VLT MH L36MM 1/2 SXPD2B408

## (undated) DEVICE — SUTURE ETHLN SZ 3-0 L18IN NONABSORBABLE BLK L24MM PS-1 3/8 1663G

## (undated) DEVICE — GOWN,AURORA,NONRNF,XL,30/CS: Brand: MEDLINE

## (undated) DEVICE — BNDG,ELSTC,MATRIX,STRL,2"X5YD,LF,HOOK&LP: Brand: MEDLINE

## (undated) DEVICE — ZIMMER® STERILE DISPOSABLE TOURNIQUET CUFF WITH PROTECTIVE SLEEVE AND PLC, DUAL PORT, SINGLE BLADDER, 18 IN. (46 CM)

## (undated) DEVICE — SVMMC HND

## (undated) DEVICE — INTENDED FOR TISSUE SEPARATION, AND OTHER PROCEDURES THAT REQUIRE A SHARP SURGICAL BLADE TO PUNCTURE OR CUT.: Brand: BARD-PARKER ® CARBON RIB-BACK BLADES

## (undated) DEVICE — SYRINGE, LUER LOCK, 10ML: Brand: MEDLINE

## (undated) DEVICE — TUBE IRRIG HNDPC HI FLO TP INTRPULS W/SUCTION TUBE

## (undated) DEVICE — DEFENDO AIR WATER SUCTION AND BIOPSY VALVE KIT FOR  OLYMPUS: Brand: DEFENDO AIR/WATER/SUCTION AND BIOPSY VALVE

## (undated) DEVICE — NEEDLE HYPO 21GA L1.5IN INTRAMUSCULAR S STL LATCH BVL UP

## (undated) DEVICE — 3M™ IOBAN™ 2 ANTIMICROBIAL INCISE DRAPE 6651EZ: Brand: IOBAN™ 2

## (undated) DEVICE — GOWN,SIRUS,NONRNF,SETINSLV,XL,20/CS: Brand: MEDLINE

## (undated) DEVICE — SUTURE VIC + ABS BR UD X1 2-0 27IN VCP459H

## (undated) DEVICE — Z DISCONTINUED USE 2275686 GLOVE SURG SZ 8 L12IN FNGR THK13MIL WHT ISOLEX POLYISOPRENE

## (undated) DEVICE — SUTURE ETHLN SZ 3-0 L18IN NONABSORBABLE BLK FS-1 L24MM 3/8 663H

## (undated) DEVICE — PREP SOL PVP IODINE 4%  4 OZ/BTL

## (undated) DEVICE — BNDG,ELSTC,MATRIX,STRL,6"X5YD,LF,HOOK&LP: Brand: MEDLINE

## (undated) DEVICE — STERILE PATIENT PROTECTIVE PAD FOR IMP® KNEE POSITIONERS & COHESIVE WRAP (10 / CASE): Brand: DE MAYO KNEE POSITIONER®

## (undated) DEVICE — TUBING AMB

## (undated) DEVICE — ST CHARLES PERI-GYN PACK: Brand: MEDLINE INDUSTRIES, INC.

## (undated) DEVICE — GUIDEWIRE ORTH L290MM DIA3.2MM FOR RG AG EXPERT FEM NAILING

## (undated) DEVICE — 3 ML SYRINGE LUER-LOCK TIP: Brand: MONOJECT

## (undated) DEVICE — GAUZE,SPONGE,FLUFF,6"X6.75",STRL,5/TRAY: Brand: MEDLINE

## (undated) DEVICE — GLOVE EXAM M L95IN FNGR THK35MIL PALM THK24MIL OFF WHT

## (undated) DEVICE — NEEDLE FLTR 18GA L1.5IN MEM THK5UM BLNT DISP

## (undated) DEVICE — CONTAINER,SPECIMEN,4OZ,OR STRL: Brand: MEDLINE

## (undated) DEVICE — PROTECTOR ULN NRV PUR FOAM HK LOOP STRP ANATOMICALLY

## (undated) DEVICE — BIT DRL L330MM DIA4.2MM CALIB 100MM 3 FLUT QUIK CPL

## (undated) DEVICE — SKIN AFFIX SURG ADHESIVE 72/CS 0.55ML: Brand: MEDLINE

## (undated) DEVICE — DRAPE,U/ SHT,SPLIT,PLAS,STERIL: Brand: MEDLINE

## (undated) DEVICE — Z INACTIVE USE 2635503 SOLUTION IRRIG 3000ML ST H2O USP UROMATIC PLAS CONT

## (undated) DEVICE — DRESSING FOAM SELF ADH 20X10 CM ABSORBENT MEPILEX BORDER

## (undated) DEVICE — TOWEL,OR,DSP,ST,NATURAL,DLX,4/PK,20PK/CS: Brand: MEDLINE

## (undated) DEVICE — Z DISCONTINUED USE 2423037 APPLICATOR MEDICATED 3 CC CHLORHEXIDINE GLUC 2% CHLORAPREP

## (undated) DEVICE — SUTURE VCRL + SZ 3-0 L27IN ABSRB UD L26MM SH 1/2 CIR VCP416H

## (undated) DEVICE — TRAY CATH 16FR F INCLUDE LUB DRNGE BG STATLOK STBL DEV

## (undated) DEVICE — CATHETER URETH 16FR BLLN 5CC SIL ALLY W/ SIL HYDRGEL 2 W F

## (undated) DEVICE — Z INACTIVE USE 2525529 CONNECTOR TBNG FOR O2

## (undated) DEVICE — YANKAUER,FLEXIBLE HANDLE,REGLR CAPACITY: Brand: MEDLINE INDUSTRIES, INC.

## (undated) DEVICE — DECANTER FLD 9IN ST BG FOR ASEP TRNSF OF FLD

## (undated) DEVICE — PADDING,UNDERCAST,COTTON, 3X4YD STERILE: Brand: MEDLINE

## (undated) DEVICE — PADDING CAST W6INXL4YD COT LO LINTING WYTEX

## (undated) DEVICE — NEEDLE EPI L4IN DIA20GA 30DEG BVL NONINSULATED ECHOGENIC

## (undated) DEVICE — CATHETER FOL 2 W 12 FR 5 CC URETH SPEC TIEM BARDX IC

## (undated) DEVICE — CANNULA IV 18GA L15IN BLNT FILL LUERLOCK HUB MJCT

## (undated) DEVICE — GOWN,SIRUS,POLYRNF,BRTHSLV,XL,30/CS: Brand: MEDLINE

## (undated) DEVICE — OSCILLATING TIP SAW CARTRIDGE: Brand: PRECISION FALCON

## (undated) DEVICE — SPONGE LAP W18XL18IN WHT COT 4 PLY FLD STRUNG RADPQ DISP ST

## (undated) DEVICE — C-ARMOR C-ARM EQUIPMENT COVERS CLEAR STERILE UNIVERSAL FIT 12 PER CASE: Brand: C-ARMOR

## (undated) DEVICE — DISCONTINUED USE 394504 SYRINGE LUER LOCK TIP 12 ML

## (undated) DEVICE — ZIMMER® STERILE DISPOSABLE TOURNIQUET CUFF WITH PROTECTIVE SLEEVE AND PLC, DUAL PORT, SINGLE BLADDER, 24 IN. (61 CM)

## (undated) DEVICE — 3M™ TEGADERM™ TRANSPARENT FILM DRESSING FRAME STYLE, 1626W, 4 IN X 4-3/4 IN (10 CM X 12 CM), 50/CT 4CT/CASE: Brand: 3M™ TEGADERM™

## (undated) DEVICE — PADDING CAST W2INXL4YD ST COT COHESIVE HND TEARABLE SPEC

## (undated) DEVICE — COUNTER NDL 10 COUNT HLD 20 FOAM BLK SGL MAG

## (undated) DEVICE — GLOVE ORANGE PI 7   MSG9070

## (undated) DEVICE — DISPOSABLE BIPOLAR CABLE, 12FT (3.6M): Brand: KIRWAN

## (undated) DEVICE — ZIMMER® STERILE DISPOSABLE TOURNIQUET CUFF, DUAL PORT, SINGLE BLADDER, 34 IN. (86 CM)

## (undated) DEVICE — POUCH INSTR W6.75XL11.5IN FRST 2 PKT ADH FOR ORTH AND

## (undated) DEVICE — ST CHARLES CYSTO PACK: Brand: MEDLINE INDUSTRIES, INC.

## (undated) DEVICE — HYPODERMIC SAFETY NEEDLE: Brand: MAGELLAN

## (undated) DEVICE — GLOVE SURG SZ 8 L12IN FNGR THK75MIL WHT LTX POLYMER BEAD

## (undated) DEVICE — ROD RMR L950MM DIA2.5MM W/ EXTN BALL TIP

## (undated) DEVICE — CANNULA NSL AD TBNG L7FT PVC STR NONFLARED PRNG O2 DEL W STD

## (undated) DEVICE — Z DISCONTINUED USE 2424143 ADAPTER O2 SWVL CHRISTMAS TREE GRN

## (undated) DEVICE — TUBING, SUCTION, 9/32" X 20', STRAIGHT: Brand: MEDLINE INDUSTRIES, INC.

## (undated) DEVICE — 2HR18 2-0 U MND 16X16 13MM LDR: Brand: 2HR18 2-0 U MND 16X16 13MM LDR

## (undated) DEVICE — Z CONVERTED USE 2162213 APPLICATOR PREP 4OZ CHG 4% BACTOSHIELD USE FOR HND WSH AND